# Patient Record
Sex: FEMALE | Race: WHITE | NOT HISPANIC OR LATINO | Employment: OTHER | URBAN - METROPOLITAN AREA
[De-identification: names, ages, dates, MRNs, and addresses within clinical notes are randomized per-mention and may not be internally consistent; named-entity substitution may affect disease eponyms.]

---

## 2017-01-13 DIAGNOSIS — M54.50 CHRONIC MIDLINE LOW BACK PAIN WITHOUT SCIATICA: ICD-10-CM

## 2017-01-13 DIAGNOSIS — G89.29 CHRONIC MIDLINE LOW BACK PAIN WITHOUT SCIATICA: ICD-10-CM

## 2017-01-13 RX ORDER — HYDROCODONE BITARTRATE AND ACETAMINOPHEN 10; 325 MG/1; MG/1
2 TABLET ORAL EVERY 6 HOURS PRN
Qty: 240 TABLET | Refills: 0 | Status: SHIPPED | OUTPATIENT
Start: 2017-01-13 | End: 2017-02-15 | Stop reason: SDUPTHER

## 2017-01-13 NOTE — TELEPHONE ENCOUNTER
Pt is not able to come in this month and is wanting to know if you would refill her pain medication this month and her come in next month. Please advise

## 2017-01-16 RX ORDER — LEVOFLOXACIN 500 MG/1
TABLET, FILM COATED ORAL
Qty: 14 TABLET | Refills: 0 | Status: SHIPPED | OUTPATIENT
Start: 2017-01-16 | End: 2017-01-30 | Stop reason: SDUPTHER

## 2017-01-24 RX ORDER — ERGOCALCIFEROL 1.25 MG/1
CAPSULE ORAL
Qty: 12 CAPSULE | Refills: 3 | Status: SHIPPED | OUTPATIENT
Start: 2017-01-24 | End: 2017-05-08 | Stop reason: HOSPADM

## 2017-01-30 RX ORDER — LEVOFLOXACIN 500 MG/1
TABLET, FILM COATED ORAL
Qty: 14 TABLET | Refills: 0 | Status: SHIPPED | OUTPATIENT
Start: 2017-01-30 | End: 2017-02-15

## 2017-02-06 DIAGNOSIS — E03.9 HYPOTHYROIDISM, ACQUIRED: ICD-10-CM

## 2017-02-06 RX ORDER — LEVOTHYROXINE SODIUM 0.2 MG/1
TABLET ORAL
Qty: 30 TABLET | Refills: 10 | Status: SHIPPED | OUTPATIENT
Start: 2017-02-06 | End: 2017-05-08 | Stop reason: HOSPADM

## 2017-02-15 ENCOUNTER — OFFICE VISIT (OUTPATIENT)
Dept: FAMILY MEDICINE CLINIC | Facility: CLINIC | Age: 62
End: 2017-02-15

## 2017-02-15 VITALS
BODY MASS INDEX: 43.39 KG/M2 | SYSTOLIC BLOOD PRESSURE: 130 MMHG | OXYGEN SATURATION: 96 % | DIASTOLIC BLOOD PRESSURE: 80 MMHG | HEART RATE: 84 BPM | WEIGHT: 270 LBS | HEIGHT: 66 IN | RESPIRATION RATE: 16 BRPM | TEMPERATURE: 97.5 F

## 2017-02-15 DIAGNOSIS — IMO0002 UNCONTROLLED TYPE 2 DIABETES MELLITUS WITH DIABETIC POLYNEUROPATHY, WITH LONG-TERM CURRENT USE OF INSULIN: Primary | ICD-10-CM

## 2017-02-15 DIAGNOSIS — F41.1 GAD (GENERALIZED ANXIETY DISORDER): ICD-10-CM

## 2017-02-15 DIAGNOSIS — I10 ESSENTIAL HYPERTENSION: ICD-10-CM

## 2017-02-15 DIAGNOSIS — I50.42 CHRONIC COMBINED SYSTOLIC AND DIASTOLIC CONGESTIVE HEART FAILURE (HCC): ICD-10-CM

## 2017-02-15 DIAGNOSIS — J41.8 MIXED SIMPLE AND MUCOPURULENT CHRONIC BRONCHITIS (HCC): ICD-10-CM

## 2017-02-15 DIAGNOSIS — F34.1 DYSTHYMIA: ICD-10-CM

## 2017-02-15 DIAGNOSIS — M81.0 OSTEOPOROSIS: ICD-10-CM

## 2017-02-15 DIAGNOSIS — M54.50 CHRONIC MIDLINE LOW BACK PAIN WITHOUT SCIATICA: ICD-10-CM

## 2017-02-15 DIAGNOSIS — G89.29 CHRONIC MIDLINE LOW BACK PAIN WITHOUT SCIATICA: ICD-10-CM

## 2017-02-15 DIAGNOSIS — E03.9 HYPOTHYROIDISM, ACQUIRED: ICD-10-CM

## 2017-02-15 DIAGNOSIS — R60.9 PERIPHERAL EDEMA: ICD-10-CM

## 2017-02-15 DIAGNOSIS — E78.2 MIXED HYPERLIPIDEMIA: ICD-10-CM

## 2017-02-15 PROCEDURE — 99214 OFFICE O/P EST MOD 30 MIN: CPT | Performed by: PHYSICIAN ASSISTANT

## 2017-02-15 RX ORDER — BUMETANIDE 2 MG/1
2 TABLET ORAL DAILY
Qty: 90 TABLET | Refills: 1 | Status: SHIPPED | OUTPATIENT
Start: 2017-02-15 | End: 2017-05-08 | Stop reason: HOSPADM

## 2017-02-15 RX ORDER — METOPROLOL TARTRATE 50 MG/1
50 TABLET, FILM COATED ORAL 2 TIMES DAILY
Qty: 180 TABLET | Refills: 1 | Status: SHIPPED | OUTPATIENT
Start: 2017-02-15 | End: 2017-08-25 | Stop reason: SDUPTHER

## 2017-02-15 RX ORDER — ATORVASTATIN CALCIUM 40 MG/1
40 TABLET, FILM COATED ORAL DAILY
Qty: 90 TABLET | Refills: 3 | Status: SHIPPED | OUTPATIENT
Start: 2017-02-15 | End: 2018-03-14 | Stop reason: SDUPTHER

## 2017-02-15 RX ORDER — HYDROCODONE BITARTRATE AND ACETAMINOPHEN 10; 325 MG/1; MG/1
TABLET ORAL
Qty: 90 TABLET | Refills: 0
Start: 2017-02-15 | End: 2017-03-13 | Stop reason: SDUPTHER

## 2017-02-15 RX ORDER — SPIRONOLACTONE 50 MG/1
50 TABLET, FILM COATED ORAL DAILY
Qty: 90 TABLET | Refills: 1 | Status: ON HOLD | OUTPATIENT
Start: 2017-02-15 | End: 2017-10-16

## 2017-02-15 RX ORDER — ALENDRONATE SODIUM 70 MG/1
70 TABLET ORAL
Qty: 12 TABLET | Refills: 3 | Status: SHIPPED | OUTPATIENT
Start: 2017-02-15 | End: 2017-08-22

## 2017-02-15 NOTE — PATIENT INSTRUCTIONS
Low glycemic index diet  Exercise 30 minutes most days of the week  Make sure you get results on any labs or tests we ordered today  We discussed medications and how to take them as prescribed  Sleep 6-8 hours each night if possible  If you have not signed up for [employer], please activate your code ASAP from your After Visit Summary today    LDL goal <100  LDL goal if heart disease <70  HDL goal >60  Triglyceride goal <150  BP goal =<130/80  Fasting glucose <100    Warned patient that this medication can cause drowsiness and impair them operating machinery, including driving a car.  Caution is advised.

## 2017-02-15 NOTE — PROGRESS NOTES
Subjective   Jared Govea is a 61 y.o. female.     History of Present Illness   Jared Govea 61 y.o. female who presents today for routine follow up check and medication refills.  she has a history of   Patient Active Problem List   Diagnosis   • SERENA (generalized anxiety disorder)   • Chronic back pain   • CHF (congestive heart failure)   • COPD (chronic obstructive pulmonary disease)   • Depression   • Diabetes type 2, uncontrolled   • Swelling   • HLD (hyperlipidemia)   • Essential hypertension   • Hypothyroidism, acquired   • Osteoporosis   • Primary insomnia   .  Since the last visit, she has overall felt tired.  She has Hypertenision and is well controlled on medication.  she has been compliant with current medications have reviewed them.  The patient denies medication side effects.    She declines mammo    Jared Govea female 61 y.o. who presents today for follow up of Depression.  She reports medication is working well, patient desires to continue on Rx, and needs refill. Onset of symptoms was approximately several years ago.  She denies current suicidal and homicidal ideation. Risk factors are chronic illness.  Previous treatment includes current Rx.  She complains of the following medication side effects: none.  The patient has had no previous counseling..    Patient comes in today to see me for the first time. Is a long term patient of Dr. Ventura. Seeing me today due to Dr. Ventura no longer working here. Discussed with pt today that I will not be writing pain medications or benzodiazepines for them past a 90 day window and that we will be aggressively decreasing doses along the way. They have a choice of being sent to pain management for any narcotics taken, if they wish. I will be getting them off of any benzos.they are taking, or they can see a psychiatrist to discuss this further (list of names and phone numbers given to pt today). They are also aware that they are free to transfer their care to a  different facility before the 90 days are up if they wish. After that point, they are aware that they will no longer be seen in this facility by our doctors. Again, reiterated the fact that those types of medications will not be written here past 90 days, and pt voices understanding.    The following portions of the patient's history were reviewed and updated as appropriate: allergies, current medications, past family history, past medical history, past social history, past surgical history and problem list.    Review of Systems   Constitutional: Negative for activity change, appetite change and unexpected weight change.   HENT: Positive for mouth sores. Negative for nosebleeds and trouble swallowing.    Eyes: Negative for pain and visual disturbance.   Respiratory: Negative for chest tightness, shortness of breath and wheezing.    Cardiovascular: Positive for leg swelling. Negative for chest pain and palpitations.   Gastrointestinal: Positive for diarrhea and vomiting. Negative for abdominal pain and blood in stool.   Endocrine: Negative.    Genitourinary: Negative for difficulty urinating and hematuria.   Musculoskeletal: Negative for joint swelling.   Skin: Negative for color change and rash.   Allergic/Immunologic: Negative.    Neurological: Positive for light-headedness. Negative for syncope and speech difficulty.   Hematological: Negative for adenopathy. Bruises/bleeds easily.   Psychiatric/Behavioral: Negative for agitation and confusion.   All other systems reviewed and are negative.      Objective   Physical Exam   Constitutional: She is oriented to person, place, and time. She appears well-developed and well-nourished. No distress.   HENT:   Head: Normocephalic and atraumatic.   Eyes: Conjunctivae and EOM are normal. Pupils are equal, round, and reactive to light. Right eye exhibits no discharge. Left eye exhibits no discharge. No scleral icterus.   Neck: Normal range of motion. Neck supple. No tracheal  deviation present. No thyromegaly present.   Cardiovascular: Normal rate, regular rhythm, normal heart sounds, intact distal pulses and normal pulses.  Exam reveals no gallop.    No murmur heard.  2+ edema   Pulmonary/Chest: Effort normal and breath sounds normal. No respiratory distress. She has no wheezes. She has no rales.   Musculoskeletal: Normal range of motion.   Neurological: She is alert and oriented to person, place, and time. She exhibits normal muscle tone. Coordination normal.   Skin: Skin is warm. No rash noted. No erythema. No pallor.   Psychiatric: She has a normal mood and affect. Her behavior is normal. Judgment and thought content normal.   Nursing note and vitals reviewed.      Assessment/Plan   Problems Addressed this Visit        Cardiovascular and Mediastinum    CHF (congestive heart failure)    Relevant Medications    spironolactone (ALDACTONE) 50 MG tablet    metoprolol tartrate (LOPRESSOR) 50 MG tablet    Other Relevant Orders    Comprehensive metabolic panel    Lipid panel    CBC and Differential    TSH    T4, Free    Hemoglobin A1c    C-Peptide    Microalbumin / creatinine, urine ratio    Phosphorus    Vitamin D 25 Hydroxy    Ambulatory Referral to Cardiology    Ambulatory Referral to Nephrology    Ambulatory Referral to Pain Management    Ferritin    HLD (hyperlipidemia)    Relevant Medications    atorvastatin (LIPITOR) 40 MG tablet    Other Relevant Orders    Comprehensive metabolic panel    Lipid panel    CBC and Differential    TSH    T4, Free    Hemoglobin A1c    C-Peptide    Microalbumin / creatinine, urine ratio    Phosphorus    Vitamin D 25 Hydroxy    Ambulatory Referral to Cardiology    Ambulatory Referral to Nephrology    Ambulatory Referral to Pain Management    Ferritin    Essential hypertension    Relevant Medications    spironolactone (ALDACTONE) 50 MG tablet    metoprolol tartrate (LOPRESSOR) 50 MG tablet    bumetanide (BUMEX) 2 MG tablet    Other Relevant Orders     Comprehensive metabolic panel    Lipid panel    CBC and Differential    TSH    T4, Free    Hemoglobin A1c    C-Peptide    Microalbumin / creatinine, urine ratio    Phosphorus    Vitamin D 25 Hydroxy    Ambulatory Referral to Cardiology    Ambulatory Referral to Nephrology    Ambulatory Referral to Pain Management    Ferritin       Respiratory    COPD (chronic obstructive pulmonary disease)    Relevant Orders    Comprehensive metabolic panel    Lipid panel    CBC and Differential    TSH    T4, Free    Hemoglobin A1c    C-Peptide    Microalbumin / creatinine, urine ratio    Phosphorus    Vitamin D 25 Hydroxy    Ambulatory Referral to Cardiology    Ambulatory Referral to Nephrology    Ambulatory Referral to Pain Management    Ferritin       Endocrine    Diabetes type 2, uncontrolled - Primary    Relevant Orders    Comprehensive metabolic panel    Lipid panel    CBC and Differential    TSH    T4, Free    Hemoglobin A1c    C-Peptide    Microalbumin / creatinine, urine ratio    Phosphorus    Vitamin D 25 Hydroxy    Ambulatory Referral to Cardiology    Ambulatory Referral to Nephrology    Ambulatory Referral to Pain Management    Ferritin    Ambulatory Referral to Endocrinology    Hypothyroidism, acquired    Relevant Medications    metoprolol tartrate (LOPRESSOR) 50 MG tablet    Other Relevant Orders    Comprehensive metabolic panel    Lipid panel    CBC and Differential    TSH    T4, Free    Hemoglobin A1c    C-Peptide    Microalbumin / creatinine, urine ratio    Phosphorus    Vitamin D 25 Hydroxy    Ambulatory Referral to Cardiology    Ambulatory Referral to Nephrology    Ambulatory Referral to Pain Management    Ferritin       Nervous and Auditory    Chronic back pain       Musculoskeletal and Integument    Osteoporosis    Relevant Orders    Comprehensive metabolic panel    Lipid panel    CBC and Differential    TSH    T4, Free    Hemoglobin A1c    C-Peptide    Microalbumin / creatinine, urine ratio    Phosphorus     Vitamin D 25 Hydroxy    Ambulatory Referral to Cardiology    Ambulatory Referral to Nephrology    Ambulatory Referral to Pain Management    Ferritin       Other    SERENA (generalized anxiety disorder)    Relevant Orders    Comprehensive metabolic panel    Lipid panel    CBC and Differential    TSH    T4, Free    Hemoglobin A1c    C-Peptide    Microalbumin / creatinine, urine ratio    Phosphorus    Vitamin D 25 Hydroxy    Ambulatory Referral to Cardiology    Ambulatory Referral to Nephrology    Ambulatory Referral to Pain Management    Ferritin    Depression    Relevant Orders    Comprehensive metabolic panel    Lipid panel    CBC and Differential    TSH    T4, Free    Hemoglobin A1c    C-Peptide    Microalbumin / creatinine, urine ratio    Phosphorus    Vitamin D 25 Hydroxy    Ambulatory Referral to Cardiology    Ambulatory Referral to Nephrology    Ambulatory Referral to Pain Management    Ferritin    RESOLVED: Peripheral edema    Relevant Medications    bumetanide (BUMEX) 2 MG tablet    Other Relevant Orders    Comprehensive metabolic panel    Lipid panel    CBC and Differential    TSH    T4, Free    Hemoglobin A1c    C-Peptide    Microalbumin / creatinine, urine ratio    Phosphorus    Vitamin D 25 Hydroxy    Ambulatory Referral to Cardiology    Ambulatory Referral to Nephrology    Ambulatory Referral to Pain Management    Ferritin

## 2017-02-16 LAB
25(OH)D3+25(OH)D2 SERPL-MCNC: 31.7 NG/ML (ref 30–100)
ALBUMIN SERPL-MCNC: 4 G/DL (ref 3.5–5.2)
ALBUMIN/CREAT UR: 362.7 MG/G CREAT (ref 0–30)
ALBUMIN/GLOB SERPL: 1.7 G/DL
ALP SERPL-CCNC: 78 U/L (ref 39–117)
ALT SERPL-CCNC: 28 U/L (ref 1–33)
AST SERPL-CCNC: 12 U/L (ref 1–32)
BASOPHILS # BLD AUTO: 0.01 10*3/MM3 (ref 0–0.2)
BASOPHILS NFR BLD AUTO: 0.1 % (ref 0–1.5)
BILIRUB SERPL-MCNC: 0.5 MG/DL (ref 0.1–1.2)
BUN SERPL-MCNC: 63 MG/DL (ref 8–23)
BUN/CREAT SERPL: 38.2 (ref 7–25)
C PEPTIDE SERPL-MCNC: 1.3 NG/ML (ref 1.1–4.4)
CALCIUM SERPL-MCNC: 9.6 MG/DL (ref 8.6–10.5)
CHLORIDE SERPL-SCNC: 94 MMOL/L (ref 98–107)
CHOLEST SERPL-MCNC: 160 MG/DL (ref 0–200)
CO2 SERPL-SCNC: 32.5 MMOL/L (ref 22–29)
CREAT SERPL-MCNC: 1.65 MG/DL (ref 0.57–1)
CREAT UR-MCNC: 28.7 MG/DL
EOSINOPHIL # BLD AUTO: 0.02 10*3/MM3 (ref 0–0.7)
EOSINOPHIL NFR BLD AUTO: 0.2 % (ref 0.3–6.2)
ERYTHROCYTE [DISTWIDTH] IN BLOOD BY AUTOMATED COUNT: 13.8 % (ref 11.7–13)
FERRITIN SERPL-MCNC: 753.7 NG/ML (ref 13–150)
GLOBULIN SER CALC-MCNC: 2.4 GM/DL
GLUCOSE SERPL-MCNC: 347 MG/DL (ref 65–99)
HBA1C MFR BLD: 10.46 % (ref 4.8–5.6)
HCT VFR BLD AUTO: 37.6 % (ref 35.6–45.5)
HDLC SERPL-MCNC: 88 MG/DL (ref 40–60)
HGB BLD-MCNC: 11.8 G/DL (ref 11.9–15.5)
IMM GRANULOCYTES # BLD: 0.21 10*3/MM3 (ref 0–0.03)
IMM GRANULOCYTES NFR BLD: 1.6 % (ref 0–0.5)
LDLC SERPL CALC-MCNC: 45 MG/DL (ref 0–100)
LYMPHOCYTES # BLD AUTO: 1.36 10*3/MM3 (ref 0.9–4.8)
LYMPHOCYTES NFR BLD AUTO: 10.3 % (ref 19.6–45.3)
MCH RBC QN AUTO: 30.4 PG (ref 26.9–32)
MCHC RBC AUTO-ENTMCNC: 31.4 G/DL (ref 32.4–36.3)
MCV RBC AUTO: 96.9 FL (ref 80.5–98.2)
MICROALBUMIN UR-MCNC: 104.1 UG/ML
MONOCYTES # BLD AUTO: 0.71 10*3/MM3 (ref 0.2–1.2)
MONOCYTES NFR BLD AUTO: 5.4 % (ref 5–12)
NEUTROPHILS # BLD AUTO: 10.88 10*3/MM3 (ref 1.9–8.1)
NEUTROPHILS NFR BLD AUTO: 82.4 % (ref 42.7–76)
PHOSPHATE SERPL-MCNC: 4 MG/DL (ref 2.5–4.5)
PLATELET # BLD AUTO: 167 10*3/MM3 (ref 140–500)
POTASSIUM SERPL-SCNC: 4.8 MMOL/L (ref 3.5–5.2)
PROT SERPL-MCNC: 6.4 G/DL (ref 6–8.5)
RBC # BLD AUTO: 3.88 10*6/MM3 (ref 3.9–5.2)
SODIUM SERPL-SCNC: 139 MMOL/L (ref 136–145)
T4 FREE SERPL-MCNC: 1.5 NG/DL (ref 0.93–1.7)
TRIGL SERPL-MCNC: 135 MG/DL (ref 0–150)
TSH SERPL DL<=0.005 MIU/L-ACNC: 0.57 MIU/ML (ref 0.27–4.2)
VLDLC SERPL CALC-MCNC: 27 MG/DL (ref 5–40)
WBC # BLD AUTO: 13.19 10*3/MM3 (ref 4.5–10.7)

## 2017-03-13 DIAGNOSIS — G89.29 CHRONIC MIDLINE LOW BACK PAIN WITHOUT SCIATICA: ICD-10-CM

## 2017-03-13 DIAGNOSIS — M54.50 CHRONIC MIDLINE LOW BACK PAIN WITHOUT SCIATICA: ICD-10-CM

## 2017-03-13 RX ORDER — HYDROCODONE BITARTRATE AND ACETAMINOPHEN 10; 325 MG/1; MG/1
TABLET ORAL
Qty: 90 TABLET | Refills: 0
Start: 2017-03-13 | End: 2017-04-17 | Stop reason: SDUPTHER

## 2017-03-27 RX ORDER — INSULIN GLARGINE 100 [IU]/ML
65 INJECTION, SOLUTION SUBCUTANEOUS NIGHTLY
Qty: 6 PEN | Refills: 0 | Status: SHIPPED | OUTPATIENT
Start: 2017-03-27 | End: 2017-03-30 | Stop reason: SDUPTHER

## 2017-03-30 ENCOUNTER — OFFICE VISIT (OUTPATIENT)
Dept: ENDOCRINOLOGY | Age: 62
End: 2017-03-30

## 2017-03-30 VITALS
BODY MASS INDEX: 44.1 KG/M2 | SYSTOLIC BLOOD PRESSURE: 140 MMHG | WEIGHT: 274.4 LBS | HEIGHT: 66 IN | DIASTOLIC BLOOD PRESSURE: 68 MMHG

## 2017-03-30 DIAGNOSIS — E11.42 DIABETIC PERIPHERAL NEUROPATHY (HCC): ICD-10-CM

## 2017-03-30 DIAGNOSIS — I10 ESSENTIAL HYPERTENSION: ICD-10-CM

## 2017-03-30 DIAGNOSIS — E78.2 MIXED HYPERLIPIDEMIA: ICD-10-CM

## 2017-03-30 DIAGNOSIS — IMO0002 UNCONTROLLED TYPE 2 DIABETES MELLITUS WITH DIABETIC POLYNEUROPATHY, WITH LONG-TERM CURRENT USE OF INSULIN: Primary | ICD-10-CM

## 2017-03-30 DIAGNOSIS — E03.9 HYPOTHYROIDISM, ACQUIRED: ICD-10-CM

## 2017-03-30 PROBLEM — R59.0 LAD (LYMPHADENOPATHY), MEDIASTINAL: Status: ACTIVE | Noted: 2017-03-30

## 2017-03-30 PROBLEM — J86.9 EMPYEMA (HCC): Status: ACTIVE | Noted: 2017-03-30

## 2017-03-30 PROBLEM — R91.8 LUNG MASS: Status: ACTIVE | Noted: 2017-03-30

## 2017-03-30 PROBLEM — N18.9 CKD (CHRONIC KIDNEY DISEASE): Status: ACTIVE | Noted: 2017-03-30

## 2017-03-30 PROBLEM — J98.11 ATELECTASIS: Status: ACTIVE | Noted: 2017-03-30

## 2017-03-30 PROBLEM — J94.8 HYDROTHORAX: Status: ACTIVE | Noted: 2017-03-30

## 2017-03-30 PROBLEM — K61.1 ABSCESS, PERIRECTAL: Status: ACTIVE | Noted: 2017-03-30

## 2017-03-30 PROCEDURE — 99204 OFFICE O/P NEW MOD 45 MIN: CPT | Performed by: NURSE PRACTITIONER

## 2017-03-30 RX ORDER — PREDNISONE 20 MG/1
5 TABLET ORAL 2 TIMES DAILY
COMMUNITY
Start: 2017-03-13 | End: 2017-05-08 | Stop reason: HOSPADM

## 2017-03-30 RX ORDER — LIRAGLUTIDE 6 MG/ML
1.8 INJECTION SUBCUTANEOUS DAILY
Qty: 9 ML | Refills: 5 | Status: SHIPPED | OUTPATIENT
Start: 2017-03-30 | End: 2017-04-13 | Stop reason: SDUPTHER

## 2017-03-30 RX ORDER — INSULIN GLARGINE 100 [IU]/ML
40 INJECTION, SOLUTION SUBCUTANEOUS 2 TIMES DAILY
Qty: 10 PEN | Refills: 5 | Status: SHIPPED | OUTPATIENT
Start: 2017-03-30 | End: 2017-04-11 | Stop reason: CLARIF

## 2017-03-30 NOTE — PROGRESS NOTES
"Subjective   Jared Govea is a 61 y.o. female is being seen for consultation today at the request of Shantell Ford PA-C  Chief Complaint   Patient presents with   • Diabetes     recent labs (outside); didnt bring BG monitor; test BG 2-3XD   • Hypothyroidism     TESTED BS THOUGHT WAS HIGH IT    • Hyperlipidemia     pt states swelling in both legs and feet. and tingling and numbness in feet only   • Hypertension     wants to see if can get a refill for prednison and advair diskus     /68  Ht 66\" (167.6 cm)  Wt 274 lb 6.4 oz (124 kg)  BMI 44.29 kg/m2  Current Outpatient Prescriptions on File Prior to Visit   Medication Sig   • albuterol (PROVENTIL HFA;VENTOLIN HFA) 108 (90 BASE) MCG/ACT inhaler Inhale 2 puffs every 4 (four) hours as needed for wheezing.   • alendronate (FOSAMAX) 70 MG tablet Take 1 tablet by mouth Every 7 (Seven) Days. For Osteoporosis   • aspirin 81 MG EC tablet Take 81 mg by mouth daily.   • atorvastatin (LIPITOR) 40 MG tablet Take 1 tablet by mouth Daily. For cholesterol   • B Complex Vitamins (VITAMIN B COMPLEX PO) Take 1 tablet by mouth daily.   • bumetanide (BUMEX) 2 MG tablet Take 1 tablet by mouth Daily. For edema   • cetirizine (ZyrTEC) 10 MG tablet Take 10 mg by mouth every night.   • famotidine (PEPCID) 20 MG tablet Take 20 mg by mouth daily.   • fluticasone-salmeterol (ADVAIR DISKUS) 250-50 MCG/DOSE DISKUS Inhale 1 puff 2 (two) times a day.   • guaiFENesin (MUCINEX) 600 MG 12 hr tablet Take 1 tablet by mouth 2 (two) times a day.   • hydrALAZINE (APRESOLINE) 100 MG tablet TAKE ONE TABLET BY MOUTH TWICE A DAY X 30 DAYS   • HYDROcodone-acetaminophen (NORCO)  MG per tablet One PO Q 8 hours prn pain   • insulin aspart (NOVOLOG FLEXPEN) 100 UNIT/ML solution pen-injector sc pen Use as directed four times a day per sliding scale   • LANTUS SOLOSTAR 100 UNIT/ML injection pen Inject 65 Units under the skin Every Night. And 10 units in AM   • levalbuterol (XOPENEX) 0.63 MG/3ML " nebulizer solution Take 1 ampule by nebulization every 4 (four) hours as needed for wheezing.   • levothyroxine (SYNTHROID, LEVOTHROID) 200 MCG tablet TAKE ONE TABLET BY MOUTH DAILY   • metoprolol tartrate (LOPRESSOR) 50 MG tablet Take 1 tablet by mouth 2 (Two) Times a Day. For heart   • QUEtiapine (SEROquel) 100 MG tablet TAKE ONE TABLET BY MOUTH TWICE A DAY   • spironolactone (ALDACTONE) 50 MG tablet Take 1 tablet by mouth Daily. For edema   • vitamin D (ERGOCALCIFEROL) 31277 UNITS capsule capsule TAKE ONE CAPSULE BY MOUTH ONCE WEEKLY     No current facility-administered medications on file prior to visit.      Family History   Problem Relation Age of Onset   • Cancer Mother      bone     Social History   Substance Use Topics   • Smoking status: Former Smoker     Years: 40.00     Quit date: 10/4/2010   • Smokeless tobacco: Former User     Quit date: 8/3/2012   • Alcohol use No     Allergies   Allergen Reactions   • Erythromycin Nausea And Vomiting   • Ativan [Lorazepam]    • Erythromycin Ethylsuccinate    • Tape      Paper tape       Past Medical History:   Diagnosis Date   • CHF (congestive heart failure)    • Chronic back pain    • COPD (chronic obstructive pulmonary disease)    • Depression    • Edema    • Encounter for eye exam     several years ago   • Essential hypertension    • SERENA (generalized anxiety disorder)    • GERD (gastroesophageal reflux disease)    • History of colonoscopy     never   • HLD (hyperlipidemia)    • Hypothyroidism, acquired    • Osteopetrosis    • Type 2 diabetes mellitus          History of Present Illness  Encounter Diagnoses   Name Primary?   • Uncontrolled type 2 diabetes mellitus with diabetic polyneuropathy, with long-term current use of insulin Yes   • Hypothyroidism, acquired    • Essential hypertension    • Mixed hyperlipidemia     The following portions of the patient's history were reviewed and updated as appropriate: allergies, current medications, past family history,  past medical history, past social history, past surgical history and problem list.  This is a 61-year-old female patient here today as a new patient referral for uncontrolled type 2 diabetes.  She states she has had diabetes for a very long time.  She also has a history of COPD, asthma, and congestive heart failure.  She denies any history of medullary thyroid cancer or pancreatitis.  She has been taking Lantus insulin 65 units at night and 10 units in the morning.  She states she has been taking her NovoLog insulin twice daily once in the morning and again at bedtime.  She is not pairing this insulin with meals.  She states she has woke up before at night with low blood sugar in the 60 range.  She is symptomatic with hypoglycemia.  She does follow with a pulmonologist for her breathing issues.  She is on continuous nasal oxygen at 4 L/m.  She does have some peripheral neuropathy it gets worse especially at night.  She has 2-3+ edema in her lower extremities.  She is using a blood glucose meter that his name brand [employer].  She did not bring her blood glucose meter in with her today.  She is accompanied at today's visit by a family member.  She is currently in a wheelchair as motorized for mobility.  She is short of breath with minimal exertion and talking.  Her medications were reviewed at today's visit.  She does have kidney disease and has not been evaluated by a nephrologist.  She states she is needing prescriptions today for prednisone and Advair however they were instructed to contact her pharmacy and request refills so he can be sent to the appropriate provider.  Review of Systems   Constitutional: Positive for fatigue.   HENT: Negative for trouble swallowing.    Eyes: Negative for visual disturbance.   Respiratory: Positive for shortness of breath.    Gastrointestinal: Negative for nausea.   Endocrine: Negative for polyuria.   Genitourinary: Negative for urgency.   Musculoskeletal: Negative for joint  swelling.   Skin: Negative for wound.   Allergic/Immunologic: Negative for immunocompromised state.   Neurological: Positive for numbness.   Hematological: Negative for adenopathy.   Psychiatric/Behavioral: The patient is not nervous/anxious.        Objective   Physical Exam   Constitutional: She is oriented to person, place, and time. She appears well-developed and well-nourished. No distress.   Obese   HENT:   Head: Normocephalic and atraumatic.   Right Ear: External ear normal.   Left Ear: External ear normal.   Nose: Nose normal.   Mouth/Throat: Oropharynx is clear and moist. No oropharyngeal exudate.   Eyes: EOM are normal. Pupils are equal, round, and reactive to light. Right eye exhibits no discharge. Left eye exhibits no discharge.   Neck: Trachea normal, normal range of motion and full passive range of motion without pain. Neck supple. No tracheal tenderness present. Carotid bruit is not present. No tracheal deviation, no edema and no erythema present. No thyroid mass and no thyromegaly present.   Cardiovascular: Normal rate, regular rhythm, normal heart sounds and intact distal pulses.  Exam reveals no gallop and no friction rub.    No murmur heard.  Pulmonary/Chest: Breath sounds normal. No stridor. No respiratory distress. She has no wheezes. She has no rales.   Shortness of breath with minimal exertion.  He is currently using nasal oxygen at 4 L/m portable   Abdominal: Soft. Bowel sounds are normal. She exhibits no distension.   Musculoskeletal: She exhibits edema. She exhibits no deformity.   Uses a motorized wheelchair has to 3+ edema in bilateral lower extremities   Lymphadenopathy:     She has no cervical adenopathy.   Neurological: She is alert and oriented to person, place, and time.   Skin: Skin is warm and dry. No rash noted. She is not diaphoretic. No erythema. No pallor.   Color ashen   Psychiatric: She has a normal mood and affect. Her behavior is normal. Judgment and thought content normal.    Nursing note and vitals reviewed.      Results for orders placed or performed in visit on 02/15/17   Comprehensive metabolic panel   Result Value Ref Range    Glucose 347 (H) 65 - 99 mg/dL    BUN 63 (H) 8 - 23 mg/dL    Creatinine 1.65 (H) 0.57 - 1.00 mg/dL    eGFR Non African Am 32 (L) >60 mL/min/1.73    eGFR  Am 38 (L) >60 mL/min/1.73    BUN/Creatinine Ratio 38.2 (H) 7.0 - 25.0    Sodium 139 136 - 145 mmol/L    Potassium 4.8 3.5 - 5.2 mmol/L    Chloride 94 (L) 98 - 107 mmol/L    Total CO2 32.5 (H) 22.0 - 29.0 mmol/L    Calcium 9.6 8.6 - 10.5 mg/dL    Total Protein 6.4 6.0 - 8.5 g/dL    Albumin 4.00 3.50 - 5.20 g/dL    Globulin 2.4 gm/dL    A/G Ratio 1.7 g/dL    Total Bilirubin 0.5 0.1 - 1.2 mg/dL    Alkaline Phosphatase 78 39 - 117 U/L    AST (SGOT) 12 1 - 32 U/L    ALT (SGPT) 28 1 - 33 U/L   Lipid panel   Result Value Ref Range    Total Cholesterol 160 0 - 200 mg/dL    Triglycerides 135 0 - 150 mg/dL    HDL Cholesterol 88 (H) 40 - 60 mg/dL    VLDL Cholesterol 27 5 - 40 mg/dL    LDL Cholesterol  45 0 - 100 mg/dL   TSH   Result Value Ref Range    TSH 0.574 0.270 - 4.200 mIU/mL   T4, Free   Result Value Ref Range    Free T4 1.50 0.93 - 1.70 ng/dL   Hemoglobin A1c   Result Value Ref Range    Hemoglobin A1C 10.46 (H) 4.80 - 5.60 %   C-Peptide   Result Value Ref Range    C-Peptide 1.3 1.1 - 4.4 ng/mL   Microalbumin / creatinine, urine ratio   Result Value Ref Range    Creatinine, Urine 28.7 Not Estab. mg/dL    Microalbumin, Urine 104.1 Not Estab. ug/mL    Microalbumin/Creatinine Ratio Urine 362.7 (H) 0.0 - 30.0 mg/g creat   Phosphorus   Result Value Ref Range    Phosphorus 4.0 2.5 - 4.5 mg/dL   Vitamin D 25 Hydroxy   Result Value Ref Range    25 Hydroxy, Vitamin D 31.7 30.0 - 100.0 ng/mL   Ferritin   Result Value Ref Range    Ferritin 753.70 (H) 13.00 - 150.00 ng/mL   CBC and Differential   Result Value Ref Range    WBC 13.19 (H) 4.50 - 10.70 10*3/mm3    RBC 3.88 (L) 3.90 - 5.20 10*6/mm3    Hemoglobin 11.8 (L)  11.9 - 15.5 g/dL    Hematocrit 37.6 35.6 - 45.5 %    MCV 96.9 80.5 - 98.2 fL    MCH 30.4 26.9 - 32.0 pg    MCHC 31.4 (L) 32.4 - 36.3 g/dL    RDW 13.8 (H) 11.7 - 13.0 %    Platelets 167 140 - 500 10*3/mm3    Neutrophil Rel % 82.4 (H) 42.7 - 76.0 %    Lymphocyte Rel % 10.3 (L) 19.6 - 45.3 %    Monocyte Rel % 5.4 5.0 - 12.0 %    Eosinophil Rel % 0.2 (L) 0.3 - 6.2 %    Basophil Rel % 0.1 0.0 - 1.5 %    Neutrophils Absolute 10.88 (H) 1.90 - 8.10 10*3/mm3    Lymphocytes Absolute 1.36 0.90 - 4.80 10*3/mm3    Monocytes Absolute 0.71 0.20 - 1.20 10*3/mm3    Eosinophils Absolute 0.02 0.00 - 0.70 10*3/mm3    Basophils Absolute 0.01 0.00 - 0.20 10*3/mm3    Immature Granulocyte Rel % 1.6 (H) 0.0 - 0.5 %    Immature Grans Absolute 0.21 (H) 0.00 - 0.03 10*3/mm3       Assessment/Plan   Jared was seen today for diabetes, hypothyroidism, hyperlipidemia and hypertension.    Diagnoses and all orders for this visit:    Uncontrolled type 2 diabetes mellitus with diabetic polyneuropathy, with long-term current use of insulin    Hypothyroidism, acquired    Essential hypertension    Mixed hyperlipidemia        In summary, patient was seen and examined.  She has a history of uncontrolled type 2 diabetes.  She also has some kidney disease and pulmonary issues.  I reviewed all her current medications and have made the following changes as listed below.  I have cautioned them on hypoglycemic reactions and to notify the office if she has any blood sugars of less than 70.  Also asked that they contact me with the blood sugar readings in a couple weeks so that I can make further adjustments to the medications.  She was started on Victoza and instructed on the mechanism of action of medication.  She was also instructed on how to dose and inject.  She will follow-up in 3-4 months with labs prior to her next appointment.  No labs were done at today's visit.  All her recent labs from February were reviewed.  Her hemoglobin A1c reflects poor diabetes  control 10.46.  Her creatinine was 1.65.  Her cholesterol reflects that is under good control.       Check your blood sugar readings 4 times daily and let the office know sooner if your blood sugars dropped less than 70 mg/dL.  Victoza 0.6 mg subcutaneous once daily for 1 week then increase to 1.2 mg once daily for 1 week and then increase to 1.8 mg daily for maintenance dose.  You may titrate up slowly if you have problems with nausea  Increase Lantus to 40 units twice daily  NovoLog 15 units prior to each meal.  If you skip that meal then please skip that dose  Referral has been made for nephrologist for evaluation of kidney disease

## 2017-03-30 NOTE — PATIENT INSTRUCTIONS
Check your blood sugar readings 4 times daily and let the office know sooner if your blood sugars dropped less than 70 mg/dL.  Victoza 0.6 mg subcutaneous once daily for 1 week then increase to 1.2 mg once daily for 1 week and then increase to 1.8 mg daily for maintenance dose.  You may titrate up slowly if you have problems with nausea  Increase Lantus to 40 units twice daily  NovoLog 15 units prior to each meal.  If you skip that meal then please skip that dose  Referral has been made for nephrologist for evaluation of kidney disease

## 2017-04-12 DIAGNOSIS — I10 ESSENTIAL HYPERTENSION: ICD-10-CM

## 2017-04-12 RX ORDER — HYDRALAZINE HYDROCHLORIDE 100 MG/1
100 TABLET, FILM COATED ORAL 2 TIMES DAILY
Qty: 60 TABLET | Refills: 4 | Status: SHIPPED | OUTPATIENT
Start: 2017-04-12 | End: 2017-08-22

## 2017-04-13 RX ORDER — LIRAGLUTIDE 6 MG/ML
1.2 INJECTION SUBCUTANEOUS DAILY
Qty: 9 ML | Refills: 5 | Status: SHIPPED | OUTPATIENT
Start: 2017-04-13 | End: 2017-07-05

## 2017-04-14 ENCOUNTER — TELEPHONE (OUTPATIENT)
Dept: ENDOCRINOLOGY | Age: 62
End: 2017-04-14

## 2017-04-14 NOTE — TELEPHONE ENCOUNTER
----- Message from OPAL Arias sent at 4/13/2017  3:45 PM EDT -----  Contact: ALYSSA (PT'S. SISTER ON HIPAA FORM)  Decrease dose to 1.2 mg if she tolerated that dose.   ----- Message -----     From: Lydia Butts MA     Sent: 4/13/2017   3:18 PM       To: OPAL Arias        ----- Message -----     From: Layne Rangel     Sent: 4/13/2017   9:35 AM       To: Lydia Butts MA    THIS IS A [facility] PATIENT.     ALYSSA SAYS THAT SINCE THE PATIENTS VICTOZA WAS INCREASED TO 1.8 MG DAILY, SHE HAS BEEN VERY SICK TO HER STOMACH. SHE CAN'T EAT AND IF SHE DOES EAT, SHE HAS A HARD TIME KEEPING FOOD DOWN. THIS STOMACH SICKNESS CORRELATES WITH THE VICTOZA INCREASE SO THIS LEADS HER TO BELIEVE THAT THIS IS THE REASON. PLEASE CALL TO ADVISE THE PATIENT ON WHAT TO DO.     Spoke to patient's sister 04/14/2017 2:02PM

## 2017-04-17 ENCOUNTER — OFFICE VISIT (OUTPATIENT)
Dept: PAIN MEDICINE | Facility: CLINIC | Age: 62
End: 2017-04-17

## 2017-04-17 VITALS
OXYGEN SATURATION: 89 % | WEIGHT: 265.6 LBS | RESPIRATION RATE: 16 BRPM | TEMPERATURE: 97.5 F | HEIGHT: 66 IN | HEART RATE: 82 BPM | BODY MASS INDEX: 42.68 KG/M2 | SYSTOLIC BLOOD PRESSURE: 154 MMHG | DIASTOLIC BLOOD PRESSURE: 72 MMHG

## 2017-04-17 DIAGNOSIS — E11.42 DIABETIC PERIPHERAL NEUROPATHY (HCC): ICD-10-CM

## 2017-04-17 DIAGNOSIS — M47.816 SPONDYLOSIS OF LUMBAR REGION WITHOUT MYELOPATHY OR RADICULOPATHY: ICD-10-CM

## 2017-04-17 DIAGNOSIS — M54.50 CHRONIC MIDLINE LOW BACK PAIN WITHOUT SCIATICA: ICD-10-CM

## 2017-04-17 DIAGNOSIS — G89.29 CHRONIC BILATERAL LOW BACK PAIN WITHOUT SCIATICA: ICD-10-CM

## 2017-04-17 DIAGNOSIS — M54.50 CHRONIC BILATERAL LOW BACK PAIN WITHOUT SCIATICA: ICD-10-CM

## 2017-04-17 DIAGNOSIS — G89.29 CHRONIC MIDLINE LOW BACK PAIN WITHOUT SCIATICA: ICD-10-CM

## 2017-04-17 DIAGNOSIS — G89.4 CHRONIC PAIN SYNDROME: Primary | ICD-10-CM

## 2017-04-17 DIAGNOSIS — M19.90 ARTHRITIS: ICD-10-CM

## 2017-04-17 LAB
POC AMPHETAMINES: NEGATIVE
POC BARBITURATES: NEGATIVE
POC BENZODIAZEPHINES: NEGATIVE
POC COCAINE: NEGATIVE
POC METHADONE: NEGATIVE
POC METHAMPHETAMINE SCREEN URINE: NEGATIVE
POC OPIATES: POSITIVE
POC OXYCODONE: POSITIVE
POC PHENCYCLIDINE: NEGATIVE
POC PROPOXYPHENE: NEGATIVE
POC THC: NEGATIVE
POC TRICYCLIC ANTIDEPRESSANTS: POSITIVE

## 2017-04-17 PROCEDURE — 80305 DRUG TEST PRSMV DIR OPT OBS: CPT | Performed by: ANESTHESIOLOGY

## 2017-04-17 PROCEDURE — 99205 OFFICE O/P NEW HI 60 MIN: CPT | Performed by: ANESTHESIOLOGY

## 2017-04-17 RX ORDER — HYDROCODONE BITARTRATE AND ACETAMINOPHEN 10; 325 MG/1; MG/1
1 TABLET ORAL EVERY 6 HOURS PRN
Qty: 120 TABLET | Refills: 0 | Status: SHIPPED | OUTPATIENT
Start: 2017-04-17 | End: 2017-05-26 | Stop reason: SDUPTHER

## 2017-04-17 RX ORDER — HYDROCODONE BITARTRATE AND ACETAMINOPHEN 10; 325 MG/1; MG/1
1 TABLET ORAL EVERY 6 HOURS PRN
Qty: 120 TABLET | Refills: 0 | Status: SHIPPED | OUTPATIENT
Start: 2017-04-17 | End: 2017-04-17 | Stop reason: SDUPTHER

## 2017-04-17 NOTE — PATIENT INSTRUCTIONS
--- Follow-up for procedure.... Bilateral L25 Lumbar Medial Branch Block, x2,1 wk apart (diagnostic, NO Steroids)    -- Urine drug screen for routine confirmation    -- slight dose escalation in hydrocodone therapy...  As a trial, pending urine drug screen confirmation and also pain psychologist substance abuse evaluation    A medication management agreement is signed by the patient and the provider today.  Reviewed with the patient that this contract is specific to controlled substances, specifically pain medication.  Reviewed core of pain medicine is to decrease pain and increase functional level.  Reviewed the medication must be picked up as a written prescription from this office and will not be called into any pharmacy.  Reviewed the use of [place] within the office setting.  Reviewed causes for potential of discontinuation of opiates,  including but not limited to consideration for diversion, obtaining other controlled substances from other license practitioners, or  inappropriate office behavior.  Patient understands to use a controlled substance as prescribed by physician and to avoid improper use of controlled substances.  Patient will also verbalized understanding that prescriptions to be filled at the same pharmacy  unless office staff is made aware of this.  Patient understands they can be submitted to random urine drug screens and/or random pill counts on any request.  Patient understands they are not to receive early refills.  The patient must produce an official police report for any effort to replace controlled substances that are lost or stolen.  No use of illegal street drugs while receiving controlled substances from this prescribing provider.  The patient is to take medication as prescribed  and not deviate from the normal schedule without consultation from the provider.  Patient is not to share the medication with others or to take medication with alcohol or other sedatives.  Use caution when  "driving or operating machinery.  Alert office if the patient becomes pregnant or begins nursing a child.  Reviewed the use of controlled substances recreates a risk for respiratory depression, which may result in serious harm or even death.  Must always keep the prescription in the original container.  Patient is to store controlled substances in a locked cabinet or other secure storage unit that is cool, dry and out of sunlight.  Patient must immediately notify office if any controlled substances is stolen or improperly taken by another individual.  Reviewed risk for physical dependence, tolerance and addiction.    --  -------  Opioid Risk Assessment / Formalized Substance Abuse Risk Stratification:  Why is a consultation with the pain psychologist needed?    The medical decision making when deciding whether or not to treat chronic painful conditions with prescription pain medications is a complex process.  The decisions are even more complex when considering whether or not there is a medical need for the use of a controlled substance.  Not only is there consideration of the medical or anatomic pathology, but also safety, other medical conditions, other medications or supplements used, social factors, psychological factors, and the multimodal or holistic treatment plan that is most reasonable for a given condition.      In our practice, we adhere to the most fundamental value of modern medicine... \"First Do No Harm.\"  There can be significant risks to the use of prescribed controlled substances, including opioid pain medications.  Medications are tools.  All tools can be used properly or improperly.  There are risks to the use of controlled substances.  We will follow best medical practices to the best of our ability, including observance of the [place] statutes as well as national & CDC guidelines.    The risks and benefits of prescription pain medications must be weighed.  Many factors can put a patient at risk " for misuse of pain medication.  If we feel that there are concerns, then we will seek the opinion of our colleague, the pain psychologist, to assist in assist in risk stratification and development of a plan to decrease risk.   (For more information, see Section 4 of [place] 9:260, which is [place] statute.)    If a patient has demonstrated elements of noncompliance, then we are required to seek the opinion of a mental health professional for a substance abuse risk assessment if we want to even consider the continuation of prescription pain medication.  Otherwise, we are required by this law to stop prescribing pain medication altogether, and we will need to discharge the patient from our practice.  (See Section 5 of [place] 9:260 for more information.)    The pain psychologist is a valuable member of our multidisciplinary pain management practice model.  Patients should expect a long discussion with the psychologist to help with understanding about the patient's condition and current treatment.  Also, there may be some psychological testing assessments given.  The information gathered is important to all of us.  Not only do we feel it is necessary as part of your treatment plan, we also feel that the evaluation is required by our best practice guidelines and by the statutes of the [facility].   -------      -------  Education about Medial Branch Blockade and RF Therapy:    This medial branch blockade (MBB) suggested is intended for diagnostic purposes, with the intent of offering the patient Radiofrequency thermal rhizotomy (RF) if the MBB is diagnostically effective.  The diagnostic blockade is necessary to determine the likelihood that RF therapy could be efficacious in providing long term relief to the patient.    Medial branches are sensory nerve branches that connect to a facet joint and transmit sensations & pain signals from that joint.  Facet is a term for the type of joints found in the  "spine.  Medial branches are the nerves that go to a facet, and therefore are also sometimes called \"facet joint nerves\" (FJNs).      In a medial branch blockade procedure, xray fluoroscopy is used to verify the locations of the outside of the joint lines which are being targeted.  Under xray guidance, needles are placed to these areas.  Contrast dye is injected to confirm proper placement, with dye flowing over the joint area, and to ensure that the dye does not flow into unintended areas such as a vein.  When this is confirmed, local anesthetic is injected to block the medial branch at that joint level.      If MBBs are diagnostically successful in blocking pain, then the patient is most likely a great candidate for Radiofrequency of those facet joint nerves.  In the RF procedure, needles are placed to the joint lines in the same fashion, and after testing, the needle tips are heated to thermally treat the nerves, blocking the nerves by in essence damaging the nerves with the heat treatment.       Medically, a successful RF procedure should provide a patient with 50% pain relief or more for at least 6 months.  Clinical experience suggests that successful patients receive relief more in the range of 12 months on average.  We also discussed that a fortunate minority of patients receive therapeutic success from the MBB, and may not require RF ablation.  If a patient receives more than 8 weeks of relief from MBB, then occasional repeat MBB for therapeutic purposes is a very reasonable alternative therapy.  This course of therapy is consistent with our LCDs according to our CMS  in the area, and therefore other insurance providers should follow accordingly.  We will monitor our patients to screen for these therapeutic responders and will offer RF therapy only when necessary.        We discussed that MBB & RF are not without risks.  Guidelines regarding anticoagulant use & neuraxial procedures will be " respected.  Patients that are ill or otherwise may be at risk for sepsis will not have their spines accessed by neuraxial injections of any type.  This patient will not be offered these therapies if there is an increased risk.   We discussed that there is a risk of postprocedural pain and also a risk of worsening of clinical picture with these procedures as with any neuraxial procedure.    -------

## 2017-04-17 NOTE — PROGRESS NOTES
"CHIEF COMPLAINT      Subjective   Jared Govea is a 61 y.o. female.   She presents to the office for evaluation of pain. She was referred here by Shantell Ford .     Pt presents with head ache pain, back pain, and neuropathy in feet. She has had back pain and osteoporosis for years. She states it is from all the prednisone she had to take which made her bones brittle. States she also worked for 30 years doing a lot of lifting. She has been taking hydrocodone for 15-20 years to manage pain. Describes her lower back pain as stabbing. Standing, walking, and doing any activity aggravates it. She has to stop and rest to alleviate pain and she also gets out of breath due to having COPD. She also c/o head aches that she gets 2-3 times a week and she has had them for \"quite awhile.\" Sister said it may be due to uncontrolled diabetes, that when it gets too high it spikes a h/a. She also has to use inhalers which makes her jittery and could contribute to h/a. She is on steroids. She has never had back or neck surgery. She has never had epidurals for back pain or PT for pain. She has never been to pain management. She states her primary doctor decreased her hydrocodone dose- she uses to take 2 tablets three times a day, and now takes 1 tablet three times a day. She has lymphedema in her legs and when it swells she gets shooting pain in her legs and tingling plus numbness in her feet. She has cutting and tingling pains in her feet. No recent MRIs.      This 61-year-old woman has a very complex medical history with high cholesterol hypothyroidism hypertension COPD diabetes with peripheral neuropathy GERD is anxious and depressive symptomatology and O2 dependence.  Primary care provider is Shantell BERRIOS.  She also follows with Gricelda JOSEPH at [facility] endocrinology.    She is a , and has one child.  She has retired as a .  She does not drink alcohol and does not smoke.  She has a 40-60-pack-year " smoking history and quit smoking in 2012.  She does not use any illicit substances and does not engage in any risky behaviors.  She had her flu vaccine this season.    She complains of throbbing headaches that averages a 6 out of 10 when they flare.  She complains of severe ankle and foot pain with cutting pains and itching pains and some other tingling and burning sensation bilaterally in the feet.  She complains of stabbing back pain which has an axial type back pain which is fairly constant and rates a 6 out of 10.  Her pain force frequently and makes her feel like she wants to cry when it flares.        Back Pain   This is a chronic problem. The current episode started more than 1 year ago. The problem occurs constantly. The problem has been gradually worsening since onset. The pain is present in the lumbar spine. The quality of the pain is described as aching. The pain does not radiate. The pain is severe. The symptoms are aggravated by bending, standing and twisting. Stiffness is present in the morning, at night and all day. Associated symptoms include chest pain (pt states due to congestive heart failure), headaches, numbness and weakness. Pertinent negatives include no abdominal pain or fever. Risk factors include history of steroid use, poor posture and obesity. She has tried analgesics, bed rest, chiropractic manipulation, NSAIDs, walking and heat (she is not a good candidate for long term NSAIDs) for the symptoms. The treatment provided mild relief.        PEG Assessment   What number best describes your pain on average in the past week?7  What number best describes how, during the past week, pain has interfered with your enjoyment of life?7  What number best describes how, during the past week, pain has interfered with your general activity?  7        Current Outpatient Prescriptions:   •  albuterol (PROVENTIL HFA;VENTOLIN HFA) 108 (90 BASE) MCG/ACT inhaler, Inhale 2 puffs every 4 (four) hours as needed  for wheezing., Disp: , Rfl:   •  alendronate (FOSAMAX) 70 MG tablet, Take 1 tablet by mouth Every 7 (Seven) Days. For Osteoporosis, Disp: 12 tablet, Rfl: 3  •  aspirin 81 MG EC tablet, Take 81 mg by mouth daily., Disp: , Rfl:   •  atorvastatin (LIPITOR) 40 MG tablet, Take 1 tablet by mouth Daily. For cholesterol, Disp: 90 tablet, Rfl: 3  •  B Complex Vitamins (VITAMIN B COMPLEX PO), Take 1 tablet by mouth daily., Disp: , Rfl:   •  bumetanide (BUMEX) 2 MG tablet, Take 1 tablet by mouth Daily. For edema, Disp: 90 tablet, Rfl: 1  •  cetirizine (ZyrTEC) 10 MG tablet, Take 10 mg by mouth every night., Disp: , Rfl:   •  famotidine (PEPCID) 20 MG tablet, Take 20 mg by mouth daily., Disp: , Rfl:   •  fluticasone-salmeterol (ADVAIR DISKUS) 250-50 MCG/DOSE DISKUS, Inhale 1 puff 2 (two) times a day., Disp: , Rfl:   •  guaiFENesin (MUCINEX) 600 MG 12 hr tablet, Take 1 tablet by mouth 2 (two) times a day., Disp: , Rfl:   •  hydrALAZINE (APRESOLINE) 100 MG tablet, Take 1 tablet by mouth 2 (Two) Times a Day., Disp: 60 tablet, Rfl: 4  •  HYDROcodone-acetaminophen (NORCO)  MG per tablet, Take 1 tablet by mouth Every 6 (Six) Hours As Needed for Severe Pain (7-10)., Disp: 120 tablet, Rfl: 0  •  insulin aspart (NOVOLOG FLEXPEN) 100 UNIT/ML solution pen-injector sc pen, 15 units prior to each meal tid. If you skip meal then skip dose., Disp: 15 mL, Rfl: 8  •  Insulin Degludec (TRESIBA FLEXTOUCH) 200 UNIT/ML solution pen-injector, Inject 70 Units under the skin Daily., Disp: 15 mL, Rfl: 3  •  Insulin Pen Needle (BD PEN NEEDLE CLAY U/F) 32G X 4 MM misc, Use as directed 7 times daily, Disp: 250 each, Rfl: 5  •  levalbuterol (XOPENEX) 0.63 MG/3ML nebulizer solution, Take 1 ampule by nebulization every 4 (four) hours as needed for wheezing., Disp: , Rfl:   •  levothyroxine (SYNTHROID, LEVOTHROID) 200 MCG tablet, TAKE ONE TABLET BY MOUTH DAILY, Disp: 30 tablet, Rfl: 10  •  metoprolol tartrate (LOPRESSOR) 50 MG tablet, Take 1 tablet by  mouth 2 (Two) Times a Day. For heart, Disp: 180 tablet, Rfl: 1  •  predniSONE (DELTASONE) 20 MG tablet, Take 5 mg by mouth 2 (Two) Times a Day., Disp: , Rfl:   •  QUEtiapine (SEROquel) 100 MG tablet, TAKE ONE TABLET BY MOUTH TWICE A DAY, Disp: 60 tablet, Rfl: 10  •  spironolactone (ALDACTONE) 50 MG tablet, Take 1 tablet by mouth Daily. For edema, Disp: 90 tablet, Rfl: 1  •  VICTOZA 18 MG/3ML solution pen-injector, Inject 1.2 mg under the skin Daily., Disp: 9 mL, Rfl: 5  •  vitamin D (ERGOCALCIFEROL) 71014 UNITS capsule capsule, TAKE ONE CAPSULE BY MOUTH ONCE WEEKLY, Disp: 12 capsule, Rfl: 3    The following portions of the patient's history were reviewed and updated as appropriate: allergies, current medications, past family history, past medical history, past social history, past surgical history and problem list.      REVIEW OF PERTINENT MEDICAL DATA    Cardona Depression inventory is positive with a score of 22.    I reviewed several previous office notes including the last primary care provider office visit from the 2-15/2017...  I reviewed other primary office visits as well all present in the Epic chart.  She was previously taking hydrocodone up to 80 mg a day which has been weaned to 30 mg a day.  The primary care provider as stated that they will not prescribe pain medications for the long-term.    --    Will D.I.R.E. Score: Patient Selection for Chronic Opioid Analgesia   For each factor, rate the patient’s score from 1-3 based on the explanations in the right hand column.       Diagnosis:    2 1 = Benign chronic condition with minimal objective findings or no definite medical diagnosis. Examples: fibromyalgia, migraine headaches, nonspecific back pain.   2 = Slowly progressive condition concordant with moderate pain, or fixed condition with moderate objective findings. Examples: failed back surgery syndrome, back pain with moderate degenerative changes, neuropathic pain.   3 = Advanced condition  concordant with severe pain with objective findings. Examples: severe ischemic vascular disease, advanced neuropathy, severe spinal stenosis.    Intractability:    2 1 = Few therapies have been tried and the patient takes a passive role in his/her pain management process.   2 = Most customary treatments have been tried but the patient is not fully engaged in the pain management process, or barriers prevent (insurance, transportation, medical illness).   3 = Patient fully engaged in a spectrum of appropriate treatments but with inadequate response.    Risk:  (R = Total of P + C + R + S below)    Psychological:     2 1 = Serious personality dysfunction or mental illness interfering with care.   Example: personality disorder, severe affective disorder, significant personality issues.   2 = Personality or mental health interferes moderately. Example: depression or anxiety disorder.   3 = Good communication with clinic. No significant personality dysfunction or mental illness.    Chemical Health:     2 1 = Active or very recent use of illicit drugs, excessive alcohol, or prescription drug abuse.   2 = Chemical coper (uses medications to cope with stress) or history of CD in remission.   3 = No CD history. Not drug-focused or chemically reliant.      Reliability:     2 1 = History of numerous problems: medication misuse, missed appointments, rarely follows through.   2 = Occasional difficulties with compliance, but generally reliable.   3 = Highly reliable patient with meds, appointments & treatment.    Social Support:     2 1 = Life in chaos. Little family support and few close relationships. Loss of most normal life roles.   2 = Reduction in some relationships and life roles.   3 = Supportive family/close relationships. Involved in work or school and no social isolation.    Efficacy score:    2 1 = Poor function or minimal pain relief despite moderate to high doses.   2 = Moderate benefit with function improved in a  number of ways (or insufficient info - hasn’t tried opioid yet or very low doses or too short of a trial).   3 = Good improvement in pain and function and quality of life with stable doses over time.           14  Total score = D + I + R + E     Score 7-13: Not a suitable candidate for long-term opioid analgesia   Score 14-21: May be a candidate for long-term opioid analgesia     (Source: [facility] © 2005.)        --    Review of Systems   Constitutional: Positive for activity change (decreased), appetite change (pt states victoza has decreased her appetite), chills (pt states she gets hot and cold and mostly stays cold) and fatigue. Negative for fever.   HENT: Positive for congestion and sore throat.    Eyes: Negative for pain.   Respiratory: Positive for cough and shortness of breath (pt on COPD).    Cardiovascular: Positive for chest pain (pt states due to congestive heart failure).   Gastrointestinal: Positive for diarrhea (pt sister states depends on what she eats). Negative for abdominal pain, constipation, nausea and vomiting.   Endocrine: Negative for polydipsia and polyuria.   Genitourinary: Positive for frequency.   Musculoskeletal: Positive for arthralgias and back pain.   Skin: Negative for rash.   Allergic/Immunologic: Negative for immunocompromised state.   Neurological: Positive for weakness, numbness and headaches.   Hematological: Does not bruise/bleed easily.   Psychiatric/Behavioral: Negative for agitation, confusion, hallucinations, sleep disturbance and suicidal ideas. The patient is nervous/anxious.                    --    Reviewed report of lower extremity doppler from August 3  2016.  No thrombus.    ---    Prelim UDS Immunoassay:    THC  (marijuana)  " negative  ELIO (cocaine) negative  OPI (opiate) positive  AMP (amphet) negative  MET (methamph) negative  PCP (pcp)  negative  MDMA (ecstacy) negative  BAR (barbituate) negative  BZO (benzodiaz) negative  MTD (methadone) negative  TCA (tricyclic) positive  OXY (oxycodone) positive    GreenTemp warm  Appearance  WNL      ---    Vitals:    04/17/17 0912   BP: 154/72   Pulse: 82   Resp: 16   Temp: 97.5 °F (36.4 °C)   SpO2: (!) 89%  Comment: 4 liters NC   Weight: 265 lb 9.6 oz (120 kg)   Height: 66\" (167.6 cm)   PainSc:   7   PainLoc: Back         Objective   Physical Exam   Constitutional: She is oriented to person, place, and time. Vital signs are normal. She appears well-developed and well-nourished. She has a sickly appearance.   HENT:   Head: Normocephalic and atraumatic.   Right Ear: Hearing and external ear normal.   Left Ear: Hearing and external ear normal.   Nose: Nose normal.   Mouth/Throat: Uvula is midline and oropharynx is clear and moist.   NC O2 in place     Eyes: Conjunctivae and EOM are normal. Pupils are equal, round, and reactive to light.   Neck: Neck supple.   Cardiovascular: Regular rhythm.    Murmur heard.  Pulmonary/Chest: Effort normal. She has wheezes.   Abdominal: Soft. Normal appearance and bowel sounds are normal. There is no hepatosplenomegaly. There is no tenderness. There is no CVA tenderness.   Musculoskeletal:        Cervical back: She exhibits decreased range of motion.        Thoracic back: She exhibits decreased range of motion and tenderness.        Lumbar back: She exhibits decreased range of motion and tenderness.        Right foot: There is tenderness.        Left foot: There is decreased range of motion.       Neurological Sensory Findings -  Altered sharp/dull right ankle/foot discrimination and altered sharp/dull left ankle/foot discrimination.    Vascular Status -  Her exam exhibits right foot edema. Her exam exhibits left foot edema.  Lymphadenopathy:     She has no " cervical adenopathy.   Neurological: She is alert and oriented to person, place, and time. She displays atrophy. A sensory deficit is present. No cranial nerve deficit. Gait abnormal.   Reflex Scores:       Patellar reflexes are 0 on the right side and 0 on the left side.       Achilles reflexes are 0 on the right side and 0 on the left side.  SLR equiviocal bilaterally.  Decr light touch stocking distribution.  QUite deconditioned and weak.   Skin: Skin is warm and dry.   Psychiatric: She has a normal mood and affect. Her speech is normal.   Nursing note and vitals reviewed.      Assessment/Plan   Jared was seen today for back pain and pain.    Diagnoses and all orders for this visit:    Chronic pain syndrome  -     Ambulatory Referral to Psychology  -     Urine Drug Screen Confirmation  -     POC Urine Drug Screen, Triage    Diabetic peripheral neuropathy  -     Urine Drug Screen Confirmation  -     POC Urine Drug Screen, Triage    Spondylosis of lumbar region without myelopathy or radiculopathy  -     Case Request  -     Urine Drug Screen Confirmation  -     POC Urine Drug Screen, Triage    Chronic bilateral low back pain without sciatica  -     Urine Drug Screen Confirmation  -     POC Urine Drug Screen, Triage    Arthritis  -     Urine Drug Screen Confirmation  -     POC Urine Drug Screen, Triage    Chronic midline low back pain without sciatica  -     Discontinue: HYDROcodone-acetaminophen (NORCO)  MG per tablet; Take 1 tablet by mouth Every 6 (Six) Hours As Needed for Severe Pain (7-10). One PO Q 8 hours prn pain  -     Urine Drug Screen Confirmation  -     POC Urine Drug Screen, Triage  -     HYDROcodone-acetaminophen (NORCO)  MG per tablet; Take 1 tablet by mouth Every 6 (Six) Hours As Needed for Severe Pain (7-10).        --- Follow-up for procedure.... Bilateral L25 Lumbar Medial Branch Block, x2,1 wk apart (diagnostic, NO Steroids)    -- Urine drug screen for routine confirmation    -- slight  dose escalation in hydrocodone therapy...  As a trial, pending urine drug screen confirmation and also pain psychologist substance abuse evaluation    A medication management agreement is signed by the patient and the provider today.  Reviewed with the patient that this contract is specific to controlled substances, specifically pain medication.  Reviewed core of pain medicine is to decrease pain and increase functional level.  Reviewed the medication must be picked up as a written prescription from this office and will not be called into any pharmacy.  Reviewed the use of Graham within the office setting.  Reviewed causes for potential of discontinuation of opiates,  including but not limited to consideration for diversion, obtaining other controlled substances from other license practitioners, or  inappropriate office behavior.  Patient understands to use a controlled substance as prescribed by physician and to avoid improper use of controlled substances.  Patient will also verbalized understanding that prescriptions to be filled at the same pharmacy  unless office staff is made aware of this.  Patient understands they can be submitted to random urine drug screens and/or random pill counts on any request.  Patient understands they are not to receive early refills.  The patient must produce an official police report for any effort to replace controlled substances that are lost or stolen.  No use of illegal street drugs while receiving controlled substances from this prescribing provider.  The patient is to take medication as prescribed  and not deviate from the normal schedule without consultation from the provider.  Patient is not to share the medication with others or to take medication with alcohol or other sedatives.  Use caution when driving or operating machinery.  Alert office if the patient becomes pregnant or begins nursing a child.  Reviewed the use of controlled substances recreates a risk for  "respiratory depression, which may result in serious harm or even death.  Must always keep the prescription in the original container.  Patient is to store controlled substances in a locked cabinet or other secure storage unit that is cool, dry and out of sunlight.  Patient must immediately notify office if any controlled substances is stolen or improperly taken by another individual.  Reviewed risk for physical dependence, tolerance and addiction.    --  She is a very poor candidate for long term use of NSAIDs      -------  Opioid Risk Assessment / Formalized Substance Abuse Risk Stratification:  Why is a consultation with the pain psychologist needed?    The medical decision making when deciding whether or not to treat chronic painful conditions with prescription pain medications is a complex process.  The decisions are even more complex when considering whether or not there is a medical need for the use of a controlled substance.  Not only is there consideration of the medical or anatomic pathology, but also safety, other medical conditions, other medications or supplements used, social factors, psychological factors, and the multimodal or holistic treatment plan that is most reasonable for a given condition.      In our practice, we adhere to the most fundamental value of modern medicine... \"First Do No Harm.\"  There can be significant risks to the use of prescribed controlled substances, including opioid pain medications.  Medications are tools.  All tools can be used properly or improperly.  There are risks to the use of controlled substances.  We will follow best medical practices to the best of our ability, including observance of the [place] statutes as well as national & CDC guidelines.    The risks and benefits of prescription pain medications must be weighed.  Many factors can put a patient at risk for misuse of pain medication.  If we feel that there are concerns, then we will seek the opinion of our " "colleague, the pain psychologist, to assist in assist in risk stratification and development of a plan to decrease risk.   (For more information, see Section 4 of 201 [place] 9:260, which is [place] statute.)    If a patient has demonstrated elements of noncompliance, then we are required to seek the opinion of a mental health professional for a substance abuse risk assessment if we want to even consider the continuation of prescription pain medication.  Otherwise, we are required by this law to stop prescribing pain medication altogether, and we will need to discharge the patient from our practice.  (See Section 5 of [place] 9:260 for more information.)    The pain psychologist is a valuable member of our multidisciplinary pain management practice model.  Patients should expect a long discussion with the psychologist to help with understanding about the patient's condition and current treatment.  Also, there may be some psychological testing assessments given.  The information gathered is important to all of us.  Not only do we feel it is necessary as part of your treatment plan, we also feel that the evaluation is required by our best practice guidelines and by the statutes of the [facility].   -------      -------  Education about Medial Branch Blockade and RF Therapy:    This medial branch blockade (MBB) suggested is intended for diagnostic purposes, with the intent of offering the patient Radiofrequency thermal rhizotomy (RF) if the MBB is diagnostically effective.  The diagnostic blockade is necessary to determine the likelihood that RF therapy could be efficacious in providing long term relief to the patient.    Medial branches are sensory nerve branches that connect to a facet joint and transmit sensations & pain signals from that joint.  Facet is a term for the type of joints found in the spine.  Medial branches are the nerves that go to a facet, and therefore are also sometimes called \"facet " "joint nerves\" (FJNs).      In a medial branch blockade procedure, xray fluoroscopy is used to verify the locations of the outside of the joint lines which are being targeted.  Under xray guidance, needles are placed to these areas.  Contrast dye is injected to confirm proper placement, with dye flowing over the joint area, and to ensure that the dye does not flow into unintended areas such as a vein.  When this is confirmed, local anesthetic is injected to block the medial branch at that joint level.      If MBBs are diagnostically successful in blocking pain, then the patient is most likely a great candidate for Radiofrequency of those facet joint nerves.  In the RF procedure, needles are placed to the joint lines in the same fashion, and after testing, the needle tips are heated to thermally treat the nerves, blocking the nerves by in essence damaging the nerves with the heat treatment.       Medically, a successful RF procedure should provide a patient with 50% pain relief or more for at least 6 months.  Clinical experience suggests that successful patients receive relief more in the range of 12 months on average.  We also discussed that a fortunate minority of patients receive therapeutic success from the MBB, and may not require RF ablation.  If a patient receives more than 8 weeks of relief from MBB, then occasional repeat MBB for therapeutic purposes is a very reasonable alternative therapy.  This course of therapy is consistent with our LCDs according to our CMS  in the area, and therefore other insurance providers should follow accordingly.  We will monitor our patients to screen for these therapeutic responders and will offer RF therapy only when necessary.        We discussed that MBB & RF are not without risks.  Guidelines regarding anticoagulant use & neuraxial procedures will be respected.  Patients that are ill or otherwise may be at risk for sepsis will not have their spines accessed by " neuraxial injections of any type.  This patient will not be offered these therapies if there is an increased risk.   We discussed that there is a risk of postprocedural pain and also a risk of worsening of clinical picture with these procedures as with any neuraxial procedure.    -------                   JACOB REPORT    As part of the patient's treatment plan, I am prescribing controlled substances. The patient has been made aware of appropriate use of such medications, including potential risk of somnolence, limited ability to drive and/or work safely, and the potential for dependence or overdose. It has also bee made clear that these medications are for use by this patient only, without concomitant use of alcohol or other substances unless prescribed.     Patient has completed prescribing agreement detailing terms of continued prescribing of controlled substances, including monitoring JACOB reports, urine drug screening, and pill counts if necessary. The patient is aware that inappropriate use will results in cessation of prescribing such medications.    JACOB report has been reviewed and scanned into the patient's chart.    Date of last JACOB : 4-15-17    History and physical exam exhibit continued safe and appropriate use of controlled substances.         EMR Dragon/Transcription disclaimer:   Much of this encounter note is an electronic transcription/translation of spoken language to printed text. The electronic translation of spoken language may permit erroneous, or at times, nonsensical words or phrases to be inadvertently transcribed; Although I have reviewed the note for such errors, some may still exist.

## 2017-04-30 ENCOUNTER — HOSPITAL ENCOUNTER (INPATIENT)
Facility: HOSPITAL | Age: 62
LOS: 6 days | Discharge: HOME HEALTH | End: 2017-05-08
Attending: EMERGENCY MEDICINE | Admitting: HOSPITALIST

## 2017-04-30 ENCOUNTER — APPOINTMENT (OUTPATIENT)
Dept: GENERAL RADIOLOGY | Facility: HOSPITAL | Age: 62
End: 2017-04-30

## 2017-04-30 DIAGNOSIS — J44.1 COPD EXACERBATION (HCC): Primary | ICD-10-CM

## 2017-04-30 DIAGNOSIS — R73.9 HYPERGLYCEMIA: ICD-10-CM

## 2017-04-30 DIAGNOSIS — N28.9 ACUTE RENAL INSUFFICIENCY: ICD-10-CM

## 2017-04-30 DIAGNOSIS — E11.42 DIABETIC PERIPHERAL NEUROPATHY (HCC): ICD-10-CM

## 2017-04-30 DIAGNOSIS — IMO0002 UNCONTROLLED TYPE 2 DIABETES MELLITUS WITH DIABETIC POLYNEUROPATHY, WITH LONG-TERM CURRENT USE OF INSULIN: ICD-10-CM

## 2017-04-30 LAB
ALBUMIN SERPL-MCNC: 3.6 G/DL (ref 3.5–5.2)
ALBUMIN/GLOB SERPL: 1.5 G/DL
ALP SERPL-CCNC: 57 U/L (ref 40–129)
ALT SERPL W P-5'-P-CCNC: 14 U/L (ref 5–33)
ANION GAP SERPL CALCULATED.3IONS-SCNC: 13.1 MMOL/L
AST SERPL-CCNC: 14 U/L (ref 5–32)
BACTERIA UR QL AUTO: ABNORMAL /HPF
BASOPHILS # BLD AUTO: 0.05 10*3/MM3 (ref 0–0.2)
BASOPHILS NFR BLD AUTO: 0.5 % (ref 0–2)
BILIRUB SERPL-MCNC: 0.2 MG/DL (ref 0.2–1.2)
BILIRUB UR QL STRIP: ABNORMAL
BUN BLD-MCNC: 65 MG/DL (ref 8–23)
BUN/CREAT SERPL: 31.1 (ref 7–25)
CALCIUM SPEC-SCNC: 9.2 MG/DL (ref 8.8–10.5)
CHLORIDE SERPL-SCNC: 97 MMOL/L (ref 98–107)
CLARITY UR: CLEAR
CO2 SERPL-SCNC: 26.9 MMOL/L (ref 22–29)
COLOR UR: YELLOW
CREAT BLD-MCNC: 2.09 MG/DL (ref 0.57–1)
DEPRECATED RDW RBC AUTO: 41.7 FL (ref 37–54)
EOSINOPHIL # BLD AUTO: 0.37 10*3/MM3 (ref 0.1–0.3)
EOSINOPHIL NFR BLD AUTO: 3.8 % (ref 0–4)
ERYTHROCYTE [DISTWIDTH] IN BLOOD BY AUTOMATED COUNT: 12.7 % (ref 11.5–14.5)
GFR SERPL CREATININE-BSD FRML MDRD: 24 ML/MIN/1.73
GLOBULIN UR ELPH-MCNC: 2.4 GM/DL
GLUCOSE BLD-MCNC: 392 MG/DL (ref 65–99)
GLUCOSE BLDC GLUCOMTR-MCNC: 401 MG/DL (ref 70–130)
GLUCOSE BLDC GLUCOMTR-MCNC: 404 MG/DL (ref 70–130)
GLUCOSE BLDC GLUCOMTR-MCNC: 439 MG/DL (ref 70–130)
GLUCOSE UR STRIP-MCNC: ABNORMAL MG/DL
HCT VFR BLD AUTO: 31.5 % (ref 37–47)
HGB BLD-MCNC: 10.2 G/DL (ref 12–16)
HGB UR QL STRIP.AUTO: NEGATIVE
HYALINE CASTS UR QL AUTO: ABNORMAL /LPF
IMM GRANULOCYTES # BLD: 0.06 10*3/MM3 (ref 0–0.03)
IMM GRANULOCYTES NFR BLD: 0.6 % (ref 0–0.5)
INR PPP: 0.95 (ref 0.9–1.1)
KETONES UR QL STRIP: NEGATIVE
LEUKOCYTE ESTERASE UR QL STRIP.AUTO: ABNORMAL
LYMPHOCYTES # BLD AUTO: 1.51 10*3/MM3 (ref 0.6–4.8)
LYMPHOCYTES NFR BLD AUTO: 15.6 % (ref 20–45)
MCH RBC QN AUTO: 29.7 PG (ref 27–31)
MCHC RBC AUTO-ENTMCNC: 32.4 G/DL (ref 31–37)
MCV RBC AUTO: 91.6 FL (ref 81–99)
MONOCYTES # BLD AUTO: 0.74 10*3/MM3 (ref 0–1)
MONOCYTES NFR BLD AUTO: 7.7 % (ref 3–8)
NEUTROPHILS # BLD AUTO: 6.92 10*3/MM3 (ref 1.5–8.3)
NEUTROPHILS NFR BLD AUTO: 71.8 % (ref 45–70)
NITRITE UR QL STRIP: NEGATIVE
NRBC BLD MANUAL-RTO: 0 /100 WBC (ref 0–0)
NT-PROBNP SERPL-MCNC: 698.3 PG/ML (ref 5–125)
PH UR STRIP.AUTO: <=5 [PH] (ref 4.5–8)
PLATELET # BLD AUTO: 183 10*3/MM3 (ref 140–500)
PMV BLD AUTO: 11.5 FL (ref 7.4–10.4)
POTASSIUM BLD-SCNC: 4 MMOL/L (ref 3.5–5.2)
PROT SERPL-MCNC: 6 G/DL (ref 6–8.5)
PROT UR QL STRIP: ABNORMAL
PROTHROMBIN TIME: 12.7 SECONDS (ref 12.1–15)
RBC # BLD AUTO: 3.44 10*6/MM3 (ref 4.2–5.4)
RBC # UR: ABNORMAL /HPF
REF LAB TEST METHOD: ABNORMAL
SODIUM BLD-SCNC: 137 MMOL/L (ref 136–145)
SP GR UR STRIP: 1.01 (ref 1–1.03)
SQUAMOUS #/AREA URNS HPF: ABNORMAL /HPF
TROPONIN T SERPL-MCNC: 0.07 NG/ML (ref 0–0.03)
UROBILINOGEN UR QL STRIP: ABNORMAL
WBC NRBC COR # BLD: 9.65 10*3/MM3 (ref 4.8–10.8)
WBC UR QL AUTO: ABNORMAL /HPF

## 2017-04-30 PROCEDURE — 87798 DETECT AGENT NOS DNA AMP: CPT | Performed by: EMERGENCY MEDICINE

## 2017-04-30 PROCEDURE — 82962 GLUCOSE BLOOD TEST: CPT

## 2017-04-30 PROCEDURE — 84484 ASSAY OF TROPONIN QUANT: CPT | Performed by: EMERGENCY MEDICINE

## 2017-04-30 PROCEDURE — 80053 COMPREHEN METABOLIC PANEL: CPT | Performed by: EMERGENCY MEDICINE

## 2017-04-30 PROCEDURE — 93005 ELECTROCARDIOGRAM TRACING: CPT | Performed by: EMERGENCY MEDICINE

## 2017-04-30 PROCEDURE — 87086 URINE CULTURE/COLONY COUNT: CPT | Performed by: EMERGENCY MEDICINE

## 2017-04-30 PROCEDURE — 71020 HC CHEST PA AND LATERAL: CPT

## 2017-04-30 PROCEDURE — 87486 CHLMYD PNEUM DNA AMP PROBE: CPT | Performed by: EMERGENCY MEDICINE

## 2017-04-30 PROCEDURE — 85610 PROTHROMBIN TIME: CPT | Performed by: EMERGENCY MEDICINE

## 2017-04-30 PROCEDURE — G0378 HOSPITAL OBSERVATION PER HR: HCPCS

## 2017-04-30 PROCEDURE — 83880 ASSAY OF NATRIURETIC PEPTIDE: CPT | Performed by: EMERGENCY MEDICINE

## 2017-04-30 PROCEDURE — 81001 URINALYSIS AUTO W/SCOPE: CPT | Performed by: EMERGENCY MEDICINE

## 2017-04-30 PROCEDURE — 99284 EMERGENCY DEPT VISIT MOD MDM: CPT

## 2017-04-30 PROCEDURE — 25010000002 ONDANSETRON PER 1 MG: Performed by: EMERGENCY MEDICINE

## 2017-04-30 PROCEDURE — 25010000002 ENOXAPARIN PER 10 MG: Performed by: HOSPITALIST

## 2017-04-30 PROCEDURE — 94799 UNLISTED PULMONARY SVC/PX: CPT

## 2017-04-30 PROCEDURE — 87633 RESP VIRUS 12-25 TARGETS: CPT | Performed by: EMERGENCY MEDICINE

## 2017-04-30 PROCEDURE — 63710000001 INSULIN DETEMIR PER 5 UNITS: Performed by: HOSPITALIST

## 2017-04-30 PROCEDURE — 85025 COMPLETE CBC W/AUTO DIFF WBC: CPT | Performed by: EMERGENCY MEDICINE

## 2017-04-30 PROCEDURE — 99285 EMERGENCY DEPT VISIT HI MDM: CPT | Performed by: EMERGENCY MEDICINE

## 2017-04-30 PROCEDURE — 93010 ELECTROCARDIOGRAM REPORT: CPT | Performed by: INTERNAL MEDICINE

## 2017-04-30 PROCEDURE — 87581 M.PNEUMON DNA AMP PROBE: CPT | Performed by: EMERGENCY MEDICINE

## 2017-04-30 PROCEDURE — 63710000001 INSULIN ASPART PER 5 UNITS: Performed by: HOSPITALIST

## 2017-04-30 RX ORDER — NICOTINE POLACRILEX 4 MG
15 LOZENGE BUCCAL
Status: DISCONTINUED | OUTPATIENT
Start: 2017-04-30 | End: 2017-05-03 | Stop reason: SDUPTHER

## 2017-04-30 RX ORDER — NYSTATIN 100000 [USP'U]/G
POWDER TOPICAL EVERY 12 HOURS SCHEDULED
Status: DISCONTINUED | OUTPATIENT
Start: 2017-04-30 | End: 2017-05-08 | Stop reason: HOSPADM

## 2017-04-30 RX ORDER — SODIUM CHLORIDE 0.9 % (FLUSH) 0.9 %
1-10 SYRINGE (ML) INJECTION AS NEEDED
Status: DISCONTINUED | OUTPATIENT
Start: 2017-04-30 | End: 2017-05-08 | Stop reason: HOSPADM

## 2017-04-30 RX ORDER — BUDESONIDE AND FORMOTEROL FUMARATE DIHYDRATE 80; 4.5 UG/1; UG/1
2 AEROSOL RESPIRATORY (INHALATION)
Status: DISCONTINUED | OUTPATIENT
Start: 2017-04-30 | End: 2017-05-08 | Stop reason: HOSPADM

## 2017-04-30 RX ORDER — SPIRONOLACTONE 25 MG/1
50 TABLET ORAL DAILY
Status: DISCONTINUED | OUTPATIENT
Start: 2017-04-30 | End: 2017-05-08 | Stop reason: HOSPADM

## 2017-04-30 RX ORDER — LEVOTHYROXINE SODIUM 0.1 MG/1
200 TABLET ORAL DAILY
Status: DISCONTINUED | OUTPATIENT
Start: 2017-04-30 | End: 2017-05-08 | Stop reason: HOSPADM

## 2017-04-30 RX ORDER — ERGOCALCIFEROL 1.25 MG/1
50000 CAPSULE ORAL
Status: DISCONTINUED | OUTPATIENT
Start: 2017-04-30 | End: 2017-05-08 | Stop reason: HOSPADM

## 2017-04-30 RX ORDER — ALBUTEROL SULFATE 2.5 MG/3ML
2.5 SOLUTION RESPIRATORY (INHALATION)
Status: DISCONTINUED | OUTPATIENT
Start: 2017-04-30 | End: 2017-05-02

## 2017-04-30 RX ORDER — BUMETANIDE 1 MG/1
2 TABLET ORAL DAILY
Status: DISCONTINUED | OUTPATIENT
Start: 2017-04-30 | End: 2017-05-01

## 2017-04-30 RX ORDER — SODIUM CHLORIDE 9 MG/ML
75 INJECTION, SOLUTION INTRAVENOUS CONTINUOUS
Status: DISCONTINUED | OUTPATIENT
Start: 2017-04-30 | End: 2017-05-02

## 2017-04-30 RX ORDER — QUETIAPINE FUMARATE 100 MG/1
100 TABLET, FILM COATED ORAL 2 TIMES DAILY
Status: DISCONTINUED | OUTPATIENT
Start: 2017-04-30 | End: 2017-05-08 | Stop reason: HOSPADM

## 2017-04-30 RX ORDER — DEXTROSE MONOHYDRATE 25 G/50ML
25 INJECTION, SOLUTION INTRAVENOUS
Status: DISCONTINUED | OUTPATIENT
Start: 2017-04-30 | End: 2017-05-03 | Stop reason: SDUPTHER

## 2017-04-30 RX ORDER — FAMOTIDINE 20 MG/1
20 TABLET, FILM COATED ORAL DAILY
Status: DISCONTINUED | OUTPATIENT
Start: 2017-04-30 | End: 2017-05-08 | Stop reason: HOSPADM

## 2017-04-30 RX ORDER — LEVOTHYROXINE SODIUM 0.2 MG/1
200 TABLET ORAL DAILY
COMMUNITY
End: 2017-07-10 | Stop reason: DRUGHIGH

## 2017-04-30 RX ORDER — SODIUM CHLORIDE 0.9 % (FLUSH) 0.9 %
10 SYRINGE (ML) INJECTION AS NEEDED
Status: DISCONTINUED | OUTPATIENT
Start: 2017-04-30 | End: 2017-05-08 | Stop reason: HOSPADM

## 2017-04-30 RX ORDER — METOPROLOL TARTRATE 50 MG/1
50 TABLET, FILM COATED ORAL 2 TIMES DAILY
Status: DISCONTINUED | OUTPATIENT
Start: 2017-04-30 | End: 2017-05-08 | Stop reason: HOSPADM

## 2017-04-30 RX ORDER — GUAIFENESIN 600 MG/1
600 TABLET, EXTENDED RELEASE ORAL 2 TIMES DAILY
Status: DISCONTINUED | OUTPATIENT
Start: 2017-04-30 | End: 2017-05-08 | Stop reason: HOSPADM

## 2017-04-30 RX ORDER — ASPIRIN 81 MG/1
81 TABLET ORAL DAILY
Status: DISCONTINUED | OUTPATIENT
Start: 2017-04-30 | End: 2017-05-08 | Stop reason: HOSPADM

## 2017-04-30 RX ORDER — QUETIAPINE FUMARATE 100 MG/1
100 TABLET, FILM COATED ORAL 2 TIMES DAILY
COMMUNITY
End: 2017-08-25 | Stop reason: SDUPTHER

## 2017-04-30 RX ORDER — ERGOCALCIFEROL 1.25 MG/1
50000 CAPSULE ORAL
COMMUNITY
End: 2018-01-11 | Stop reason: SDUPTHER

## 2017-04-30 RX ORDER — IPRATROPIUM BROMIDE AND ALBUTEROL SULFATE 2.5; .5 MG/3ML; MG/3ML
3 SOLUTION RESPIRATORY (INHALATION) ONCE
Status: COMPLETED | OUTPATIENT
Start: 2017-04-30 | End: 2017-04-30

## 2017-04-30 RX ORDER — HYDROCODONE BITARTRATE AND ACETAMINOPHEN 10; 325 MG/1; MG/1
1 TABLET ORAL EVERY 6 HOURS PRN
Status: DISCONTINUED | OUTPATIENT
Start: 2017-04-30 | End: 2017-05-08 | Stop reason: HOSPADM

## 2017-04-30 RX ORDER — ROSUVASTATIN CALCIUM 10 MG/1
20 TABLET, COATED ORAL DAILY
Status: DISCONTINUED | OUTPATIENT
Start: 2017-04-30 | End: 2017-05-01 | Stop reason: CLARIF

## 2017-04-30 RX ORDER — ONDANSETRON 2 MG/ML
4 INJECTION INTRAMUSCULAR; INTRAVENOUS ONCE
Status: COMPLETED | OUTPATIENT
Start: 2017-04-30 | End: 2017-04-30

## 2017-04-30 RX ORDER — CETIRIZINE HYDROCHLORIDE 10 MG/1
10 TABLET ORAL NIGHTLY
Status: DISCONTINUED | OUTPATIENT
Start: 2017-04-30 | End: 2017-05-08 | Stop reason: HOSPADM

## 2017-04-30 RX ORDER — HYDRALAZINE HYDROCHLORIDE 50 MG/1
100 TABLET, FILM COATED ORAL 2 TIMES DAILY
Status: DISCONTINUED | OUTPATIENT
Start: 2017-04-30 | End: 2017-05-08 | Stop reason: HOSPADM

## 2017-04-30 RX ORDER — SODIUM CHLORIDE 9 MG/ML
50 INJECTION, SOLUTION INTRAVENOUS CONTINUOUS
Status: DISCONTINUED | OUTPATIENT
Start: 2017-04-30 | End: 2017-05-01

## 2017-04-30 RX ADMIN — IPRATROPIUM BROMIDE AND ALBUTEROL SULFATE 3 ML: .5; 3 SOLUTION RESPIRATORY (INHALATION) at 16:32

## 2017-04-30 RX ADMIN — ALBUTEROL SULFATE 2.5 MG: 2.5 SOLUTION RESPIRATORY (INHALATION) at 19:17

## 2017-04-30 RX ADMIN — GUAIFENESIN 600 MG: 600 TABLET, EXTENDED RELEASE ORAL at 19:51

## 2017-04-30 RX ADMIN — BUDESONIDE AND FORMOTEROL FUMARATE DIHYDRATE 2 PUFF: 80; 4.5 AEROSOL RESPIRATORY (INHALATION) at 19:28

## 2017-04-30 RX ADMIN — SODIUM CHLORIDE 75 ML/HR: 9 INJECTION, SOLUTION INTRAVENOUS at 20:01

## 2017-04-30 RX ADMIN — QUETIAPINE FUMARATE 100 MG: 100 TABLET, FILM COATED ORAL at 19:51

## 2017-04-30 RX ADMIN — METOPROLOL TARTRATE 50 MG: 50 TABLET, FILM COATED ORAL at 19:51

## 2017-04-30 RX ADMIN — SODIUM CHLORIDE 50 ML/HR: 9 INJECTION, SOLUTION INTRAVENOUS at 17:25

## 2017-04-30 RX ADMIN — INSULIN DETEMIR 70 UNITS: 100 INJECTION, SOLUTION SUBCUTANEOUS at 21:08

## 2017-04-30 RX ADMIN — ENOXAPARIN SODIUM 30 MG: 30 INJECTION SUBCUTANEOUS at 19:51

## 2017-04-30 RX ADMIN — ALBUTEROL SULFATE 2.5 MG: 2.5 SOLUTION RESPIRATORY (INHALATION) at 23:15

## 2017-04-30 RX ADMIN — ONDANSETRON 4 MG: 2 INJECTION, SOLUTION INTRAMUSCULAR; INTRAVENOUS at 15:55

## 2017-04-30 RX ADMIN — HYDROCODONE BITARTRATE AND ACETAMINOPHEN 1 TABLET: 10; 325 TABLET ORAL at 20:59

## 2017-04-30 RX ADMIN — INSULIN ASPART 12 UNITS: 100 INJECTION, SOLUTION INTRAVENOUS; SUBCUTANEOUS at 21:00

## 2017-04-30 RX ADMIN — NYSTATIN: 100000 POWDER TOPICAL at 21:03

## 2017-04-30 RX ADMIN — CETIRIZINE HYDROCHLORIDE 10 MG: 10 TABLET, FILM COATED ORAL at 20:59

## 2017-05-01 LAB
ANION GAP SERPL CALCULATED.3IONS-SCNC: 11.4 MMOL/L
ANION GAP SERPL CALCULATED.3IONS-SCNC: 12.3 MMOL/L
B PERT DNA SPEC QL NAA+PROBE: NOT DETECTED
BUN BLD-MCNC: 60 MG/DL (ref 8–23)
BUN BLD-MCNC: 65 MG/DL (ref 8–23)
BUN/CREAT SERPL: 28.5 (ref 7–25)
BUN/CREAT SERPL: 28.6 (ref 7–25)
C PNEUM DNA NPH QL NAA+NON-PROBE: NOT DETECTED
CALCIUM SPEC-SCNC: 8.7 MG/DL (ref 8.8–10.5)
CALCIUM SPEC-SCNC: 8.7 MG/DL (ref 8.8–10.5)
CHLORIDE SERPL-SCNC: 100 MMOL/L (ref 98–107)
CHLORIDE SERPL-SCNC: 99 MMOL/L (ref 98–107)
CO2 SERPL-SCNC: 27.7 MMOL/L (ref 22–29)
CO2 SERPL-SCNC: 28.6 MMOL/L (ref 22–29)
CREAT BLD-MCNC: 2.1 MG/DL (ref 0.57–1)
CREAT BLD-MCNC: 2.28 MG/DL (ref 0.57–1)
DEPRECATED RDW RBC AUTO: 42.9 FL (ref 37–54)
EOSINOPHIL # BLD MANUAL: 0.39 10*3/MM3 (ref 0.1–0.3)
EOSINOPHIL NFR BLD MANUAL: 5 % (ref 0–4)
ERYTHROCYTE [DISTWIDTH] IN BLOOD BY AUTOMATED COUNT: 12.6 % (ref 11.5–14.5)
FLUAV H1 2009 PAND RNA NPH QL NAA+PROBE: NOT DETECTED
FLUAV H1 HA GENE NPH QL NAA+PROBE: NOT DETECTED
FLUAV H3 RNA NPH QL NAA+PROBE: NOT DETECTED
FLUAV SUBTYP SPEC NAA+PROBE: NOT DETECTED
FLUBV RNA ISLT QL NAA+PROBE: NOT DETECTED
GFR SERPL CREATININE-BSD FRML MDRD: 22 ML/MIN/1.73
GFR SERPL CREATININE-BSD FRML MDRD: 24 ML/MIN/1.73
GLUCOSE BLD-MCNC: 352 MG/DL (ref 65–99)
GLUCOSE BLD-MCNC: 357 MG/DL (ref 65–99)
GLUCOSE BLDC GLUCOMTR-MCNC: 344 MG/DL (ref 70–130)
GLUCOSE BLDC GLUCOMTR-MCNC: 374 MG/DL (ref 70–130)
GLUCOSE BLDC GLUCOMTR-MCNC: 378 MG/DL (ref 70–130)
GLUCOSE BLDC GLUCOMTR-MCNC: 389 MG/DL (ref 70–130)
GLUCOSE BLDC GLUCOMTR-MCNC: 412 MG/DL (ref 70–130)
GLUCOSE BLDC GLUCOMTR-MCNC: 416 MG/DL (ref 70–130)
HADV DNA SPEC NAA+PROBE: NOT DETECTED
HCOV 229E RNA SPEC QL NAA+PROBE: NOT DETECTED
HCOV HKU1 RNA SPEC QL NAA+PROBE: NOT DETECTED
HCOV NL63 RNA SPEC QL NAA+PROBE: NOT DETECTED
HCOV OC43 RNA SPEC QL NAA+PROBE: NOT DETECTED
HCT VFR BLD AUTO: 29.3 % (ref 37–47)
HGB BLD-MCNC: 9.4 G/DL (ref 12–16)
HMPV RNA NPH QL NAA+NON-PROBE: NOT DETECTED
HPIV1 RNA SPEC QL NAA+PROBE: NOT DETECTED
HPIV2 RNA SPEC QL NAA+PROBE: NOT DETECTED
HPIV3 RNA NPH QL NAA+PROBE: NOT DETECTED
HPIV4 P GENE NPH QL NAA+PROBE: NOT DETECTED
LYMPHOCYTES # BLD MANUAL: 1.33 10*3/MM3 (ref 0.6–4.8)
LYMPHOCYTES NFR BLD MANUAL: 17 % (ref 20–45)
LYMPHOCYTES NFR BLD MANUAL: 4 % (ref 3–8)
M PNEUMO IGG SER IA-ACNC: NOT DETECTED
MCH RBC QN AUTO: 29.7 PG (ref 27–31)
MCHC RBC AUTO-ENTMCNC: 32.1 G/DL (ref 31–37)
MCV RBC AUTO: 92.4 FL (ref 81–99)
MONOCYTES # BLD AUTO: 0.31 10*3/MM3 (ref 0–1)
NEUTROPHILS # BLD AUTO: 5.8 10*3/MM3 (ref 1.5–8.3)
NEUTROPHILS NFR BLD MANUAL: 72 % (ref 45–70)
NEUTS BAND NFR BLD MANUAL: 2 % (ref 0–5)
PLATELET # BLD AUTO: 175 10*3/MM3 (ref 140–500)
PMV BLD AUTO: 11.4 FL (ref 7.4–10.4)
POTASSIUM BLD-SCNC: 4.1 MMOL/L (ref 3.5–5.2)
POTASSIUM BLD-SCNC: 4.2 MMOL/L (ref 3.5–5.2)
RBC # BLD AUTO: 3.17 10*6/MM3 (ref 4.2–5.4)
RBC MORPH BLD: NORMAL
RHINOVIRUS RNA SPEC NAA+PROBE: NOT DETECTED
RSV RNA NPH QL NAA+NON-PROBE: NOT DETECTED
SMALL PLATELETS BLD QL SMEAR: ADEQUATE
SODIUM BLD-SCNC: 139 MMOL/L (ref 136–145)
SODIUM BLD-SCNC: 140 MMOL/L (ref 136–145)
WBC MORPH BLD: NORMAL
WBC NRBC COR # BLD: 7.84 10*3/MM3 (ref 4.8–10.8)

## 2017-05-01 PROCEDURE — 80048 BASIC METABOLIC PNL TOTAL CA: CPT | Performed by: HOSPITALIST

## 2017-05-01 PROCEDURE — 63710000001 INSULIN DETEMIR PER 5 UNITS: Performed by: HOSPITALIST

## 2017-05-01 PROCEDURE — G0378 HOSPITAL OBSERVATION PER HR: HCPCS

## 2017-05-01 PROCEDURE — 63710000001 INSULIN ASPART PER 5 UNITS: Performed by: HOSPITALIST

## 2017-05-01 PROCEDURE — 94799 UNLISTED PULMONARY SVC/PX: CPT

## 2017-05-01 PROCEDURE — 25010000002 ENOXAPARIN PER 10 MG: Performed by: HOSPITALIST

## 2017-05-01 PROCEDURE — 85007 BL SMEAR W/DIFF WBC COUNT: CPT | Performed by: HOSPITALIST

## 2017-05-01 PROCEDURE — 99219 PR INITIAL OBSERVATION CARE/DAY 50 MINUTES: CPT | Performed by: HOSPITALIST

## 2017-05-01 PROCEDURE — 85027 COMPLETE CBC AUTOMATED: CPT | Performed by: HOSPITALIST

## 2017-05-01 PROCEDURE — 82962 GLUCOSE BLOOD TEST: CPT

## 2017-05-01 RX ORDER — ATORVASTATIN CALCIUM 20 MG/1
20 TABLET, FILM COATED ORAL NIGHTLY
Status: DISCONTINUED | OUTPATIENT
Start: 2017-05-01 | End: 2017-05-08 | Stop reason: HOSPADM

## 2017-05-01 RX ORDER — ONDANSETRON 4 MG/1
4 TABLET, FILM COATED ORAL EVERY 6 HOURS PRN
Status: DISCONTINUED | OUTPATIENT
Start: 2017-05-01 | End: 2017-05-08 | Stop reason: HOSPADM

## 2017-05-01 RX ADMIN — SPIRONOLACTONE 50 MG: 25 TABLET ORAL at 08:27

## 2017-05-01 RX ADMIN — METOPROLOL TARTRATE 50 MG: 50 TABLET, FILM COATED ORAL at 08:27

## 2017-05-01 RX ADMIN — SODIUM CHLORIDE 75 ML/HR: 9 INJECTION, SOLUTION INTRAVENOUS at 08:20

## 2017-05-01 RX ADMIN — ALBUTEROL SULFATE 2.5 MG: 2.5 SOLUTION RESPIRATORY (INHALATION) at 03:21

## 2017-05-01 RX ADMIN — HYDROCODONE BITARTRATE AND ACETAMINOPHEN 1 TABLET: 10; 325 TABLET ORAL at 17:44

## 2017-05-01 RX ADMIN — INSULIN DETEMIR 70 UNITS: 100 INJECTION, SOLUTION SUBCUTANEOUS at 21:55

## 2017-05-01 RX ADMIN — NYSTATIN: 100000 POWDER TOPICAL at 08:31

## 2017-05-01 RX ADMIN — ALBUTEROL SULFATE 2.5 MG: 2.5 SOLUTION RESPIRATORY (INHALATION) at 07:32

## 2017-05-01 RX ADMIN — METOPROLOL TARTRATE 50 MG: 50 TABLET, FILM COATED ORAL at 17:40

## 2017-05-01 RX ADMIN — INSULIN ASPART 12 UNITS: 100 INJECTION, SOLUTION INTRAVENOUS; SUBCUTANEOUS at 21:55

## 2017-05-01 RX ADMIN — INSULIN ASPART 14 UNITS: 100 INJECTION, SOLUTION INTRAVENOUS; SUBCUTANEOUS at 17:39

## 2017-05-01 RX ADMIN — INSULIN ASPART 10 UNITS: 100 INJECTION, SOLUTION INTRAVENOUS; SUBCUTANEOUS at 08:28

## 2017-05-01 RX ADMIN — CETIRIZINE HYDROCHLORIDE 10 MG: 10 TABLET, FILM COATED ORAL at 21:55

## 2017-05-01 RX ADMIN — GUAIFENESIN 600 MG: 600 TABLET, EXTENDED RELEASE ORAL at 08:28

## 2017-05-01 RX ADMIN — ALBUTEROL SULFATE 2.5 MG: 2.5 SOLUTION RESPIRATORY (INHALATION) at 11:26

## 2017-05-01 RX ADMIN — BUDESONIDE AND FORMOTEROL FUMARATE DIHYDRATE 2 PUFF: 80; 4.5 AEROSOL RESPIRATORY (INHALATION) at 07:29

## 2017-05-01 RX ADMIN — QUETIAPINE FUMARATE 100 MG: 100 TABLET, FILM COATED ORAL at 08:27

## 2017-05-01 RX ADMIN — ALBUTEROL SULFATE 2.5 MG: 2.5 SOLUTION RESPIRATORY (INHALATION) at 19:18

## 2017-05-01 RX ADMIN — ASPIRIN 81 MG: 81 TABLET, COATED ORAL at 08:28

## 2017-05-01 RX ADMIN — ATORVASTATIN CALCIUM 20 MG: 20 TABLET, FILM COATED ORAL at 21:55

## 2017-05-01 RX ADMIN — FAMOTIDINE 20 MG: 20 TABLET, FILM COATED ORAL at 08:27

## 2017-05-01 RX ADMIN — INSULIN ASPART 12 UNITS: 100 INJECTION, SOLUTION INTRAVENOUS; SUBCUTANEOUS at 13:19

## 2017-05-01 RX ADMIN — HYDRALAZINE HYDROCHLORIDE 100 MG: 50 TABLET ORAL at 17:40

## 2017-05-01 RX ADMIN — ALBUTEROL SULFATE 2.5 MG: 2.5 SOLUTION RESPIRATORY (INHALATION) at 15:35

## 2017-05-01 RX ADMIN — GUAIFENESIN 600 MG: 600 TABLET, EXTENDED RELEASE ORAL at 17:40

## 2017-05-01 RX ADMIN — QUETIAPINE FUMARATE 100 MG: 100 TABLET, FILM COATED ORAL at 17:40

## 2017-05-01 RX ADMIN — ENOXAPARIN SODIUM 30 MG: 30 INJECTION SUBCUTANEOUS at 08:28

## 2017-05-01 RX ADMIN — LEVOTHYROXINE SODIUM 200 MCG: 100 TABLET ORAL at 08:27

## 2017-05-01 RX ADMIN — BUDESONIDE AND FORMOTEROL FUMARATE DIHYDRATE 2 PUFF: 80; 4.5 AEROSOL RESPIRATORY (INHALATION) at 19:23

## 2017-05-01 RX ADMIN — HYDRALAZINE HYDROCHLORIDE 100 MG: 50 TABLET ORAL at 08:27

## 2017-05-01 RX ADMIN — HYDROCODONE BITARTRATE AND ACETAMINOPHEN 1 TABLET: 10; 325 TABLET ORAL at 08:27

## 2017-05-01 RX ADMIN — NYSTATIN: 100000 POWDER TOPICAL at 21:56

## 2017-05-01 RX ADMIN — ALBUTEROL SULFATE 2.5 MG: 2.5 SOLUTION RESPIRATORY (INHALATION) at 23:01

## 2017-05-01 RX ADMIN — BUMETANIDE 2 MG: 1 TABLET ORAL at 08:28

## 2017-05-02 ENCOUNTER — APPOINTMENT (OUTPATIENT)
Dept: CARDIOLOGY | Facility: HOSPITAL | Age: 62
End: 2017-05-02
Attending: HOSPITALIST

## 2017-05-02 PROBLEM — E87.70 VOLUME EXCESS: Status: ACTIVE | Noted: 2017-05-02

## 2017-05-02 LAB
ANION GAP SERPL CALCULATED.3IONS-SCNC: 11.9 MMOL/L
AORTIC ARCH: 2.2 CM
ASCENDING AORTA: 2.8 CM
BACTERIA SPEC AEROBE CULT: NORMAL
BH CV ECHO MEAS - ACS: 2.3 CM
BH CV ECHO MEAS - AO MAX PG (FULL): 1.4 MMHG
BH CV ECHO MEAS - AO MAX PG: 5.9 MMHG
BH CV ECHO MEAS - AO MEAN PG (FULL): 0 MMHG
BH CV ECHO MEAS - AO MEAN PG: 3 MMHG
BH CV ECHO MEAS - AO ROOT AREA (BSA CORRECTED): 1.4
BH CV ECHO MEAS - AO ROOT AREA: 8 CM^2
BH CV ECHO MEAS - AO ROOT DIAM: 3.2 CM
BH CV ECHO MEAS - AO V2 MAX: 121 CM/SEC
BH CV ECHO MEAS - AO V2 MEAN: 87.7 CM/SEC
BH CV ECHO MEAS - AO V2 VTI: 29.8 CM
BH CV ECHO MEAS - ASC AORTA: 2.8 CM
BH CV ECHO MEAS - AVA(I,A): 3.4 CM^2
BH CV ECHO MEAS - AVA(I,D): 3.4 CM^2
BH CV ECHO MEAS - AVA(V,A): 3 CM^2
BH CV ECHO MEAS - AVA(V,D): 3 CM^2
BH CV ECHO MEAS - BSA(HAYCOCK): 2.5 M^2
BH CV ECHO MEAS - BSA: 2.3 M^2
BH CV ECHO MEAS - BZI_BMI: 44.3 KILOGRAMS/M^2
BH CV ECHO MEAS - BZI_METRIC_HEIGHT: 170.2 CM
BH CV ECHO MEAS - BZI_METRIC_WEIGHT: 128.4 KG
BH CV ECHO MEAS - CONTRAST EF (2CH): 54.9 ML/M^2
BH CV ECHO MEAS - CONTRAST EF 4CH: 56.2 ML/M^2
BH CV ECHO MEAS - EDV(CUBED): 81.2 ML
BH CV ECHO MEAS - EDV(MOD-SP2): 123 ML
BH CV ECHO MEAS - EDV(MOD-SP4): 160 ML
BH CV ECHO MEAS - EDV(TEICH): 84.4 ML
BH CV ECHO MEAS - EF(CUBED): 70.9 %
BH CV ECHO MEAS - EF(MOD-SP2): 54.9 %
BH CV ECHO MEAS - EF(MOD-SP4): 56.2 %
BH CV ECHO MEAS - EF(TEICH): 62.8 %
BH CV ECHO MEAS - ESV(CUBED): 23.6 ML
BH CV ECHO MEAS - ESV(MOD-SP2): 55.5 ML
BH CV ECHO MEAS - ESV(MOD-SP4): 70.1 ML
BH CV ECHO MEAS - ESV(TEICH): 31.4 ML
BH CV ECHO MEAS - FS: 33.7 %
BH CV ECHO MEAS - IVS/LVPW: 1.1
BH CV ECHO MEAS - IVSD: 1.1 CM
BH CV ECHO MEAS - LAT PEAK E' VEL: 8 CM/SEC
BH CV ECHO MEAS - LV DIASTOLIC VOL/BSA (35-75): 68.3 ML/M^2
BH CV ECHO MEAS - LV MASS(C)D: 150 GRAMS
BH CV ECHO MEAS - LV MASS(C)DI: 64 GRAMS/M^2
BH CV ECHO MEAS - LV MAX PG: 4.4 MMHG
BH CV ECHO MEAS - LV MEAN PG: 3 MMHG
BH CV ECHO MEAS - LV SYSTOLIC VOL/BSA (12-30): 29.9 ML/M^2
BH CV ECHO MEAS - LV V1 MAX: 105 CM/SEC
BH CV ECHO MEAS - LV V1 MEAN: 78.8 CM/SEC
BH CV ECHO MEAS - LV V1 VTI: 29.3 CM
BH CV ECHO MEAS - LVIDD: 4.3 CM
BH CV ECHO MEAS - LVIDS: 2.9 CM
BH CV ECHO MEAS - LVLD AP2: 8.6 CM
BH CV ECHO MEAS - LVLD AP4: 9.4 CM
BH CV ECHO MEAS - LVLS AP2: 8.1 CM
BH CV ECHO MEAS - LVLS AP4: 9.1 CM
BH CV ECHO MEAS - LVOT AREA (M): 3.5 CM^2
BH CV ECHO MEAS - LVOT AREA: 3.5 CM^2
BH CV ECHO MEAS - LVOT DIAM: 2.1 CM
BH CV ECHO MEAS - LVPWD: 0.96 CM
BH CV ECHO MEAS - MED PEAK E' VEL: 7 CM/SEC
BH CV ECHO MEAS - MV A DUR: 0.1 SEC
BH CV ECHO MEAS - MV A MAX VEL: 114 CM/SEC
BH CV ECHO MEAS - MV DEC SLOPE: 714 CM/SEC^2
BH CV ECHO MEAS - MV DEC TIME: 0.19 SEC
BH CV ECHO MEAS - MV E MAX VEL: 89.2 CM/SEC
BH CV ECHO MEAS - MV E/A: 0.78
BH CV ECHO MEAS - MV MAX PG: 6.9 MMHG
BH CV ECHO MEAS - MV MEAN PG: 3 MMHG
BH CV ECHO MEAS - MV P1/2T MAX VEL: 137 CM/SEC
BH CV ECHO MEAS - MV P1/2T: 56.2 MSEC
BH CV ECHO MEAS - MV V2 MAX: 131 CM/SEC
BH CV ECHO MEAS - MV V2 MEAN: 71.7 CM/SEC
BH CV ECHO MEAS - MV V2 VTI: 36.7 CM
BH CV ECHO MEAS - MVA P1/2T LCG: 1.6 CM^2
BH CV ECHO MEAS - MVA(P1/2T): 3.9 CM^2
BH CV ECHO MEAS - MVA(VTI): 2.8 CM^2
BH CV ECHO MEAS - PA ACC TIME: 0.12 SEC
BH CV ECHO MEAS - PA MAX PG (FULL): 0.26 MMHG
BH CV ECHO MEAS - PA MAX PG: 4.8 MMHG
BH CV ECHO MEAS - PA PR(ACCEL): 26.8 MMHG
BH CV ECHO MEAS - PA V2 MAX: 109 CM/SEC
BH CV ECHO MEAS - PULM A REVS DUR: 0.11 SEC
BH CV ECHO MEAS - PULM A REVS VEL: 39 CM/SEC
BH CV ECHO MEAS - PULM DIAS VEL: 43.7 CM/SEC
BH CV ECHO MEAS - PULM S/D: 1.4
BH CV ECHO MEAS - PULM SYS VEL: 59.6 CM/SEC
BH CV ECHO MEAS - PVA(V,A): 3.7 CM^2
BH CV ECHO MEAS - PVA(V,D): 3.7 CM^2
BH CV ECHO MEAS - QP/QS: 0.86
BH CV ECHO MEAS - RV MAX PG: 4.5 MMHG
BH CV ECHO MEAS - RV MEAN PG: 2 MMHG
BH CV ECHO MEAS - RV V1 MAX: 106 CM/SEC
BH CV ECHO MEAS - RV V1 MEAN: 73.2 CM/SEC
BH CV ECHO MEAS - RV V1 VTI: 22.9 CM
BH CV ECHO MEAS - RVOT AREA: 3.8 CM^2
BH CV ECHO MEAS - RVOT DIAM: 2.2 CM
BH CV ECHO MEAS - SI(AO): 102.3 ML/M^2
BH CV ECHO MEAS - SI(CUBED): 24.6 ML/M^2
BH CV ECHO MEAS - SI(LVOT): 43.3 ML/M^2
BH CV ECHO MEAS - SI(MOD-SP2): 28.8 ML/M^2
BH CV ECHO MEAS - SI(MOD-SP4): 38.4 ML/M^2
BH CV ECHO MEAS - SI(TEICH): 22.6 ML/M^2
BH CV ECHO MEAS - SUP REN AO DIAM: 1.7 CM
BH CV ECHO MEAS - SV(AO): 239.7 ML
BH CV ECHO MEAS - SV(CUBED): 57.5 ML
BH CV ECHO MEAS - SV(LVOT): 101.5 ML
BH CV ECHO MEAS - SV(MOD-SP2): 67.5 ML
BH CV ECHO MEAS - SV(MOD-SP4): 89.9 ML
BH CV ECHO MEAS - SV(RVOT): 87.1 ML
BH CV ECHO MEAS - SV(TEICH): 53 ML
BH CV ECHO MEAS - TAPSE (>1.6): 2.1 CM2
BH CV XLRA - RV BASE: 3.5 CM
BH CV XLRA - TDI S': 16 CM/SEC
BUN BLD-MCNC: 58 MG/DL (ref 8–23)
BUN/CREAT SERPL: 27.6 (ref 7–25)
CALCIUM SPEC-SCNC: 8.5 MG/DL (ref 8.8–10.5)
CHLORIDE SERPL-SCNC: 102 MMOL/L (ref 98–107)
CO2 SERPL-SCNC: 26.1 MMOL/L (ref 22–29)
CREAT BLD-MCNC: 2.1 MG/DL (ref 0.57–1)
E/E' RATIO: 13
GFR SERPL CREATININE-BSD FRML MDRD: 24 ML/MIN/1.73
GLUCOSE BLD-MCNC: 340 MG/DL (ref 65–99)
GLUCOSE BLDC GLUCOMTR-MCNC: 302 MG/DL (ref 70–130)
GLUCOSE BLDC GLUCOMTR-MCNC: 346 MG/DL (ref 70–130)
GLUCOSE BLDC GLUCOMTR-MCNC: 385 MG/DL (ref 70–130)
GLUCOSE BLDC GLUCOMTR-MCNC: 411 MG/DL (ref 70–130)
GLUCOSE BLDC GLUCOMTR-MCNC: 418 MG/DL (ref 70–130)
LEFT ATRIUM VOLUME INDEX: 29 ML/M2
POTASSIUM BLD-SCNC: 4.1 MMOL/L (ref 3.5–5.2)
SODIUM BLD-SCNC: 140 MMOL/L (ref 136–145)

## 2017-05-02 PROCEDURE — 0399T HC MYOCARDL STRAIN IMAG QUAN ASSMT PER SESS: CPT

## 2017-05-02 PROCEDURE — 25010000002 ENOXAPARIN PER 10 MG: Performed by: HOSPITALIST

## 2017-05-02 PROCEDURE — 63710000001 INSULIN ASPART PER 5 UNITS: Performed by: HOSPITALIST

## 2017-05-02 PROCEDURE — 94799 UNLISTED PULMONARY SVC/PX: CPT

## 2017-05-02 PROCEDURE — 93306 TTE W/DOPPLER COMPLETE: CPT | Performed by: INTERNAL MEDICINE

## 2017-05-02 PROCEDURE — 0399T ADULT TRANSTHORACIC ECHO COMPLETE: CPT | Performed by: INTERNAL MEDICINE

## 2017-05-02 PROCEDURE — 80048 BASIC METABOLIC PNL TOTAL CA: CPT | Performed by: HOSPITALIST

## 2017-05-02 PROCEDURE — 63710000001 INSULIN DETEMIR PER 5 UNITS: Performed by: HOSPITALIST

## 2017-05-02 PROCEDURE — 82962 GLUCOSE BLOOD TEST: CPT

## 2017-05-02 PROCEDURE — 99232 SBSQ HOSP IP/OBS MODERATE 35: CPT | Performed by: HOSPITALIST

## 2017-05-02 PROCEDURE — 93306 TTE W/DOPPLER COMPLETE: CPT

## 2017-05-02 RX ORDER — BUMETANIDE 0.25 MG/ML
2 INJECTION INTRAMUSCULAR; INTRAVENOUS EVERY 12 HOURS
Status: DISCONTINUED | OUTPATIENT
Start: 2017-05-02 | End: 2017-05-05

## 2017-05-02 RX ORDER — ALBUTEROL SULFATE 2.5 MG/3ML
2.5 SOLUTION RESPIRATORY (INHALATION) EVERY 6 HOURS PRN
Status: DISCONTINUED | OUTPATIENT
Start: 2017-05-02 | End: 2017-05-05

## 2017-05-02 RX ORDER — BUMETANIDE 1 MG/1
2 TABLET ORAL ONCE
Status: COMPLETED | OUTPATIENT
Start: 2017-05-02 | End: 2017-05-02

## 2017-05-02 RX ADMIN — QUETIAPINE FUMARATE 100 MG: 100 TABLET, FILM COATED ORAL at 18:52

## 2017-05-02 RX ADMIN — QUETIAPINE FUMARATE 100 MG: 100 TABLET, FILM COATED ORAL at 09:10

## 2017-05-02 RX ADMIN — ALBUTEROL SULFATE 2.5 MG: 2.5 SOLUTION RESPIRATORY (INHALATION) at 15:32

## 2017-05-02 RX ADMIN — HYDROCODONE BITARTRATE AND ACETAMINOPHEN 1 TABLET: 10; 325 TABLET ORAL at 12:06

## 2017-05-02 RX ADMIN — NYSTATIN: 100000 POWDER TOPICAL at 09:10

## 2017-05-02 RX ADMIN — HYDRALAZINE HYDROCHLORIDE 100 MG: 50 TABLET ORAL at 09:07

## 2017-05-02 RX ADMIN — GUAIFENESIN 600 MG: 600 TABLET, EXTENDED RELEASE ORAL at 18:53

## 2017-05-02 RX ADMIN — INSULIN ASPART 14 UNITS: 100 INJECTION, SOLUTION INTRAVENOUS; SUBCUTANEOUS at 21:05

## 2017-05-02 RX ADMIN — BUDESONIDE AND FORMOTEROL FUMARATE DIHYDRATE 2 PUFF: 80; 4.5 AEROSOL RESPIRATORY (INHALATION) at 19:15

## 2017-05-02 RX ADMIN — ALBUTEROL SULFATE 2.5 MG: 2.5 SOLUTION RESPIRATORY (INHALATION) at 11:34

## 2017-05-02 RX ADMIN — FAMOTIDINE 20 MG: 20 TABLET, FILM COATED ORAL at 09:06

## 2017-05-02 RX ADMIN — ALBUTEROL SULFATE 2.5 MG: 2.5 SOLUTION RESPIRATORY (INHALATION) at 19:08

## 2017-05-02 RX ADMIN — BUDESONIDE AND FORMOTEROL FUMARATE DIHYDRATE 2 PUFF: 80; 4.5 AEROSOL RESPIRATORY (INHALATION) at 11:36

## 2017-05-02 RX ADMIN — CETIRIZINE HYDROCHLORIDE 10 MG: 10 TABLET, FILM COATED ORAL at 21:07

## 2017-05-02 RX ADMIN — HYDROCODONE BITARTRATE AND ACETAMINOPHEN 1 TABLET: 10; 325 TABLET ORAL at 04:04

## 2017-05-02 RX ADMIN — ALBUTEROL SULFATE 2.5 MG: 2.5 SOLUTION RESPIRATORY (INHALATION) at 02:56

## 2017-05-02 RX ADMIN — METOPROLOL TARTRATE 50 MG: 50 TABLET, FILM COATED ORAL at 09:10

## 2017-05-02 RX ADMIN — ASPIRIN 81 MG: 81 TABLET, COATED ORAL at 09:07

## 2017-05-02 RX ADMIN — ATORVASTATIN CALCIUM 20 MG: 20 TABLET, FILM COATED ORAL at 21:08

## 2017-05-02 RX ADMIN — INSULIN ASPART 10 UNITS: 100 INJECTION, SOLUTION INTRAVENOUS; SUBCUTANEOUS at 12:06

## 2017-05-02 RX ADMIN — ALBUTEROL SULFATE 2.5 MG: 2.5 SOLUTION RESPIRATORY (INHALATION) at 07:55

## 2017-05-02 RX ADMIN — SPIRONOLACTONE 50 MG: 25 TABLET ORAL at 09:10

## 2017-05-02 RX ADMIN — METOPROLOL TARTRATE 50 MG: 50 TABLET, FILM COATED ORAL at 18:52

## 2017-05-02 RX ADMIN — INSULIN ASPART 12 UNITS: 100 INJECTION, SOLUTION INTRAVENOUS; SUBCUTANEOUS at 18:50

## 2017-05-02 RX ADMIN — INSULIN DETEMIR 80 UNITS: 100 INJECTION, SOLUTION SUBCUTANEOUS at 21:05

## 2017-05-02 RX ADMIN — NYSTATIN: 100000 POWDER TOPICAL at 21:08

## 2017-05-02 RX ADMIN — HYDROCODONE BITARTRATE AND ACETAMINOPHEN 1 TABLET: 10; 325 TABLET ORAL at 21:06

## 2017-05-02 RX ADMIN — ENOXAPARIN SODIUM 30 MG: 30 INJECTION SUBCUTANEOUS at 09:07

## 2017-05-02 RX ADMIN — HYDRALAZINE HYDROCHLORIDE 100 MG: 50 TABLET ORAL at 18:53

## 2017-05-02 RX ADMIN — BUMETANIDE 2 MG: 1 TABLET ORAL at 22:44

## 2017-05-02 RX ADMIN — LEVOTHYROXINE SODIUM 200 MCG: 100 TABLET ORAL at 09:10

## 2017-05-02 RX ADMIN — GUAIFENESIN 600 MG: 600 TABLET, EXTENDED RELEASE ORAL at 09:07

## 2017-05-02 RX ADMIN — INSULIN ASPART 10 UNITS: 100 INJECTION, SOLUTION INTRAVENOUS; SUBCUTANEOUS at 08:59

## 2017-05-03 ENCOUNTER — APPOINTMENT (OUTPATIENT)
Dept: GENERAL RADIOLOGY | Facility: HOSPITAL | Age: 62
End: 2017-05-03

## 2017-05-03 ENCOUNTER — APPOINTMENT (OUTPATIENT)
Dept: ULTRASOUND IMAGING | Facility: HOSPITAL | Age: 62
End: 2017-05-03

## 2017-05-03 LAB
ALBUMIN SERPL-MCNC: 3.2 G/DL (ref 3.5–5.2)
ALBUMIN/GLOB SERPL: 1.1 G/DL
ALP SERPL-CCNC: 51 U/L (ref 40–129)
ALT SERPL W P-5'-P-CCNC: 13 U/L (ref 5–33)
ANION GAP SERPL CALCULATED.3IONS-SCNC: 14 MMOL/L
AST SERPL-CCNC: 13 U/L (ref 5–32)
BILIRUB SERPL-MCNC: 0.3 MG/DL (ref 0.2–1.2)
BUN BLD-MCNC: 54 MG/DL (ref 8–23)
BUN/CREAT SERPL: 28.7 (ref 7–25)
CALCIUM SPEC-SCNC: 9 MG/DL (ref 8.8–10.5)
CHLORIDE SERPL-SCNC: 104 MMOL/L (ref 98–107)
CO2 SERPL-SCNC: 24 MMOL/L (ref 22–29)
CREAT BLD-MCNC: 1.88 MG/DL (ref 0.57–1)
GFR SERPL CREATININE-BSD FRML MDRD: 27 ML/MIN/1.73
GLOBULIN UR ELPH-MCNC: 2.9 GM/DL
GLUCOSE BLD-MCNC: 231 MG/DL (ref 65–99)
GLUCOSE BLDC GLUCOMTR-MCNC: 182 MG/DL (ref 70–130)
GLUCOSE BLDC GLUCOMTR-MCNC: 263 MG/DL (ref 70–130)
GLUCOSE BLDC GLUCOMTR-MCNC: 314 MG/DL (ref 70–130)
GLUCOSE BLDC GLUCOMTR-MCNC: 337 MG/DL (ref 70–130)
POTASSIUM BLD-SCNC: 4.4 MMOL/L (ref 3.5–5.2)
PROT SERPL-MCNC: 6.1 G/DL (ref 6–8.5)
SODIUM BLD-SCNC: 142 MMOL/L (ref 136–145)

## 2017-05-03 PROCEDURE — 02HV33Z INSERTION OF INFUSION DEVICE INTO SUPERIOR VENA CAVA, PERCUTANEOUS APPROACH: ICD-10-PCS | Performed by: HOSPITALIST

## 2017-05-03 PROCEDURE — 82962 GLUCOSE BLOOD TEST: CPT

## 2017-05-03 PROCEDURE — 94799 UNLISTED PULMONARY SVC/PX: CPT

## 2017-05-03 PROCEDURE — 99232 SBSQ HOSP IP/OBS MODERATE 35: CPT | Performed by: INTERNAL MEDICINE

## 2017-05-03 PROCEDURE — C1894 INTRO/SHEATH, NON-LASER: HCPCS

## 2017-05-03 PROCEDURE — 71010 HC CHEST PA OR AP: CPT

## 2017-05-03 PROCEDURE — 63710000001 INSULIN DETEMIR PER 5 UNITS: Performed by: HOSPITALIST

## 2017-05-03 PROCEDURE — 63710000001 INSULIN ASPART PER 5 UNITS: Performed by: INTERNAL MEDICINE

## 2017-05-03 PROCEDURE — 63710000001 INSULIN ASPART PER 5 UNITS: Performed by: HOSPITALIST

## 2017-05-03 PROCEDURE — 93970 EXTREMITY STUDY: CPT

## 2017-05-03 PROCEDURE — 25010000002 ENOXAPARIN PER 10 MG: Performed by: HOSPITALIST

## 2017-05-03 PROCEDURE — 80053 COMPREHEN METABOLIC PANEL: CPT | Performed by: HOSPITALIST

## 2017-05-03 RX ORDER — POLYETHYLENE GLYCOL 3350 17 G/17G
17 POWDER, FOR SOLUTION ORAL DAILY PRN
Status: DISCONTINUED | OUTPATIENT
Start: 2017-05-03 | End: 2017-05-07

## 2017-05-03 RX ORDER — DOCUSATE SODIUM 100 MG/1
100 CAPSULE, LIQUID FILLED ORAL 2 TIMES DAILY
Status: DISCONTINUED | OUTPATIENT
Start: 2017-05-03 | End: 2017-05-07

## 2017-05-03 RX ORDER — SODIUM CHLORIDE 0.9 % (FLUSH) 0.9 %
10 SYRINGE (ML) INJECTION EVERY 12 HOURS SCHEDULED
Status: DISCONTINUED | OUTPATIENT
Start: 2017-05-03 | End: 2017-05-08 | Stop reason: HOSPADM

## 2017-05-03 RX ORDER — BISACODYL 10 MG
10 SUPPOSITORY, RECTAL RECTAL DAILY PRN
Status: DISCONTINUED | OUTPATIENT
Start: 2017-05-03 | End: 2017-05-08 | Stop reason: HOSPADM

## 2017-05-03 RX ORDER — IPRATROPIUM BROMIDE AND ALBUTEROL SULFATE 2.5; .5 MG/3ML; MG/3ML
3 SOLUTION RESPIRATORY (INHALATION)
Status: DISCONTINUED | OUTPATIENT
Start: 2017-05-03 | End: 2017-05-04 | Stop reason: CLARIF

## 2017-05-03 RX ORDER — DEXTROSE MONOHYDRATE 25 G/50ML
25 INJECTION, SOLUTION INTRAVENOUS
Status: DISCONTINUED | OUTPATIENT
Start: 2017-05-03 | End: 2017-05-08 | Stop reason: HOSPADM

## 2017-05-03 RX ORDER — NICOTINE POLACRILEX 4 MG
15 LOZENGE BUCCAL
Status: DISCONTINUED | OUTPATIENT
Start: 2017-05-03 | End: 2017-05-08 | Stop reason: HOSPADM

## 2017-05-03 RX ORDER — SODIUM CHLORIDE 0.9 % (FLUSH) 0.9 %
10 SYRINGE (ML) INJECTION AS NEEDED
Status: DISCONTINUED | OUTPATIENT
Start: 2017-05-03 | End: 2017-05-08 | Stop reason: HOSPADM

## 2017-05-03 RX ADMIN — NYSTATIN: 100000 POWDER TOPICAL at 11:32

## 2017-05-03 RX ADMIN — ALBUTEROL SULFATE 2.5 MG: 2.5 SOLUTION RESPIRATORY (INHALATION) at 08:58

## 2017-05-03 RX ADMIN — HYDRALAZINE HYDROCHLORIDE 100 MG: 50 TABLET ORAL at 17:35

## 2017-05-03 RX ADMIN — BUDESONIDE AND FORMOTEROL FUMARATE DIHYDRATE 2 PUFF: 80; 4.5 AEROSOL RESPIRATORY (INHALATION) at 09:03

## 2017-05-03 RX ADMIN — Medication 10 ML: at 20:48

## 2017-05-03 RX ADMIN — QUETIAPINE FUMARATE 100 MG: 100 TABLET, FILM COATED ORAL at 11:27

## 2017-05-03 RX ADMIN — LEVOTHYROXINE SODIUM 200 MCG: 100 TABLET ORAL at 11:24

## 2017-05-03 RX ADMIN — IPRATROPIUM BROMIDE AND ALBUTEROL SULFATE 3 ML: .5; 3 SOLUTION RESPIRATORY (INHALATION) at 11:50

## 2017-05-03 RX ADMIN — IPRATROPIUM BROMIDE AND ALBUTEROL SULFATE 3 ML: .5; 3 SOLUTION RESPIRATORY (INHALATION) at 19:53

## 2017-05-03 RX ADMIN — HYDROCODONE BITARTRATE AND ACETAMINOPHEN 1 TABLET: 10; 325 TABLET ORAL at 11:24

## 2017-05-03 RX ADMIN — INSULIN ASPART 4 UNITS: 100 INJECTION, SOLUTION INTRAVENOUS; SUBCUTANEOUS at 20:50

## 2017-05-03 RX ADMIN — INSULIN ASPART 12 UNITS: 100 INJECTION, SOLUTION INTRAVENOUS; SUBCUTANEOUS at 08:00

## 2017-05-03 RX ADMIN — GUAIFENESIN 600 MG: 600 TABLET, EXTENDED RELEASE ORAL at 17:35

## 2017-05-03 RX ADMIN — QUETIAPINE FUMARATE 100 MG: 100 TABLET, FILM COATED ORAL at 17:35

## 2017-05-03 RX ADMIN — ATORVASTATIN CALCIUM 20 MG: 20 TABLET, FILM COATED ORAL at 20:47

## 2017-05-03 RX ADMIN — FAMOTIDINE 20 MG: 20 TABLET, FILM COATED ORAL at 11:25

## 2017-05-03 RX ADMIN — CETIRIZINE HYDROCHLORIDE 10 MG: 10 TABLET, FILM COATED ORAL at 20:46

## 2017-05-03 RX ADMIN — ASPIRIN 81 MG: 81 TABLET, COATED ORAL at 11:26

## 2017-05-03 RX ADMIN — NYSTATIN: 100000 POWDER TOPICAL at 20:57

## 2017-05-03 RX ADMIN — INSULIN ASPART 2 UNITS: 100 INJECTION, SOLUTION INTRAVENOUS; SUBCUTANEOUS at 12:54

## 2017-05-03 RX ADMIN — GUAIFENESIN 600 MG: 600 TABLET, EXTENDED RELEASE ORAL at 11:26

## 2017-05-03 RX ADMIN — DOCUSATE SODIUM 100 MG: 100 CAPSULE, LIQUID FILLED ORAL at 20:43

## 2017-05-03 RX ADMIN — BUMETANIDE 2 MG: 0.25 INJECTION INTRAMUSCULAR; INTRAVENOUS at 14:29

## 2017-05-03 RX ADMIN — Medication 10 ML: at 14:34

## 2017-05-03 RX ADMIN — BUDESONIDE AND FORMOTEROL FUMARATE DIHYDRATE 2 PUFF: 80; 4.5 AEROSOL RESPIRATORY (INHALATION) at 20:00

## 2017-05-03 RX ADMIN — INSULIN ASPART 5 UNITS: 100 INJECTION, SOLUTION INTRAVENOUS; SUBCUTANEOUS at 17:14

## 2017-05-03 RX ADMIN — IPRATROPIUM BROMIDE AND ALBUTEROL SULFATE 3 ML: .5; 3 SOLUTION RESPIRATORY (INHALATION) at 15:53

## 2017-05-03 RX ADMIN — INSULIN DETEMIR 80 UNITS: 100 INJECTION, SOLUTION SUBCUTANEOUS at 20:42

## 2017-05-03 RX ADMIN — ENOXAPARIN SODIUM 30 MG: 30 INJECTION SUBCUTANEOUS at 11:25

## 2017-05-03 RX ADMIN — HYDRALAZINE HYDROCHLORIDE 100 MG: 50 TABLET ORAL at 11:26

## 2017-05-03 RX ADMIN — DOCUSATE SODIUM 100 MG: 100 CAPSULE, LIQUID FILLED ORAL at 20:47

## 2017-05-03 RX ADMIN — HYDROCODONE BITARTRATE AND ACETAMINOPHEN 1 TABLET: 10; 325 TABLET ORAL at 20:47

## 2017-05-03 RX ADMIN — SPIRONOLACTONE 50 MG: 25 TABLET ORAL at 11:27

## 2017-05-03 RX ADMIN — METOPROLOL TARTRATE 50 MG: 50 TABLET, FILM COATED ORAL at 17:35

## 2017-05-03 RX ADMIN — METOPROLOL TARTRATE 50 MG: 50 TABLET, FILM COATED ORAL at 11:24

## 2017-05-04 LAB
ALBUMIN SERPL-MCNC: 3.2 G/DL (ref 3.5–5.2)
ALBUMIN/GLOB SERPL: 1.2 G/DL
ALP SERPL-CCNC: 50 U/L (ref 40–129)
ALT SERPL W P-5'-P-CCNC: 13 U/L (ref 5–33)
ANION GAP SERPL CALCULATED.3IONS-SCNC: 10.4 MMOL/L
ARTERIAL PATENCY WRIST A: POSITIVE
AST SERPL-CCNC: 11 U/L (ref 5–32)
ATMOSPHERIC PRESS: 743 MMHG
BASE EXCESS BLDA CALC-SCNC: 0.9 MMOL/L (ref -2.3–2.3)
BASOPHILS # BLD AUTO: 0.03 10*3/MM3 (ref 0–0.2)
BASOPHILS NFR BLD AUTO: 0.4 % (ref 0–2)
BDY SITE: ABNORMAL
BILIRUB SERPL-MCNC: 0.3 MG/DL (ref 0.2–1.2)
BUN BLD-MCNC: 55 MG/DL (ref 8–23)
BUN/CREAT SERPL: 29.3 (ref 7–25)
CALCIUM SPEC-SCNC: 8.8 MG/DL (ref 8.8–10.5)
CHLORIDE SERPL-SCNC: 102 MMOL/L (ref 98–107)
CO2 SERPL-SCNC: 27.6 MMOL/L (ref 22–29)
CREAT BLD-MCNC: 1.88 MG/DL (ref 0.57–1)
DEPRECATED RDW RBC AUTO: 43.8 FL (ref 37–54)
EOSINOPHIL # BLD AUTO: 0.43 10*3/MM3 (ref 0.1–0.3)
EOSINOPHIL NFR BLD AUTO: 5.6 % (ref 0–4)
ERYTHROCYTE [DISTWIDTH] IN BLOOD BY AUTOMATED COUNT: 13 % (ref 11.5–14.5)
GAS FLOW AIRWAY: 4 LPM
GFR SERPL CREATININE-BSD FRML MDRD: 27 ML/MIN/1.73
GLOBULIN UR ELPH-MCNC: 2.6 GM/DL
GLUCOSE BLD-MCNC: 313 MG/DL (ref 65–99)
GLUCOSE BLDC GLUCOMTR-MCNC: 293 MG/DL (ref 70–130)
GLUCOSE BLDC GLUCOMTR-MCNC: 346 MG/DL (ref 70–130)
GLUCOSE BLDC GLUCOMTR-MCNC: 362 MG/DL (ref 70–130)
GLUCOSE BLDC GLUCOMTR-MCNC: 390 MG/DL (ref 70–130)
GLUCOSE BLDC GLUCOMTR-MCNC: 435 MG/DL (ref 70–130)
GLUCOSE BLDC GLUCOMTR-MCNC: 443 MG/DL (ref 70–130)
HCO3 BLDA-SCNC: 27.7 MMOL/L (ref 22–26)
HCT VFR BLD AUTO: 28.5 % (ref 37–47)
HGB BLD-MCNC: 9.2 G/DL (ref 12–16)
HGB BLDA-MCNC: 10.5 G/DL (ref 12–18)
IMM GRANULOCYTES # BLD: 0.04 10*3/MM3 (ref 0–0.03)
IMM GRANULOCYTES NFR BLD: 0.5 % (ref 0–0.5)
LYMPHOCYTES # BLD AUTO: 1.65 10*3/MM3 (ref 0.6–4.8)
LYMPHOCYTES NFR BLD AUTO: 21.7 % (ref 20–45)
MCH RBC QN AUTO: 30.1 PG (ref 27–31)
MCHC RBC AUTO-ENTMCNC: 32.3 G/DL (ref 31–37)
MCV RBC AUTO: 93.1 FL (ref 81–99)
MODALITY: ABNORMAL
MONOCYTES # BLD AUTO: 0.83 10*3/MM3 (ref 0–1)
MONOCYTES NFR BLD AUTO: 10.9 % (ref 3–8)
NEUTROPHILS # BLD AUTO: 4.64 10*3/MM3 (ref 1.5–8.3)
NEUTROPHILS NFR BLD AUTO: 60.9 % (ref 45–70)
NRBC BLD MANUAL-RTO: 0 /100 WBC (ref 0–0)
PCO2 BLDA: 55.6 MM HG (ref 35–45)
PH BLDA: 7.32 PH UNITS (ref 7.35–7.45)
PLATELET # BLD AUTO: 171 10*3/MM3 (ref 140–500)
PMV BLD AUTO: 11.3 FL (ref 7.4–10.4)
PO2 BLDA: 76.6 MM HG (ref 80–100)
POTASSIUM BLD-SCNC: 4.2 MMOL/L (ref 3.5–5.2)
PROT SERPL-MCNC: 5.8 G/DL (ref 6–8.5)
RBC # BLD AUTO: 3.06 10*6/MM3 (ref 4.2–5.4)
SAO2 % BLDCOA: 94.2 % (ref 91–100)
SODIUM BLD-SCNC: 140 MMOL/L (ref 136–145)
WBC NRBC COR # BLD: 7.62 10*3/MM3 (ref 4.8–10.8)

## 2017-05-04 PROCEDURE — 94799 UNLISTED PULMONARY SVC/PX: CPT

## 2017-05-04 PROCEDURE — 25010000002 ENOXAPARIN PER 10 MG: Performed by: HOSPITALIST

## 2017-05-04 PROCEDURE — 80053 COMPREHEN METABOLIC PANEL: CPT | Performed by: INTERNAL MEDICINE

## 2017-05-04 PROCEDURE — 82803 BLOOD GASES ANY COMBINATION: CPT | Performed by: INTERNAL MEDICINE

## 2017-05-04 PROCEDURE — 36600 WITHDRAWAL OF ARTERIAL BLOOD: CPT | Performed by: INTERNAL MEDICINE

## 2017-05-04 PROCEDURE — 63710000001 INSULIN DETEMIR PER 5 UNITS: Performed by: INTERNAL MEDICINE

## 2017-05-04 PROCEDURE — 63710000001 INSULIN ASPART PER 5 UNITS: Performed by: INTERNAL MEDICINE

## 2017-05-04 PROCEDURE — 99232 SBSQ HOSP IP/OBS MODERATE 35: CPT | Performed by: INTERNAL MEDICINE

## 2017-05-04 PROCEDURE — 82962 GLUCOSE BLOOD TEST: CPT

## 2017-05-04 PROCEDURE — 97161 PT EVAL LOW COMPLEX 20 MIN: CPT

## 2017-05-04 PROCEDURE — 85025 COMPLETE CBC W/AUTO DIFF WBC: CPT | Performed by: INTERNAL MEDICINE

## 2017-05-04 RX ORDER — LEVALBUTEROL INHALATION SOLUTION 0.63 MG/3ML
0.63 SOLUTION RESPIRATORY (INHALATION) 4 TIMES DAILY PRN
Status: DISCONTINUED | OUTPATIENT
Start: 2017-05-04 | End: 2017-05-08 | Stop reason: HOSPADM

## 2017-05-04 RX ADMIN — IPRATROPIUM BROMIDE AND ALBUTEROL SULFATE 3 ML: .5; 3 SOLUTION RESPIRATORY (INHALATION) at 07:34

## 2017-05-04 RX ADMIN — LEVALBUTEROL INHALATION SOLUTION 0.63 MG: 0.63 SOLUTION RESPIRATORY (INHALATION) at 20:36

## 2017-05-04 RX ADMIN — GUAIFENESIN 600 MG: 600 TABLET, EXTENDED RELEASE ORAL at 08:45

## 2017-05-04 RX ADMIN — BUDESONIDE AND FORMOTEROL FUMARATE DIHYDRATE 2 PUFF: 80; 4.5 AEROSOL RESPIRATORY (INHALATION) at 09:08

## 2017-05-04 RX ADMIN — Medication 10 ML: at 20:43

## 2017-05-04 RX ADMIN — HYDROCODONE BITARTRATE AND ACETAMINOPHEN 1 TABLET: 10; 325 TABLET ORAL at 18:09

## 2017-05-04 RX ADMIN — QUETIAPINE FUMARATE 100 MG: 100 TABLET, FILM COATED ORAL at 17:30

## 2017-05-04 RX ADMIN — INSULIN ASPART 6 UNITS: 100 INJECTION, SOLUTION INTRAVENOUS; SUBCUTANEOUS at 17:30

## 2017-05-04 RX ADMIN — INSULIN ASPART 7 UNITS: 100 INJECTION, SOLUTION INTRAVENOUS; SUBCUTANEOUS at 20:39

## 2017-05-04 RX ADMIN — LEVALBUTEROL INHALATION SOLUTION 0.63 MG: 0.63 SOLUTION RESPIRATORY (INHALATION) at 15:55

## 2017-05-04 RX ADMIN — HYDROCODONE BITARTRATE AND ACETAMINOPHEN 1 TABLET: 10; 325 TABLET ORAL at 08:48

## 2017-05-04 RX ADMIN — NYSTATIN: 100000 POWDER TOPICAL at 08:58

## 2017-05-04 RX ADMIN — HYDROCODONE BITARTRATE AND ACETAMINOPHEN 1 TABLET: 10; 325 TABLET ORAL at 03:00

## 2017-05-04 RX ADMIN — IPRATROPIUM BROMIDE AND ALBUTEROL SULFATE 3 ML: .5; 3 SOLUTION RESPIRATORY (INHALATION) at 11:32

## 2017-05-04 RX ADMIN — LEVOTHYROXINE SODIUM 200 MCG: 100 TABLET ORAL at 08:46

## 2017-05-04 RX ADMIN — DOCUSATE SODIUM 100 MG: 100 CAPSULE, LIQUID FILLED ORAL at 17:29

## 2017-05-04 RX ADMIN — METOPROLOL TARTRATE 50 MG: 50 TABLET, FILM COATED ORAL at 08:46

## 2017-05-04 RX ADMIN — HYDRALAZINE HYDROCHLORIDE 100 MG: 50 TABLET ORAL at 08:47

## 2017-05-04 RX ADMIN — INSULIN DETEMIR 90 UNITS: 100 INJECTION, SOLUTION SUBCUTANEOUS at 20:40

## 2017-05-04 RX ADMIN — INSULIN ASPART 4 UNITS: 100 INJECTION, SOLUTION INTRAVENOUS; SUBCUTANEOUS at 08:43

## 2017-05-04 RX ADMIN — ATORVASTATIN CALCIUM 20 MG: 20 TABLET, FILM COATED ORAL at 20:46

## 2017-05-04 RX ADMIN — BUDESONIDE AND FORMOTEROL FUMARATE DIHYDRATE 2 PUFF: 80; 4.5 AEROSOL RESPIRATORY (INHALATION) at 20:36

## 2017-05-04 RX ADMIN — BUMETANIDE 2 MG: 0.25 INJECTION INTRAMUSCULAR; INTRAVENOUS at 03:00

## 2017-05-04 RX ADMIN — SPIRONOLACTONE 50 MG: 25 TABLET ORAL at 08:45

## 2017-05-04 RX ADMIN — HYDRALAZINE HYDROCHLORIDE 100 MG: 50 TABLET ORAL at 17:29

## 2017-05-04 RX ADMIN — ENOXAPARIN SODIUM 30 MG: 30 INJECTION SUBCUTANEOUS at 08:44

## 2017-05-04 RX ADMIN — FAMOTIDINE 20 MG: 20 TABLET, FILM COATED ORAL at 08:45

## 2017-05-04 RX ADMIN — NYSTATIN: 100000 POWDER TOPICAL at 20:43

## 2017-05-04 RX ADMIN — DOCUSATE SODIUM 100 MG: 100 CAPSULE, LIQUID FILLED ORAL at 08:45

## 2017-05-04 RX ADMIN — ASPIRIN 81 MG: 81 TABLET, COATED ORAL at 08:45

## 2017-05-04 RX ADMIN — CETIRIZINE HYDROCHLORIDE 10 MG: 10 TABLET, FILM COATED ORAL at 20:43

## 2017-05-04 RX ADMIN — Medication 10 ML: at 08:59

## 2017-05-04 RX ADMIN — BUMETANIDE 2 MG: 0.25 INJECTION INTRAMUSCULAR; INTRAVENOUS at 15:03

## 2017-05-04 RX ADMIN — INSULIN ASPART 5 UNITS: 100 INJECTION, SOLUTION INTRAVENOUS; SUBCUTANEOUS at 11:35

## 2017-05-04 RX ADMIN — METOPROLOL TARTRATE 50 MG: 50 TABLET, FILM COATED ORAL at 17:29

## 2017-05-04 RX ADMIN — GUAIFENESIN 600 MG: 600 TABLET, EXTENDED RELEASE ORAL at 17:29

## 2017-05-04 RX ADMIN — QUETIAPINE FUMARATE 100 MG: 100 TABLET, FILM COATED ORAL at 08:45

## 2017-05-05 ENCOUNTER — TELEPHONE (OUTPATIENT)
Dept: FAMILY MEDICINE CLINIC | Facility: CLINIC | Age: 62
End: 2017-05-05

## 2017-05-05 ENCOUNTER — APPOINTMENT (OUTPATIENT)
Dept: ULTRASOUND IMAGING | Facility: HOSPITAL | Age: 62
End: 2017-05-05

## 2017-05-05 LAB
BILIRUB UR QL STRIP: NEGATIVE
CLARITY UR: CLEAR
COLOR UR: YELLOW
CREAT UR-MCNC: 50.4 MG/DL
GLUCOSE BLDC GLUCOMTR-MCNC: 214 MG/DL (ref 70–130)
GLUCOSE BLDC GLUCOMTR-MCNC: 257 MG/DL (ref 70–130)
GLUCOSE BLDC GLUCOMTR-MCNC: 293 MG/DL (ref 70–130)
GLUCOSE BLDC GLUCOMTR-MCNC: 384 MG/DL (ref 70–130)
GLUCOSE UR STRIP-MCNC: ABNORMAL MG/DL
HAV IGM SERPL QL IA: NORMAL
HBV CORE IGM SERPL QL IA: NORMAL
HBV SURFACE AG SERPL QL IA: NORMAL
HCV AB SER DONR QL: NORMAL
HGB UR QL STRIP.AUTO: ABNORMAL
HIV1 P24 AG SER QL: NORMAL
HIV1+2 AB SER QL: NEGATIVE
KETONES UR QL STRIP: NEGATIVE
LEUKOCYTE ESTERASE UR QL STRIP.AUTO: NEGATIVE
NITRITE UR QL STRIP: NEGATIVE
PH UR STRIP.AUTO: <=5 [PH] (ref 4.5–8)
PROCALCITONIN SERPL-MCNC: 0.13 NG/ML (ref 0.1–0.25)
PROT UR QL STRIP: NEGATIVE
PTH-INTACT SERPL-MCNC: 64 PG/ML (ref 15–65)
SP GR UR STRIP: <=1.005 (ref 1–1.03)
UROBILINOGEN UR QL STRIP: ABNORMAL

## 2017-05-05 PROCEDURE — 86160 COMPLEMENT ANTIGEN: CPT | Performed by: INTERNAL MEDICINE

## 2017-05-05 PROCEDURE — 94799 UNLISTED PULMONARY SVC/PX: CPT

## 2017-05-05 PROCEDURE — 86256 FLUORESCENT ANTIBODY TITER: CPT | Performed by: INTERNAL MEDICINE

## 2017-05-05 PROCEDURE — 25010000002 ENOXAPARIN PER 10 MG: Performed by: HOSPITALIST

## 2017-05-05 PROCEDURE — 63710000001 INSULIN ASPART PER 5 UNITS: Performed by: INTERNAL MEDICINE

## 2017-05-05 PROCEDURE — 83516 IMMUNOASSAY NONANTIBODY: CPT | Performed by: INTERNAL MEDICINE

## 2017-05-05 PROCEDURE — 86334 IMMUNOFIX E-PHORESIS SERUM: CPT | Performed by: INTERNAL MEDICINE

## 2017-05-05 PROCEDURE — 99232 SBSQ HOSP IP/OBS MODERATE 35: CPT | Performed by: INTERNAL MEDICINE

## 2017-05-05 PROCEDURE — 86592 SYPHILIS TEST NON-TREP QUAL: CPT | Performed by: INTERNAL MEDICINE

## 2017-05-05 PROCEDURE — 81003 URINALYSIS AUTO W/O SCOPE: CPT | Performed by: INTERNAL MEDICINE

## 2017-05-05 PROCEDURE — G0432 EIA HIV-1/HIV-2 SCREEN: HCPCS | Performed by: INTERNAL MEDICINE

## 2017-05-05 PROCEDURE — 99254 IP/OBS CNSLTJ NEW/EST MOD 60: CPT | Performed by: INTERNAL MEDICINE

## 2017-05-05 PROCEDURE — 83520 IMMUNOASSAY QUANT NOS NONAB: CPT | Performed by: INTERNAL MEDICINE

## 2017-05-05 PROCEDURE — 86335 IMMUNFIX E-PHORSIS/URINE/CSF: CPT | Performed by: INTERNAL MEDICINE

## 2017-05-05 PROCEDURE — 84145 PROCALCITONIN (PCT): CPT | Performed by: INTERNAL MEDICINE

## 2017-05-05 PROCEDURE — 87899 AGENT NOS ASSAY W/OPTIC: CPT | Performed by: INTERNAL MEDICINE

## 2017-05-05 PROCEDURE — 82962 GLUCOSE BLOOD TEST: CPT

## 2017-05-05 PROCEDURE — 83883 ASSAY NEPHELOMETRY NOT SPEC: CPT | Performed by: INTERNAL MEDICINE

## 2017-05-05 PROCEDURE — 63710000001 INSULIN DETEMIR PER 5 UNITS: Performed by: INTERNAL MEDICINE

## 2017-05-05 PROCEDURE — 80074 ACUTE HEPATITIS PANEL: CPT | Performed by: INTERNAL MEDICINE

## 2017-05-05 PROCEDURE — 76775 US EXAM ABDO BACK WALL LIM: CPT

## 2017-05-05 PROCEDURE — 86038 ANTINUCLEAR ANTIBODIES: CPT | Performed by: INTERNAL MEDICINE

## 2017-05-05 PROCEDURE — 82570 ASSAY OF URINE CREATININE: CPT | Performed by: INTERNAL MEDICINE

## 2017-05-05 PROCEDURE — 83970 ASSAY OF PARATHORMONE: CPT | Performed by: INTERNAL MEDICINE

## 2017-05-05 RX ORDER — ALBUTEROL SULFATE 2.5 MG/3ML
2.5 SOLUTION RESPIRATORY (INHALATION) EVERY 4 HOURS PRN
Status: DISCONTINUED | OUTPATIENT
Start: 2017-05-05 | End: 2017-05-05 | Stop reason: CLARIF

## 2017-05-05 RX ORDER — BUMETANIDE 0.25 MG/ML
2 INJECTION INTRAMUSCULAR; INTRAVENOUS EVERY 6 HOURS
Status: DISCONTINUED | OUTPATIENT
Start: 2017-05-05 | End: 2017-05-07

## 2017-05-05 RX ORDER — LEVALBUTEROL INHALATION SOLUTION 0.63 MG/3ML
0.63 SOLUTION RESPIRATORY (INHALATION) EVERY 4 HOURS PRN
Status: DISCONTINUED | OUTPATIENT
Start: 2017-05-05 | End: 2017-05-08 | Stop reason: HOSPADM

## 2017-05-05 RX ORDER — METOLAZONE 5 MG/1
5 TABLET ORAL DAILY
Status: DISCONTINUED | OUTPATIENT
Start: 2017-05-05 | End: 2017-05-07

## 2017-05-05 RX ADMIN — LEVALBUTEROL INHALATION SOLUTION 0.63 MG: 0.63 SOLUTION RESPIRATORY (INHALATION) at 16:33

## 2017-05-05 RX ADMIN — BUDESONIDE AND FORMOTEROL FUMARATE DIHYDRATE 2 PUFF: 80; 4.5 AEROSOL RESPIRATORY (INHALATION) at 20:45

## 2017-05-05 RX ADMIN — GUAIFENESIN 600 MG: 600 TABLET, EXTENDED RELEASE ORAL at 08:15

## 2017-05-05 RX ADMIN — NYSTATIN: 100000 POWDER TOPICAL at 11:53

## 2017-05-05 RX ADMIN — Medication 10 ML: at 08:20

## 2017-05-05 RX ADMIN — BUMETANIDE 2 MG: 0.25 INJECTION INTRAMUSCULAR; INTRAVENOUS at 04:24

## 2017-05-05 RX ADMIN — SPIRONOLACTONE 50 MG: 25 TABLET ORAL at 08:16

## 2017-05-05 RX ADMIN — DOCUSATE SODIUM 100 MG: 100 CAPSULE, LIQUID FILLED ORAL at 08:15

## 2017-05-05 RX ADMIN — INSULIN ASPART 6 UNITS: 100 INJECTION, SOLUTION INTRAVENOUS; SUBCUTANEOUS at 21:25

## 2017-05-05 RX ADMIN — DOCUSATE SODIUM 100 MG: 100 CAPSULE, LIQUID FILLED ORAL at 17:24

## 2017-05-05 RX ADMIN — ASPIRIN 81 MG: 81 TABLET, COATED ORAL at 08:15

## 2017-05-05 RX ADMIN — METOPROLOL TARTRATE 50 MG: 50 TABLET, FILM COATED ORAL at 08:15

## 2017-05-05 RX ADMIN — NYSTATIN: 100000 POWDER TOPICAL at 21:13

## 2017-05-05 RX ADMIN — Medication 10 ML: at 21:14

## 2017-05-05 RX ADMIN — METOLAZONE 5 MG: 5 TABLET ORAL at 17:25

## 2017-05-05 RX ADMIN — BUDESONIDE AND FORMOTEROL FUMARATE DIHYDRATE 2 PUFF: 80; 4.5 AEROSOL RESPIRATORY (INHALATION) at 09:38

## 2017-05-05 RX ADMIN — BISACODYL 10 MG: 10 SUPPOSITORY RECTAL at 08:56

## 2017-05-05 RX ADMIN — INSULIN ASPART 4 UNITS: 100 INJECTION, SOLUTION INTRAVENOUS; SUBCUTANEOUS at 08:16

## 2017-05-05 RX ADMIN — ATORVASTATIN CALCIUM 20 MG: 20 TABLET, FILM COATED ORAL at 21:12

## 2017-05-05 RX ADMIN — INSULIN ASPART 4 UNITS: 100 INJECTION, SOLUTION INTRAVENOUS; SUBCUTANEOUS at 17:59

## 2017-05-05 RX ADMIN — GUAIFENESIN 600 MG: 600 TABLET, EXTENDED RELEASE ORAL at 17:24

## 2017-05-05 RX ADMIN — LEVALBUTEROL INHALATION SOLUTION 0.63 MG: 0.63 SOLUTION RESPIRATORY (INHALATION) at 05:23

## 2017-05-05 RX ADMIN — INSULIN ASPART 4 UNITS: 100 INJECTION, SOLUTION INTRAVENOUS; SUBCUTANEOUS at 18:00

## 2017-05-05 RX ADMIN — CETIRIZINE HYDROCHLORIDE 10 MG: 10 TABLET, FILM COATED ORAL at 21:14

## 2017-05-05 RX ADMIN — HYDRALAZINE HYDROCHLORIDE 100 MG: 50 TABLET ORAL at 08:15

## 2017-05-05 RX ADMIN — QUETIAPINE FUMARATE 100 MG: 100 TABLET, FILM COATED ORAL at 17:24

## 2017-05-05 RX ADMIN — LEVOTHYROXINE SODIUM 200 MCG: 100 TABLET ORAL at 08:15

## 2017-05-05 RX ADMIN — INSULIN ASPART 4 UNITS: 100 INJECTION, SOLUTION INTRAVENOUS; SUBCUTANEOUS at 11:53

## 2017-05-05 RX ADMIN — HYDRALAZINE HYDROCHLORIDE 100 MG: 50 TABLET ORAL at 17:24

## 2017-05-05 RX ADMIN — BUMETANIDE 2 MG: 0.25 INJECTION INTRAMUSCULAR; INTRAVENOUS at 16:45

## 2017-05-05 RX ADMIN — HYDROCODONE BITARTRATE AND ACETAMINOPHEN 1 TABLET: 10; 325 TABLET ORAL at 11:53

## 2017-05-05 RX ADMIN — INSULIN ASPART 3 UNITS: 100 INJECTION, SOLUTION INTRAVENOUS; SUBCUTANEOUS at 11:52

## 2017-05-05 RX ADMIN — INSULIN DETEMIR 100 UNITS: 100 INJECTION, SOLUTION SUBCUTANEOUS at 21:25

## 2017-05-05 RX ADMIN — BUMETANIDE 2 MG: 0.25 INJECTION INTRAMUSCULAR; INTRAVENOUS at 23:04

## 2017-05-05 RX ADMIN — QUETIAPINE FUMARATE 100 MG: 100 TABLET, FILM COATED ORAL at 08:16

## 2017-05-05 RX ADMIN — FAMOTIDINE 20 MG: 20 TABLET, FILM COATED ORAL at 08:15

## 2017-05-05 RX ADMIN — METOPROLOL TARTRATE 50 MG: 50 TABLET, FILM COATED ORAL at 17:24

## 2017-05-05 RX ADMIN — ENOXAPARIN SODIUM 30 MG: 30 INJECTION SUBCUTANEOUS at 08:15

## 2017-05-06 LAB
ANION GAP SERPL CALCULATED.3IONS-SCNC: 10.2 MMOL/L
BUN BLD-MCNC: 49 MG/DL (ref 8–23)
BUN/CREAT SERPL: 29.7 (ref 7–25)
C3 SERPL-MCNC: 172 MG/DL (ref 82–167)
C4 SERPL-MCNC: 43 MG/DL (ref 14–44)
CALCIUM SPEC-SCNC: 8.9 MG/DL (ref 8.8–10.5)
CHLORIDE SERPL-SCNC: 98 MMOL/L (ref 98–107)
CO2 SERPL-SCNC: 30.8 MMOL/L (ref 22–29)
COLLECT DURATION TIME UR: 24 HRS
COLLECT DURATION TIME UR: 24 HRS
CREAT BLD-MCNC: 1.65 MG/DL (ref 0.57–1)
CREAT UR-MCNC: 28 MG/DL
CREATINE 24H UR-MRATE: 0.9 G/24 HR (ref 0.7–1.6)
GFR SERPL CREATININE-BSD FRML MDRD: 32 ML/MIN/1.73
GLUCOSE BLD-MCNC: 249 MG/DL (ref 65–99)
GLUCOSE BLDC GLUCOMTR-MCNC: 196 MG/DL (ref 70–130)
GLUCOSE BLDC GLUCOMTR-MCNC: 243 MG/DL (ref 70–130)
GLUCOSE BLDC GLUCOMTR-MCNC: 253 MG/DL (ref 70–130)
GLUCOSE BLDC GLUCOMTR-MCNC: 358 MG/DL (ref 70–130)
KAPPA LC SERPL-MCNC: 68.89 MG/L (ref 3.3–19.4)
KAPPA LC/LAMBDA SER: 1.28 {RATIO} (ref 0.26–1.65)
LAMBDA LC FREE SERPL-MCNC: 53.85 MG/L (ref 5.71–26.3)
POTASSIUM BLD-SCNC: 4.2 MMOL/L (ref 3.5–5.2)
PROT 24H UR-MRATE: 160 MG/24HOURS (ref 28–141)
PROT UR-MCNC: 5 MG/DL
SODIUM BLD-SCNC: 139 MMOL/L (ref 136–145)
SPECIMEN VOL 24H UR: 3200 ML
SPECIMEN VOL 24H UR: 3200 ML

## 2017-05-06 PROCEDURE — 82962 GLUCOSE BLOOD TEST: CPT

## 2017-05-06 PROCEDURE — 82570 ASSAY OF URINE CREATININE: CPT | Performed by: INTERNAL MEDICINE

## 2017-05-06 PROCEDURE — 94799 UNLISTED PULMONARY SVC/PX: CPT

## 2017-05-06 PROCEDURE — 63710000001 INSULIN ASPART PER 5 UNITS: Performed by: INTERNAL MEDICINE

## 2017-05-06 PROCEDURE — 63710000001 INSULIN DETEMIR PER 5 UNITS: Performed by: INTERNAL MEDICINE

## 2017-05-06 PROCEDURE — 25010000002 ENOXAPARIN PER 10 MG: Performed by: HOSPITALIST

## 2017-05-06 PROCEDURE — 99232 SBSQ HOSP IP/OBS MODERATE 35: CPT | Performed by: INTERNAL MEDICINE

## 2017-05-06 PROCEDURE — 81050 URINALYSIS VOLUME MEASURE: CPT | Performed by: INTERNAL MEDICINE

## 2017-05-06 PROCEDURE — 80048 BASIC METABOLIC PNL TOTAL CA: CPT | Performed by: INTERNAL MEDICINE

## 2017-05-06 PROCEDURE — 84156 ASSAY OF PROTEIN URINE: CPT | Performed by: INTERNAL MEDICINE

## 2017-05-06 RX ADMIN — SPIRONOLACTONE 50 MG: 25 TABLET ORAL at 08:32

## 2017-05-06 RX ADMIN — GUAIFENESIN 600 MG: 600 TABLET, EXTENDED RELEASE ORAL at 17:21

## 2017-05-06 RX ADMIN — FAMOTIDINE 20 MG: 20 TABLET, FILM COATED ORAL at 08:32

## 2017-05-06 RX ADMIN — HYDROCODONE BITARTRATE AND ACETAMINOPHEN 1 TABLET: 10; 325 TABLET ORAL at 02:55

## 2017-05-06 RX ADMIN — QUETIAPINE FUMARATE 100 MG: 100 TABLET, FILM COATED ORAL at 17:20

## 2017-05-06 RX ADMIN — ASPIRIN 81 MG: 81 TABLET, COATED ORAL at 08:32

## 2017-05-06 RX ADMIN — LEVALBUTEROL INHALATION SOLUTION 0.63 MG: 0.63 SOLUTION RESPIRATORY (INHALATION) at 19:28

## 2017-05-06 RX ADMIN — INSULIN ASPART 4 UNITS: 100 INJECTION, SOLUTION INTRAVENOUS; SUBCUTANEOUS at 12:07

## 2017-05-06 RX ADMIN — METOPROLOL TARTRATE 50 MG: 50 TABLET, FILM COATED ORAL at 17:20

## 2017-05-06 RX ADMIN — ATORVASTATIN CALCIUM 20 MG: 20 TABLET, FILM COATED ORAL at 21:07

## 2017-05-06 RX ADMIN — QUETIAPINE FUMARATE 100 MG: 100 TABLET, FILM COATED ORAL at 08:31

## 2017-05-06 RX ADMIN — HYDRALAZINE HYDROCHLORIDE 100 MG: 50 TABLET ORAL at 08:31

## 2017-05-06 RX ADMIN — INSULIN DETEMIR 100 UNITS: 100 INJECTION, SOLUTION SUBCUTANEOUS at 21:09

## 2017-05-06 RX ADMIN — INSULIN ASPART 3 UNITS: 100 INJECTION, SOLUTION INTRAVENOUS; SUBCUTANEOUS at 17:21

## 2017-05-06 RX ADMIN — BUMETANIDE 2 MG: 0.25 INJECTION INTRAMUSCULAR; INTRAVENOUS at 23:23

## 2017-05-06 RX ADMIN — NYSTATIN: 100000 POWDER TOPICAL at 21:02

## 2017-05-06 RX ADMIN — METOLAZONE 5 MG: 5 TABLET ORAL at 08:31

## 2017-05-06 RX ADMIN — BUDESONIDE AND FORMOTEROL FUMARATE DIHYDRATE 2 PUFF: 80; 4.5 AEROSOL RESPIRATORY (INHALATION) at 19:28

## 2017-05-06 RX ADMIN — LEVALBUTEROL INHALATION SOLUTION 0.63 MG: 0.63 SOLUTION RESPIRATORY (INHALATION) at 14:13

## 2017-05-06 RX ADMIN — BUDESONIDE AND FORMOTEROL FUMARATE DIHYDRATE 2 PUFF: 80; 4.5 AEROSOL RESPIRATORY (INHALATION) at 06:46

## 2017-05-06 RX ADMIN — INSULIN ASPART 4 UNITS: 100 INJECTION, SOLUTION INTRAVENOUS; SUBCUTANEOUS at 17:23

## 2017-05-06 RX ADMIN — HYDRALAZINE HYDROCHLORIDE 100 MG: 50 TABLET ORAL at 17:20

## 2017-05-06 RX ADMIN — DOCUSATE SODIUM 100 MG: 100 CAPSULE, LIQUID FILLED ORAL at 17:21

## 2017-05-06 RX ADMIN — BUMETANIDE 2 MG: 0.25 INJECTION INTRAMUSCULAR; INTRAVENOUS at 12:05

## 2017-05-06 RX ADMIN — LEVALBUTEROL INHALATION SOLUTION 0.63 MG: 0.63 SOLUTION RESPIRATORY (INHALATION) at 06:45

## 2017-05-06 RX ADMIN — DOCUSATE SODIUM 100 MG: 100 CAPSULE, LIQUID FILLED ORAL at 08:32

## 2017-05-06 RX ADMIN — INSULIN ASPART 2 UNITS: 100 INJECTION, SOLUTION INTRAVENOUS; SUBCUTANEOUS at 12:05

## 2017-05-06 RX ADMIN — BUMETANIDE 2 MG: 0.25 INJECTION INTRAMUSCULAR; INTRAVENOUS at 06:10

## 2017-05-06 RX ADMIN — METOPROLOL TARTRATE 50 MG: 50 TABLET, FILM COATED ORAL at 08:31

## 2017-05-06 RX ADMIN — NYSTATIN: 100000 POWDER TOPICAL at 08:32

## 2017-05-06 RX ADMIN — ENOXAPARIN SODIUM 30 MG: 30 INJECTION SUBCUTANEOUS at 08:32

## 2017-05-06 RX ADMIN — INSULIN ASPART 4 UNITS: 100 INJECTION, SOLUTION INTRAVENOUS; SUBCUTANEOUS at 08:33

## 2017-05-06 RX ADMIN — CETIRIZINE HYDROCHLORIDE 10 MG: 10 TABLET, FILM COATED ORAL at 21:06

## 2017-05-06 RX ADMIN — Medication 10 ML: at 21:16

## 2017-05-06 RX ADMIN — Medication 10 ML: at 08:34

## 2017-05-06 RX ADMIN — LEVOTHYROXINE SODIUM 200 MCG: 100 TABLET ORAL at 08:31

## 2017-05-06 RX ADMIN — BUMETANIDE 2 MG: 0.25 INJECTION INTRAMUSCULAR; INTRAVENOUS at 17:21

## 2017-05-06 RX ADMIN — INSULIN ASPART 4 UNITS: 100 INJECTION, SOLUTION INTRAVENOUS; SUBCUTANEOUS at 08:32

## 2017-05-06 RX ADMIN — GUAIFENESIN 600 MG: 600 TABLET, EXTENDED RELEASE ORAL at 08:32

## 2017-05-06 RX ADMIN — HYDROCODONE BITARTRATE AND ACETAMINOPHEN 1 TABLET: 10; 325 TABLET ORAL at 17:39

## 2017-05-06 RX ADMIN — INSULIN ASPART 6 UNITS: 100 INJECTION, SOLUTION INTRAVENOUS; SUBCUTANEOUS at 20:58

## 2017-05-07 LAB
ALBUMIN SERPL-MCNC: 3.2 G/DL (ref 3.5–5.2)
ALBUMIN/GLOB SERPL: 1.2 G/DL
ALP SERPL-CCNC: 51 U/L (ref 40–129)
ALT SERPL W P-5'-P-CCNC: 12 U/L (ref 5–33)
ANION GAP SERPL CALCULATED.3IONS-SCNC: 13.6 MMOL/L
AST SERPL-CCNC: 12 U/L (ref 5–32)
BASOPHILS # BLD AUTO: 0.04 10*3/MM3 (ref 0–0.2)
BASOPHILS NFR BLD AUTO: 0.4 % (ref 0–2)
BILIRUB SERPL-MCNC: 0.3 MG/DL (ref 0.2–1.2)
BUN BLD-MCNC: 51 MG/DL (ref 8–23)
BUN/CREAT SERPL: 26.4 (ref 7–25)
CALCIUM SPEC-SCNC: 8.3 MG/DL (ref 8.8–10.5)
CHLORIDE SERPL-SCNC: 95 MMOL/L (ref 98–107)
CO2 SERPL-SCNC: 31.4 MMOL/L (ref 22–29)
CREAT BLD-MCNC: 1.93 MG/DL (ref 0.57–1)
DEPRECATED RDW RBC AUTO: 43.4 FL (ref 37–54)
EOSINOPHIL # BLD AUTO: 0.59 10*3/MM3 (ref 0.1–0.3)
EOSINOPHIL NFR BLD AUTO: 6.3 % (ref 0–4)
ERYTHROCYTE [DISTWIDTH] IN BLOOD BY AUTOMATED COUNT: 12.8 % (ref 11.5–14.5)
GFR SERPL CREATININE-BSD FRML MDRD: 26 ML/MIN/1.73
GLOBULIN UR ELPH-MCNC: 2.7 GM/DL
GLUCOSE BLD-MCNC: 273 MG/DL (ref 65–99)
GLUCOSE BLD-MCNC: 374 MG/DL (ref 65–99)
GLUCOSE BLDC GLUCOMTR-MCNC: 230 MG/DL (ref 70–130)
GLUCOSE BLDC GLUCOMTR-MCNC: 235 MG/DL (ref 70–130)
GLUCOSE BLDC GLUCOMTR-MCNC: 350 MG/DL (ref 70–130)
GLUCOSE BLDC GLUCOMTR-MCNC: 413 MG/DL (ref 70–130)
GLUCOSE BLDC GLUCOMTR-MCNC: 562 MG/DL (ref 70–130)
HCT VFR BLD AUTO: 29.4 % (ref 37–47)
HGB BLD-MCNC: 9.4 G/DL (ref 12–16)
IMM GRANULOCYTES # BLD: 0.05 10*3/MM3 (ref 0–0.03)
IMM GRANULOCYTES NFR BLD: 0.5 % (ref 0–0.5)
LYMPHOCYTES # BLD AUTO: 2.25 10*3/MM3 (ref 0.6–4.8)
LYMPHOCYTES NFR BLD AUTO: 23.9 % (ref 20–45)
MCH RBC QN AUTO: 29.9 PG (ref 27–31)
MCHC RBC AUTO-ENTMCNC: 32 G/DL (ref 31–37)
MCV RBC AUTO: 93.6 FL (ref 81–99)
MONOCYTES # BLD AUTO: 0.82 10*3/MM3 (ref 0–1)
MONOCYTES NFR BLD AUTO: 8.7 % (ref 3–8)
NEUTROPHILS # BLD AUTO: 5.68 10*3/MM3 (ref 1.5–8.3)
NEUTROPHILS NFR BLD AUTO: 60.2 % (ref 45–70)
NRBC BLD MANUAL-RTO: 0 /100 WBC (ref 0–0)
PLATELET # BLD AUTO: 188 10*3/MM3 (ref 140–500)
PMV BLD AUTO: 11 FL (ref 7.4–10.4)
POTASSIUM BLD-SCNC: 4.1 MMOL/L (ref 3.5–5.2)
PROT SERPL-MCNC: 5.9 G/DL (ref 6–8.5)
RBC # BLD AUTO: 3.14 10*6/MM3 (ref 4.2–5.4)
SODIUM BLD-SCNC: 140 MMOL/L (ref 136–145)
WBC NRBC COR # BLD: 9.43 10*3/MM3 (ref 4.8–10.8)

## 2017-05-07 PROCEDURE — 82947 ASSAY GLUCOSE BLOOD QUANT: CPT | Performed by: HOSPITALIST

## 2017-05-07 PROCEDURE — 80053 COMPREHEN METABOLIC PANEL: CPT | Performed by: INTERNAL MEDICINE

## 2017-05-07 PROCEDURE — 25010000002 ENOXAPARIN PER 10 MG: Performed by: HOSPITALIST

## 2017-05-07 PROCEDURE — 94799 UNLISTED PULMONARY SVC/PX: CPT

## 2017-05-07 PROCEDURE — 85025 COMPLETE CBC W/AUTO DIFF WBC: CPT | Performed by: INTERNAL MEDICINE

## 2017-05-07 PROCEDURE — 63710000001 INSULIN DETEMIR PER 5 UNITS: Performed by: INTERNAL MEDICINE

## 2017-05-07 PROCEDURE — 99232 SBSQ HOSP IP/OBS MODERATE 35: CPT | Performed by: HOSPITALIST

## 2017-05-07 PROCEDURE — 82962 GLUCOSE BLOOD TEST: CPT

## 2017-05-07 PROCEDURE — 63710000001 INSULIN ASPART PER 5 UNITS: Performed by: INTERNAL MEDICINE

## 2017-05-07 RX ORDER — BUMETANIDE 0.25 MG/ML
2 INJECTION INTRAMUSCULAR; INTRAVENOUS 2 TIMES DAILY
Status: DISCONTINUED | OUTPATIENT
Start: 2017-05-07 | End: 2017-05-08

## 2017-05-07 RX ADMIN — INSULIN DETEMIR 100 UNITS: 100 INJECTION, SOLUTION SUBCUTANEOUS at 21:00

## 2017-05-07 RX ADMIN — LEVALBUTEROL INHALATION SOLUTION 0.63 MG: 0.63 SOLUTION RESPIRATORY (INHALATION) at 18:37

## 2017-05-07 RX ADMIN — METOLAZONE 5 MG: 5 TABLET ORAL at 09:18

## 2017-05-07 RX ADMIN — HYDROCODONE BITARTRATE AND ACETAMINOPHEN 1 TABLET: 10; 325 TABLET ORAL at 02:27

## 2017-05-07 RX ADMIN — Medication 10 ML: at 09:19

## 2017-05-07 RX ADMIN — ERGOCALCIFEROL 50000 UNITS: 1.25 CAPSULE ORAL at 21:07

## 2017-05-07 RX ADMIN — INSULIN ASPART 3 UNITS: 100 INJECTION, SOLUTION INTRAVENOUS; SUBCUTANEOUS at 18:09

## 2017-05-07 RX ADMIN — ASPIRIN 81 MG: 81 TABLET, COATED ORAL at 09:17

## 2017-05-07 RX ADMIN — FAMOTIDINE 20 MG: 20 TABLET, FILM COATED ORAL at 09:17

## 2017-05-07 RX ADMIN — BUDESONIDE AND FORMOTEROL FUMARATE DIHYDRATE 2 PUFF: 80; 4.5 AEROSOL RESPIRATORY (INHALATION) at 21:13

## 2017-05-07 RX ADMIN — LEVALBUTEROL INHALATION SOLUTION 0.63 MG: 0.63 SOLUTION RESPIRATORY (INHALATION) at 09:59

## 2017-05-07 RX ADMIN — INSULIN ASPART 4 UNITS: 100 INJECTION, SOLUTION INTRAVENOUS; SUBCUTANEOUS at 18:08

## 2017-05-07 RX ADMIN — GUAIFENESIN 600 MG: 600 TABLET, EXTENDED RELEASE ORAL at 09:17

## 2017-05-07 RX ADMIN — CETIRIZINE HYDROCHLORIDE 10 MG: 10 TABLET, FILM COATED ORAL at 21:07

## 2017-05-07 RX ADMIN — GUAIFENESIN 600 MG: 600 TABLET, EXTENDED RELEASE ORAL at 18:09

## 2017-05-07 RX ADMIN — HYDRALAZINE HYDROCHLORIDE 100 MG: 50 TABLET ORAL at 09:17

## 2017-05-07 RX ADMIN — INSULIN ASPART 3 UNITS: 100 INJECTION, SOLUTION INTRAVENOUS; SUBCUTANEOUS at 09:18

## 2017-05-07 RX ADMIN — INSULIN ASPART 4 UNITS: 100 INJECTION, SOLUTION INTRAVENOUS; SUBCUTANEOUS at 09:17

## 2017-05-07 RX ADMIN — NYSTATIN: 100000 POWDER TOPICAL at 09:17

## 2017-05-07 RX ADMIN — INSULIN ASPART 7 UNITS: 100 INJECTION, SOLUTION INTRAVENOUS; SUBCUTANEOUS at 21:00

## 2017-05-07 RX ADMIN — ENOXAPARIN SODIUM 30 MG: 30 INJECTION SUBCUTANEOUS at 09:18

## 2017-05-07 RX ADMIN — NYSTATIN 1 APPLICATION: 100000 POWDER TOPICAL at 21:07

## 2017-05-07 RX ADMIN — QUETIAPINE FUMARATE 100 MG: 100 TABLET, FILM COATED ORAL at 09:17

## 2017-05-07 RX ADMIN — INSULIN ASPART 4 UNITS: 100 INJECTION, SOLUTION INTRAVENOUS; SUBCUTANEOUS at 12:12

## 2017-05-07 RX ADMIN — HYDRALAZINE HYDROCHLORIDE 100 MG: 50 TABLET ORAL at 18:09

## 2017-05-07 RX ADMIN — DOCUSATE SODIUM 100 MG: 100 CAPSULE, LIQUID FILLED ORAL at 09:18

## 2017-05-07 RX ADMIN — HYDROCODONE BITARTRATE AND ACETAMINOPHEN 1 TABLET: 10; 325 TABLET ORAL at 18:09

## 2017-05-07 RX ADMIN — SPIRONOLACTONE 50 MG: 25 TABLET ORAL at 09:17

## 2017-05-07 RX ADMIN — HYDROCODONE BITARTRATE AND ACETAMINOPHEN 1 TABLET: 10; 325 TABLET ORAL at 09:25

## 2017-05-07 RX ADMIN — LEVOTHYROXINE SODIUM 200 MCG: 100 TABLET ORAL at 09:17

## 2017-05-07 RX ADMIN — BUMETANIDE 2 MG: 0.25 INJECTION INTRAMUSCULAR; INTRAVENOUS at 18:10

## 2017-05-07 RX ADMIN — BUDESONIDE AND FORMOTEROL FUMARATE DIHYDRATE 2 PUFF: 80; 4.5 AEROSOL RESPIRATORY (INHALATION) at 09:54

## 2017-05-07 RX ADMIN — BUMETANIDE 2 MG: 0.25 INJECTION INTRAMUSCULAR; INTRAVENOUS at 05:25

## 2017-05-07 RX ADMIN — METOPROLOL TARTRATE 50 MG: 50 TABLET, FILM COATED ORAL at 18:09

## 2017-05-07 RX ADMIN — QUETIAPINE FUMARATE 100 MG: 100 TABLET, FILM COATED ORAL at 18:09

## 2017-05-07 RX ADMIN — ATORVASTATIN CALCIUM 20 MG: 20 TABLET, FILM COATED ORAL at 21:07

## 2017-05-07 RX ADMIN — METOPROLOL TARTRATE 50 MG: 50 TABLET, FILM COATED ORAL at 09:17

## 2017-05-07 RX ADMIN — Medication 10 ML: at 21:07

## 2017-05-07 RX ADMIN — INSULIN ASPART 6 UNITS: 100 INJECTION, SOLUTION INTRAVENOUS; SUBCUTANEOUS at 12:14

## 2017-05-08 ENCOUNTER — TELEPHONE (OUTPATIENT)
Dept: INTERNAL MEDICINE | Facility: CLINIC | Age: 62
End: 2017-05-08

## 2017-05-08 VITALS
SYSTOLIC BLOOD PRESSURE: 136 MMHG | RESPIRATION RATE: 20 BRPM | HEIGHT: 67 IN | TEMPERATURE: 98.1 F | WEIGHT: 268.9 LBS | HEART RATE: 96 BPM | BODY MASS INDEX: 42.2 KG/M2 | OXYGEN SATURATION: 93 % | DIASTOLIC BLOOD PRESSURE: 70 MMHG

## 2017-05-08 LAB
ANA SER QL: NEGATIVE
ANION GAP SERPL CALCULATED.3IONS-SCNC: 12.1 MMOL/L
BUN BLD-MCNC: 53 MG/DL (ref 8–23)
BUN/CREAT SERPL: 27.7 (ref 7–25)
C-ANCA TITR SER IF: NORMAL TITER
CALCIUM SPEC-SCNC: 8.5 MG/DL (ref 8.8–10.5)
CHLORIDE SERPL-SCNC: 95 MMOL/L (ref 98–107)
CO2 SERPL-SCNC: 33.9 MMOL/L (ref 22–29)
CREAT BLD-MCNC: 1.91 MG/DL (ref 0.57–1)
GBM IGG SER-ACNC: 3 UNITS (ref 0–20)
GFR SERPL CREATININE-BSD FRML MDRD: 27 ML/MIN/1.73
GLUCOSE BLD-MCNC: 209 MG/DL (ref 65–99)
GLUCOSE BLDC GLUCOMTR-MCNC: 170 MG/DL (ref 70–130)
IGA SERPL-MCNC: 337 MG/DL (ref 87–352)
IGG SERPL-MCNC: 578 MG/DL (ref 700–1600)
IGM SERPL-MCNC: 55 MG/DL (ref 26–217)
INTERPRETATION UR IFE-IMP: NORMAL
Lab: NORMAL
MYELOPEROXIDASE AB SER-ACNC: <9 U/ML (ref 0–9)
P-ANCA ATYPICAL TITR SER IF: NORMAL TITER
P-ANCA TITR SER IF: NORMAL TITER
POTASSIUM BLD-SCNC: 4.2 MMOL/L (ref 3.5–5.2)
PROT PATTERN SERPL IFE-IMP: ABNORMAL
PROTEINASE3 AB SER IA-ACNC: <3.5 U/ML (ref 0–3.5)
RPR SER QL: NORMAL
SODIUM BLD-SCNC: 141 MMOL/L (ref 136–145)

## 2017-05-08 PROCEDURE — 82962 GLUCOSE BLOOD TEST: CPT

## 2017-05-08 PROCEDURE — 80048 BASIC METABOLIC PNL TOTAL CA: CPT | Performed by: HOSPITALIST

## 2017-05-08 PROCEDURE — 99239 HOSP IP/OBS DSCHRG MGMT >30: CPT | Performed by: NURSE PRACTITIONER

## 2017-05-08 PROCEDURE — 63710000001 INSULIN ASPART PER 5 UNITS: Performed by: HOSPITALIST

## 2017-05-08 PROCEDURE — 94799 UNLISTED PULMONARY SVC/PX: CPT

## 2017-05-08 PROCEDURE — 25010000002 ENOXAPARIN PER 10 MG: Performed by: HOSPITALIST

## 2017-05-08 PROCEDURE — 63710000001 INSULIN ASPART PER 5 UNITS: Performed by: INTERNAL MEDICINE

## 2017-05-08 RX ORDER — BUMETANIDE 1 MG/1
2 TABLET ORAL DAILY
Status: DISCONTINUED | OUTPATIENT
Start: 2017-05-08 | End: 2017-05-08 | Stop reason: HOSPADM

## 2017-05-08 RX ORDER — BUMETANIDE 1 MG/1
1 TABLET ORAL EVERY EVENING
Status: DISCONTINUED | OUTPATIENT
Start: 2017-05-08 | End: 2017-05-08 | Stop reason: HOSPADM

## 2017-05-08 RX ORDER — BISACODYL 10 MG
10 SUPPOSITORY, RECTAL RECTAL DAILY PRN
Qty: 30 SUPPOSITORY | Refills: 0 | Status: SHIPPED | OUTPATIENT
Start: 2017-05-08 | End: 2017-08-22

## 2017-05-08 RX ORDER — BUMETANIDE 1 MG/1
1 TABLET ORAL EVERY EVENING
Qty: 30 TABLET | Refills: 0 | Status: SHIPPED | OUTPATIENT
Start: 2017-05-08 | End: 2017-07-05 | Stop reason: SDUPTHER

## 2017-05-08 RX ORDER — NYSTATIN 100000 [USP'U]/G
POWDER TOPICAL EVERY 12 HOURS SCHEDULED
Qty: 45 G | Refills: 0 | Status: SHIPPED | OUTPATIENT
Start: 2017-05-08 | End: 2017-08-22 | Stop reason: SDUPTHER

## 2017-05-08 RX ORDER — BUMETANIDE 2 MG/1
2 TABLET ORAL DAILY
Qty: 30 TABLET | Refills: 0 | Status: SHIPPED | OUTPATIENT
Start: 2017-05-08 | End: 2017-07-05 | Stop reason: SDUPTHER

## 2017-05-08 RX ADMIN — INSULIN ASPART 15 UNITS: 100 INJECTION, SOLUTION INTRAVENOUS; SUBCUTANEOUS at 08:00

## 2017-05-08 RX ADMIN — SPIRONOLACTONE 50 MG: 25 TABLET ORAL at 08:00

## 2017-05-08 RX ADMIN — LEVOTHYROXINE SODIUM 200 MCG: 100 TABLET ORAL at 07:59

## 2017-05-08 RX ADMIN — FAMOTIDINE 20 MG: 20 TABLET, FILM COATED ORAL at 08:00

## 2017-05-08 RX ADMIN — ASPIRIN 81 MG: 81 TABLET, COATED ORAL at 08:00

## 2017-05-08 RX ADMIN — GUAIFENESIN 600 MG: 600 TABLET, EXTENDED RELEASE ORAL at 08:00

## 2017-05-08 RX ADMIN — BUMETANIDE 2 MG: 0.25 INJECTION INTRAMUSCULAR; INTRAVENOUS at 08:01

## 2017-05-08 RX ADMIN — LEVALBUTEROL INHALATION SOLUTION 0.63 MG: 0.63 SOLUTION RESPIRATORY (INHALATION) at 08:34

## 2017-05-08 RX ADMIN — HYDRALAZINE HYDROCHLORIDE 100 MG: 50 TABLET ORAL at 08:00

## 2017-05-08 RX ADMIN — BUDESONIDE AND FORMOTEROL FUMARATE DIHYDRATE 2 PUFF: 80; 4.5 AEROSOL RESPIRATORY (INHALATION) at 08:41

## 2017-05-08 RX ADMIN — INSULIN ASPART 15 UNITS: 100 INJECTION, SOLUTION INTRAVENOUS; SUBCUTANEOUS at 12:01

## 2017-05-08 RX ADMIN — INSULIN ASPART 2 UNITS: 100 INJECTION, SOLUTION INTRAVENOUS; SUBCUTANEOUS at 12:02

## 2017-05-08 RX ADMIN — ENOXAPARIN SODIUM 30 MG: 30 INJECTION SUBCUTANEOUS at 08:01

## 2017-05-08 RX ADMIN — HYDROCODONE BITARTRATE AND ACETAMINOPHEN 1 TABLET: 10; 325 TABLET ORAL at 09:25

## 2017-05-08 RX ADMIN — QUETIAPINE FUMARATE 100 MG: 100 TABLET, FILM COATED ORAL at 08:00

## 2017-05-08 RX ADMIN — INSULIN ASPART 3 UNITS: 100 INJECTION, SOLUTION INTRAVENOUS; SUBCUTANEOUS at 08:00

## 2017-05-08 RX ADMIN — NYSTATIN: 100000 POWDER TOPICAL at 08:59

## 2017-05-08 RX ADMIN — Medication 10 ML: at 09:27

## 2017-05-08 RX ADMIN — METOPROLOL TARTRATE 50 MG: 50 TABLET, FILM COATED ORAL at 07:59

## 2017-05-12 ENCOUNTER — LAB REQUISITION (OUTPATIENT)
Dept: LAB | Facility: HOSPITAL | Age: 62
End: 2017-05-12

## 2017-05-12 DIAGNOSIS — I50.20 SYSTOLIC CONGESTIVE HEART FAILURE (HCC): ICD-10-CM

## 2017-05-12 LAB
ANION GAP SERPL CALCULATED.3IONS-SCNC: 15.7 MMOL/L
BUN BLD-MCNC: 62 MG/DL (ref 8–23)
BUN/CREAT SERPL: 24.5 (ref 7–25)
CALCIUM SPEC-SCNC: 9.4 MG/DL (ref 8.8–10.5)
CHLORIDE SERPL-SCNC: 92 MMOL/L (ref 98–107)
CO2 SERPL-SCNC: 33.3 MMOL/L (ref 22–29)
CREAT BLD-MCNC: 2.53 MG/DL (ref 0.57–1)
GFR SERPL CREATININE-BSD FRML MDRD: 19 ML/MIN/1.73
GLUCOSE BLD-MCNC: 230 MG/DL (ref 65–99)
POTASSIUM BLD-SCNC: 4.8 MMOL/L (ref 3.5–5.2)
SODIUM BLD-SCNC: 141 MMOL/L (ref 136–145)

## 2017-05-12 PROCEDURE — 80048 BASIC METABOLIC PNL TOTAL CA: CPT | Performed by: FAMILY MEDICINE

## 2017-05-15 ENCOUNTER — OFFICE VISIT (OUTPATIENT)
Dept: FAMILY MEDICINE CLINIC | Facility: CLINIC | Age: 62
End: 2017-05-15

## 2017-05-15 VITALS
RESPIRATION RATE: 16 BRPM | HEIGHT: 67 IN | WEIGHT: 270 LBS | SYSTOLIC BLOOD PRESSURE: 110 MMHG | BODY MASS INDEX: 42.38 KG/M2 | OXYGEN SATURATION: 89 % | TEMPERATURE: 98.6 F | HEART RATE: 64 BPM | DIASTOLIC BLOOD PRESSURE: 68 MMHG

## 2017-05-15 DIAGNOSIS — R31.9 HEMATURIA: ICD-10-CM

## 2017-05-15 DIAGNOSIS — J44.1 COPD EXACERBATION (HCC): ICD-10-CM

## 2017-05-15 DIAGNOSIS — Z09 HOSPITAL DISCHARGE FOLLOW-UP: Primary | ICD-10-CM

## 2017-05-15 DIAGNOSIS — J01.90 ACUTE SINUSITIS, RECURRENCE NOT SPECIFIED, UNSPECIFIED LOCATION: ICD-10-CM

## 2017-05-15 PROCEDURE — 81003 URINALYSIS AUTO W/O SCOPE: CPT | Performed by: PHYSICIAN ASSISTANT

## 2017-05-15 PROCEDURE — 99214 OFFICE O/P EST MOD 30 MIN: CPT | Performed by: PHYSICIAN ASSISTANT

## 2017-05-15 RX ORDER — DOXYCYCLINE HYCLATE 100 MG/1
100 CAPSULE ORAL 2 TIMES DAILY
Qty: 20 CAPSULE | Refills: 0 | Status: SHIPPED | OUTPATIENT
Start: 2017-05-15 | End: 2017-07-05

## 2017-05-18 ENCOUNTER — OUTSIDE FACILITY SERVICE (OUTPATIENT)
Dept: PAIN MEDICINE | Facility: CLINIC | Age: 62
End: 2017-05-18

## 2017-05-18 PROCEDURE — 64493 INJ PARAVERT F JNT L/S 1 LEV: CPT | Performed by: ANESTHESIOLOGY

## 2017-05-18 PROCEDURE — 64494 INJ PARAVERT F JNT L/S 2 LEV: CPT | Performed by: ANESTHESIOLOGY

## 2017-05-18 PROCEDURE — 64495 INJ PARAVERT F JNT L/S 3 LEV: CPT | Performed by: ANESTHESIOLOGY

## 2017-05-19 LAB
BILIRUB BLD-MCNC: NEGATIVE MG/DL
CLARITY, POC: CLEAR
COLOR UR: YELLOW
GLUCOSE UR STRIP-MCNC: NEGATIVE MG/DL
KETONES UR QL: ABNORMAL
LEUKOCYTE EST, POC: NEGATIVE
NITRITE UR-MCNC: NEGATIVE MG/ML
PH UR: 6 [PH] (ref 5–8)
PROT UR STRIP-MCNC: NEGATIVE MG/DL
RBC # UR STRIP: NEGATIVE /UL
SP GR UR: 1 (ref 1–1.03)
UROBILINOGEN UR QL: NORMAL

## 2017-05-26 DIAGNOSIS — G89.29 CHRONIC MIDLINE LOW BACK PAIN WITHOUT SCIATICA: ICD-10-CM

## 2017-05-26 DIAGNOSIS — M54.50 CHRONIC MIDLINE LOW BACK PAIN WITHOUT SCIATICA: ICD-10-CM

## 2017-05-31 ENCOUNTER — LAB REQUISITION (OUTPATIENT)
Dept: LAB | Facility: HOSPITAL | Age: 62
End: 2017-05-31

## 2017-05-31 DIAGNOSIS — Z00.00 ENCOUNTER FOR GENERAL ADULT MEDICAL EXAMINATION WITHOUT ABNORMAL FINDINGS: ICD-10-CM

## 2017-05-31 LAB
ANION GAP SERPL CALCULATED.3IONS-SCNC: 14.3 MMOL/L
BACTERIA UR QL AUTO: ABNORMAL /HPF
BASOPHILS # BLD AUTO: 0.04 10*3/MM3 (ref 0–0.2)
BASOPHILS NFR BLD AUTO: 0.4 % (ref 0–1.5)
BILIRUB UR QL STRIP: NEGATIVE
BUN BLD-MCNC: 85 MG/DL (ref 8–23)
BUN/CREAT SERPL: 40.3 (ref 7–25)
CALCIUM SPEC-SCNC: 10 MG/DL (ref 8.6–10.5)
CHLORIDE SERPL-SCNC: 96 MMOL/L (ref 98–107)
CLARITY UR: ABNORMAL
CO2 SERPL-SCNC: 31.7 MMOL/L (ref 22–29)
COLOR UR: YELLOW
CREAT BLD-MCNC: 2.11 MG/DL (ref 0.57–1)
DEPRECATED RDW RBC AUTO: 46.1 FL (ref 37–54)
EOSINOPHIL # BLD AUTO: 0.21 10*3/MM3 (ref 0–0.7)
EOSINOPHIL NFR BLD AUTO: 2.1 % (ref 0.3–6.2)
ERYTHROCYTE [DISTWIDTH] IN BLOOD BY AUTOMATED COUNT: 12.7 % (ref 11.7–13)
GFR SERPL CREATININE-BSD FRML MDRD: 24 ML/MIN/1.73
GLUCOSE BLD-MCNC: 144 MG/DL (ref 65–99)
GLUCOSE UR STRIP-MCNC: NEGATIVE MG/DL
HCT VFR BLD AUTO: 40.4 % (ref 35.6–45.5)
HGB BLD-MCNC: 12.2 G/DL (ref 11.9–15.5)
HGB UR QL STRIP.AUTO: NEGATIVE
HYALINE CASTS UR QL AUTO: ABNORMAL /LPF
IMM GRANULOCYTES # BLD: 0.03 10*3/MM3 (ref 0–0.03)
IMM GRANULOCYTES NFR BLD: 0.3 % (ref 0–0.5)
KETONES UR QL STRIP: NEGATIVE
LEUKOCYTE ESTERASE UR QL STRIP.AUTO: ABNORMAL
LYMPHOCYTES # BLD AUTO: 2.38 10*3/MM3 (ref 0.9–4.8)
LYMPHOCYTES NFR BLD AUTO: 23.6 % (ref 19.6–45.3)
MCH RBC QN AUTO: 29.9 PG (ref 26.9–32)
MCHC RBC AUTO-ENTMCNC: 30.2 G/DL (ref 32.4–36.3)
MCV RBC AUTO: 99 FL (ref 80.5–98.2)
MONOCYTES # BLD AUTO: 0.91 10*3/MM3 (ref 0.2–1.2)
MONOCYTES NFR BLD AUTO: 9 % (ref 5–12)
NEUTROPHILS # BLD AUTO: 6.53 10*3/MM3 (ref 1.9–8.1)
NEUTROPHILS NFR BLD AUTO: 64.6 % (ref 42.7–76)
NITRITE UR QL STRIP: NEGATIVE
PH UR STRIP.AUTO: <=5 [PH] (ref 5–8)
PLATELET # BLD AUTO: 180 10*3/MM3 (ref 140–500)
PMV BLD AUTO: 11.9 FL (ref 6–12)
POTASSIUM BLD-SCNC: 4 MMOL/L (ref 3.5–5.2)
PROT UR QL STRIP: NEGATIVE
RBC # BLD AUTO: 4.08 10*6/MM3 (ref 3.9–5.2)
RBC # UR: ABNORMAL /HPF
REF LAB TEST METHOD: ABNORMAL
SODIUM BLD-SCNC: 142 MMOL/L (ref 136–145)
SP GR UR STRIP: 1.02 (ref 1–1.03)
SQUAMOUS #/AREA URNS HPF: ABNORMAL /HPF
TRANS CELLS #/AREA URNS HPF: ABNORMAL /HPF
UROBILINOGEN UR QL STRIP: ABNORMAL
WBC NRBC COR # BLD: 10.1 10*3/MM3 (ref 4.5–10.7)
WBC UR QL AUTO: ABNORMAL /HPF

## 2017-05-31 PROCEDURE — 80048 BASIC METABOLIC PNL TOTAL CA: CPT | Performed by: INTERNAL MEDICINE

## 2017-05-31 PROCEDURE — 85025 COMPLETE CBC W/AUTO DIFF WBC: CPT | Performed by: INTERNAL MEDICINE

## 2017-05-31 PROCEDURE — 81001 URINALYSIS AUTO W/SCOPE: CPT | Performed by: INTERNAL MEDICINE

## 2017-05-31 RX ORDER — HYDROCODONE BITARTRATE AND ACETAMINOPHEN 10; 325 MG/1; MG/1
1 TABLET ORAL EVERY 6 HOURS PRN
Qty: 120 TABLET | Refills: 0 | Status: SHIPPED | OUTPATIENT
Start: 2017-05-31 | End: 2017-06-29 | Stop reason: SDUPTHER

## 2017-06-01 ENCOUNTER — OUTSIDE FACILITY SERVICE (OUTPATIENT)
Dept: PAIN MEDICINE | Facility: CLINIC | Age: 62
End: 2017-06-01

## 2017-06-01 PROCEDURE — 64495 INJ PARAVERT F JNT L/S 3 LEV: CPT | Performed by: ANESTHESIOLOGY

## 2017-06-01 PROCEDURE — 64493 INJ PARAVERT F JNT L/S 1 LEV: CPT | Performed by: ANESTHESIOLOGY

## 2017-06-01 PROCEDURE — 64494 INJ PARAVERT F JNT L/S 2 LEV: CPT | Performed by: ANESTHESIOLOGY

## 2017-06-05 DIAGNOSIS — E55.9 VITAMIN D DEFICIENCY: ICD-10-CM

## 2017-06-05 DIAGNOSIS — E03.9 HYPOTHYROIDISM, ACQUIRED: ICD-10-CM

## 2017-06-05 DIAGNOSIS — E11.42 DIABETIC PERIPHERAL NEUROPATHY (HCC): Primary | ICD-10-CM

## 2017-06-12 ENCOUNTER — OFFICE VISIT (OUTPATIENT)
Dept: PAIN MEDICINE | Facility: CLINIC | Age: 62
End: 2017-06-12

## 2017-06-12 VITALS
SYSTOLIC BLOOD PRESSURE: 143 MMHG | HEART RATE: 61 BPM | RESPIRATION RATE: 16 BRPM | DIASTOLIC BLOOD PRESSURE: 75 MMHG | HEIGHT: 67 IN | TEMPERATURE: 96.8 F | OXYGEN SATURATION: 95 %

## 2017-06-12 DIAGNOSIS — M54.50 CHRONIC MIDLINE LOW BACK PAIN WITHOUT SCIATICA: ICD-10-CM

## 2017-06-12 DIAGNOSIS — G89.29 CHRONIC MIDLINE LOW BACK PAIN WITHOUT SCIATICA: ICD-10-CM

## 2017-06-12 DIAGNOSIS — G89.4 CHRONIC PAIN SYNDROME: Primary | ICD-10-CM

## 2017-06-12 DIAGNOSIS — M43.06 LUMBAR SPONDYLOLYSIS: ICD-10-CM

## 2017-06-12 DIAGNOSIS — E11.42 DIABETIC PERIPHERAL NEUROPATHY (HCC): ICD-10-CM

## 2017-06-12 PROCEDURE — 99214 OFFICE O/P EST MOD 30 MIN: CPT | Performed by: NURSE PRACTITIONER

## 2017-06-12 NOTE — PROGRESS NOTES
CHIEF COMPLAINT  Pt states received minimal  relief of back pain following 6/1/17 Bilateral L2-S1 Facet joint injections,#2.  Initial Evaluation by Yamile JOSEPH.  Subjective   Jared Govea is a 61 y.o. female  who presents to the office for follow-up of procedure.  She completed a bilateral L2-L5 FJN   on  6-1-17 and 5-18-17 performed by Dr. Ledezma for management of low back pain. Patient reports variable relief from the procedure.  With the first procedure,  She noticed 70% improvement for at least 1.5 weeks.  With the second procedure, she notes approximately 20% relief for 7 days.  She admits she had a lot of post-procedural pain after the second round of facets.     Patient was initially evaluated by Dr. Ledezma on 4/17/17.  He has a history of low back pains.  He is ordered have bilateral L2 through L5 medial branch blocks ×2 with no steroids.  She did complete these. She was also ordered to have an opioid risk assessment with Dr. Cartwright, which she has not done and states no one from his office has contacted her. And finally, her medication regimen was adjusted to Hydrocodone 10/325 q6hrs(in place of 18hrs).    She was recently admitted to hospital for lymphedema (4-30-17 to 5-8-17).    Complains of pain in her low back. Today her pain is 2/10VAs. Describes the pain as fairly continuous aching and throbbing. Pain increases with standing, moving, lifting; pain decreases with sit, rest, elevate feet and medication. Continues with Hydrocodone 10/325 2 BID. Denies any side effects form the regimen. The regimen helps decrease her pain by 30-40%. ADL's by self.    Also has a history of DPN.  Her primary complaint today is her back pain. Occasionally she has pain that shoots from her back to her legs, but generally her LE pain is independent of her low back pain.     Back Pain   This is a chronic problem. The current episode started more than 1 year ago. The problem occurs constantly. The problem has been  gradually worsening since onset. The pain is present in the lumbar spine. The quality of the pain is described as aching. The pain does not radiate. The pain is at a severity of 2/10. The symptoms are aggravated by bending, standing and twisting. Stiffness is present in the morning, at night and all day. Associated symptoms include chest pain (pt states due to congestive heart failure), headaches, numbness (bilat. legs,feet) and weakness (legs bila.). Pertinent negatives include no abdominal pain, bladder incontinence, bowel incontinence or fever. Risk factors include history of steroid use, poor posture and obesity. She has tried analgesics, bed rest, chiropractic manipulation, NSAIDs, walking and heat (she is not a good candidate for long term NSAIDs) for the symptoms. The treatment provided moderate relief.      PEG Assessment   What number best describes your pain on average in the past week?4  What number best describes how, during the past week, pain has interfered with your enjoyment of life?4  What number best describes how, during the past week, pain has interfered with your general activity?  4    The following portions of the patient's history were reviewed and updated as appropriate: allergies, current medications, past family history, past medical history, past social history, past surgical history and problem list.    Review of Systems   Constitutional: Positive for activity change (decreased), appetite change (pt states victoza has decreased her appetite), chills (pt states she gets hot and cold and mostly stays cold) and fatigue. Negative for fever.   HENT: Positive for sore throat. Negative for congestion.    Eyes: Negative for pain.   Respiratory: Positive for apnea and shortness of breath (pt on COPD). Negative for cough and chest tightness.    Cardiovascular: Positive for chest pain (pt states due to congestive heart failure).   Gastrointestinal: Negative for abdominal pain, bowel incontinence,  "constipation, diarrhea (pt sister states depends on what she eats), nausea and vomiting.   Endocrine: Negative for polydipsia and polyuria.   Genitourinary: Negative for bladder incontinence, difficulty urinating and frequency.   Musculoskeletal: Positive for arthralgias and back pain.   Skin: Negative for rash.   Allergic/Immunologic: Negative for immunocompromised state.   Neurological: Positive for weakness (legs bila.), numbness (bilat. legs,feet) and headaches.   Hematological: Does not bruise/bleed easily.   Psychiatric/Behavioral: Positive for sleep disturbance. Negative for agitation, confusion, hallucinations and suicidal ideas. The patient is not nervous/anxious.        Vitals:    06/12/17 1234   BP: 143/75   Pulse: 61   Resp: 16   Temp: 96.8 °F (36 °C)   SpO2: 95%   Height: 66.5\" (168.9 cm)   PainSc: 2  Comment: LBP bilaterally ranges from 2-8/10   PainLoc: Back     Objective   Physical Exam   Constitutional: She is oriented to person, place, and time. Vital signs are normal. She appears well-developed and well-nourished.   Chronic ill appearance   HENT:   Head: Normocephalic and atraumatic.   NC O2 in place     Eyes: Conjunctivae and EOM are normal. Pupils are equal, round, and reactive to light.   Neck: Neck supple.   Cardiovascular: Regular rhythm.    Murmur heard.  Pulmonary/Chest: Effort normal. She has wheezes.   O2 per NC   Abdominal: Soft.   obese   Musculoskeletal:        Cervical back: She exhibits tenderness.        Thoracic back: She exhibits tenderness.        Lumbar back: She exhibits tenderness and bony tenderness (moderate tenderness of bilateral L2-L5 facets).        Right foot: There is tenderness.        Left foot: There is decreased range of motion.       Neurological Sensory Findings -  Altered sharp/dull right ankle/foot discrimination and altered sharp/dull left ankle/foot discrimination.    Vascular Status -  Her exam exhibits right foot edema. Her exam exhibits left foot " edema.  Lymphadenopathy:     She has no cervical adenopathy.   Neurological: She is alert and oriented to person, place, and time. She displays atrophy. Gait (in motorized scooter) abnormal.   Reflex Scores:       Patellar reflexes are 0 on the right side and 0 on the left side.  Decr light touch stocking distribution.    Skin: Skin is warm, dry and intact.   Psychiatric: She has a normal mood and affect. Her speech is normal and behavior is normal. Judgment and thought content normal.   Nursing note and vitals reviewed.      Assessment/Plan   Jared was seen today for back pain.    Diagnoses and all orders for this visit:    Chronic pain syndrome  -     Ambulatory Referral to Psychology    Chronic midline low back pain without sciatica  -     Ambulatory Referral to Psychology  -     Case Request    Diabetic peripheral neuropathy    Lumbar spondylolysis  -     Case Request      --- The urine drug screen confirmation from 4-17-17 has been reviewed and the result is appropriate based on patient history and JACOB report  --- Continue with medication regimen as previously prescribed by Dr. Ledezma. No refill needed at this time. Patient appears stable with current regimen. No adverse effects. Regarding continuation of opioids, there is no evidence of aberrant behavior or any red flags.  The patient continues with appropriate response to opioid therapy. ADL's remain intact by self.   --- Continue with psychological evaluation with Dr. Cartwright. Referral placed again.  --- Repeat bilateral L2-L5 FJN. No blood thinners. NO STEROIDS. Reviewed the procedure at length with the patient.  Included in the review was expectations, complications, risk and benefits.The procedure was described in detail and the risks, benefits and alternatives were discussed with the patient (including but not limited to: bleeding, infection, nerve damage, worsening of pain, inability to perform injection, paralysis, seizures, and death) who agreed  "to proceed.  Discussed the potential for sedation if warranted/wanted.  Questions were answered and in a way the patient could understand.  Patient verbalized understanding and wishes to proceed.  This intervention will be ordered.  --- Follow-up after procedure or sooner if needed.    -------  Education about Medial Branch Blockade and RF Therapy:    This medial branch blockade (MBB) suggested is intended for diagnostic purposes, with the intent of offering the patient Radiofrequency thermal rhizotomy (RF) if the MBB is diagnostically effective.  The diagnostic blockade is necessary to determine the likelihood that RF therapy could be efficacious in providing long term relief to the patient.    Medial branches are sensory nerve branches that connect to a facet joint and transmit sensations & pain signals from that joint.  Facet is a term for the type of joints found in the spine.  Medial branches are the nerves that go to a facet, and therefore are also sometimes called \"facet joint nerves\" (FJNs).      In a medial branch blockade procedure, xray fluoroscopy is used to verify the locations of the outside of the joint lines which are being targeted.  Under xray guidance, needles are placed to these areas.  Contrast dye is injected to confirm proper placement, with dye flowing over the joint area, and to ensure that the dye does not flow into unintended areas such as a vein.  When this is confirmed, local anesthetic is injected to block the medial branch at that joint level.      If MBBs are diagnostically successful in blocking pain, then the patient is most likely a great candidate for Radiofrequency of those facet joint nerves.  In the RF procedure, needles are placed to the joint lines in the same fashion, and after testing, the needle tips are heated to thermally treat the nerves, blocking the nerves by in essence damaging the nerves with the heat treatment.       Medically, a successful RF procedure should " provide a patient with 50% pain relief or more for at least 6 months.  Clinical experience suggests that successful patients receive relief more in the range of 12 months on average.  We also discussed that a fortunate minority of patients receive therapeutic success from the MBB, and may not require RF ablation.  If a patient receives more than 8 weeks of relief from MBB, then occasional repeat MBB for therapeutic purposes is a very reasonable alternative therapy.  This course of therapy is consistent with our LCDs according to our CMS  in the area, and therefore other insurance providers should follow accordingly.  We will monitor our patients to screen for these therapeutic responders and will offer RF therapy only when necessary.        We discussed that MBB & RF are not without risks.  Guidelines regarding anticoagulant use & neuraxial procedures will be respected.  Patients that are ill or otherwise may be at risk for sepsis will not have their spines accessed by neuraxial injections of any type.  This patient will not be offered these therapies if there is an increased risk.   We discussed that there is a risk of postprocedural pain and also a risk of worsening of clinical picture with these procedures as with any neuraxial procedure.    -------         JACOB REPORT    As part of the patient's treatment plan, I am prescribing controlled substances. The patient has been made aware of appropriate use of such medications, including potential risk of somnolence, limited ability to drive and/or work safely, and the potential for dependence or overdose. It has also bee made clear that these medications are for use by this patient only, without concomitant use of alcohol or other substances unless prescribed.     Patient has completed prescribing agreement detailing terms of continued prescribing of controlled substances, including monitoring JACOB reports, urine drug screening, and pill counts if  necessary. The patient is aware that inappropriate use will results in cessation of prescribing such medications.    JACOB report has been reviewed and scanned into the patient's chart.    Date of last JACOB : 6-8-17    History and physical exam exhibit continued safe and appropriate use of controlled substances.       EMR Dragon/Transcription disclaimer:   Much of this encounter note is an electronic transcription/translation of spoken language to printed text. The electronic translation of spoken language may permit erroneous, or at times, nonsensical words or phrases to be inadvertently transcribed; Although I have reviewed the note for such errors, some may still exist.

## 2017-06-20 ENCOUNTER — OUTSIDE FACILITY SERVICE (OUTPATIENT)
Dept: PAIN MEDICINE | Facility: CLINIC | Age: 62
End: 2017-06-20

## 2017-06-20 PROCEDURE — 64494 INJ PARAVERT F JNT L/S 2 LEV: CPT | Performed by: ANESTHESIOLOGY

## 2017-06-20 PROCEDURE — 64493 INJ PARAVERT F JNT L/S 1 LEV: CPT | Performed by: ANESTHESIOLOGY

## 2017-06-20 PROCEDURE — 64495 INJ PARAVERT F JNT L/S 3 LEV: CPT | Performed by: ANESTHESIOLOGY

## 2017-06-29 ENCOUNTER — OFFICE VISIT (OUTPATIENT)
Dept: PAIN MEDICINE | Facility: CLINIC | Age: 62
End: 2017-06-29

## 2017-06-29 VITALS
DIASTOLIC BLOOD PRESSURE: 76 MMHG | WEIGHT: 254 LBS | HEIGHT: 67 IN | SYSTOLIC BLOOD PRESSURE: 137 MMHG | HEART RATE: 79 BPM | OXYGEN SATURATION: 88 % | RESPIRATION RATE: 18 BRPM | TEMPERATURE: 98.6 F | BODY MASS INDEX: 39.87 KG/M2

## 2017-06-29 DIAGNOSIS — G89.4 CHRONIC PAIN SYNDROME: Primary | ICD-10-CM

## 2017-06-29 DIAGNOSIS — M43.06 LUMBAR SPONDYLOLYSIS: ICD-10-CM

## 2017-06-29 DIAGNOSIS — M54.50 CHRONIC MIDLINE LOW BACK PAIN WITHOUT SCIATICA: ICD-10-CM

## 2017-06-29 DIAGNOSIS — G89.29 CHRONIC MIDLINE LOW BACK PAIN WITHOUT SCIATICA: ICD-10-CM

## 2017-06-29 PROCEDURE — 99213 OFFICE O/P EST LOW 20 MIN: CPT | Performed by: NURSE PRACTITIONER

## 2017-06-29 RX ORDER — HYDROCODONE BITARTRATE AND ACETAMINOPHEN 10; 325 MG/1; MG/1
1 TABLET ORAL EVERY 6 HOURS PRN
Qty: 120 TABLET | Refills: 0 | Status: SHIPPED | OUTPATIENT
Start: 2017-06-29 | End: 2017-07-31 | Stop reason: SDUPTHER

## 2017-06-29 NOTE — PROGRESS NOTES
CHIEF COMPLAINT  Follow-up for back pain.      Subjective   Jared Govea is a 62 y.o. female  who presents to the office for follow-up of procedure.  She completed a bilateral lumbar MBB @ L2-L5 on  6/20/17 performed by Dr. Ledezma for management of back pain. Patient reports no relief from the procedure.  She states she was very sleepy after the procedure and does not remember any specific relief. When she awoke the next day, she recognizes that her pain was at its pre-procedure level. She is unable to voice any relief with the third injection.     Patient was last evaluated by myself on 6/12/17.  At that time she was ordered have a repeat bilateral L2 through L5 FJN.  This was performed on 6/20/17.    She completed a bilateral L2-L5 FJN on 6-1-17 and 5-18-17 performed by Dr. Ledezma for management of low back pain. Patient reports variable relief from the procedure. With the first procedure, She noticed 70% improvement for at least 1.5 weeks. With the second procedure, she notes approximately 20% relief for 7 days. She admits she had a lot of post-procedural pain after the second round of facets.     Complains of pain in her low back. Today her pain is 6/10VAS. Describes the pain as intermittent. Continues with Hydrocodone 10/325 2 BID. Denies any side effects form the regimen. The regimen helps decrease her pain by 30-40%. ADL's difficulty due to pulmonary issues.     She used to be on Hydrocodone 10/325 2 QID per Dr. Ventura.     Is on longer doing home PT as she has completed this.     Back Pain   This is a chronic problem. The current episode started more than 1 year ago. The problem occurs constantly. The problem has been gradually worsening since onset. The pain is present in the lumbar spine. The quality of the pain is described as aching. The pain does not radiate. The pain is at a severity of 6/10. The symptoms are aggravated by bending, standing and twisting. Stiffness is present in the morning, at night  "and all day. Associated symptoms include headaches and numbness (bilateral feet). Pertinent negatives include no abdominal pain, bladder incontinence, bowel incontinence, fever or weakness. Chest pain: pt states due to congestive heart failure. Risk factors include history of steroid use, poor posture and obesity. She has tried analgesics, bed rest, chiropractic manipulation, NSAIDs, walking and heat (she is not a good candidate for long term NSAIDs) for the symptoms. The treatment provided moderate relief.      PEG Assessment   What number best describes your pain on average in the past week?6  What number best describes how, during the past week, pain has interfered with your enjoyment of life?7  What number best describes how, during the past week, pain has interfered with your general activity?  7    The following portions of the patient's history were reviewed and updated as appropriate: allergies, current medications, past family history, past medical history, past social history, past surgical history and problem list.    Review of Systems   Constitutional: Positive for fatigue. Negative for fever.   HENT: Positive for congestion.    Eyes: Negative for visual disturbance.   Respiratory: Positive for cough and shortness of breath. Negative for wheezing.    Cardiovascular: Negative.  Chest pain: pt states due to congestive heart failure.   Gastrointestinal: Negative for abdominal pain, bowel incontinence, constipation and diarrhea.   Genitourinary: Negative for bladder incontinence and difficulty urinating.   Musculoskeletal: Positive for back pain.   Neurological: Positive for numbness (bilateral feet) and headaches. Negative for weakness.   Psychiatric/Behavioral: Negative for sleep disturbance and suicidal ideas. The patient is nervous/anxious.        Vitals:    06/29/17 1051   BP: 137/76   Pulse: 79   Resp: 18   Temp: 98.6 °F (37 °C)   SpO2: (!) 88%   Weight: 254 lb (115 kg)   Height: 66.5\" (168.9 cm) "   PainSc:   6   PainLoc: Back     Objective   Physical Exam   Constitutional: She is oriented to person, place, and time. Vital signs are normal. She appears well-developed and well-nourished.   Chronic ill appearance   HENT:   Head: Normocephalic and atraumatic.   NC O2 in place     Eyes: Conjunctivae and EOM are normal. Pupils are equal, round, and reactive to light.   Neck: Neck supple.   Cardiovascular: Regular rhythm.    Murmur heard.  Pulmonary/Chest: Effort normal. She has wheezes.   O2 per NC   Abdominal: Soft.   obese   Musculoskeletal:        Cervical back: She exhibits tenderness.        Thoracic back: She exhibits tenderness.        Lumbar back: She exhibits tenderness and bony tenderness (moderate tenderness of bilateral L2-L5 facets).       Neurological Sensory Findings -  Altered sharp/dull right ankle/foot discrimination and altered sharp/dull left ankle/foot discrimination.    Vascular Status -  Her exam exhibits right foot edema. Her exam exhibits left foot edema.  Lymphadenopathy:     She has no cervical adenopathy.   Neurological: She is alert and oriented to person, place, and time. Gait (in motorized scooter) abnormal.   Reflex Scores:       Patellar reflexes are 0 on the right side and 0 on the left side.  Decreased light touch stocking distribution.    Skin: Skin is warm, dry and intact.   Psychiatric: She has a normal mood and affect. Her speech is normal and behavior is normal. Judgment and thought content normal.   Nursing note and vitals reviewed.      Assessment/Plan   Jared was seen today for back pain.    Diagnoses and all orders for this visit:    Chronic pain syndrome  -     Ambulatory Referral to Psychology    Lumbar spondylolysis  -     Ambulatory Referral to Psychology    Chronic midline low back pain without sciatica  -     Ambulatory Referral to Psychology  -     HYDROcodone-acetaminophen (NORCO)  MG per tablet; Take 1 tablet by mouth Every 6 (Six) Hours As Needed for  Severe Pain (7-10).      --- The urine drug screen confirmation from 4-12-17 has been reviewed and the result is appropriate based on patient history and JACOB report  --- Refill Hydrocodone. Patient appears stable with current regimen. No adverse effects. Regarding continuation of opioids, there is no evidence of aberrant behavior or any red flags.  The patient continues with appropriate response to opioid therapy. ADL's remain intact by self.   --- Proceed with psychological evaluation with Dr. Cartwright.  --- Follow-up 1 month with DR. Ledezma or sooner if needed.         JACOB REPORT    As part of the patient's treatment plan, I am prescribing controlled substances. The patient has been made aware of appropriate use of such medications, including potential risk of somnolence, limited ability to drive and/or work safely, and the potential for dependence or overdose. It has also bee made clear that these medications are for use by this patient only, without concomitant use of alcohol or other substances unless prescribed.     Patient has completed prescribing agreement detailing terms of continued prescribing of controlled substances, including monitoring JACOB reports, urine drug screening, and pill counts if necessary. The patient is aware that inappropriate use will results in cessation of prescribing such medications.    JACOB report has been reviewed and scanned into the patient's chart.    Date of last JACOB : 6-28-17    History and physical exam exhibit continued safe and appropriate use of controlled substances.       EMR Dragon/Transcription disclaimer:   Much of this encounter note is an electronic transcription/translation of spoken language to printed text. The electronic translation of spoken language may permit erroneous, or at times, nonsensical words or phrases to be inadvertently transcribed; Although I have reviewed the note for such errors, some may still exist.

## 2017-07-05 ENCOUNTER — RESULTS ENCOUNTER (OUTPATIENT)
Dept: ENDOCRINOLOGY | Age: 62
End: 2017-07-05

## 2017-07-05 ENCOUNTER — OFFICE VISIT (OUTPATIENT)
Dept: ENDOCRINOLOGY | Age: 62
End: 2017-07-05

## 2017-07-05 VITALS
WEIGHT: 251.2 LBS | HEIGHT: 66 IN | SYSTOLIC BLOOD PRESSURE: 130 MMHG | DIASTOLIC BLOOD PRESSURE: 80 MMHG | BODY MASS INDEX: 40.37 KG/M2

## 2017-07-05 DIAGNOSIS — I50.42 CHRONIC COMBINED SYSTOLIC AND DIASTOLIC CONGESTIVE HEART FAILURE (HCC): ICD-10-CM

## 2017-07-05 DIAGNOSIS — E78.2 MIXED HYPERLIPIDEMIA: ICD-10-CM

## 2017-07-05 DIAGNOSIS — N18.9 CKD (CHRONIC KIDNEY DISEASE), UNSPECIFIED STAGE: ICD-10-CM

## 2017-07-05 DIAGNOSIS — IMO0002 UNCONTROLLED TYPE 2 DIABETES MELLITUS WITH DIABETIC POLYNEUROPATHY, WITH LONG-TERM CURRENT USE OF INSULIN: Primary | ICD-10-CM

## 2017-07-05 DIAGNOSIS — I10 ESSENTIAL HYPERTENSION: ICD-10-CM

## 2017-07-05 DIAGNOSIS — E11.42 DIABETIC PERIPHERAL NEUROPATHY (HCC): ICD-10-CM

## 2017-07-05 DIAGNOSIS — E03.9 HYPOTHYROIDISM, ACQUIRED: ICD-10-CM

## 2017-07-05 DIAGNOSIS — E55.9 VITAMIN D DEFICIENCY: ICD-10-CM

## 2017-07-05 PROCEDURE — 99214 OFFICE O/P EST MOD 30 MIN: CPT | Performed by: NURSE PRACTITIONER

## 2017-07-05 RX ORDER — BUMETANIDE 1 MG/1
1 TABLET ORAL EVERY EVENING
Qty: 30 TABLET | Refills: 0 | Status: SHIPPED | OUTPATIENT
Start: 2017-07-05 | End: 2017-08-22

## 2017-07-05 RX ORDER — BUMETANIDE 2 MG/1
2 TABLET ORAL DAILY
Qty: 30 TABLET | Refills: 0 | Status: SHIPPED | OUTPATIENT
Start: 2017-07-05 | End: 2017-08-22

## 2017-07-05 NOTE — PROGRESS NOTES
"Subjective   Jared Govea is a 62 y.o. female is here today for follow-up.  Chief Complaint   Patient presents with   • Diabetes     no recent labs, testing up to 3 times daily, pt brought meter   • Hypothyroidism     pt was unable to tolerate victoza and has not needed to take novolog   • Vitamin D Deficiency     /80  Ht 66\" (167.6 cm)  Wt 251 lb 3.2 oz (114 kg)  BMI 40.54 kg/m2  Current Outpatient Prescriptions on File Prior to Visit   Medication Sig   • albuterol (PROVENTIL HFA;VENTOLIN HFA) 108 (90 BASE) MCG/ACT inhaler Inhale 2 puffs every 4 (four) hours as needed for wheezing.   • alendronate (FOSAMAX) 70 MG tablet Take 1 tablet by mouth Every 7 (Seven) Days. For Osteoporosis   • aspirin 81 MG EC tablet Take 81 mg by mouth daily.   • atorvastatin (LIPITOR) 40 MG tablet Take 1 tablet by mouth Daily. For cholesterol   • B Complex Vitamins (VITAMIN B COMPLEX PO) Take 1 tablet by mouth daily.   • bisacodyl (DULCOLAX) 10 MG suppository Insert 1 suppository into the rectum Daily As Needed for Constipation.   • bumetanide (BUMEX) 1 MG tablet Take 1 tablet by mouth Every Evening.   • bumetanide (BUMEX) 2 MG tablet Take 1 tablet by mouth Daily.   • cetirizine (ZyrTEC) 10 MG tablet Take 10 mg by mouth every night.   • famotidine (PEPCID) 20 MG tablet Take 20 mg by mouth daily.   • fluticasone-salmeterol (ADVAIR DISKUS) 250-50 MCG/DOSE DISKUS Inhale 1 puff 2 (Two) Times a Day. For COPD and rinse mouth after use   • guaiFENesin (MUCINEX) 600 MG 12 hr tablet Take 1 tablet by mouth 2 (two) times a day.   • hydrALAZINE (APRESOLINE) 100 MG tablet Take 1 tablet by mouth 2 (Two) Times a Day.   • HYDROcodone-acetaminophen (NORCO)  MG per tablet Take 1 tablet by mouth Every 6 (Six) Hours As Needed for Severe Pain (7-10).   • Insulin Degludec (TRESIBA FLEXTOUCH) 200 UNIT/ML solution pen-injector Inject 70 Units under the skin Daily. (Patient taking differently: Inject 70 Units under the skin Every Evening.)   • " Insulin Pen Needle (BD PEN NEEDLE CLAY U/F) 32G X 4 MM misc Use as directed 7 times daily   • levalbuterol (XOPENEX) 0.63 MG/3ML nebulizer solution Take 1 ampule by nebulization every 4 (four) hours as needed for wheezing.   • levothyroxine (SYNTHROID, LEVOTHROID) 200 MCG tablet Take 200 mcg by mouth Daily.   • metoprolol tartrate (LOPRESSOR) 50 MG tablet Take 1 tablet by mouth 2 (Two) Times a Day. For heart   • nystatin (MYCOSTATIN) 623300 UNIT/GM powder Apply  topically Every 12 (Twelve) Hours.   • QUEtiapine (SEROquel) 100 MG tablet Take 100 mg by mouth 2 (Two) Times a Day.   • spironolactone (ALDACTONE) 50 MG tablet Take 1 tablet by mouth Daily. For edema   • vitamin D (ERGOCALCIFEROL) 51727 UNITS capsule capsule Take 50,000 Units by mouth Every 7 (Seven) Days.   • insulin aspart (NOVOLOG FLEXPEN) 100 UNIT/ML solution pen-injector sc pen 15 units prior to each meal tid. If you skip meal then skip dose.   • VICTOZA 18 MG/3ML solution pen-injector Inject 1.2 mg under the skin Daily.   • [DISCONTINUED] doxycycline (VIBRAMYCIN) 100 MG capsule Take 1 capsule by mouth 2 (Two) Times a Day. For infection     No current facility-administered medications on file prior to visit.      Family History   Problem Relation Age of Onset   • Cancer Mother      bone   • Cancer Father    • Cancer Brother      Allergies   Allergen Reactions   • Erythromycin Nausea And Vomiting   • Ativan [Lorazepam]    • Erythromycin Ethylsuccinate    • Tape      Paper tape           History of Present Illness   Chief Complaint   Patient presents with   • Diabetes     no recent labs, testing up to 3 times daily, pt brought meter   • Hypothyroidism     pt was unable to tolerate victoza and has not needed to take novolog   • Vitamin D Deficiency   This is a 60-year-old female patient here today for routine follow-up visit for the above-mentioned problems.  She has a history of uncontrolled type 2 diabetes and is checking her blood sugars one time  daily.  She states she was recently in the hospital for edema and exacerbation of COPD.  She does have an appointment follow-up with her pulmonologist.  She was advised to follow up with cardiology however her daughter states that she was never referred to a cardiologist.  She denies having any history of heart disease however according to her medical records she was diagnosed with CHF as well as mild cardiomegaly.  They're needing refills at the prescription Bumex that was prescribed for her from the hospital.  She was given a 30 day prescription for this medication until that she needs to follow with the appropriate provider for further refills.  She is getting ready to go see a new PCP however they canceled her last appointment and will need to schedule a new one.  She is accompanied today by her daughter as well as her granddaughter.  She uses a motorized scooter but is able to walk short distances.  She is on nasal oxygen continuous.  She is not following any diet and states that's what her blood sugars have been fluctuating somewhat.  She did have a low of 57 this morning and her highest blood sugar readings according to her meter download was 492.  She is very frustrated at today's appointment because she is told to follow up with so many doctors.  She states she has a limited income of which to live on and the co-pays and caused the medications are wearing her down.  She also has a history of chronic pain.  She follows pain management has had some injections in her back which she states didn't work because her $75 each treatment.  Her daughter states she is sleeping more in the patient complains that she has no appetite.  She states she is taking her tresiba daily however she is not taking her mealtime insulin as prescribed.  She is a poor historian to try very late to when she is taking the insulin and when she has not.  She states she has a sliding scale at home that she goes by.  She was prescribed at her  last visit to take 15 units of mealtime insulin with each meal and to skip the dose if she skips a meal.  She has not been following those instructions.  Despite the fact that her blood sugars are fluctuating so much she is not receptive to making changes.  She feels that all her problems are based in diet.  I spent considerable time today educating her on the correlation between food, activity, and insulin.  Her last meal that she was yesterday morning she ate breakfast.  Her daughter states she is a night out will stay up all night sleep all day.  She quit taken Victoza due to nausea.  It is questionable whether or not she is taking any mealtime insulin.  Her daughter was unable to assist with knowing her medications.        The following portions of the patient's history were reviewed and updated as appropriate: allergies, current medications, past family history, past medical history, past social history, past surgical history and problem list.    Review of Systems   Constitutional: Positive for fatigue.   HENT: Negative for trouble swallowing.    Eyes: Negative for visual disturbance.   Respiratory: Negative for shortness of breath.    Cardiovascular: Positive for leg swelling.   Endocrine: Negative for polyuria.   Skin: Negative for wound.   Neurological: Negative for numbness.       Objective   Physical Exam   Constitutional: She is oriented to person, place, and time. She appears well-developed and well-nourished. No distress.   Obese   HENT:   Head: Normocephalic and atraumatic.   Right Ear: External ear normal.   Left Ear: External ear normal.   Nose: Nose normal.   Mouth/Throat: Oropharynx is clear and moist. No oropharyngeal exudate.   Eyes: EOM are normal. Pupils are equal, round, and reactive to light. Right eye exhibits no discharge. Left eye exhibits no discharge.   Neck: Trachea normal, normal range of motion and full passive range of motion without pain. Neck supple. No tracheal tenderness present.  Carotid bruit is not present. No tracheal deviation, no edema and no erythema present. No thyroid mass and no thyromegaly present.   Cardiovascular: Normal rate, regular rhythm, normal heart sounds and intact distal pulses.  Exam reveals no gallop and no friction rub.    No murmur heard.  Pulmonary/Chest: Breath sounds normal. No stridor. No respiratory distress. She has no wheezes. She has no rales.   Shortness of breath with minimal exertion.  He is currently using nasal oxygen at 4 L/m portable   Abdominal: Soft. Bowel sounds are normal. She exhibits no distension.   Musculoskeletal: She exhibits edema. She exhibits no deformity.   Uses a motorized wheelchair has to 3+ edema in bilateral lower extremities   Lymphadenopathy:     She has no cervical adenopathy.   Neurological: She is alert and oriented to person, place, and time.   Skin: Skin is warm and dry. No rash noted. She is not diaphoretic. No erythema. No pallor.   Color ashen   Psychiatric: She has a normal mood and affect. Her behavior is normal. Judgment and thought content normal.   Nursing note and vitals reviewed.    Lab Requisition on 05/31/2017   Component Date Value Ref Range Status   • Glucose 05/31/2017 144* 65 - 99 mg/dL Final   • BUN 05/31/2017 85* 8 - 23 mg/dL Final   • Creatinine 05/31/2017 2.11* 0.57 - 1.00 mg/dL Final   • Sodium 05/31/2017 142  136 - 145 mmol/L Final   • Potassium 05/31/2017 4.0  3.5 - 5.2 mmol/L Final   • Chloride 05/31/2017 96* 98 - 107 mmol/L Final   • CO2 05/31/2017 31.7* 22.0 - 29.0 mmol/L Final   • Calcium 05/31/2017 10.0  8.6 - 10.5 mg/dL Final   • eGFR Non African Amer 05/31/2017 24* >60 mL/min/1.73 Final   • BUN/Creatinine Ratio 05/31/2017 40.3* 7.0 - 25.0 Final   • Anion Gap 05/31/2017 14.3  mmol/L Final   • Color, UA 05/31/2017 Yellow  Yellow, Straw Final   • Appearance, UA 05/31/2017 Cloudy* Clear Final   • pH, UA 05/31/2017 <=5.0  5.0 - 8.0 Final   • Specific Gravity,  05/31/2017 1.016  1.005 - 1.030 Final    • Glucose, UA 05/31/2017 Negative  Negative Final   • Ketones, UA 05/31/2017 Negative  Negative Final   • Bilirubin, UA 05/31/2017 Negative  Negative Final   • Blood, UA 05/31/2017 Negative  Negative Final   • Protein, UA 05/31/2017 Negative  Negative Final   • Leuk Esterase, UA 05/31/2017 Moderate (2+)* Negative Final   • Nitrite, UA 05/31/2017 Negative  Negative Final   • Urobilinogen, UA 05/31/2017 0.2 E.U./dL  0.2 - 1.0 E.U./dL Final   • WBC 05/31/2017 10.10  4.50 - 10.70 10*3/mm3 Final   • RBC 05/31/2017 4.08  3.90 - 5.20 10*6/mm3 Final   • Hemoglobin 05/31/2017 12.2  11.9 - 15.5 g/dL Final   • Hematocrit 05/31/2017 40.4  35.6 - 45.5 % Final   • MCV 05/31/2017 99.0* 80.5 - 98.2 fL Final   • MCH 05/31/2017 29.9  26.9 - 32.0 pg Final   • MCHC 05/31/2017 30.2* 32.4 - 36.3 g/dL Final   • RDW 05/31/2017 12.7  11.7 - 13.0 % Final   • RDW-SD 05/31/2017 46.1  37.0 - 54.0 fl Final   • MPV 05/31/2017 11.9  6.0 - 12.0 fL Final   • Platelets 05/31/2017 180  140 - 500 10*3/mm3 Final   • Neutrophil % 05/31/2017 64.6  42.7 - 76.0 % Final   • Lymphocyte % 05/31/2017 23.6  19.6 - 45.3 % Final   • Monocyte % 05/31/2017 9.0  5.0 - 12.0 % Final   • Eosinophil % 05/31/2017 2.1  0.3 - 6.2 % Final   • Basophil % 05/31/2017 0.4  0.0 - 1.5 % Final   • Immature Grans % 05/31/2017 0.3  0.0 - 0.5 % Final   • Neutrophils, Absolute 05/31/2017 6.53  1.90 - 8.10 10*3/mm3 Final   • Lymphocytes, Absolute 05/31/2017 2.38  0.90 - 4.80 10*3/mm3 Final   • Monocytes, Absolute 05/31/2017 0.91  0.20 - 1.20 10*3/mm3 Final   • Eosinophils, Absolute 05/31/2017 0.21  0.00 - 0.70 10*3/mm3 Final   • Basophils, Absolute 05/31/2017 0.04  0.00 - 0.20 10*3/mm3 Final   • Immature Grans, Absolute 05/31/2017 0.03  0.00 - 0.03 10*3/mm3 Final   • RBC, UA 05/31/2017 0-2  None Seen, 0-2 /HPF Final   • WBC,  05/31/2017 3-5* None Seen, 0-2 /HPF Final   • Bacteria,  05/31/2017 Trace* None Seen /HPF Final   • Squamous Epithelial Cells,  05/31/2017 3-6* None Seen,  0-2 /HPF Final   • Transitional Epithelial Cells, UA 05/31/2017 3-6* 0 - 2 /HPF Final   • Hyaline Casts, UA 05/31/2017 3-6  None Seen /LPF Final   • Methodology 05/31/2017 Manual Light Microscopy   Final         Assessment/Plan   Jared was seen today for diabetes, hypothyroidism and vitamin d deficiency.    Diagnoses and all orders for this visit:    Uncontrolled type 2 diabetes mellitus with diabetic polyneuropathy, with long-term current use of insulin  -     Comprehensive Metabolic Panel  -     C-Peptide  -     Hemoglobin A1c  -     Cancel: Insulin Antibody  -     Lipid Panel  -     MicroAlbumin, Urine, Random  -     T3, Free  -     T4, Free  -     Thyroid Panel With TSH  -     Vitamin D 25 Hydroxy  -     Cancel: MicroAlbumin, Urine, Random  -     Cancel: Lipid Panel  -     Ambulatory Referral to Cardiology    Chronic combined systolic and diastolic congestive heart failure  -     Comprehensive Metabolic Panel  -     C-Peptide  -     Hemoglobin A1c  -     Cancel: Insulin Antibody  -     Lipid Panel  -     MicroAlbumin, Urine, Random  -     T3, Free  -     T4, Free  -     Thyroid Panel With TSH  -     Vitamin D 25 Hydroxy  -     Cancel: MicroAlbumin, Urine, Random  -     Cancel: Lipid Panel  -     Ambulatory Referral to Cardiology    Mixed hyperlipidemia  -     Comprehensive Metabolic Panel  -     C-Peptide  -     Hemoglobin A1c  -     Cancel: Insulin Antibody  -     Lipid Panel  -     MicroAlbumin, Urine, Random  -     T3, Free  -     T4, Free  -     Thyroid Panel With TSH  -     Vitamin D 25 Hydroxy  -     Cancel: MicroAlbumin, Urine, Random  -     Cancel: Lipid Panel  -     Ambulatory Referral to Cardiology    Essential hypertension  -     Comprehensive Metabolic Panel  -     C-Peptide  -     Hemoglobin A1c  -     Cancel: Insulin Antibody  -     Lipid Panel  -     MicroAlbumin, Urine, Random  -     T3, Free  -     T4, Free  -     Thyroid Panel With TSH  -     Vitamin D 25 Hydroxy  -     Cancel: MicroAlbumin,  Urine, Random  -     Cancel: Lipid Panel  -     Ambulatory Referral to Cardiology    Hypothyroidism, acquired  -     Comprehensive Metabolic Panel  -     C-Peptide  -     Hemoglobin A1c  -     Cancel: Insulin Antibody  -     Lipid Panel  -     MicroAlbumin, Urine, Random  -     T3, Free  -     T4, Free  -     Thyroid Panel With TSH  -     Vitamin D 25 Hydroxy  -     Cancel: MicroAlbumin, Urine, Random  -     Cancel: Lipid Panel  -     Ambulatory Referral to Cardiology    Diabetic peripheral neuropathy  -     Comprehensive Metabolic Panel  -     C-Peptide  -     Hemoglobin A1c  -     Cancel: Insulin Antibody  -     Lipid Panel  -     MicroAlbumin, Urine, Random  -     T3, Free  -     T4, Free  -     Thyroid Panel With TSH  -     Vitamin D 25 Hydroxy  -     Cancel: MicroAlbumin, Urine, Random  -     Cancel: Lipid Panel  -     Ambulatory Referral to Cardiology    CKD (chronic kidney disease), unspecified stage  -     Comprehensive Metabolic Panel  -     C-Peptide  -     Hemoglobin A1c  -     Cancel: Insulin Antibody  -     Lipid Panel  -     MicroAlbumin, Urine, Random  -     T3, Free  -     T4, Free  -     Thyroid Panel With TSH  -     Vitamin D 25 Hydroxy  -     Cancel: MicroAlbumin, Urine, Random  -     Cancel: Lipid Panel  -     Ambulatory Referral to Cardiology    Other orders  -     bumetanide (BUMEX) 1 MG tablet; Take 1 tablet by mouth Every Evening.  -     bumetanide (BUMEX) 2 MG tablet; Take 1 tablet by mouth Daily.        In summary, patient was seen and examined.  She is a very complicated patient with a lot of diagnoses and medications.  She does follow with a specialist however she does not have a cardiologist that she is aware of.  I will refer cardiologist due to her history of CHF as well as mild cardiomegaly.  She is transitioning to a new primary care provider needs to schedule an appointment.  She was given a 30 day prescription for her Bumex but needs to follow-up with the appropriate provider after  30 days for refills.  I've asked that they call with their sliding scale insulin so I can better understand how she is taking her insulin.  She will have extensive laboratory evaluation done today and will be notified of the results. Based on  Her reported history it appears that she is not consistent with when she takes mealtime insulin or if she even takes any.  Her weight is stable.  She states her edema has improved considerably since being in the hospital.  Based on her blood sugar readings her diabetes is not well controlled.  Patient did not want a cardiology referral because she states she sees too many doctors already however her daughter requested that one be made.  Medically and clinically she is stable at this time.  Her blood pressure is an acceptable range.  Her kidney function as well as her thyroid hormones will be evaluated with labs today.

## 2017-07-10 RX ORDER — LEVOTHYROXINE SODIUM 112 UG/1
112 TABLET ORAL DAILY
Qty: 30 TABLET | Refills: 11 | Status: SHIPPED | OUTPATIENT
Start: 2017-07-10 | End: 2017-07-10 | Stop reason: SDUPTHER

## 2017-07-10 RX ORDER — LEVOTHYROXINE SODIUM 112 UG/1
224 TABLET ORAL DAILY
Qty: 60 TABLET | Refills: 5 | Status: SHIPPED | OUTPATIENT
Start: 2017-07-10 | End: 2018-06-18

## 2017-07-11 ENCOUNTER — TELEPHONE (OUTPATIENT)
Dept: ENDOCRINOLOGY | Age: 62
End: 2017-07-11

## 2017-07-11 LAB
25(OH)D3+25(OH)D2 SERPL-MCNC: 41.9 NG/ML (ref 30–100)
ALBUMIN SERPL-MCNC: 4.4 G/DL (ref 3.5–5.2)
ALBUMIN/GLOB SERPL: 1.6 G/DL
ALP SERPL-CCNC: 77 U/L (ref 39–117)
ALT SERPL-CCNC: 15 U/L (ref 1–33)
AST SERPL-CCNC: 21 U/L (ref 1–32)
BILIRUB SERPL-MCNC: 0.4 MG/DL (ref 0.1–1.2)
BUN SERPL-MCNC: 71 MG/DL (ref 8–23)
BUN/CREAT SERPL: 21.1 (ref 7–25)
C PEPTIDE SERPL-MCNC: 0.3 NG/ML (ref 1.1–4.4)
CALCIUM SERPL-MCNC: 9.9 MG/DL (ref 8.6–10.5)
CHLORIDE SERPL-SCNC: 92 MMOL/L (ref 98–107)
CHOLEST SERPL-MCNC: 151 MG/DL (ref 0–200)
CO2 SERPL-SCNC: 31.1 MMOL/L (ref 22–29)
CREAT SERPL-MCNC: 3.37 MG/DL (ref 0.57–1)
FT4I SERPL CALC-MCNC: 1.2 (ref 1.2–4.9)
GLOBULIN SER CALC-MCNC: 2.7 GM/DL
GLUCOSE SERPL-MCNC: 48 MG/DL (ref 65–99)
HBA1C MFR BLD: 8.4 % (ref 4.8–5.6)
HDLC SERPL-MCNC: 46 MG/DL (ref 40–60)
INSULIN AB SER-ACNC: <5 UU/ML
LDLC SERPL CALC-MCNC: 78 MG/DL (ref 0–100)
MICROALBUMIN UR-MCNC: 38.5 UG/ML
POTASSIUM SERPL-SCNC: 4.9 MMOL/L (ref 3.5–5.2)
PROT SERPL-MCNC: 7.1 G/DL (ref 6–8.5)
SODIUM SERPL-SCNC: 140 MMOL/L (ref 136–145)
T3FREE SERPL-MCNC: 1.8 PG/ML (ref 2–4.4)
T3RU NFR SERPL: 27 % (ref 24–39)
T4 FREE SERPL-MCNC: 0.83 NG/DL (ref 0.93–1.7)
T4 SERPL-MCNC: 4.4 UG/DL (ref 4.5–12)
TRIGL SERPL-MCNC: 135 MG/DL (ref 0–150)
TSH SERPL DL<=0.005 MIU/L-ACNC: 14.16 UIU/ML (ref 0.45–4.5)
VLDLC SERPL CALC-MCNC: 27 MG/DL (ref 5–40)

## 2017-07-11 NOTE — TELEPHONE ENCOUNTER
----- Message from OPAL Arias sent at 7/10/2017  6:19 PM EDT -----  I reviewed her preliminary labs and her hemoglobin A1c is not at goal of less than 7.  Based on her current sliding scale I would like for her to increase her doses by 1 unit for each dose for example 150-200  take 5 units instead of 4 units and so forth.  Nephrology was referred back in March and I need to know did she ever go see a nephrologist.  Her kidney function has declined and she needs to get in with somebody if she has not gone the need to make another referral.  Her TSH is too high so I'm increasing her dose to 112 µg 2 pills once daily in the morning on empty stomach      I spoke with the patient's sister and let her know what adjustments needed to be made. Her sister also stated that she will be seeing Dr. Stevens on July 27th.

## 2017-07-19 ENCOUNTER — TRANSCRIBE ORDERS (OUTPATIENT)
Dept: PULMONOLOGY | Facility: HOSPITAL | Age: 62
End: 2017-07-19

## 2017-07-19 DIAGNOSIS — R91.8 PULMONARY INFILTRATE: Primary | ICD-10-CM

## 2017-07-27 ENCOUNTER — HOSPITAL ENCOUNTER (OUTPATIENT)
Dept: CT IMAGING | Facility: HOSPITAL | Age: 62
Discharge: HOME | End: 2017-07-27
Attending: INTERNAL MEDICINE | Admitting: INTERNAL MEDICINE

## 2017-07-27 DIAGNOSIS — R91.8 PULMONARY INFILTRATE: ICD-10-CM

## 2017-07-27 PROCEDURE — 71250 CT THORAX DX C-: CPT

## 2017-07-31 ENCOUNTER — OFFICE VISIT (OUTPATIENT)
Dept: PAIN MEDICINE | Facility: CLINIC | Age: 62
End: 2017-07-31

## 2017-07-31 VITALS
WEIGHT: 259.6 LBS | BODY MASS INDEX: 41.72 KG/M2 | HEART RATE: 62 BPM | DIASTOLIC BLOOD PRESSURE: 59 MMHG | TEMPERATURE: 97.6 F | OXYGEN SATURATION: 91 % | RESPIRATION RATE: 16 BRPM | HEIGHT: 66 IN | SYSTOLIC BLOOD PRESSURE: 103 MMHG

## 2017-07-31 DIAGNOSIS — M47.816 SPONDYLOSIS OF LUMBAR REGION WITHOUT MYELOPATHY OR RADICULOPATHY: ICD-10-CM

## 2017-07-31 DIAGNOSIS — G89.29 CHRONIC MIDLINE LOW BACK PAIN WITHOUT SCIATICA: ICD-10-CM

## 2017-07-31 DIAGNOSIS — M51.06 INTERVERTEBRAL LUMBAR DISC DISORDER WITH MYELOPATHY, LUMBAR REGION: ICD-10-CM

## 2017-07-31 DIAGNOSIS — G89.4 CHRONIC PAIN SYNDROME: Primary | ICD-10-CM

## 2017-07-31 DIAGNOSIS — Z79.891 ENCOUNTER FOR LONG-TERM OPIATE ANALGESIC USE: ICD-10-CM

## 2017-07-31 DIAGNOSIS — E11.42 DIABETIC PERIPHERAL NEUROPATHY (HCC): ICD-10-CM

## 2017-07-31 DIAGNOSIS — M54.50 CHRONIC MIDLINE LOW BACK PAIN WITHOUT SCIATICA: ICD-10-CM

## 2017-07-31 PROCEDURE — 99215 OFFICE O/P EST HI 40 MIN: CPT | Performed by: ANESTHESIOLOGY

## 2017-07-31 RX ORDER — HYDROCODONE BITARTRATE AND ACETAMINOPHEN 10; 325 MG/1; MG/1
1 TABLET ORAL EVERY 6 HOURS PRN
Qty: 120 TABLET | Refills: 0 | Status: SHIPPED | OUTPATIENT
Start: 2017-07-31 | End: 2017-08-30 | Stop reason: SDUPTHER

## 2017-08-02 ENCOUNTER — LAB (OUTPATIENT)
Dept: LAB | Facility: HOSPITAL | Age: 62
End: 2017-08-02
Attending: INTERNAL MEDICINE

## 2017-08-02 ENCOUNTER — TRANSCRIBE ORDERS (OUTPATIENT)
Dept: ADMINISTRATIVE | Facility: HOSPITAL | Age: 62
End: 2017-08-02

## 2017-08-02 DIAGNOSIS — N18.4 CHRONIC KIDNEY DISEASE, STAGE IV (SEVERE) (HCC): ICD-10-CM

## 2017-08-02 DIAGNOSIS — N17.9 ACUTE KIDNEY FAILURE, UNSPECIFIED (HCC): ICD-10-CM

## 2017-08-02 DIAGNOSIS — N17.9 ACUTE KIDNEY FAILURE, UNSPECIFIED (HCC): Primary | ICD-10-CM

## 2017-08-02 LAB
ALBUMIN SERPL-MCNC: 4 G/DL (ref 3.5–5.2)
ALBUMIN/GLOB SERPL: 1.4 G/DL
ALP SERPL-CCNC: 63 U/L (ref 40–129)
ALT SERPL W P-5'-P-CCNC: 16 U/L (ref 5–33)
ANION GAP SERPL CALCULATED.3IONS-SCNC: 12.2 MMOL/L
AST SERPL-CCNC: 20 U/L (ref 5–32)
BACTERIA UR QL AUTO: ABNORMAL /HPF
BASOPHILS # BLD AUTO: 0.05 10*3/MM3 (ref 0–0.2)
BASOPHILS NFR BLD AUTO: 0.4 % (ref 0–2)
BILIRUB SERPL-MCNC: 0.3 MG/DL (ref 0.2–1.2)
BILIRUB UR QL STRIP: NEGATIVE
BUN BLD-MCNC: 61 MG/DL (ref 8–23)
BUN/CREAT SERPL: 30.2 (ref 7–25)
CALCIUM SPEC-SCNC: 9.4 MG/DL (ref 8.8–10.5)
CHLORIDE SERPL-SCNC: 101 MMOL/L (ref 98–107)
CLARITY UR: ABNORMAL
CO2 SERPL-SCNC: 28.8 MMOL/L (ref 22–29)
COLOR UR: YELLOW
CREAT BLD-MCNC: 2.02 MG/DL (ref 0.57–1)
DEPRECATED RDW RBC AUTO: 45.6 FL (ref 37–54)
EOSINOPHIL # BLD AUTO: 0.72 10*3/MM3 (ref 0.1–0.3)
EOSINOPHIL NFR BLD AUTO: 6 % (ref 0–4)
ERYTHROCYTE [DISTWIDTH] IN BLOOD BY AUTOMATED COUNT: 13.1 % (ref 11.5–14.5)
GFR SERPL CREATININE-BSD FRML MDRD: 25 ML/MIN/1.73
GLOBULIN UR ELPH-MCNC: 2.9 GM/DL
GLUCOSE BLD-MCNC: 141 MG/DL (ref 65–99)
GLUCOSE UR STRIP-MCNC: NEGATIVE MG/DL
HCT VFR BLD AUTO: 36.1 % (ref 37–47)
HGB BLD-MCNC: 10.7 G/DL (ref 12–16)
HGB UR QL STRIP.AUTO: NEGATIVE
HYALINE CASTS UR QL AUTO: ABNORMAL /LPF
IMM GRANULOCYTES # BLD: 0.03 10*3/MM3 (ref 0–0.03)
IMM GRANULOCYTES NFR BLD: 0.3 % (ref 0–0.5)
KETONES UR QL STRIP: NEGATIVE
LEUKOCYTE ESTERASE UR QL STRIP.AUTO: ABNORMAL
LYMPHOCYTES # BLD AUTO: 2.57 10*3/MM3 (ref 0.6–4.8)
LYMPHOCYTES NFR BLD AUTO: 21.5 % (ref 20–45)
MCH RBC QN AUTO: 28.4 PG (ref 27–31)
MCHC RBC AUTO-ENTMCNC: 29.6 G/DL (ref 31–37)
MCV RBC AUTO: 95.8 FL (ref 81–99)
MONOCYTES # BLD AUTO: 1.08 10*3/MM3 (ref 0–1)
MONOCYTES NFR BLD AUTO: 9 % (ref 3–8)
NEUTROPHILS # BLD AUTO: 7.49 10*3/MM3 (ref 1.5–8.3)
NEUTROPHILS NFR BLD AUTO: 62.8 % (ref 45–70)
NITRITE UR QL STRIP: NEGATIVE
NRBC BLD MANUAL-RTO: 0 /100 WBC (ref 0–0)
PH UR STRIP.AUTO: <=5 [PH] (ref 4.5–8)
PLATELET # BLD AUTO: 207 10*3/MM3 (ref 140–500)
PMV BLD AUTO: 10.9 FL (ref 7.4–10.4)
POTASSIUM BLD-SCNC: 4.4 MMOL/L (ref 3.5–5.2)
PROT SERPL-MCNC: 6.9 G/DL (ref 6–8.5)
PROT UR QL STRIP: NEGATIVE
RBC # BLD AUTO: 3.77 10*6/MM3 (ref 4.2–5.4)
RBC # UR: ABNORMAL /HPF
REF LAB TEST METHOD: ABNORMAL
SODIUM BLD-SCNC: 142 MMOL/L (ref 136–145)
SP GR UR STRIP: 1.01 (ref 1–1.03)
SQUAMOUS #/AREA URNS HPF: ABNORMAL /HPF
UROBILINOGEN UR QL STRIP: ABNORMAL
WBC CLUMPS # UR AUTO: ABNORMAL /HPF
WBC NRBC COR # BLD: 11.94 10*3/MM3 (ref 4.8–10.8)
WBC UR QL AUTO: ABNORMAL /HPF
YEAST URNS QL MICRO: ABNORMAL /HPF

## 2017-08-02 PROCEDURE — 81001 URINALYSIS AUTO W/SCOPE: CPT

## 2017-08-02 PROCEDURE — 87086 URINE CULTURE/COLONY COUNT: CPT

## 2017-08-02 PROCEDURE — 80053 COMPREHEN METABOLIC PANEL: CPT

## 2017-08-02 PROCEDURE — 36415 COLL VENOUS BLD VENIPUNCTURE: CPT

## 2017-08-02 PROCEDURE — 81003 URINALYSIS AUTO W/O SCOPE: CPT

## 2017-08-02 PROCEDURE — 85025 COMPLETE CBC W/AUTO DIFF WBC: CPT

## 2017-08-03 LAB — BACTERIA SPEC AEROBE CULT: NORMAL

## 2017-08-08 ENCOUNTER — TELEPHONE (OUTPATIENT)
Dept: INTERNAL MEDICINE | Facility: CLINIC | Age: 62
End: 2017-08-08

## 2017-08-08 NOTE — TELEPHONE ENCOUNTER
----- Message from Jina Vaughan MD sent at 8/7/2017 11:35 AM EDT -----  Thanks Erin.     ----- Message -----     From: Erin Luna MA     Sent: 8/7/2017   9:49 AM       To: Jina Vaughan MD    Patient has moved to [facility] and will not be returning to her prior PMD.  I have scheduled her on 8/22/2017.  ----- Message -----     From: Erin Luna MA     Sent: 8/7/2017   8:15 AM       To: Erin Luna MA        ----- Message -----     From: Jina Vaughan MD     Sent: 8/7/2017   7:53 AM       To: Gonzalo Gage[address] [place] [address]    We should try to move up her appointment? She is very sick and it looks as though she followed up with [facility] after discharge and is now switching to our clinic? Can we call her and get her moved up please?

## 2017-08-22 ENCOUNTER — OFFICE VISIT (OUTPATIENT)
Dept: INTERNAL MEDICINE | Facility: CLINIC | Age: 62
End: 2017-08-22

## 2017-08-22 VITALS
BODY MASS INDEX: 41.4 KG/M2 | SYSTOLIC BLOOD PRESSURE: 106 MMHG | HEART RATE: 62 BPM | WEIGHT: 257.6 LBS | HEIGHT: 66 IN | DIASTOLIC BLOOD PRESSURE: 68 MMHG | OXYGEN SATURATION: 92 %

## 2017-08-22 DIAGNOSIS — E78.2 MIXED HYPERLIPIDEMIA: ICD-10-CM

## 2017-08-22 DIAGNOSIS — E03.9 HYPOTHYROIDISM, ACQUIRED: ICD-10-CM

## 2017-08-22 DIAGNOSIS — B37.2 CANDIDAL SKIN INFECTION: ICD-10-CM

## 2017-08-22 DIAGNOSIS — I50.32 CHRONIC DIASTOLIC CONGESTIVE HEART FAILURE (HCC): ICD-10-CM

## 2017-08-22 DIAGNOSIS — R91.1 LUNG NODULE, SOLITARY: ICD-10-CM

## 2017-08-22 DIAGNOSIS — D64.9 NORMOCYTIC ANEMIA: ICD-10-CM

## 2017-08-22 DIAGNOSIS — M81.0 OSTEOPOROSIS: ICD-10-CM

## 2017-08-22 DIAGNOSIS — I10 ESSENTIAL HYPERTENSION: ICD-10-CM

## 2017-08-22 DIAGNOSIS — S91.109A WOUND, OPEN, TOE, INITIAL ENCOUNTER: ICD-10-CM

## 2017-08-22 DIAGNOSIS — N18.4 CKD (CHRONIC KIDNEY DISEASE) STAGE 4, GFR 15-29 ML/MIN (HCC): ICD-10-CM

## 2017-08-22 DIAGNOSIS — E11.42 DIABETIC PERIPHERAL NEUROPATHY (HCC): ICD-10-CM

## 2017-08-22 DIAGNOSIS — IMO0002 UNCONTROLLED TYPE 2 DIABETES MELLITUS WITH DIABETIC POLYNEUROPATHY, WITH LONG-TERM CURRENT USE OF INSULIN: ICD-10-CM

## 2017-08-22 DIAGNOSIS — J41.8 MIXED SIMPLE AND MUCOPURULENT CHRONIC BRONCHITIS (HCC): Primary | ICD-10-CM

## 2017-08-22 PROBLEM — J94.8 PLEURAL SCARRING: Status: ACTIVE | Noted: 2017-08-22

## 2017-08-22 PROBLEM — J98.11 ATELECTASIS: Status: RESOLVED | Noted: 2017-03-30 | Resolved: 2017-08-22

## 2017-08-22 PROBLEM — J86.9 EMPYEMA (HCC): Status: RESOLVED | Noted: 2017-03-30 | Resolved: 2017-08-22

## 2017-08-22 PROBLEM — K61.1 ABSCESS, PERIRECTAL: Status: RESOLVED | Noted: 2017-03-30 | Resolved: 2017-08-22

## 2017-08-22 PROBLEM — J94.8 HYDROTHORAX: Status: RESOLVED | Noted: 2017-03-30 | Resolved: 2017-08-22

## 2017-08-22 PROBLEM — M79.89 SWELLING OF LOWER EXTREMITY: Chronic | Status: ACTIVE | Noted: 2017-08-22

## 2017-08-22 PROBLEM — E87.70 VOLUME EXCESS: Status: RESOLVED | Noted: 2017-05-02 | Resolved: 2017-08-22

## 2017-08-22 PROBLEM — J44.1 COPD EXACERBATION: Status: RESOLVED | Noted: 2017-04-30 | Resolved: 2017-08-22

## 2017-08-22 PROCEDURE — 99215 OFFICE O/P EST HI 40 MIN: CPT | Performed by: INTERNAL MEDICINE

## 2017-08-22 RX ORDER — BUMETANIDE 2 MG/1
2 TABLET ORAL 2 TIMES DAILY
Qty: 60 TABLET | Refills: 0 | OUTPATIENT
Start: 2017-08-22 | End: 2018-05-24 | Stop reason: HOSPADM

## 2017-08-22 RX ORDER — NYSTATIN 100000 U/G
CREAM TOPICAL 2 TIMES DAILY
Qty: 30 G | Refills: 3 | Status: SHIPPED | OUTPATIENT
Start: 2017-08-22 | End: 2017-09-26

## 2017-08-22 RX ORDER — NYSTATIN 100000 [USP'U]/G
POWDER TOPICAL 2 TIMES DAILY
Qty: 45 G | Refills: 3 | Status: SHIPPED | OUTPATIENT
Start: 2017-08-22 | End: 2017-08-30

## 2017-08-22 NOTE — PROGRESS NOTES
"Subjective     Jared Govea is a 62 y.o. female, who presents with a chief complaint of   Chief Complaint   Patient presents with   • Establish Care     former Dr. Corinne Ventura pt    • Leg Pain     bilateral lower leg pain, hot to touch pt states    • Foot Swelling     L foot, x questions        HPI Comments: 61 yo F here to establish care and has many chronic medical issues. All of her problems are new to me and she was recently admitted in 5/2017 with fluid overload that was thought to be multi-factorial related to her CKD, obesity, and COPD. Cardiology origin was ruled out.     She has had issues with her left foot \"crusting up\" for 6 months. She has had pain and sharp pains in both feet for years that has worsened in the last few weeks. No fever or chills. She has never been on any medication for neuropathy and doesn't know the last time that she had a foot exam.     She was very sick in 2011 and was admitted to [place]. She could not get off the ventilator due to MRSA PNA and had to have a trache. She had a chest tube to drain the fluid and was in [place] for 4 months.     She has type 2 diabetes that she takes 70 units of Triseba. Her fasting in the AM is usually 150-200. She does not have her log here today for me to review. She does not have her SSI here with her today as well, cannot tell me what she normally takes. She has not had her eyes checked in the last year and is due. She does not have lows regularly. She is on a statin.     She take Bumex for her heart and chronic swelling in her legs and tolerates well.     She has end stage COPD on 4L consistently. She is also on Advair, Incruse and her Xopenex.     She does not do very much walking reguarly and uses her scooter. She does walk in her house    She is working on getting a stimulator in her back. She takes 2 tablets orally twice per day of hydrocodone for her chronic low back pain. Dr. Ledezma manages her pain medication.        The following " portions of the patient's history were reviewed and updated as appropriate: allergies, current medications, past family history, past medical history, past social history, past surgical history and problem list.    Allergies: Erythromycin; Erythromycin ethylsuccinate; Ativan [lorazepam]; and Tape    Current Outpatient Prescriptions:   •  albuterol (PROVENTIL HFA;VENTOLIN HFA) 108 (90 BASE) MCG/ACT inhaler, Inhale 2 puffs every 4 (four) hours as needed for wheezing., Disp: , Rfl:   •  alendronate (FOSAMAX) 70 MG tablet, Take 1 tablet by mouth Every 7 (Seven) Days. For Osteoporosis, Disp: 12 tablet, Rfl: 3  •  aspirin 81 MG EC tablet, Take 81 mg by mouth daily., Disp: , Rfl:   •  atorvastatin (LIPITOR) 40 MG tablet, Take 1 tablet by mouth Daily. For cholesterol, Disp: 90 tablet, Rfl: 3  •  B Complex Vitamins (VITAMIN B COMPLEX PO), Take 1 tablet by mouth daily., Disp: , Rfl:   •  bumetanide (BUMEX) 2 MG tablet, Take 1 tablet by mouth 2 (Two) Times a Day., Disp: 60 tablet, Rfl: 0  •  cetirizine (ZyrTEC) 10 MG tablet, Take 10 mg by mouth every night., Disp: , Rfl:   •  famotidine (PEPCID) 20 MG tablet, Take 20 mg by mouth daily., Disp: , Rfl:   •  fluticasone-salmeterol (ADVAIR DISKUS) 250-50 MCG/DOSE DISKUS, Inhale 1 puff 2 (Two) Times a Day. For COPD and rinse mouth after use, Disp: 60 each, Rfl: 5  •  guaiFENesin (MUCINEX) 600 MG 12 hr tablet, Take 1 tablet by mouth 2 (two) times a day., Disp: , Rfl:   •  HYDROcodone-acetaminophen (NORCO)  MG per tablet, Take 1 tablet by mouth Every 6 (Six) Hours As Needed for Severe Pain  (7-10). (Patient taking differently: Take 2 tablets by mouth 2 (Two) Times a Day.), Disp: 120 tablet, Rfl: 0  •  INCRUSE ELLIPTA 62.5 MCG/INH aerosol powder , , Disp: , Rfl:   •  insulin aspart (NOVOLOG FLEXPEN) 100 UNIT/ML solution pen-injector sc pen, 15 units prior to each meal tid. If you skip meal then skip dose., Disp: 15 mL, Rfl: 8  •  Insulin Degludec (TRESIBA FLEXTOUCH) 200 UNIT/ML  "solution pen-injector, Inject 70 Units under the skin Daily., Disp: 15 mL, Rfl: 3  •  Insulin Pen Needle (BD PEN NEEDLE CLAY U/F) 32G X 4 MM misc, Use as directed 7 times daily, Disp: 250 each, Rfl: 5  •  levalbuterol (XOPENEX) 0.63 MG/3ML nebulizer solution, Take 1 ampule by nebulization every 4 (four) hours as needed for wheezing., Disp: , Rfl:   •  levothyroxine (SYNTHROID) 112 MCG tablet, Take 2 tablets by mouth Daily., Disp: 60 tablet, Rfl: 5  •  metoprolol tartrate (LOPRESSOR) 50 MG tablet, Take 1 tablet by mouth 2 (Two) Times a Day. For heart, Disp: 180 tablet, Rfl: 1  •  QUEtiapine (SEROquel) 100 MG tablet, Take 100 mg by mouth 2 (Two) Times a Day., Disp: , Rfl:   •  spironolactone (ALDACTONE) 50 MG tablet, Take 1 tablet by mouth Daily. For edema, Disp: 90 tablet, Rfl: 1  •  vitamin D (ERGOCALCIFEROL) 04899 UNITS capsule capsule, Take 50,000 Units by mouth Every 7 (Seven) Days., Disp: , Rfl:   •  nystatin (MYCOSTATIN) 241675 UNIT/GM cream, Apply  topically 2 (Two) Times a Day., Disp: 30 g, Rfl: 3  •  nystatin (MYCOSTATIN) 130403 UNIT/GM powder, Apply  topically Every 12 (Twelve) Hours., Disp: 45 g, Rfl: 0  •  nystatin (MYCOSTATIN) 433083 UNIT/GM powder, Apply  topically 2 (Two) Times a Day., Disp: 45 g, Rfl: 3      Review of Systems   Constitutional: Negative for chills, fatigue and fever.   Respiratory: Positive for cough, shortness of breath and wheezing.    Cardiovascular: Positive for leg swelling. Negative for chest pain and palpitations.   Gastrointestinal: Negative for diarrhea, nausea and vomiting.   Musculoskeletal: Positive for arthralgias.   Skin: Positive for color change, rash and wound.       Objective     /68 (BP Location: Left arm, Patient Position: Sitting, Cuff Size: Adult)  Pulse 62  Ht 66\" (167.6 cm)  Wt 257 lb 9.6 oz (117 kg)  SpO2 92%  BMI 41.58 kg/m2      Physical Exam   Constitutional: She is oriented to person, place, and time. She appears well-developed and " well-nourished. No distress.   Morbidly obese in a scooter with nasal cancula in place    HENT:   Head: Normocephalic and atraumatic.   Right Ear: External ear normal.   Left Ear: External ear normal.   Mouth/Throat: Oropharynx is clear and moist. No oropharyngeal exudate.   Eyes: Conjunctivae are normal. Right eye exhibits no discharge. Left eye exhibits no discharge. No scleral icterus.   Neck: Neck supple.   Cardiovascular: Normal rate, regular rhythm and normal heart sounds.  Exam reveals no gallop and no friction rub.    No murmur heard.  Pulmonary/Chest: Effort normal. No respiratory distress. She has decreased breath sounds. She has no wheezes. She has no rales.   Musculoskeletal: She exhibits edema (Pitting edema of bilateral LE's with chronic venous changes in both legs. Several small ulcers on her anterior toes with crusting of her skin on top of her feet. ).   Lymphadenopathy:     She has no cervical adenopathy.   Neurological: She is alert and oriented to person, place, and time.   Skin: Skin is warm. Rash (Candidal rash under her breast and pannus ) noted.   Psychiatric: She has a normal mood and affect. Her behavior is normal.   Nursing note and vitals reviewed.        Results for orders placed or performed in visit on 08/02/17   Urine Culture   Result Value Ref Range    Urine Culture 50,000 CFU/mL Mixed Culture    Comprehensive Metabolic Panel   Result Value Ref Range    Glucose 141 (H) 65 - 99 mg/dL    BUN 61 (H) 8 - 23 mg/dL    Creatinine 2.02 (H) 0.57 - 1.00 mg/dL    Sodium 142 136 - 145 mmol/L    Potassium 4.4 3.5 - 5.2 mmol/L    Chloride 101 98 - 107 mmol/L    CO2 28.8 22.0 - 29.0 mmol/L    Calcium 9.4 8.8 - 10.5 mg/dL    Total Protein 6.9 6.0 - 8.5 g/dL    Albumin 4.00 3.50 - 5.20 g/dL    ALT (SGPT) 16 5 - 33 U/L    AST (SGOT) 20 5 - 32 U/L    Alkaline Phosphatase 63 40 - 129 U/L    Total Bilirubin 0.3 0.2 - 1.2 mg/dL    eGFR Non African Amer 25 (L) >60 mL/min/1.73    Globulin 2.9 gm/dL    A/G  Ratio 1.4 g/dL    BUN/Creatinine Ratio 30.2 (H) 7.0 - 25.0    Anion Gap 12.2 mmol/L   Urinalysis With / Culture If Indicated   Result Value Ref Range    Color, UA Yellow Yellow, Straw    Appearance, UA Slightly Cloudy (A) Clear    pH, UA <=5.0 4.5 - 8.0    Specific Gravity, UA 1.015 1.003 - 1.030    Glucose, UA Negative Negative    Ketones, UA Negative Negative, 80 mg/dL (3+), >=160 mg/dL (4+)    Bilirubin, UA Negative Negative    Blood, UA Negative Negative    Protein, UA Negative Negative    Leuk Esterase, UA Small (1+) (A) Negative    Nitrite, UA Negative Negative    Urobilinogen, UA 0.2 E.U./dL 0.2 - 1.0 E.U./dL   CBC Auto Differential   Result Value Ref Range    WBC 11.94 (H) 4.80 - 10.80 10*3/mm3    RBC 3.77 (L) 4.20 - 5.40 10*6/mm3    Hemoglobin 10.7 (L) 12.0 - 16.0 g/dL    Hematocrit 36.1 (L) 37.0 - 47.0 %    MCV 95.8 81.0 - 99.0 fL    MCH 28.4 27.0 - 31.0 pg    MCHC 29.6 (L) 31.0 - 37.0 g/dL    RDW 13.1 11.5 - 14.5 %    RDW-SD 45.6 37.0 - 54.0 fl    MPV 10.9 (H) 7.4 - 10.4 fL    Platelets 207 140 - 500 10*3/mm3    Neutrophil % 62.8 45.0 - 70.0 %    Lymphocyte % 21.5 20.0 - 45.0 %    Monocyte % 9.0 (H) 3.0 - 8.0 %    Eosinophil % 6.0 (H) 0.0 - 4.0 %    Basophil % 0.4 0.0 - 2.0 %    Immature Grans % 0.3 0.0 - 0.5 %    Neutrophils, Absolute 7.49 1.50 - 8.30 10*3/mm3    Lymphocytes, Absolute 2.57 0.60 - 4.80 10*3/mm3    Monocytes, Absolute 1.08 (H) 0.00 - 1.00 10*3/mm3    Eosinophils, Absolute 0.72 (H) 0.10 - 0.30 10*3/mm3    Basophils, Absolute 0.05 0.00 - 0.20 10*3/mm3    Immature Grans, Absolute 0.03 0.00 - 0.03 10*3/mm3    nRBC 0.0 0.0 - 0.0 /100 WBC   Urinalysis, Microscopic Only   Result Value Ref Range    RBC, UA 0-2 (A) None Seen /HPF    WBC, UA 13-20 (A) None Seen /HPF    Bacteria, UA 2+ (A) None Seen /HPF    Squamous Epithelial Cells, UA 21-30 (A) None Seen, 0-2 /HPF    Yeast, UA Small/1+ Yeast None Seen /HPF    Hyaline Casts, UA None Seen None Seen /LPF    WBC Clumps, UA Small/1+ None Seen /HPF     Methodology Manual Light Microscopy        Assessment/Plan   Jared was seen today for establish care, leg pain and foot swelling.    Diagnoses and all orders for this visit:    Mixed simple and mucopurulent chronic bronchitis    Chronic diastolic congestive heart failure    Uncontrolled type 2 diabetes mellitus with diabetic polyneuropathy, with long-term current use of insulin  -     Hemoglobin A1c    Hypothyroidism, acquired  -     T4, Free  -     TSH    Diabetic peripheral neuropathy    Osteoporosis    CKD (chronic kidney disease) stage 4, GFR 15-29 ml/min  -     Comprehensive Metabolic Panel    Normocytic anemia  -     CBC & Differential  -     Iron Profile  -     Vitamin B12  -     Folate    Essential hypertension    Mixed hyperlipidemia    Candidal skin infection    Wound, open, toe, initial encounter  -     Ambulatory Referral to Podiatry    Lung nodule, solitary    Other orders  -     Insulin Degludec (TRESIBA FLEXTOUCH) 200 UNIT/ML solution pen-injector; Inject 70 Units under the skin Daily.  -     bumetanide (BUMEX) 2 MG tablet; Take 1 tablet by mouth 2 (Two) Times a Day.  -     nystatin (MYCOSTATIN) 354503 UNIT/GM powder; Apply  topically 2 (Two) Times a Day.  -     nystatin (MYCOSTATIN) 502844 UNIT/GM cream; Apply  topically 2 (Two) Times a Day.      She is followed by Dr. Hoyt for her COPD and lung nodule. Will continue current regimen of LABA/ICC, LAMA and PRN albuterol. She is on 4L of oxygen consistently for goal O2 of 88-91%. CT in 1/2018 for for follow up as well as chronic mediastinal lymphadenopathy.     Patient's cholesterol is nder good control per check in 7/2017 that I reviewed. Will continue current regimen of Simvastatin. No side effects from medications reported. Will follow up in 3months to monitor levels.     HTN is under good control and patient will continue to monitor with home BP cuff. No side effects from medications. Will continue current regimen of Metoprolol. She is not on ACE  due to her CKD. Will follow up in 3 months      Patient's type 2 diabetes is better control than in the past (>10% to now 8.4%) . Will continue current regimen of Triseba and SSI that she will call and let me know what she has been instructed to do.No reported side effects from medications. Will follow up in 1 month to review her BG log and follow up on labs. She needs to get her eyes checked and will have that done.     TSH has not been stable, but patient is doing well. Will continue current regimen of levothyroxine 224 mcg and follow up levels in the next week. Will adjust does accordingly.     Her foot ulcer needs to be seen by podiatrist. I have made referral today.     For her chronic venous stasis, I have encouraged her to move more and use compression stockings.     Her CKD is actually better and her GFR is now close to 25. Will discuss with Dr. Orr about stopping her Fosamax and potentially trying a low dose Gabapentin possibly 100mg PO TID to help with neuropathy. She knows to drink plenty of water and avoid any NSAID's. Continue Bumex BID as well as spironolactone.      I am working up anemia which is likely anemia of chronic disease and will review those labs when they are back. She is currently on iron for her CKD     I have spent 30 minutes of face to face time with patient counseling her on her diabetes and how to manage and control and how this relates to her CKD and overall health. I also counseled patient on use of NSAID's and to avoid to her kidney disease.     Refuses mammogram and c-scope or cologuard today    Return in about 4 weeks (around 9/19/2017) for Recheck.    Jina Vaughan MD  08/22/2017

## 2017-08-23 LAB
ALBUMIN SERPL-MCNC: 4 G/DL (ref 3.5–5.2)
ALBUMIN/GLOB SERPL: 1.5 G/DL
ALP SERPL-CCNC: 70 U/L (ref 40–129)
ALT SERPL-CCNC: 13 U/L (ref 5–33)
AST SERPL-CCNC: 17 U/L (ref 5–32)
BASOPHILS # BLD AUTO: 0.05 10*3/MM3 (ref 0–0.2)
BASOPHILS NFR BLD AUTO: 0.5 % (ref 0–2)
BILIRUB SERPL-MCNC: 0.3 MG/DL (ref 0.2–1.2)
BUN SERPL-MCNC: 70 MG/DL (ref 8–23)
BUN/CREAT SERPL: 26.6 (ref 7–25)
CALCIUM SERPL-MCNC: 9.1 MG/DL (ref 8.8–10.5)
CHLORIDE SERPL-SCNC: 98 MMOL/L (ref 98–107)
CO2 SERPL-SCNC: 31.1 MMOL/L (ref 22–29)
CREAT SERPL-MCNC: 2.63 MG/DL (ref 0.57–1)
EOSINOPHIL # BLD AUTO: 0.57 10*3/MM3 (ref 0.1–0.3)
EOSINOPHIL NFR BLD AUTO: 5.9 % (ref 0–4)
ERYTHROCYTE [DISTWIDTH] IN BLOOD BY AUTOMATED COUNT: 13.2 % (ref 11.5–14.5)
FOLATE SERPL-MCNC: >20 NG/ML (ref 4.78–24.2)
GLOBULIN SER CALC-MCNC: 2.7 GM/DL
GLUCOSE SERPL-MCNC: 75 MG/DL (ref 65–99)
HBA1C MFR BLD: 6.6 % (ref 4.8–5.6)
HCT VFR BLD AUTO: 35.7 % (ref 37–47)
HGB BLD-MCNC: 10.8 G/DL (ref 12–16)
IMM GRANULOCYTES # BLD: 0.02 10*3/MM3 (ref 0–0.03)
IMM GRANULOCYTES NFR BLD: 0.2 % (ref 0–0.5)
IRON SATN MFR SERPL: 22 %
IRON SERPL-MCNC: 48 MCG/DL (ref 37–145)
LYMPHOCYTES # BLD AUTO: 2.35 10*3/MM3 (ref 0.6–4.8)
LYMPHOCYTES NFR BLD AUTO: 24.2 % (ref 20–45)
MCH RBC QN AUTO: 28.9 PG (ref 27–31)
MCHC RBC AUTO-ENTMCNC: 30.3 G/DL (ref 31–37)
MCV RBC AUTO: 95.5 FL (ref 81–99)
MONOCYTES # BLD AUTO: 0.83 10*3/MM3 (ref 0–1)
MONOCYTES NFR BLD AUTO: 8.5 % (ref 3–8)
NEUTROPHILS # BLD AUTO: 5.89 10*3/MM3 (ref 1.5–8.3)
NEUTROPHILS NFR BLD AUTO: 60.7 % (ref 45–70)
NRBC BLD AUTO-RTO: 0 /100 WBC (ref 0–0)
PLATELET # BLD AUTO: 213 10*3/MM3 (ref 140–500)
POTASSIUM SERPL-SCNC: 4.6 MMOL/L (ref 3.5–5.2)
PROT SERPL-MCNC: 6.7 G/DL (ref 6–8.5)
RBC # BLD AUTO: 3.74 10*6/MM3 (ref 4.2–5.4)
SODIUM SERPL-SCNC: 142 MMOL/L (ref 136–145)
T4 FREE SERPL-MCNC: 1.17 NG/DL (ref 0.93–1.7)
TIBC SERPL-MCNC: 214 MCG/DL (ref 261–478)
TSH SERPL DL<=0.005 MIU/L-ACNC: 1.91 MIU/ML (ref 0.27–4.2)
UIBC SERPL-MCNC: 166 MCG/DL (ref 112–346)
VIT B12 SERPL-MCNC: 813 PG/ML (ref 211–946)
WBC # BLD AUTO: 9.71 10*3/MM3 (ref 4.8–10.8)

## 2017-08-23 NOTE — PROGRESS NOTES
Please call patient with these results and let her know that her labs are roughly stable. Her thyroid is much better and I want her to continue her current dose of synthroid at 224mcg per day. Her kidney function and anemia are about the same. I will plan on seeing her in a few weeks for follow up of her diabetes. Please see if she can give you her SSI regimen over the phone that she is currently taking. I do want to let her know that her diabetes is doing much better. Her HgA1c is 6.6% which is great. Below 7% is goal. Keep up the good work and we will make changes if we need to to her insulin the next time that I see her.

## 2017-08-24 RX ORDER — METOPROLOL TARTRATE 50 MG/1
TABLET, FILM COATED ORAL
Qty: 180 TABLET | Refills: 0 | OUTPATIENT
Start: 2017-08-24

## 2017-08-25 ENCOUNTER — TELEPHONE (OUTPATIENT)
Dept: INTERNAL MEDICINE | Facility: CLINIC | Age: 62
End: 2017-08-25

## 2017-08-25 RX ORDER — QUETIAPINE FUMARATE 100 MG/1
100 TABLET, FILM COATED ORAL 2 TIMES DAILY
Qty: 180 TABLET | Refills: 1 | Status: SHIPPED | OUTPATIENT
Start: 2017-08-25 | End: 2018-03-09 | Stop reason: SDUPTHER

## 2017-08-25 RX ORDER — METOPROLOL TARTRATE 50 MG/1
50 TABLET, FILM COATED ORAL 2 TIMES DAILY
Qty: 180 TABLET | Refills: 1 | Status: SHIPPED | OUTPATIENT
Start: 2017-08-25 | End: 2018-03-08 | Stop reason: SDUPTHER

## 2017-08-25 NOTE — TELEPHONE ENCOUNTER
----- Message from Jina Vaughan MD sent at 8/23/2017  3:31 PM EDT -----  Please call patient with these results and let her know that her labs are roughly stable. Her thyroid is much better and I want her to continue her current dose of synthroid at 224mcg per day. Her kidney function and anemia are about the same. I will plan on seeing her in a few weeks for follow up of her diabetes. Please see if she can give you her SSI regimen over the phone that she is currently taking. I do want to let her know that her diabetes is doing much better. Her HgA1c is 6.6% which is great. Below 7% is goal. Keep up the good work and we will make changes if we need to to her insulin the next time that I see her.     Pt advised of labs and instructions and below is SSI.  150 to 200     5 units  201 to 250     7 units  251 to 300     9 units  301 to 355     11 units  351 to 400     13 units  401 to 450      17 units  451 to 500 21 units  Above that get help.    dg

## 2017-08-30 ENCOUNTER — OFFICE VISIT (OUTPATIENT)
Dept: PAIN MEDICINE | Facility: CLINIC | Age: 62
End: 2017-08-30

## 2017-08-30 VITALS
OXYGEN SATURATION: 90 % | SYSTOLIC BLOOD PRESSURE: 125 MMHG | BODY MASS INDEX: 39.7 KG/M2 | WEIGHT: 247 LBS | RESPIRATION RATE: 20 BRPM | DIASTOLIC BLOOD PRESSURE: 80 MMHG | HEIGHT: 66 IN | TEMPERATURE: 98 F | HEART RATE: 61 BPM

## 2017-08-30 DIAGNOSIS — M54.50 CHRONIC MIDLINE LOW BACK PAIN WITHOUT SCIATICA: ICD-10-CM

## 2017-08-30 DIAGNOSIS — G89.29 CHRONIC MIDLINE LOW BACK PAIN WITHOUT SCIATICA: ICD-10-CM

## 2017-08-30 DIAGNOSIS — E11.42 DIABETIC PERIPHERAL NEUROPATHY (HCC): ICD-10-CM

## 2017-08-30 DIAGNOSIS — M43.06 LUMBAR SPONDYLOLYSIS: ICD-10-CM

## 2017-08-30 DIAGNOSIS — G89.4 CHRONIC PAIN SYNDROME: Primary | ICD-10-CM

## 2017-08-30 DIAGNOSIS — M54.31 BILATERAL SCIATICA: ICD-10-CM

## 2017-08-30 DIAGNOSIS — M54.32 BILATERAL SCIATICA: ICD-10-CM

## 2017-08-30 PROCEDURE — 99214 OFFICE O/P EST MOD 30 MIN: CPT | Performed by: ANESTHESIOLOGY

## 2017-08-30 RX ORDER — HYDROCODONE BITARTRATE AND ACETAMINOPHEN 10; 325 MG/1; MG/1
2 TABLET ORAL 2 TIMES DAILY
Qty: 120 TABLET | Refills: 0 | Status: SHIPPED | OUTPATIENT
Start: 2017-08-30 | End: 2017-09-28 | Stop reason: SDUPTHER

## 2017-09-11 ENCOUNTER — OFFICE VISIT (OUTPATIENT)
Dept: INTERNAL MEDICINE | Facility: CLINIC | Age: 62
End: 2017-09-11

## 2017-09-11 VITALS
HEIGHT: 66 IN | TEMPERATURE: 97.2 F | DIASTOLIC BLOOD PRESSURE: 68 MMHG | SYSTOLIC BLOOD PRESSURE: 116 MMHG | BODY MASS INDEX: 39.63 KG/M2 | WEIGHT: 246.6 LBS | OXYGEN SATURATION: 94 % | HEART RATE: 64 BPM

## 2017-09-11 DIAGNOSIS — L03.311 CELLULITIS OF ABDOMINAL WALL: ICD-10-CM

## 2017-09-11 DIAGNOSIS — H10.32 ACUTE BACTERIAL CONJUNCTIVITIS OF LEFT EYE: ICD-10-CM

## 2017-09-11 DIAGNOSIS — B37.2 YEAST DERMATITIS: Primary | ICD-10-CM

## 2017-09-11 PROCEDURE — 99214 OFFICE O/P EST MOD 30 MIN: CPT | Performed by: INTERNAL MEDICINE

## 2017-09-11 PROCEDURE — 96372 THER/PROPH/DIAG INJ SC/IM: CPT | Performed by: INTERNAL MEDICINE

## 2017-09-11 RX ORDER — DOXYCYCLINE HYCLATE 100 MG/1
100 TABLET, DELAYED RELEASE ORAL 2 TIMES DAILY
Qty: 20 TABLET | Refills: 0 | Status: SHIPPED | OUTPATIENT
Start: 2017-09-11 | End: 2017-09-21

## 2017-09-11 RX ORDER — FLUCONAZOLE 100 MG/1
100 TABLET ORAL DAILY
Qty: 14 TABLET | Refills: 0 | Status: SHIPPED | OUTPATIENT
Start: 2017-09-11 | End: 2017-09-25

## 2017-09-11 RX ORDER — POLYMYXIN B SULFATE AND TRIMETHOPRIM 1; 10000 MG/ML; [USP'U]/ML
1 SOLUTION OPHTHALMIC EVERY 4 HOURS
Qty: 10 ML | Refills: 0 | Status: SHIPPED | OUTPATIENT
Start: 2017-09-11 | End: 2017-09-18

## 2017-09-11 RX ORDER — OCTISALATE, AVOBENZONE, HOMOSALATE, AND OCTOCRYLENE 29.4; 29.4; 49; 25.48 MG/ML; MG/ML; MG/ML; MG/ML
4 LOTION TOPICAL DAILY
Qty: 30 CAPSULE | Refills: 6 | Status: SHIPPED | OUTPATIENT
Start: 2017-09-11 | End: 2018-04-04 | Stop reason: SDUPTHER

## 2017-09-11 RX ORDER — CEFTRIAXONE 500 MG/1
1000 INJECTION, POWDER, FOR SOLUTION INTRAMUSCULAR; INTRAVENOUS EVERY 24 HOURS
Status: DISCONTINUED | OUTPATIENT
Start: 2017-09-11 | End: 2017-09-11

## 2017-09-11 NOTE — PROGRESS NOTES
"Subjective     Jared Govea is a 62 y.o. female, who presents with a chief complaint of   Chief Complaint   Patient presents with   • Rash     x concerns, under bilateral breast, lower extrem    • Eye Problem     L eye, drainage, rednes, x questions    • Fatigue     daughter states pt fell 2 x times recently, pt disagrees by stating she \"just slipped\" in the shower        HPI Comments: 61 yo F here for acute visit. She started having yellow drainage in her left eye about one week ago. No fever or chills. No visual disturbance. The eye is consistently draining and she has swelling in her left cheek as well.     Her rash in her breat and panus is actually doing better, but is now hurting more. She has been trying to keep it dry and clean, but Nystatin and powder is not helping anymore. No abscesses, but she is in pain. She has not tried anything that she thinks helps besides a cream that she bought on [employer] that is not prescription.        The following portions of the patient's history were reviewed and updated as appropriate: allergies, current medications, past family history, past medical history, past social history, past surgical history and problem list.    Allergies: Erythromycin; Erythromycin ethylsuccinate; Ativan [lorazepam]; and Tape    Current Outpatient Prescriptions:   •  albuterol (PROVENTIL HFA;VENTOLIN HFA) 108 (90 BASE) MCG/ACT inhaler, Inhale 2 puffs every 4 (four) hours as needed for wheezing., Disp: , Rfl:   •  aspirin 81 MG EC tablet, Take 81 mg by mouth daily., Disp: , Rfl:   •  atorvastatin (LIPITOR) 40 MG tablet, Take 1 tablet by mouth Daily. For cholesterol, Disp: 90 tablet, Rfl: 3  •  B Complex Vitamins (VITAMIN B COMPLEX PO), Take 1 tablet by mouth daily., Disp: , Rfl:   •  bumetanide (BUMEX) 2 MG tablet, Take 1 tablet by mouth 2 (Two) Times a Day., Disp: 60 tablet, Rfl: 0  •  cetirizine (ZyrTEC) 10 MG tablet, Take 10 mg by mouth every night., Disp: , Rfl:   •  famotidine (PEPCID) 20 MG " tablet, Take 20 mg by mouth daily., Disp: , Rfl:   •  fluticasone-salmeterol (ADVAIR DISKUS) 250-50 MCG/DOSE DISKUS, Inhale 1 puff 2 (Two) Times a Day. For COPD and rinse mouth after use, Disp: 60 each, Rfl: 5  •  guaiFENesin (MUCINEX) 600 MG 12 hr tablet, Take 1 tablet by mouth 2 (two) times a day., Disp: , Rfl:   •  HYDROcodone-acetaminophen (NORCO)  MG per tablet, Take 2 tablets by mouth 2 (Two) Times a Day., Disp: 120 tablet, Rfl: 0  •  INCRUSE ELLIPTA 62.5 MCG/INH aerosol powder , , Disp: , Rfl:   •  insulin aspart (NOVOLOG FLEXPEN) 100 UNIT/ML solution pen-injector sc pen, 15 units prior to each meal tid. If you skip meal then skip dose., Disp: 15 mL, Rfl: 8  •  Insulin Degludec (TRESIBA FLEXTOUCH) 200 UNIT/ML solution pen-injector, Inject 70 Units under the skin Daily., Disp: 15 mL, Rfl: 3  •  Insulin Pen Needle (BD PEN NEEDLE CLAY U/F) 32G X 4 MM misc, Use as directed 7 times daily, Disp: 250 each, Rfl: 5  •  levalbuterol (XOPENEX) 0.63 MG/3ML nebulizer solution, Take 1 ampule by nebulization every 4 (four) hours as needed for wheezing., Disp: , Rfl:   •  levothyroxine (SYNTHROID) 112 MCG tablet, Take 2 tablets by mouth Daily., Disp: 60 tablet, Rfl: 5  •  metoprolol tartrate (LOPRESSOR) 50 MG tablet, Take 1 tablet by mouth 2 (Two) Times a Day. For heart, Disp: 180 tablet, Rfl: 1  •  nystatin (MYCOSTATIN) 790122 UNIT/GM cream, Apply  topically 2 (Two) Times a Day., Disp: 30 g, Rfl: 3  •  QUEtiapine (SEROquel) 100 MG tablet, Take 1 tablet by mouth 2 (Two) Times a Day., Disp: 180 tablet, Rfl: 1  •  spironolactone (ALDACTONE) 50 MG tablet, Take 1 tablet by mouth Daily. For edema, Disp: 90 tablet, Rfl: 1  •  vitamin D (ERGOCALCIFEROL) 85826 UNITS capsule capsule, Take 50,000 Units by mouth Every 7 (Seven) Days., Disp: , Rfl:   •  doxycycline (DORYX) 100 MG enteric coated tablet, Take 1 tablet by mouth 2 (Two) Times a Day for 10 days., Disp: 20 tablet, Rfl: 0  •  fluconazole (DIFLUCAN) 100 MG tablet, Take 1  "tablet by mouth Daily for 14 days., Disp: 14 tablet, Rfl: 0  •  Probiotic Product (ALIGN) 4 MG capsule, Take 4 mg by mouth Daily., Disp: 30 capsule, Rfl: 6  •  trimethoprim-polymyxin b (POLYTRIM) 48634-2.1 UNIT/ML-% ophthalmic solution, Administer 1 drop to both eyes Every 4 (Four) Hours for 7 days., Disp: 10 mL, Rfl: 0    Current Facility-Administered Medications:   •  cefTRIAXone (ROCEPHIN) 1 g in lidocaine PF 1% (XYLOCAINE) IM only syringe, 1 g, Intramuscular, Once, Jina Vaughan MD  Medications Discontinued During This Encounter   Medication Reason   • cefTRIAXone (ROCEPHIN) injection 1,000 mg        Review of Systems   Constitutional: Negative for chills, fatigue and fever.   Eyes: Positive for discharge, redness and itching. Negative for visual disturbance.   Respiratory: Negative for cough.    Cardiovascular: Negative for chest pain and palpitations.   Gastrointestinal: Negative for constipation, diarrhea and vomiting.   Skin: Positive for color change, rash and wound.       Objective     /68 (BP Location: Left arm, Patient Position: Sitting, Cuff Size: Adult)  Pulse 64  Temp 97.2 °F (36.2 °C)  Ht 66\" (167.6 cm)  Wt 246 lb 9.6 oz (112 kg)  SpO2 94%  BMI 39.8 kg/m2      Physical Exam   Constitutional: She is oriented to person, place, and time. She appears well-developed and well-nourished. No distress.   HENT:   Head: Normocephalic and atraumatic.   Right Ear: External ear normal.   Left Ear: External ear normal.   Mouth/Throat: Oropharynx is clear and moist. No oropharyngeal exudate.   Eyes: Conjunctivae are normal. Right eye exhibits no discharge. Left eye exhibits no discharge. No scleral icterus.   Neck: Neck supple.   Cardiovascular: Normal rate.    Pulmonary/Chest:   Increased effort with moving. Currently on supplemental oxygen.    Abdominal: Soft. Bowel sounds are normal. She exhibits no distension and no mass. There is no tenderness. There is no guarding.   Lymphadenopathy:     She has " no cervical adenopathy.   Neurological: She is alert and oriented to person, place, and time.   Skin: Skin is warm. Rash noted.   Severe yeast infection of skin of panus, right breast ans vaginal area.  Patient with yellow oozing of skin as well as redness and excoriations. No abscesses palpate under breast or in panus.    Psychiatric: She has a normal mood and affect. Her behavior is normal.   Nursing note and vitals reviewed.        Results for orders placed or performed in visit on 08/22/17   Hemoglobin A1c   Result Value Ref Range    Hemoglobin A1C 6.60 (H) 4.80 - 5.60 %   Iron Profile   Result Value Ref Range    TIBC 214 (L) 261 - 478 mcg/dL    UIBC 166 112 - 346 mcg/dL    Iron 48 37 - 145 mcg/dL    Iron Saturation 22 %   Vitamin B12   Result Value Ref Range    Vitamin B-12 813 211 - 946 pg/mL   Folate   Result Value Ref Range    Folate >20.00 4.78 - 24.20 ng/mL   T4, Free   Result Value Ref Range    Free T4 1.17 0.93 - 1.70 ng/dL   TSH   Result Value Ref Range    TSH 1.910 0.270 - 4.200 mIU/mL   Comprehensive Metabolic Panel   Result Value Ref Range    Glucose 75 65 - 99 mg/dL    BUN 70 (H) 8 - 23 mg/dL    Creatinine 2.63 (H) 0.57 - 1.00 mg/dL    eGFR Non African Am 18 (L) >60 mL/min/1.73    eGFR African Am 22 (L) >60 mL/min/1.73    BUN/Creatinine Ratio 26.6 (H) 7.0 - 25.0    Sodium 142 136 - 145 mmol/L    Potassium 4.6 3.5 - 5.2 mmol/L    Chloride 98 98 - 107 mmol/L    Total CO2 31.1 (H) 22.0 - 29.0 mmol/L    Calcium 9.1 8.8 - 10.5 mg/dL    Total Protein 6.7 6.0 - 8.5 g/dL    Albumin 4.00 3.50 - 5.20 g/dL    Globulin 2.7 gm/dL    A/G Ratio 1.5 g/dL    Total Bilirubin 0.3 0.2 - 1.2 mg/dL    Alkaline Phosphatase 70 40 - 129 U/L    AST (SGOT) 17 5 - 32 U/L    ALT (SGPT) 13 5 - 33 U/L   CBC & Differential   Result Value Ref Range    WBC 9.71 4.80 - 10.80 10*3/mm3    RBC 3.74 (L) 4.20 - 5.40 10*6/mm3    Hemoglobin 10.8 (L) 12.0 - 16.0 g/dL    Hematocrit 35.7 (L) 37.0 - 47.0 %    MCV 95.5 81.0 - 99.0 fL    MCH 28.9  27.0 - 31.0 pg    MCHC 30.3 (L) 31.0 - 37.0 g/dL    RDW 13.2 11.5 - 14.5 %    Platelets 213 140 - 500 10*3/mm3    Neutrophil Rel % 60.7 45.0 - 70.0 %    Lymphocyte Rel % 24.2 20.0 - 45.0 %    Monocyte Rel % 8.5 (H) 3.0 - 8.0 %    Eosinophil Rel % 5.9 (H) 0.0 - 4.0 %    Basophil Rel % 0.5 0.0 - 2.0 %    Neutrophils Absolute 5.89 1.50 - 8.30 10*3/mm3    Lymphocytes Absolute 2.35 0.60 - 4.80 10*3/mm3    Monocytes Absolute 0.83 0.00 - 1.00 10*3/mm3    Eosinophils Absolute 0.57 (H) 0.10 - 0.30 10*3/mm3    Basophils Absolute 0.05 0.00 - 0.20 10*3/mm3    Immature Granulocyte Rel % 0.2 0.0 - 0.5 %    Immature Grans Absolute 0.02 0.00 - 0.03 10*3/mm3    nRBC 0.0 0.0 - 0.0 /100 WBC       Assessment/Plan   Jared was seen today for rash, eye problem and fatigue.    Diagnoses and all orders for this visit:    Yeast dermatitis  -     Ambulatory Referral to Home Health  -     cefTRIAXone (ROCEPHIN) 1 g in lidocaine PF 1% (XYLOCAINE) IM only syringe; Inject 4 mL into the shoulder, thigh, or buttocks 1 (One) Time.    Cellulitis of abdominal wall  -     Discontinue: cefTRIAXone (ROCEPHIN) injection 1,000 mg; Inject 1,000 mg into the shoulder, thigh, or buttocks Daily.  -     Ambulatory Referral to Home Health  -     cefTRIAXone (ROCEPHIN) 1 g in lidocaine PF 1% (XYLOCAINE) IM only syringe; Inject 4 mL into the shoulder, thigh, or buttocks 1 (One) Time.    Acute bacterial conjunctivitis of left eye  -     cefTRIAXone (ROCEPHIN) 1 g in lidocaine PF 1% (XYLOCAINE) IM only syringe; Inject 4 mL into the shoulder, thigh, or buttocks 1 (One) Time.    Other orders  -     fluconazole (DIFLUCAN) 100 MG tablet; Take 1 tablet by mouth Daily for 14 days.  -     doxycycline (DORYX) 100 MG enteric coated tablet; Take 1 tablet by mouth 2 (Two) Times a Day for 10 days.  -     trimethoprim-polymyxin b (POLYTRIM) 80009-9.1 UNIT/ML-% ophthalmic solution; Administer 1 drop to both eyes Every 4 (Four) Hours for 7 days.  -     Probiotic Product (ALIGN) 4 MG  capsule; Take 4 mg by mouth Daily.      Will treat with 10 day course of antibiotics as well as Rocephin shot today. With h/o MRSA needs to have coverage and gave Doxy as Bactrim is contraindicated due to kidney disease. Will see back on Thursday to make sure that she is improving. Will also give Diflucan for yeast rash. Home health to visit her for wound care. Keep clean and try and will also start Probiotic to keep her from having side effects from antibiotics. No fever or chills currently, but will call if things change.     Topical eye drops for eye as well which may be related to infection in groin and, breast and panus.     Return in about 3 days (around 9/14/2017).    Jina Vaughan MD  09/11/2017

## 2017-09-12 ENCOUNTER — TELEPHONE (OUTPATIENT)
Dept: INTERNAL MEDICINE | Facility: CLINIC | Age: 62
End: 2017-09-12

## 2017-09-12 NOTE — TELEPHONE ENCOUNTER
----- Message from Jina Vaughan MD sent at 9/12/2017 11:32 AM EDT -----  Regarding: RE: home health phone call  Thanks for the update. Tell them to call me if they need anything else.   ----- Message -----     From: Christina Chavez MA     Sent: 9/12/2017  10:51 AM       To: Jina Vaughan MD  Subject: FW: home health phone call                     ----- Message -----     From: Magalys Galvan     Sent: 9/12/2017  10:46 AM       To: Christina Chavez MA  Subject: home health phone call                           Clement      Cellulitis of abdomen wall    2 pinpoint openings  abd pads were put underneath.  She is washing with dial soap and applying nystatin and apply abd pads twice daily    Will be seeing patient twice weekly for next 3 weeks.    Cristina  [phone]    Verbal order given for Cristina to order supplies necessary for this patient.

## 2017-09-14 ENCOUNTER — OFFICE VISIT (OUTPATIENT)
Dept: INTERNAL MEDICINE | Facility: CLINIC | Age: 62
End: 2017-09-14

## 2017-09-14 VITALS
HEIGHT: 66 IN | WEIGHT: 244 LBS | SYSTOLIC BLOOD PRESSURE: 104 MMHG | OXYGEN SATURATION: 98 % | BODY MASS INDEX: 39.21 KG/M2 | HEART RATE: 89 BPM | DIASTOLIC BLOOD PRESSURE: 68 MMHG

## 2017-09-14 DIAGNOSIS — L03.311 CELLULITIS OF ABDOMINAL WALL: ICD-10-CM

## 2017-09-14 DIAGNOSIS — B37.2 YEAST DERMATITIS: Primary | ICD-10-CM

## 2017-09-14 DIAGNOSIS — R03.1 LOW BLOOD PRESSURE, NOT HYPOTENSION: ICD-10-CM

## 2017-09-14 DIAGNOSIS — H10.32 ACUTE BACTERIAL CONJUNCTIVITIS OF LEFT EYE: ICD-10-CM

## 2017-09-14 DIAGNOSIS — T75.3XXA CAR SICKNESS, INITIAL ENCOUNTER: ICD-10-CM

## 2017-09-14 PROCEDURE — 99214 OFFICE O/P EST MOD 30 MIN: CPT | Performed by: INTERNAL MEDICINE

## 2017-09-14 RX ORDER — DIMENHYDRINATE 50 MG/1
50 TABLET ORAL DAILY PRN
Qty: 30 TABLET | Refills: 0 | Status: SHIPPED | OUTPATIENT
Start: 2017-09-14 | End: 2020-08-14 | Stop reason: HOSPADM

## 2017-09-14 NOTE — PROGRESS NOTES
Subjective     Jared Govea is a 62 y.o. female, who presents with a chief complaint of   Chief Complaint   Patient presents with   • Follow-up     4 day follow up, patient asking for car sickness rx    • Rash     patient states this issue are better       HPI Comments: 61 yo F here for follow up of her cellulitis and yeast infection which is doing better on fluconazole and Keflex. She is tolerating antibiotics well without diarrhea. She has noticed decrease in pain in her vaginal area and skin folds. Home health has seen her and done dressing changes and discussed cleaning area well and keeping dry.     Her eye is much better as well. She is using the drops that I gave her.     She does have chronic are sickness and doesn't know what she should take for this. It happens with long car rides and she gets sick to her stomach.          The following portions of the patient's history were reviewed and updated as appropriate: allergies, current medications, past family history, past medical history, past social history, past surgical history and problem list.    Allergies: Erythromycin; Erythromycin ethylsuccinate; Ativan [lorazepam]; and Tape    Current Outpatient Prescriptions:   •  albuterol (PROVENTIL HFA;VENTOLIN HFA) 108 (90 BASE) MCG/ACT inhaler, Inhale 2 puffs every 4 (four) hours as needed for wheezing., Disp: , Rfl:   •  aspirin 81 MG EC tablet, Take 81 mg by mouth daily., Disp: , Rfl:   •  atorvastatin (LIPITOR) 40 MG tablet, Take 1 tablet by mouth Daily. For cholesterol, Disp: 90 tablet, Rfl: 3  •  B Complex Vitamins (VITAMIN B COMPLEX PO), Take 1 tablet by mouth daily., Disp: , Rfl:   •  bumetanide (BUMEX) 2 MG tablet, Take 1 tablet by mouth 2 (Two) Times a Day., Disp: 60 tablet, Rfl: 0  •  cetirizine (ZyrTEC) 10 MG tablet, Take 10 mg by mouth every night., Disp: , Rfl:   •  doxycycline (DORYX) 100 MG enteric coated tablet, Take 1 tablet by mouth 2 (Two) Times a Day for 10 days., Disp: 20 tablet, Rfl: 0  •   famotidine (PEPCID) 20 MG tablet, Take 20 mg by mouth daily., Disp: , Rfl:   •  fluconazole (DIFLUCAN) 100 MG tablet, Take 1 tablet by mouth Daily for 14 days., Disp: 14 tablet, Rfl: 0  •  fluticasone-salmeterol (ADVAIR DISKUS) 250-50 MCG/DOSE DISKUS, Inhale 1 puff 2 (Two) Times a Day. For COPD and rinse mouth after use, Disp: 60 each, Rfl: 5  •  guaiFENesin (MUCINEX) 600 MG 12 hr tablet, Take 1 tablet by mouth 2 (two) times a day., Disp: , Rfl:   •  HYDROcodone-acetaminophen (NORCO)  MG per tablet, Take 2 tablets by mouth 2 (Two) Times a Day., Disp: 120 tablet, Rfl: 0  •  INCRUSE ELLIPTA 62.5 MCG/INH aerosol powder , , Disp: , Rfl:   •  insulin aspart (NOVOLOG FLEXPEN) 100 UNIT/ML solution pen-injector sc pen, 15 units prior to each meal tid. If you skip meal then skip dose., Disp: 15 mL, Rfl: 8  •  Insulin Degludec (TRESIBA FLEXTOUCH) 200 UNIT/ML solution pen-injector, Inject 70 Units under the skin Daily., Disp: 15 mL, Rfl: 3  •  Insulin Pen Needle (BD PEN NEEDLE CLAY U/F) 32G X 4 MM misc, Use as directed 7 times daily, Disp: 250 each, Rfl: 5  •  levalbuterol (XOPENEX) 0.63 MG/3ML nebulizer solution, Take 1 ampule by nebulization every 4 (four) hours as needed for wheezing., Disp: , Rfl:   •  levothyroxine (SYNTHROID) 112 MCG tablet, Take 2 tablets by mouth Daily., Disp: 60 tablet, Rfl: 5  •  metoprolol tartrate (LOPRESSOR) 50 MG tablet, Take 1 tablet by mouth 2 (Two) Times a Day. For heart, Disp: 180 tablet, Rfl: 1  •  nystatin (MYCOSTATIN) 816835 UNIT/GM cream, Apply  topically 2 (Two) Times a Day., Disp: 30 g, Rfl: 3  •  Probiotic Product (ALIGN) 4 MG capsule, Take 4 mg by mouth Daily., Disp: 30 capsule, Rfl: 6  •  QUEtiapine (SEROquel) 100 MG tablet, Take 1 tablet by mouth 2 (Two) Times a Day., Disp: 180 tablet, Rfl: 1  •  spironolactone (ALDACTONE) 50 MG tablet, Take 1 tablet by mouth Daily. For edema, Disp: 90 tablet, Rfl: 1  •  trimethoprim-polymyxin b (POLYTRIM) 00360-1.1 UNIT/ML-% ophthalmic  "solution, Administer 1 drop to both eyes Every 4 (Four) Hours for 7 days., Disp: 10 mL, Rfl: 0  •  vitamin D (ERGOCALCIFEROL) 23407 UNITS capsule capsule, Take 50,000 Units by mouth Every 7 (Seven) Days., Disp: , Rfl:   •  dimenhyDRINATE (DRAMAMINE) 50 MG tablet, Take 1 tablet by mouth Daily As Needed for movement disorders., Disp: 30 tablet, Rfl: 0  There are no discontinued medications.    Review of Systems   Constitutional: Negative for chills and fever.   Gastrointestinal: Negative for abdominal pain, diarrhea and nausea.   Skin: Positive for rash and wound.   Neurological: Negative for dizziness and headaches.   Hematological: Negative for adenopathy.       Objective     /68 (BP Location: Left arm, Patient Position: Sitting, Cuff Size: Adult)  Pulse 89  Ht 66\" (167.6 cm)  Wt 244 lb (111 kg)  SpO2 98%  BMI 39.38 kg/m2      Physical Exam   Constitutional: She is oriented to person, place, and time. She appears well-developed and well-nourished. No distress.   HENT:   Head: Normocephalic and atraumatic.   Right Ear: External ear normal.   Left Ear: External ear normal.   Mouth/Throat: Oropharynx is clear and moist. No oropharyngeal exudate.   Eyes: Conjunctivae and lids are normal. Right eye exhibits no discharge. Left eye exhibits discharge (Discharge is much better. Small excoriation over nasal bridge.). No scleral icterus.   Neck: Neck supple.   Cardiovascular: Normal rate, regular rhythm and normal heart sounds.  Exam reveals no gallop and no friction rub.    No murmur heard.  Pulmonary/Chest: Effort normal and breath sounds normal. No respiratory distress. She has no wheezes. She has no rales.   Abdominal: Soft. Bowel sounds are normal. She exhibits no distension and no mass. There is no tenderness. There is no guarding.   Lymphadenopathy:     She has no cervical adenopathy.   Neurological: She is alert and oriented to person, place, and time.   Skin: Skin is warm. Rash (Yeast rash in pannus and " breast is better) noted.   Psychiatric: She has a normal mood and affect. Her behavior is normal.   Nursing note and vitals reviewed.        Results for orders placed or performed in visit on 08/22/17   Hemoglobin A1c   Result Value Ref Range    Hemoglobin A1C 6.60 (H) 4.80 - 5.60 %   Iron Profile   Result Value Ref Range    TIBC 214 (L) 261 - 478 mcg/dL    UIBC 166 112 - 346 mcg/dL    Iron 48 37 - 145 mcg/dL    Iron Saturation 22 %   Vitamin B12   Result Value Ref Range    Vitamin B-12 813 211 - 946 pg/mL   Folate   Result Value Ref Range    Folate >20.00 4.78 - 24.20 ng/mL   T4, Free   Result Value Ref Range    Free T4 1.17 0.93 - 1.70 ng/dL   TSH   Result Value Ref Range    TSH 1.910 0.270 - 4.200 mIU/mL   Comprehensive Metabolic Panel   Result Value Ref Range    Glucose 75 65 - 99 mg/dL    BUN 70 (H) 8 - 23 mg/dL    Creatinine 2.63 (H) 0.57 - 1.00 mg/dL    eGFR Non African Am 18 (L) >60 mL/min/1.73    eGFR African Am 22 (L) >60 mL/min/1.73    BUN/Creatinine Ratio 26.6 (H) 7.0 - 25.0    Sodium 142 136 - 145 mmol/L    Potassium 4.6 3.5 - 5.2 mmol/L    Chloride 98 98 - 107 mmol/L    Total CO2 31.1 (H) 22.0 - 29.0 mmol/L    Calcium 9.1 8.8 - 10.5 mg/dL    Total Protein 6.7 6.0 - 8.5 g/dL    Albumin 4.00 3.50 - 5.20 g/dL    Globulin 2.7 gm/dL    A/G Ratio 1.5 g/dL    Total Bilirubin 0.3 0.2 - 1.2 mg/dL    Alkaline Phosphatase 70 40 - 129 U/L    AST (SGOT) 17 5 - 32 U/L    ALT (SGPT) 13 5 - 33 U/L   CBC & Differential   Result Value Ref Range    WBC 9.71 4.80 - 10.80 10*3/mm3    RBC 3.74 (L) 4.20 - 5.40 10*6/mm3    Hemoglobin 10.8 (L) 12.0 - 16.0 g/dL    Hematocrit 35.7 (L) 37.0 - 47.0 %    MCV 95.5 81.0 - 99.0 fL    MCH 28.9 27.0 - 31.0 pg    MCHC 30.3 (L) 31.0 - 37.0 g/dL    RDW 13.2 11.5 - 14.5 %    Platelets 213 140 - 500 10*3/mm3    Neutrophil Rel % 60.7 45.0 - 70.0 %    Lymphocyte Rel % 24.2 20.0 - 45.0 %    Monocyte Rel % 8.5 (H) 3.0 - 8.0 %    Eosinophil Rel % 5.9 (H) 0.0 - 4.0 %    Basophil Rel % 0.5 0.0 -  2.0 %    Neutrophils Absolute 5.89 1.50 - 8.30 10*3/mm3    Lymphocytes Absolute 2.35 0.60 - 4.80 10*3/mm3    Monocytes Absolute 0.83 0.00 - 1.00 10*3/mm3    Eosinophils Absolute 0.57 (H) 0.10 - 0.30 10*3/mm3    Basophils Absolute 0.05 0.00 - 0.20 10*3/mm3    Immature Granulocyte Rel % 0.2 0.0 - 0.5 %    Immature Grans Absolute 0.02 0.00 - 0.03 10*3/mm3    nRBC 0.0 0.0 - 0.0 /100 WBC       Assessment/Plan   Jared was seen today for follow-up and rash.    Diagnoses and all orders for this visit:    Yeast dermatitis    Cellulitis of abdominal wall    Acute bacterial conjunctivitis of left eye    Low blood pressure, not hypotension    Car sickness, initial encounter    Other orders  -     dimenhyDRINATE (DRAMAMINE) 50 MG tablet; Take 1 tablet by mouth Daily As Needed for movement disorders.        Continue diflucan and antibiotic to complete course. Home health to visit her for wound care. Keep clean and try and continue Probiotic to keep her from having side effects from antibiotics. No fever or chills currently, but will call if things change.      Continue topical eye drops for eye as well.     Dramamine for car sickness     Will monitor lower blood pressure (although normal). She's going to see me back in about 10-12 days to follow up on her kidneys., diabetes and blood pressure. Will make sure to bring log with her on return visit.      Return in about 12 days (around 9/26/2017).    Jina Vaughan MD  09/14/2017

## 2017-09-26 ENCOUNTER — OFFICE VISIT (OUTPATIENT)
Dept: INTERNAL MEDICINE | Facility: CLINIC | Age: 62
End: 2017-09-26

## 2017-09-26 VITALS
SYSTOLIC BLOOD PRESSURE: 118 MMHG | HEART RATE: 84 BPM | OXYGEN SATURATION: 91 % | HEIGHT: 66 IN | WEIGHT: 243 LBS | DIASTOLIC BLOOD PRESSURE: 62 MMHG | BODY MASS INDEX: 39.05 KG/M2

## 2017-09-26 DIAGNOSIS — R21 RASH OF BODY: ICD-10-CM

## 2017-09-26 DIAGNOSIS — J41.8 MIXED SIMPLE AND MUCOPURULENT CHRONIC BRONCHITIS (HCC): ICD-10-CM

## 2017-09-26 DIAGNOSIS — E78.2 MIXED HYPERLIPIDEMIA: Primary | ICD-10-CM

## 2017-09-26 DIAGNOSIS — D64.9 NORMOCYTIC ANEMIA: ICD-10-CM

## 2017-09-26 DIAGNOSIS — E11.42 TYPE 2 DIABETES MELLITUS WITH DIABETIC POLYNEUROPATHY, WITHOUT LONG-TERM CURRENT USE OF INSULIN (HCC): ICD-10-CM

## 2017-09-26 DIAGNOSIS — I10 ESSENTIAL HYPERTENSION: ICD-10-CM

## 2017-09-26 DIAGNOSIS — E03.9 HYPOTHYROIDISM, ACQUIRED: ICD-10-CM

## 2017-09-26 DIAGNOSIS — Z23 NEED FOR INFLUENZA VACCINATION: ICD-10-CM

## 2017-09-26 DIAGNOSIS — N18.4 CKD (CHRONIC KIDNEY DISEASE) STAGE 4, GFR 15-29 ML/MIN (HCC): ICD-10-CM

## 2017-09-26 LAB
BUN SERPL-MCNC: 106 MG/DL (ref 8–23)
BUN/CREAT SERPL: 35.2 (ref 7–25)
CALCIUM SERPL-MCNC: 9.6 MG/DL (ref 8.8–10.5)
CHLORIDE SERPL-SCNC: 96 MMOL/L (ref 98–107)
CO2 SERPL-SCNC: 30.7 MMOL/L (ref 22–29)
CREAT SERPL-MCNC: 3.01 MG/DL (ref 0.57–1)
GLUCOSE SERPL-MCNC: 198 MG/DL (ref 65–99)
POTASSIUM SERPL-SCNC: 5.2 MMOL/L (ref 3.5–5.2)
SODIUM SERPL-SCNC: 140 MMOL/L (ref 136–145)

## 2017-09-26 PROCEDURE — 90686 IIV4 VACC NO PRSV 0.5 ML IM: CPT | Performed by: INTERNAL MEDICINE

## 2017-09-26 PROCEDURE — 99214 OFFICE O/P EST MOD 30 MIN: CPT | Performed by: INTERNAL MEDICINE

## 2017-09-26 PROCEDURE — 90471 IMMUNIZATION ADMIN: CPT | Performed by: INTERNAL MEDICINE

## 2017-09-26 RX ORDER — ALBUTEROL SULFATE 90 UG/1
2 AEROSOL, METERED RESPIRATORY (INHALATION) EVERY 4 HOURS PRN
Qty: 1 INHALER | Refills: 6 | Status: SHIPPED | OUTPATIENT
Start: 2017-09-26 | End: 2018-06-18 | Stop reason: SDUPTHER

## 2017-09-26 NOTE — PROGRESS NOTES
Subjective     Jared Govea is a 62 y.o. female, who presents with a chief complaint of   Chief Complaint   Patient presents with   • Follow-up     2 wk f/u, yeast, htn, dm II   • Diabetes       HPI Comments: 61 yo F here for follow up of her diabetes. She takes 70 units of Triseba. She never misses a dose. She generally takes about 10 units per day with her meals. She has been off of ACE for many years due to CKD. Her numbers are running around 's. She does have a few highs around 220. She is on a statin. She has had lows in the past down to 50's. Those happen a few times per month. They are related to not eating. She does not eat well and eats a lot of freezer meals. She does eat fresh fruit. She doesn't eat a lot of vegetables. She had her eyes checked yesterday that showed no diabetes changes, but did have increased pressure and going to [employer] [email or web address] on Monday.     Her breathing is okay today and is on 4L of O2 during the day. She takes Advair and Incruse and albuterol PRN. When she is out, she needs it more. She is followed by Dr. Hoyt     She is now done taking anti-fungal and anti-biotic. Her wounds are looking better in her panus and breast. She is using powder and ABD pads. She was not working well with home health per their report. She is seeing home health a few days per week.           The following portions of the patient's history were reviewed and updated as appropriate: allergies, current medications, past family history, past medical history, past social history, past surgical history and problem list.    Allergies: Erythromycin; Erythromycin ethylsuccinate; Ativan [lorazepam]; and Tape    Current Outpatient Prescriptions:   •  albuterol (PROVENTIL HFA;VENTOLIN HFA) 108 (90 Base) MCG/ACT inhaler, Inhale 2 puffs Every 4 (Four) Hours As Needed for Wheezing., Disp: 1 inhaler, Rfl: 6  •  aspirin 81 MG EC tablet, Take 81 mg by mouth daily., Disp: , Rfl:   •  atorvastatin (LIPITOR) 40 MG  tablet, Take 1 tablet by mouth Daily. For cholesterol, Disp: 90 tablet, Rfl: 3  •  B Complex Vitamins (VITAMIN B COMPLEX PO), Take 1 tablet by mouth daily., Disp: , Rfl:   •  bumetanide (BUMEX) 2 MG tablet, Take 1 tablet by mouth 2 (Two) Times a Day., Disp: 60 tablet, Rfl: 0  •  cetirizine (ZyrTEC) 10 MG tablet, Take 10 mg by mouth every night., Disp: , Rfl:   •  dimenhyDRINATE (DRAMAMINE) 50 MG tablet, Take 1 tablet by mouth Daily As Needed for movement disorders., Disp: 30 tablet, Rfl: 0  •  famotidine (PEPCID) 20 MG tablet, Take 20 mg by mouth daily., Disp: , Rfl:   •  fluticasone-salmeterol (ADVAIR DISKUS) 250-50 MCG/DOSE DISKUS, Inhale 1 puff 2 (Two) Times a Day. For COPD and rinse mouth after use, Disp: 60 each, Rfl: 5  •  guaiFENesin (MUCINEX) 600 MG 12 hr tablet, Take 1 tablet by mouth 2 (two) times a day., Disp: , Rfl:   •  HYDROcodone-acetaminophen (NORCO)  MG per tablet, Take 2 tablets by mouth 2 (Two) Times a Day., Disp: 120 tablet, Rfl: 0  •  INCRUSE ELLIPTA 62.5 MCG/INH aerosol powder , , Disp: , Rfl:   •  insulin aspart (NOVOLOG FLEXPEN) 100 UNIT/ML solution pen-injector sc pen, SSI, Disp: 15 mL, Rfl: 3  •  Insulin Degludec (TRESIBA FLEXTOUCH) 200 UNIT/ML solution pen-injector, Inject 70 Units under the skin Daily., Disp: 15 mL, Rfl: 3  •  Insulin Pen Needle (BD PEN NEEDLE CLAY U/F) 32G X 4 MM misc, Use as directed 7 times daily, Disp: 250 each, Rfl: 5  •  levalbuterol (XOPENEX) 0.63 MG/3ML nebulizer solution, Take 1 ampule by nebulization every 4 (four) hours as needed for wheezing., Disp: , Rfl:   •  levothyroxine (SYNTHROID) 112 MCG tablet, Take 2 tablets by mouth Daily., Disp: 60 tablet, Rfl: 5  •  metoprolol tartrate (LOPRESSOR) 50 MG tablet, Take 1 tablet by mouth 2 (Two) Times a Day. For heart, Disp: 180 tablet, Rfl: 1  •  Probiotic Product (ALIGN) 4 MG capsule, Take 4 mg by mouth Daily., Disp: 30 capsule, Rfl: 6  •  QUEtiapine (SEROquel) 100 MG tablet, Take 1 tablet by mouth 2 (Two)  "Times a Day., Disp: 180 tablet, Rfl: 1  •  spironolactone (ALDACTONE) 50 MG tablet, Take 1 tablet by mouth Daily. For edema, Disp: 90 tablet, Rfl: 1  •  vitamin D (ERGOCALCIFEROL) 94775 UNITS capsule capsule, Take 50,000 Units by mouth Every 7 (Seven) Days., Disp: , Rfl:   Medications Discontinued During This Encounter   Medication Reason   • nystatin (MYCOSTATIN) 250899 UNIT/GM cream Therapy completed   • Insulin Degludec (TRESIBA FLEXTOUCH) 200 UNIT/ML solution pen-injector Reorder   • insulin aspart (NOVOLOG FLEXPEN) 100 UNIT/ML solution pen-injector sc pen Reorder   • albuterol (PROVENTIL HFA;VENTOLIN HFA) 108 (90 BASE) MCG/ACT inhaler Reorder       Review of Systems   Constitutional: Positive for fatigue. Negative for chills and fever.   HENT: Negative for congestion.    Respiratory: Positive for shortness of breath (Chronic ). Negative for cough.    Cardiovascular: Negative for chest pain and palpitations.   Skin: Positive for rash and wound.   Neurological: Negative for dizziness and headaches.       Objective     /62 (BP Location: Right arm, Patient Position: Sitting, Cuff Size: Adult)  Pulse 84  Ht 66\" (167.6 cm)  Wt 243 lb (110 kg)  SpO2 91%  BMI 39.22 kg/m2      Physical Exam   Constitutional: She is oriented to person, place, and time. She appears well-developed and well-nourished. No distress.   Obese and sitting in scooter   HENT:   Head: Normocephalic and atraumatic.   Right Ear: External ear normal.   Left Ear: External ear normal.   Mouth/Throat: Oropharynx is clear and moist. No oropharyngeal exudate.   Eyes: Conjunctivae are normal. Right eye exhibits no discharge. Left eye exhibits no discharge. No scleral icterus.   Neck: Neck supple.   Cardiovascular: Normal rate.  Exam reveals no gallop and no friction rub.    No murmur heard.  Pulmonary/Chest:   Increased breathing with transferring related to deconditioning .    Lymphadenopathy:     She has no cervical adenopathy.   Neurological: " She is alert and oriented to person, place, and time.   Skin: Skin is warm. Rash (Rash of pannus and right breat is better. Still has redness and denuded skin, but no purulence or discharge. Area appears dry. ) noted. There is erythema.   Psychiatric: She has a normal mood and affect. Her behavior is normal.   Nursing note and vitals reviewed.        Results for orders placed or performed in visit on 08/22/17   Hemoglobin A1c   Result Value Ref Range    Hemoglobin A1C 6.60 (H) 4.80 - 5.60 %   Iron Profile   Result Value Ref Range    TIBC 214 (L) 261 - 478 mcg/dL    UIBC 166 112 - 346 mcg/dL    Iron 48 37 - 145 mcg/dL    Iron Saturation 22 %   Vitamin B12   Result Value Ref Range    Vitamin B-12 813 211 - 946 pg/mL   Folate   Result Value Ref Range    Folate >20.00 4.78 - 24.20 ng/mL   T4, Free   Result Value Ref Range    Free T4 1.17 0.93 - 1.70 ng/dL   TSH   Result Value Ref Range    TSH 1.910 0.270 - 4.200 mIU/mL   Comprehensive Metabolic Panel   Result Value Ref Range    Glucose 75 65 - 99 mg/dL    BUN 70 (H) 8 - 23 mg/dL    Creatinine 2.63 (H) 0.57 - 1.00 mg/dL    eGFR Non African Am 18 (L) >60 mL/min/1.73    eGFR African Am 22 (L) >60 mL/min/1.73    BUN/Creatinine Ratio 26.6 (H) 7.0 - 25.0    Sodium 142 136 - 145 mmol/L    Potassium 4.6 3.5 - 5.2 mmol/L    Chloride 98 98 - 107 mmol/L    Total CO2 31.1 (H) 22.0 - 29.0 mmol/L    Calcium 9.1 8.8 - 10.5 mg/dL    Total Protein 6.7 6.0 - 8.5 g/dL    Albumin 4.00 3.50 - 5.20 g/dL    Globulin 2.7 gm/dL    A/G Ratio 1.5 g/dL    Total Bilirubin 0.3 0.2 - 1.2 mg/dL    Alkaline Phosphatase 70 40 - 129 U/L    AST (SGOT) 17 5 - 32 U/L    ALT (SGPT) 13 5 - 33 U/L   CBC & Differential   Result Value Ref Range    WBC 9.71 4.80 - 10.80 10*3/mm3    RBC 3.74 (L) 4.20 - 5.40 10*6/mm3    Hemoglobin 10.8 (L) 12.0 - 16.0 g/dL    Hematocrit 35.7 (L) 37.0 - 47.0 %    MCV 95.5 81.0 - 99.0 fL    MCH 28.9 27.0 - 31.0 pg    MCHC 30.3 (L) 31.0 - 37.0 g/dL    RDW 13.2 11.5 - 14.5 %     Platelets 213 140 - 500 10*3/mm3    Neutrophil Rel % 60.7 45.0 - 70.0 %    Lymphocyte Rel % 24.2 20.0 - 45.0 %    Monocyte Rel % 8.5 (H) 3.0 - 8.0 %    Eosinophil Rel % 5.9 (H) 0.0 - 4.0 %    Basophil Rel % 0.5 0.0 - 2.0 %    Neutrophils Absolute 5.89 1.50 - 8.30 10*3/mm3    Lymphocytes Absolute 2.35 0.60 - 4.80 10*3/mm3    Monocytes Absolute 0.83 0.00 - 1.00 10*3/mm3    Eosinophils Absolute 0.57 (H) 0.10 - 0.30 10*3/mm3    Basophils Absolute 0.05 0.00 - 0.20 10*3/mm3    Immature Granulocyte Rel % 0.2 0.0 - 0.5 %    Immature Grans Absolute 0.02 0.00 - 0.03 10*3/mm3    nRBC 0.0 0.0 - 0.0 /100 WBC       Assessment/Plan   Jared was seen today for follow-up and diabetes.    Diagnoses and all orders for this visit:    Mixed hyperlipidemia    Essential hypertension    Mixed simple and mucopurulent chronic bronchitis    Type 2 diabetes mellitus with diabetic polyneuropathy, without long-term current use of insulin    Hypothyroidism, acquired    CKD (chronic kidney disease) stage 4, GFR 15-29 ml/min  -     Basic Metabolic Panel    Normocytic anemia    Rash of body    Need for influenza vaccination  -     Flu Vaccine Quad PF 3YR+    Other orders  -     Insulin Degludec (TRESIBA FLEXTOUCH) 200 UNIT/ML solution pen-injector; Inject 70 Units under the skin Daily.  -     insulin aspart (NOVOLOG FLEXPEN) 100 UNIT/ML solution pen-injector sc pen; SSI  -     albuterol (PROVENTIL HFA;VENTOLIN HFA) 108 (90 Base) MCG/ACT inhaler; Inhale 2 puffs Every 4 (Four) Hours As Needed for Wheezing.        Patient's type 2 diabetes is under good control . Will continue current regimen of Triseba as well as SSI which we will scan into media.No reported side effects from medications. Will follow up in 2 months with labs before. She is up to date on her eye exam.     HTN is under good control and patient will continue to monitor with home BP cuff. No side effects from medications. Will continue current regimen of Metoprolol and diuretics. Will  follow up in 2 months      Her CKD fluctuates. I want her to continue her current regimen of Bumex and spironolactone and needs follow up with  due to the severity. She needs to work on her diet and moving more. Encouraged her to drink plenty of water and to avoid NSAID's.     Normocytic anemia is stable and her B12, folate and iron profiles are normal. I think that this is related to anemia of chronic disease and we will continue to monitor closely.     TSH has been stable and patient is doing well. Will continue current regimen of levothyroxine 112 mcg and follow up levels in 6 months.     For her rash, I have called home health and they will start Z-guard cream as well as ABD pads. No need for further antibiotics or anti-fungals are this time. Continue Nystatin powder as well.     Patient's cholesterol is under good control. Will continue current regimen of Lipitor. No side effects from medications reported. Will follow up in 6 months to monitor levels.       Return in about 2 months (around 11/26/2017) for Recheck.    Jina Vaughan MD  09/26/2017

## 2017-09-27 ENCOUNTER — TELEPHONE (OUTPATIENT)
Dept: INTERNAL MEDICINE | Facility: CLINIC | Age: 62
End: 2017-09-27

## 2017-09-27 DIAGNOSIS — R94.8: Primary | ICD-10-CM

## 2017-09-27 LAB
Lab: NORMAL
PHOSPHATE SERPL-MCNC: 5.4 MG/DL (ref 2.7–4.5)
WRITTEN AUTHORIZATION: NORMAL

## 2017-09-27 NOTE — TELEPHONE ENCOUNTER
Patient sister has been advised and voiced understanding. Patient sister also advised that she will call Dr. Orr's office immediately after our call. I advised I would fax over results to Dr. Orr's office for patient.     ----- Message from Jina Vaughan MD sent at 9/27/2017  8:28 AM EDT -----  Please call patient with these results and let her know that her kidney function is worsening. She did not go to there appointment with Dr. Konstantin Orr and I asked them to reschedule. I would actually like him to see her this week if possible. Can you see if his clinic can see her? Please add a phosphorous onto her blood that was collected yesterday as well.

## 2017-09-27 NOTE — PROGRESS NOTES
Please call patient with these results and let her know that her kidney function is worsening. She did not go to there appointment with Dr. Konstantin Orr and I asked them to reschedule. I would actually like him to see her this week if possible. Can you see if his clinic can see her? Please add a phosphorous onto her blood that was collected yesterday as well.

## 2017-09-28 ENCOUNTER — OFFICE VISIT (OUTPATIENT)
Dept: PAIN MEDICINE | Facility: CLINIC | Age: 62
End: 2017-09-28

## 2017-09-28 VITALS
DIASTOLIC BLOOD PRESSURE: 54 MMHG | HEART RATE: 68 BPM | OXYGEN SATURATION: 88 % | TEMPERATURE: 97.8 F | WEIGHT: 243 LBS | RESPIRATION RATE: 18 BRPM | SYSTOLIC BLOOD PRESSURE: 123 MMHG | HEIGHT: 66 IN | BODY MASS INDEX: 39.05 KG/M2

## 2017-09-28 DIAGNOSIS — M43.06 LUMBAR SPONDYLOLYSIS: ICD-10-CM

## 2017-09-28 DIAGNOSIS — Z79.899 ENCOUNTER FOR LONG-TERM CURRENT USE OF HIGH RISK MEDICATION: ICD-10-CM

## 2017-09-28 DIAGNOSIS — E11.42 DIABETIC PERIPHERAL NEUROPATHY (HCC): ICD-10-CM

## 2017-09-28 DIAGNOSIS — G89.4 CHRONIC PAIN SYNDROME: Primary | ICD-10-CM

## 2017-09-28 DIAGNOSIS — G89.29 CHRONIC MIDLINE LOW BACK PAIN WITHOUT SCIATICA: ICD-10-CM

## 2017-09-28 DIAGNOSIS — M54.50 CHRONIC MIDLINE LOW BACK PAIN WITHOUT SCIATICA: ICD-10-CM

## 2017-09-28 PROCEDURE — 99213 OFFICE O/P EST LOW 20 MIN: CPT | Performed by: NURSE PRACTITIONER

## 2017-09-28 RX ORDER — HYDROCODONE BITARTRATE AND ACETAMINOPHEN 10; 325 MG/1; MG/1
2 TABLET ORAL 2 TIMES DAILY
Qty: 120 TABLET | Refills: 0 | Status: SHIPPED | OUTPATIENT
Start: 2017-09-28 | End: 2017-10-30 | Stop reason: SDUPTHER

## 2017-09-28 NOTE — PROGRESS NOTES
CHIEF COMPLAINT    F/U back pain. Pt states back pain is unchanged. Waiting for SCS trial.    Subjective   Jared Govea is a 62 y.o. female  who presents to the office for follow-up.She has a history of chronic low back and bilateral leg pain.  She is awaiting insurance authorization/scheduling of SCS trial phase 1.      Reports low back and leg/foot pain related to DPN.  Today her pain is a 4/10 in severity.  She has previously reported little benefit with multiple injections/pain management procedures.  At this time she continues with hydrocodone 10/325 4/day for pain. She reports moderate benefit with this regime and denies adverse reactions.     Back Pain   This is a chronic problem. The current episode started more than 1 year ago. The problem occurs constantly. The pain is present in the lumbar spine. The quality of the pain is described as aching. The pain does not radiate. The pain is at a severity of 4/10. The pain is severe. The symptoms are aggravated by bending, standing and twisting. Stiffness is present in the morning, at night and all day. Associated symptoms include headaches (pt states off an on) and weakness. Pertinent negatives include no abdominal pain, bladder incontinence, bowel incontinence, chest pain (pt states due to congestive heart failure), fever or numbness. Risk factors include history of steroid use, poor posture and obesity. She has tried analgesics, bed rest, chiropractic manipulation, NSAIDs, walking, heat, ice and home exercises for the symptoms. The treatment provided mild relief.      PEG Assessment   What number best describes your pain on average in the past week?4  What number best describes how, during the past week, pain has interfered with your enjoyment of life?4  What number best describes how, during the past week, pain has interfered with your general activity?  4    The following portions of the patient's history were reviewed and updated as appropriate: allergies,  "current medications, past family history, past medical history, past social history, past surgical history and problem list.    Review of Systems   Constitutional: Positive for fatigue. Negative for chills and fever.   HENT: Positive for congestion.    Eyes: Negative for visual disturbance.   Respiratory: Positive for cough, shortness of breath and wheezing.    Cardiovascular: Negative.  Negative for chest pain (pt states due to congestive heart failure).   Gastrointestinal: Positive for nausea (pt states she just started dramamine today). Negative for abdominal pain, bowel incontinence, constipation, diarrhea and vomiting.   Genitourinary: Negative for bladder incontinence and difficulty urinating.   Musculoskeletal: Positive for back pain.   Skin: Negative for rash and wound.   Allergic/Immunologic: Negative for immunocompromised state.   Neurological: Positive for dizziness (occ due to side effects of medications), weakness, light-headedness and headaches (pt states off an on). Negative for numbness.   Hematological: Does not bruise/bleed easily.   Psychiatric/Behavioral: Positive for sleep disturbance. Negative for agitation, confusion, hallucinations and suicidal ideas. The patient is nervous/anxious.        Vitals:    09/28/17 1122   BP: 123/54   Pulse: 68   Resp: 18   Temp: 97.8 °F (36.6 °C)   SpO2: (!) 88%  Comment: on 3 liters 02 NC. [facility] member states 88-94% is good for her   Weight: 243 lb (110 kg)   Height: 66\" (167.6 cm)   PainSc:   4   PainLoc: Back     Objective   Physical Exam   Constitutional: She is oriented to person, place, and time. Vital signs are normal. She appears well-developed and well-nourished.   Chronic ill appearance   HENT:   Head: Normocephalic and atraumatic.   NC O2 in place   Eyes: Conjunctivae and EOM are normal. Pupils are equal, round, and reactive to light.   Neck: Neck supple.   Cardiovascular: Normal rate, regular rhythm and normal heart sounds.    Pulmonary/Chest: Effort " normal. She has wheezes.   Using O2 per NC   Abdominal:   obese   Musculoskeletal:        Lumbar back: She exhibits decreased range of motion, tenderness, bony tenderness and pain. She exhibits no swelling, no edema and no laceration.       Vascular Status -  Her exam exhibits right foot edema. Her exam exhibits left foot edema.  Lymphadenopathy:     She has no cervical adenopathy.   Neurological: She is alert and oriented to person, place, and time. Gait (in motorized scooter) abnormal.   Reflex Scores:       Patellar reflexes are 0 on the right side and 0 on the left side.  Skin: Skin is warm, dry and intact.   Psychiatric: She has a normal mood and affect. Her speech is normal and behavior is normal. Judgment and thought content normal.   Nursing note and vitals reviewed.    Assessment/Plan   Jared was seen today for back pain.    Diagnoses and all orders for this visit:    Chronic pain syndrome    Chronic midline low back pain without sciatica  -     HYDROcodone-acetaminophen (NORCO)  MG per tablet; Take 2 tablets by mouth 2 (Two) Times a Day.    Lumbar spondylolysis    Diabetic peripheral neuropathy    Encounter for long-term current use of high risk medication      --- Proceed with SCS trial   --- Refill hydrocodone. Patient appears stable with current regimen. No adverse effects. Regarding continuation of opioids, there is no evidence of aberrant behavior or any red flags.  The patient continues with appropriate response to opioid therapy. ADL's remain intact by self.   --- The urine drug screen confirmation from 4/17/2017 has been reviewed and the result is appropriate based on patient history and JACOB report  --- Follow-up 1 month or sooner for SCS trial          JACOB REPORT  As part of the patient's treatment plan, I am prescribing controlled substances. The patient has been made aware of appropriate use of such medications, including potential risk of somnolence, limited ability to drive and/or  work safely, and the potential for dependence or overdose. It has also bee made clear that these medications are for use by this patient only, without concomitant use of alcohol or other substances unless prescribed.     Patient has completed prescribing agreement detailing terms of continued prescribing of controlled substances, including monitoring JACOB reports, urine drug screening, and pill counts if necessary. The patient is aware that inappropriate use will results in cessation of prescribing such medications.    JACOB report has been reviewed and scanned into the patient's chart.    Date of last JACOB : 9/27/2017    History and physical exam exhibit continued safe and appropriate use of controlled substances.    EMR Dragon/Transcription disclaimer:   Much of this encounter note is an electronic transcription/translation of spoken language to printed text. The electronic translation of spoken language may permit erroneous, or at times, nonsensical words or phrases to be inadvertently transcribed; Although I have reviewed the note for such errors, some may still exist.

## 2017-10-16 ENCOUNTER — TELEPHONE (OUTPATIENT)
Dept: INTERNAL MEDICINE | Facility: CLINIC | Age: 62
End: 2017-10-16

## 2017-10-16 ENCOUNTER — HOSPITAL ENCOUNTER (INPATIENT)
Facility: HOSPITAL | Age: 62
LOS: 4 days | Discharge: HOME | End: 2017-10-20
Attending: HOSPITALIST | Admitting: HOSPITALIST

## 2017-10-16 DIAGNOSIS — M79.3 PANNICULITIS: Primary | ICD-10-CM

## 2017-10-16 DIAGNOSIS — N18.9 CHRONIC KIDNEY DISEASE, UNSPECIFIED CKD STAGE: ICD-10-CM

## 2017-10-16 LAB
ALBUMIN SERPL-MCNC: 3.5 G/DL (ref 3.5–5.2)
ALBUMIN/GLOB SERPL: 1 G/DL
ALP SERPL-CCNC: 76 U/L (ref 40–129)
ALT SERPL W P-5'-P-CCNC: 10 U/L (ref 5–33)
ANION GAP SERPL CALCULATED.3IONS-SCNC: 12.2 MMOL/L
AST SERPL-CCNC: 14 U/L (ref 5–32)
BASOPHILS # BLD AUTO: 0.04 10*3/MM3 (ref 0–0.2)
BASOPHILS NFR BLD AUTO: 0.3 % (ref 0–2)
BILIRUB SERPL-MCNC: 0.3 MG/DL (ref 0.2–1.2)
BUN BLD-MCNC: 68 MG/DL (ref 8–23)
BUN/CREAT SERPL: 31.6 (ref 7–25)
CALCIUM SPEC-SCNC: 9.1 MG/DL (ref 8.8–10.5)
CHLORIDE SERPL-SCNC: 95 MMOL/L (ref 98–107)
CO2 SERPL-SCNC: 28.8 MMOL/L (ref 22–29)
CREAT BLD-MCNC: 2.15 MG/DL (ref 0.57–1)
D-LACTATE SERPL-SCNC: 0.9 MMOL/L (ref 0.5–2)
DEPRECATED RDW RBC AUTO: 41.1 FL (ref 37–54)
EOSINOPHIL # BLD AUTO: 0.44 10*3/MM3 (ref 0.1–0.3)
EOSINOPHIL NFR BLD AUTO: 3 % (ref 0–4)
ERYTHROCYTE [DISTWIDTH] IN BLOOD BY AUTOMATED COUNT: 12.2 % (ref 11.5–14.5)
GFR SERPL CREATININE-BSD FRML MDRD: 23 ML/MIN/1.73
GLOBULIN UR ELPH-MCNC: 3.5 GM/DL
GLUCOSE BLD-MCNC: 273 MG/DL (ref 65–99)
GLUCOSE BLDC GLUCOMTR-MCNC: 276 MG/DL (ref 70–130)
GLUCOSE BLDC GLUCOMTR-MCNC: 384 MG/DL (ref 70–130)
HCT VFR BLD AUTO: 31.5 % (ref 37–47)
HGB BLD-MCNC: 10 G/DL (ref 12–16)
IMM GRANULOCYTES # BLD: 0.06 10*3/MM3 (ref 0–0.03)
IMM GRANULOCYTES NFR BLD: 0.4 % (ref 0–0.5)
LYMPHOCYTES # BLD AUTO: 2.05 10*3/MM3 (ref 0.6–4.8)
LYMPHOCYTES NFR BLD AUTO: 13.9 % (ref 20–45)
MCH RBC QN AUTO: 29.2 PG (ref 27–31)
MCHC RBC AUTO-ENTMCNC: 31.7 G/DL (ref 31–37)
MCV RBC AUTO: 92.1 FL (ref 81–99)
MONOCYTES # BLD AUTO: 0.93 10*3/MM3 (ref 0–1)
MONOCYTES NFR BLD AUTO: 6.3 % (ref 3–8)
NEUTROPHILS # BLD AUTO: 11.26 10*3/MM3 (ref 1.5–8.3)
NEUTROPHILS NFR BLD AUTO: 76.1 % (ref 45–70)
NRBC BLD MANUAL-RTO: 0 /100 WBC (ref 0–0)
PLATELET # BLD AUTO: 189 10*3/MM3 (ref 140–500)
PMV BLD AUTO: 10.8 FL (ref 7.4–10.4)
POTASSIUM BLD-SCNC: 4.5 MMOL/L (ref 3.5–5.2)
PROT SERPL-MCNC: 7 G/DL (ref 6–8.5)
RBC # BLD AUTO: 3.42 10*6/MM3 (ref 4.2–5.4)
SODIUM BLD-SCNC: 136 MMOL/L (ref 136–145)
WBC NRBC COR # BLD: 14.78 10*3/MM3 (ref 4.8–10.8)

## 2017-10-16 PROCEDURE — 99284 EMERGENCY DEPT VISIT MOD MDM: CPT | Performed by: PHYSICIAN ASSISTANT

## 2017-10-16 PROCEDURE — 25010000002 FLUCONAZOLE PER 200 MG

## 2017-10-16 PROCEDURE — 94640 AIRWAY INHALATION TREATMENT: CPT

## 2017-10-16 PROCEDURE — 25010000002 FLUCONAZOLE PER 200 MG: Performed by: HOSPITALIST

## 2017-10-16 PROCEDURE — 63710000001 INSULIN DETEMIR PER 5 UNITS: Performed by: HOSPITALIST

## 2017-10-16 PROCEDURE — 82962 GLUCOSE BLOOD TEST: CPT

## 2017-10-16 PROCEDURE — 63710000001 INSULIN ASPART PER 5 UNITS: Performed by: HOSPITALIST

## 2017-10-16 PROCEDURE — 25010000002 PIPERACILLIN SOD-TAZOBACTAM PER 1 G: Performed by: PHYSICIAN ASSISTANT

## 2017-10-16 PROCEDURE — 99222 1ST HOSP IP/OBS MODERATE 55: CPT | Performed by: HOSPITALIST

## 2017-10-16 PROCEDURE — 80053 COMPREHEN METABOLIC PANEL: CPT | Performed by: PHYSICIAN ASSISTANT

## 2017-10-16 PROCEDURE — 85025 COMPLETE CBC W/AUTO DIFF WBC: CPT | Performed by: PHYSICIAN ASSISTANT

## 2017-10-16 PROCEDURE — 94799 UNLISTED PULMONARY SVC/PX: CPT

## 2017-10-16 PROCEDURE — 83605 ASSAY OF LACTIC ACID: CPT | Performed by: PHYSICIAN ASSISTANT

## 2017-10-16 PROCEDURE — 87040 BLOOD CULTURE FOR BACTERIA: CPT | Performed by: PHYSICIAN ASSISTANT

## 2017-10-16 PROCEDURE — 99283 EMERGENCY DEPT VISIT LOW MDM: CPT

## 2017-10-16 RX ORDER — POLYMYXIN B SULFATE AND TRIMETHOPRIM 1; 10000 MG/ML; [USP'U]/ML
1 SOLUTION OPHTHALMIC
Status: DISCONTINUED | OUTPATIENT
Start: 2017-10-17 | End: 2017-10-20 | Stop reason: HOSPADM

## 2017-10-16 RX ORDER — L.ACID,PARA/B.BIFIDUM/S.THERM 8B CELL
1 CAPSULE ORAL DAILY
Status: DISCONTINUED | OUTPATIENT
Start: 2017-10-17 | End: 2017-10-20 | Stop reason: HOSPADM

## 2017-10-16 RX ORDER — ASPIRIN 81 MG/1
81 TABLET ORAL DAILY
Status: DISCONTINUED | OUTPATIENT
Start: 2017-10-17 | End: 2017-10-20 | Stop reason: HOSPADM

## 2017-10-16 RX ORDER — NICOTINE POLACRILEX 4 MG
15 LOZENGE BUCCAL
Status: DISCONTINUED | OUTPATIENT
Start: 2017-10-16 | End: 2017-10-20 | Stop reason: HOSPADM

## 2017-10-16 RX ORDER — HYDROCODONE BITARTRATE AND ACETAMINOPHEN 10; 325 MG/1; MG/1
2 TABLET ORAL 2 TIMES DAILY
Status: DISCONTINUED | OUTPATIENT
Start: 2017-10-16 | End: 2017-10-20 | Stop reason: HOSPADM

## 2017-10-16 RX ORDER — ATORVASTATIN CALCIUM 20 MG/1
TABLET, FILM COATED ORAL
Status: COMPLETED
Start: 2017-10-16 | End: 2017-10-16

## 2017-10-16 RX ORDER — FLUCONAZOLE 2 MG/ML
INJECTION, SOLUTION INTRAVENOUS
Status: COMPLETED
Start: 2017-10-16 | End: 2017-10-16

## 2017-10-16 RX ORDER — BUDESONIDE AND FORMOTEROL FUMARATE DIHYDRATE 160; 4.5 UG/1; UG/1
2 AEROSOL RESPIRATORY (INHALATION)
Status: DISCONTINUED | OUTPATIENT
Start: 2017-10-16 | End: 2017-10-20 | Stop reason: HOSPADM

## 2017-10-16 RX ORDER — SODIUM CHLORIDE 0.9 % (FLUSH) 0.9 %
1-10 SYRINGE (ML) INJECTION AS NEEDED
Status: DISCONTINUED | OUTPATIENT
Start: 2017-10-16 | End: 2017-10-20 | Stop reason: HOSPADM

## 2017-10-16 RX ORDER — ATORVASTATIN CALCIUM 40 MG/1
40 TABLET, FILM COATED ORAL NIGHTLY
Status: DISCONTINUED | OUTPATIENT
Start: 2017-10-16 | End: 2017-10-20 | Stop reason: HOSPADM

## 2017-10-16 RX ORDER — FLUCONAZOLE 2 MG/ML
200 INJECTION, SOLUTION INTRAVENOUS DAILY
Status: DISCONTINUED | OUTPATIENT
Start: 2017-10-17 | End: 2017-10-17 | Stop reason: DRUGHIGH

## 2017-10-16 RX ORDER — BUMETANIDE 1 MG/1
2 TABLET ORAL DAILY
Status: DISCONTINUED | OUTPATIENT
Start: 2017-10-17 | End: 2017-10-20 | Stop reason: HOSPADM

## 2017-10-16 RX ORDER — FLUCONAZOLE 2 MG/ML
400 INJECTION, SOLUTION INTRAVENOUS ONCE
Status: COMPLETED | OUTPATIENT
Start: 2017-10-16 | End: 2017-10-17

## 2017-10-16 RX ORDER — SODIUM CHLORIDE 9 MG/ML
40 INJECTION, SOLUTION INTRAVENOUS AS NEEDED
Status: DISCONTINUED | OUTPATIENT
Start: 2017-10-16 | End: 2017-10-20 | Stop reason: HOSPADM

## 2017-10-16 RX ORDER — FAMOTIDINE 20 MG/1
20 TABLET, FILM COATED ORAL DAILY
Status: DISCONTINUED | OUTPATIENT
Start: 2017-10-17 | End: 2017-10-20 | Stop reason: HOSPADM

## 2017-10-16 RX ORDER — LINEZOLID 2 MG/ML
600 INJECTION, SOLUTION INTRAVENOUS EVERY 12 HOURS
Status: DISCONTINUED | OUTPATIENT
Start: 2017-10-17 | End: 2017-10-17

## 2017-10-16 RX ORDER — METOPROLOL TARTRATE 50 MG/1
50 TABLET, FILM COATED ORAL 2 TIMES DAILY
Status: DISCONTINUED | OUTPATIENT
Start: 2017-10-16 | End: 2017-10-20 | Stop reason: HOSPADM

## 2017-10-16 RX ORDER — GUAIFENESIN 600 MG/1
600 TABLET, EXTENDED RELEASE ORAL 2 TIMES DAILY
Status: DISCONTINUED | OUTPATIENT
Start: 2017-10-16 | End: 2017-10-20 | Stop reason: HOSPADM

## 2017-10-16 RX ORDER — FLUCONAZOLE 2 MG/ML
400 INJECTION, SOLUTION INTRAVENOUS DAILY
Status: DISCONTINUED | OUTPATIENT
Start: 2017-10-16 | End: 2017-10-16

## 2017-10-16 RX ORDER — VITAMIN B COMPLEX
1 CAPSULE ORAL DAILY
Status: DISCONTINUED | OUTPATIENT
Start: 2017-10-17 | End: 2017-10-17 | Stop reason: CLARIF

## 2017-10-16 RX ORDER — IPRATROPIUM BROMIDE AND ALBUTEROL SULFATE 2.5; .5 MG/3ML; MG/3ML
3 SOLUTION RESPIRATORY (INHALATION) ONCE
Status: COMPLETED | OUTPATIENT
Start: 2017-10-16 | End: 2017-10-16

## 2017-10-16 RX ORDER — QUETIAPINE FUMARATE 100 MG/1
100 TABLET, FILM COATED ORAL 2 TIMES DAILY
Status: DISCONTINUED | OUTPATIENT
Start: 2017-10-16 | End: 2017-10-20 | Stop reason: HOSPADM

## 2017-10-16 RX ORDER — ALBUTEROL SULFATE 2.5 MG/3ML
2.5 SOLUTION RESPIRATORY (INHALATION) EVERY 4 HOURS PRN
Status: DISCONTINUED | OUTPATIENT
Start: 2017-10-16 | End: 2017-10-17

## 2017-10-16 RX ORDER — CETIRIZINE HYDROCHLORIDE 10 MG/1
10 TABLET ORAL NIGHTLY
Status: DISCONTINUED | OUTPATIENT
Start: 2017-10-16 | End: 2017-10-20 | Stop reason: HOSPADM

## 2017-10-16 RX ORDER — LEVOTHYROXINE SODIUM 112 UG/1
224 TABLET ORAL DAILY
Status: DISCONTINUED | OUTPATIENT
Start: 2017-10-17 | End: 2017-10-20 | Stop reason: HOSPADM

## 2017-10-16 RX ORDER — SODIUM CHLORIDE 0.9 % (FLUSH) 0.9 %
10 SYRINGE (ML) INJECTION AS NEEDED
Status: DISCONTINUED | OUTPATIENT
Start: 2017-10-16 | End: 2017-10-20 | Stop reason: HOSPADM

## 2017-10-16 RX ORDER — DEXTROSE MONOHYDRATE 25 G/50ML
25 INJECTION, SOLUTION INTRAVENOUS
Status: DISCONTINUED | OUTPATIENT
Start: 2017-10-16 | End: 2017-10-20 | Stop reason: HOSPADM

## 2017-10-16 RX ADMIN — ATORVASTATIN CALCIUM 40 MG: 40 TABLET, FILM COATED ORAL at 23:01

## 2017-10-16 RX ADMIN — BUDESONIDE AND FORMOTEROL FUMARATE DIHYDRATE 2 PUFF: 160; 4.5 AEROSOL RESPIRATORY (INHALATION) at 22:55

## 2017-10-16 RX ADMIN — FLUCONAZOLE 400 MG: 2 INJECTION, SOLUTION INTRAVENOUS at 23:04

## 2017-10-16 RX ADMIN — PIPERACILLIN SODIUM, TAZOBACTAM SODIUM 2.25 G: 2; .25 INJECTION, POWDER, LYOPHILIZED, FOR SOLUTION INTRAVENOUS at 18:45

## 2017-10-16 RX ADMIN — METOPROLOL TARTRATE 50 MG: 50 TABLET, FILM COATED ORAL at 23:01

## 2017-10-16 RX ADMIN — GUAIFENESIN 600 MG: 600 TABLET, EXTENDED RELEASE ORAL at 23:02

## 2017-10-16 RX ADMIN — INSULIN DETEMIR 60 UNITS: 100 INJECTION, SOLUTION SUBCUTANEOUS at 23:02

## 2017-10-16 RX ADMIN — IPRATROPIUM BROMIDE AND ALBUTEROL SULFATE 3 ML: .5; 3 SOLUTION RESPIRATORY (INHALATION) at 19:28

## 2017-10-16 RX ADMIN — INSULIN ASPART 20 UNITS: 100 INJECTION, SOLUTION INTRAVENOUS; SUBCUTANEOUS at 23:02

## 2017-10-16 RX ADMIN — QUETIAPINE FUMARATE 100 MG: 100 TABLET, FILM COATED ORAL at 23:02

## 2017-10-16 RX ADMIN — VANCOMYCIN HYDROCHLORIDE 1000 MG: 1 INJECTION, POWDER, LYOPHILIZED, FOR SOLUTION INTRAVENOUS at 19:23

## 2017-10-16 RX ADMIN — ATORVASTATIN CALCIUM 40 MG: 20 TABLET, FILM COATED ORAL at 23:01

## 2017-10-16 RX ADMIN — CETIRIZINE HYDROCHLORIDE 10 MG: 10 TABLET, FILM COATED ORAL at 23:01

## 2017-10-16 RX ADMIN — FLUCONAZOLE 400 MG: 2 INJECTION, SOLUTION INTRAVENOUS at 23:03

## 2017-10-16 RX ADMIN — HYDROCODONE BITARTRATE AND ACETAMINOPHEN 2 TABLET: 10; 325 TABLET ORAL at 23:02

## 2017-10-16 NOTE — ED PROVIDER NOTES
Subjective   History of Present Illness  History of Present Illness    Chief complaint: rash    Location: groin    Quality/Severity:  Red, itchy, burning. severe    Timing/Duration: 1 week, worse    Modifying Factors: Nothing makes worse or better.  They have tried multiple over-the-counter medications in addition to nystatin powder    Associated Symptoms: Denies fevers or chills.  Denies nausea or vomiting.    Narrative: 62-year-old female with history of diabetes, CHF and chronic kidney disease presents with rash that has been worse for one week.  She stated that she previously had a rash that was treated as yeast infection.  It had some improvement but now it has returned and has become increasingly worse every day.  It is spreading and it is dark red.  She has been using topical nystatin without relief.    Review of Systems  General: Denies fevers or chills.  Denies any weakness or fatigue.  Denies any weight loss or weight gain.  SKIN: As above.  ENT: Denies sore throat or rhinorrhea.  Denies ear pain.    EYES: drainage with mattering to L eye x 1 day. Denies any blurred vision.  Denies any change in vision.  Denies any photophobia.  Denies any vision loss.  LUNGS: Denies any shortness of breath or wheezing.  Denies any cough.  Denies any hemoptysis.  CARDIAC: Denies any chest pain.  Denies palpitations.  Denies syncope.  Denies any edema  ABD: Denies any abdominal pain.  Denies any nausea or vomiting or diarrhea.  Denies any rectal bleeding.  Denies constipation  : Denies any dysuria, urgency, frequency or hematuria.  Denies discharge.  Denies flank pain.  NEURO: Denies any focal weakness.  Denies headache.  Denies seizures.  Denies changes in speech or difficulty walking.  ENDOCRINE: Denies polydipsia and polyuria  M/S: Denies arthralgias, back pain, myalgias or neck pain  HEME/LYMPH: Negative for adenopathy. Does not bruise/bleed easily.   PSYCH: Negative for suicidal ideas. Denies anxiety or  depression  review was performed in addition to those in the above all other reviews are negative.      Past Medical History:   Diagnosis Date   • Anxiety    • Arthritis    • CHF (congestive heart failure)    • Chronic back pain    • COPD (chronic obstructive pulmonary disease)    • Depression    • Essential hypertension    • SERENA (generalized anxiety disorder)    • GERD (gastroesophageal reflux disease)    • Headache    • HLD (hyperlipidemia)    • Hypothyroidism, acquired    • Low back pain    • Neuropathy in diabetes    • Sleep apnea    • Type 2 diabetes mellitus    • Wound, open, toe 8/22/2017       Allergies   Allergen Reactions   • Erythromycin Nausea And Vomiting   • Erythromycin Ethylsuccinate Nausea And Vomiting   • Ativan [Lorazepam] Hallucinations   • Tape Rash     Paper tape         Past Surgical History:   Procedure Laterality Date   • LUNG SURGERY Right    • TRACHEOSTOMY         Family History   Problem Relation Age of Onset   • Cancer Mother      bone   • Cancer Father    • Cancer Brother        Social History     Social History   • Marital status:      Spouse name: N/A   • Number of children: N/A   • Years of education: N/A     Social History Main Topics   • Smoking status: Former Smoker     Packs/day: 0.50     Years: 40.00     Types: Cigarettes     Quit date: 10/4/2010   • Smokeless tobacco: Former User     Quit date: 8/3/2012   • Alcohol use No   • Drug use: No   • Sexual activity: Defer     Other Topics Concern   • None     Social History Narrative     No current facility-administered medications on file prior to encounter.      Current Outpatient Prescriptions on File Prior to Encounter   Medication Sig Dispense Refill   • albuterol (PROVENTIL HFA;VENTOLIN HFA) 108 (90 Base) MCG/ACT inhaler Inhale 2 puffs Every 4 (Four) Hours As Needed for Wheezing. 1 inhaler 6   • aspirin 81 MG EC tablet Take 81 mg by mouth daily.     • atorvastatin (LIPITOR) 40 MG tablet Take 1 tablet by mouth Daily. For  cholesterol 90 tablet 3   • B Complex Vitamins (VITAMIN B COMPLEX PO) Take 1 tablet by mouth daily.     • bumetanide (BUMEX) 2 MG tablet Take 1 tablet by mouth 2 (Two) Times a Day. (Patient taking differently: Take 2 mg by mouth Daily.) 60 tablet 0   • cetirizine (ZyrTEC) 10 MG tablet Take 10 mg by mouth every night.     • dimenhyDRINATE (DRAMAMINE) 50 MG tablet Take 1 tablet by mouth Daily As Needed for movement disorders. 30 tablet 0   • famotidine (PEPCID) 20 MG tablet Take 20 mg by mouth daily.     • fluticasone-salmeterol (ADVAIR DISKUS) 250-50 MCG/DOSE DISKUS Inhale 1 puff 2 (Two) Times a Day. For COPD and rinse mouth after use 60 each 5   • guaiFENesin (MUCINEX) 600 MG 12 hr tablet Take 1 tablet by mouth 2 (two) times a day.     • HYDROcodone-acetaminophen (NORCO)  MG per tablet Take 2 tablets by mouth 2 (Two) Times a Day. 120 tablet 0   • INCRUSE ELLIPTA 62.5 MCG/INH aerosol powder       • insulin aspart (NOVOLOG FLEXPEN) 100 UNIT/ML solution pen-injector sc pen SSI 15 mL 3   • Insulin Degludec (TRESIBA FLEXTOUCH) 200 UNIT/ML solution pen-injector Inject 70 Units under the skin Daily. 15 mL 3   • Insulin Pen Needle (BD PEN NEEDLE CLAY U/F) 32G X 4 MM misc Use as directed 7 times daily 250 each 5   • levalbuterol (XOPENEX) 0.63 MG/3ML nebulizer solution Take 1 ampule by nebulization every 4 (four) hours as needed for wheezing.     • levothyroxine (SYNTHROID) 112 MCG tablet Take 2 tablets by mouth Daily. 60 tablet 5   • metoprolol tartrate (LOPRESSOR) 50 MG tablet Take 1 tablet by mouth 2 (Two) Times a Day. For heart 180 tablet 1   • Probiotic Product (ALIGN) 4 MG capsule Take 4 mg by mouth Daily. 30 capsule 6   • QUEtiapine (SEROquel) 100 MG tablet Take 1 tablet by mouth 2 (Two) Times a Day. 180 tablet 1   • spironolactone (ALDACTONE) 50 MG tablet Take 1 tablet by mouth Daily. For edema 90 tablet 1   • vitamin D (ERGOCALCIFEROL) 70736 UNITS capsule capsule Take 50,000 Units by mouth Every 7 (Seven)  Days.             Objective   Physical Exam  Vitals:    10/16/17 1748   BP: 140/58   Pulse: 86   Resp: 18   Temp: 98.6 °F (37 °C)   SpO2: 90%     GENERAL: a/o x 4, NAD  SKIN: Warm pink and dry, erythema L nose/cheek border, bright red erythema in patient's upper medial thigh, groin, under pannis circumferential around to the back.  It does kris.  HEENT:  PERRLA, EOM intact, conjunctiva injected L eye, sclera clear, yellow drainage to L eye  NECK: supple, no JVD  LUNGS: Clear to auscultation bilaterally without wheezes, rales or rhonchi.  No accessory muscle use and no nasal flaring.  CARDIAC:  Regular rate and rhythm, S1-S2.  No murmurs, rubs or gallops.  No peripheral edema.  Equal pulses bilaterally.  ABDOMEN: Soft, nontender, nondistended.  No guarding or rebound tenderness.  Normal bowel sounds.  MUSCULOSKELETAL: Moves all extremities well.  No deformity.  NEURO: Cranial nerves II through XII grossly intact.  No gross focal deficits.  Alert.  Normal speech and motor.  PSYCH: Normal mood and affect      Procedures         ED Course  ED Course      D/w dr. Donis.  Iv vanc/zosyn/ fluconazole given with doses adjusted for ckd.    Results for orders placed or performed during the hospital encounter of 10/16/17   Comprehensive Metabolic Panel   Result Value Ref Range    Glucose 273 (H) 65 - 99 mg/dL    BUN 68 (H) 8 - 23 mg/dL    Creatinine 2.15 (H) 0.57 - 1.00 mg/dL    Sodium 136 136 - 145 mmol/L    Potassium 4.5 3.5 - 5.2 mmol/L    Chloride 95 (L) 98 - 107 mmol/L    CO2 28.8 22.0 - 29.0 mmol/L    Calcium 9.1 8.8 - 10.5 mg/dL    Total Protein 7.0 6.0 - 8.5 g/dL    Albumin 3.50 3.50 - 5.20 g/dL    ALT (SGPT) 10 5 - 33 U/L    AST (SGOT) 14 5 - 32 U/L    Alkaline Phosphatase 76 40 - 129 U/L    Total Bilirubin 0.3 0.2 - 1.2 mg/dL    eGFR Non African Amer 23 (L) >60 mL/min/1.73    Globulin 3.5 gm/dL    A/G Ratio 1.0 g/dL    BUN/Creatinine Ratio 31.6 (H) 7.0 - 25.0    Anion Gap 12.2 mmol/L   Lactic Acid, Plasma   Result  Value Ref Range    Lactate 0.9 0.5 - 2.0 mmol/L   CBC Auto Differential   Result Value Ref Range    WBC 14.78 (H) 4.80 - 10.80 10*3/mm3    RBC 3.42 (L) 4.20 - 5.40 10*6/mm3    Hemoglobin 10.0 (L) 12.0 - 16.0 g/dL    Hematocrit 31.5 (L) 37.0 - 47.0 %    MCV 92.1 81.0 - 99.0 fL    MCH 29.2 27.0 - 31.0 pg    MCHC 31.7 31.0 - 37.0 g/dL    RDW 12.2 11.5 - 14.5 %    RDW-SD 41.1 37.0 - 54.0 fl    MPV 10.8 (H) 7.4 - 10.4 fL    Platelets 189 140 - 500 10*3/mm3    Neutrophil % 76.1 (H) 45.0 - 70.0 %    Lymphocyte % 13.9 (L) 20.0 - 45.0 %    Monocyte % 6.3 3.0 - 8.0 %    Eosinophil % 3.0 0.0 - 4.0 %    Basophil % 0.3 0.0 - 2.0 %    Immature Grans % 0.4 0.0 - 0.5 %    Neutrophils, Absolute 11.26 (H) 1.50 - 8.30 10*3/mm3    Lymphocytes, Absolute 2.05 0.60 - 4.80 10*3/mm3    Monocytes, Absolute 0.93 0.00 - 1.00 10*3/mm3    Eosinophils, Absolute 0.44 (H) 0.10 - 0.30 10*3/mm3    Basophils, Absolute 0.04 0.00 - 0.20 10*3/mm3    Immature Grans, Absolute 0.06 (H) 0.00 - 0.03 10*3/mm3    nRBC 0.0 0.0 - 0.0 /100 WBC   POC Glucose Fingerstick   Result Value Ref Range    Glucose 276 (H) 70 - 130 mg/dL     CONSULT  Time 1910  Discussed case with Dr Paul  Reviewed history, exam, results and treatments.  Discussed concerns and plan of care.  She will come and evaluate the patient.  1915- Dr. Paul and Dr. Peck here to see pt.  1916- they will admit          MDM  Number of Diagnoses or Management Options  Chronic kidney disease, unspecified CKD stage:   Panniculitis:      Amount and/or Complexity of Data Reviewed  Clinical lab tests: reviewed and ordered  Tests in the radiology section of CPT®: ordered and reviewed  Tests in the medicine section of CPT®: ordered and reviewed  Decide to obtain previous medical records or to obtain history from someone other than the patient: yes  Obtain history from someone other than the patient: yes  Review and summarize past medical records: yes  Discuss the patient with other providers:  yes  Independent visualization of images, tracings, or specimens: yes    Risk of Complications, Morbidity, and/or Mortality  Presenting problems: moderate  Diagnostic procedures: moderate  Management options: high    Patient Progress  Patient progress: improved      Final diagnoses:   Panniculitis   Chronic kidney disease, unspecified CKD stage     Dictated utilizing Dragon dictation    .         Violet Lee PA-C  10/16/17 1929

## 2017-10-16 NOTE — ED NOTES
Pt has a dark red rash with some blistering all over her perineum and underneath her panis.  She has a rash on the left side of her face involving her left eye.  She says the rash oozes white pus and itches horribly.     Jenna Romero RN  10/16/17 5129

## 2017-10-16 NOTE — ED NOTES
Pt has her own 02 tank. Placed a pt label on it and will advise the for that the tank is hers     Yadiel Bean RN  10/16/17 1926

## 2017-10-16 NOTE — TELEPHONE ENCOUNTER
Spoke to sister, all symptoms 1 x week- rash in/around eye is currently back and spreading. Rash that was in lower area has now spread to dorsogluteal as well. Drainage and warm to touch is present. Per Dr. Vaughan ER ok for patient.     ----- Message from Sania Gardner sent at 10/16/2017 11:53 AM EDT -----  Regarding: SEVERE RASH PAINFUL  Contact: [phone]  MARIE    PATIENTS SISTER, SANIA, CALLED IN TO SAY THAT THE RASH IS A LOT WORSE AND SHE WOULD LIKE TO HAVE SOME GUIDANCE AS TO WHETHER OR NOT SHE SHOULD TAKE THE PATIENT TO THE ER. PLEASE CALL HER AT THE NUMBER ABOVE.

## 2017-10-17 LAB
ANION GAP SERPL CALCULATED.3IONS-SCNC: 12 MMOL/L
BASOPHILS # BLD AUTO: 0.04 10*3/MM3 (ref 0–0.2)
BASOPHILS NFR BLD AUTO: 0.3 % (ref 0–2)
BUN BLD-MCNC: 68 MG/DL (ref 8–23)
BUN/CREAT SERPL: 35.1 (ref 7–25)
CALCIUM SPEC-SCNC: 8.9 MG/DL (ref 8.8–10.5)
CHLORIDE SERPL-SCNC: 100 MMOL/L (ref 98–107)
CO2 SERPL-SCNC: 28 MMOL/L (ref 22–29)
CREAT BLD-MCNC: 1.94 MG/DL (ref 0.57–1)
DEPRECATED RDW RBC AUTO: 41.5 FL (ref 37–54)
EOSINOPHIL # BLD AUTO: 0.41 10*3/MM3 (ref 0.1–0.3)
EOSINOPHIL NFR BLD AUTO: 3.2 % (ref 0–4)
ERYTHROCYTE [DISTWIDTH] IN BLOOD BY AUTOMATED COUNT: 12.5 % (ref 11.5–14.5)
GFR SERPL CREATININE-BSD FRML MDRD: 26 ML/MIN/1.73
GLUCOSE BLD-MCNC: 129 MG/DL (ref 65–99)
GLUCOSE BLDC GLUCOMTR-MCNC: 127 MG/DL (ref 70–130)
GLUCOSE BLDC GLUCOMTR-MCNC: 232 MG/DL (ref 70–130)
GLUCOSE BLDC GLUCOMTR-MCNC: 82 MG/DL (ref 70–130)
GLUCOSE BLDC GLUCOMTR-MCNC: 94 MG/DL (ref 70–130)
HCT VFR BLD AUTO: 28.9 % (ref 37–47)
HGB BLD-MCNC: 9.2 G/DL (ref 12–16)
IMM GRANULOCYTES # BLD: 0.04 10*3/MM3 (ref 0–0.03)
IMM GRANULOCYTES NFR BLD: 0.3 % (ref 0–0.5)
LYMPHOCYTES # BLD AUTO: 2.05 10*3/MM3 (ref 0.6–4.8)
LYMPHOCYTES NFR BLD AUTO: 16.1 % (ref 20–45)
MCH RBC QN AUTO: 29.2 PG (ref 27–31)
MCHC RBC AUTO-ENTMCNC: 31.8 G/DL (ref 31–37)
MCV RBC AUTO: 91.7 FL (ref 81–99)
MONOCYTES # BLD AUTO: 0.91 10*3/MM3 (ref 0–1)
MONOCYTES NFR BLD AUTO: 7.2 % (ref 3–8)
NEUTROPHILS # BLD AUTO: 9.25 10*3/MM3 (ref 1.5–8.3)
NEUTROPHILS NFR BLD AUTO: 72.9 % (ref 45–70)
NRBC BLD MANUAL-RTO: 0 /100 WBC (ref 0–0)
PLATELET # BLD AUTO: 181 10*3/MM3 (ref 140–500)
PMV BLD AUTO: 10.9 FL (ref 7.4–10.4)
POTASSIUM BLD-SCNC: 4 MMOL/L (ref 3.5–5.2)
RBC # BLD AUTO: 3.15 10*6/MM3 (ref 4.2–5.4)
SODIUM BLD-SCNC: 140 MMOL/L (ref 136–145)
WBC NRBC COR # BLD: 12.7 10*3/MM3 (ref 4.8–10.8)

## 2017-10-17 PROCEDURE — 25010000002 PIPERACILLIN-TAZOBACTAM: Performed by: HOSPITALIST

## 2017-10-17 PROCEDURE — 25010000002 PIPERACILLIN SOD-TAZOBACTAM PER 1 G: Performed by: HOSPITALIST

## 2017-10-17 PROCEDURE — 94640 AIRWAY INHALATION TREATMENT: CPT

## 2017-10-17 PROCEDURE — 25010000002 PIPERACILLIN SOD-TAZOBACTAM PER 1 G

## 2017-10-17 PROCEDURE — 82962 GLUCOSE BLOOD TEST: CPT

## 2017-10-17 PROCEDURE — 25010000002 ENOXAPARIN PER 10 MG: Performed by: HOSPITALIST

## 2017-10-17 PROCEDURE — 25010000002 FLUCONAZOLE PER 200 MG: Performed by: INTERNAL MEDICINE

## 2017-10-17 PROCEDURE — 25010000002 LINEZOLID 600 MG/300ML SOLUTION: Performed by: HOSPITALIST

## 2017-10-17 PROCEDURE — 63710000001 INSULIN ASPART PER 5 UNITS: Performed by: HOSPITALIST

## 2017-10-17 PROCEDURE — 80048 BASIC METABOLIC PNL TOTAL CA: CPT | Performed by: HOSPITALIST

## 2017-10-17 PROCEDURE — 94799 UNLISTED PULMONARY SVC/PX: CPT

## 2017-10-17 PROCEDURE — 63710000001 INSULIN DETEMIR PER 5 UNITS: Performed by: HOSPITALIST

## 2017-10-17 PROCEDURE — 99232 SBSQ HOSP IP/OBS MODERATE 35: CPT | Performed by: HOSPITALIST

## 2017-10-17 PROCEDURE — 85025 COMPLETE CBC W/AUTO DIFF WBC: CPT | Performed by: HOSPITALIST

## 2017-10-17 RX ORDER — PIPERACILLIN SODIUM, TAZOBACTAM SODIUM 3; .375 G/15ML; G/15ML
INJECTION, POWDER, LYOPHILIZED, FOR SOLUTION INTRAVENOUS
Status: COMPLETED
Start: 2017-10-17 | End: 2017-10-17

## 2017-10-17 RX ORDER — FLUCONAZOLE 2 MG/ML
200 INJECTION, SOLUTION INTRAVENOUS DAILY
Status: DISCONTINUED | OUTPATIENT
Start: 2017-10-17 | End: 2017-10-20

## 2017-10-17 RX ORDER — ALBUTEROL SULFATE 2.5 MG/3ML
2.5 SOLUTION RESPIRATORY (INHALATION)
Status: DISCONTINUED | OUTPATIENT
Start: 2017-10-17 | End: 2017-10-18

## 2017-10-17 RX ORDER — SODIUM CHLORIDE 9 MG/ML
INJECTION, SOLUTION INTRAVENOUS
Status: COMPLETED
Start: 2017-10-17 | End: 2017-10-17

## 2017-10-17 RX ORDER — SODIUM CHLORIDE 9 MG/ML
INJECTION, SOLUTION INTRAVENOUS
Status: DISPENSED
Start: 2017-10-17 | End: 2017-10-18

## 2017-10-17 RX ORDER — CLINDAMYCIN PHOSPHATE 600 MG/50ML
600 INJECTION INTRAVENOUS EVERY 8 HOURS
Status: DISCONTINUED | OUTPATIENT
Start: 2017-10-17 | End: 2017-10-20

## 2017-10-17 RX ORDER — FLUCONAZOLE 150 MG/1
150 TABLET ORAL
Status: DISCONTINUED | OUTPATIENT
Start: 2017-10-23 | End: 2017-10-17

## 2017-10-17 RX ADMIN — ATORVASTATIN CALCIUM 40 MG: 40 TABLET, FILM COATED ORAL at 22:21

## 2017-10-17 RX ADMIN — INSULIN DETEMIR 60 UNITS: 100 INJECTION, SOLUTION SUBCUTANEOUS at 22:16

## 2017-10-17 RX ADMIN — MICONAZOLE NITRATE: 20 POWDER TOPICAL at 14:13

## 2017-10-17 RX ADMIN — METOPROLOL TARTRATE 50 MG: 50 TABLET, FILM COATED ORAL at 17:43

## 2017-10-17 RX ADMIN — LINEZOLID 600 MG: 600 INJECTION, SOLUTION INTRAVENOUS at 08:57

## 2017-10-17 RX ADMIN — POLYMYXIN B SULFATE AND TRIMETHOPRIM 1 DROP: 1; 10000 SOLUTION OPHTHALMIC at 22:13

## 2017-10-17 RX ADMIN — IPRATROPIUM BROMIDE 0.5 MG: 0.5 SOLUTION RESPIRATORY (INHALATION) at 20:20

## 2017-10-17 RX ADMIN — BUDESONIDE AND FORMOTEROL FUMARATE DIHYDRATE 2 PUFF: 160; 4.5 AEROSOL RESPIRATORY (INHALATION) at 20:20

## 2017-10-17 RX ADMIN — POLYMYXIN B SULFATE AND TRIMETHOPRIM 1 DROP: 1; 10000 SOLUTION OPHTHALMIC at 16:28

## 2017-10-17 RX ADMIN — BUMETANIDE 2 MG: 1 TABLET ORAL at 08:58

## 2017-10-17 RX ADMIN — GUAIFENESIN 600 MG: 600 TABLET, EXTENDED RELEASE ORAL at 17:43

## 2017-10-17 RX ADMIN — CLINDAMYCIN PHOSPHATE 600 MG: 12 INJECTION, SOLUTION INTRAVENOUS at 18:07

## 2017-10-17 RX ADMIN — HYDROCODONE BITARTRATE AND ACETAMINOPHEN 2 TABLET: 10; 325 TABLET ORAL at 08:59

## 2017-10-17 RX ADMIN — ALBUTEROL SULFATE 2.5 MG: 2.5 SOLUTION RESPIRATORY (INHALATION) at 23:17

## 2017-10-17 RX ADMIN — PIPERACILLIN AND TAZOBACTAM: 3; .375 INJECTION, POWDER, LYOPHILIZED, FOR SOLUTION INTRAVENOUS; PARENTERAL at 03:22

## 2017-10-17 RX ADMIN — INSULIN ASPART 8 UNITS: 100 INJECTION, SOLUTION INTRAVENOUS; SUBCUTANEOUS at 12:15

## 2017-10-17 RX ADMIN — ASPIRIN 81 MG: 81 TABLET, COATED ORAL at 09:00

## 2017-10-17 RX ADMIN — BUDESONIDE AND FORMOTEROL FUMARATE DIHYDRATE 2 PUFF: 160; 4.5 AEROSOL RESPIRATORY (INHALATION) at 08:00

## 2017-10-17 RX ADMIN — PIPERACILLIN AND TAZOBACTAM 3.38 G: 3; .375 INJECTION, POWDER, FOR SOLUTION INTRAVENOUS at 03:22

## 2017-10-17 RX ADMIN — IPRATROPIUM BROMIDE 0.5 MG: 0.5 SOLUTION RESPIRATORY (INHALATION) at 07:49

## 2017-10-17 RX ADMIN — GUAIFENESIN 600 MG: 600 TABLET, EXTENDED RELEASE ORAL at 08:58

## 2017-10-17 RX ADMIN — MICONAZOLE NITRATE: 20 POWDER TOPICAL at 22:25

## 2017-10-17 RX ADMIN — CETIRIZINE HYDROCHLORIDE 10 MG: 10 TABLET, FILM COATED ORAL at 22:21

## 2017-10-17 RX ADMIN — LEVOTHYROXINE SODIUM 224 MCG: 112 TABLET ORAL at 09:00

## 2017-10-17 RX ADMIN — Medication 1 CAPSULE: at 09:00

## 2017-10-17 RX ADMIN — ENOXAPARIN SODIUM 30 MG: 30 INJECTION SUBCUTANEOUS at 08:58

## 2017-10-17 RX ADMIN — PIPERACILLIN AND TAZOBACTAM 3.38 G: 3; .375 INJECTION, POWDER, FOR SOLUTION INTRAVENOUS at 12:14

## 2017-10-17 RX ADMIN — SODIUM CHLORIDE 40 ML: 9 INJECTION, SOLUTION INTRAVENOUS at 18:07

## 2017-10-17 RX ADMIN — SODIUM CHLORIDE: 0.9 INJECTION, SOLUTION INTRAVENOUS at 03:22

## 2017-10-17 RX ADMIN — POLYMYXIN B SULFATE AND TRIMETHOPRIM 1 DROP: 1; 10000 SOLUTION OPHTHALMIC at 12:16

## 2017-10-17 RX ADMIN — METOPROLOL TARTRATE 50 MG: 50 TABLET, FILM COATED ORAL at 09:01

## 2017-10-17 RX ADMIN — IPRATROPIUM BROMIDE 0.5 MG: 0.5 SOLUTION RESPIRATORY (INHALATION) at 15:40

## 2017-10-17 RX ADMIN — IPRATROPIUM BROMIDE 0.5 MG: 0.5 SOLUTION RESPIRATORY (INHALATION) at 11:35

## 2017-10-17 RX ADMIN — HYDROCODONE BITARTRATE AND ACETAMINOPHEN 2 TABLET: 10; 325 TABLET ORAL at 17:43

## 2017-10-17 RX ADMIN — QUETIAPINE FUMARATE 100 MG: 100 TABLET, FILM COATED ORAL at 17:43

## 2017-10-17 RX ADMIN — FLUCONAZOLE 200 MG: 2 INJECTION, SOLUTION INTRAVENOUS at 16:25

## 2017-10-17 RX ADMIN — QUETIAPINE FUMARATE 100 MG: 100 TABLET, FILM COATED ORAL at 09:00

## 2017-10-17 RX ADMIN — POLYMYXIN B SULFATE AND TRIMETHOPRIM 1 DROP: 1; 10000 SOLUTION OPHTHALMIC at 09:01

## 2017-10-17 RX ADMIN — FAMOTIDINE 20 MG: 20 TABLET, FILM COATED ORAL at 08:59

## 2017-10-17 RX ADMIN — ASCORBIC ACID, THIAMINE MONONITRATE,RIBOFLAVIN, NIACINAMIDE, PYRIDOXINE HYDROCHLORIDE, FOLIC ACID, CYANOCOBALAMIN, BIOTIN, CALCIUM PANTOTHENATE, 1 MG: 100; 1.5; 1.7; 20; 10; 1; 6000; 150000; 5 CAPSULE, LIQUID FILLED ORAL at 09:00

## 2017-10-17 NOTE — NURSING NOTE
[facility] consult received for cellulitis and intertrigo of skin folds.  Patient presents with large areas of erythematous inflammation with satellite lesions to breast folds, skin folds, bilateral groin, and under pannus. Fissures are also noted as well as excessive moisture. Currently there is no active draining or bleeding but patient states there has been yellow drainage with an odor in the past.  Patient complains of discomfort and itching.  Recommendation:  Cleanse areas with soap/water.  Dry areas well, apply dusting of miconazole powder followed by Z guard, seal with no sting barrier spray.  Place ABD pads under folders to wick moisture.  NO pillow cases please.  Spoke with Dr. Peck, patient's RN and CNA regarding treatment recommendation.  Thank you for consult and will plan to follow up later this week.  Do not hesitate to ask if you need anything further.

## 2017-10-17 NOTE — PLAN OF CARE
Problem: Skin Integrity Impairment, Risk/Actual (Adult)  Goal: Identify Related Risk Factors and Signs and Symptoms  Outcome: Ongoing (interventions implemented as appropriate)    10/17/17 1704   Skin Integrity Impairment, Risk/Actual   Skin Integrity Impairment, Risk/Actual: Related Risk Factors infection/disease process;moisture;skin disorders   Signs and Symptoms (Skin Integrity Impairment) rash;inflammation       Goal: Skin Integrity/Wound Healing  Outcome: Ongoing (interventions implemented as appropriate)    10/17/17 1704   Skin Integrity Impairment, Risk/Actual (Adult)   Skin Integrity/Wound Healing making progress toward outcome         Problem: Pain, Acute (Adult)  Goal: Identify Related Risk Factors and Signs and Symptoms  Outcome: Ongoing (interventions implemented as appropriate)    10/17/17 1704   Pain, Acute   Related Risk Factors (Acute Pain) infection;patient perception   Signs and Symptoms (Acute Pain) verbalization of pain descriptors

## 2017-10-17 NOTE — H&P
[facility] HOSPITALIST     Jina Vaughan MD    CHIEF COMPLAINT: Rash in groin    HISTORY OF PRESENT ILLNESS:    61 y/o female with dyslipidemia, hypertension, DM-2 with diabetic neuropathy, hypothyroidism, CKD stage IV, Anemia of Chronic disease, COPD, GERD, ALTA, H/O Anxiety and depression, Morbid obesity, and Chronic hypoxic respiratory failure on 4L O2 continuous at home presented to the ER secondary to worsening rash in her groin and abdomen.  The patient notes this started about 3 months ago.  In early September she was seen by her PCP and started on Doxycycline and fluconazole.  The rash she notes got a little better and she continued to use a topical barrier cream and ABD pads but over time the rash has worsened again and she notes it itches and burns.  She denies any fevers or chills or N/V/D.  She notes chronic KAYE which is unchanged from her baseline and minimal LE edema.  She denies any CP or heart palpitations. She denies any lightheadedness.  She has chronic back pain which is fairly well controlled on her home pain regimen. She was also treated for conjuntivitis in early September and notes 4-5 days ago her eyes started to get read again with purulent yellow drainage R>L.  She has crusting as well.       Past Medical History:   Diagnosis Date   • Anxiety    • Arthritis    • CHF (congestive heart failure)    • Chronic back pain    • COPD (chronic obstructive pulmonary disease)    • Depression    • Essential hypertension    • SERENA (generalized anxiety disorder)    • GERD (gastroesophageal reflux disease)    • Headache    • HLD (hyperlipidemia)    • Hypothyroidism, acquired    • Low back pain    • Neuropathy in diabetes    • Sleep apnea    • Type 2 diabetes mellitus    • Wound, open, toe 8/22/2017     Past Surgical History:   Procedure Laterality Date   • LUNG SURGERY Right    • TRACHEOSTOMY       Family History   Problem Relation Age of Onset   • Cancer Mother      bone   • Cancer  Father    • Cancer Brother      Social History   Substance Use Topics   • Smoking status: Former Smoker     Packs/day: 0.50     Years: 40.00     Types: Cigarettes     Quit date: 10/4/2010   • Smokeless tobacco: Former User     Quit date: 8/3/2012   • Alcohol use No     Prescriptions Prior to Admission   Medication Sig Dispense Refill Last Dose   • albuterol (PROVENTIL HFA;VENTOLIN HFA) 108 (90 Base) MCG/ACT inhaler Inhale 2 puffs Every 4 (Four) Hours As Needed for Wheezing. 1 inhaler 6 10/16/2017 at Unknown time   • aspirin 81 MG EC tablet Take 81 mg by mouth daily.   10/16/2017 at Unknown time   • atorvastatin (LIPITOR) 40 MG tablet Take 1 tablet by mouth Daily. For cholesterol (Patient taking differently: Take 40 mg by mouth Every Night. For cholesterol) 90 tablet 3 10/15/2017 at Unknown time   • B Complex Vitamins (VITAMIN B COMPLEX PO) Take 1 tablet by mouth daily.   10/16/2017 at Unknown time   • bumetanide (BUMEX) 2 MG tablet Take 1 tablet by mouth 2 (Two) Times a Day. (Patient taking differently: Take 2 mg by mouth Daily.) 60 tablet 0 10/16/2017 at Unknown time   • cetirizine (ZyrTEC) 10 MG tablet Take 10 mg by mouth every night.   10/15/2017 at Unknown time   • dimenhyDRINATE (DRAMAMINE) 50 MG tablet Take 1 tablet by mouth Daily As Needed for movement disorders. 30 tablet 0 Past Week at Unknown time   • famotidine (PEPCID) 20 MG tablet Take 20 mg by mouth daily.   10/16/2017 at Unknown time   • fluticasone-salmeterol (ADVAIR DISKUS) 250-50 MCG/DOSE DISKUS Inhale 1 puff 2 (Two) Times a Day. For COPD and rinse mouth after use 60 each 5 10/16/2017 at 0700   • guaiFENesin (MUCINEX) 600 MG 12 hr tablet Take 1 tablet by mouth 2 (two) times a day.   10/16/2017 at 0700   • HYDROcodone-acetaminophen (NORCO)  MG per tablet Take 2 tablets by mouth 2 (Two) Times a Day. 120 tablet 0 10/16/2017 at 0700   • INCRUSE ELLIPTA 62.5 MCG/INH aerosol powder  Inhale Daily.   10/16/2017 at Unknown time   • insulin aspart  "(NOVOLOG FLEXPEN) 100 UNIT/ML solution pen-injector sc pen SSI 15 mL 3 10/15/2017 at Unknown time   • Insulin Degludec (TRESIBA FLEXTOUCH) 200 UNIT/ML solution pen-injector Inject 70 Units under the skin Daily. (Patient taking differently: Inject 72 Units under the skin Daily.) 15 mL 3 10/15/2017 at Unknown time   • levalbuterol (XOPENEX) 0.63 MG/3ML nebulizer solution Take 1 ampule by nebulization every 4 (four) hours as needed for wheezing.   10/16/2017 at Unknown time   • levothyroxine (SYNTHROID) 112 MCG tablet Take 2 tablets by mouth Daily. 60 tablet 5 10/16/2017 at Unknown time   • metoprolol tartrate (LOPRESSOR) 50 MG tablet Take 1 tablet by mouth 2 (Two) Times a Day. For heart 180 tablet 1 10/16/2017 at 0700   • Probiotic Product (ALIGN) 4 MG capsule Take 4 mg by mouth Daily. 30 capsule 6 10/15/2017 at Unknown time   • QUEtiapine (SEROquel) 100 MG tablet Take 1 tablet by mouth 2 (Two) Times a Day. 180 tablet 1 10/16/2017 at 0700   • vitamin D (ERGOCALCIFEROL) 62495 UNITS capsule capsule Take 50,000 Units by mouth Every 7 (Seven) Days. On tuesday   Past Week at Unknown time   • Insulin Pen Needle (BD PEN NEEDLE CLAY U/F) 32G X 4 MM misc Use as directed 7 times daily 250 each 5 Taking     Allergies:  Erythromycin; Erythromycin ethylsuccinate; Ativan [lorazepam]; and Tape    REVIEW OF SYSTEMS:  Please see the above history of present illness for pertinent positives and negatives.  The remainder of the patient's systems have been reviewed and are negative.     Vital Signs  Temp:  [97.1 °F (36.2 °C)-98.6 °F (37 °C)] 97.1 °F (36.2 °C)  Heart Rate:  [75-86] 75  Resp:  [18] 18  BP: (140-146)/(58-66) 146/66  Oxygen Therapy  SpO2: 91 %  Pulse Oximetry Type: Intermittent  O2 Device: nasal cannula  Flow (L/min): 4  Body mass index is 39.22 kg/(m^2).     Flowsheet Rows         First Filed Value    Admission Height  66\" (167.6 cm) Documented at 10/16/2017 1748    Admission Weight  243 lb (110 kg) Documented at 10/16/2017 " 1748             Physical Exam:  Physical Exam   Constitutional: Patient appears well-developed, morbidly obese and in no acute distress   HEENT:   Head: Normocephalic and atraumatic.   Eyes:  Pupils are equal, round, and reactive to light. EOM are intact. Sclera and injected L>R and there is crusting and purulent yellow discharge noted L>R. There is also some pink area and crusting near the bridge of the patient's nose more on the left.  Mouth and Throat: Patient has moist mucous membranes. Oropharynx is clear of any erythema or exudate.     Neck: Neck supple. No JVD noted (difficult to eval with body habitus). No lymphadenopathy present.  Cardiovascular: Regular rate, regular rhythm, S1 normal and S2 normal.  Exam reveals no gallop and no friction rub.  No murmur heard.  Pulmonary/Chest: Lungs with diminished breath sounds throughout. No respiratory distress. No wheezes. No rhonchi. No rales.   Abdominal: Obese. Soft. Bowel sounds are normal. No mass. There is no tenderness. Musculoskeletal: Normal Muscle tone  Extremities: trace LE edema. Pulses are palpable in all 4 extremities.  Neurological: Patient is alert and oriented to person, place, and time. Cranial nerves II-XII are grossly intact with no focal deficits.  Skin: Skin is warm. Nails show no clubbing.  No cyanosis. Intertrigo and cellulitis under the patient's panus with significant erythema that wraps around to her sides and into her groin and upper thighs.  No purulent drainage appreciated.      Results Review:    I reviewed the patient's new clinical results.  Lab Results (most recent)     Procedure Component Value Units Date/Time    POC Glucose Fingerstick [[id number]]  (Abnormal) Collected:  10/16/17 1810    Specimen:  Blood Updated:  10/16/17 1816     Glucose 276 (H) mg/dL     Narrative:       Meter: [id number] : [id number] Maya Zarate NURSING ASSISTANT    Blood Culture - Blood, [[id number]] Collected:  10/16/17 1815    Specimen:  Blood from  Arm, Right Updated:  10/16/17 1820    CBC & Differential [[id number]] Collected:  10/16/17 1815    Specimen:  Blood Updated:  10/16/17 1822    Narrative:       The following orders were created for panel order CBC & Differential.  Procedure                               Abnormality         Status                     ---------                               -----------         ------                     CBC Auto Differential[[id number]]        Abnormal            Final result                 Please view results for these tests on the individual orders.    CBC Auto Differential [[id number]]  (Abnormal) Collected:  10/16/17 1815    Specimen:  Blood Updated:  10/16/17 1822     WBC 14.78 (H) 10*3/mm3      RBC 3.42 (L) 10*6/mm3      Hemoglobin 10.0 (L) g/dL      Hematocrit 31.5 (L) %      MCV 92.1 fL      MCH 29.2 pg      MCHC 31.7 g/dL      RDW 12.2 %      RDW-SD 41.1 fl      MPV 10.8 (H) fL      Platelets 189 10*3/mm3      Neutrophil % 76.1 (H) %      Lymphocyte % 13.9 (L) %      Monocyte % 6.3 %      Eosinophil % 3.0 %      Basophil % 0.3 %      Immature Grans % 0.4 %      Neutrophils, Absolute 11.26 (H) 10*3/mm3      Lymphocytes, Absolute 2.05 10*3/mm3      Monocytes, Absolute 0.93 10*3/mm3      Eosinophils, Absolute 0.44 (H) 10*3/mm3      Basophils, Absolute 0.04 10*3/mm3      Immature Grans, Absolute 0.06 (H) 10*3/mm3      nRBC 0.0 /100 WBC     Lactic Acid, Plasma [[id number]]  (Normal) Collected:  10/16/17 1816    Specimen:  Blood Updated:  10/16/17 1835     Lactate 0.9 mmol/L     Comprehensive Metabolic Panel [[id number]]  (Abnormal) Collected:  10/16/17 1815    Specimen:  Blood Updated:  10/16/17 1839     Glucose 273 (H) mg/dL      BUN 68 (H) mg/dL      Creatinine 2.15 (H) mg/dL      Sodium 136 mmol/L      Potassium 4.5 mmol/L      Chloride 95 (L) mmol/L      CO2 28.8 mmol/L      Calcium 9.1 mg/dL      Total Protein 7.0 g/dL      Albumin 3.50 g/dL      ALT (SGPT) 10 U/L      AST (SGOT) 14 U/L      Alkaline Phosphatase  76 U/L      Total Bilirubin 0.3 mg/dL      eGFR Non African Amer 23 (L) mL/min/1.73      Globulin 3.5 gm/dL      A/G Ratio 1.0 g/dL      BUN/Creatinine Ratio 31.6 (H)     Anion Gap 12.2 mmol/L     Blood Culture - Blood, [[id number]] Collected:  10/16/17 1835    Specimen:  Blood from Arm, Right Updated:  10/16/17 1903          Imaging Results (most recent)     None       ECG/EMG Results (most recent)     None            Assessment/Plan     1. Severe intertrigo and cellulitis: Patient was started on Vanco, zosyn and diflucan in the ER.  I will continue the zosyn and diflucan but change the Vanco to zyvox for now given the patient's severe renal dysfunction. Will have wound care see the patient. WBC is only mildly elevated and patient is afebrile.     2. Conjunctivitis: Appears bacterial with purulent discharge R>L.  Previously resolved with polytrim drops. Will resume and complete a new course.     3. Dyslipidemia: Continue home dose of lipitor.    5. Hypertension: BP slightly elevated, continue home BB and diuretic. Monitor.    6. DM-2 with diabetic neuropathy: Last HbA1C in August was 6.6.  Will reduce long acting insulin slightly as we are using Levemir here instead of Tresiba.  CC diet.  Start our SSI, monitor accu checks.     7. Hypothyroidism: continue home dose supplementation. TSH is August was NL.    8. CKD stage IV: Renal function appears about at baseline.  Patient has jumped around quite a bit in the last 6 months from 1.8 to 3.0. She follows with Dr. Orr outpatient. Monitor. (patient had been on Bumex BID and spironolactone but nephrology decreased her bumex dose to daily and D/Mino the spironolactone because of worsened renal function last month).    9. Anemia of Chronic disease: Hb appears about 9-11 at baseline.  Is currently at her baseline. Monitor.     10. Chronic hypoxic respiratory failure and COPD:  on 4L O2 continuous at home. No acute issues here. Will substitute symbicort for advair and  ipratropium for incruse ellipta here.  Continue albuterol PRN.    11. GERD: Continue home dose pepcid.    12. ALTA: Continue home Bipap at HS.     13. H/O Anxiety and depression: Patient on no medications for this currently.  No acute issues.    14. Morbid obesity: Nutrition to .    15. Chronic Low back pain: Patient follows with a pain MD outpatient. Continue home dose norco.  I did verify dose with previous clinic notes and ivonne reports.    16. DVT prophy: Lovenox and SCDs.    I discussed the patients findings and my recommendations with patient.     Magalys Paul MD  10/16/17  10:34 PM

## 2017-10-17 NOTE — PLAN OF CARE
Problem: Patient Care Overview (Adult)  Goal: Plan of Care Review    10/16/17 8538   Coping/Psychosocial Response Interventions   Plan Of Care Reviewed With patient   Patient Care Overview   Progress progress toward functional goals as expected

## 2017-10-17 NOTE — NURSING NOTE
10/17/17 @ 1430- I SPOKE WITH PATIENT AT BEDSIDE- SHE LIVES WITH HER SISTER AND BROTHER IN LAW IN A HOUSE AND HAS FOR ABOUT 7 YEARS- SHE HAS A WC, WALKER, SHOWER CHAIR, OXYGEN WITH JACKIE AT 4 L NC CONTINUOUS, BIPAP MACHINE, NEBULIZER, AND A  MOTORIZED SCOOTER. SHE AMBULATES INSIDE THE HOME WITHOUT AN ASSISTIVE DEVICE- SHE IS INDEPENDENT WITH HER ADLS- HER FAMILY IS SUPPORTIVE AND HELPFUL-  SHE PLANS ON RETURNING HOME WITH HER SISTER AT DISCHARGE- SHE STATES SHE DOES PURCHASE MED'S PRESCRIBED, BUT SOME ARE EXPENSIVE, ESPECIALLY THE INHALERS- I GAVE HER A PRESCRIPTION DISCOUNT CARD. I ALSO NOTIFIED HER OF DIRECT WEB SITES FOR MED'S THAT SHE COULD TRY AND GET DISCOUNT MEDICINE-(ENTERING HER INFO) I CALLED DR. STARKEY OFFICE AND SPOKE WITH KWADWO- SHE WAS NOTIFIED OF OF PATIENTS  RESP MEDICATIONS AND SHE IS GETTING INFO TO DIANA(DR. STARKEY NURSE)- WHO IS TO CHECK FOR SAMPLES AT [place] OFFICE FOR PATIENT AND GET BACK WITH CASE MANAGEMENT . NO OTHER NEEDS NOTED AT THIS TIMEDischarge Planning Assessment   Reyna Meyer     Patient Name: Jared Govea  MRN: [id number]  Today's Date: 10/17/2017    Admit Date: 10/16/2017          Discharge Needs Assessment       10/17/17 1428    Living Environment    Lives With --   LIVES WITH SISTER AND BROTHER IN LAW    Living Arrangements house    Living Environment    Provides Primary Care For no one    Quality Of Family Relationships supportive    Able to Return to Prior Living Arrangements yes    Discharge Needs Assessment    Readmission Within The Last 30 Days no previous admission in last 30 days    Equipment Currently Used at Home wheelchair;bipap/ cpap   NEBULIZER, MOTORIZED SCOOTER,            Discharge Plan     None        Discharge Placement     No information found                Demographic Summary       10/17/17 1426    Referral Information    Admission Type inpatient    Contact Information    Permission Granted to Share Information With --   SISTER ALYSSA CARROLL- [phone]     Primary Care Physician Information    Name DR. SALAZAR IN [place]            Functional Status       10/17/17 6483    Functional Status Prior    Prior Functional Level Comment INDEPENDENT WITH ADLS , DOES NOT DRIVE            Psychosocial     None            Abuse/Neglect     None            Legal     None            Substance Abuse     None            Patient Forms     None          Radha Cm, RN

## 2017-10-17 NOTE — PLAN OF CARE
Problem: Skin Integrity Impairment, Risk/Actual (Adult)  Goal: Identify Related Risk Factors and Signs and Symptoms  Outcome: Ongoing (interventions implemented as appropriate)    10/17/17 1206   Skin Integrity Impairment, Risk/Actual   Skin Integrity Impairment, Risk/Actual: Related Risk Factors infection/disease process;moisture;fluid/nutrition status;skin disorders   Signs and Symptoms (Skin Integrity Impairment) erythema nonblanchable;rash;inflammation       Goal: Skin Integrity/Wound Healing  Outcome: Ongoing (interventions implemented as appropriate)    10/17/17 1206   Skin Integrity Impairment, Risk/Actual (Adult)   Skin Integrity/Wound Healing making progress toward outcome

## 2017-10-17 NOTE — PLAN OF CARE
Problem: Patient Care Overview (Adult)  Goal: Discharge Needs Assessment  Outcome: Ongoing (interventions implemented as appropriate)    10/16/17 2033 10/17/17 1428 10/17/17 1445   Discharge Needs Assessment   Readmission Within The Last 30 Days --  no previous admission in last 30 days --    Discharge Planning Comments --  --  HOME WITH FAMILY AT DISCHARGE   Living Environment   Transportation Available family or friend will provide --  --    Self-Care   Equipment Currently Used at Home --  wheelchair;bipap/ cpap  (NEBULIZER, MOTORIZED SCOOTER,) --

## 2017-10-17 NOTE — PROGRESS NOTES
"Hospitalist Team      Patient Care Team:  Jina Vaughan MD as PCP - General (Internal Medicine)  Jhonny Hoyt MD as Consulting Physician (Pulmonary Disease)  OPAL Arias as Nurse Practitioner (Endocrinology)  Konstantin Orr MD as Consulting Physician (Nephrology)  Vargas Ledezma MD as Consulting Physician (Pain Medicine)  Trinidad Barreto MD as Consulting Physician (Cardiology)        Chief Complaint:  Follow-up cellulitis    Subjective    Feels much better today!  Less burning and pain.  Didn't sleep well last evening.  Denies chest pain, chronic dyspnea unchanged.  Tolerating PO.    Objective    Vital Signs  Temp:  [97.1 °F (36.2 °C)-98.6 °F (37 °C)] 98.2 °F (36.8 °C)  Heart Rate:  [67-86] 67  Resp:  [16-18] 16  BP: (124-154)/(58-66) 124/64  Oxygen Therapy  SpO2: 91 %  Pulse Oximetry Type: Intermittent  O2 Device: nasal cannula  Flow (L/min): 4}    Flowsheet Rows         First Filed Value    Admission Height  66\" (167.6 cm) Documented at 10/16/2017 1748    Admission Weight  243 lb (110 kg) Documented at 10/16/2017 1748          Physical Exam:    General Appearance:    Alert, cooperative, in no acute distress   Lungs:     Diminished to auscultation,respirations regular, even and         unlabored    Heart:    Regular rhythm and normal rate, normal S1 and S2, no            murmur, no gallop, no rub, no click   Abdomen:     Morbidly obese w/ active bowel sounds   Rectal:     Deferred   Extremities:   Moves all extremities well, no edema, no cyanosis, no             redness   Pulses:   Pulses palpable and equal bilaterally   Skin:   Erythema slightly decreased   Neurologic:   Cranial nerves 2 - 12 grossly intact         Results Review:     I reviewed the patient's new clinical results.  Lab Results (last 24 hours)     Procedure Component Value Units Date/Time    POC Glucose Fingerstick [[id number]]  (Abnormal) Collected:  10/16/17 1810    Specimen:  Blood Updated:  10/16/17 1816     Glucose " 276 (H) mg/dL     Narrative:       Meter: [id number] : [id number] Maya Zarate NURSING ASSISTANT    CBC & Differential [[id number]] Collected:  10/16/17 1815    Specimen:  Blood Updated:  10/16/17 1822    Narrative:       The following orders were created for panel order CBC & Differential.  Procedure                               Abnormality         Status                     ---------                               -----------         ------                     CBC Auto Differential[[id number]]        Abnormal            Final result                 Please view results for these tests on the individual orders.    CBC Auto Differential [[id number]]  (Abnormal) Collected:  10/16/17 1815    Specimen:  Blood Updated:  10/16/17 1822     WBC 14.78 (H) 10*3/mm3      RBC 3.42 (L) 10*6/mm3      Hemoglobin 10.0 (L) g/dL      Hematocrit 31.5 (L) %      MCV 92.1 fL      MCH 29.2 pg      MCHC 31.7 g/dL      RDW 12.2 %      RDW-SD 41.1 fl      MPV 10.8 (H) fL      Platelets 189 10*3/mm3      Neutrophil % 76.1 (H) %      Lymphocyte % 13.9 (L) %      Monocyte % 6.3 %      Eosinophil % 3.0 %      Basophil % 0.3 %      Immature Grans % 0.4 %      Neutrophils, Absolute 11.26 (H) 10*3/mm3      Lymphocytes, Absolute 2.05 10*3/mm3      Monocytes, Absolute 0.93 10*3/mm3      Eosinophils, Absolute 0.44 (H) 10*3/mm3      Basophils, Absolute 0.04 10*3/mm3      Immature Grans, Absolute 0.06 (H) 10*3/mm3      nRBC 0.0 /100 WBC     Lactic Acid, Plasma [[id number]]  (Normal) Collected:  10/16/17 1816    Specimen:  Blood Updated:  10/16/17 1835     Lactate 0.9 mmol/L     Comprehensive Metabolic Panel [[id number]]  (Abnormal) Collected:  10/16/17 1815    Specimen:  Blood Updated:  10/16/17 1839     Glucose 273 (H) mg/dL      BUN 68 (H) mg/dL      Creatinine 2.15 (H) mg/dL      Sodium 136 mmol/L      Potassium 4.5 mmol/L      Chloride 95 (L) mmol/L      CO2 28.8 mmol/L      Calcium 9.1 mg/dL      Total Protein 7.0 g/dL      Albumin 3.50 g/dL       ALT (SGPT) 10 U/L      AST (SGOT) 14 U/L      Alkaline Phosphatase 76 U/L      Total Bilirubin 0.3 mg/dL      eGFR Non African Amer 23 (L) mL/min/1.73      Globulin 3.5 gm/dL      A/G Ratio 1.0 g/dL      BUN/Creatinine Ratio 31.6 (H)     Anion Gap 12.2 mmol/L     POC Glucose Fingerstick [[id number]]  (Abnormal) Collected:  10/16/17 2243    Specimen:  Blood Updated:  10/16/17 2249     Glucose 384 (H) mg/dL     Narrative:       Meter: [id number] : [id number] Waylon Tillman    CBC Auto Differential [[id number]]  (Abnormal) Collected:  10/17/17 0408    Specimen:  Blood Updated:  10/17/17 0439     WBC 12.70 (H) 10*3/mm3      RBC 3.15 (L) 10*6/mm3      Hemoglobin 9.2 (L) g/dL      Hematocrit 28.9 (L) %      MCV 91.7 fL      MCH 29.2 pg      MCHC 31.8 g/dL      RDW 12.5 %      RDW-SD 41.5 fl      MPV 10.9 (H) fL      Platelets 181 10*3/mm3      Neutrophil % 72.9 (H) %      Lymphocyte % 16.1 (L) %      Monocyte % 7.2 %      Eosinophil % 3.2 %      Basophil % 0.3 %      Immature Grans % 0.3 %      Neutrophils, Absolute 9.25 (H) 10*3/mm3      Lymphocytes, Absolute 2.05 10*3/mm3      Monocytes, Absolute 0.91 10*3/mm3      Eosinophils, Absolute 0.41 (H) 10*3/mm3      Basophils, Absolute 0.04 10*3/mm3      Immature Grans, Absolute 0.04 (H) 10*3/mm3      nRBC 0.0 /100 WBC     Basic Metabolic Panel [[id number]]  (Abnormal) Collected:  10/17/17 0408    Specimen:  Blood Updated:  10/17/17 0534     Glucose 129 (H) mg/dL      BUN 68 (H) mg/dL      Creatinine 1.94 (H) mg/dL      Sodium 140 mmol/L      Potassium 4.0 mmol/L      Chloride 100 mmol/L      CO2 28.0 mmol/L      Calcium 8.9 mg/dL      eGFR Non African Amer 26 (L) mL/min/1.73      BUN/Creatinine Ratio 35.1 (H)     Anion Gap 12.0 mmol/L     Narrative:       GFR Normal >60  Chronic Kidney Disease <60  Kidney Failure <15    Blood Culture - Blood, [[id number]]  (Normal) Collected:  10/16/17 1815    Specimen:  Blood from Arm, Right Updated:  10/17/17 0631     Blood Culture No  growth at less than 24 hours    Blood Culture - Blood, [[id number]]  (Normal) Collected:  10/16/17 1835    Specimen:  Blood from Arm, Right Updated:  10/17/17 0716     Blood Culture No growth at less than 24 hours    POC Glucose Fingerstick [[id number]]  (Normal) Collected:  10/17/17 0839    Specimen:  Blood Updated:  10/17/17 0848     Glucose 82 mg/dL     Narrative:       Meter: [id number] : [id number] FaPublikDemand NA CERT    POC Glucose Fingerstick [[id number]]  (Abnormal) Collected:  10/17/17 1149    Specimen:  Blood Updated:  10/17/17 1204     Glucose 232 (H) mg/dL     Narrative:       Meter: [id number] : [id number] [employer] NA CERT          Imaging Results (last 24 hours)     ** No results found for the last 24 hours. **            ECG/EMG Results (most recent)     None          Medication Review:   I have reviewed the patient's current medication list    Current Facility-Administered Medications:   •  albuterol (PROVENTIL) nebulizer solution 0.083% 2.5 mg/3mL, 2.5 mg, Nebulization, Q4H PRN, Magalys Paul MD  •  aspirin EC tablet 81 mg, 81 mg, Oral, Daily, Magalys Paul MD, 81 mg at 10/17/17 0900  •  atorvastatin (LIPITOR) tablet 40 mg, 40 mg, Oral, Nightly, Magalys Paul MD, 40 mg at 10/16/17 2301  •  b complex-C-folic acid capsule 1 mg, 1 capsule, Oral, Daily, Magalys Paul MD, 1 mg at 10/17/17 0900  •  budesonide-formoterol (SYMBICORT) 160-4.5 MCG/ACT inhaler 2 puff, 2 puff, Inhalation, BID - RT, Magalys Paul MD, 2 puff at 10/17/17 0800  •  bumetanide (BUMEX) tablet 2 mg, 2 mg, Oral, Daily, Magalys Paul MD, 2 mg at 10/17/17 0858  •  cetirizine (zyrTEC) tablet 10 mg, 10 mg, Oral, Nightly, Magalys Paul MD, 10 mg at 10/16/17 2301  •  clindamycin (CLEOCIN) 600 mg in dextrose 5% 50 mL IVPB (premix), 600 mg, Intravenous, Q8H, Stanley Mendoza MD  •  dextrose (D50W) solution 25 g, 25 g, Intravenous,  Q15 Min PRN, Magalys Paul MD  •  dextrose (GLUTOSE) oral gel 15 g, 15 g, Oral, Q15 Min PRN, Magalys Paul MD  •  enoxaparin (LOVENOX) syringe 30 mg, 30 mg, Subcutaneous, Daily, Magalys Paul MD, 30 mg at 10/17/17 0858  •  famotidine (PEPCID) tablet 20 mg, 20 mg, Oral, Daily, Magalys Paul MD, 20 mg at 10/17/17 0859  •  fluconazole (DIFLUCAN) IVPB 200 mg, 200 mg, Intravenous, Daily, Stanley Mendoza MD  •  glucagon (GLUCAGEN) injection 1 mg, 1 mg, Subcutaneous, Q15 Min PRN, Magalys Paul MD  •  guaiFENesin (MUCINEX) 12 hr tablet 600 mg, 600 mg, Oral, BID, Magalys Paul MD, 600 mg at 10/17/17 0858  •  HYDROcodone-acetaminophen (NORCO)  MG per tablet 2 tablet, 2 tablet, Oral, BID, Magalys Paul MD, 2 tablet at 10/17/17 0859  •  insulin aspart (novoLOG) injection 0-24 Units, 0-24 Units, Subcutaneous, 4x Daily AC & at Bedtime, Magalys Paul MD, 8 Units at 10/17/17 1215  •  insulin detemir (LEVEMIR) injection 60 Units, 60 Units, Subcutaneous, Nightly, Magalys Paul MD, 60 Units at 10/16/17 2302  •  ipratropium (ATROVENT) nebulizer solution 0.5 mg, 0.5 mg, Nebulization, 4x Daily - RT, Magalys Paul MD, 0.5 mg at 10/17/17 1135  •  lactobacillus acidophilus (RISAQUAD) capsule 1 capsule, 1 capsule, Oral, Daily, Magalys Paul MD, 1 capsule at 10/17/17 0900  •  levothyroxine (SYNTHROID, LEVOTHROID) tablet 224 mcg, 224 mcg, Oral, Daily, Magalys Paul MD, 224 mcg at 10/17/17 0900  •  metoprolol tartrate (LOPRESSOR) tablet 50 mg, 50 mg, Oral, BID, Magalys Paul MD, 50 mg at 10/17/17 0901  •  miconazole (MICOTIN) 2 % powder, , Topical, Q12H, Payam Peck MD  •  Pharmacy Consult - Pharmacy to dose, , Does not apply, Continuous PRN, Magalys Paul MD  •  Pharmacy Consult - Pharmacy to dose, , Does not apply, Continuous  PRN, Magalys Paul MD  •  QUEtiapine (SEROquel) tablet 100 mg, 100 mg, Oral, BID, Magalys Paul MD, 100 mg at 10/17/17 0900  •  sodium chloride 0.9 % flush 1-10 mL, 1-10 mL, Intravenous, PRN, Magalys Paul MD  •  Insert peripheral IV, , , Once **AND** sodium chloride 0.9 % flush 10 mL, 10 mL, Intravenous, PRN, Violet Lee PA-C  •  sodium chloride 0.9 % infusion 40 mL, 40 mL, Intravenous, PRN, Magalys Paul MD  •  trimethoprim-polymyxin b (POLYTRIM) 57411-5.1 UNIT/ML-% ophthalmic solution 1 drop, 1 drop, Both Eyes, Q4H, Magalys Paul MD, 1 drop at 10/17/17 1216      Assessment/Plan     1. Severe intertrigo and cellulitis:  Slowly resolving.  Appreciate ID and WCON help!  On probiotic.     2. Conjunctivitis:  Continue eye drop regimen.     3. Dyslipidemia:  Continue statin therapy.     4. Hypertension:  At goal.  No change in therapy.     5. DM-2 with diabetic neuropathy: AM and bedsides at goal today.  No change in regimen.  A1c at goal.     6. Hypothyroidism:  Continue current dose replacement.     7. CKD stage IV: Creatinine around baseline.  Continue to monitor.      8. Chronic hypoxic respiratory failure and COPD:  Nothing acute.  Continues on baseline O2.     9. GERD:  Continue current dose Pepcid.     10. ALTA: Continue BiPAP w/ sleep.     11. Morbid obesity: Nutrition to .     12. Chronic Low back pain:  Nothing acute.     Plan for disposition: Home when ready    Payam Peck MD  10/17/17  3:19 PM

## 2017-10-17 NOTE — PAYOR COMM NOTE
"Estuardo Govea (62 y.o. Female)     ATTN: NURSE REVIEWER  REF#[id number]  [facility] FOR INPT REVIEW. PLEASE REPLY TO JAC ALANIS AT FAX#[phone] OR PHONE#[phone].      Date of Birth Social Security Number Address Home Phone MRN    1955  [id number] [address]  [place] [address] [phone] [id number]    Confucianism Marital Status          None        Admission Date Admission Type Admitting Provider Attending Provider Department, Room/Bed    10/16/17 Emergency Magalys Paul MD Engelhardt, Magalys Paige MD [facility] MED SURG, 1417/1    Discharge Date Discharge Disposition Discharge Destination                      Attending Provider: Magalys Paul MD     Allergies:  Erythromycin, Erythromycin Ethylsuccinate, Ativan [Lorazepam], Tape    Isolation:  Contact   Infection:  MRSA/History Only (08/05/16)   Code Status:  FULL    Ht:  66\" (167.6 cm)   Wt:  243 lb (110 kg)    Admission Cmt:  None   Principal Problem:  None                Active Insurance as of 10/16/2017     Primary Coverage     Payor Plan Insurance Group Employer/Plan Group    [employer] EMPL[employer]EE [id number]     Payor Plan Address Payor Plan Phone Number Effective From Effective To    [address] [phone] 1/1/2015     [place] [address]       Subscriber Name Subscriber Birth Date Member ID       ESTUARDO GOVEA 1955 [id number]                 Emergency Contacts      (Rel.) Home Phone Work Phone Mobile Phone    Sania Goins (Sister) -- -- [phone]               History & Physical      Magalys Paul MD at 10/16/2017  8:29 PM          [facility] MEDICAL [facility] HOSPITALIST     Jina Vaughan MD    CHIEF COMPLAINT: Rash in groin    HISTORY OF PRESENT ILLNESS:    63 y/o female with dyslipidemia, hypertension, DM-2 with diabetic neuropathy, hypothyroidism, CKD stage IV, Anemia of Chronic disease, COPD, GERD, ALTA, H/O " Anxiety and depression, Morbid obesity, and Chronic hypoxic respiratory failure on 4L O2 continuous at home presented to the ER secondary to worsening rash in her groin and abdomen.  The patient notes this started about 3 months ago.  In early September she was seen by her PCP and started on Doxycycline and fluconazole.  The rash she notes got a little better and she continued to use a topical barrier cream and ABD pads but over time the rash has worsened again and she notes it itches and burns.  She denies any fevers or chills or N/V/D.  She notes chronic KAYE which is unchanged from her baseline and minimal LE edema.  She denies any CP or heart palpitations. She denies any lightheadedness.  She has chronic back pain which is fairly well controlled on her home pain regimen. She was also treated for conjuntivitis in early September and notes 4-5 days ago her eyes started to get read again with purulent yellow drainage R>L.  She has crusting as well.       Past Medical History:   Diagnosis Date   • Anxiety    • Arthritis    • CHF (congestive heart failure)    • Chronic back pain    • COPD (chronic obstructive pulmonary disease)    • Depression    • Essential hypertension    • SERENA (generalized anxiety disorder)    • GERD (gastroesophageal reflux disease)    • Headache    • HLD (hyperlipidemia)    • Hypothyroidism, acquired    • Low back pain    • Neuropathy in diabetes    • Sleep apnea    • Type 2 diabetes mellitus    • Wound, open, toe 8/22/2017     Past Surgical History:   Procedure Laterality Date   • LUNG SURGERY Right    • TRACHEOSTOMY       Family History   Problem Relation Age of Onset   • Cancer Mother      bone   • Cancer Father    • Cancer Brother      Social History   Substance Use Topics   • Smoking status: Former Smoker     Packs/day: 0.50     Years: 40.00     Types: Cigarettes     Quit date: 10/4/2010   • Smokeless tobacco: Former User     Quit date: 8/3/2012   • Alcohol use No     Prescriptions Prior to  Admission   Medication Sig Dispense Refill Last Dose   • albuterol (PROVENTIL HFA;VENTOLIN HFA) 108 (90 Base) MCG/ACT inhaler Inhale 2 puffs Every 4 (Four) Hours As Needed for Wheezing. 1 inhaler 6 10/16/2017 at Unknown time   • aspirin 81 MG EC tablet Take 81 mg by mouth daily.   10/16/2017 at Unknown time   • atorvastatin (LIPITOR) 40 MG tablet Take 1 tablet by mouth Daily. For cholesterol (Patient taking differently: Take 40 mg by mouth Every Night. For cholesterol) 90 tablet 3 10/15/2017 at Unknown time   • B Complex Vitamins (VITAMIN B COMPLEX PO) Take 1 tablet by mouth daily.   10/16/2017 at Unknown time   • bumetanide (BUMEX) 2 MG tablet Take 1 tablet by mouth 2 (Two) Times a Day. (Patient taking differently: Take 2 mg by mouth Daily.) 60 tablet 0 10/16/2017 at Unknown time   • cetirizine (ZyrTEC) 10 MG tablet Take 10 mg by mouth every night.   10/15/2017 at Unknown time   • dimenhyDRINATE (DRAMAMINE) 50 MG tablet Take 1 tablet by mouth Daily As Needed for movement disorders. 30 tablet 0 Past Week at Unknown time   • famotidine (PEPCID) 20 MG tablet Take 20 mg by mouth daily.   10/16/2017 at Unknown time   • fluticasone-salmeterol (ADVAIR DISKUS) 250-50 MCG/DOSE DISKUS Inhale 1 puff 2 (Two) Times a Day. For COPD and rinse mouth after use 60 each 5 10/16/2017 at 0700   • guaiFENesin (MUCINEX) 600 MG 12 hr tablet Take 1 tablet by mouth 2 (two) times a day.   10/16/2017 at 0700   • HYDROcodone-acetaminophen (NORCO)  MG per tablet Take 2 tablets by mouth 2 (Two) Times a Day. 120 tablet 0 10/16/2017 at 0700   • INCRUSE ELLIPTA 62.5 MCG/INH aerosol powder  Inhale Daily.   10/16/2017 at Unknown time   • insulin aspart (NOVOLOG FLEXPEN) 100 UNIT/ML solution pen-injector sc pen SSI 15 mL 3 10/15/2017 at Unknown time   • Insulin Degludec (TRESIBA FLEXTOUCH) 200 UNIT/ML solution pen-injector Inject 70 Units under the skin Daily. (Patient taking differently: Inject 72 Units under the skin Daily.) 15 mL 3 10/15/2017  "at Unknown time   • levalbuterol (XOPENEX) 0.63 MG/3ML nebulizer solution Take 1 ampule by nebulization every 4 (four) hours as needed for wheezing.   10/16/2017 at Unknown time   • levothyroxine (SYNTHROID) 112 MCG tablet Take 2 tablets by mouth Daily. 60 tablet 5 10/16/2017 at Unknown time   • metoprolol tartrate (LOPRESSOR) 50 MG tablet Take 1 tablet by mouth 2 (Two) Times a Day. For heart 180 tablet 1 10/16/2017 at 0700   • Probiotic Product (ALIGN) 4 MG capsule Take 4 mg by mouth Daily. 30 capsule 6 10/15/2017 at Unknown time   • QUEtiapine (SEROquel) 100 MG tablet Take 1 tablet by mouth 2 (Two) Times a Day. 180 tablet 1 10/16/2017 at 0700   • vitamin D (ERGOCALCIFEROL) 50884 UNITS capsule capsule Take 50,000 Units by mouth Every 7 (Seven) Days. On tuesday   Past Week at Unknown time   • Insulin Pen Needle (BD PEN NEEDLE CLAY U/F) 32G X 4 MM misc Use as directed 7 times daily 250 each 5 Taking     Allergies:  Erythromycin; Erythromycin ethylsuccinate; Ativan [lorazepam]; and Tape    REVIEW OF SYSTEMS:  Please see the above history of present illness for pertinent positives and negatives.  The remainder of the patient's systems have been reviewed and are negative.     Vital Signs  Temp:  [97.1 °F (36.2 °C)-98.6 °F (37 °C)] 97.1 °F (36.2 °C)  Heart Rate:  [75-86] 75  Resp:  [18] 18  BP: (140-146)/(58-66) 146/66  Oxygen Therapy  SpO2: 91 %  Pulse Oximetry Type: Intermittent  O2 Device: nasal cannula  Flow (L/min): 4  Body mass index is 39.22 kg/(m^2).     Flowsheet Rows         First Filed Value    Admission Height  66\" (167.6 cm) Documented at 10/16/2017 1748    Admission Weight  243 lb (110 kg) Documented at 10/16/2017 1748             Physical Exam:  Physical Exam   Constitutional: Patient appears well-developed, morbidly obese and in no acute distress   HEENT:   Head: Normocephalic and atraumatic.   Eyes:  Pupils are equal, round, and reactive to light. EOM are intact. Sclera and injected L>R and there is " crusting and purulent yellow discharge noted L>R. There is also some pink area and crusting near the bridge of the patient's nose more on the left.  Mouth and Throat: Patient has moist mucous membranes. Oropharynx is clear of any erythema or exudate.     Neck: Neck supple. No JVD noted (difficult to eval with body habitus). No lymphadenopathy present.  Cardiovascular: Regular rate, regular rhythm, S1 normal and S2 normal.  Exam reveals no gallop and no friction rub.  No murmur heard.  Pulmonary/Chest: Lungs with diminished breath sounds throughout. No respiratory distress. No wheezes. No rhonchi. No rales.   Abdominal: Obese. Soft. Bowel sounds are normal. No mass. There is no tenderness. Musculoskeletal: Normal Muscle tone  Extremities: trace LE edema. Pulses are palpable in all 4 extremities.  Neurological: Patient is alert and oriented to person, place, and time. Cranial nerves II-XII are grossly intact with no focal deficits.  Skin: Skin is warm. Nails show no clubbing.  No cyanosis. Intertrigo and cellulitis under the patient's panus with significant erythema that wraps around to her sides and into her groin and upper thighs.  No purulent drainage appreciated.      Results Review:    I reviewed the patient's new clinical results.  Lab Results (most recent)     Procedure Component Value Units Date/Time    POC Glucose Fingerstick [[id number]]  (Abnormal) Collected:  10/16/17 1810    Specimen:  Blood Updated:  10/16/17 1816     Glucose 276 (H) mg/dL     Narrative:       Meter: [id number] : [id number] Maya Zarate NURSING ASSISTANT    Blood Culture - Blood, [[id number]] Collected:  10/16/17 1815    Specimen:  Blood from Arm, Right Updated:  10/16/17 1820    CBC & Differential [[id number]] Collected:  10/16/17 1815    Specimen:  Blood Updated:  10/16/17 1822    Narrative:       The following orders were created for panel order CBC & Differential.  Procedure                               Abnormality         Status                      ---------                               -----------         ------                     CBC Auto Differential[[id number]]        Abnormal            Final result                 Please view results for these tests on the individual orders.    CBC Auto Differential [[id number]]  (Abnormal) Collected:  10/16/17 1815    Specimen:  Blood Updated:  10/16/17 1822     WBC 14.78 (H) 10*3/mm3      RBC 3.42 (L) 10*6/mm3      Hemoglobin 10.0 (L) g/dL      Hematocrit 31.5 (L) %      MCV 92.1 fL      MCH 29.2 pg      MCHC 31.7 g/dL      RDW 12.2 %      RDW-SD 41.1 fl      MPV 10.8 (H) fL      Platelets 189 10*3/mm3      Neutrophil % 76.1 (H) %      Lymphocyte % 13.9 (L) %      Monocyte % 6.3 %      Eosinophil % 3.0 %      Basophil % 0.3 %      Immature Grans % 0.4 %      Neutrophils, Absolute 11.26 (H) 10*3/mm3      Lymphocytes, Absolute 2.05 10*3/mm3      Monocytes, Absolute 0.93 10*3/mm3      Eosinophils, Absolute 0.44 (H) 10*3/mm3      Basophils, Absolute 0.04 10*3/mm3      Immature Grans, Absolute 0.06 (H) 10*3/mm3      nRBC 0.0 /100 WBC     Lactic Acid, Plasma [[id number]]  (Normal) Collected:  10/16/17 1816    Specimen:  Blood Updated:  10/16/17 1835     Lactate 0.9 mmol/L     Comprehensive Metabolic Panel [[id number]]  (Abnormal) Collected:  10/16/17 1815    Specimen:  Blood Updated:  10/16/17 1839     Glucose 273 (H) mg/dL      BUN 68 (H) mg/dL      Creatinine 2.15 (H) mg/dL      Sodium 136 mmol/L      Potassium 4.5 mmol/L      Chloride 95 (L) mmol/L      CO2 28.8 mmol/L      Calcium 9.1 mg/dL      Total Protein 7.0 g/dL      Albumin 3.50 g/dL      ALT (SGPT) 10 U/L      AST (SGOT) 14 U/L      Alkaline Phosphatase 76 U/L      Total Bilirubin 0.3 mg/dL      eGFR Non African Amer 23 (L) mL/min/1.73      Globulin 3.5 gm/dL      A/G Ratio 1.0 g/dL      BUN/Creatinine Ratio 31.6 (H)     Anion Gap 12.2 mmol/L     Blood Culture - Blood, [[id number]] Collected:  10/16/17 9916    Specimen:  Blood from Arm, Right  Updated:  10/16/17 1903          Imaging Results (most recent)     None       ECG/EMG Results (most recent)     None            Assessment/Plan     1. Severe intertrigo and cellulitis: Patient was started on Vanco, zosyn and diflucan in the ER.  I will continue the zosyn and diflucan but change the Vanco to zyvox for now given the patient's severe renal dysfunction. Will have wound care see the patient. WBC is only mildly elevated and patient is afebrile.     2. Conjunctivitis: Appears bacterial with purulent discharge R>L.  Previously resolved with polytrim drops. Will resume and complete a new course.     3. Dyslipidemia: Continue home dose of lipitor.    5. Hypertension: BP slightly elevated, continue home BB and diuretic. Monitor.    6. DM-2 with diabetic neuropathy: Last HbA1C in August was 6.6.  Will reduce long acting insulin slightly as we are using Levemir here instead of Tresiba.  CC diet.  Start our SSI, monitor accu checks.     7. Hypothyroidism: continue home dose supplementation. TSH is August was NL.    8. CKD stage IV: Renal function appears about at baseline.  Patient has jumped around quite a bit in the last 6 months from 1.8 to 3.0. She follows with Dr. Orr outpatient. Monitor. (patient had been on Bumex BID and spironolactone but nephrology decreased her bumex dose to daily and D/Mino the spironolactone because of worsened renal function last month).    9. Anemia of Chronic disease: Hb appears about 9-11 at baseline.  Is currently at her baseline. Monitor.     10. Chronic hypoxic respiratory failure and COPD:  on 4L O2 continuous at home. No acute issues here. Will substitute symbicort for advair and ipratropium for incruse ellipta here.  Continue albuterol PRN.    11. GERD: Continue home dose pepcid.    12. ALTA: Continue home Bipap at HS.     13. H/O Anxiety and depression: Patient on no medications for this currently.  No acute issues.    14. Morbid obesity: Nutrition to .    15.  Chronic Low back pain: Patient follows with a pain MD outpatient. Continue home dose norco.  I did verify dose with previous clinic notes and ivonne reports.    16. DVT prophy: Lovenox and SCDs.    I discussed the patients findings and my recommendations with patient.     Magalys Paul MD  10/16/17  10:34 PM           Electronically signed by Magalys Paul MD at 10/16/2017 10:34 PM        Hospital Medications (all)       Dose Frequency Start End    albuterol (PROVENTIL) nebulizer solution 0.083% 2.5 mg/3mL 2.5 mg Every 4 Hours PRN 10/16/2017     Sig - Route: Take 2.5 mg by nebulization Every 4 (Four) Hours As Needed for Wheezing or Shortness of Air. - Nebulization    aspirin EC tablet 81 mg 81 mg Daily 10/17/2017     Sig - Route: Take 1 tablet by mouth Daily. - Oral    atorvastatin (LIPITOR) tablet 40 mg 40 mg Nightly 10/16/2017     Sig - Route: Take 1 tablet by mouth Every Night. - Oral    b complex-C-folic acid capsule 1 mg 1 capsule Daily 10/17/2017     Sig - Route: Take 1 capsule by mouth Daily. - Oral    budesonide-formoterol (SYMBICORT) 160-4.5 MCG/ACT inhaler 2 puff 2 puff 2 Times Daily - RT 10/16/2017     Sig - Route: Inhale 2 puffs 2 (Two) Times a Day. - Inhalation    bumetanide (BUMEX) tablet 2 mg 2 mg Daily 10/17/2017     Sig - Route: Take 2 tablets by mouth Daily. - Oral    cetirizine (zyrTEC) tablet 10 mg 10 mg Nightly 10/16/2017     Sig - Route: Take 1 tablet by mouth Every Night. - Oral    dextrose (D50W) solution 25 g 25 g Every 15 Minutes PRN 10/16/2017     Sig - Route: Infuse 50 mL into a venous catheter Every 15 (Fifteen) Minutes As Needed for Low Blood Sugar (Blood Sugar Less Than 70, Patient Has IV Access - Unresponsive, NPO or Unable To Safely Swallow). - Intravenous    dextrose (GLUTOSE) oral gel 15 g 15 g Every 15 Minutes PRN 10/16/2017     Sig - Route: Take 15 g by mouth Every 15 (Fifteen) Minutes As Needed for Low Blood Sugar (Blood Sugar Less Than 70, Patient  Alert, Is Not NPO & Can Safely Swallow). - Oral    enoxaparin (LOVENOX) syringe 30 mg 30 mg Daily 10/17/2017     Sig - Route: Inject 0.3 mL under the skin Daily. - Subcutaneous    famotidine (PEPCID) tablet 20 mg 20 mg Daily 10/17/2017     Sig - Route: Take 1 tablet by mouth Daily. - Oral    fluconazole (DIFLUCAN) 200-0.9 MG/100ML-% IVPB  - ADS Override Pull   10/16/2017 10/16/2017    Notes to Pharmacy: Cherelle Alba: cabinet override    fluconazole (DIFLUCAN) IVPB 400 mg 400 mg Once 10/16/2017 10/17/2017    Sig - Route: Infuse 200 mL into a venous catheter 1 (One) Time. - Intravenous    fluconazole (DIFLUCAN) tablet 150 mg 150 mg Every 7 Days 10/23/2017     Sig - Route: Take 1 tablet by mouth Every 7 (Seven) Days. - Oral    Cosign for Ordering: Required by Magalys Paul MD    glucagon (GLUCAGEN) injection 1 mg 1 mg Every 15 Minutes PRN 10/16/2017     Sig - Route: Inject 1 mg under the skin Every 15 (Fifteen) Minutes As Needed (Blood Glucose Less Than 70 - Patient Without IV Access - Unresponsive, NPO or Unable To Safely Swallow). - Subcutaneous    guaiFENesin (MUCINEX) 12 hr tablet 600 mg 600 mg 2 Times Daily 10/16/2017     Sig - Route: Take 1 tablet by mouth 2 (Two) Times a Day. - Oral    HYDROcodone-acetaminophen (NORCO)  MG per tablet 2 tablet 2 tablet 2 Times Daily 10/16/2017 10/26/2017    Sig - Route: Take 2 tablets by mouth 2 (Two) Times a Day. - Oral    insulin aspart (novoLOG) injection 0-24 Units 0-24 Units 4 Times Daily Before Meals & Nightly 10/16/2017     Sig - Route: Inject 0-24 Units under the skin 4 (Four) Times a Day Before Meals & at Bedtime. - Subcutaneous    insulin detemir (LEVEMIR) injection 60 Units 60 Units Nightly 10/16/2017     Sig - Route: Inject 60 Units under the skin Every Night. - Subcutaneous    ipratropium (ATROVENT) nebulizer solution 0.5 mg 0.5 mg 4 Times Daily - RT 10/16/2017     Sig - Route: Take 2.5 mL by nebulization 4 (Four) Times a Day. - Nebulization     ipratropium-albuterol (DUO-NEB) nebulizer solution 3 mL 3 mL Once 10/16/2017 10/16/2017    Sig - Route: Take 3 mL by nebulization 1 (One) Time. - Nebulization    Cosign for Ordering: Accepted by Brain Donis MD on 10/17/2017 12:06 AM    lactobacillus acidophilus (RISAQUAD) capsule 1 capsule 1 capsule Daily 10/17/2017     Sig - Route: Take 1 capsule by mouth Daily. - Oral    levothyroxine (SYNTHROID, LEVOTHROID) tablet 224 mcg 224 mcg Daily 10/17/2017 7/11/2018    Sig - Route: Take 2 tablets by mouth Daily. - Oral    Linezolid (ZYVOX) 600 mg 300 mL 600 mg Every 12 Hours 10/17/2017     Sig - Route: Infuse 300 mL into a venous catheter Every 12 (Twelve) Hours. - Intravenous    metoprolol tartrate (LOPRESSOR) tablet 50 mg 50 mg 2 Times Daily 10/16/2017     Sig - Route: Take 1 tablet by mouth 2 (Two) Times a Day. - Oral    miconazole (MICOTIN) 2 % powder  Every 12 Hours Scheduled 10/17/2017     Sig - Route: Apply  topically Every 12 (Twelve) Hours. - Topical    Pharmacy Consult - Pharmacy to dose  Continuous PRN 10/16/2017     Sig - Route: Continuous As Needed for Consult. - Does not apply    Pharmacy Consult - Pharmacy to dose  Continuous PRN 10/16/2017     Sig - Route: Continuous As Needed for Consult. - Does not apply    piperacillin-tazobactam (ZOSYN) 3.375 (3-0.375) g injection  - ADS Override Pull   10/17/2017 10/17/2017    Notes to Pharmacy: Created by cabinet override    piperacillin-tazobactam (ZOSYN) 3.375 g/100 mL 0.9% NS IVPB (mbp) 3.375 g Every 8 Hours 10/17/2017     Sig - Route: Infuse 100 mL into a venous catheter Every 8 (Eight) Hours. - Intravenous    piperacillin-tazobactam (ZOSYN) IVPB 2.25 g in 100 mL NS 2.25 g Once 10/16/2017 10/16/2017    Sig - Route: Infuse 2.25 g into a venous catheter 1 (One) Time. - Intravenous    Cosign for Ordering: Accepted by Brain Donis MD on 10/17/2017 12:06 AM    QUEtiapine (SEROquel) tablet 100 mg 100 mg 2 Times Daily 10/16/2017     Sig - Route: Take 1 tablet  "by mouth 2 (Two) Times a Day. - Oral    sodium chloride 0.9 % flush 1-10 mL 1-10 mL As Needed 10/16/2017     Sig - Route: Infuse 1-10 mL into a venous catheter As Needed for Line Care. - Intravenous    sodium chloride 0.9 % flush 10 mL 10 mL As Needed 10/16/2017     Sig - Route: Infuse 10 mL into a venous catheter As Needed for Line Care. - Intravenous    Cosign for Ordering: Accepted by Brain Donis MD on 10/17/2017 12:06 AM    Linked Group 1:  \"And\" Linked Group Details        sodium chloride 0.9 % infusion  - ADS Override Pull   10/17/2017 10/17/2017    Notes to Pharmacy: Created by cabinet override    sodium chloride 0.9 % infusion 40 mL 40 mL As Needed 10/16/2017     Sig - Route: Infuse 40 mL into a venous catheter As Needed for Line Care. - Intravenous    trimethoprim-polymyxin b (POLYTRIM) 09535-6.1 UNIT/ML-% ophthalmic solution 1 drop 1 drop Every 4 Hours Scheduled 10/17/2017     Sig - Route: Administer 1 drop to both eyes Every 4 (Four) Hours. - Both Eyes    vancomycin 1 g/250 mL 0.9% NS (vial-mate) 15 mg/kg × 59.3 kg (Ideal) Once 10/16/2017 10/16/2017    Sig - Route: Infuse 250 mL into a venous catheter 1 (One) Time. - Intravenous    Cosign for Ordering: Accepted by Brain Donis MD on 10/17/2017 12:06 AM    fluconazole (DIFLUCAN) IVPB 200 mg (Discontinued) 200 mg Daily 10/17/2017 10/17/2017    Sig - Route: Infuse 100 mL into a venous catheter Daily. - Intravenous    Reason for Discontinue: Dose adjustment    fluconazole (DIFLUCAN) IVPB 400 mg (Discontinued) 400 mg Daily 10/16/2017 10/16/2017    Sig - Route: Infuse 200 mL into a venous catheter Daily. - Intravenous    Cosign for Ordering: Accepted by Brain Donis MD on 10/17/2017 12:06 AM    insulin aspart (novoLOG) injection 0-9 Units (Discontinued) 0-9 Units 4 Times Daily Before Meals & Nightly 10/16/2017 10/16/2017    Sig - Route: Inject 0-9 Units under the skin 4 (Four) Times a Day Before Meals & at Bedtime. - Subcutaneous    insulin " detemir (LEVEMIR) injection 60 Units (Discontinued) 60 Units Every Morning 10/17/2017 10/16/2017    Sig - Route: Inject 60 Units under the skin Every Morning. - Subcutaneous    piperacillin-tazobactam (ZOSYN) IVPB 2.25 g in 100 mL NS (Discontinued) 2.25 g Every 6 Hours 10/17/2017 10/16/2017    Sig - Route: Infuse 2.25 g into a venous catheter Every 6 (Six) Hours. - Intravenous    Reason for Discontinue: Formulary change    vitamin b complex capsule 1 tablet (Discontinued) 1 tablet Daily 10/17/2017 10/17/2017    Sig - Route: Take 1 tablet by mouth Daily. - Oral    Reason for Discontinue: Formulary change          Lab Results (last 72 hours)     Procedure Component Value Units Date/Time    POC Glucose Fingerstick [[id number]]  (Abnormal) Collected:  10/16/17 1810    Specimen:  Blood Updated:  10/16/17 1816     Glucose 276 (H) mg/dL     Narrative:       Meter: [id number] : [id number] Maya Zarate NURSING ASSISTANT    CBC & Differential [[id number]] Collected:  10/16/17 1815    Specimen:  Blood Updated:  10/16/17 1822    Narrative:       The following orders were created for panel order CBC & Differential.  Procedure                               Abnormality         Status                     ---------                               -----------         ------                     CBC Auto Differential[[id number]]        Abnormal            Final result                 Please view results for these tests on the individual orders.    CBC Auto Differential [[id number]]  (Abnormal) Collected:  10/16/17 1815    Specimen:  Blood Updated:  10/16/17 1822     WBC 14.78 (H) 10*3/mm3      RBC 3.42 (L) 10*6/mm3      Hemoglobin 10.0 (L) g/dL      Hematocrit 31.5 (L) %      MCV 92.1 fL      MCH 29.2 pg      MCHC 31.7 g/dL      RDW 12.2 %      RDW-SD 41.1 fl      MPV 10.8 (H) fL      Platelets 189 10*3/mm3      Neutrophil % 76.1 (H) %      Lymphocyte % 13.9 (L) %      Monocyte % 6.3 %      Eosinophil % 3.0 %      Basophil % 0.3 %       Immature Grans % 0.4 %      Neutrophils, Absolute 11.26 (H) 10*3/mm3      Lymphocytes, Absolute 2.05 10*3/mm3      Monocytes, Absolute 0.93 10*3/mm3      Eosinophils, Absolute 0.44 (H) 10*3/mm3      Basophils, Absolute 0.04 10*3/mm3      Immature Grans, Absolute 0.06 (H) 10*3/mm3      nRBC 0.0 /100 WBC     Lactic Acid, Plasma [[id number]]  (Normal) Collected:  10/16/17 1816    Specimen:  Blood Updated:  10/16/17 1835     Lactate 0.9 mmol/L     Comprehensive Metabolic Panel [[id number]]  (Abnormal) Collected:  10/16/17 1815    Specimen:  Blood Updated:  10/16/17 1839     Glucose 273 (H) mg/dL      BUN 68 (H) mg/dL      Creatinine 2.15 (H) mg/dL      Sodium 136 mmol/L      Potassium 4.5 mmol/L      Chloride 95 (L) mmol/L      CO2 28.8 mmol/L      Calcium 9.1 mg/dL      Total Protein 7.0 g/dL      Albumin 3.50 g/dL      ALT (SGPT) 10 U/L      AST (SGOT) 14 U/L      Alkaline Phosphatase 76 U/L      Total Bilirubin 0.3 mg/dL      eGFR Non African Amer 23 (L) mL/min/1.73      Globulin 3.5 gm/dL      A/G Ratio 1.0 g/dL      BUN/Creatinine Ratio 31.6 (H)     Anion Gap 12.2 mmol/L     POC Glucose Fingerstick [[id number]]  (Abnormal) Collected:  10/16/17 2243    Specimen:  Blood Updated:  10/16/17 2249     Glucose 384 (H) mg/dL     Narrative:       Meter: [id number] : [id number] Waylonrossy Tillman    CBC Auto Differential [[id number]]  (Abnormal) Collected:  10/17/17 0408    Specimen:  Blood Updated:  10/17/17 0439     WBC 12.70 (H) 10*3/mm3      RBC 3.15 (L) 10*6/mm3      Hemoglobin 9.2 (L) g/dL      Hematocrit 28.9 (L) %      MCV 91.7 fL      MCH 29.2 pg      MCHC 31.8 g/dL      RDW 12.5 %      RDW-SD 41.5 fl      MPV 10.9 (H) fL      Platelets 181 10*3/mm3      Neutrophil % 72.9 (H) %      Lymphocyte % 16.1 (L) %      Monocyte % 7.2 %      Eosinophil % 3.2 %      Basophil % 0.3 %      Immature Grans % 0.3 %      Neutrophils, Absolute 9.25 (H) 10*3/mm3      Lymphocytes, Absolute 2.05 10*3/mm3      Monocytes, Absolute 0.91  10*3/mm3      Eosinophils, Absolute 0.41 (H) 10*3/mm3      Basophils, Absolute 0.04 10*3/mm3      Immature Grans, Absolute 0.04 (H) 10*3/mm3      nRBC 0.0 /100 WBC     Basic Metabolic Panel [[id number]]  (Abnormal) Collected:  10/17/17 0408    Specimen:  Blood Updated:  10/17/17 0534     Glucose 129 (H) mg/dL      BUN 68 (H) mg/dL      Creatinine 1.94 (H) mg/dL      Sodium 140 mmol/L      Potassium 4.0 mmol/L      Chloride 100 mmol/L      CO2 28.0 mmol/L      Calcium 8.9 mg/dL      eGFR Non African Amer 26 (L) mL/min/1.73      BUN/Creatinine Ratio 35.1 (H)     Anion Gap 12.0 mmol/L     Narrative:       GFR Normal >60  Chronic Kidney Disease <60  Kidney Failure <15    Blood Culture - Blood, [[id number]]  (Normal) Collected:  10/16/17 1815    Specimen:  Blood from Arm, Right Updated:  10/17/17 0631     Blood Culture No growth at less than 24 hours    Blood Culture - Blood, [[id number]]  (Normal) Collected:  10/16/17 1835    Specimen:  Blood from Arm, Right Updated:  10/17/17 0716     Blood Culture No growth at less than 24 hours    POC Glucose Fingerstick [[id number]]  (Normal) Collected:  10/17/17 0839    Specimen:  Blood Updated:  10/17/17 0848     Glucose 82 mg/dL     Narrative:       Meter: [id number] : [id number] Fadayanaker Lea NA CERT    POC Glucose Fingerstick [[id number]]  (Abnormal) Collected:  10/17/17 1149    Specimen:  Blood Updated:  10/17/17 1204     Glucose 232 (H) mg/dL     Narrative:       Meter: [id number] : [id number] Faulker Lea NA CERT

## 2017-10-17 NOTE — PROGRESS NOTES
No renal adjustment is needed for Zyvox.   600 mg q12h is appropriate for cellulitis and intertrigo  Continue as ordered

## 2017-10-17 NOTE — PLAN OF CARE
Problem: Patient Care Overview (Adult)  Goal: Plan of Care Review  Outcome: Ongoing (interventions implemented as appropriate)    10/17/17 0231   Coping/Psychosocial Response Interventions   Plan Of Care Reviewed With patient   Patient Care Overview   Progress no change   Outcome Evaluation   Outcome Summary/Follow up Plan VSS, pictures taken of patients skin, and placed in chart. Applied Zinc cream to skin and placed pillow cases between skin folds for comfort. Patient says she feels better. No other issues or concerns this shift.          Problem: Skin Integrity Impairment, Risk/Actual (Adult)  Goal: Identify Related Risk Factors and Signs and Symptoms  Outcome: Ongoing (interventions implemented as appropriate)    10/17/17 0231   Skin Integrity Impairment, Risk/Actual   Skin Integrity Impairment, Risk/Actual: Related Risk Factors edema;fluid/nutrition status;infection/disease process;moisture;skin disorders   Signs and Symptoms (Skin Integrity Impairment) edema;erythema nonblanchable;inflammation       Goal: Skin Integrity/Wound Healing  Outcome: Ongoing (interventions implemented as appropriate)    10/17/17 0231   Skin Integrity Impairment, Risk/Actual (Adult)   Skin Integrity/Wound Healing making progress toward outcome         Problem: Pain, Acute (Adult)  Goal: Identify Related Risk Factors and Signs and Symptoms  Outcome: Ongoing (interventions implemented as appropriate)    10/17/17 0231   Pain, Acute   Related Risk Factors (Acute Pain) knowledge deficit;patient perception;persistent pain   Signs and Symptoms (Acute Pain) BADLs/IADLs reluctance/inability to perform;facial mask of pain/grimace;verbalization of pain descriptors       Goal: Acceptable Pain Control/Comfort Level  Outcome: Ongoing (interventions implemented as appropriate)    10/17/17 0231   Pain, Acute (Adult)   Acceptable Pain Control/Comfort Level making progress toward outcome

## 2017-10-17 NOTE — CONSULTS
LOS: 1 day   Patient Care Team:  Jina Vaughan MD as PCP - General (Internal Medicine)  Jhonny Hoyt MD as Consulting Physician (Pulmonary Disease)  OPAL Arias as Nurse Practitioner (Endocrinology)  Konstantin Orr MD as Consulting Physician (Nephrology)  Vargas Ledezma MD as Consulting Physician (Pain Medicine)  Trinidad Barreto MD as Consulting Physician (Cardiology)        Subjective       Attending MD : Magalys Rodriges*    Patient Complaints: groin pain         History of Present Illness  :     Patient Denies:      PMH : Active Problems:    Panniculitis      Review of Systems:    12 point ROS done  NO chills    Objective     Vital Signs  Temp:  [97.1 °F (36.2 °C)-98.6 °F (37 °C)] 98.2 °F (36.8 °C)  Heart Rate:  [67-86] 67  Resp:  [16-18] 16  BP: (124-154)/(58-66) 124/64    Physical Exam:     General Appearance:    Alert, cooperative, in no acute distress   Head:    Normocephalic, without obvious abnormality, atraumatic   Eyes:            Lids and lashes normal, conjunctivae and sclerae normal, no   icterus, no pallor, corneas clear, PERRLA   Ears:    Ears appear intact with no abnormalities noted   Throat:   No oral lesions, no thrush, oral mucosa moist   Neck:   No adenopathy, supple, trachea midline, no thyromegaly, no     carotid bruit, no JVD   Back:     No kyphosis present, no scoliosis present, no skin lesions,       erythema or scars, no tenderness to percussion or                   palpation,   range of motion normal   Lungs:     Clear to auscultation,respirations regular, even and                   unlabored    Heart:    Regular rhythm and normal rate, normal S1 and S2, no            murmur, no gallop, no rub, no click   Breast Exam:    Deferred   Abdomen:     Normal bowel sounds, no masses, no organomegaly, soft        non-tender, non-distended, no guarding, no rebound                 tenderness   Genitalia:    Deferred   Extremities:   Moves all extremities  well, no edema, no cyanosis, no              redness   Pulses:   Pulses palpable and equal bilaterally   Skin:   Groin rash noted   Lymph nodes:   No palpable adenopathy   Neurologic:   Cranial nerves 2 - 12 grossly intact, sensation intact, DTR        present and equal bilaterally            Results Review:    Lab Results (last 72 hours)     Procedure Component Value Units Date/Time    POC Glucose Fingerstick [[id number]]  (Abnormal) Collected:  10/16/17 1810    Specimen:  Blood Updated:  10/16/17 1816     Glucose 276 (H) mg/dL     Narrative:       Meter: [id number] : [id number] Maya Zarate NURSING ASSISTANT    CBC & Differential [[id number]] Collected:  10/16/17 1815    Specimen:  Blood Updated:  10/16/17 1822    Narrative:       The following orders were created for panel order CBC & Differential.  Procedure                               Abnormality         Status                     ---------                               -----------         ------                     CBC Auto Differential[[id number]]        Abnormal            Final result                 Please view results for these tests on the individual orders.    CBC Auto Differential [[id number]]  (Abnormal) Collected:  10/16/17 1815    Specimen:  Blood Updated:  10/16/17 1822     WBC 14.78 (H) 10*3/mm3      RBC 3.42 (L) 10*6/mm3      Hemoglobin 10.0 (L) g/dL      Hematocrit 31.5 (L) %      MCV 92.1 fL      MCH 29.2 pg      MCHC 31.7 g/dL      RDW 12.2 %      RDW-SD 41.1 fl      MPV 10.8 (H) fL      Platelets 189 10*3/mm3      Neutrophil % 76.1 (H) %      Lymphocyte % 13.9 (L) %      Monocyte % 6.3 %      Eosinophil % 3.0 %      Basophil % 0.3 %      Immature Grans % 0.4 %      Neutrophils, Absolute 11.26 (H) 10*3/mm3      Lymphocytes, Absolute 2.05 10*3/mm3      Monocytes, Absolute 0.93 10*3/mm3      Eosinophils, Absolute 0.44 (H) 10*3/mm3      Basophils, Absolute 0.04 10*3/mm3      Immature Grans, Absolute 0.06 (H) 10*3/mm3      nRBC 0.0 /100 WBC      Lactic Acid, Plasma [[id number]]  (Normal) Collected:  10/16/17 1816    Specimen:  Blood Updated:  10/16/17 1835     Lactate 0.9 mmol/L     Comprehensive Metabolic Panel [[id number]]  (Abnormal) Collected:  10/16/17 1815    Specimen:  Blood Updated:  10/16/17 1839     Glucose 273 (H) mg/dL      BUN 68 (H) mg/dL      Creatinine 2.15 (H) mg/dL      Sodium 136 mmol/L      Potassium 4.5 mmol/L      Chloride 95 (L) mmol/L      CO2 28.8 mmol/L      Calcium 9.1 mg/dL      Total Protein 7.0 g/dL      Albumin 3.50 g/dL      ALT (SGPT) 10 U/L      AST (SGOT) 14 U/L      Alkaline Phosphatase 76 U/L      Total Bilirubin 0.3 mg/dL      eGFR Non African Amer 23 (L) mL/min/1.73      Globulin 3.5 gm/dL      A/G Ratio 1.0 g/dL      BUN/Creatinine Ratio 31.6 (H)     Anion Gap 12.2 mmol/L     POC Glucose Fingerstick [[id number]]  (Abnormal) Collected:  10/16/17 2243    Specimen:  Blood Updated:  10/16/17 2249     Glucose 384 (H) mg/dL     Narrative:       Meter: [id number] : [id number] Waylon Tillman    CBC Auto Differential [[id number]]  (Abnormal) Collected:  10/17/17 0408    Specimen:  Blood Updated:  10/17/17 0439     WBC 12.70 (H) 10*3/mm3      RBC 3.15 (L) 10*6/mm3      Hemoglobin 9.2 (L) g/dL      Hematocrit 28.9 (L) %      MCV 91.7 fL      MCH 29.2 pg      MCHC 31.8 g/dL      RDW 12.5 %      RDW-SD 41.5 fl      MPV 10.9 (H) fL      Platelets 181 10*3/mm3      Neutrophil % 72.9 (H) %      Lymphocyte % 16.1 (L) %      Monocyte % 7.2 %      Eosinophil % 3.2 %      Basophil % 0.3 %      Immature Grans % 0.3 %      Neutrophils, Absolute 9.25 (H) 10*3/mm3      Lymphocytes, Absolute 2.05 10*3/mm3      Monocytes, Absolute 0.91 10*3/mm3      Eosinophils, Absolute 0.41 (H) 10*3/mm3      Basophils, Absolute 0.04 10*3/mm3      Immature Grans, Absolute 0.04 (H) 10*3/mm3      nRBC 0.0 /100 WBC     Basic Metabolic Panel [[id number]]  (Abnormal) Collected:  10/17/17 0408    Specimen:  Blood Updated:  10/17/17 0534     Glucose 129 (H)  mg/dL      BUN 68 (H) mg/dL      Creatinine 1.94 (H) mg/dL      Sodium 140 mmol/L      Potassium 4.0 mmol/L      Chloride 100 mmol/L      CO2 28.0 mmol/L      Calcium 8.9 mg/dL      eGFR Non African Amer 26 (L) mL/min/1.73      BUN/Creatinine Ratio 35.1 (H)     Anion Gap 12.0 mmol/L     Narrative:       GFR Normal >60  Chronic Kidney Disease <60  Kidney Failure <15    Blood Culture - Blood, [[id number]]  (Normal) Collected:  10/16/17 1815    Specimen:  Blood from Arm, Right Updated:  10/17/17 0631     Blood Culture No growth at less than 24 hours    Blood Culture - Blood, [[id number]]  (Normal) Collected:  10/16/17 1835    Specimen:  Blood from Arm, Right Updated:  10/17/17 0716     Blood Culture No growth at less than 24 hours    POC Glucose Fingerstick [[id number]]  (Normal) Collected:  10/17/17 0839    Specimen:  Blood Updated:  10/17/17 0848     Glucose 82 mg/dL     Narrative:       Meter: [id number] : [id number] Red Lambda NA CERT    POC Glucose Fingerstick [[id number]]  (Abnormal) Collected:  10/17/17 1149    Specimen:  Blood Updated:  10/17/17 1204     Glucose 232 (H) mg/dL     Narrative:       Meter: [id number] : [id number] Red Lambda NA CERT              Imaging Results (last 72 hours)     ** No results found for the last 72 hours. **            Medication Review:      Hospital Medications (active)       Dose Frequency Start End    albuterol (PROVENTIL) nebulizer solution 0.083% 2.5 mg/3mL 2.5 mg Every 4 Hours PRN 10/16/2017     Sig - Route: Take 2.5 mg by nebulization Every 4 (Four) Hours As Needed for Wheezing or Shortness of Air. - Nebulization    aspirin EC tablet 81 mg 81 mg Daily 10/17/2017     Sig - Route: Take 1 tablet by mouth Daily. - Oral    atorvastatin (LIPITOR) tablet 40 mg 40 mg Nightly 10/16/2017     Sig - Route: Take 1 tablet by mouth Every Night. - Oral    b complex-C-folic acid capsule 1 mg 1 capsule Daily 10/17/2017     Sig - Route: Take 1 capsule by mouth Daily. - Oral     budesonide-formoterol (SYMBICORT) 160-4.5 MCG/ACT inhaler 2 puff 2 puff 2 Times Daily - RT 10/16/2017     Sig - Route: Inhale 2 puffs 2 (Two) Times a Day. - Inhalation    bumetanide (BUMEX) tablet 2 mg 2 mg Daily 10/17/2017     Sig - Route: Take 2 tablets by mouth Daily. - Oral    cetirizine (zyrTEC) tablet 10 mg 10 mg Nightly 10/16/2017     Sig - Route: Take 1 tablet by mouth Every Night. - Oral    dextrose (D50W) solution 25 g 25 g Every 15 Minutes PRN 10/16/2017     Sig - Route: Infuse 50 mL into a venous catheter Every 15 (Fifteen) Minutes As Needed for Low Blood Sugar (Blood Sugar Less Than 70, Patient Has IV Access - Unresponsive, NPO or Unable To Safely Swallow). - Intravenous    dextrose (GLUTOSE) oral gel 15 g 15 g Every 15 Minutes PRN 10/16/2017     Sig - Route: Take 15 g by mouth Every 15 (Fifteen) Minutes As Needed for Low Blood Sugar (Blood Sugar Less Than 70, Patient Alert, Is Not NPO & Can Safely Swallow). - Oral    enoxaparin (LOVENOX) syringe 30 mg 30 mg Daily 10/17/2017     Sig - Route: Inject 0.3 mL under the skin Daily. - Subcutaneous    famotidine (PEPCID) tablet 20 mg 20 mg Daily 10/17/2017     Sig - Route: Take 1 tablet by mouth Daily. - Oral    fluconazole (DIFLUCAN) 200-0.9 MG/100ML-% IVPB  - ADS Override Pull   10/16/2017 10/16/2017    Notes to Pharmacy: Cherelle Alba: cabinet override    fluconazole (DIFLUCAN) IVPB 400 mg 400 mg Once 10/16/2017 10/17/2017    Sig - Route: Infuse 200 mL into a venous catheter 1 (One) Time. - Intravenous    fluconazole (DIFLUCAN) tablet 150 mg 150 mg Every 7 Days 10/23/2017     Sig - Route: Take 1 tablet by mouth Every 7 (Seven) Days. - Oral    Cosign for Ordering: Required by Magalys Paul MD    glucagon (GLUCAGEN) injection 1 mg 1 mg Every 15 Minutes PRN 10/16/2017     Sig - Route: Inject 1 mg under the skin Every 15 (Fifteen) Minutes As Needed (Blood Glucose Less Than 70 - Patient Without IV Access - Unresponsive, NPO or Unable To Safely  Swallow). - Subcutaneous    guaiFENesin (MUCINEX) 12 hr tablet 600 mg 600 mg 2 Times Daily 10/16/2017     Sig - Route: Take 1 tablet by mouth 2 (Two) Times a Day. - Oral    HYDROcodone-acetaminophen (NORCO)  MG per tablet 2 tablet 2 tablet 2 Times Daily 10/16/2017 10/26/2017    Sig - Route: Take 2 tablets by mouth 2 (Two) Times a Day. - Oral    insulin aspart (novoLOG) injection 0-24 Units 0-24 Units 4 Times Daily Before Meals & Nightly 10/16/2017     Sig - Route: Inject 0-24 Units under the skin 4 (Four) Times a Day Before Meals & at Bedtime. - Subcutaneous    insulin detemir (LEVEMIR) injection 60 Units 60 Units Nightly 10/16/2017     Sig - Route: Inject 60 Units under the skin Every Night. - Subcutaneous    ipratropium (ATROVENT) nebulizer solution 0.5 mg 0.5 mg 4 Times Daily - RT 10/16/2017     Sig - Route: Take 2.5 mL by nebulization 4 (Four) Times a Day. - Nebulization    ipratropium-albuterol (DUO-NEB) nebulizer solution 3 mL 3 mL Once 10/16/2017 10/16/2017    Sig - Route: Take 3 mL by nebulization 1 (One) Time. - Nebulization    Cosign for Ordering: Accepted by Brain Donis MD on 10/17/2017 12:06 AM    lactobacillus acidophilus (RISAQUAD) capsule 1 capsule 1 capsule Daily 10/17/2017     Sig - Route: Take 1 capsule by mouth Daily. - Oral    levothyroxine (SYNTHROID, LEVOTHROID) tablet 224 mcg 224 mcg Daily 10/17/2017 7/11/2018    Sig - Route: Take 2 tablets by mouth Daily. - Oral    Linezolid (ZYVOX) 600 mg 300 mL 600 mg Every 12 Hours 10/17/2017     Sig - Route: Infuse 300 mL into a venous catheter Every 12 (Twelve) Hours. - Intravenous    metoprolol tartrate (LOPRESSOR) tablet 50 mg 50 mg 2 Times Daily 10/16/2017     Sig - Route: Take 1 tablet by mouth 2 (Two) Times a Day. - Oral    miconazole (MICOTIN) 2 % powder  Every 12 Hours Scheduled 10/17/2017     Sig - Route: Apply  topically Every 12 (Twelve) Hours. - Topical    Pharmacy Consult - Pharmacy to dose  Continuous PRN 10/16/2017     Sig -  "Route: Continuous As Needed for Consult. - Does not apply    Pharmacy Consult - Pharmacy to dose  Continuous PRN 10/16/2017     Sig - Route: Continuous As Needed for Consult. - Does not apply    piperacillin-tazobactam (ZOSYN) 3.375 (3-0.375) g injection  - ADS Override Pull   10/17/2017 10/17/2017    Notes to Pharmacy: Created by cabinet override    piperacillin-tazobactam (ZOSYN) 3.375 g/100 mL 0.9% NS IVPB (mbp) 3.375 g Every 8 Hours 10/17/2017     Sig - Route: Infuse 100 mL into a venous catheter Every 8 (Eight) Hours. - Intravenous    piperacillin-tazobactam (ZOSYN) IVPB 2.25 g in 100 mL NS 2.25 g Once 10/16/2017 10/16/2017    Sig - Route: Infuse 2.25 g into a venous catheter 1 (One) Time. - Intravenous    Cosign for Ordering: Accepted by Brain Donis MD on 10/17/2017 12:06 AM    QUEtiapine (SEROquel) tablet 100 mg 100 mg 2 Times Daily 10/16/2017     Sig - Route: Take 1 tablet by mouth 2 (Two) Times a Day. - Oral    sodium chloride 0.9 % flush 1-10 mL 1-10 mL As Needed 10/16/2017     Sig - Route: Infuse 1-10 mL into a venous catheter As Needed for Line Care. - Intravenous    sodium chloride 0.9 % flush 10 mL 10 mL As Needed 10/16/2017     Sig - Route: Infuse 10 mL into a venous catheter As Needed for Line Care. - Intravenous    Cosign for Ordering: Accepted by Brain Donis MD on 10/17/2017 12:06 AM    Linked Group 1:  \"And\" Linked Group Details        sodium chloride 0.9 % infusion  - ADS Override Pull   10/17/2017 10/17/2017    Notes to Pharmacy: Created by cabinet override    sodium chloride 0.9 % infusion 40 mL 40 mL As Needed 10/16/2017     Sig - Route: Infuse 40 mL into a venous catheter As Needed for Line Care. - Intravenous    trimethoprim-polymyxin b (POLYTRIM) 07375-3.1 UNIT/ML-% ophthalmic solution 1 drop 1 drop Every 4 Hours Scheduled 10/17/2017     Sig - Route: Administer 1 drop to both eyes Every 4 (Four) Hours. - Both Eyes    vancomycin 1 g/250 mL 0.9% NS (vial-mate) 15 mg/kg × 59.3 kg " (Ideal) Once 10/16/2017 10/16/2017    Sig - Route: Infuse 250 mL into a venous catheter 1 (One) Time. - Intravenous    Cosign for Ordering: Accepted by Brain Donis MD on 10/17/2017 12:06 AM    fluconazole (DIFLUCAN) IVPB 200 mg (Discontinued) 200 mg Daily 10/17/2017 10/17/2017    Sig - Route: Infuse 100 mL into a venous catheter Daily. - Intravenous    Reason for Discontinue: Dose adjustment    fluconazole (DIFLUCAN) IVPB 400 mg (Discontinued) 400 mg Daily 10/16/2017 10/16/2017    Sig - Route: Infuse 200 mL into a venous catheter Daily. - Intravenous    Cosign for Ordering: Accepted by Brain Donis MD on 10/17/2017 12:06 AM    insulin aspart (novoLOG) injection 0-9 Units (Discontinued) 0-9 Units 4 Times Daily Before Meals & Nightly 10/16/2017 10/16/2017    Sig - Route: Inject 0-9 Units under the skin 4 (Four) Times a Day Before Meals & at Bedtime. - Subcutaneous    insulin detemir (LEVEMIR) injection 60 Units (Discontinued) 60 Units Every Morning 10/17/2017 10/16/2017    Sig - Route: Inject 60 Units under the skin Every Morning. - Subcutaneous    piperacillin-tazobactam (ZOSYN) IVPB 2.25 g in 100 mL NS (Discontinued) 2.25 g Every 6 Hours 10/17/2017 10/16/2017    Sig - Route: Infuse 2.25 g into a venous catheter Every 6 (Six) Hours. - Intravenous    Reason for Discontinue: Formulary change    vitamin b complex capsule 1 tablet (Discontinued) 1 tablet Daily 10/17/2017 10/17/2017    Sig - Route: Take 1 tablet by mouth Daily. - Oral    Reason for Discontinue: Formulary change        Assessment/Plan       Active Problems:    Panniculitis    DM  CKD  MO  Sever Tenia cruris  Cellulitis      Plan :    IV  clinda and diflucan  Local care with Nystatin  Keep skin folds in groin   Home soon on 10 days of PO minocyclin and diflucan 200 and local care  Thank  you    Stanley Mendoza MD  10/17/17  2:42 PM

## 2017-10-18 LAB
GLUCOSE BLDC GLUCOMTR-MCNC: 185 MG/DL (ref 70–130)
GLUCOSE BLDC GLUCOMTR-MCNC: 303 MG/DL (ref 70–130)
GLUCOSE BLDC GLUCOMTR-MCNC: 87 MG/DL (ref 70–130)
GLUCOSE BLDC GLUCOMTR-MCNC: 87 MG/DL (ref 70–130)

## 2017-10-18 PROCEDURE — 25010000002 FLUCONAZOLE PER 200 MG: Performed by: INTERNAL MEDICINE

## 2017-10-18 PROCEDURE — 63710000001 INSULIN ASPART PER 5 UNITS: Performed by: HOSPITALIST

## 2017-10-18 PROCEDURE — 25010000002 ENOXAPARIN PER 10 MG: Performed by: HOSPITALIST

## 2017-10-18 PROCEDURE — 82962 GLUCOSE BLOOD TEST: CPT

## 2017-10-18 PROCEDURE — 99232 SBSQ HOSP IP/OBS MODERATE 35: CPT | Performed by: HOSPITALIST

## 2017-10-18 PROCEDURE — 94799 UNLISTED PULMONARY SVC/PX: CPT

## 2017-10-18 PROCEDURE — 63710000001 INSULIN DETEMIR PER 5 UNITS: Performed by: HOSPITALIST

## 2017-10-18 RX ORDER — IPRATROPIUM BROMIDE AND ALBUTEROL SULFATE 2.5; .5 MG/3ML; MG/3ML
3 SOLUTION RESPIRATORY (INHALATION)
Status: DISCONTINUED | OUTPATIENT
Start: 2017-10-18 | End: 2017-10-20 | Stop reason: HOSPADM

## 2017-10-18 RX ADMIN — METOPROLOL TARTRATE 50 MG: 50 TABLET, FILM COATED ORAL at 11:25

## 2017-10-18 RX ADMIN — ENOXAPARIN SODIUM 30 MG: 30 INJECTION SUBCUTANEOUS at 11:26

## 2017-10-18 RX ADMIN — POLYMYXIN B SULFATE AND TRIMETHOPRIM 1 DROP: 1; 10000 SOLUTION OPHTHALMIC at 14:43

## 2017-10-18 RX ADMIN — ASCORBIC ACID, THIAMINE MONONITRATE,RIBOFLAVIN, NIACINAMIDE, PYRIDOXINE HYDROCHLORIDE, FOLIC ACID, CYANOCOBALAMIN, BIOTIN, CALCIUM PANTOTHENATE, 1 MG: 100; 1.5; 1.7; 20; 10; 1; 6000; 150000; 5 CAPSULE, LIQUID FILLED ORAL at 11:25

## 2017-10-18 RX ADMIN — ASPIRIN 81 MG: 81 TABLET, COATED ORAL at 11:26

## 2017-10-18 RX ADMIN — POLYMYXIN B SULFATE AND TRIMETHOPRIM 1 DROP: 1; 10000 SOLUTION OPHTHALMIC at 11:23

## 2017-10-18 RX ADMIN — IPRATROPIUM BROMIDE 0.5 MG: 0.5 SOLUTION RESPIRATORY (INHALATION) at 07:49

## 2017-10-18 RX ADMIN — GUAIFENESIN 600 MG: 600 TABLET, EXTENDED RELEASE ORAL at 11:25

## 2017-10-18 RX ADMIN — POLYMYXIN B SULFATE AND TRIMETHOPRIM 1 DROP: 1; 10000 SOLUTION OPHTHALMIC at 21:59

## 2017-10-18 RX ADMIN — INSULIN DETEMIR 60 UNITS: 100 INJECTION, SOLUTION SUBCUTANEOUS at 22:03

## 2017-10-18 RX ADMIN — QUETIAPINE FUMARATE 100 MG: 100 TABLET, FILM COATED ORAL at 17:49

## 2017-10-18 RX ADMIN — BUMETANIDE 2 MG: 1 TABLET ORAL at 11:24

## 2017-10-18 RX ADMIN — INSULIN ASPART 4 UNITS: 100 INJECTION, SOLUTION INTRAVENOUS; SUBCUTANEOUS at 17:47

## 2017-10-18 RX ADMIN — METOPROLOL TARTRATE 50 MG: 50 TABLET, FILM COATED ORAL at 17:49

## 2017-10-18 RX ADMIN — IPRATROPIUM BROMIDE AND ALBUTEROL SULFATE 3 ML: .5; 3 SOLUTION RESPIRATORY (INHALATION) at 15:34

## 2017-10-18 RX ADMIN — GUAIFENESIN 600 MG: 600 TABLET, EXTENDED RELEASE ORAL at 17:49

## 2017-10-18 RX ADMIN — FLUCONAZOLE 200 MG: 2 INJECTION, SOLUTION INTRAVENOUS at 13:35

## 2017-10-18 RX ADMIN — POLYMYXIN B SULFATE AND TRIMETHOPRIM 1 DROP: 1; 10000 SOLUTION OPHTHALMIC at 04:09

## 2017-10-18 RX ADMIN — HYDROCODONE BITARTRATE AND ACETAMINOPHEN 2 TABLET: 10; 325 TABLET ORAL at 18:06

## 2017-10-18 RX ADMIN — HYDROCODONE BITARTRATE AND ACETAMINOPHEN 2 TABLET: 10; 325 TABLET ORAL at 11:24

## 2017-10-18 RX ADMIN — BUDESONIDE AND FORMOTEROL FUMARATE DIHYDRATE 2 PUFF: 160; 4.5 AEROSOL RESPIRATORY (INHALATION) at 10:08

## 2017-10-18 RX ADMIN — MICONAZOLE NITRATE: 20 POWDER TOPICAL at 21:59

## 2017-10-18 RX ADMIN — FAMOTIDINE 20 MG: 20 TABLET, FILM COATED ORAL at 11:25

## 2017-10-18 RX ADMIN — CETIRIZINE HYDROCHLORIDE 10 MG: 10 TABLET, FILM COATED ORAL at 21:59

## 2017-10-18 RX ADMIN — CLINDAMYCIN PHOSPHATE 600 MG: 12 INJECTION, SOLUTION INTRAVENOUS at 21:59

## 2017-10-18 RX ADMIN — INSULIN ASPART 16 UNITS: 100 INJECTION, SOLUTION INTRAVENOUS; SUBCUTANEOUS at 22:07

## 2017-10-18 RX ADMIN — MICONAZOLE NITRATE: 20 POWDER TOPICAL at 11:28

## 2017-10-18 RX ADMIN — POLYMYXIN B SULFATE AND TRIMETHOPRIM 1 DROP: 1; 10000 SOLUTION OPHTHALMIC at 13:40

## 2017-10-18 RX ADMIN — LEVOTHYROXINE SODIUM 224 MCG: 112 TABLET ORAL at 11:25

## 2017-10-18 RX ADMIN — IPRATROPIUM BROMIDE AND ALBUTEROL SULFATE 3 ML: .5; 3 SOLUTION RESPIRATORY (INHALATION) at 18:40

## 2017-10-18 RX ADMIN — CLINDAMYCIN PHOSPHATE 600 MG: 12 INJECTION, SOLUTION INTRAVENOUS at 02:00

## 2017-10-18 RX ADMIN — BUDESONIDE AND FORMOTEROL FUMARATE DIHYDRATE 2 PUFF: 160; 4.5 AEROSOL RESPIRATORY (INHALATION) at 18:49

## 2017-10-18 RX ADMIN — ATORVASTATIN CALCIUM 40 MG: 40 TABLET, FILM COATED ORAL at 21:59

## 2017-10-18 RX ADMIN — ALBUTEROL SULFATE 2.5 MG: 2.5 SOLUTION RESPIRATORY (INHALATION) at 07:49

## 2017-10-18 RX ADMIN — CLINDAMYCIN PHOSPHATE 600 MG: 12 INJECTION, SOLUTION INTRAVENOUS at 14:43

## 2017-10-18 RX ADMIN — Medication 1 CAPSULE: at 11:26

## 2017-10-18 RX ADMIN — QUETIAPINE FUMARATE 100 MG: 100 TABLET, FILM COATED ORAL at 11:24

## 2017-10-18 NOTE — PROGRESS NOTES
Adult Nutrition  Assessment/PES    Patient Name:  Jared Govea  YOB: 1955  MRN: [id number]  Admit Date:  10/16/2017    Assessment Date:  10/18/2017    Comments:  Provided education per pt request. Good po intake noted. Will remain available.           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 Somewhat denies HF but then does confirm diuretic at home for fluid. Has BGM and checks daily. NKFA. Denies issue chew/swallow, feeds self. Uses microwave for most meals at home.             Nutrition/Diet History       10/18/17 1029    Nutrition/Diet History    Patient Reported Diet/Restrictions/Preferences diabetic   use lite-salt sometimes not reg salt            Anthropometrics       10/18/17 1030    Anthropometrics    RD Documented Current Weight  110 kg (242 lb 8.1 oz)    Anthropometrics (Special Considerations)    RD Calculated BMI (kg/m2) 39.2    Body Mass Index (BMI)    BMI Grade 35 - 39.9 - obesity - grade II            Labs/Tests/Procedures/Meds       10/18/17 1030    Labs/Tests/Procedures/Meds    Labs/Tests Review Reviewed;Glucose;BUN;Creat;GFR    Medication Review Reviewed, pertinent;Insulin;Diuretic;Multivitamin   risaquad, florastor            Physical Findings       10/18/17 1030    Physical Findings/Assessment    Additional Documentation Physical Appearance (Group)    Physical Appearance    Overall Physical Appearance obese    Skin cellulitis            Estimated/Assessed Needs       10/18/17 1031    Calculation Measurements    Weight Used  "For Calculations 110 kg (242 lb 8.1 oz)    Height Used for Calculations 1.676 m (5' 6\")    Estimated/Assessed Energy Needs    Energy Need Method [place]- Emilio    Age 62    RMR ([place]-St Jeor Equation) 1676.75    Estimated Kcal Range  1511 kcal (mifflin 1.2 - 500 kcal for obese)    170 gm CHO, 45% kcal     Estimated/Assessed Protein Needs    Weight Used for Protein Calculation 110 kg (242 lb 8.1 oz)    Protein (gm/kg) 0.7    0.7 Gm Protein (gm) 77    Estimated/Assessed Fluid Needs    Fluid Need Method Other (comment)   9534-1771 ml CHF guidelines            Nutrition Prescription Ordered       10/18/17 1032    Nutrition Prescription PO    Common Modifiers Cardiac;Consistent Carbohydrate            Evaluation of Received Nutrient/Fluid Intake       10/18/17 1032    Evaluation of Received Nutrient/Fluid Intake    Nutrition Delivered Fluid Evaluation    Fluid Intake Evaluation    Oral Fluid (mL) 1100    Total Fluid Intake (mL) 1100    % Fluid Needs 78    PO Evaluation    Number of Meals 3    % PO Intake 100            Problem/Interventions:        Problem 1       10/18/17 1032    Nutrition Diagnoses Problem 1    Problem 1 Knowledge Deficit    Etiology (related to) Medical Diagnosis    Signs/Symptoms (evidenced by) Reported  Information Deficit                    Intervention Goal       10/18/17 1032    Intervention Goal    General Provide information regarding MNT for treatment/condition    PO Maintain intake            Nutrition Intervention       10/18/17 1032    Nutrition Intervention    RD/Tech Action Interview for preference;Follow Tx progress              Education/Evaluation       10/18/17 1033    Education    Education Education topics   Pt reports working on Dm has lost wt & Hga1c 6ish. Pt uses lite salt if any. Pt most intersted in what she needs to do for kidney. Explained CKD IV per notes, avoiding Na+ & adequate pro but not excessive most important. K/Mg/Phos with no issues .    Education Topics " Cardiac diabetic;Renal diet;CHF;Na+   Pt has a scale but reportrs doesn't half the time. Encouraged monitor wt for HF. Simple Rules Na+, Simple Rules DM, Basic Renal Diet, Nutrition Diabetes Placemate provided at bedside with card.     Monitor/Evaluation    Education Follow-up Other (comment)   Edu impt 3 meals per day, 5 fruits/veggies, pro each meal but portion control, avoid salt, watch CHO, cont check BG. Card provided for questions. Expect fair to good compliance but requested visit.         Electronically signed by:  Flor Valencia RD  10/18/17 10:36 AM

## 2017-10-18 NOTE — PROGRESS NOTES
"Hospitalist Team      Patient Care Team:  Jina Vaughan MD as PCP - General (Internal Medicine)  Jhonny Hoyt MD as Consulting Physician (Pulmonary Disease)  OPAL Arias as Nurse Practitioner (Endocrinology)  Konstantin Orr MD as Consulting Physician (Nephrology)  Vargas Ledezma MD as Consulting Physician (Pain Medicine)  Trinidad Barreto MD as Consulting Physician (Cardiology)        Chief Complaint: Follow-up Panniculitis/Cellulitis    Subjective    Continues to do well.  Pain improved.  Slept much better last night.  Tolerating PO.  Denies dyspnea and chest pain.  Swelling in her legs improved.    Objective    Vital Signs  Temp:  [96.8 °F (36 °C)-98.2 °F (36.8 °C)] 98 °F (36.7 °C)  Heart Rate:  [58-72] 65  Resp:  [16-20] 16  BP: (116-135)/(60-65) 116/60  Oxygen Therapy  SpO2: 97 %  Pulse Oximetry Type: Intermittent  O2 Device: BiPAP  Flow (L/min): 4}    Flowsheet Rows         First Filed Value    Admission Height  66\" (167.6 cm) Documented at 10/16/2017 1748    Admission Weight  243 lb (110 kg) Documented at 10/16/2017 1748          Physical Exam:    General Appearance:    Alert, cooperative, in no acute distress   Eyes:            Decreased erythema and purulence of the left eye.   Lungs:     Diminished to auscultation,respirations regular, even and         unlabored    Heart:    Regular rhythm and normal rate, normal S1 and S2, no            murmur, no gallop, no rub, no click   Abdomen:     Morbidly obese, soft w/ active bowel sounds   Extremities:   Moves all extremities well, no edema, no cyanosis, no             redness   Pulses:   Pulses palpable and equal bilaterally   Skin:   Resolving erythema   Neurologic:   Cranial nerves 2 - 12 grossly intact       Results Review:     I reviewed the patient's new clinical results.  Lab Results (last 24 hours)     Procedure Component Value Units Date/Time    POC Glucose Fingerstick [[id number]]  (Abnormal) Collected:  10/17/17 1149    " Specimen:  Blood Updated:  10/17/17 1204     Glucose 232 (H) mg/dL     Narrative:       Meter: [id number] : [id number] Russ STEARNS    POC Glucose Fingerstick [[id number]]  (Normal) Collected:  10/17/17 1652    Specimen:  Blood Updated:  10/17/17 1703     Glucose 94 mg/dL     Narrative:       Meter: [id number] : [id number] Russ FIRAS CERT    Blood Culture - Blood, [[id number]]  (Normal) Collected:  10/16/17 1815    Specimen:  Blood from Arm, Right Updated:  10/17/17 1831     Blood Culture No growth at 24 hours    Blood Culture - Blood, [[id number]]  (Normal) Collected:  10/16/17 1835    Specimen:  Blood from Arm, Right Updated:  10/17/17 1916     Blood Culture No growth at 24 hours    POC Glucose Fingerstick [[id number]]  (Normal) Collected:  10/17/17 2039    Specimen:  Blood Updated:  10/17/17 2045     Glucose 127 mg/dL     Narrative:       Meter: [id number] : [id number] Waylon Tillman    POC Glucose Fingerstick [[id number]]  (Normal) Collected:  10/18/17 0721    Specimen:  Blood Updated:  10/18/17 0733     Glucose 87 mg/dL     Narrative:       Meter: [id number] : [id number] Russ Tate NA CERT          Imaging Results (last 24 hours)     ** No results found for the last 24 hours. **            Medication Review:   I have reviewed the patient's current medication list    Current Facility-Administered Medications:   •  aspirin EC tablet 81 mg, 81 mg, Oral, Daily, Magalys Paul MD, 81 mg at 10/17/17 0900  •  atorvastatin (LIPITOR) tablet 40 mg, 40 mg, Oral, Nightly, Magalys Paul MD, 40 mg at 10/17/17 2221  •  b complex-C-folic acid capsule 1 mg, 1 capsule, Oral, Daily, Magalys Paul MD, 1 mg at 10/17/17 0900  •  budesonide-formoterol (SYMBICORT) 160-4.5 MCG/ACT inhaler 2 puff, 2 puff, Inhalation, BID - RT, Magalys Paul MD, 2 puff at 10/18/17 1008  •  bumetanide (BUMEX) tablet 2 mg, 2 mg, Oral, Daily, Magalys Paige  MD [place], 2 mg at 10/17/17 0858  •  cetirizine (zyrTEC) tablet 10 mg, 10 mg, Oral, Nightly, Magalys Paul MD, 10 mg at 10/17/17 2221  •  clindamycin (CLEOCIN) 600 mg in dextrose 5% 50 mL IVPB (premix), 600 mg, Intravenous, Q8H, Stanley Mendoza MD, Stopped at 10/18/17 0411  •  dextrose (D50W) solution 25 g, 25 g, Intravenous, Q15 Min PRN, Magalys Paul MD  •  dextrose (GLUTOSE) oral gel 15 g, 15 g, Oral, Q15 Min PRN, Magalys Paul MD  •  enoxaparin (LOVENOX) syringe 30 mg, 30 mg, Subcutaneous, Daily, Magalys Paul MD, 30 mg at 10/17/17 0858  •  famotidine (PEPCID) tablet 20 mg, 20 mg, Oral, Daily, Magalys Paul MD, 20 mg at 10/17/17 0859  •  fluconazole (DIFLUCAN) IVPB 200 mg, 200 mg, Intravenous, Daily, Stanley Mendoza MD, 200 mg at 10/17/17 1625  •  glucagon (GLUCAGEN) injection 1 mg, 1 mg, Subcutaneous, Q15 Min PRN, Magalys Paul MD  •  guaiFENesin (MUCINEX) 12 hr tablet 600 mg, 600 mg, Oral, BID, Magalys Paul MD, 600 mg at 10/17/17 1743  •  HYDROcodone-acetaminophen (NORCO)  MG per tablet 2 tablet, 2 tablet, Oral, BID, Magalys Paul MD, 2 tablet at 10/17/17 1743  •  insulin aspart (novoLOG) injection 0-24 Units, 0-24 Units, Subcutaneous, 4x Daily AC & at Bedtime, Magalys Paul MD, 8 Units at 10/17/17 1215  •  insulin detemir (LEVEMIR) injection 60 Units, 60 Units, Subcutaneous, Nightly, Magalys Paul MD, 60 Units at 10/17/17 2216  •  ipratropium-albuterol (DUO-NEB) nebulizer solution 3 mL, 3 mL, Nebulization, 4x Daily - RT, Payam Peck MD  •  lactobacillus acidophilus (RISAQUAD) capsule 1 capsule, 1 capsule, Oral, Daily, Magalys Paul MD, 1 capsule at 10/17/17 0900  •  levothyroxine (SYNTHROID, LEVOTHROID) tablet 224 mcg, 224 mcg, Oral, Daily, Magalys Paul MD, 224 mcg at 10/17/17 0900  •  metoprolol tartrate (LOPRESSOR)  tablet 50 mg, 50 mg, Oral, BID, Magalys Paul MD, 50 mg at 10/17/17 1743  •  miconazole (MICOTIN) 2 % powder, , Topical, Q12H, Payam Peck MD  •  Pharmacy Consult - Pharmacy to dose, , Does not apply, Continuous PRN, Magalys Paul MD  •  Pharmacy Consult - Pharmacy to dose, , Does not apply, Continuous PRN, Magalys Paul MD  •  QUEtiapine (SEROquel) tablet 100 mg, 100 mg, Oral, BID, Magalys Paul MD, 100 mg at 10/17/17 1743  •  sodium chloride 0.9 % flush 1-10 mL, 1-10 mL, Intravenous, PRN, Magalys Paul MD  •  Insert peripheral IV, , , Once **AND** sodium chloride 0.9 % flush 10 mL, 10 mL, Intravenous, PRN, Violet Lee PA-C  •  sodium chloride 0.9 % infusion 40 mL, 40 mL, Intravenous, PRN, Magalys Paul MD, 40 mL at 10/17/17 1807  •  trimethoprim-polymyxin b (POLYTRIM) 83457-5.1 UNIT/ML-% ophthalmic solution 1 drop, 1 drop, Both Eyes, Q4H, Magalys Paul MD, 1 drop at 10/18/17 0409      Assessment/Plan     1. Panniculitis and cellulitis: Continues to slowly resolve.  Continue IV antibiotics as leukocytosis resolves.  Oral therapy guidelines as per Dr. Mendoza.      2. Conjunctivitis:  Improving.  Continue antibiotic drops.      3. Dyslipidemia:  Continue Lipitor.      4. Hypertension:  At goal.  No change in regimen.      5. DM-2 with diabetic neuropathy:  Bedsides at goal.  No change to regimen.      6. Hypothyroidism:  Continue current replacement.      7. CKD stage IV:  Creatinine at baseline.      8. Chronic hypoxic respiratory failure and COPD:  No acute issues.      9. GERD:  Nothing acute.      10. ALTA: Nothing acute.  Continue BiPAP at sleep.      11. Morbid obesity:  Nutrition following.      12. Chronic Low back pain:  No acute issues.    Plan for disposition: Home on oral antibiotics.    Payam Peck MD  10/18/17  10:20 AM

## 2017-10-18 NOTE — NURSING NOTE
Continued Stay Note  TEODORA Dobbs     Patient Name: Jared Govea  MRN: [id number]  Today's Date: 10/18/2017    Admit Date: 10/16/2017          Discharge Plan       10/18/17 0806    Case Management/Social Work Plan    Additional Comments received voicemail from Sandie Hoyt office and they will have samples of Incruse at the [address] [place] [address] office. they do not have any Advair and they do not have rescue inhalers. will continue to follow.               Discharge Codes     None            Christina Galvan RN

## 2017-10-18 NOTE — NURSING NOTE
Continued Stay Note  TEODORA Dobbs     Patient Name: Jared Govea  MRN: [id number]  Today's Date: 10/18/2017    Admit Date: 10/16/2017          Discharge Plan       10/18/17 1501    Case Management/Social Work Plan    Plan plan home with family    Patient/Family In Agreement With Plan yes    Additional Comments Spoke with patient at bedside and informed of samples of Incruse medication available at Dr. Hoyt's office @ [address] [facility]. She appreciates the information but is not sure if  she will be able to have someone  the medication. Will continue to follow.              Discharge Codes     None            Davion Pryor RN

## 2017-10-18 NOTE — PLAN OF CARE
Problem: Patient Care Overview (Adult)  Goal: Plan of Care Review  Outcome: Ongoing (interventions implemented as appropriate)    10/18/17 0631   Coping/Psychosocial Response Interventions   Plan Of Care Reviewed With patient   Patient Care Overview   Progress no change   Outcome Evaluation   Outcome Summary/Follow up Plan pt confused between 0200 and 0430 on 10/18/17; was disoriented to time place and person. believed she was abandoned out in the wilderness with no clothes on and wanted to go home; no problems since that time.       Goal: Adult Individualization and Mutuality  Outcome: Ongoing (interventions implemented as appropriate)  Goal: Discharge Needs Assessment  Outcome: Ongoing (interventions implemented as appropriate)    Problem: Skin Integrity Impairment, Risk/Actual (Adult)  Goal: Identify Related Risk Factors and Signs and Symptoms  Outcome: Ongoing (interventions implemented as appropriate)  Goal: Skin Integrity/Wound Healing  Outcome: Ongoing (interventions implemented as appropriate)    Problem: Pain, Acute (Adult)  Goal: Identify Related Risk Factors and Signs and Symptoms  Outcome: Ongoing (interventions implemented as appropriate)  Goal: Acceptable Pain Control/Comfort Level  Outcome: Ongoing (interventions implemented as appropriate)    Problem: Confusion, Acute (Adult)  Goal: Identify Related Risk Factors and Signs and Symptoms  Outcome: Ongoing (interventions implemented as appropriate)  Goal: Cognitive/Functional Impairments Minimized  Outcome: Ongoing (interventions implemented as appropriate)  Goal: Safety  Outcome: Ongoing (interventions implemented as appropriate)

## 2017-10-18 NOTE — NURSING NOTE
Patient on IV fluconazole and IV clindamycin. PO eye drops given as ordered. Miconazole powder applied to skin folds and abd pad placed between the folds. Family at bedside.

## 2017-10-18 NOTE — PLAN OF CARE
Problem: Patient Care Overview (Adult)  Goal: Plan of Care Review  Outcome: Ongoing (interventions implemented as appropriate)    10/18/17 1430   Coping/Psychosocial Response Interventions   Plan Of Care Reviewed With patient   Patient Care Overview   Progress progress toward functional goals as expected   Outcome Evaluation   Outcome Summary/Follow up Plan strong encouragement to practice good hand hygiene. Patient witnessed touching panis area then face, wash cloth and  provided. Nails need to be cut down. Encouraged her to ambulte to bathroom, vs using bsc. Also to air out the lower abdomen.          Problem: Skin Integrity Impairment, Risk/Actual (Adult)  Goal: Skin Integrity/Wound Healing  Outcome: Ongoing (interventions implemented as appropriate)

## 2017-10-19 LAB
ALBUMIN SERPL-MCNC: 3.2 G/DL (ref 3.5–5.2)
ALBUMIN/GLOB SERPL: 1 G/DL
ALP SERPL-CCNC: 64 U/L (ref 40–129)
ALT SERPL W P-5'-P-CCNC: 11 U/L (ref 5–33)
ANION GAP SERPL CALCULATED.3IONS-SCNC: 11.8 MMOL/L
AST SERPL-CCNC: 14 U/L (ref 5–32)
BASOPHILS # BLD AUTO: 0.04 10*3/MM3 (ref 0–0.2)
BASOPHILS NFR BLD AUTO: 0.4 % (ref 0–2)
BILIRUB SERPL-MCNC: 0.2 MG/DL (ref 0.2–1.2)
BUN BLD-MCNC: 60 MG/DL (ref 8–23)
BUN/CREAT SERPL: 28.6 (ref 7–25)
CALCIUM SPEC-SCNC: 8.5 MG/DL (ref 8.8–10.5)
CHLORIDE SERPL-SCNC: 104 MMOL/L (ref 98–107)
CO2 SERPL-SCNC: 27.2 MMOL/L (ref 22–29)
CREAT BLD-MCNC: 2.1 MG/DL (ref 0.57–1)
DEPRECATED RDW RBC AUTO: 42 FL (ref 37–54)
EOSINOPHIL # BLD AUTO: 0.52 10*3/MM3 (ref 0.1–0.3)
EOSINOPHIL NFR BLD AUTO: 5.8 % (ref 0–4)
ERYTHROCYTE [DISTWIDTH] IN BLOOD BY AUTOMATED COUNT: 12.6 % (ref 11.5–14.5)
GFR SERPL CREATININE-BSD FRML MDRD: 24 ML/MIN/1.73
GLOBULIN UR ELPH-MCNC: 3.1 GM/DL
GLUCOSE BLD-MCNC: 120 MG/DL (ref 65–99)
GLUCOSE BLDC GLUCOMTR-MCNC: 168 MG/DL (ref 70–130)
GLUCOSE BLDC GLUCOMTR-MCNC: 285 MG/DL (ref 70–130)
GLUCOSE BLDC GLUCOMTR-MCNC: 290 MG/DL (ref 70–130)
GLUCOSE BLDC GLUCOMTR-MCNC: 99 MG/DL (ref 70–130)
HCT VFR BLD AUTO: 28.4 % (ref 37–47)
HGB BLD-MCNC: 9.1 G/DL (ref 12–16)
IMM GRANULOCYTES # BLD: 0.05 10*3/MM3 (ref 0–0.03)
IMM GRANULOCYTES NFR BLD: 0.6 % (ref 0–0.5)
LYMPHOCYTES # BLD AUTO: 2.25 10*3/MM3 (ref 0.6–4.8)
LYMPHOCYTES NFR BLD AUTO: 24.9 % (ref 20–45)
MCH RBC QN AUTO: 29.5 PG (ref 27–31)
MCHC RBC AUTO-ENTMCNC: 32 G/DL (ref 31–37)
MCV RBC AUTO: 92.2 FL (ref 81–99)
MONOCYTES # BLD AUTO: 0.8 10*3/MM3 (ref 0–1)
MONOCYTES NFR BLD AUTO: 8.9 % (ref 3–8)
NEUTROPHILS # BLD AUTO: 5.37 10*3/MM3 (ref 1.5–8.3)
NEUTROPHILS NFR BLD AUTO: 59.4 % (ref 45–70)
NRBC BLD MANUAL-RTO: 0 /100 WBC (ref 0–0)
PLATELET # BLD AUTO: 202 10*3/MM3 (ref 140–500)
PMV BLD AUTO: 10.2 FL (ref 7.4–10.4)
POTASSIUM BLD-SCNC: 4.5 MMOL/L (ref 3.5–5.2)
PROT SERPL-MCNC: 6.3 G/DL (ref 6–8.5)
RBC # BLD AUTO: 3.08 10*6/MM3 (ref 4.2–5.4)
SODIUM BLD-SCNC: 143 MMOL/L (ref 136–145)
WBC NRBC COR # BLD: 9.03 10*3/MM3 (ref 4.8–10.8)

## 2017-10-19 PROCEDURE — 25010000002 FLUCONAZOLE PER 200 MG: Performed by: INTERNAL MEDICINE

## 2017-10-19 PROCEDURE — 25010000002 ENOXAPARIN PER 10 MG: Performed by: HOSPITALIST

## 2017-10-19 PROCEDURE — 94799 UNLISTED PULMONARY SVC/PX: CPT

## 2017-10-19 PROCEDURE — 63710000001 INSULIN DETEMIR PER 5 UNITS: Performed by: HOSPITALIST

## 2017-10-19 PROCEDURE — 99232 SBSQ HOSP IP/OBS MODERATE 35: CPT | Performed by: INTERNAL MEDICINE

## 2017-10-19 PROCEDURE — 63710000001 INSULIN ASPART PER 5 UNITS: Performed by: HOSPITALIST

## 2017-10-19 PROCEDURE — 82962 GLUCOSE BLOOD TEST: CPT

## 2017-10-19 PROCEDURE — 80053 COMPREHEN METABOLIC PANEL: CPT | Performed by: HOSPITALIST

## 2017-10-19 PROCEDURE — 85025 COMPLETE CBC W/AUTO DIFF WBC: CPT | Performed by: HOSPITALIST

## 2017-10-19 RX ORDER — ZOLPIDEM TARTRATE 5 MG/1
5 TABLET ORAL NIGHTLY PRN
Status: DISCONTINUED | OUTPATIENT
Start: 2017-10-19 | End: 2017-10-20 | Stop reason: HOSPADM

## 2017-10-19 RX ADMIN — Medication 1 CAPSULE: at 09:32

## 2017-10-19 RX ADMIN — BUDESONIDE AND FORMOTEROL FUMARATE DIHYDRATE 2 PUFF: 160; 4.5 AEROSOL RESPIRATORY (INHALATION) at 19:41

## 2017-10-19 RX ADMIN — INSULIN ASPART 12 UNITS: 100 INJECTION, SOLUTION INTRAVENOUS; SUBCUTANEOUS at 12:06

## 2017-10-19 RX ADMIN — FLUCONAZOLE 200 MG: 2 INJECTION, SOLUTION INTRAVENOUS at 09:44

## 2017-10-19 RX ADMIN — IPRATROPIUM BROMIDE AND ALBUTEROL SULFATE 3 ML: .5; 3 SOLUTION RESPIRATORY (INHALATION) at 11:39

## 2017-10-19 RX ADMIN — INSULIN ASPART 4 UNITS: 100 INJECTION, SOLUTION INTRAVENOUS; SUBCUTANEOUS at 17:11

## 2017-10-19 RX ADMIN — IPRATROPIUM BROMIDE AND ALBUTEROL SULFATE 3 ML: .5; 3 SOLUTION RESPIRATORY (INHALATION) at 15:51

## 2017-10-19 RX ADMIN — POLYMYXIN B SULFATE AND TRIMETHOPRIM 1 DROP: 1; 10000 SOLUTION OPHTHALMIC at 23:46

## 2017-10-19 RX ADMIN — CETIRIZINE HYDROCHLORIDE 10 MG: 10 TABLET, FILM COATED ORAL at 20:48

## 2017-10-19 RX ADMIN — ATORVASTATIN CALCIUM 40 MG: 40 TABLET, FILM COATED ORAL at 20:48

## 2017-10-19 RX ADMIN — GUAIFENESIN 600 MG: 600 TABLET, EXTENDED RELEASE ORAL at 17:11

## 2017-10-19 RX ADMIN — MICONAZOLE NITRATE 1 APPLICATION: 20 POWDER TOPICAL at 20:49

## 2017-10-19 RX ADMIN — CLINDAMYCIN PHOSPHATE 600 MG: 12 INJECTION, SOLUTION INTRAVENOUS at 19:39

## 2017-10-19 RX ADMIN — BUDESONIDE AND FORMOTEROL FUMARATE DIHYDRATE 2 PUFF: 160; 4.5 AEROSOL RESPIRATORY (INHALATION) at 07:46

## 2017-10-19 RX ADMIN — POLYMYXIN B SULFATE AND TRIMETHOPRIM 1 DROP: 1; 10000 SOLUTION OPHTHALMIC at 20:46

## 2017-10-19 RX ADMIN — POLYMYXIN B SULFATE AND TRIMETHOPRIM 1 DROP: 1; 10000 SOLUTION OPHTHALMIC at 17:10

## 2017-10-19 RX ADMIN — GUAIFENESIN 600 MG: 600 TABLET, EXTENDED RELEASE ORAL at 09:31

## 2017-10-19 RX ADMIN — ASCORBIC ACID, THIAMINE MONONITRATE,RIBOFLAVIN, NIACINAMIDE, PYRIDOXINE HYDROCHLORIDE, FOLIC ACID, CYANOCOBALAMIN, BIOTIN, CALCIUM PANTOTHENATE, 1 MG: 100; 1.5; 1.7; 20; 10; 1; 6000; 150000; 5 CAPSULE, LIQUID FILLED ORAL at 09:32

## 2017-10-19 RX ADMIN — LEVOTHYROXINE SODIUM 224 MCG: 112 TABLET ORAL at 09:31

## 2017-10-19 RX ADMIN — INSULIN DETEMIR 60 UNITS: 100 INJECTION, SOLUTION SUBCUTANEOUS at 21:06

## 2017-10-19 RX ADMIN — IPRATROPIUM BROMIDE AND ALBUTEROL SULFATE 3 ML: .5; 3 SOLUTION RESPIRATORY (INHALATION) at 07:37

## 2017-10-19 RX ADMIN — POLYMYXIN B SULFATE AND TRIMETHOPRIM 1 DROP: 1; 10000 SOLUTION OPHTHALMIC at 12:06

## 2017-10-19 RX ADMIN — FAMOTIDINE 20 MG: 20 TABLET, FILM COATED ORAL at 09:32

## 2017-10-19 RX ADMIN — MICONAZOLE NITRATE: 20 POWDER TOPICAL at 09:43

## 2017-10-19 RX ADMIN — QUETIAPINE FUMARATE 100 MG: 100 TABLET, FILM COATED ORAL at 09:32

## 2017-10-19 RX ADMIN — IPRATROPIUM BROMIDE AND ALBUTEROL SULFATE 3 ML: .5; 3 SOLUTION RESPIRATORY (INHALATION) at 19:41

## 2017-10-19 RX ADMIN — ASPIRIN 81 MG: 81 TABLET, COATED ORAL at 09:32

## 2017-10-19 RX ADMIN — CLINDAMYCIN PHOSPHATE 600 MG: 12 INJECTION, SOLUTION INTRAVENOUS at 03:18

## 2017-10-19 RX ADMIN — POLYMYXIN B SULFATE AND TRIMETHOPRIM 1 DROP: 1; 10000 SOLUTION OPHTHALMIC at 09:31

## 2017-10-19 RX ADMIN — CLINDAMYCIN PHOSPHATE 600 MG: 12 INJECTION, SOLUTION INTRAVENOUS at 12:06

## 2017-10-19 RX ADMIN — INSULIN ASPART 12 UNITS: 100 INJECTION, SOLUTION INTRAVENOUS; SUBCUTANEOUS at 21:06

## 2017-10-19 RX ADMIN — HYDROCODONE BITARTRATE AND ACETAMINOPHEN 2 TABLET: 10; 325 TABLET ORAL at 09:43

## 2017-10-19 RX ADMIN — BUMETANIDE 2 MG: 1 TABLET ORAL at 09:32

## 2017-10-19 RX ADMIN — QUETIAPINE FUMARATE 100 MG: 100 TABLET, FILM COATED ORAL at 17:11

## 2017-10-19 RX ADMIN — METOPROLOL TARTRATE 50 MG: 50 TABLET, FILM COATED ORAL at 09:32

## 2017-10-19 RX ADMIN — HYDROCODONE BITARTRATE AND ACETAMINOPHEN 2 TABLET: 10; 325 TABLET ORAL at 17:10

## 2017-10-19 RX ADMIN — METOPROLOL TARTRATE 50 MG: 50 TABLET, FILM COATED ORAL at 17:11

## 2017-10-19 RX ADMIN — POLYMYXIN B SULFATE AND TRIMETHOPRIM 1 DROP: 1; 10000 SOLUTION OPHTHALMIC at 03:18

## 2017-10-19 RX ADMIN — ENOXAPARIN SODIUM 30 MG: 30 INJECTION SUBCUTANEOUS at 09:32

## 2017-10-19 NOTE — PAYOR COMM NOTE
"Estuardo Govea (62 y.o. Female)     ATTN: NURSE REVIEWER  REF#[id number]  FAXING ADDITIONAL CLINICALS ON PENDING INPT REVIEW. PLEASE REPLY TO JAC AMBRIZ FAX#[phone] OR PHONE#[phone].       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)   Code Status:  FULL    Ht:  66\" (167.6 cm)   Wt:  243 lb (110 kg)    Admission Cmt:  None   Principal Problem:  None                Active Insurance as of 10/16/2017     Primary Coverage     Payor Plan Insurance Group Employer/Plan Group    [facility] [employer] EMPL[employer]EE [id number]     Payor Plan Address Payor Plan Phone Number Effective From Effective To    [address] [phone] 1/1/2015     [place] [address]       Subscriber Name Subscriber Birth Date Member ID       ESTUARDO GOVEA 1955 [id number]                 Emergency Contacts      (Rel.) Home Phone Work Phone Mobile Phone    Sania Goins (Sister) -- -- [phone]            [facility] Medications (active)       Dose Frequency Start End    aspirin EC tablet 81 mg 81 mg Daily 10/17/2017     Sig - Route: Take 1 tablet by mouth Daily. - Oral    atorvastatin (LIPITOR) tablet 40 mg 40 mg Nightly 10/16/2017     Sig - Route: Take 1 tablet by mouth Every Night. - Oral    b complex-C-folic acid capsule 1 mg 1 capsule Daily 10/17/2017     Sig " - Route: Take 1 capsule by mouth Daily. - Oral    budesonide-formoterol (SYMBICORT) 160-4.5 MCG/ACT inhaler 2 puff 2 puff 2 Times Daily - RT 10/16/2017     Sig - Route: Inhale 2 puffs 2 (Two) Times a Day. - Inhalation    bumetanide (BUMEX) tablet 2 mg 2 mg Daily 10/17/2017     Sig - Route: Take 2 tablets by mouth Daily. - Oral    cetirizine (zyrTEC) tablet 10 mg 10 mg Nightly 10/16/2017     Sig - Route: Take 1 tablet by mouth Every Night. - Oral    clindamycin (CLEOCIN) 600 mg in dextrose 5% 50 mL IVPB (premix) 600 mg Every 8 Hours 10/17/2017     Sig - Route: Infuse 50 mL into a venous catheter Every 8 (Eight) Hours. - Intravenous    dextrose (D50W) solution 25 g 25 g Every 15 Minutes PRN 10/16/2017     Sig - Route: Infuse 50 mL into a venous catheter Every 15 (Fifteen) Minutes As Needed for Low Blood Sugar (Blood Sugar Less Than 70, Patient Has IV Access - Unresponsive, NPO or Unable To Safely Swallow). - Intravenous    dextrose (GLUTOSE) oral gel 15 g 15 g Every 15 Minutes PRN 10/16/2017     Sig - Route: Take 15 g by mouth Every 15 (Fifteen) Minutes As Needed for Low Blood Sugar (Blood Sugar Less Than 70, Patient Alert, Is Not NPO & Can Safely Swallow). - Oral    enoxaparin (LOVENOX) syringe 30 mg 30 mg Daily 10/17/2017     Sig - Route: Inject 0.3 mL under the skin Daily. - Subcutaneous    famotidine (PEPCID) tablet 20 mg 20 mg Daily 10/17/2017     Sig - Route: Take 1 tablet by mouth Daily. - Oral    fluconazole (DIFLUCAN) IVPB 200 mg 200 mg Daily 10/17/2017     Sig - Route: Infuse 100 mL into a venous catheter Daily. - Intravenous    glucagon (GLUCAGEN) injection 1 mg 1 mg Every 15 Minutes PRN 10/16/2017     Sig - Route: Inject 1 mg under the skin Every 15 (Fifteen) Minutes As Needed (Blood Glucose Less Than 70 - Patient Without IV Access - Unresponsive, NPO or Unable To Safely Swallow). - Subcutaneous    guaiFENesin (MUCINEX) 12 hr tablet 600 mg 600 mg 2 Times Daily 10/16/2017     Sig - Route: Take 1 tablet by  mouth 2 (Two) Times a Day. - Oral    HYDROcodone-acetaminophen (NORCO)  MG per tablet 2 tablet 2 tablet 2 Times Daily 10/16/2017 10/26/2017    Sig - Route: Take 2 tablets by mouth 2 (Two) Times a Day. - Oral    insulin aspart (novoLOG) injection 0-24 Units 0-24 Units 4 Times Daily Before Meals & Nightly 10/16/2017     Sig - Route: Inject 0-24 Units under the skin 4 (Four) Times a Day Before Meals & at Bedtime. - Subcutaneous    insulin detemir (LEVEMIR) injection 60 Units 60 Units Nightly 10/16/2017     Sig - Route: Inject 60 Units under the skin Every Night. - Subcutaneous    ipratropium-albuterol (DUO-NEB) nebulizer solution 3 mL 3 mL 4 Times Daily - RT 10/18/2017     Sig - Route: Take 3 mL by nebulization 4 (Four) Times a Day. - Nebulization    lactobacillus acidophilus (RISAQUAD) capsule 1 capsule 1 capsule Daily 10/17/2017     Sig - Route: Take 1 capsule by mouth Daily. - Oral    levothyroxine (SYNTHROID, LEVOTHROID) tablet 224 mcg 224 mcg Daily 10/17/2017 7/11/2018    Sig - Route: Take 2 tablets by mouth Daily. - Oral    metoprolol tartrate (LOPRESSOR) tablet 50 mg 50 mg 2 Times Daily 10/16/2017     Sig - Route: Take 1 tablet by mouth 2 (Two) Times a Day. - Oral    miconazole (MICOTIN) 2 % powder  Every 12 Hours Scheduled 10/17/2017     Sig - Route: Apply  topically Every 12 (Twelve) Hours. - Topical    Pharmacy Consult - Pharmacy to dose  Continuous PRN 10/16/2017     Sig - Route: Continuous As Needed for Consult. - Does not apply    Pharmacy Consult - Pharmacy to dose  Continuous PRN 10/16/2017     Sig - Route: Continuous As Needed for Consult. - Does not apply    QUEtiapine (SEROquel) tablet 100 mg 100 mg 2 Times Daily 10/16/2017     Sig - Route: Take 1 tablet by mouth 2 (Two) Times a Day. - Oral    sodium chloride 0.9 % flush 1-10 mL 1-10 mL As Needed 10/16/2017     Sig - Route: Infuse 1-10 mL into a venous catheter As Needed for Line Care. - Intravenous    sodium chloride 0.9 % flush 10 mL 10 mL As  "Needed 10/16/2017     Sig - Route: Infuse 10 mL into a venous catheter As Needed for Line Care. - Intravenous    Cosign for Ordering: Accepted by Brain Donis MD on 10/17/2017 12:06 AM    Linked Group 1:  \"And\" Linked Group Details        sodium chloride 0.9 % infusion 40 mL 40 mL As Needed 10/16/2017     Sig - Route: Infuse 40 mL into a venous catheter As Needed for Line Care. - Intravenous    trimethoprim-polymyxin b (POLYTRIM) 13973-7.1 UNIT/ML-% ophthalmic solution 1 drop 1 drop Every 4 Hours Scheduled 10/17/2017     Sig - Route: Administer 1 drop to both eyes Every 4 (Four) Hours. - Both Eyes    zolpidem (AMBIEN) tablet 5 mg 5 mg Nightly PRN 10/19/2017 10/29/2017    Sig - Route: Take 1 tablet by mouth At Night As Needed for Sleep. - Oral            Lab Results (last 24 hours)     Procedure Component Value Units Date/Time    POC Glucose Fingerstick [[id number]]  (Abnormal) Collected:  10/18/17 1710    Specimen:  Blood Updated:  10/18/17 1725     Glucose 185 (H) mg/dL     Narrative:       Meter: [id number] : [id number] Russ STEARNS    Blood Culture - Blood, [[id number]]  (Normal) Collected:  10/16/17 1815    Specimen:  Blood from Arm, Right Updated:  10/18/17 1831     Blood Culture No growth at 2 days    Blood Culture - Blood, [[id number]]  (Normal) Collected:  10/16/17 1835    Specimen:  Blood from Arm, Right Updated:  10/18/17 1916     Blood Culture No growth at 2 days    POC Glucose Fingerstick [[id number]]  (Abnormal) Collected:  10/18/17 2018    Specimen:  Blood Updated:  10/18/17 2024     Glucose 303 (H) mg/dL     Narrative:       Meter: [id number] : [id number] Jassi Waters NURSING ASSISTANT    CBC & Differential [[id number]] Collected:  10/19/17 0443    Specimen:  Blood Updated:  10/19/17 0453    Narrative:       The following orders were created for panel order CBC & Differential.  Procedure                               Abnormality         Status                     ---------           "                     -----------         ------                     CBC Auto Differential[[id number]]        Abnormal            Final result                 Please view results for these tests on the individual orders.    CBC Auto Differential [[id number]]  (Abnormal) Collected:  10/19/17 0443    Specimen:  Blood Updated:  10/19/17 0453     WBC 9.03 10*3/mm3      RBC 3.08 (L) 10*6/mm3      Hemoglobin 9.1 (L) g/dL      Hematocrit 28.4 (L) %      MCV 92.2 fL      MCH 29.5 pg      MCHC 32.0 g/dL      RDW 12.6 %      RDW-SD 42.0 fl      MPV 10.2 fL      Platelets 202 10*3/mm3      Neutrophil % 59.4 %      Lymphocyte % 24.9 %      Monocyte % 8.9 (H) %      Eosinophil % 5.8 (H) %      Basophil % 0.4 %      Immature Grans % 0.6 (H) %      Neutrophils, Absolute 5.37 10*3/mm3      Lymphocytes, Absolute 2.25 10*3/mm3      Monocytes, Absolute 0.80 10*3/mm3      Eosinophils, Absolute 0.52 (H) 10*3/mm3      Basophils, Absolute 0.04 10*3/mm3      Immature Grans, Absolute 0.05 (H) 10*3/mm3      nRBC 0.0 /100 WBC     Comprehensive Metabolic Panel [[id number]]  (Abnormal) Collected:  10/19/17 0443    Specimen:  Blood Updated:  10/19/17 0600     Glucose 120 (H) mg/dL      BUN 60 (H) mg/dL      Creatinine 2.10 (H) mg/dL      Sodium 143 mmol/L      Potassium 4.5 mmol/L      Chloride 104 mmol/L      CO2 27.2 mmol/L      Calcium 8.5 (L) mg/dL      Total Protein 6.3 g/dL      Albumin 3.20 (L) g/dL      ALT (SGPT) 11 U/L      AST (SGOT) 14 U/L      Alkaline Phosphatase 64 U/L      Total Bilirubin 0.2 mg/dL      eGFR Non African Amer 24 (L) mL/min/1.73      Globulin 3.1 gm/dL      A/G Ratio 1.0 g/dL      BUN/Creatinine Ratio 28.6 (H)     Anion Gap 11.8 mmol/L     POC Glucose Fingerstick [[id number]]  (Normal) Collected:  10/19/17 0721    Specimen:  Blood Updated:  10/19/17 0738     Glucose 99 mg/dL     Narrative:       Meter: [id number] : [id number] Russ STEARNS    POC Glucose Fingerstick [[id number]]  (Abnormal) Collected:   10/19/17 1118    Specimen:  Blood Updated:  10/19/17 1142     Glucose 290 (H) mg/dL     Narrative:       Meter: [id number] : [id number] Russ FRIAS JINNY        Imaging Results (last 24 hours)     ** No results found for the last 24 hours. **        ECG/EMG Results (last 24 hours)     ** No results found for the last 24 hours. **        Operative/Procedure Notes (last 24 hours) (Notes from 10/18/2017 12:43 PM through 10/19/2017 12:43 PM)     No notes of this type exist for this encounter.           Physician Progress Notes (last 24 hours) (Notes from 10/18/2017 12:43 PM through 10/19/2017 12:43 PM)      Micah Romero MD at 10/19/2017 12:18 PM  Version 1 of 1             HOSPITALIST SERVICES  @ [facility], [facility] HOSPITALIST TEAM   PROGRESS NOTE      Patient Care Team:  Jina Vaughan MD as PCP - General (Internal Medicine)  Jhonny Hoyt MD as Consulting Physician (Pulmonary Disease)  OPAL Arias as Nurse Practitioner (Endocrinology)  Konstantin Orr MD as Consulting Physician (Nephrology)  Vargas Ledezma MD as Consulting Physician (Pain Medicine)  Trinidad Barreto MD as Consulting Physician (Cardiology)        Chief Complaint:        Rash in groin      Subjective:    As per Dr Castillo's admission Note  63 y/o female with dyslipidemia, hypertension, DM-2 with diabetic neuropathy, hypothyroidism, CKD stage IV, Anemia of Chronic disease, COPD, GERD, ALTA, H/O Anxiety and depression, Morbid obesity, and Chronic hypoxic respiratory failure on 4L O2 continuous at home presented to the ER secondary to worsening rash in her groin and abdomen.  The patient notes this started about 3 months ago.  In early September she was seen by her PCP and started on Doxycycline and fluconazole.  The rash she notes got a little better and she continued to use a topical barrier cream and ABD pads but over time the rash has worsened again and she notes it  "itches and burns.  She denies any fevers or chills or N/V/D.  She notes chronic KAYE which is unchanged from her baseline and minimal LE edema.  She denies any CP or heart palpitations. She denies any lightheadedness.  She has chronic back pain which is fairly well controlled on her home pain regimen. She was also treated for conjuntivitis in early September and notes 4-5 days ago her eyes started to get read again with purulent yellow drainage R>L.  She has crusting as well.       Interval History and ROS:     Patient States she feels better and is anxious to go home. Requests something for sleep  Patient Complaints: As above  Patient Denies:  Any sx of shortness of breath, wheezing, abdominal pain or n/v  History taken from: Patient      Objective    Vital Signs  Temp:  [97.8 °F (36.6 °C)-98.3 °F (36.8 °C)] 98.1 °F (36.7 °C)  Heart Rate:  [60-78] 69  Resp:  [17-20] 20  BP: ()/(52-62) 150/60    Flowsheet Rows         First Filed Value    Admission Height  66\" (167.6 cm) Documented at 10/16/2017 1748    Admission Weight  243 lb (110 kg) Documented at 10/16/2017 1748              PHYSICAL EXAMINATION:    VS: As documented above  PE: GEN - Morbidly obese, A&O x 3, in NAD   HEENT - Normocephalic, PERRL, EOMI   CV - RRR, S1+, S2+. with no m/r/g  RESP - CTAB x no rales or rhonchi    ABD - Obese, s/nt/nd, NABS, no HSMeg, no palpable masses. Intertrigo and cellulitis under the patient's panus with significant erythema that wraps around to her sides and into her groin and upper thighs.    MS - MAEW(moves all extremities well) , 5/5 strength UE/LE   EXT - No cyanosis or clubbing; Ankle/pedal edema appreciated  NEURO - CN II-XII intact, normal motor and sensory examinations without any focal  neurologic deficits.  PSYCH - affect, mood congruent and appropriate           Results Review:     I reviewed the patient's new clinical results.    Lab Results (last 24 hours)     Procedure Component Value Units Date/Time    POC " Glucose Fingerstick [[id number]]  (Normal) Collected:  10/18/17 1221    Specimen:  Blood Updated:  10/18/17 1235     Glucose 87 mg/dL     Narrative:       Meter: [id number] : [id number] [employer] NA CERT    POC Glucose Fingerstick [[id number]]  (Abnormal) Collected:  10/18/17 1710    Specimen:  Blood Updated:  10/18/17 1725     Glucose 185 (H) mg/dL     Narrative:       Meter: [id number] : [id number] Faulker Lea NA CERT    Blood Culture - Blood, [[id number]]  (Normal) Collected:  10/16/17 1815    Specimen:  Blood from Arm, Right Updated:  10/18/17 1831     Blood Culture No growth at 2 days    Blood Culture - Blood, [[id number]]  (Normal) Collected:  10/16/17 1835    Specimen:  Blood from Arm, Right Updated:  10/18/17 1916     Blood Culture No growth at 2 days    POC Glucose Fingerstick [[id number]]  (Abnormal) Collected:  10/18/17 2018    Specimen:  Blood Updated:  10/18/17 2024     Glucose 303 (H) mg/dL     Narrative:       Meter: [id number] : [id number] Jassi Waters NURSING ASSISTANT    CBC & Differential [[id number]] Collected:  10/19/17 0443    Specimen:  Blood Updated:  10/19/17 0453    Narrative:       The following orders were created for panel order CBC & Differential.  Procedure                               Abnormality         Status                     ---------                               -----------         ------                     CBC Auto Differential[[id number]]        Abnormal            Final result                 Please view results for these tests on the individual orders.    CBC Auto Differential [[id number]]  (Abnormal) Collected:  10/19/17 0443    Specimen:  Blood Updated:  10/19/17 0453     WBC 9.03 10*3/mm3      RBC 3.08 (L) 10*6/mm3      Hemoglobin 9.1 (L) g/dL      Hematocrit 28.4 (L) %      MCV 92.2 fL      MCH 29.5 pg      MCHC 32.0 g/dL      RDW 12.6 %      RDW-SD 42.0 fl      MPV 10.2 fL      Platelets 202 10*3/mm3      Neutrophil % 59.4 %      Lymphocyte % 24.9 %       Monocyte % 8.9 (H) %      Eosinophil % 5.8 (H) %      Basophil % 0.4 %      Immature Grans % 0.6 (H) %      Neutrophils, Absolute 5.37 10*3/mm3      Lymphocytes, Absolute 2.25 10*3/mm3      Monocytes, Absolute 0.80 10*3/mm3      Eosinophils, Absolute 0.52 (H) 10*3/mm3      Basophils, Absolute 0.04 10*3/mm3      Immature Grans, Absolute 0.05 (H) 10*3/mm3      nRBC 0.0 /100 WBC     Comprehensive Metabolic Panel [[id number]]  (Abnormal) Collected:  10/19/17 0443    Specimen:  Blood Updated:  10/19/17 0600     Glucose 120 (H) mg/dL      BUN 60 (H) mg/dL      Creatinine 2.10 (H) mg/dL      Sodium 143 mmol/L      Potassium 4.5 mmol/L      Chloride 104 mmol/L      CO2 27.2 mmol/L      Calcium 8.5 (L) mg/dL      Total Protein 6.3 g/dL      Albumin 3.20 (L) g/dL      ALT (SGPT) 11 U/L      AST (SGOT) 14 U/L      Alkaline Phosphatase 64 U/L      Total Bilirubin 0.2 mg/dL      eGFR Non African Amer 24 (L) mL/min/1.73      Globulin 3.1 gm/dL      A/G Ratio 1.0 g/dL      BUN/Creatinine Ratio 28.6 (H)     Anion Gap 11.8 mmol/L     POC Glucose Fingerstick [[id number]]  (Normal) Collected:  10/19/17 0721    Specimen:  Blood Updated:  10/19/17 0738     Glucose 99 mg/dL     Narrative:       Meter: [id number] : [id number] Primary Data    POC Glucose Fingerstick [[id number]]  (Abnormal) Collected:  10/19/17 1118    Specimen:  Blood Updated:  10/19/17 1142     Glucose 290 (H) mg/dL     Narrative:       Meter: [id number] : [id number] Primary Data          Imaging Results (last 24 hours)     ** No results found for the last 24 hours. **          Xray not reviewed personally by physician.      ECG not reviewed personally by physician  ECG/EMG Results (most recent)     None          Medication Review:   I have reviewed the patient's current medication list    Current Facility-Administered Medications:   •  aspirin EC tablet 81 mg, 81 mg, Oral, Daily, Magalys Paul MD, 81 mg at 10/19/17 0973  •   atorvastatin (LIPITOR) tablet 40 mg, 40 mg, Oral, Nightly, Magalys Paul MD, 40 mg at 10/18/17 2159  •  b complex-C-folic acid capsule 1 mg, 1 capsule, Oral, Daily, Magalys Paul MD, 1 mg at 10/19/17 0932  •  budesonide-formoterol (SYMBICORT) 160-4.5 MCG/ACT inhaler 2 puff, 2 puff, Inhalation, BID - RT, Magalys Paul MD, 2 puff at 10/19/17 0746  •  bumetanide (BUMEX) tablet 2 mg, 2 mg, Oral, Daily, Magalys Paul MD, 2 mg at 10/19/17 0932  •  cetirizine (zyrTEC) tablet 10 mg, 10 mg, Oral, Nightly, Magalys Paul MD, 10 mg at 10/18/17 2159  •  clindamycin (CLEOCIN) 600 mg in dextrose 5% 50 mL IVPB (premix), 600 mg, Intravenous, Q8H, Stanley Mendoza MD, Last Rate: 0 mL/hr at 10/19/17 0315, 600 mg at 10/19/17 1206  •  dextrose (D50W) solution 25 g, 25 g, Intravenous, Q15 Min PRN, Magalys Paul MD  •  dextrose (GLUTOSE) oral gel 15 g, 15 g, Oral, Q15 Min PRN, Magalys Paul MD  •  enoxaparin (LOVENOX) syringe 30 mg, 30 mg, Subcutaneous, Daily, Magalys Paul MD, 30 mg at 10/19/17 0932  •  famotidine (PEPCID) tablet 20 mg, 20 mg, Oral, Daily, Magalys Paul MD, 20 mg at 10/19/17 0932  •  fluconazole (DIFLUCAN) IVPB 200 mg, 200 mg, Intravenous, Daily, Stanley Mendoza MD, 200 mg at 10/19/17 0944  •  glucagon (GLUCAGEN) injection 1 mg, 1 mg, Subcutaneous, Q15 Min PRN, Magalys Paul MD  •  guaiFENesin (MUCINEX) 12 hr tablet 600 mg, 600 mg, Oral, BID, Magalys Paul MD, 600 mg at 10/19/17 0931  •  HYDROcodone-acetaminophen (NORCO)  MG per tablet 2 tablet, 2 tablet, Oral, BID, Magalys Paul MD, 2 tablet at 10/19/17 0943  •  insulin aspart (novoLOG) injection 0-24 Units, 0-24 Units, Subcutaneous, 4x Daily AC & at Bedtime, Magalys Paul MD, 12 Units at 10/19/17 1206  •  insulin detemir (LEVEMIR) injection 60 Units, 60 Units, Subcutaneous,  Nightly, Magalys Paul MD, 60 Units at 10/18/17 2203  •  ipratropium-albuterol (DUO-NEB) nebulizer solution 3 mL, 3 mL, Nebulization, 4x Daily - RT, Payam Peck MD, 3 mL at 10/19/17 1139  •  lactobacillus acidophilus (RISAQUAD) capsule 1 capsule, 1 capsule, Oral, Daily, Magalys Paul MD, 1 capsule at 10/19/17 0932  •  levothyroxine (SYNTHROID, LEVOTHROID) tablet 224 mcg, 224 mcg, Oral, Daily, Magalys Paul MD, 224 mcg at 10/19/17 0931  •  metoprolol tartrate (LOPRESSOR) tablet 50 mg, 50 mg, Oral, BID, Magalys Paul MD, 50 mg at 10/19/17 0932  •  miconazole (MICOTIN) 2 % powder, , Topical, Q12H, Payam Peck MD  •  Pharmacy Consult - Pharmacy to dose, , Does not apply, Continuous PRN, Magalys Paul MD  •  Pharmacy Consult - Pharmacy to dose, , Does not apply, Continuous PRN, Magalys Paul MD  •  QUEtiapine (SEROquel) tablet 100 mg, 100 mg, Oral, BID, Magalys Paul MD, 100 mg at 10/19/17 0932  •  sodium chloride 0.9 % flush 1-10 mL, 1-10 mL, Intravenous, PRN, Magalys Paul MD  •  Insert peripheral IV, , , Once **AND** sodium chloride 0.9 % flush 10 mL, 10 mL, Intravenous, PRN, Violet Lee PA-C  •  sodium chloride 0.9 % infusion 40 mL, 40 mL, Intravenous, PRN, Magalys Paul MD, 40 mL at 10/17/17 1807  •  trimethoprim-polymyxin b (POLYTRIM) 93006-8.1 UNIT/ML-% ophthalmic solution 1 drop, 1 drop, Both Eyes, Q4H, Magalys Paul MD, 1 drop at 10/19/17 1206  •  zolpidem (AMBIEN) tablet 5 mg, 5 mg, Oral, Nightly PRN, Micah Romero MD      Assessment/Plan       ASSESSMENT AND PLAN:       SUMMARY:    ?   PROPHYLAXIS:   -DVT Prophylaxis: On Inj. Lovenox   -Yates Catheter: Not indicated at this time    NUTRITION AND FLUIDS:  -Diet/ Nutrition: Regular, cardiac, consistent carbohydrate diet   -Fluid Status/Electrolytes: Saline Lock       SOCIAL ISSUES:    -Behavioral/  Agitation Issues: NONE   -Social Issues: Lives at home with her family.       THERAPEUTIC:    -ANTIBIOTICS: Inj Clindamycin and Inj Fluconazole  -PAIN MANAGEMENT: Norco  mg              PLAN:    -Labs and diagnostic tests reviewed: WBC 9.03, Hb 9.1, Plt 202, Gluc 290, Na 143, K 4.5, Creat 2.10    -Diagnostic tests reviewed: NONE done in last 24 hours    -Consultations: Dr Mendoza (ID)    -Any new recommendations: To continue current treatment and management plan    -New Labs ordered: CBC and CMP in AM    -New diagnostic tests ordered: N/A    -Any changes in medications: N/A    -Discharge planning issues: Patient should be able to go back home once ready for discharge    -Placement issues: N/A    -Patient is clinically and hemodynamically stable    -To continue current management and supportive care    -Will follow patient closely    -Nothing new to add for right now      DIAGNOSES:      PRIMARY DIAGNOSES:         1. Panniculitis and cellulitis: Continues to slowly resolve.  Continue IV antibiotics as leukocytosis resolves.   Will switch to oral antibiotic therapy guidelines as per Dr. Mendoza from tomorrow and patient will be discharged home tomorrow.      2. Conjunctivitis:  Improving.  Continue antibiotic drops.      3. Dyslipidemia:  Continue Lipitor.      4. Hypertension:  At goal.  No change in regimen. Last /60      5. DM-2 with diabetic neuropathy:  Bedsides at goal.  No change to regimen.      6. Hypothyroidism:  Continue current replacement.      7. CKD stage IV:  Creatinine at baseline.      8. Chronic hypoxic respiratory failure and COPD:  No acute issues.      9. GERD:  Nothing acute.      10. ALTA: Nothing acute.  Continue BiPAP at sleep.      11. Morbid obesity:  Nutrition following.      12. Chronic Low back pain:  No acute issues.    13. DVT Prophylaxis: On Inj Lovenox and SCDs      ?     SECONDARY DIAGNOSES:  ?     As above      SURGICAL DIAGNOSES:      As per Problem List      Plan for  disposition: Patient will be discharged home tomorrow after IV abx and then switched to oral abx    Micah Romero MD  10/19/17  12:18 PM            Micah Romero M.D., FACP  Internal Medicine/ Hospitalist          Time:        Electronically signed by Micah Romero MD at 10/19/2017 12:40 PM

## 2017-10-19 NOTE — PROGRESS NOTES
HOSPITALIST SERVICES  @ [facility], [place]            [facility] HOSPITALIST TEAM   PROGRESS NOTE      Patient Care Team:  Jina Vaughan MD as PCP - General (Internal Medicine)  Jhonny Hoyt MD as Consulting Physician (Pulmonary Disease)  OPAL Arias as Nurse Practitioner (Endocrinology)  Konstantin Orr MD as Consulting Physician (Nephrology)  Vargas Ledezma MD as Consulting Physician (Pain Medicine)  Trinidad Barreto MD as Consulting Physician (Cardiology)        Chief Complaint:        Rash in groin      Subjective:    As per Dr Castillo's admission Note  61 y/o female with dyslipidemia, hypertension, DM-2 with diabetic neuropathy, hypothyroidism, CKD stage IV, Anemia of Chronic disease, COPD, GERD, ALTA, H/O Anxiety and depression, Morbid obesity, and Chronic hypoxic respiratory failure on 4L O2 continuous at home presented to the ER secondary to worsening rash in her groin and abdomen.  The patient notes this started about 3 months ago.  In early September she was seen by her PCP and started on Doxycycline and fluconazole.  The rash she notes got a little better and she continued to use a topical barrier cream and ABD pads but over time the rash has worsened again and she notes it itches and burns.  She denies any fevers or chills or N/V/D.  She notes chronic KAYE which is unchanged from her baseline and minimal LE edema.  She denies any CP or heart palpitations. She denies any lightheadedness.  She has chronic back pain which is fairly well controlled on her home pain regimen. She was also treated for conjuntivitis in early September and notes 4-5 days ago her eyes started to get read again with purulent yellow drainage R>L.  She has crusting as well.       Interval History and ROS:     Patient States she feels better and is anxious to go home. Requests something for sleep  Patient Complaints: As above  Patient Denies:  Any sx of shortness of breath,  "wheezing, abdominal pain or n/v  History taken from: Patient      Objective    Vital Signs  Temp:  [97.8 °F (36.6 °C)-98.3 °F (36.8 °C)] 98.1 °F (36.7 °C)  Heart Rate:  [60-78] 69  Resp:  [17-20] 20  BP: ()/(52-62) 150/60    Flowsheet Rows         First Filed Value    Admission Height  66\" (167.6 cm) Documented at 10/16/2017 1748    Admission Weight  243 lb (110 kg) Documented at 10/16/2017 1748              PHYSICAL EXAMINATION:    VS: As documented above  PE: GEN - Morbidly obese, A&O x 3, in NAD   HEENT - Normocephalic, PERRL, EOMI   CV - RRR, S1+, S2+. with no m/r/g  RESP - CTAB x no rales or rhonchi    ABD - Obese, s/nt/nd, NABS, no HSMeg, no palpable masses. Intertrigo and cellulitis under the patient's panus with significant erythema that wraps around to her sides and into her groin and upper thighs.    MS - MAEW(moves all extremities well) , 5/5 strength UE/LE   EXT - No cyanosis or clubbing; Ankle/pedal edema appreciated  NEURO - CN II-XII intact, normal motor and sensory examinations without any focal  neurologic deficits.  PSYCH - affect, mood congruent and appropriate           Results Review:     I reviewed the patient's new clinical results.    Lab Results (last 24 hours)     Procedure Component Value Units Date/Time    POC Glucose Fingerstick [[id number]]  (Normal) Collected:  10/18/17 1221    Specimen:  Blood Updated:  10/18/17 1235     Glucose 87 mg/dL     Narrative:       Meter: [id number] : [id number] [employer] CERT    POC Glucose Fingerstick [[id number]]  (Abnormal) Collected:  10/18/17 1710    Specimen:  Blood Updated:  10/18/17 1725     Glucose 185 (H) mg/dL     Narrative:       Meter: [id number] : [id number] [email or web address] NA CERT    Blood Culture - Blood, [[id number]]  (Normal) Collected:  10/16/17 1815    Specimen:  Blood from Arm, Right Updated:  10/18/17 1831     Blood Culture No growth at 2 days    Blood Culture - Blood, [[id number]]  (Normal) Collected:  10/16/17 1835    " Specimen:  Blood from Arm, Right Updated:  10/18/17 1916     Blood Culture No growth at 2 days    POC Glucose Fingerstick [[id number]]  (Abnormal) Collected:  10/18/17 2018    Specimen:  Blood Updated:  10/18/17 2024     Glucose 303 (H) mg/dL     Narrative:       Meter: [id number] : [id number] [employer]ener[employer] NURSING ASSISTANT    CBC & Differential [[id number]] Collected:  10/19/17 0443    Specimen:  Blood Updated:  10/19/17 0453    Narrative:       The following orders were created for panel order CBC & Differential.  Procedure                               Abnormality         Status                     ---------                               -----------         ------                     CBC Auto Differential[[id number]]        Abnormal            Final result                 Please view results for these tests on the individual orders.    CBC Auto Differential [[id number]]  (Abnormal) Collected:  10/19/17 0443    Specimen:  Blood Updated:  10/19/17 0453     WBC 9.03 10*3/mm3      RBC 3.08 (L) 10*6/mm3      Hemoglobin 9.1 (L) g/dL      Hematocrit 28.4 (L) %      MCV 92.2 fL      MCH 29.5 pg      MCHC 32.0 g/dL      RDW 12.6 %      RDW-SD 42.0 fl      MPV 10.2 fL      Platelets 202 10*3/mm3      Neutrophil % 59.4 %      Lymphocyte % 24.9 %      Monocyte % 8.9 (H) %      Eosinophil % 5.8 (H) %      Basophil % 0.4 %      Immature Grans % 0.6 (H) %      Neutrophils, Absolute 5.37 10*3/mm3      Lymphocytes, Absolute 2.25 10*3/mm3      Monocytes, Absolute 0.80 10*3/mm3      Eosinophils, Absolute 0.52 (H) 10*3/mm3      Basophils, Absolute 0.04 10*3/mm3      Immature Grans, Absolute 0.05 (H) 10*3/mm3      nRBC 0.0 /100 WBC     Comprehensive Metabolic Panel [[id number]]  (Abnormal) Collected:  10/19/17 0443    Specimen:  Blood Updated:  10/19/17 0600     Glucose 120 (H) mg/dL      BUN 60 (H) mg/dL      Creatinine 2.10 (H) mg/dL      Sodium 143 mmol/L      Potassium 4.5 mmol/L      Chloride 104 mmol/L      CO2 27.2 mmol/L       Calcium 8.5 (L) mg/dL      Total Protein 6.3 g/dL      Albumin 3.20 (L) g/dL      ALT (SGPT) 11 U/L      AST (SGOT) 14 U/L      Alkaline Phosphatase 64 U/L      Total Bilirubin 0.2 mg/dL      eGFR Non African Amer 24 (L) mL/min/1.73      Globulin 3.1 gm/dL      A/G Ratio 1.0 g/dL      BUN/Creatinine Ratio 28.6 (H)     Anion Gap 11.8 mmol/L     POC Glucose Fingerstick [[id number]]  (Normal) Collected:  10/19/17 0721    Specimen:  Blood Updated:  10/19/17 0738     Glucose 99 mg/dL     Narrative:       Meter: [id number] : [id number] [employer] NA CERT    POC Glucose Fingerstick [[id number]]  (Abnormal) Collected:  10/19/17 1118    Specimen:  Blood Updated:  10/19/17 1142     Glucose 290 (H) mg/dL     Narrative:       Meter: [id number] : [id number] [employer] NA CERT          Imaging Results (last 24 hours)     ** No results found for the last 24 hours. **          Xray not reviewed personally by physician.      ECG not reviewed personally by physician  ECG/EMG Results (most recent)     None          Medication Review:   I have reviewed the patient's current medication list    Current Facility-Administered Medications:   •  aspirin EC tablet 81 mg, 81 mg, Oral, Daily, Magalys Paul MD, 81 mg at 10/19/17 0932  •  atorvastatin (LIPITOR) tablet 40 mg, 40 mg, Oral, Nightly, Magalys Paul MD, 40 mg at 10/18/17 5065  •  b complex-C-folic acid capsule 1 mg, 1 capsule, Oral, Daily, Magalys Paul MD, 1 mg at 10/19/17 0932  •  budesonide-formoterol (SYMBICORT) 160-4.5 MCG/ACT inhaler 2 puff, 2 puff, Inhalation, BID - RT, Magalys Paul MD, 2 puff at 10/19/17 0746  •  bumetanide (BUMEX) tablet 2 mg, 2 mg, Oral, Daily, Magalys Paul MD, 2 mg at 10/19/17 8720  •  cetirizine (zyrTEC) tablet 10 mg, 10 mg, Oral, Nightly, Magalys Paul MD, 10 mg at 10/18/17 2635  •  clindamycin (CLEOCIN) 600 mg in dextrose 5% 50 mL IVPB (premix), 600  mg, Intravenous, Q8H, Stanley Mendoza MD, Last Rate: 0 mL/hr at 10/19/17 0315, 600 mg at 10/19/17 1206  •  dextrose (D50W) solution 25 g, 25 g, Intravenous, Q15 Min PRN, Magalys Paul MD  •  dextrose (GLUTOSE) oral gel 15 g, 15 g, Oral, Q15 Min PRN, Magalys Paul MD  •  enoxaparin (LOVENOX) syringe 30 mg, 30 mg, Subcutaneous, Daily, Magalys Paul MD, 30 mg at 10/19/17 0932  •  famotidine (PEPCID) tablet 20 mg, 20 mg, Oral, Daily, Magalys Paul MD, 20 mg at 10/19/17 0932  •  fluconazole (DIFLUCAN) IVPB 200 mg, 200 mg, Intravenous, Daily, Stanley Mendoza MD, 200 mg at 10/19/17 0944  •  glucagon (GLUCAGEN) injection 1 mg, 1 mg, Subcutaneous, Q15 Min PRN, Magalys Paul MD  •  guaiFENesin (MUCINEX) 12 hr tablet 600 mg, 600 mg, Oral, BID, Magalys Paul MD, 600 mg at 10/19/17 0931  •  HYDROcodone-acetaminophen (NORCO)  MG per tablet 2 tablet, 2 tablet, Oral, BID, Magalys Paul MD, 2 tablet at 10/19/17 0943  •  insulin aspart (novoLOG) injection 0-24 Units, 0-24 Units, Subcutaneous, 4x Daily AC & at Bedtime, Magalys Paul MD, 12 Units at 10/19/17 1206  •  insulin detemir (LEVEMIR) injection 60 Units, 60 Units, Subcutaneous, Nightly, Magalys Paul MD, 60 Units at 10/18/17 2203  •  ipratropium-albuterol (DUO-NEB) nebulizer solution 3 mL, 3 mL, Nebulization, 4x Daily - RT, Pyaam Peck MD, 3 mL at 10/19/17 1139  •  lactobacillus acidophilus (RISAQUAD) capsule 1 capsule, 1 capsule, Oral, Daily, Magalys Paul MD, 1 capsule at 10/19/17 0932  •  levothyroxine (SYNTHROID, LEVOTHROID) tablet 224 mcg, 224 mcg, Oral, Daily, Magalys Paul MD, 224 mcg at 10/19/17 0931  •  metoprolol tartrate (LOPRESSOR) tablet 50 mg, 50 mg, Oral, BID, Magalys Paul MD, 50 mg at 10/19/17 0932  •  miconazole (MICOTIN) 2 % powder, , Topical, Q12H, Payam Peck MD  •   Pharmacy Consult - Pharmacy to dose, , Does not apply, Continuous PRN, Magalys Paul MD  •  Pharmacy Consult - Pharmacy to dose, , Does not apply, Continuous PRN, Magalys Paul MD  •  QUEtiapine (SEROquel) tablet 100 mg, 100 mg, Oral, BID, Magalys Paul MD, 100 mg at 10/19/17 0932  •  sodium chloride 0.9 % flush 1-10 mL, 1-10 mL, Intravenous, PRN, Magalys Paul MD  •  Insert peripheral IV, , , Once **AND** sodium chloride 0.9 % flush 10 mL, 10 mL, Intravenous, PRN, Violet Lee PA-C  •  sodium chloride 0.9 % infusion 40 mL, 40 mL, Intravenous, PRN, Magalys Paul MD, 40 mL at 10/17/17 1807  •  trimethoprim-polymyxin b (POLYTRIM) 97970-8.1 UNIT/ML-% ophthalmic solution 1 drop, 1 drop, Both Eyes, Q4H, Magalys Paul MD, 1 drop at 10/19/17 1206  •  zolpidem (AMBIEN) tablet 5 mg, 5 mg, Oral, Nightly PRN, Micah Romero MD      Assessment/Plan       ASSESSMENT AND PLAN:       SUMMARY:    ?   PROPHYLAXIS:   -DVT Prophylaxis: On Inj. Lovenox   -Yates Catheter: Not indicated at this time    NUTRITION AND FLUIDS:  -Diet/ Nutrition: Regular, cardiac, consistent carbohydrate diet   -Fluid Status/Electrolytes: Saline Lock       SOCIAL ISSUES:    -Behavioral/ Agitation Issues: NONE   -Social Issues: Lives at home with her family.       THERAPEUTIC:    -ANTIBIOTICS: Inj Clindamycin and Inj Fluconazole  -PAIN MANAGEMENT: Norco  mg              PLAN:    -Labs and diagnostic tests reviewed: WBC 9.03, Hb 9.1, Plt 202, Gluc 290, Na 143, K 4.5, Creat 2.10    -Diagnostic tests reviewed: NONE done in last 24 hours    -Consultations: Dr Mendoza (ID)    -Any new recommendations: To continue current treatment and management plan    -New Labs ordered: CBC and CMP in AM    -New diagnostic tests ordered: N/A    -Any changes in medications: N/A    -Discharge planning issues: Patient should be able to go back home once ready for  discharge    -Placement issues: N/A    -Patient is clinically and hemodynamically stable    -To continue current management and supportive care    -Will follow patient closely    -Nothing new to add for right now      DIAGNOSES:      PRIMARY DIAGNOSES:         1. Panniculitis and cellulitis: Continues to slowly resolve.  Continue IV antibiotics as leukocytosis resolves.  Will switch to oral antibiotic therapy guidelines as per Dr. Mendoza from tomorrow and patient will be discharged home tomorrow.      2. Conjunctivitis:  Improving.  Continue antibiotic drops.      3. Dyslipidemia:  Continue Lipitor.      4. Hypertension:  At goal.  No change in regimen. Last /60      5. DM-2 with diabetic neuropathy:  Bedsides at goal.  No change to regimen.      6. Hypothyroidism:  Continue current replacement.      7. CKD stage IV:  Creatinine at baseline.      8. Chronic hypoxic respiratory failure and COPD:  No acute issues.      9. GERD:  Nothing acute.      10. ALTA: Nothing acute.  Continue BiPAP at sleep.      11. Morbid obesity:  Nutrition following.      12. Chronic Low back pain:  No acute issues.    13. DVT Prophylaxis: On Inj Lovenox and SCDs      ?     SECONDARY DIAGNOSES:  ?     As above      SURGICAL DIAGNOSES:      As per Problem List      Plan for disposition: Patient will be discharged home tomorrow after IV abx and then switched to oral abx    Micah Romero MD  10/19/17  12:18 PM            Micah Romreo M.D., [facility]  Internal Medicine/ Hospitalist          Time:

## 2017-10-19 NOTE — PLAN OF CARE
Problem: Patient Care Overview (Adult)  Goal: Plan of Care Review    10/19/17 1513   Coping/Psychosocial Response Interventions   Plan Of Care Reviewed With patient   Patient Care Overview   Progress progress toward functional goals as expected   Outcome Evaluation   Outcome Summary/Follow up Plan Encouraging and educating patient in good personal hygeine practices. Moved BSC to bathroom and pt. is compliant with ambulating to the toilet. Skin care performed this shift, excoriated areas appear to be improving. Pt. on continued IV antibiotics. Pt. states she wants to go home.        Goal: Adult Individualization and Mutuality  Outcome: Ongoing (interventions implemented as appropriate)    10/19/17 1513   Individualization   Patient Specific Goals Pt. states that she wants to go home.          Problem: Skin Integrity Impairment, Risk/Actual (Adult)  Goal: Identify Related Risk Factors and Signs and Symptoms  Outcome: Ongoing (interventions implemented as appropriate)    10/19/17 1513   Skin Integrity Impairment, Risk/Actual   Skin Integrity Impairment, Risk/Actual: Related Risk Factors immobility;infection/disease process;moisture;skin disorders   Signs and Symptoms (Skin Integrity Impairment) inflammation;rash       Goal: Skin Integrity/Wound Healing  Outcome: Ongoing (interventions implemented as appropriate)    10/19/17 1513   Skin Integrity Impairment, Risk/Actual (Adult)   Skin Integrity/Wound Healing making progress toward outcome         Problem: Pain, Acute (Adult)  Goal: Identify Related Risk Factors and Signs and Symptoms  Outcome: Ongoing (interventions implemented as appropriate)    10/19/17 1513   Pain, Acute   Related Risk Factors (Acute Pain) infection;knowledge deficit;patient perception   Signs and Symptoms (Acute Pain) verbalization of pain descriptors;fatigue/weakness;BADLs/IADLs reluctance/inability to perform       Goal: Acceptable Pain Control/Comfort Level  Outcome: Ongoing (interventions  implemented as appropriate)    10/19/17 1513   Pain, Acute (Adult)   Acceptable Pain Control/Comfort Level making progress toward outcome         Problem: Confusion, Acute (Adult)  Goal: Identify Related Risk Factors and Signs and Symptoms  Outcome: Ongoing (interventions implemented as appropriate)    10/19/17 1513   Confusion, Acute   Related Risk Factors (Acute Confusion) advanced age;infection;mobility decreased       Goal: Cognitive/Functional Impairments Minimized  Outcome: Ongoing (interventions implemented as appropriate)    10/19/17 1513   Confusion, Acute (Adult)   Cognitive/Functional Impairments Minimized making progress toward outcome       Goal: Safety  Outcome: Ongoing (interventions implemented as appropriate)

## 2017-10-19 NOTE — PLAN OF CARE
Problem: Patient Care Overview (Adult)  Goal: Plan of Care Review  Outcome: Ongoing (interventions implemented as appropriate)    10/19/17 0625   Coping/Psychosocial Response Interventions   Plan Of Care Reviewed With patient   Patient Care Overview   Progress progress toward functional goals as expected   Outcome Evaluation   Outcome Summary/Follow up Plan continues to encourage good hygiene practices. She felt this was too hard to do and was doing her best.       Goal: Adult Individualization and Mutuality  Outcome: Ongoing (interventions implemented as appropriate)  Goal: Discharge Needs Assessment  Outcome: Ongoing (interventions implemented as appropriate)    Problem: Skin Integrity Impairment, Risk/Actual (Adult)  Goal: Identify Related Risk Factors and Signs and Symptoms  Outcome: Ongoing (interventions implemented as appropriate)  Goal: Skin Integrity/Wound Healing  Outcome: Ongoing (interventions implemented as appropriate)    Problem: Pain, Acute (Adult)  Goal: Identify Related Risk Factors and Signs and Symptoms  Outcome: Ongoing (interventions implemented as appropriate)  Goal: Acceptable Pain Control/Comfort Level  Outcome: Ongoing (interventions implemented as appropriate)    Problem: Confusion, Acute (Adult)  Goal: Identify Related Risk Factors and Signs and Symptoms  Outcome: Ongoing (interventions implemented as appropriate)  Goal: Cognitive/Functional Impairments Minimized  Outcome: Ongoing (interventions implemented as appropriate)  Goal: Safety  Outcome: Ongoing (interventions implemented as appropriate)

## 2017-10-20 VITALS
OXYGEN SATURATION: 97 % | WEIGHT: 243 LBS | BODY MASS INDEX: 39.05 KG/M2 | HEART RATE: 68 BPM | RESPIRATION RATE: 20 BRPM | DIASTOLIC BLOOD PRESSURE: 69 MMHG | HEIGHT: 66 IN | SYSTOLIC BLOOD PRESSURE: 144 MMHG | TEMPERATURE: 97.8 F

## 2017-10-20 LAB
ALBUMIN SERPL-MCNC: 3.2 G/DL (ref 3.5–5.2)
ALBUMIN/GLOB SERPL: 0.9 G/DL
ALP SERPL-CCNC: 69 U/L (ref 40–129)
ALT SERPL W P-5'-P-CCNC: 14 U/L (ref 5–33)
ANION GAP SERPL CALCULATED.3IONS-SCNC: 11.6 MMOL/L
AST SERPL-CCNC: 16 U/L (ref 5–32)
BASOPHILS # BLD AUTO: 0.03 10*3/MM3 (ref 0–0.2)
BASOPHILS NFR BLD AUTO: 0.3 % (ref 0–2)
BILIRUB SERPL-MCNC: 0.2 MG/DL (ref 0.2–1.2)
BUN BLD-MCNC: 55 MG/DL (ref 8–23)
BUN/CREAT SERPL: 28.9 (ref 7–25)
CALCIUM SPEC-SCNC: 8.7 MG/DL (ref 8.8–10.5)
CHLORIDE SERPL-SCNC: 103 MMOL/L (ref 98–107)
CO2 SERPL-SCNC: 29.4 MMOL/L (ref 22–29)
CREAT BLD-MCNC: 1.9 MG/DL (ref 0.57–1)
DEPRECATED RDW RBC AUTO: 41.8 FL (ref 37–54)
EOSINOPHIL # BLD AUTO: 0.49 10*3/MM3 (ref 0.1–0.3)
EOSINOPHIL NFR BLD AUTO: 5.4 % (ref 0–4)
ERYTHROCYTE [DISTWIDTH] IN BLOOD BY AUTOMATED COUNT: 12.4 % (ref 11.5–14.5)
GFR SERPL CREATININE-BSD FRML MDRD: 27 ML/MIN/1.73
GLOBULIN UR ELPH-MCNC: 3.4 GM/DL
GLUCOSE BLD-MCNC: 129 MG/DL (ref 65–99)
GLUCOSE BLDC GLUCOMTR-MCNC: 104 MG/DL (ref 70–130)
HCT VFR BLD AUTO: 30.1 % (ref 37–47)
HGB BLD-MCNC: 9.4 G/DL (ref 12–16)
IMM GRANULOCYTES # BLD: 0.09 10*3/MM3 (ref 0–0.03)
IMM GRANULOCYTES NFR BLD: 1 % (ref 0–0.5)
LYMPHOCYTES # BLD AUTO: 2.49 10*3/MM3 (ref 0.6–4.8)
LYMPHOCYTES NFR BLD AUTO: 27.5 % (ref 20–45)
MCH RBC QN AUTO: 29.1 PG (ref 27–31)
MCHC RBC AUTO-ENTMCNC: 31.2 G/DL (ref 31–37)
MCV RBC AUTO: 93.2 FL (ref 81–99)
MONOCYTES # BLD AUTO: 0.89 10*3/MM3 (ref 0–1)
MONOCYTES NFR BLD AUTO: 9.8 % (ref 3–8)
NEUTROPHILS # BLD AUTO: 5.07 10*3/MM3 (ref 1.5–8.3)
NEUTROPHILS NFR BLD AUTO: 56 % (ref 45–70)
NRBC BLD MANUAL-RTO: 0 /100 WBC (ref 0–0)
PLATELET # BLD AUTO: 200 10*3/MM3 (ref 140–500)
PMV BLD AUTO: 10.2 FL (ref 7.4–10.4)
POTASSIUM BLD-SCNC: 4.7 MMOL/L (ref 3.5–5.2)
PROT SERPL-MCNC: 6.6 G/DL (ref 6–8.5)
RBC # BLD AUTO: 3.23 10*6/MM3 (ref 4.2–5.4)
SODIUM BLD-SCNC: 144 MMOL/L (ref 136–145)
WBC NRBC COR # BLD: 9.06 10*3/MM3 (ref 4.8–10.8)

## 2017-10-20 PROCEDURE — 85025 COMPLETE CBC W/AUTO DIFF WBC: CPT | Performed by: INTERNAL MEDICINE

## 2017-10-20 PROCEDURE — 25010000002 FLUCONAZOLE PER 200 MG: Performed by: INTERNAL MEDICINE

## 2017-10-20 PROCEDURE — 99239 HOSP IP/OBS DSCHRG MGMT >30: CPT | Performed by: INTERNAL MEDICINE

## 2017-10-20 PROCEDURE — 82962 GLUCOSE BLOOD TEST: CPT

## 2017-10-20 PROCEDURE — 80053 COMPREHEN METABOLIC PANEL: CPT | Performed by: INTERNAL MEDICINE

## 2017-10-20 PROCEDURE — 94799 UNLISTED PULMONARY SVC/PX: CPT

## 2017-10-20 PROCEDURE — 25010000002 ENOXAPARIN PER 10 MG: Performed by: HOSPITALIST

## 2017-10-20 RX ORDER — FLUCONAZOLE 100 MG/1
200 TABLET ORAL EVERY 24 HOURS
Status: DISCONTINUED | OUTPATIENT
Start: 2017-10-20 | End: 2017-10-20 | Stop reason: HOSPADM

## 2017-10-20 RX ORDER — POLYMYXIN B SULFATE AND TRIMETHOPRIM 1; 10000 MG/ML; [USP'U]/ML
1 SOLUTION OPHTHALMIC
Qty: 1 EACH | Refills: 0 | Status: SHIPPED | OUTPATIENT
Start: 2017-10-20 | End: 2018-05-24 | Stop reason: HOSPADM

## 2017-10-20 RX ORDER — MINOCYCLINE HYDROCHLORIDE 50 MG/1
100 CAPSULE ORAL EVERY 12 HOURS SCHEDULED
Status: DISCONTINUED | OUTPATIENT
Start: 2017-10-20 | End: 2017-10-20 | Stop reason: HOSPADM

## 2017-10-20 RX ORDER — MINOCYCLINE HYDROCHLORIDE 100 MG/1
100 CAPSULE ORAL EVERY 12 HOURS SCHEDULED
Qty: 20 CAPSULE | Refills: 0 | Status: SHIPPED | OUTPATIENT
Start: 2017-10-20 | End: 2017-10-30

## 2017-10-20 RX ORDER — FLUCONAZOLE 200 MG/1
200 TABLET ORAL EVERY 24 HOURS
Qty: 20 TABLET | Refills: 0 | Status: SHIPPED | OUTPATIENT
Start: 2017-10-20 | End: 2017-11-09

## 2017-10-20 RX ADMIN — MICONAZOLE NITRATE: 20 POWDER TOPICAL at 08:09

## 2017-10-20 RX ADMIN — ENOXAPARIN SODIUM 30 MG: 30 INJECTION SUBCUTANEOUS at 08:09

## 2017-10-20 RX ADMIN — ASCORBIC ACID, THIAMINE MONONITRATE,RIBOFLAVIN, NIACINAMIDE, PYRIDOXINE HYDROCHLORIDE, FOLIC ACID, CYANOCOBALAMIN, BIOTIN, CALCIUM PANTOTHENATE, 1 MG: 100; 1.5; 1.7; 20; 10; 1; 6000; 150000; 5 CAPSULE, LIQUID FILLED ORAL at 08:08

## 2017-10-20 RX ADMIN — BUMETANIDE 2 MG: 1 TABLET ORAL at 08:08

## 2017-10-20 RX ADMIN — CLINDAMYCIN PHOSPHATE 600 MG: 12 INJECTION, SOLUTION INTRAVENOUS at 05:06

## 2017-10-20 RX ADMIN — FAMOTIDINE 20 MG: 20 TABLET, FILM COATED ORAL at 08:08

## 2017-10-20 RX ADMIN — FLUCONAZOLE 200 MG: 100 TABLET ORAL at 10:23

## 2017-10-20 RX ADMIN — BUDESONIDE AND FORMOTEROL FUMARATE DIHYDRATE 2 PUFF: 160; 4.5 AEROSOL RESPIRATORY (INHALATION) at 07:33

## 2017-10-20 RX ADMIN — LEVOTHYROXINE SODIUM 224 MCG: 112 TABLET ORAL at 08:08

## 2017-10-20 RX ADMIN — METOPROLOL TARTRATE 50 MG: 50 TABLET, FILM COATED ORAL at 08:08

## 2017-10-20 RX ADMIN — Medication 1 CAPSULE: at 08:08

## 2017-10-20 RX ADMIN — IPRATROPIUM BROMIDE AND ALBUTEROL SULFATE 3 ML: .5; 3 SOLUTION RESPIRATORY (INHALATION) at 07:19

## 2017-10-20 RX ADMIN — HYDROCODONE BITARTRATE AND ACETAMINOPHEN 2 TABLET: 10; 325 TABLET ORAL at 08:08

## 2017-10-20 RX ADMIN — MINOCYCLINE HYDROCHLORIDE 100 MG: 50 CAPSULE ORAL at 10:23

## 2017-10-20 RX ADMIN — GUAIFENESIN 600 MG: 600 TABLET, EXTENDED RELEASE ORAL at 08:08

## 2017-10-20 RX ADMIN — ASPIRIN 81 MG: 81 TABLET, COATED ORAL at 08:08

## 2017-10-20 RX ADMIN — POLYMYXIN B SULFATE AND TRIMETHOPRIM 1 DROP: 1; 10000 SOLUTION OPHTHALMIC at 08:09

## 2017-10-20 RX ADMIN — POLYMYXIN B SULFATE AND TRIMETHOPRIM 1 DROP: 1; 10000 SOLUTION OPHTHALMIC at 05:06

## 2017-10-20 RX ADMIN — QUETIAPINE FUMARATE 100 MG: 100 TABLET, FILM COATED ORAL at 08:09

## 2017-10-20 RX ADMIN — FLUCONAZOLE 200 MG: 2 INJECTION, SOLUTION INTRAVENOUS at 08:08

## 2017-10-20 NOTE — DISCHARGE INSTR - APPOINTMENTS
Follow up with Jina Vaughan MD on October 25, 2017 at 9:40 am   [facility] INTERNAL MED AND PEDS (--)  [address]  [place] [address] [phone]

## 2017-10-20 NOTE — DISCHARGE INSTR - LAB
Follow up with Jina Vaughan MD on October 25, 2017 at 9:40 am   [facility] INTERNAL MED AND PEDS (--)  [address]  [place] [address] [phone]

## 2017-10-20 NOTE — DISCHARGE SUMMARY
HOSPITALIST SERVICES  @ [facility], [place]          [facility] Hospitalist Team    DISCHARGE SUMMARY        Jared RUEDA Jeferson  1955  [id number]        Hospitalists Discharge Summary    Date of Admission: 10/16/2017  Date of Discharge:  10/20/2017            DIAGNOSES:      PRIMARY DIAGNOSES:         1. Panniculitis and cellulitis      2. Conjunctivitis      3. Dyslipidemia      4. Hypertension      5. DM-2 with diabetic neuropathy      6. Hypothyroidism      7. CKD stage IV      8. Chronic hypoxic respiratory failure and COPD      9. GERD      10. ALTA      11. Morbid obesity      12. Chronic Low back pain     13. DVT Prophylaxis                 PCP  Patient Care Team:  Jina Vaughan MD as PCP - General (Internal Medicine)  Jhonny Hoyt MD as Consulting Physician (Pulmonary Disease)  OPAL Arias as Nurse Practitioner (Endocrinology)  Konstantin Orr MD as Consulting Physician (Nephrology)  Vargas Ledezma MD as Consulting Physician (Pain Medicine)  Trinidad Barreto MD as Consulting Physician (Cardiology)    Consults:   Consults     Date and Time Order Name Status Description    10/16/2017 2136 Inpatient Consult to Infectious Diseases Completed           Operations and Procedures Performed:       No results found.    Allergies:  is allergic to erythromycin; erythromycin ethylsuccinate; ativan [lorazepam]; and tape.    Graham  not reviewed    Discharge Medications:   Jared Govea   Home Medication Instructions DAE:[id number]    Printed on:10/20/17 0941   Medication Information                      albuterol (PROVENTIL HFA;VENTOLIN HFA) 108 (90 Base) MCG/ACT inhaler  Inhale 2 puffs Every 4 (Four) Hours As Needed for Wheezing.             aspirin 81 MG EC tablet  Take 81 mg by mouth daily.             atorvastatin (LIPITOR) 40 MG tablet  Take 1 tablet by mouth Daily. For cholesterol             B Complex Vitamins (VITAMIN B COMPLEX PO)  Take 1 tablet by mouth  daily.             bumetanide (BUMEX) 2 MG tablet  Take 1 tablet by mouth 2 (Two) Times a Day.             cetirizine (ZyrTEC) 10 MG tablet  Take 10 mg by mouth every night.             dimenhyDRINATE (DRAMAMINE) 50 MG tablet  Take 1 tablet by mouth Daily As Needed for movement disorders.             famotidine (PEPCID) 20 MG tablet  Take 20 mg by mouth daily.             fluconazole (DIFLUCAN) 200 MG tablet  Take 1 tablet by mouth Daily for 20 doses. Indications: Skin and Soft Tissue Infection             fluticasone-salmeterol (ADVAIR DISKUS) 250-50 MCG/DOSE DISKUS  Inhale 1 puff 2 (Two) Times a Day. For COPD and rinse mouth after use             guaiFENesin (MUCINEX) 600 MG 12 hr tablet  Take 1 tablet by mouth 2 (two) times a day.             HYDROcodone-acetaminophen (NORCO)  MG per tablet  Take 2 tablets by mouth 2 (Two) Times a Day.             INCRUSE ELLIPTA 62.5 MCG/INH aerosol powder   Inhale Daily.             insulin aspart (NOVOLOG FLEXPEN) 100 UNIT/ML solution pen-injector sc pen  SSI             Insulin Degludec (TRESIBA FLEXTOUCH) 200 UNIT/ML solution pen-injector  Inject 70 Units under the skin Daily.             Insulin Pen Needle (BD PEN NEEDLE CLAY U/F) 32G X 4 MM misc  Use as directed 7 times daily             levalbuterol (XOPENEX) 0.63 MG/3ML nebulizer solution  Take 1 ampule by nebulization every 4 (four) hours as needed for wheezing.             levothyroxine (SYNTHROID) 112 MCG tablet  Take 2 tablets by mouth Daily.             metoprolol tartrate (LOPRESSOR) 50 MG tablet  Take 1 tablet by mouth 2 (Two) Times a Day. For heart             miconazole (MICOTIN) 2 % powder  Apply  topically Every 12 (Twelve) Hours. Under folds of skin             minocycline (MINOCIN,DYNACIN) 100 MG capsule  Take 1 capsule by mouth Every 12 (Twelve) Hours for 20 doses. Indications: Skin and Soft Tissue Infection             Probiotic Product (ALIGN) 4 MG capsule  Take 4 mg by mouth Daily.              QUEtiapine (SEROquel) 100 MG tablet  Take 1 tablet by mouth 2 (Two) Times a Day.             trimethoprim-polymyxin b (POLYTRIM) 03645-9.1 UNIT/ML-% ophthalmic solution  Administer 1 drop to both eyes Every 4 (Four) Hours.             vitamin D (ERGOCALCIFEROL) 28413 UNITS capsule capsule  Take 50,000 Units by mouth Every 7 (Seven) Days. On tuesday                 History of Present Illness:    As per Dr Castillo's Admission Note  63 y/o female with dyslipidemia, hypertension, DM-2 with diabetic neuropathy, hypothyroidism, CKD stage IV, Anemia of Chronic disease, COPD, GERD, ALTA, H/O Anxiety and depression, Morbid obesity, and Chronic hypoxic respiratory failure on 4L O2 continuous at home presented to the ER secondary to worsening rash in her groin and abdomen.  The patient notes this started about 3 months ago.  In early September she was seen by her PCP and started on Doxycycline and fluconazole.  The rash she notes got a little better and she continued to use a topical barrier cream and ABD pads but over time the rash has worsened again and she notes it itches and burns.  She denies any fevers or chills or N/V/D.  She notes chronic KAYE which is unchanged from her baseline and minimal LE edema.  She denies any CP or heart palpitations. She denies any lightheadedness.  She has chronic back pain which is fairly well controlled on her home pain regimen. She was also treated for conjuntivitis in early September and notes 4-5 days ago her eyes started to get read again with purulent yellow drainage R>L.  She has crusting as well.         Hospital Course     Patient was admitted to Med Surg and   Started on Inj Zosyn and Inj Vanco. Vanco was changed to Zovyx. ID consult was obtained from Dr Mendoza and he recommended the following:    Plan :     IV  clinda and diflucan  Local care with Nystatin  Keep skin folds in groin   Home soon on 10 days of PO minocyclin and diflucan 200 and local care  Thank   greg Mendoza MD  10/17/17  2:42 PM        Patient's WBC is normal. Will put patient on oral Minocyclin and Diflucan and patient will be discharged home today.          Last Lab Results:   Lab Results (most recent)     Procedure Component Value Units Date/Time    POC Glucose Fingerstick [[id number]]  (Abnormal) Collected:  10/16/17 1810    Specimen:  Blood Updated:  10/16/17 1816     Glucose 276 (H) mg/dL     Narrative:       Meter: [id number] : [id number] Maya Zarate NURSING ASSISTANT    CBC & Differential [[id number]] Collected:  10/16/17 1815    Specimen:  Blood Updated:  10/16/17 1822    Narrative:       The following orders were created for panel order CBC & Differential.  Procedure                               Abnormality         Status                     ---------                               -----------         ------                     CBC Auto Differential[[id number]]        Abnormal            Final result                 Please view results for these tests on the individual orders.    CBC Auto Differential [[id number]]  (Abnormal) Collected:  10/16/17 1815    Specimen:  Blood Updated:  10/16/17 1822     WBC 14.78 (H) 10*3/mm3      RBC 3.42 (L) 10*6/mm3      Hemoglobin 10.0 (L) g/dL      Hematocrit 31.5 (L) %      MCV 92.1 fL      MCH 29.2 pg      MCHC 31.7 g/dL      RDW 12.2 %      RDW-SD 41.1 fl      MPV 10.8 (H) fL      Platelets 189 10*3/mm3      Neutrophil % 76.1 (H) %      Lymphocyte % 13.9 (L) %      Monocyte % 6.3 %      Eosinophil % 3.0 %      Basophil % 0.3 %      Immature Grans % 0.4 %      Neutrophils, Absolute 11.26 (H) 10*3/mm3      Lymphocytes, Absolute 2.05 10*3/mm3      Monocytes, Absolute 0.93 10*3/mm3      Eosinophils, Absolute 0.44 (H) 10*3/mm3      Basophils, Absolute 0.04 10*3/mm3      Immature Grans, Absolute 0.06 (H) 10*3/mm3      nRBC 0.0 /100 WBC     Lactic Acid, Plasma [[id number]]  (Normal) Collected:  10/16/17 1816    Specimen:  Blood Updated:  10/16/17 1835      Lactate 0.9 mmol/L     Comprehensive Metabolic Panel [[id number]]  (Abnormal) Collected:  10/16/17 1815    Specimen:  Blood Updated:  10/16/17 1839     Glucose 273 (H) mg/dL      BUN 68 (H) mg/dL      Creatinine 2.15 (H) mg/dL      Sodium 136 mmol/L      Potassium 4.5 mmol/L      Chloride 95 (L) mmol/L      CO2 28.8 mmol/L      Calcium 9.1 mg/dL      Total Protein 7.0 g/dL      Albumin 3.50 g/dL      ALT (SGPT) 10 U/L      AST (SGOT) 14 U/L      Alkaline Phosphatase 76 U/L      Total Bilirubin 0.3 mg/dL      eGFR Non African Amer 23 (L) mL/min/1.73      Globulin 3.5 gm/dL      A/G Ratio 1.0 g/dL      BUN/Creatinine Ratio 31.6 (H)     Anion Gap 12.2 mmol/L     POC Glucose Fingerstick [[id number]]  (Abnormal) Collected:  10/16/17 2243    Specimen:  Blood Updated:  10/16/17 2249     Glucose 384 (H) mg/dL     Narrative:       Meter: [id number] : [id number] Waylon Zamoratney    CBC Auto Differential [[id number]]  (Abnormal) Collected:  10/17/17 0408    Specimen:  Blood Updated:  10/17/17 0439     WBC 12.70 (H) 10*3/mm3      RBC 3.15 (L) 10*6/mm3      Hemoglobin 9.2 (L) g/dL      Hematocrit 28.9 (L) %      MCV 91.7 fL      MCH 29.2 pg      MCHC 31.8 g/dL      RDW 12.5 %      RDW-SD 41.5 fl      MPV 10.9 (H) fL      Platelets 181 10*3/mm3      Neutrophil % 72.9 (H) %      Lymphocyte % 16.1 (L) %      Monocyte % 7.2 %      Eosinophil % 3.2 %      Basophil % 0.3 %      Immature Grans % 0.3 %      Neutrophils, Absolute 9.25 (H) 10*3/mm3      Lymphocytes, Absolute 2.05 10*3/mm3      Monocytes, Absolute 0.91 10*3/mm3      Eosinophils, Absolute 0.41 (H) 10*3/mm3      Basophils, Absolute 0.04 10*3/mm3      Immature Grans, Absolute 0.04 (H) 10*3/mm3      nRBC 0.0 /100 WBC     Basic Metabolic Panel [[id number]]  (Abnormal) Collected:  10/17/17 0408    Specimen:  Blood Updated:  10/17/17 0534     Glucose 129 (H) mg/dL      BUN 68 (H) mg/dL      Creatinine 1.94 (H) mg/dL      Sodium 140 mmol/L      Potassium 4.0 mmol/L      Chloride  100 mmol/L      CO2 28.0 mmol/L      Calcium 8.9 mg/dL      eGFR Non African Amer 26 (L) mL/min/1.73      BUN/Creatinine Ratio 35.1 (H)     Anion Gap 12.0 mmol/L     Narrative:       GFR Normal >60  Chronic Kidney Disease <60  Kidney Failure <15    POC Glucose Fingerstick [[id number]]  (Normal) Collected:  10/17/17 0839    Specimen:  Blood Updated:  10/17/17 0848     Glucose 82 mg/dL     Narrative:       Meter: [id number] : [id number] Faulker Lea NA CERT    POC Glucose Fingerstick [[id number]]  (Abnormal) Collected:  10/17/17 1149    Specimen:  Blood Updated:  10/17/17 1204     Glucose 232 (H) mg/dL     Narrative:       Meter: [id number] : [id number] Faulker Lea NA CERT    POC Glucose Fingerstick [[id number]]  (Normal) Collected:  10/17/17 1652    Specimen:  Blood Updated:  10/17/17 1703     Glucose 94 mg/dL     Narrative:       Meter: [id number] : [id number] Faulker Lea NA CERT    POC Glucose Fingerstick [[id number]]  (Normal) Collected:  10/17/17 2039    Specimen:  Blood Updated:  10/17/17 2045     Glucose 127 mg/dL     Narrative:       Meter: [id number] : [id number] Waylon Tillman    POC Glucose Fingerstick [[id number]]  (Normal) Collected:  10/18/17 0721    Specimen:  Blood Updated:  10/18/17 0733     Glucose 87 mg/dL     Narrative:       Meter: [id number] : [id number] Faulker Lea NA CERT    POC Glucose Fingerstick [[id number]]  (Normal) Collected:  10/18/17 1221    Specimen:  Blood Updated:  10/18/17 1235     Glucose 87 mg/dL     Narrative:       Meter: [id number] : [id number] Faulker Lea NA CERT    POC Glucose Fingerstick [[id number]]  (Abnormal) Collected:  10/18/17 1710    Specimen:  Blood Updated:  10/18/17 1725     Glucose 185 (H) mg/dL     Narrative:       Meter: [id number] : [id number] Faulker Lea NA CERT    POC Glucose Fingerstick [[id number]]  (Abnormal) Collected:  10/18/17 2018    Specimen:  Blood Updated:  10/18/17 2024     Glucose 303 (H) mg/dL     Narrative:        Meter: [id number] : [id number] Jassi Waters NURSING ASSISTANT    CBC & Differential [[id number]] Collected:  10/19/17 0443    Specimen:  Blood Updated:  10/19/17 0453    Narrative:       The following orders were created for panel order CBC & Differential.  Procedure                               Abnormality         Status                     ---------                               -----------         ------                     CBC Auto Differential[[id number]]        Abnormal            Final result                 Please view results for these tests on the individual orders.    CBC Auto Differential [[id number]]  (Abnormal) Collected:  10/19/17 0443    Specimen:  Blood Updated:  10/19/17 0453     WBC 9.03 10*3/mm3      RBC 3.08 (L) 10*6/mm3      Hemoglobin 9.1 (L) g/dL      Hematocrit 28.4 (L) %      MCV 92.2 fL      MCH 29.5 pg      MCHC 32.0 g/dL      RDW 12.6 %      RDW-SD 42.0 fl      MPV 10.2 fL      Platelets 202 10*3/mm3      Neutrophil % 59.4 %      Lymphocyte % 24.9 %      Monocyte % 8.9 (H) %      Eosinophil % 5.8 (H) %      Basophil % 0.4 %      Immature Grans % 0.6 (H) %      Neutrophils, Absolute 5.37 10*3/mm3      Lymphocytes, Absolute 2.25 10*3/mm3      Monocytes, Absolute 0.80 10*3/mm3      Eosinophils, Absolute 0.52 (H) 10*3/mm3      Basophils, Absolute 0.04 10*3/mm3      Immature Grans, Absolute 0.05 (H) 10*3/mm3      nRBC 0.0 /100 WBC     Comprehensive Metabolic Panel [[id number]]  (Abnormal) Collected:  10/19/17 0443    Specimen:  Blood Updated:  10/19/17 0600     Glucose 120 (H) mg/dL      BUN 60 (H) mg/dL      Creatinine 2.10 (H) mg/dL      Sodium 143 mmol/L      Potassium 4.5 mmol/L      Chloride 104 mmol/L      CO2 27.2 mmol/L      Calcium 8.5 (L) mg/dL      Total Protein 6.3 g/dL      Albumin 3.20 (L) g/dL      ALT (SGPT) 11 U/L      AST (SGOT) 14 U/L      Alkaline Phosphatase 64 U/L      Total Bilirubin 0.2 mg/dL      eGFR Non African Amer 24 (L) mL/min/1.73      Globulin 3.1 gm/dL       A/G Ratio 1.0 g/dL      BUN/Creatinine Ratio 28.6 (H)     Anion Gap 11.8 mmol/L     POC Glucose Fingerstick [[id number]]  (Normal) Collected:  10/19/17 0721    Specimen:  Blood Updated:  10/19/17 0738     Glucose 99 mg/dL     Narrative:       Meter: [id number] : [id number] Janetparis Tate NA CERT    POC Glucose Fingerstick [[id number]]  (Abnormal) Collected:  10/19/17 1118    Specimen:  Blood Updated:  10/19/17 1142     Glucose 290 (H) mg/dL     Narrative:       Meter: [id number] : [id number] BlaineSecurActive NA CERT    POC Glucose Fingerstick [[id number]]  (Abnormal) Collected:  10/19/17 1639    Specimen:  Blood Updated:  10/19/17 1645     Glucose 168 (H) mg/dL     Narrative:       Meter: [id number] : [id number] Ravinder Villa [place] NURSING ASSISTANT-POOL    Blood Culture - Blood, [[id number]]  (Normal) Collected:  10/16/17 1815    Specimen:  Blood from Arm, Right Updated:  10/19/17 1831     Blood Culture No growth at 3 days    Blood Culture - Blood, [[id number]]  (Normal) Collected:  10/16/17 1835    Specimen:  Blood from Arm, Right Updated:  10/19/17 1916     Blood Culture No growth at 3 days    POC Glucose Fingerstick [[id number]]  (Abnormal) Collected:  10/19/17 2039    Specimen:  Blood Updated:  10/19/17 2046     Glucose 285 (H) mg/dL     Narrative:       Meter: [id number] : [id number] Adeola Cortez NA CERT    CBC & Differential [[id number]] Collected:  10/20/17 0346    Specimen:  Blood Updated:  10/20/17 0359    Narrative:       The following orders were created for panel order CBC & Differential.  Procedure                               Abnormality         Status                     ---------                               -----------         ------                     CBC Auto Differential[[id number]]        Abnormal            Final result                 Please view results for these tests on the individual orders.    CBC Auto Differential [[id number]]  (Abnormal) Collected:  10/20/17 0346    Specimen:   Blood Updated:  10/20/17 0359     WBC 9.06 10*3/mm3      RBC 3.23 (L) 10*6/mm3      Hemoglobin 9.4 (L) g/dL      Hematocrit 30.1 (L) %      MCV 93.2 fL      MCH 29.1 pg      MCHC 31.2 g/dL      RDW 12.4 %      RDW-SD 41.8 fl      MPV 10.2 fL      Platelets 200 10*3/mm3      Neutrophil % 56.0 %      Lymphocyte % 27.5 %      Monocyte % 9.8 (H) %      Eosinophil % 5.4 (H) %      Basophil % 0.3 %      Immature Grans % 1.0 (H) %      Neutrophils, Absolute 5.07 10*3/mm3      Lymphocytes, Absolute 2.49 10*3/mm3      Monocytes, Absolute 0.89 10*3/mm3      Eosinophils, Absolute 0.49 (H) 10*3/mm3      Basophils, Absolute 0.03 10*3/mm3      Immature Grans, Absolute 0.09 (H) 10*3/mm3      nRBC 0.0 /100 WBC     Comprehensive Metabolic Panel [[id number]]  (Abnormal) Collected:  10/20/17 0346    Specimen:  Blood Updated:  10/20/17 0502     Glucose 129 (H) mg/dL      BUN 55 (H) mg/dL      Creatinine 1.90 (H) mg/dL      Sodium 144 mmol/L      Potassium 4.7 mmol/L      Chloride 103 mmol/L      CO2 29.4 (H) mmol/L      Calcium 8.7 (L) mg/dL      Total Protein 6.6 g/dL      Albumin 3.20 (L) g/dL      ALT (SGPT) 14 U/L      AST (SGOT) 16 U/L      Alkaline Phosphatase 69 U/L      Total Bilirubin 0.2 mg/dL      eGFR Non African Amer 27 (L) mL/min/1.73      Globulin 3.4 gm/dL      A/G Ratio 0.9 g/dL      BUN/Creatinine Ratio 28.9 (H)     Anion Gap 11.6 mmol/L     POC Glucose Fingerstick [[id number]]  (Normal) Collected:  10/20/17 0732    Specimen:  Blood Updated:  10/20/17 0749     Glucose 104 mg/dL     Narrative:       Meter: [id number] : [id number] Mings Heather NURSING ASSISTANT        Imaging Results (most recent)     None          PROCEDURES      Condition on Discharge:  Clinically and hemodynamically stable    Physical Exam at Discharge  Vital Signs  Temp:  [97.5 °F (36.4 °C)-98.5 °F (36.9 °C)] 97.8 °F (36.6 °C)  Heart Rate:  [60-74] 68  Resp:  [18-20] 20  BP: (121-165)/(54-74) 144/69    Physical Exam   Constitutional: Patient  appears well-developed and well-nourished morbidly obese and in no acute distress   HEENT:   Head: Normocephalic and atraumatic.   Eyes:  Pupils are equal, round, and reactive to light. EOM are intact. Sclera are anicteric and non-injected.  Mouth and Throat: Patient has moist mucous membranes. Oropharynx is clear of any erythema or exudate.     Neck: Neck supple. No JVD present. No thyromegaly present. No lymphadenopathy present.  Cardiovascular: Regular rate, regular rhythm, S1 normal and S2 normal.  Exam reveals no gallop and no friction rub.  No murmur heard.  Pulmonary/Chest: Lungs are clear to auscultation bilaterally. No respiratory distress. No wheezes. No rhonchi. No rales.   Abdominal: Obese, soft. Bowel sounds are normal. No distension and no mass. There is no hepatosplenomegaly. There is no tenderness.   Musculoskeletal: Normal Muscle tone  Extremities: Intertrigo and cellulitis under the patient's panus with significant erythema that wraps around to her sides and into her groin and upper thighs.  No edema. Pulses are palpable in all 4 extremities.  Neurological: Patient is alert and oriented to person, place, and time. Cranial nerves II-XII are grossly intact with no focal deficits.  Skin: Skin is warm.  Intertrigo and cellulitis under the patient's panus with significant erythema that wraps around to her sides and into her groin and upper thighs. Nails show no clubbing.  No cyanosis or erythema.        Discharge Disposition  Will be going home    Visiting Nurse:    No     Home PT/OT:  No     Home Safety Evaluation:  No     DME  N/A    Discharge Diet:           Dietary Orders            Start     Ordered    10/16/17 2136  Diet Regular; Cardiac, Consistent Carbohydrate  Diet Effective Now     Question Answer Comment   Diet Texture / Consistency Regular    Common Modifiers Cardiac    Common Modifiers Consistent Carbohydrate        10/16/17 2136          Activity at Discharge:  Ad Tish    Pre-discharge  education  N/A      Follow-up Appointments  Future Appointments  Date Time Provider Department Center   10/27/2017 11:40 AM OPAL Cotton [facility] PM [facility] None   11/21/2017 11:30 AM [facility] None   11/28/2017 11:20 AM Jina Vaughan MD [facility] None   12/1/2017 10:40 AM OPAL Arias [facility] None     Follow up with PCP in 2 weeks    Test Results Pending at Discharge   Order Current Status    Blood Culture - Blood, Preliminary result    Blood Culture - Blood, Preliminary result           Micah Romero MD  10/20/17  9:41 AM    Time: Discharge 35 min (if over 30 minutes give explanation as to why it took greater than 30 minutes)  Discharge over 30 min (if over 30 minutes give explanation as to why it took greater than 30 minutes)  Secondary to:  Coordination of home care  D/W case management  Medication reconciliation

## 2017-10-21 LAB
BACTERIA SPEC AEROBE CULT: NORMAL
BACTERIA SPEC AEROBE CULT: NORMAL

## 2017-10-25 ENCOUNTER — OFFICE VISIT (OUTPATIENT)
Dept: INTERNAL MEDICINE | Facility: CLINIC | Age: 62
End: 2017-10-25

## 2017-10-25 ENCOUNTER — TELEPHONE (OUTPATIENT)
Dept: INTERNAL MEDICINE | Facility: CLINIC | Age: 62
End: 2017-10-25

## 2017-10-25 VITALS
OXYGEN SATURATION: 91 % | SYSTOLIC BLOOD PRESSURE: 106 MMHG | DIASTOLIC BLOOD PRESSURE: 58 MMHG | HEIGHT: 66 IN | BODY MASS INDEX: 38.89 KG/M2 | HEART RATE: 61 BPM | WEIGHT: 242 LBS

## 2017-10-25 DIAGNOSIS — R53.83 FATIGUE, UNSPECIFIED TYPE: ICD-10-CM

## 2017-10-25 DIAGNOSIS — Z09 HOSPITAL DISCHARGE FOLLOW-UP: Primary | ICD-10-CM

## 2017-10-25 DIAGNOSIS — R06.02 SHORTNESS OF BREATH: Primary | ICD-10-CM

## 2017-10-25 DIAGNOSIS — M79.3 PANNICULITIS: ICD-10-CM

## 2017-10-25 DIAGNOSIS — R09.89 LUNG CRACKLES: ICD-10-CM

## 2017-10-25 DIAGNOSIS — R09.89 ABNORMAL LUNG SOUNDS: ICD-10-CM

## 2017-10-25 PROCEDURE — 99214 OFFICE O/P EST MOD 30 MIN: CPT | Performed by: INTERNAL MEDICINE

## 2017-10-25 NOTE — TELEPHONE ENCOUNTER
Order placed.  Patient advised and voiced understanding.  ----- Message from Erin Luna MA sent at 10/25/2017  2:11 PM EDT -----      ----- Message -----     From: Jina Vaughan MD     Sent: 10/25/2017   1:19 PM       To: Gonz[place] [address]    Please call patient and tell her that I reviewed labs from Dr. Hoyt. He did not document that she has crackles on the right on his last note and I would like her to get a CXR 2 view (PA and lateral) today or tomorrow if possible. Please order for her and we will call her with the results.

## 2017-10-25 NOTE — TELEPHONE ENCOUNTER
----- Message from Erin Luna MA sent at 10/25/2017  2:11 PM EDT -----      ----- Message -----     From: Jina Vaughan MD     Sent: 10/25/2017   1:19 PM       To[facility]    Please call patient and tell her that I reviewed labs from Dr. Hoyt. He did not document that she has crackles on the right on his last note and I would like her to get a CXR 2 view (PA and lateral) today or tomorrow if possible. Please order for her and we will call her with the results.

## 2017-10-25 NOTE — PROGRESS NOTES
"Subjective     Jared Govea is a 62 y.o. female, who presents with a chief complaint of   Chief Complaint   Patient presents with   • Follow-up     HOS f/u ER 10/16-10/20   • Shortness of Breath   • Rash     pt states rash is \"still drainage\"       HPI Comments: 63 yo F here for hospital follow up from her panniculitis who had failed outpatient management of her breast and abdominal area. She was admitted and started on Zosyn and Vanco. Vanco was changed to Zovyx. ID consult was obtained and recommended change to IV Clinda and Diflucan and local care with Nystatin. She was discharged on 10 days of oral minocycline and diflucan. Patient's WBC is normal.She is on Minocycline and Diflucan and has two more days. Still having some fatigue and minimal yellow drainage.No fever. She is a little bit more tired when moving around. States she feels a little bit more SOB, but no CP or dizziness. She is on her supplemental oxygen.        The following portions of the patient's history were reviewed and updated as appropriate: allergies, current medications, past family history, past medical history, past social history, past surgical history and problem list.    Allergies: Erythromycin; Erythromycin ethylsuccinate; Ativan [lorazepam]; and Tape    Current Outpatient Prescriptions:   •  albuterol (PROVENTIL HFA;VENTOLIN HFA) 108 (90 Base) MCG/ACT inhaler, Inhale 2 puffs Every 4 (Four) Hours As Needed for Wheezing., Disp: 1 inhaler, Rfl: 6  •  aspirin 81 MG EC tablet, Take 81 mg by mouth daily., Disp: , Rfl:   •  atorvastatin (LIPITOR) 40 MG tablet, Take 1 tablet by mouth Daily. For cholesterol (Patient taking differently: Take 40 mg by mouth Every Night. For cholesterol), Disp: 90 tablet, Rfl: 3  •  B Complex Vitamins (VITAMIN B COMPLEX PO), Take 1 tablet by mouth daily., Disp: , Rfl:   •  bumetanide (BUMEX) 2 MG tablet, Take 1 tablet by mouth 2 (Two) Times a Day. (Patient taking differently: Take 2 mg by mouth Daily.), Disp: 60 " tablet, Rfl: 0  •  cetirizine (ZyrTEC) 10 MG tablet, Take 10 mg by mouth every night., Disp: , Rfl:   •  dimenhyDRINATE (DRAMAMINE) 50 MG tablet, Take 1 tablet by mouth Daily As Needed for movement disorders., Disp: 30 tablet, Rfl: 0  •  famotidine (PEPCID) 20 MG tablet, Take 20 mg by mouth daily., Disp: , Rfl:   •  fluconazole (DIFLUCAN) 200 MG tablet, Take 1 tablet by mouth Daily for 20 doses. Indications: Skin and Soft Tissue Infection, Disp: 20 tablet, Rfl: 0  •  fluticasone-salmeterol (ADVAIR DISKUS) 250-50 MCG/DOSE DISKUS, Inhale 1 puff 2 (Two) Times a Day. For COPD and rinse mouth after use, Disp: 60 each, Rfl: 5  •  guaiFENesin (MUCINEX) 600 MG 12 hr tablet, Take 1 tablet by mouth 2 (two) times a day., Disp: , Rfl:   •  HYDROcodone-acetaminophen (NORCO)  MG per tablet, Take 2 tablets by mouth 2 (Two) Times a Day., Disp: 120 tablet, Rfl: 0  •  INCRUSE ELLIPTA 62.5 MCG/INH aerosol powder , Inhale Daily., Disp: , Rfl:   •  insulin aspart (NOVOLOG FLEXPEN) 100 UNIT/ML solution pen-injector sc pen, SSI, Disp: 15 mL, Rfl: 3  •  Insulin Degludec (TRESIBA FLEXTOUCH) 200 UNIT/ML solution pen-injector, Inject 70 Units under the skin Daily. (Patient taking differently: Inject 72 Units under the skin Daily.), Disp: 15 mL, Rfl: 3  •  Insulin Pen Needle (BD PEN NEEDLE CLAY U/F) 32G X 4 MM misc, Use as directed 7 times daily, Disp: 250 each, Rfl: 5  •  levalbuterol (XOPENEX) 0.63 MG/3ML nebulizer solution, Take 1 ampule by nebulization every 4 (four) hours as needed for wheezing., Disp: , Rfl:   •  levothyroxine (SYNTHROID) 112 MCG tablet, Take 2 tablets by mouth Daily., Disp: 60 tablet, Rfl: 5  •  metoprolol tartrate (LOPRESSOR) 50 MG tablet, Take 1 tablet by mouth 2 (Two) Times a Day. For heart, Disp: 180 tablet, Rfl: 1  •  minocycline (MINOCIN,DYNACIN) 100 MG capsule, Take 1 capsule by mouth Every 12 (Twelve) Hours for 20 doses. Indications: Skin and Soft Tissue Infection, Disp: 20 capsule, Rfl: 0  •  Probiotic  "Product (ALIGN) 4 MG capsule, Take 4 mg by mouth Daily., Disp: 30 capsule, Rfl: 6  •  QUEtiapine (SEROquel) 100 MG tablet, Take 1 tablet by mouth 2 (Two) Times a Day., Disp: 180 tablet, Rfl: 1  •  trimethoprim-polymyxin b (POLYTRIM) 42058-6.1 UNIT/ML-% ophthalmic solution, Administer 1 drop to both eyes Every 4 (Four) Hours., Disp: 1 each, Rfl: 0  •  vitamin D (ERGOCALCIFEROL) 57002 UNITS capsule capsule, Take 50,000 Units by mouth Every 7 (Seven) Days. On tuesday, Disp: , Rfl:   •  Gauze Pads & Dressings (ABDOMINAL PAD) 8\"X10\" pads, 1 pad Daily., Disp: 50 each, Rfl: 1  Medications Discontinued During This Encounter   Medication Reason   • miconazole (MICOTIN) 2 % powder Non-compliance       Review of Systems   Constitutional: Positive for fatigue. Negative for chills and fever.   HENT: Negative for congestion.    Respiratory: Positive for shortness of breath. Negative for cough and wheezing.    Cardiovascular: Negative for chest pain and palpitations.   Skin: Positive for rash (improving ) and wound (improvin with yellow drainage).       Objective     /58 (BP Location: Left arm, Patient Position: Sitting, Cuff Size: Adult)  Pulse 61  Ht 66\" (167.6 cm)  Wt 242 lb (110 kg)  LMP  (LMP Unknown)  SpO2 91%  BMI 39.06 kg/m2      Physical Exam   Constitutional: She is oriented to person, place, and time. She appears well-developed and well-nourished. No distress.   Patient in [place] with oxygen tank. Increase work of breathing when standing up or moving.    HENT:   Head: Normocephalic and atraumatic.   Right Ear: External ear normal.   Left Ear: External ear normal.   Mouth/Throat: Oropharynx is clear and moist. No oropharyngeal exudate.   Eyes: Conjunctivae are normal. Right eye exhibits no discharge. Left eye exhibits no discharge. No scleral icterus.   Neck: Neck supple.   Cardiovascular: Normal rate, regular rhythm and normal heart sounds.  Exam reveals no gallop and no friction rub.    No murmur " heard.  Pulmonary/Chest: Effort normal. No respiratory distress. She has decreased breath sounds. She has no wheezes. She has rhonchi in the right middle field and the right lower field. She has no rales.   Lymphadenopathy:     She has no cervical adenopathy.   Neurological: She is alert and oriented to person, place, and time.   Skin: Skin is warm. No rash noted.   Psychiatric: She has a normal mood and affect. Her behavior is normal.   Nursing note and vitals reviewed.        Results for orders placed or performed during the hospital encounter of 10/16/17   Blood Culture - Blood,   Result Value Ref Range    Blood Culture No growth at 5 days    Blood Culture - Blood,   Result Value Ref Range    Blood Culture No growth at 5 days    Comprehensive Metabolic Panel   Result Value Ref Range    Glucose 273 (H) 65 - 99 mg/dL    BUN 68 (H) 8 - 23 mg/dL    Creatinine 2.15 (H) 0.57 - 1.00 mg/dL    Sodium 136 136 - 145 mmol/L    Potassium 4.5 3.5 - 5.2 mmol/L    Chloride 95 (L) 98 - 107 mmol/L    CO2 28.8 22.0 - 29.0 mmol/L    Calcium 9.1 8.8 - 10.5 mg/dL    Total Protein 7.0 6.0 - 8.5 g/dL    Albumin 3.50 3.50 - 5.20 g/dL    ALT (SGPT) 10 5 - 33 U/L    AST (SGOT) 14 5 - 32 U/L    Alkaline Phosphatase 76 40 - 129 U/L    Total Bilirubin 0.3 0.2 - 1.2 mg/dL    eGFR Non African Amer 23 (L) >60 mL/min/1.73    Globulin 3.5 gm/dL    A/G Ratio 1.0 g/dL    BUN/Creatinine Ratio 31.6 (H) 7.0 - 25.0    Anion Gap 12.2 mmol/L   Lactic Acid, Plasma   Result Value Ref Range    Lactate 0.9 0.5 - 2.0 mmol/L   CBC Auto Differential   Result Value Ref Range    WBC 14.78 (H) 4.80 - 10.80 10*3/mm3    RBC 3.42 (L) 4.20 - 5.40 10*6/mm3    Hemoglobin 10.0 (L) 12.0 - 16.0 g/dL    Hematocrit 31.5 (L) 37.0 - 47.0 %    MCV 92.1 81.0 - 99.0 fL    MCH 29.2 27.0 - 31.0 pg    MCHC 31.7 31.0 - 37.0 g/dL    RDW 12.2 11.5 - 14.5 %    RDW-SD 41.1 37.0 - 54.0 fl    MPV 10.8 (H) 7.4 - 10.4 fL    Platelets 189 140 - 500 10*3/mm3    Neutrophil % 76.1 (H) 45.0 - 70.0  %    Lymphocyte % 13.9 (L) 20.0 - 45.0 %    Monocyte % 6.3 3.0 - 8.0 %    Eosinophil % 3.0 0.0 - 4.0 %    Basophil % 0.3 0.0 - 2.0 %    Immature Grans % 0.4 0.0 - 0.5 %    Neutrophils, Absolute 11.26 (H) 1.50 - 8.30 10*3/mm3    Lymphocytes, Absolute 2.05 0.60 - 4.80 10*3/mm3    Monocytes, Absolute 0.93 0.00 - 1.00 10*3/mm3    Eosinophils, Absolute 0.44 (H) 0.10 - 0.30 10*3/mm3    Basophils, Absolute 0.04 0.00 - 0.20 10*3/mm3    Immature Grans, Absolute 0.06 (H) 0.00 - 0.03 10*3/mm3    nRBC 0.0 0.0 - 0.0 /100 WBC   Basic Metabolic Panel   Result Value Ref Range    Glucose 129 (H) 65 - 99 mg/dL    BUN 68 (H) 8 - 23 mg/dL    Creatinine 1.94 (H) 0.57 - 1.00 mg/dL    Sodium 140 136 - 145 mmol/L    Potassium 4.0 3.5 - 5.2 mmol/L    Chloride 100 98 - 107 mmol/L    CO2 28.0 22.0 - 29.0 mmol/L    Calcium 8.9 8.8 - 10.5 mg/dL    eGFR Non African Amer 26 (L) >60 mL/min/1.73    BUN/Creatinine Ratio 35.1 (H) 7.0 - 25.0    Anion Gap 12.0 mmol/L   CBC Auto Differential   Result Value Ref Range    WBC 12.70 (H) 4.80 - 10.80 10*3/mm3    RBC 3.15 (L) 4.20 - 5.40 10*6/mm3    Hemoglobin 9.2 (L) 12.0 - 16.0 g/dL    Hematocrit 28.9 (L) 37.0 - 47.0 %    MCV 91.7 81.0 - 99.0 fL    MCH 29.2 27.0 - 31.0 pg    MCHC 31.8 31.0 - 37.0 g/dL    RDW 12.5 11.5 - 14.5 %    RDW-SD 41.5 37.0 - 54.0 fl    MPV 10.9 (H) 7.4 - 10.4 fL    Platelets 181 140 - 500 10*3/mm3    Neutrophil % 72.9 (H) 45.0 - 70.0 %    Lymphocyte % 16.1 (L) 20.0 - 45.0 %    Monocyte % 7.2 3.0 - 8.0 %    Eosinophil % 3.2 0.0 - 4.0 %    Basophil % 0.3 0.0 - 2.0 %    Immature Grans % 0.3 0.0 - 0.5 %    Neutrophils, Absolute 9.25 (H) 1.50 - 8.30 10*3/mm3    Lymphocytes, Absolute 2.05 0.60 - 4.80 10*3/mm3    Monocytes, Absolute 0.91 0.00 - 1.00 10*3/mm3    Eosinophils, Absolute 0.41 (H) 0.10 - 0.30 10*3/mm3    Basophils, Absolute 0.04 0.00 - 0.20 10*3/mm3    Immature Grans, Absolute 0.04 (H) 0.00 - 0.03 10*3/mm3    nRBC 0.0 0.0 - 0.0 /100 WBC   Comprehensive Metabolic Panel   Result  Value Ref Range    Glucose 120 (H) 65 - 99 mg/dL    BUN 60 (H) 8 - 23 mg/dL    Creatinine 2.10 (H) 0.57 - 1.00 mg/dL    Sodium 143 136 - 145 mmol/L    Potassium 4.5 3.5 - 5.2 mmol/L    Chloride 104 98 - 107 mmol/L    CO2 27.2 22.0 - 29.0 mmol/L    Calcium 8.5 (L) 8.8 - 10.5 mg/dL    Total Protein 6.3 6.0 - 8.5 g/dL    Albumin 3.20 (L) 3.50 - 5.20 g/dL    ALT (SGPT) 11 5 - 33 U/L    AST (SGOT) 14 5 - 32 U/L    Alkaline Phosphatase 64 40 - 129 U/L    Total Bilirubin 0.2 0.2 - 1.2 mg/dL    eGFR Non African Amer 24 (L) >60 mL/min/1.73    Globulin 3.1 gm/dL    A/G Ratio 1.0 g/dL    BUN/Creatinine Ratio 28.6 (H) 7.0 - 25.0    Anion Gap 11.8 mmol/L   CBC Auto Differential   Result Value Ref Range    WBC 9.03 4.80 - 10.80 10*3/mm3    RBC 3.08 (L) 4.20 - 5.40 10*6/mm3    Hemoglobin 9.1 (L) 12.0 - 16.0 g/dL    Hematocrit 28.4 (L) 37.0 - 47.0 %    MCV 92.2 81.0 - 99.0 fL    MCH 29.5 27.0 - 31.0 pg    MCHC 32.0 31.0 - 37.0 g/dL    RDW 12.6 11.5 - 14.5 %    RDW-SD 42.0 37.0 - 54.0 fl    MPV 10.2 7.4 - 10.4 fL    Platelets 202 140 - 500 10*3/mm3    Neutrophil % 59.4 45.0 - 70.0 %    Lymphocyte % 24.9 20.0 - 45.0 %    Monocyte % 8.9 (H) 3.0 - 8.0 %    Eosinophil % 5.8 (H) 0.0 - 4.0 %    Basophil % 0.4 0.0 - 2.0 %    Immature Grans % 0.6 (H) 0.0 - 0.5 %    Neutrophils, Absolute 5.37 1.50 - 8.30 10*3/mm3    Lymphocytes, Absolute 2.25 0.60 - 4.80 10*3/mm3    Monocytes, Absolute 0.80 0.00 - 1.00 10*3/mm3    Eosinophils, Absolute 0.52 (H) 0.10 - 0.30 10*3/mm3    Basophils, Absolute 0.04 0.00 - 0.20 10*3/mm3    Immature Grans, Absolute 0.05 (H) 0.00 - 0.03 10*3/mm3    nRBC 0.0 0.0 - 0.0 /100 WBC   Comprehensive Metabolic Panel   Result Value Ref Range    Glucose 129 (H) 65 - 99 mg/dL    BUN 55 (H) 8 - 23 mg/dL    Creatinine 1.90 (H) 0.57 - 1.00 mg/dL    Sodium 144 136 - 145 mmol/L    Potassium 4.7 3.5 - 5.2 mmol/L    Chloride 103 98 - 107 mmol/L    CO2 29.4 (H) 22.0 - 29.0 mmol/L    Calcium 8.7 (L) 8.8 - 10.5 mg/dL    Total Protein 6.6  6.0 - 8.5 g/dL    Albumin 3.20 (L) 3.50 - 5.20 g/dL    ALT (SGPT) 14 5 - 33 U/L    AST (SGOT) 16 5 - 32 U/L    Alkaline Phosphatase 69 40 - 129 U/L    Total Bilirubin 0.2 0.2 - 1.2 mg/dL    eGFR Non African Amer 27 (L) >60 mL/min/1.73    Globulin 3.4 gm/dL    A/G Ratio 0.9 g/dL    BUN/Creatinine Ratio 28.9 (H) 7.0 - 25.0    Anion Gap 11.6 mmol/L   CBC Auto Differential   Result Value Ref Range    WBC 9.06 4.80 - 10.80 10*3/mm3    RBC 3.23 (L) 4.20 - 5.40 10*6/mm3    Hemoglobin 9.4 (L) 12.0 - 16.0 g/dL    Hematocrit 30.1 (L) 37.0 - 47.0 %    MCV 93.2 81.0 - 99.0 fL    MCH 29.1 27.0 - 31.0 pg    MCHC 31.2 31.0 - 37.0 g/dL    RDW 12.4 11.5 - 14.5 %    RDW-SD 41.8 37.0 - 54.0 fl    MPV 10.2 7.4 - 10.4 fL    Platelets 200 140 - 500 10*3/mm3    Neutrophil % 56.0 45.0 - 70.0 %    Lymphocyte % 27.5 20.0 - 45.0 %    Monocyte % 9.8 (H) 3.0 - 8.0 %    Eosinophil % 5.4 (H) 0.0 - 4.0 %    Basophil % 0.3 0.0 - 2.0 %    Immature Grans % 1.0 (H) 0.0 - 0.5 %    Neutrophils, Absolute 5.07 1.50 - 8.30 10*3/mm3    Lymphocytes, Absolute 2.49 0.60 - 4.80 10*3/mm3    Monocytes, Absolute 0.89 0.00 - 1.00 10*3/mm3    Eosinophils, Absolute 0.49 (H) 0.10 - 0.30 10*3/mm3    Basophils, Absolute 0.03 0.00 - 0.20 10*3/mm3    Immature Grans, Absolute 0.09 (H) 0.00 - 0.03 10*3/mm3    nRBC 0.0 0.0 - 0.0 /100 WBC   POC Glucose Fingerstick   Result Value Ref Range    Glucose 276 (H) 70 - 130 mg/dL   POC Glucose Fingerstick   Result Value Ref Range    Glucose 384 (H) 70 - 130 mg/dL   POC Glucose Fingerstick   Result Value Ref Range    Glucose 82 70 - 130 mg/dL   POC Glucose Fingerstick   Result Value Ref Range    Glucose 232 (H) 70 - 130 mg/dL   POC Glucose Fingerstick   Result Value Ref Range    Glucose 94 70 - 130 mg/dL   POC Glucose Fingerstick   Result Value Ref Range    Glucose 127 70 - 130 mg/dL   POC Glucose Fingerstick   Result Value Ref Range    Glucose 87 70 - 130 mg/dL   POC Glucose Fingerstick   Result Value Ref Range    Glucose 87 70 - 130  "mg/dL   POC Glucose Fingerstick   Result Value Ref Range    Glucose 185 (H) 70 - 130 mg/dL   POC Glucose Fingerstick   Result Value Ref Range    Glucose 303 (H) 70 - 130 mg/dL   POC Glucose Fingerstick   Result Value Ref Range    Glucose 99 70 - 130 mg/dL   POC Glucose Fingerstick   Result Value Ref Range    Glucose 290 (H) 70 - 130 mg/dL   POC Glucose Fingerstick   Result Value Ref Range    Glucose 168 (H) 70 - 130 mg/dL   POC Glucose Fingerstick   Result Value Ref Range    Glucose 285 (H) 70 - 130 mg/dL   POC Glucose Fingerstick   Result Value Ref Range    Glucose 104 70 - 130 mg/dL       Assessment/Plan   Jared was seen today for follow-up, shortness of breath and rash.    Diagnoses and all orders for this visit:    Hospital discharge follow-up    Panniculitis  -     Cancel: Dressing Pad Abdominal 8X10IN Sterile    Fatigue, unspecified type    Abnormal lung sounds    Other orders  -     Gauze Pads & Dressings (ABDOMINAL PAD) 8\"X10\" pads; 1 pad Daily.      Patient is doing well and will continue antibiotics and diflucan. I want her to continue to use ABD pads and keep area as dry as possible.     I think that her fatigued is related to her being hospitalized and will get better with time. Encouraged her to increase activity as tolerated.     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 No fever or chills. Will obtain notes from pulmonary and review. CXR if these are new and will watch if they are chronic. She knows to call if increased SOB from baseline, fever or other worrisome symptoms.     Return for Next scheduled follow up.    Jina Vaughan MD  10/25/2017    "

## 2017-10-30 ENCOUNTER — HOSPITAL ENCOUNTER (OUTPATIENT)
Dept: GENERAL RADIOLOGY | Facility: HOSPITAL | Age: 62
Discharge: HOME | End: 2017-10-30
Admitting: INTERNAL MEDICINE

## 2017-10-30 ENCOUNTER — OFFICE VISIT (OUTPATIENT)
Dept: PAIN MEDICINE | Facility: CLINIC | Age: 62
End: 2017-10-30

## 2017-10-30 VITALS
DIASTOLIC BLOOD PRESSURE: 61 MMHG | RESPIRATION RATE: 18 BRPM | WEIGHT: 242 LBS | TEMPERATURE: 98.1 F | BODY MASS INDEX: 38.89 KG/M2 | HEIGHT: 66 IN | HEART RATE: 66 BPM | OXYGEN SATURATION: 94 % | SYSTOLIC BLOOD PRESSURE: 115 MMHG

## 2017-10-30 DIAGNOSIS — M54.50 CHRONIC MIDLINE LOW BACK PAIN WITHOUT SCIATICA: ICD-10-CM

## 2017-10-30 DIAGNOSIS — M43.06 LUMBAR SPONDYLOLYSIS: ICD-10-CM

## 2017-10-30 DIAGNOSIS — E11.42 DIABETIC PERIPHERAL NEUROPATHY (HCC): ICD-10-CM

## 2017-10-30 DIAGNOSIS — Z79.899 ENCOUNTER FOR LONG-TERM (CURRENT) USE OF HIGH-RISK MEDICATION: ICD-10-CM

## 2017-10-30 DIAGNOSIS — G89.4 CHRONIC PAIN SYNDROME: Primary | ICD-10-CM

## 2017-10-30 DIAGNOSIS — G89.29 CHRONIC MIDLINE LOW BACK PAIN WITHOUT SCIATICA: ICD-10-CM

## 2017-10-30 PROCEDURE — 99213 OFFICE O/P EST LOW 20 MIN: CPT | Performed by: NURSE PRACTITIONER

## 2017-10-30 PROCEDURE — 71020 HC CHEST PA AND LATERAL: CPT

## 2017-10-30 RX ORDER — HYDROCODONE BITARTRATE AND ACETAMINOPHEN 10; 325 MG/1; MG/1
2 TABLET ORAL 2 TIMES DAILY
Qty: 120 TABLET | Refills: 0 | Status: SHIPPED | OUTPATIENT
Start: 2017-10-30 | End: 2017-11-29 | Stop reason: SDUPTHER

## 2017-10-30 NOTE — PROGRESS NOTES
CHIEF COMPLAINT  Back pain is unchanged since last office visit.    Subjective   Jared Govea is a 62 y.o. female  who presents to the office for follow-up.She has a history of chronic back pain. She is in the process of trying to obtain a SCS implant/trial. Was denied by insurance. Dr. Ledezma is appealing this.    Complains of pain in her back. Today her pain is 4/10VAS. Describes the pain as continuous and unchanged. Continues with Hydrocodone 10/325 2 BID. Denies any side effects form the regimen. The regimen helps decrease her pain by 30-40%. ADL's difficulty due to pulmonary issues.     Was recently in hospital for panniculitis and yeast infection. Received antibiotic and diflucan.    Remains on O2 per NC. Is on 4L.     Back Pain   This is a chronic problem. The current episode started more than 1 year ago. The problem occurs constantly. Progression since onset: unchanged since last office visit. The pain is present in the lumbar spine. The quality of the pain is described as aching. The pain does not radiate. The pain is at a severity of 4/10. The pain is moderate. The symptoms are aggravated by bending, standing and twisting. Stiffness is present in the morning, at night and all day. Associated symptoms include headaches and weakness. Pertinent negatives include no abdominal pain, bladder incontinence, bowel incontinence, chest pain, dysuria, fever or numbness. Risk factors include history of steroid use, poor posture and obesity. She has tried analgesics, bed rest, chiropractic manipulation, NSAIDs, walking, heat, ice and home exercises for the symptoms. The treatment provided mild relief.      PEG Assessment   What number best describes your pain on average in the past week?4  What number best describes how, during the past week, pain has interfered with your enjoyment of life?4  What number best describes how, during the past week, pain has interfered with your general activity?  4    The following  "portions of the patient's history were reviewed and updated as appropriate: allergies, current medications, past family history, past medical history, past social history, past surgical history and problem list.    Review of Systems   Constitutional: Negative for chills and fever.   Respiratory: Positive for shortness of breath.    Cardiovascular: Negative for chest pain.   Gastrointestinal: Negative for abdominal pain, bowel incontinence, constipation, diarrhea, nausea and vomiting.   Genitourinary: Negative for bladder incontinence, difficulty urinating, dyspareunia and dysuria.   Musculoskeletal: Positive for back pain.   Neurological: Positive for dizziness, weakness and headaches. Negative for numbness.   Psychiatric/Behavioral: Negative for confusion, hallucinations, self-injury, sleep disturbance and suicidal ideas. The patient is not nervous/anxious.        Vitals:    10/30/17 1354   BP: 115/61   Pulse: 66   Resp: 18   Temp: 98.1 °F (36.7 °C)   SpO2: 94%   Weight: 242 lb (110 kg)   Height: 66\" (167.6 cm)   PainSc:   4   PainLoc: Back     Objective   Physical Exam   Constitutional: She is oriented to person, place, and time. Vital signs are normal. She appears well-developed and well-nourished.   Chronic ill appearance   HENT:   Head: Normocephalic and atraumatic.   NC O2 in place   Eyes: Conjunctivae and EOM are normal. Pupils are equal, round, and reactive to light.   Neck: Neck supple.   Cardiovascular: Normal rate, regular rhythm and normal heart sounds.    Pulmonary/Chest: Effort normal. She has decreased breath sounds.   Using O2 per NC   Abdominal:   obese   Musculoskeletal:        Lumbar back: She exhibits decreased range of motion, tenderness, bony tenderness and pain. She exhibits no swelling, no edema and no laceration.       Vascular Status -  Her exam exhibits right foot edema. Her exam exhibits left foot edema.  Lymphadenopathy:     She has no cervical adenopathy.   Neurological: She is alert " and oriented to person, place, and time. Gait (in motorized scooter) abnormal.   Reflex Scores:       Patellar reflexes are 0 on the right side and 0 on the left side.  Skin: Skin is warm, dry and intact.   Psychiatric: She has a normal mood and affect. Her speech is normal and behavior is normal. Judgment and thought content normal.   Nursing note and vitals reviewed.      Assessment/Plan   Jared was seen today for back pain.    Diagnoses and all orders for this visit:    Chronic pain syndrome  -     Oral Fluid Drug Screen - Saliva, Oral Cavity; Future    Lumbar spondylolysis  -     Oral Fluid Drug Screen - Saliva, Oral Cavity; Future    Chronic midline low back pain without sciatica  -     HYDROcodone-acetaminophen (NORCO)  MG per tablet; Take 2 tablets by mouth 2 (Two) Times a Day.  -     Oral Fluid Drug Screen - Saliva, Oral Cavity; Future    Diabetic peripheral neuropathy  -     Oral Fluid Drug Screen - Saliva, Oral Cavity; Future    Encounter for long-term (current) use of high-risk medication      --- Routine oral drug screen in office today as part of monitoring requirements for controlled substances.  This specimen will be sent to [email or web address] for confirmation.     --- Refill Hydrocodone. Patient appears stable with current regimen. No adverse effects. Regarding continuation of opioids, there is no evidence of aberrant behavior or any red flags.  The patient continues with appropriate response to opioid therapy. ADL's remain intact by self.   --- Discussed going through with CXR as ordered by Dr. Vaughan.  --- Follow-up 1 month or sooner if needed.       JACOB REPORT    As part of the patient's treatment plan, I am prescribing controlled substances. The patient has been made aware of appropriate use of such medications, including potential risk of somnolence, limited ability to drive and/or work safely, and the potential for dependence or overdose. It has also bee made clear that these  medications are for use by this patient only, without concomitant use of alcohol or other substances unless prescribed.     Patient has completed prescribing agreement detailing terms of continued prescribing of controlled substances, including monitoring JACOB reports, urine drug screening, and pill counts if necessary. The patient is aware that inappropriate use will results in cessation of prescribing such medications.    JACOB report has been reviewed and scanned into the patient's chart.    Date of last JACOB : 10-27-17    History and physical exam exhibit continued safe and appropriate use of controlled substances.      EMR Dragon/Transcription disclaimer:   Much of this encounter note is an electronic transcription/translation of spoken language to printed text. The electronic translation of spoken language may permit erroneous, or at times, nonsensical words or phrases to be inadvertently transcribed; Although I have reviewed the note for such errors, some may still exist.

## 2017-10-31 ENCOUNTER — TELEPHONE (OUTPATIENT)
Dept: INTERNAL MEDICINE | Facility: CLINIC | Age: 62
End: 2017-10-31

## 2017-10-31 NOTE — TELEPHONE ENCOUNTER
Patients sister advised of results.     ----- Message from Jina Vaughan MD sent at 10/31/2017  8:23 AM EDT -----  Please call patient with these results and let her know that her CXR looks stable. Hope her breathing and fatigue are getting better. Call us if they are not and we will see her. Any fevers, chills, SOB increased on baseline, or increased sputum production let us know.

## 2017-11-20 ENCOUNTER — TELEPHONE (OUTPATIENT)
Dept: INTERNAL MEDICINE | Facility: CLINIC | Age: 62
End: 2017-11-20

## 2017-11-20 DIAGNOSIS — I10 ESSENTIAL HYPERTENSION: ICD-10-CM

## 2017-11-20 DIAGNOSIS — J41.8 MIXED SIMPLE AND MUCOPURULENT CHRONIC BRONCHITIS (HCC): ICD-10-CM

## 2017-11-20 DIAGNOSIS — E78.2 MIXED HYPERLIPIDEMIA: ICD-10-CM

## 2017-11-20 DIAGNOSIS — R59.0 LAD (LYMPHADENOPATHY), MEDIASTINAL: ICD-10-CM

## 2017-11-20 DIAGNOSIS — E03.9 HYPOTHYROIDISM, ACQUIRED: ICD-10-CM

## 2017-11-20 DIAGNOSIS — I50.32 CHRONIC DIASTOLIC CONGESTIVE HEART FAILURE (HCC): ICD-10-CM

## 2017-11-20 DIAGNOSIS — IMO0001 UNCONTROLLED TYPE 2 DIABETES MELLITUS WITHOUT COMPLICATION, WITH LONG-TERM CURRENT USE OF INSULIN: Primary | ICD-10-CM

## 2017-11-20 NOTE — TELEPHONE ENCOUNTER
----- Message from Jina Vaughan MD sent at 11/18/2017 11:23 AM EST -----  CBC, CMP, HgA1c, lipid panel, UA with reflex culture. Thank you.     ----- Message -----     From: Christina Chavez MA     Sent: 11/17/2017   6:26 PM       To: Jina Vaughan MD    Patient scheduled for labs 11/21/17. Unsure what to order. Please advise.

## 2017-11-29 ENCOUNTER — OFFICE VISIT (OUTPATIENT)
Dept: PAIN MEDICINE | Facility: CLINIC | Age: 62
End: 2017-11-29

## 2017-11-29 VITALS
HEART RATE: 65 BPM | RESPIRATION RATE: 16 BRPM | SYSTOLIC BLOOD PRESSURE: 122 MMHG | TEMPERATURE: 97.4 F | OXYGEN SATURATION: 94 % | DIASTOLIC BLOOD PRESSURE: 86 MMHG | HEIGHT: 66 IN

## 2017-11-29 DIAGNOSIS — M43.06 LUMBAR SPONDYLOLYSIS: ICD-10-CM

## 2017-11-29 DIAGNOSIS — G89.4 CHRONIC PAIN SYNDROME: Primary | ICD-10-CM

## 2017-11-29 DIAGNOSIS — G89.29 CHRONIC MIDLINE LOW BACK PAIN WITHOUT SCIATICA: ICD-10-CM

## 2017-11-29 DIAGNOSIS — Z79.899 ENCOUNTER FOR LONG-TERM (CURRENT) USE OF HIGH-RISK MEDICATION: ICD-10-CM

## 2017-11-29 DIAGNOSIS — M54.50 CHRONIC MIDLINE LOW BACK PAIN WITHOUT SCIATICA: ICD-10-CM

## 2017-11-29 PROCEDURE — 99213 OFFICE O/P EST LOW 20 MIN: CPT | Performed by: NURSE PRACTITIONER

## 2017-11-29 RX ORDER — HYDROCODONE BITARTRATE AND ACETAMINOPHEN 10; 325 MG/1; MG/1
2 TABLET ORAL 2 TIMES DAILY
Qty: 120 TABLET | Refills: 0 | Status: SHIPPED | OUTPATIENT
Start: 2017-11-29 | End: 2018-03-12 | Stop reason: SDUPTHER

## 2017-11-29 NOTE — PROGRESS NOTES
"CHIEF COMPLAINT  Pt states LBP continues to be tolerable \"but not improved\" since 10/30/17 office visit.    Subjective   Jared Govea is a 62 y.o. female  who presents to the office for follow-up.She has a history of chronic low back pain, which she reports as unchanged since last office visit.    Complains of pain in her low back. Today her pain is 5/10VAS. Describes the pain as continuous and unchanged. Continues with Hydrocodone 10/325 2 BID. Denies any side effects form the regimen. The regimen helps decrease her pain by 30-40%. ADL's difficulty due to pulmonary issues.     Sees pulmonary later today.    Back Pain   This is a chronic problem. The current episode started more than 1 year ago. The problem occurs constantly. Progression since onset: unchanged since last office visit. The pain is present in the lumbar spine. The quality of the pain is described as aching. The pain does not radiate. The pain is at a severity of 5/10 (ranges from 3-6/10VAS). The pain is moderate. The symptoms are aggravated by bending, standing and twisting. Stiffness is present in the morning, at night and all day. Associated symptoms include headaches and weakness (bilat. legs). Pertinent negatives include no abdominal pain, bladder incontinence, bowel incontinence, chest pain, dysuria, fever or numbness. Risk factors include history of steroid use, poor posture and obesity. She has tried analgesics, bed rest, chiropractic manipulation, NSAIDs, walking, heat, ice and home exercises for the symptoms. The treatment provided mild relief.      PEG Assessment   What number best describes your pain on average in the past week?5  What number best describes how, during the past week, pain has interfered with your enjoyment of life?5  What number best describes how, during the past week, pain has interfered with your general activity?  5    The following portions of the patient's history were reviewed and updated as appropriate: allergies, " "current medications, past family history, past medical history, past social history, past surgical history and problem list.    Review of Systems   Constitutional: Negative for activity change, chills and fever.   Respiratory: Positive for shortness of breath.    Cardiovascular: Negative for chest pain.   Gastrointestinal: Negative for abdominal pain, bowel incontinence, constipation, diarrhea, nausea and vomiting.   Genitourinary: Negative for bladder incontinence, difficulty urinating, dyspareunia and dysuria.   Musculoskeletal: Positive for back pain.   Neurological: Positive for weakness (bilat. legs), light-headedness (with \"sudden moves\") and headaches. Negative for dizziness and numbness.   Psychiatric/Behavioral: Positive for sleep disturbance. Negative for confusion, hallucinations, self-injury and suicidal ideas. The patient is not nervous/anxious.        Vitals:    11/29/17 1053   BP: 122/86   Pulse: 65   Resp: 16   Temp: 97.4 °F (36.3 °C)   SpO2: 94%   Height: 66\" (167.6 cm)   PainSc: 5  Comment: LBP ranges from 3-6,R>L side   PainLoc: Back     Objective   Physical Exam   Constitutional: She is oriented to person, place, and time. Vital signs are normal. She appears well-developed and well-nourished.   Chronic ill appearance   HENT:   Head: Normocephalic and atraumatic.   NC O2 in place   Eyes: Conjunctivae are normal.   Neck: Neck supple.   Cardiovascular: Normal rate, regular rhythm and normal heart sounds.    Pulmonary/Chest: Effort normal. She has decreased breath sounds.   Using O2 per NC   Abdominal:   obese   Musculoskeletal:        Lumbar back: She exhibits decreased range of motion, tenderness, bony tenderness and pain. She exhibits no swelling, no edema and no laceration.   Lymphadenopathy:     She has no cervical adenopathy.   Neurological: She is alert and oriented to person, place, and time. Gait (in motorized scooter) abnormal.   Reflex Scores:       Patellar reflexes are 0 on the right " side and 0 on the left side.  Skin: Skin is warm, dry and intact.   Psychiatric: She has a normal mood and affect. Her speech is normal and behavior is normal. Judgment and thought content normal.   Nursing note and vitals reviewed.    Assessment/Plan   Jared was seen today for back pain.    Diagnoses and all orders for this visit:    Chronic pain syndrome    Lumbar spondylolysis    Chronic midline low back pain without sciatica  -     HYDROcodone-acetaminophen (NORCO)  MG per tablet; Take 2 tablets by mouth 2 (Two) Times a Day.    Encounter for long-term (current) use of high-risk medication      --- The oral drug screen confirmation from 10-30-17 has been reviewed and the result is appropriate based on patient history and JACOB report  --- Refill Hydrocodone. Patient appears stable with current regimen. No adverse effects. Regarding continuation of opioids, there is no evidence of aberrant behavior or any red flags.  The patient continues with appropriate response to opioid therapy. ADL's remain intact by self.   --- Will check status of SCS appeal.   --- Follow-up 1 month or sooner if needed.       JACOB REPORT    As part of the patient's treatment plan, I am prescribing controlled substances. The patient has been made aware of appropriate use of such medications, including potential risk of somnolence, limited ability to drive and/or work safely, and the potential for dependence or overdose. It has also bee made clear that these medications are for use by this patient only, without concomitant use of alcohol or other substances unless prescribed.     Patient has completed prescribing agreement detailing terms of continued prescribing of controlled substances, including monitoring JACOB reports, urine drug screening, and pill counts if necessary. The patient is aware that inappropriate use will results in cessation of prescribing such medications.    JACOB report has been reviewed and scanned into the  patient's chart.    Date of last JACOB : 11-28-17    History and physical exam exhibit continued safe and appropriate use of controlled substances.      EMR Dragon/Transcription disclaimer:   Much of this encounter note is an electronic transcription/translation of spoken language to printed text. The electronic translation of spoken language may permit erroneous, or at times, nonsensical words or phrases to be inadvertently transcribed; Although I have reviewed the note for such errors, some may still exist.

## 2017-12-05 ENCOUNTER — OFFICE VISIT (OUTPATIENT)
Dept: INTERNAL MEDICINE | Facility: CLINIC | Age: 62
End: 2017-12-05

## 2017-12-05 VITALS
HEIGHT: 66 IN | DIASTOLIC BLOOD PRESSURE: 72 MMHG | OXYGEN SATURATION: 96 % | SYSTOLIC BLOOD PRESSURE: 126 MMHG | HEART RATE: 60 BPM

## 2017-12-05 DIAGNOSIS — N89.8 ITCHING IN THE VAGINAL AREA: ICD-10-CM

## 2017-12-05 DIAGNOSIS — B37.89 CANDIDA RASH OF GROIN: Primary | ICD-10-CM

## 2017-12-05 DIAGNOSIS — L03.311 CELLULITIS, ABDOMINAL WALL: ICD-10-CM

## 2017-12-05 PROCEDURE — 99214 OFFICE O/P EST MOD 30 MIN: CPT | Performed by: INTERNAL MEDICINE

## 2017-12-05 RX ORDER — FLUCONAZOLE 100 MG/1
100 TABLET ORAL DAILY
Qty: 14 TABLET | Refills: 0 | Status: SHIPPED | OUTPATIENT
Start: 2017-12-05 | End: 2017-12-20 | Stop reason: SDUPTHER

## 2017-12-05 RX ORDER — DOXYCYCLINE HYCLATE 100 MG/1
100 TABLET, DELAYED RELEASE ORAL 2 TIMES DAILY
Qty: 28 TABLET | Refills: 0 | Status: SHIPPED | OUTPATIENT
Start: 2017-12-05 | End: 2017-12-20

## 2017-12-05 RX ORDER — CLOTRIMAZOLE AND BETAMETHASONE DIPROPIONATE 10; .64 MG/G; MG/G
CREAM TOPICAL 2 TIMES DAILY
Qty: 45 G | Refills: 0 | Status: SHIPPED | OUTPATIENT
Start: 2017-12-05 | End: 2017-12-20 | Stop reason: SDUPTHER

## 2017-12-05 NOTE — PROGRESS NOTES
Subjective     Jared Govea is a 62 y.o. female, who presents with a chief complaint of   Chief Complaint   Patient presents with   • Rash     patient states this is a constant issue    • Eye Drainage     patient has started using rx polytrim again        HPI Comments: 61 yo F with diabetes, HTN, CKD, history of MRSA and recurrent rash in her panus as well as her breast that has flaired again. No fever or chills. She is having some mild drainage that is yellow under her panus. She states that it is red and itchy. She is using cloths to help soak up the moisture. She has also had some eye drainage form her left eye and using Polytrim that has helped.        The following portions of the patient's history were reviewed and updated as appropriate: allergies, current medications, past family history, past medical history, past social history, past surgical history and problem list.    Allergies: Erythromycin; Erythromycin ethylsuccinate; Ativan [lorazepam]; and Tape    Current Outpatient Prescriptions:   •  albuterol (PROVENTIL HFA;VENTOLIN HFA) 108 (90 Base) MCG/ACT inhaler, Inhale 2 puffs Every 4 (Four) Hours As Needed for Wheezing., Disp: 1 inhaler, Rfl: 6  •  aspirin 81 MG EC tablet, Take 81 mg by mouth daily., Disp: , Rfl:   •  atorvastatin (LIPITOR) 40 MG tablet, Take 1 tablet by mouth Daily. For cholesterol (Patient taking differently: Take 40 mg by mouth Every Night. For cholesterol), Disp: 90 tablet, Rfl: 3  •  B Complex Vitamins (VITAMIN B COMPLEX PO), Take 1 tablet by mouth daily., Disp: , Rfl:   •  bumetanide (BUMEX) 2 MG tablet, Take 1 tablet by mouth 2 (Two) Times a Day. (Patient taking differently: Take 2 mg by mouth Daily.), Disp: 60 tablet, Rfl: 0  •  cetirizine (ZyrTEC) 10 MG tablet, Take 10 mg by mouth every night., Disp: , Rfl:   •  dimenhyDRINATE (DRAMAMINE) 50 MG tablet, Take 1 tablet by mouth Daily As Needed for movement disorders., Disp: 30 tablet, Rfl: 0  •  famotidine (PEPCID) 20 MG tablet, Take  "20 mg by mouth daily., Disp: , Rfl:   •  fluticasone-salmeterol (ADVAIR DISKUS) 250-50 MCG/DOSE DISKUS, Inhale 1 puff 2 (Two) Times a Day. For COPD and rinse mouth after use, Disp: 60 each, Rfl: 5  •  Gauze Pads & Dressings (ABDOMINAL PAD) 8\"X10\" pads, 1 pad Daily., Disp: 50 each, Rfl: 1  •  guaiFENesin (MUCINEX) 600 MG 12 hr tablet, Take 1 tablet by mouth 2 (two) times a day., Disp: , Rfl:   •  HYDROcodone-acetaminophen (NORCO)  MG per tablet, Take 2 tablets by mouth 2 (Two) Times a Day., Disp: 120 tablet, Rfl: 0  •  INCRUSE ELLIPTA 62.5 MCG/INH aerosol powder , Inhale Daily., Disp: , Rfl:   •  insulin aspart (NOVOLOG FLEXPEN) 100 UNIT/ML solution pen-injector sc pen, SSI, Disp: 15 mL, Rfl: 3  •  Insulin Degludec (TRESIBA FLEXTOUCH) 200 UNIT/ML solution pen-injector, Inject 70 Units under the skin Daily. (Patient taking differently: Inject 72 Units under the skin Daily.), Disp: 15 mL, Rfl: 3  •  Insulin Pen Needle (BD PEN NEEDLE CLAY U/F) 32G X 4 MM misc, Use as directed 7 times daily, Disp: 250 each, Rfl: 5  •  levalbuterol (XOPENEX) 0.63 MG/3ML nebulizer solution, Take 1 ampule by nebulization every 4 (four) hours as needed for wheezing., Disp: , Rfl:   •  levothyroxine (SYNTHROID) 112 MCG tablet, Take 2 tablets by mouth Daily., Disp: 60 tablet, Rfl: 5  •  metoprolol tartrate (LOPRESSOR) 50 MG tablet, Take 1 tablet by mouth 2 (Two) Times a Day. For heart, Disp: 180 tablet, Rfl: 1  •  Probiotic Product (ALIGN) 4 MG capsule, Take 4 mg by mouth Daily., Disp: 30 capsule, Rfl: 6  •  QUEtiapine (SEROquel) 100 MG tablet, Take 1 tablet by mouth 2 (Two) Times a Day., Disp: 180 tablet, Rfl: 1  •  trimethoprim-polymyxin b (POLYTRIM) 12104-0.1 UNIT/ML-% ophthalmic solution, Administer 1 drop to both eyes Every 4 (Four) Hours., Disp: 1 each, Rfl: 0  •  vitamin D (ERGOCALCIFEROL) 35351 UNITS capsule capsule, Take 50,000 Units by mouth Every 7 (Seven) Days. On tuesday, Disp: , Rfl:   •  clotrimazole-betamethasone " "(LOTRISONE) 1-0.05 % cream, Apply  topically 2 (Two) Times a Day., Disp: 45 g, Rfl: 0  •  doxycycline (DORYX) 100 MG enteric coated tablet, Take 1 tablet by mouth 2 (Two) Times a Day for 14 days., Disp: 28 tablet, Rfl: 0  •  fluconazole (DIFLUCAN) 100 MG tablet, Take 1 tablet by mouth Daily for 14 days., Disp: 14 tablet, Rfl: 0  There are no discontinued medications.    Review of Systems   Constitutional: Negative for chills, fatigue and fever.   Respiratory: Negative for cough and shortness of breath.    Gastrointestinal: Negative for constipation, diarrhea and nausea.   Skin: Positive for rash and wound.       Objective     /72 (BP Location: Right arm, Patient Position: Sitting, Cuff Size: Adult)  Pulse 60  Ht 167.6 cm (65.98\")  LMP  (LMP Unknown)  SpO2 96%      Physical Exam   Constitutional: She is oriented to person, place, and time. She appears well-developed and well-nourished. No distress.   Morbidly obese in her scooter. SOB with standing.    Eyes: Conjunctivae and EOM are normal. Left eye exhibits discharge.   Neck: Neck supple.   Pulmonary/Chest:   Effort increase with standing. Nasal canula in place.    Abdominal: Soft.   Neurological: She is alert and oriented to person, place, and time.   Skin: Skin is warm. Rash (redness in panus as well as under breast bilaterally. No drainage or abscesses. ) noted. She is not diaphoretic.   Vitals reviewed.        Results for orders placed or performed during the hospital encounter of 10/16/17   Blood Culture - Blood,   Result Value Ref Range    Blood Culture No growth at 5 days    Blood Culture - Blood,   Result Value Ref Range    Blood Culture No growth at 5 days    Comprehensive Metabolic Panel   Result Value Ref Range    Glucose 273 (H) 65 - 99 mg/dL    BUN 68 (H) 8 - 23 mg/dL    Creatinine 2.15 (H) 0.57 - 1.00 mg/dL    Sodium 136 136 - 145 mmol/L    Potassium 4.5 3.5 - 5.2 mmol/L    Chloride 95 (L) 98 - 107 mmol/L    CO2 28.8 22.0 - 29.0 mmol/L    " Calcium 9.1 8.8 - 10.5 mg/dL    Total Protein 7.0 6.0 - 8.5 g/dL    Albumin 3.50 3.50 - 5.20 g/dL    ALT (SGPT) 10 5 - 33 U/L    AST (SGOT) 14 5 - 32 U/L    Alkaline Phosphatase 76 40 - 129 U/L    Total Bilirubin 0.3 0.2 - 1.2 mg/dL    eGFR Non African Amer 23 (L) >60 mL/min/1.73    Globulin 3.5 gm/dL    A/G Ratio 1.0 g/dL    BUN/Creatinine Ratio 31.6 (H) 7.0 - 25.0    Anion Gap 12.2 mmol/L   Lactic Acid, Plasma   Result Value Ref Range    Lactate 0.9 0.5 - 2.0 mmol/L   CBC Auto Differential   Result Value Ref Range    WBC 14.78 (H) 4.80 - 10.80 10*3/mm3    RBC 3.42 (L) 4.20 - 5.40 10*6/mm3    Hemoglobin 10.0 (L) 12.0 - 16.0 g/dL    Hematocrit 31.5 (L) 37.0 - 47.0 %    MCV 92.1 81.0 - 99.0 fL    MCH 29.2 27.0 - 31.0 pg    MCHC 31.7 31.0 - 37.0 g/dL    RDW 12.2 11.5 - 14.5 %    RDW-SD 41.1 37.0 - 54.0 fl    MPV 10.8 (H) 7.4 - 10.4 fL    Platelets 189 140 - 500 10*3/mm3    Neutrophil % 76.1 (H) 45.0 - 70.0 %    Lymphocyte % 13.9 (L) 20.0 - 45.0 %    Monocyte % 6.3 3.0 - 8.0 %    Eosinophil % 3.0 0.0 - 4.0 %    Basophil % 0.3 0.0 - 2.0 %    Immature Grans % 0.4 0.0 - 0.5 %    Neutrophils, Absolute 11.26 (H) 1.50 - 8.30 10*3/mm3    Lymphocytes, Absolute 2.05 0.60 - 4.80 10*3/mm3    Monocytes, Absolute 0.93 0.00 - 1.00 10*3/mm3    Eosinophils, Absolute 0.44 (H) 0.10 - 0.30 10*3/mm3    Basophils, Absolute 0.04 0.00 - 0.20 10*3/mm3    Immature Grans, Absolute 0.06 (H) 0.00 - 0.03 10*3/mm3    nRBC 0.0 0.0 - 0.0 /100 WBC   Basic Metabolic Panel   Result Value Ref Range    Glucose 129 (H) 65 - 99 mg/dL    BUN 68 (H) 8 - 23 mg/dL    Creatinine 1.94 (H) 0.57 - 1.00 mg/dL    Sodium 140 136 - 145 mmol/L    Potassium 4.0 3.5 - 5.2 mmol/L    Chloride 100 98 - 107 mmol/L    CO2 28.0 22.0 - 29.0 mmol/L    Calcium 8.9 8.8 - 10.5 mg/dL    eGFR Non African Amer 26 (L) >60 mL/min/1.73    BUN/Creatinine Ratio 35.1 (H) 7.0 - 25.0    Anion Gap 12.0 mmol/L   CBC Auto Differential   Result Value Ref Range    WBC 12.70 (H) 4.80 - 10.80  10*3/mm3    RBC 3.15 (L) 4.20 - 5.40 10*6/mm3    Hemoglobin 9.2 (L) 12.0 - 16.0 g/dL    Hematocrit 28.9 (L) 37.0 - 47.0 %    MCV 91.7 81.0 - 99.0 fL    MCH 29.2 27.0 - 31.0 pg    MCHC 31.8 31.0 - 37.0 g/dL    RDW 12.5 11.5 - 14.5 %    RDW-SD 41.5 37.0 - 54.0 fl    MPV 10.9 (H) 7.4 - 10.4 fL    Platelets 181 140 - 500 10*3/mm3    Neutrophil % 72.9 (H) 45.0 - 70.0 %    Lymphocyte % 16.1 (L) 20.0 - 45.0 %    Monocyte % 7.2 3.0 - 8.0 %    Eosinophil % 3.2 0.0 - 4.0 %    Basophil % 0.3 0.0 - 2.0 %    Immature Grans % 0.3 0.0 - 0.5 %    Neutrophils, Absolute 9.25 (H) 1.50 - 8.30 10*3/mm3    Lymphocytes, Absolute 2.05 0.60 - 4.80 10*3/mm3    Monocytes, Absolute 0.91 0.00 - 1.00 10*3/mm3    Eosinophils, Absolute 0.41 (H) 0.10 - 0.30 10*3/mm3    Basophils, Absolute 0.04 0.00 - 0.20 10*3/mm3    Immature Grans, Absolute 0.04 (H) 0.00 - 0.03 10*3/mm3    nRBC 0.0 0.0 - 0.0 /100 WBC   Comprehensive Metabolic Panel   Result Value Ref Range    Glucose 120 (H) 65 - 99 mg/dL    BUN 60 (H) 8 - 23 mg/dL    Creatinine 2.10 (H) 0.57 - 1.00 mg/dL    Sodium 143 136 - 145 mmol/L    Potassium 4.5 3.5 - 5.2 mmol/L    Chloride 104 98 - 107 mmol/L    CO2 27.2 22.0 - 29.0 mmol/L    Calcium 8.5 (L) 8.8 - 10.5 mg/dL    Total Protein 6.3 6.0 - 8.5 g/dL    Albumin 3.20 (L) 3.50 - 5.20 g/dL    ALT (SGPT) 11 5 - 33 U/L    AST (SGOT) 14 5 - 32 U/L    Alkaline Phosphatase 64 40 - 129 U/L    Total Bilirubin 0.2 0.2 - 1.2 mg/dL    eGFR Non African Amer 24 (L) >60 mL/min/1.73    Globulin 3.1 gm/dL    A/G Ratio 1.0 g/dL    BUN/Creatinine Ratio 28.6 (H) 7.0 - 25.0    Anion Gap 11.8 mmol/L   CBC Auto Differential   Result Value Ref Range    WBC 9.03 4.80 - 10.80 10*3/mm3    RBC 3.08 (L) 4.20 - 5.40 10*6/mm3    Hemoglobin 9.1 (L) 12.0 - 16.0 g/dL    Hematocrit 28.4 (L) 37.0 - 47.0 %    MCV 92.2 81.0 - 99.0 fL    MCH 29.5 27.0 - 31.0 pg    MCHC 32.0 31.0 - 37.0 g/dL    RDW 12.6 11.5 - 14.5 %    RDW-SD 42.0 37.0 - 54.0 fl    MPV 10.2 7.4 - 10.4 fL    Platelets  202 140 - 500 10*3/mm3    Neutrophil % 59.4 45.0 - 70.0 %    Lymphocyte % 24.9 20.0 - 45.0 %    Monocyte % 8.9 (H) 3.0 - 8.0 %    Eosinophil % 5.8 (H) 0.0 - 4.0 %    Basophil % 0.4 0.0 - 2.0 %    Immature Grans % 0.6 (H) 0.0 - 0.5 %    Neutrophils, Absolute 5.37 1.50 - 8.30 10*3/mm3    Lymphocytes, Absolute 2.25 0.60 - 4.80 10*3/mm3    Monocytes, Absolute 0.80 0.00 - 1.00 10*3/mm3    Eosinophils, Absolute 0.52 (H) 0.10 - 0.30 10*3/mm3    Basophils, Absolute 0.04 0.00 - 0.20 10*3/mm3    Immature Grans, Absolute 0.05 (H) 0.00 - 0.03 10*3/mm3    nRBC 0.0 0.0 - 0.0 /100 WBC   Comprehensive Metabolic Panel   Result Value Ref Range    Glucose 129 (H) 65 - 99 mg/dL    BUN 55 (H) 8 - 23 mg/dL    Creatinine 1.90 (H) 0.57 - 1.00 mg/dL    Sodium 144 136 - 145 mmol/L    Potassium 4.7 3.5 - 5.2 mmol/L    Chloride 103 98 - 107 mmol/L    CO2 29.4 (H) 22.0 - 29.0 mmol/L    Calcium 8.7 (L) 8.8 - 10.5 mg/dL    Total Protein 6.6 6.0 - 8.5 g/dL    Albumin 3.20 (L) 3.50 - 5.20 g/dL    ALT (SGPT) 14 5 - 33 U/L    AST (SGOT) 16 5 - 32 U/L    Alkaline Phosphatase 69 40 - 129 U/L    Total Bilirubin 0.2 0.2 - 1.2 mg/dL    eGFR Non African Amer 27 (L) >60 mL/min/1.73    Globulin 3.4 gm/dL    A/G Ratio 0.9 g/dL    BUN/Creatinine Ratio 28.9 (H) 7.0 - 25.0    Anion Gap 11.6 mmol/L   CBC Auto Differential   Result Value Ref Range    WBC 9.06 4.80 - 10.80 10*3/mm3    RBC 3.23 (L) 4.20 - 5.40 10*6/mm3    Hemoglobin 9.4 (L) 12.0 - 16.0 g/dL    Hematocrit 30.1 (L) 37.0 - 47.0 %    MCV 93.2 81.0 - 99.0 fL    MCH 29.1 27.0 - 31.0 pg    MCHC 31.2 31.0 - 37.0 g/dL    RDW 12.4 11.5 - 14.5 %    RDW-SD 41.8 37.0 - 54.0 fl    MPV 10.2 7.4 - 10.4 fL    Platelets 200 140 - 500 10*3/mm3    Neutrophil % 56.0 45.0 - 70.0 %    Lymphocyte % 27.5 20.0 - 45.0 %    Monocyte % 9.8 (H) 3.0 - 8.0 %    Eosinophil % 5.4 (H) 0.0 - 4.0 %    Basophil % 0.3 0.0 - 2.0 %    Immature Grans % 1.0 (H) 0.0 - 0.5 %    Neutrophils, Absolute 5.07 1.50 - 8.30 10*3/mm3    Lymphocytes,  Absolute 2.49 0.60 - 4.80 10*3/mm3    Monocytes, Absolute 0.89 0.00 - 1.00 10*3/mm3    Eosinophils, Absolute 0.49 (H) 0.10 - 0.30 10*3/mm3    Basophils, Absolute 0.03 0.00 - 0.20 10*3/mm3    Immature Grans, Absolute 0.09 (H) 0.00 - 0.03 10*3/mm3    nRBC 0.0 0.0 - 0.0 /100 WBC   POC Glucose Fingerstick   Result Value Ref Range    Glucose 276 (H) 70 - 130 mg/dL   POC Glucose Fingerstick   Result Value Ref Range    Glucose 384 (H) 70 - 130 mg/dL   POC Glucose Fingerstick   Result Value Ref Range    Glucose 82 70 - 130 mg/dL   POC Glucose Fingerstick   Result Value Ref Range    Glucose 232 (H) 70 - 130 mg/dL   POC Glucose Fingerstick   Result Value Ref Range    Glucose 94 70 - 130 mg/dL   POC Glucose Fingerstick   Result Value Ref Range    Glucose 127 70 - 130 mg/dL   POC Glucose Fingerstick   Result Value Ref Range    Glucose 87 70 - 130 mg/dL   POC Glucose Fingerstick   Result Value Ref Range    Glucose 87 70 - 130 mg/dL   POC Glucose Fingerstick   Result Value Ref Range    Glucose 185 (H) 70 - 130 mg/dL   POC Glucose Fingerstick   Result Value Ref Range    Glucose 303 (H) 70 - 130 mg/dL   POC Glucose Fingerstick   Result Value Ref Range    Glucose 99 70 - 130 mg/dL   POC Glucose Fingerstick   Result Value Ref Range    Glucose 290 (H) 70 - 130 mg/dL   POC Glucose Fingerstick   Result Value Ref Range    Glucose 168 (H) 70 - 130 mg/dL   POC Glucose Fingerstick   Result Value Ref Range    Glucose 285 (H) 70 - 130 mg/dL   POC Glucose Fingerstick   Result Value Ref Range    Glucose 104 70 - 130 mg/dL       Assessment/Plan   Jared was seen today for rash and eye drainage.    Diagnoses and all orders for this visit:    Candida rash of groin    Itching in the vaginal area    Cellulitis, abdominal wall    Other orders  -     clotrimazole-betamethasone (LOTRISONE) 1-0.05 % cream; Apply  topically 2 (Two) Times a Day.  -     doxycycline (DORYX) 100 MG enteric coated tablet; Take 1 tablet by mouth 2 (Two) Times a Day for 14  days.  -     fluconazole (DIFLUCAN) 100 MG tablet; Take 1 tablet by mouth Daily for 14 days.      Will start another 14 day course of antibiotics as well as fluconazole to cover MRSA as well as yeast   Lotrisone to help with itchiness   Return if fever  Follow up in 2 weeks with labs as she missed her last appointment and was a no show         Return in about 2 weeks (around 12/19/2017) for Recheck.    Jina Vaughan MD  12/05/2017

## 2017-12-20 ENCOUNTER — OFFICE VISIT (OUTPATIENT)
Dept: INTERNAL MEDICINE | Facility: CLINIC | Age: 62
End: 2017-12-20

## 2017-12-20 VITALS
DIASTOLIC BLOOD PRESSURE: 62 MMHG | RESPIRATION RATE: 18 BRPM | OXYGEN SATURATION: 93 % | TEMPERATURE: 98.5 F | HEIGHT: 66 IN | HEART RATE: 75 BPM | SYSTOLIC BLOOD PRESSURE: 130 MMHG

## 2017-12-20 DIAGNOSIS — N18.4 CKD (CHRONIC KIDNEY DISEASE) STAGE 4, GFR 15-29 ML/MIN (HCC): Primary | ICD-10-CM

## 2017-12-20 DIAGNOSIS — I10 ESSENTIAL HYPERTENSION: ICD-10-CM

## 2017-12-20 DIAGNOSIS — N18.30 TYPE 2 DIABETES MELLITUS WITH STAGE 3 CHRONIC KIDNEY DISEASE, WITH LONG-TERM CURRENT USE OF INSULIN (HCC): ICD-10-CM

## 2017-12-20 DIAGNOSIS — Z79.4 TYPE 2 DIABETES MELLITUS WITH STAGE 3 CHRONIC KIDNEY DISEASE, WITH LONG-TERM CURRENT USE OF INSULIN (HCC): ICD-10-CM

## 2017-12-20 DIAGNOSIS — E11.22 TYPE 2 DIABETES MELLITUS WITH STAGE 3 CHRONIC KIDNEY DISEASE, WITH LONG-TERM CURRENT USE OF INSULIN (HCC): ICD-10-CM

## 2017-12-20 DIAGNOSIS — E78.2 MIXED HYPERLIPIDEMIA: ICD-10-CM

## 2017-12-20 DIAGNOSIS — B37.2 CANDIDAL SKIN INFECTION: ICD-10-CM

## 2017-12-20 PROCEDURE — 99214 OFFICE O/P EST MOD 30 MIN: CPT | Performed by: INTERNAL MEDICINE

## 2017-12-20 RX ORDER — FLUCONAZOLE 100 MG/1
100 TABLET ORAL DAILY
Qty: 14 TABLET | Refills: 0 | Status: SHIPPED | OUTPATIENT
Start: 2017-12-20 | End: 2018-01-03

## 2017-12-20 RX ORDER — CLOTRIMAZOLE AND BETAMETHASONE DIPROPIONATE 10; .64 MG/G; MG/G
CREAM TOPICAL 2 TIMES DAILY
Qty: 180 G | Refills: 3 | Status: ON HOLD | OUTPATIENT
Start: 2017-12-20 | End: 2020-07-20

## 2017-12-20 NOTE — PROGRESS NOTES
"Subjective     Jared Govea is a 62 y.o. female, who presents with a chief complaint of   Chief Complaint   Patient presents with   • Rash     yeast derm on abdomen   • Diabetes   • Hypertension   • Hyperlipidemia       HPI Comments: 61 yo F here for follow up of her chronic medical conditions. She is doing well with no concerns today. Rash is much better and she likes using the Lotrisone. It has helped with the itchiness.     Her diabetes is doing \"bad\" per her. Her BG's have been in the  in the morning, but around 300-500 during the day. She is eating fudge and cookies during the day and not watching. Eye exam is up to date. She is on a statin and ASA 81mg. She is on 70 units of Triseba and doing SSI with Novolog.     Her HTN is chronic and under good control. She is taking BB daily.     Her breathing is doing well and has follow up with Dr. Hoyt. Still using 4L  NC and tolerates well.            The following portions of the patient's history were reviewed and updated as appropriate: allergies, current medications, past family history, past medical history, past social history, past surgical history and problem list.    Allergies: Erythromycin; Erythromycin ethylsuccinate; Ativan [lorazepam]; and Tape    Current Outpatient Prescriptions:   •  albuterol (PROVENTIL HFA;VENTOLIN HFA) 108 (90 Base) MCG/ACT inhaler, Inhale 2 puffs Every 4 (Four) Hours As Needed for Wheezing., Disp: 1 inhaler, Rfl: 6  •  aspirin 81 MG EC tablet, Take 81 mg by mouth daily., Disp: , Rfl:   •  atorvastatin (LIPITOR) 40 MG tablet, Take 1 tablet by mouth Daily. For cholesterol (Patient taking differently: Take 40 mg by mouth Every Night. For cholesterol), Disp: 90 tablet, Rfl: 3  •  B Complex Vitamins (VITAMIN B COMPLEX PO), Take 1 tablet by mouth daily., Disp: , Rfl:   •  bumetanide (BUMEX) 2 MG tablet, Take 1 tablet by mouth 2 (Two) Times a Day. (Patient taking differently: Take 2 mg by mouth Daily.), Disp: 60 tablet, Rfl: 0  •  " "cetirizine (ZyrTEC) 10 MG tablet, Take 10 mg by mouth every night., Disp: , Rfl:   •  clotrimazole-betamethasone (LOTRISONE) 1-0.05 % cream, Apply  topically 2 (Two) Times a Day., Disp: 180 g, Rfl: 3  •  dimenhyDRINATE (DRAMAMINE) 50 MG tablet, Take 1 tablet by mouth Daily As Needed for movement disorders., Disp: 30 tablet, Rfl: 0  •  famotidine (PEPCID) 20 MG tablet, Take 20 mg by mouth daily., Disp: , Rfl:   •  fluticasone-salmeterol (ADVAIR DISKUS) 250-50 MCG/DOSE DISKUS, Inhale 1 puff 2 (Two) Times a Day. For COPD and rinse mouth after use, Disp: 60 each, Rfl: 5  •  Gauze Pads & Dressings (ABDOMINAL PAD) 8\"X10\" pads, 1 pad Daily., Disp: 50 each, Rfl: 1  •  guaiFENesin (MUCINEX) 600 MG 12 hr tablet, Take 1 tablet by mouth 2 (two) times a day., Disp: , Rfl:   •  HYDROcodone-acetaminophen (NORCO)  MG per tablet, Take 2 tablets by mouth 2 (Two) Times a Day., Disp: 120 tablet, Rfl: 0  •  INCRUSE ELLIPTA 62.5 MCG/INH aerosol powder , Inhale Daily., Disp: , Rfl:   •  insulin aspart (NOVOLOG FLEXPEN) 100 UNIT/ML solution pen-injector sc pen, SSI, Disp: 15 mL, Rfl: 3  •  Insulin Degludec (TRESIBA FLEXTOUCH) 200 UNIT/ML solution pen-injector, Inject 70 Units under the skin Daily. (Patient taking differently: Inject 72 Units under the skin Daily.), Disp: 15 mL, Rfl: 3  •  Insulin Pen Needle (BD PEN NEEDLE CLAY U/F) 32G X 4 MM misc, Use as directed 7 times daily, Disp: 250 each, Rfl: 5  •  levalbuterol (XOPENEX) 0.63 MG/3ML nebulizer solution, Take 1 ampule by nebulization every 4 (four) hours as needed for wheezing., Disp: , Rfl:   •  levothyroxine (SYNTHROID) 112 MCG tablet, Take 2 tablets by mouth Daily., Disp: 60 tablet, Rfl: 5  •  metoprolol tartrate (LOPRESSOR) 50 MG tablet, Take 1 tablet by mouth 2 (Two) Times a Day. For heart, Disp: 180 tablet, Rfl: 1  •  Probiotic Product (ALIGN) 4 MG capsule, Take 4 mg by mouth Daily., Disp: 30 capsule, Rfl: 6  •  QUEtiapine (SEROquel) 100 MG tablet, Take 1 tablet by mouth 2 " "(Two) Times a Day., Disp: 180 tablet, Rfl: 1  •  vitamin D (ERGOCALCIFEROL) 74614 UNITS capsule capsule, Take 50,000 Units by mouth Every 7 (Seven) Days. On tuesday, Disp: , Rfl:   •  fluconazole (DIFLUCAN) 100 MG tablet, Take 1 tablet by mouth Daily for 14 days., Disp: 14 tablet, Rfl: 0  •  trimethoprim-polymyxin b (POLYTRIM) 94183-3.1 UNIT/ML-% ophthalmic solution, Administer 1 drop to both eyes Every 4 (Four) Hours., Disp: 1 each, Rfl: 0  Medications Discontinued During This Encounter   Medication Reason   • clotrimazole-betamethasone (LOTRISONE) 1-0.05 % cream Reorder   • fluconazole (DIFLUCAN) 100 MG tablet Reorder   • doxycycline (DORYX) 100 MG enteric coated tablet Therapy completed       Review of Systems   Constitutional: Negative for chills and fever.   HENT: Negative for congestion and rhinorrhea.    Respiratory: Positive for shortness of breath (baseline and stable on 4L NC ). Negative for cough and wheezing.    Cardiovascular: Negative for chest pain and palpitations.   Gastrointestinal: Negative for abdominal pain, diarrhea, nausea and vomiting.   Skin: Positive for rash (doing much better).       Objective     /62 (BP Location: Left arm, Patient Position: Sitting, Cuff Size: Large Adult)  Pulse 75  Temp 98.5 °F (36.9 °C) (Oral)   Resp 18  Ht 167.6 cm (66\")  LMP  (LMP Unknown)  SpO2 93% Comment: 4 liters o2      Physical Exam   Constitutional: She is oriented to person, place, and time. She appears well-developed and well-nourished. She has a sickly appearance (chronically ill appearing on 4L NC ). Ill appearance: on 4L NC  No distress.   Morbidly obese    HENT:   Head: Normocephalic and atraumatic.   Right Ear: Hearing, external ear and ear canal normal.   Left Ear: Hearing, external ear and ear canal normal.   Nose: Nose normal.   Mouth/Throat: Oropharynx is clear and moist and mucous membranes are normal. No oropharyngeal exudate. Tonsils are 0 on the right. Tonsils are 0 on the left. No " tonsillar exudate.   Eyes: Conjunctivae and lids are normal. Right eye exhibits no discharge. Left eye exhibits no discharge. Right conjunctiva is not injected. Left conjunctiva is not injected. No scleral icterus.   Neck: Neck supple.   Cardiovascular: Normal rate, regular rhythm and normal heart sounds.  Exam reveals no gallop and no friction rub.    No murmur heard.  Pulmonary/Chest: Effort normal. No respiratory distress. She has decreased breath sounds. She has no wheezes. She has no rales.   Abdominal: Soft. Bowel sounds are normal. She exhibits no distension and no mass. There is no tenderness. There is no guarding.   Lymphadenopathy:     She has no cervical adenopathy.   Neurological: She is alert and oriented to person, place, and time.   Skin: Skin is warm. No rash noted.   Psychiatric: She has a normal mood and affect. Her behavior is normal.   Nursing note and vitals reviewed.        Results for orders placed or performed during the hospital encounter of 10/16/17   Blood Culture - Blood,   Result Value Ref Range    Blood Culture No growth at 5 days    Blood Culture - Blood,   Result Value Ref Range    Blood Culture No growth at 5 days    Comprehensive Metabolic Panel   Result Value Ref Range    Glucose 273 (H) 65 - 99 mg/dL    BUN 68 (H) 8 - 23 mg/dL    Creatinine 2.15 (H) 0.57 - 1.00 mg/dL    Sodium 136 136 - 145 mmol/L    Potassium 4.5 3.5 - 5.2 mmol/L    Chloride 95 (L) 98 - 107 mmol/L    CO2 28.8 22.0 - 29.0 mmol/L    Calcium 9.1 8.8 - 10.5 mg/dL    Total Protein 7.0 6.0 - 8.5 g/dL    Albumin 3.50 3.50 - 5.20 g/dL    ALT (SGPT) 10 5 - 33 U/L    AST (SGOT) 14 5 - 32 U/L    Alkaline Phosphatase 76 40 - 129 U/L    Total Bilirubin 0.3 0.2 - 1.2 mg/dL    eGFR Non African Amer 23 (L) >60 mL/min/1.73    Globulin 3.5 gm/dL    A/G Ratio 1.0 g/dL    BUN/Creatinine Ratio 31.6 (H) 7.0 - 25.0    Anion Gap 12.2 mmol/L   Lactic Acid, Plasma   Result Value Ref Range    Lactate 0.9 0.5 - 2.0 mmol/L   CBC Auto  Differential   Result Value Ref Range    WBC 14.78 (H) 4.80 - 10.80 10*3/mm3    RBC 3.42 (L) 4.20 - 5.40 10*6/mm3    Hemoglobin 10.0 (L) 12.0 - 16.0 g/dL    Hematocrit 31.5 (L) 37.0 - 47.0 %    MCV 92.1 81.0 - 99.0 fL    MCH 29.2 27.0 - 31.0 pg    MCHC 31.7 31.0 - 37.0 g/dL    RDW 12.2 11.5 - 14.5 %    RDW-SD 41.1 37.0 - 54.0 fl    MPV 10.8 (H) 7.4 - 10.4 fL    Platelets 189 140 - 500 10*3/mm3    Neutrophil % 76.1 (H) 45.0 - 70.0 %    Lymphocyte % 13.9 (L) 20.0 - 45.0 %    Monocyte % 6.3 3.0 - 8.0 %    Eosinophil % 3.0 0.0 - 4.0 %    Basophil % 0.3 0.0 - 2.0 %    Immature Grans % 0.4 0.0 - 0.5 %    Neutrophils, Absolute 11.26 (H) 1.50 - 8.30 10*3/mm3    Lymphocytes, Absolute 2.05 0.60 - 4.80 10*3/mm3    Monocytes, Absolute 0.93 0.00 - 1.00 10*3/mm3    Eosinophils, Absolute 0.44 (H) 0.10 - 0.30 10*3/mm3    Basophils, Absolute 0.04 0.00 - 0.20 10*3/mm3    Immature Grans, Absolute 0.06 (H) 0.00 - 0.03 10*3/mm3    nRBC 0.0 0.0 - 0.0 /100 WBC   Basic Metabolic Panel   Result Value Ref Range    Glucose 129 (H) 65 - 99 mg/dL    BUN 68 (H) 8 - 23 mg/dL    Creatinine 1.94 (H) 0.57 - 1.00 mg/dL    Sodium 140 136 - 145 mmol/L    Potassium 4.0 3.5 - 5.2 mmol/L    Chloride 100 98 - 107 mmol/L    CO2 28.0 22.0 - 29.0 mmol/L    Calcium 8.9 8.8 - 10.5 mg/dL    eGFR Non African Amer 26 (L) >60 mL/min/1.73    BUN/Creatinine Ratio 35.1 (H) 7.0 - 25.0    Anion Gap 12.0 mmol/L   CBC Auto Differential   Result Value Ref Range    WBC 12.70 (H) 4.80 - 10.80 10*3/mm3    RBC 3.15 (L) 4.20 - 5.40 10*6/mm3    Hemoglobin 9.2 (L) 12.0 - 16.0 g/dL    Hematocrit 28.9 (L) 37.0 - 47.0 %    MCV 91.7 81.0 - 99.0 fL    MCH 29.2 27.0 - 31.0 pg    MCHC 31.8 31.0 - 37.0 g/dL    RDW 12.5 11.5 - 14.5 %    RDW-SD 41.5 37.0 - 54.0 fl    MPV 10.9 (H) 7.4 - 10.4 fL    Platelets 181 140 - 500 10*3/mm3    Neutrophil % 72.9 (H) 45.0 - 70.0 %    Lymphocyte % 16.1 (L) 20.0 - 45.0 %    Monocyte % 7.2 3.0 - 8.0 %    Eosinophil % 3.2 0.0 - 4.0 %    Basophil % 0.3 0.0 -  2.0 %    Immature Grans % 0.3 0.0 - 0.5 %    Neutrophils, Absolute 9.25 (H) 1.50 - 8.30 10*3/mm3    Lymphocytes, Absolute 2.05 0.60 - 4.80 10*3/mm3    Monocytes, Absolute 0.91 0.00 - 1.00 10*3/mm3    Eosinophils, Absolute 0.41 (H) 0.10 - 0.30 10*3/mm3    Basophils, Absolute 0.04 0.00 - 0.20 10*3/mm3    Immature Grans, Absolute 0.04 (H) 0.00 - 0.03 10*3/mm3    nRBC 0.0 0.0 - 0.0 /100 WBC   Comprehensive Metabolic Panel   Result Value Ref Range    Glucose 120 (H) 65 - 99 mg/dL    BUN 60 (H) 8 - 23 mg/dL    Creatinine 2.10 (H) 0.57 - 1.00 mg/dL    Sodium 143 136 - 145 mmol/L    Potassium 4.5 3.5 - 5.2 mmol/L    Chloride 104 98 - 107 mmol/L    CO2 27.2 22.0 - 29.0 mmol/L    Calcium 8.5 (L) 8.8 - 10.5 mg/dL    Total Protein 6.3 6.0 - 8.5 g/dL    Albumin 3.20 (L) 3.50 - 5.20 g/dL    ALT (SGPT) 11 5 - 33 U/L    AST (SGOT) 14 5 - 32 U/L    Alkaline Phosphatase 64 40 - 129 U/L    Total Bilirubin 0.2 0.2 - 1.2 mg/dL    eGFR Non African Amer 24 (L) >60 mL/min/1.73    Globulin 3.1 gm/dL    A/G Ratio 1.0 g/dL    BUN/Creatinine Ratio 28.6 (H) 7.0 - 25.0    Anion Gap 11.8 mmol/L   CBC Auto Differential   Result Value Ref Range    WBC 9.03 4.80 - 10.80 10*3/mm3    RBC 3.08 (L) 4.20 - 5.40 10*6/mm3    Hemoglobin 9.1 (L) 12.0 - 16.0 g/dL    Hematocrit 28.4 (L) 37.0 - 47.0 %    MCV 92.2 81.0 - 99.0 fL    MCH 29.5 27.0 - 31.0 pg    MCHC 32.0 31.0 - 37.0 g/dL    RDW 12.6 11.5 - 14.5 %    RDW-SD 42.0 37.0 - 54.0 fl    MPV 10.2 7.4 - 10.4 fL    Platelets 202 140 - 500 10*3/mm3    Neutrophil % 59.4 45.0 - 70.0 %    Lymphocyte % 24.9 20.0 - 45.0 %    Monocyte % 8.9 (H) 3.0 - 8.0 %    Eosinophil % 5.8 (H) 0.0 - 4.0 %    Basophil % 0.4 0.0 - 2.0 %    Immature Grans % 0.6 (H) 0.0 - 0.5 %    Neutrophils, Absolute 5.37 1.50 - 8.30 10*3/mm3    Lymphocytes, Absolute 2.25 0.60 - 4.80 10*3/mm3    Monocytes, Absolute 0.80 0.00 - 1.00 10*3/mm3    Eosinophils, Absolute 0.52 (H) 0.10 - 0.30 10*3/mm3    Basophils, Absolute 0.04 0.00 - 0.20 10*3/mm3     Immature Grans, Absolute 0.05 (H) 0.00 - 0.03 10*3/mm3    nRBC 0.0 0.0 - 0.0 /100 WBC   Comprehensive Metabolic Panel   Result Value Ref Range    Glucose 129 (H) 65 - 99 mg/dL    BUN 55 (H) 8 - 23 mg/dL    Creatinine 1.90 (H) 0.57 - 1.00 mg/dL    Sodium 144 136 - 145 mmol/L    Potassium 4.7 3.5 - 5.2 mmol/L    Chloride 103 98 - 107 mmol/L    CO2 29.4 (H) 22.0 - 29.0 mmol/L    Calcium 8.7 (L) 8.8 - 10.5 mg/dL    Total Protein 6.6 6.0 - 8.5 g/dL    Albumin 3.20 (L) 3.50 - 5.20 g/dL    ALT (SGPT) 14 5 - 33 U/L    AST (SGOT) 16 5 - 32 U/L    Alkaline Phosphatase 69 40 - 129 U/L    Total Bilirubin 0.2 0.2 - 1.2 mg/dL    eGFR Non African Amer 27 (L) >60 mL/min/1.73    Globulin 3.4 gm/dL    A/G Ratio 0.9 g/dL    BUN/Creatinine Ratio 28.9 (H) 7.0 - 25.0    Anion Gap 11.6 mmol/L   CBC Auto Differential   Result Value Ref Range    WBC 9.06 4.80 - 10.80 10*3/mm3    RBC 3.23 (L) 4.20 - 5.40 10*6/mm3    Hemoglobin 9.4 (L) 12.0 - 16.0 g/dL    Hematocrit 30.1 (L) 37.0 - 47.0 %    MCV 93.2 81.0 - 99.0 fL    MCH 29.1 27.0 - 31.0 pg    MCHC 31.2 31.0 - 37.0 g/dL    RDW 12.4 11.5 - 14.5 %    RDW-SD 41.8 37.0 - 54.0 fl    MPV 10.2 7.4 - 10.4 fL    Platelets 200 140 - 500 10*3/mm3    Neutrophil % 56.0 45.0 - 70.0 %    Lymphocyte % 27.5 20.0 - 45.0 %    Monocyte % 9.8 (H) 3.0 - 8.0 %    Eosinophil % 5.4 (H) 0.0 - 4.0 %    Basophil % 0.3 0.0 - 2.0 %    Immature Grans % 1.0 (H) 0.0 - 0.5 %    Neutrophils, Absolute 5.07 1.50 - 8.30 10*3/mm3    Lymphocytes, Absolute 2.49 0.60 - 4.80 10*3/mm3    Monocytes, Absolute 0.89 0.00 - 1.00 10*3/mm3    Eosinophils, Absolute 0.49 (H) 0.10 - 0.30 10*3/mm3    Basophils, Absolute 0.03 0.00 - 0.20 10*3/mm3    Immature Grans, Absolute 0.09 (H) 0.00 - 0.03 10*3/mm3    nRBC 0.0 0.0 - 0.0 /100 WBC   POC Glucose Fingerstick   Result Value Ref Range    Glucose 276 (H) 70 - 130 mg/dL   POC Glucose Fingerstick   Result Value Ref Range    Glucose 384 (H) 70 - 130 mg/dL   POC Glucose Fingerstick   Result Value Ref  Range    Glucose 82 70 - 130 mg/dL   POC Glucose Fingerstick   Result Value Ref Range    Glucose 232 (H) 70 - 130 mg/dL   POC Glucose Fingerstick   Result Value Ref Range    Glucose 94 70 - 130 mg/dL   POC Glucose Fingerstick   Result Value Ref Range    Glucose 127 70 - 130 mg/dL   POC Glucose Fingerstick   Result Value Ref Range    Glucose 87 70 - 130 mg/dL   POC Glucose Fingerstick   Result Value Ref Range    Glucose 87 70 - 130 mg/dL   POC Glucose Fingerstick   Result Value Ref Range    Glucose 185 (H) 70 - 130 mg/dL   POC Glucose Fingerstick   Result Value Ref Range    Glucose 303 (H) 70 - 130 mg/dL   POC Glucose Fingerstick   Result Value Ref Range    Glucose 99 70 - 130 mg/dL   POC Glucose Fingerstick   Result Value Ref Range    Glucose 290 (H) 70 - 130 mg/dL   POC Glucose Fingerstick   Result Value Ref Range    Glucose 168 (H) 70 - 130 mg/dL   POC Glucose Fingerstick   Result Value Ref Range    Glucose 285 (H) 70 - 130 mg/dL   POC Glucose Fingerstick   Result Value Ref Range    Glucose 104 70 - 130 mg/dL       Assessment/Plan   Jared was seen today for rash, diabetes, hypertension and hyperlipidemia.    Diagnoses and all orders for this visit:    CKD (chronic kidney disease) stage 4, GFR 15-29 ml/min    Type 2 diabetes mellitus with stage 3 chronic kidney disease, with long-term current use of insulin    Essential hypertension    Mixed hyperlipidemia    Candidal skin infection    Other orders  -     clotrimazole-betamethasone (LOTRISONE) 1-0.05 % cream; Apply  topically 2 (Two) Times a Day.  -     fluconazole (DIFLUCAN) 100 MG tablet; Take 1 tablet by mouth Daily for 14 days.        Patient's type 2 diabetes is  under good control. HgA1c is 6.7%. Will continue current regimen of Triseba as well as SSI Novolog with meals.No reported side effects from medications. Will follow up in 3 months. She is not on ACE due to CKD, but is on ASA and statin. Eye exam is up to date.     HTN is under good control and patient  will continue to monitor with home BP cuff. No side effects from medications. Will continue current regimen of BB. Will follow up in 3 months.     Patient's cholesterol is under good control. Will continue current regimen of atorvastatin3. No side effects from medications reported. Will follow up in 3 months to monitor levels.     Her anemia is stable and likely related to CKD and chronic medical conditions. Workup for [place], folate and B12 deficiency were all normal. She does not want to have further workup including evaluation of GI tract. Discussed risks and benefits with patient.     Will continue Lotrisone for her rash as well as another 14 days of Fluconazole which I hope will help.           Return in about 3 months (around 3/20/2018) for Recheck.    Jina Vaughan MD  12/20/2017

## 2017-12-21 LAB
ALBUMIN SERPL-MCNC: 3.6 G/DL (ref 3.5–5.2)
ALBUMIN/GLOB SERPL: 1.4 G/DL
ALP SERPL-CCNC: 86 U/L (ref 40–129)
ALT SERPL-CCNC: 15 U/L (ref 5–33)
APPEARANCE UR: CLEAR
AST SERPL-CCNC: 16 U/L (ref 5–32)
BACTERIA #/AREA URNS HPF: ABNORMAL /HPF
BACTERIA UR CULT: NORMAL
BACTERIA UR CULT: NORMAL
BASOPHILS # BLD AUTO: 0.03 10*3/MM3 (ref 0–0.2)
BASOPHILS NFR BLD AUTO: 0.4 % (ref 0–2)
BILIRUB SERPL-MCNC: 0.3 MG/DL (ref 0.2–1.2)
BILIRUB UR QL STRIP: NEGATIVE
BUN SERPL-MCNC: 46 MG/DL (ref 8–23)
BUN/CREAT SERPL: 29.3 (ref 7–25)
CALCIUM SERPL-MCNC: 9.1 MG/DL (ref 8.8–10.5)
CASTS URNS MICRO: ABNORMAL
CASTS URNS QL MICRO: PRESENT
CHLORIDE SERPL-SCNC: 102 MMOL/L (ref 98–107)
CHOLEST SERPL-MCNC: 120 MG/DL (ref 0–200)
CO2 SERPL-SCNC: 32.5 MMOL/L (ref 22–29)
COLOR UR: YELLOW
CREAT SERPL-MCNC: 1.57 MG/DL (ref 0.57–1)
EOSINOPHIL # BLD AUTO: 0.34 10*3/MM3 (ref 0.1–0.3)
EOSINOPHIL NFR BLD AUTO: 4.1 % (ref 0–4)
EPI CELLS #/AREA URNS HPF: ABNORMAL /HPF
ERYTHROCYTE [DISTWIDTH] IN BLOOD BY AUTOMATED COUNT: 13.4 % (ref 11.5–14.5)
GLOBULIN SER CALC-MCNC: 2.6 GM/DL
GLUCOSE SERPL-MCNC: 55 MG/DL (ref 65–99)
GLUCOSE UR QL: NEGATIVE
HBA1C MFR BLD: 6.7 % (ref 4.8–5.6)
HCT VFR BLD AUTO: 31.9 % (ref 37–47)
HDLC SERPL-MCNC: 40 MG/DL (ref 40–60)
HGB BLD-MCNC: 9.9 G/DL (ref 12–16)
HGB UR QL STRIP: NEGATIVE
IMM GRANULOCYTES # BLD: 0.02 10*3/MM3 (ref 0–0.03)
IMM GRANULOCYTES NFR BLD: 0.2 % (ref 0–0.5)
KETONES UR QL STRIP: NEGATIVE
LDLC SERPL CALC-MCNC: 52 MG/DL (ref 0–100)
LDLC/HDLC SERPL: 1.31 {RATIO}
LEUKOCYTE ESTERASE UR QL STRIP: ABNORMAL
LYMPHOCYTES # BLD AUTO: 2.16 10*3/MM3 (ref 0.6–4.8)
LYMPHOCYTES NFR BLD AUTO: 25.7 % (ref 20–45)
MCH RBC QN AUTO: 29.4 PG (ref 27–31)
MCHC RBC AUTO-ENTMCNC: 31 G/DL (ref 31–37)
MCV RBC AUTO: 94.7 FL (ref 81–99)
MICRO URNS: ABNORMAL
MONOCYTES # BLD AUTO: 0.56 10*3/MM3 (ref 0–1)
MONOCYTES NFR BLD AUTO: 6.7 % (ref 3–8)
NEUTROPHILS # BLD AUTO: 5.28 10*3/MM3 (ref 1.5–8.3)
NEUTROPHILS NFR BLD AUTO: 62.9 % (ref 45–70)
NITRITE UR QL STRIP: NEGATIVE
NRBC BLD AUTO-RTO: 0 /100 WBC (ref 0–0)
PH UR STRIP: 5.5 [PH] (ref 5–7.5)
PLATELET # BLD AUTO: 184 10*3/MM3 (ref 140–500)
POTASSIUM SERPL-SCNC: 4.3 MMOL/L (ref 3.5–5.2)
PROT SERPL-MCNC: 6.2 G/DL (ref 6–8.5)
PROT UR QL STRIP: NEGATIVE
RBC # BLD AUTO: 3.37 10*6/MM3 (ref 4.2–5.4)
RBC #/AREA URNS HPF: ABNORMAL /HPF
SODIUM SERPL-SCNC: 143 MMOL/L (ref 136–145)
SP GR UR: 1.01 (ref 1–1.03)
TRIGL SERPL-MCNC: 139 MG/DL (ref 0–150)
URINALYSIS REFLEX: ABNORMAL
UROBILINOGEN UR STRIP-MCNC: 0.2 MG/DL (ref 0.2–1)
VLDLC SERPL CALC-MCNC: 27.8 MG/DL (ref 7–27)
WBC # BLD AUTO: 8.39 10*3/MM3 (ref 4.8–10.8)
WBC #/AREA URNS HPF: ABNORMAL /HPF

## 2017-12-22 ENCOUNTER — TELEPHONE (OUTPATIENT)
Dept: INTERNAL MEDICINE | Facility: CLINIC | Age: 62
End: 2017-12-22

## 2017-12-22 NOTE — TELEPHONE ENCOUNTER
----- Message from Jina Vaughan MD sent at 12/22/2017  7:45 AM EST -----  Reviewed with patient in clinic. Nothing further to do.

## 2018-01-11 RX ORDER — ERGOCALCIFEROL 1.25 MG/1
50000 CAPSULE ORAL
Qty: 12 CAPSULE | Refills: 3 | Status: SHIPPED | OUTPATIENT
Start: 2018-01-11 | End: 2019-02-18 | Stop reason: SDUPTHER

## 2018-03-02 RX ORDER — ATORVASTATIN CALCIUM 40 MG/1
TABLET, FILM COATED ORAL
Qty: 90 TABLET | Refills: 2 | OUTPATIENT
Start: 2018-03-02

## 2018-03-08 RX ORDER — METOPROLOL TARTRATE 50 MG/1
50 TABLET, FILM COATED ORAL EVERY 12 HOURS SCHEDULED
Qty: 180 TABLET | Refills: 1 | Status: SHIPPED | OUTPATIENT
Start: 2018-03-08 | End: 2018-09-06 | Stop reason: HOSPADM

## 2018-03-09 RX ORDER — QUETIAPINE FUMARATE 100 MG/1
100 TABLET, FILM COATED ORAL 2 TIMES DAILY
Qty: 180 TABLET | Refills: 0 | Status: SHIPPED | OUTPATIENT
Start: 2018-03-09 | End: 2018-06-11 | Stop reason: SDUPTHER

## 2018-03-12 ENCOUNTER — RESULTS ENCOUNTER (OUTPATIENT)
Dept: PAIN MEDICINE | Facility: CLINIC | Age: 63
End: 2018-03-12

## 2018-03-12 ENCOUNTER — OFFICE VISIT (OUTPATIENT)
Dept: PAIN MEDICINE | Facility: CLINIC | Age: 63
End: 2018-03-12

## 2018-03-12 VITALS
HEIGHT: 66 IN | RESPIRATION RATE: 18 BRPM | WEIGHT: 250 LBS | SYSTOLIC BLOOD PRESSURE: 155 MMHG | DIASTOLIC BLOOD PRESSURE: 80 MMHG | OXYGEN SATURATION: 91 % | BODY MASS INDEX: 40.18 KG/M2 | HEART RATE: 59 BPM

## 2018-03-12 DIAGNOSIS — M43.06 LUMBAR SPONDYLOLYSIS: ICD-10-CM

## 2018-03-12 DIAGNOSIS — G89.4 CHRONIC PAIN SYNDROME: Primary | ICD-10-CM

## 2018-03-12 DIAGNOSIS — G89.4 CHRONIC PAIN SYNDROME: ICD-10-CM

## 2018-03-12 DIAGNOSIS — G89.29 CHRONIC MIDLINE LOW BACK PAIN WITHOUT SCIATICA: ICD-10-CM

## 2018-03-12 DIAGNOSIS — M54.50 CHRONIC MIDLINE LOW BACK PAIN WITHOUT SCIATICA: ICD-10-CM

## 2018-03-12 DIAGNOSIS — Z79.899 ENCOUNTER FOR LONG-TERM (CURRENT) USE OF HIGH-RISK MEDICATION: ICD-10-CM

## 2018-03-12 PROCEDURE — 99213 OFFICE O/P EST LOW 20 MIN: CPT | Performed by: NURSE PRACTITIONER

## 2018-03-12 RX ORDER — HYDROCODONE BITARTRATE AND ACETAMINOPHEN 10; 325 MG/1; MG/1
2 TABLET ORAL 2 TIMES DAILY PRN
Qty: 120 TABLET | Refills: 0 | Status: SHIPPED | OUTPATIENT
Start: 2018-03-12 | End: 2018-04-11 | Stop reason: SDUPTHER

## 2018-03-12 NOTE — PROGRESS NOTES
CHIEF COMPLAINT  LBP is unchanged since last office visit.    Subjective   Jared Govea is a 62 y.o. female  who presents to the office for follow-up.She has a history of chronic back pain. Reports her pain is worse since being without medication, otherwise it was unchanged.    Complains of pain in her low back. Today her pain is 6/10VAS. Describes the pain as continuous and worse. Has been without pain medication since early January. She missed her appointment then and then was worried about her COPD.  Previously, she had been on Hydrocodone 10/325 2 BID. Denies any side effects from the regimen. The regimen helped decrease her pain by 40%. Has difficulty with ADL's by self due to COPD. Per report, patient has not been out of house since before [place].     Is due to see her pulmonologist.     Back Pain   This is a chronic problem. The current episode started more than 1 year ago. The problem occurs constantly. Progression since onset: unchanged since last office visit. The pain is present in the lumbar spine. The quality of the pain is described as aching. The pain does not radiate. The pain is at a severity of 6/10. The pain is moderate. The symptoms are aggravated by bending, standing and twisting. Stiffness is present in the morning, at night and all day. Associated symptoms include chest pain, headaches, numbness and weakness. Pertinent negatives include no abdominal pain, bladder incontinence, bowel incontinence, dysuria or fever. Risk factors include history of steroid use, poor posture and obesity. She has tried analgesics, bed rest, chiropractic manipulation, NSAIDs, walking, heat, ice and home exercises for the symptoms. The treatment provided mild relief.      PEG Assessment   What number best describes your pain on average in the past week?6  What number best describes how, during the past week, pain has interfered with your enjoyment of life?7  What number best describes how, during the past week,  "pain has interfered with your general activity?  7    The following portions of the patient's history were reviewed and updated as appropriate: allergies, current medications, past family history, past medical history, past social history, past surgical history and problem list.    Review of Systems   Constitutional: Positive for chills. Negative for fever.   Respiratory: Positive for shortness of breath.    Cardiovascular: Positive for chest pain.   Gastrointestinal: Negative for abdominal pain, bowel incontinence, constipation, diarrhea, nausea and vomiting.   Genitourinary: Negative for bladder incontinence, difficulty urinating, dyspareunia and dysuria.   Musculoskeletal: Positive for back pain.   Neurological: Positive for weakness, numbness and headaches. Negative for dizziness and light-headedness.   Psychiatric/Behavioral: Positive for sleep disturbance. Negative for confusion, hallucinations, self-injury and suicidal ideas. The patient is not nervous/anxious.        Vitals:    03/12/18 1408   BP: 155/80   Pulse: 59   Resp: 18   SpO2: 91%   Weight: 113 kg (250 lb)   Height: 167.6 cm (66\")   PainSc:   6   PainLoc: Back     Objective   Physical Exam   Constitutional: She is oriented to person, place, and time. Vital signs are normal. She appears well-developed and well-nourished.   Chronic ill appearance   HENT:   Head: Normocephalic and atraumatic.   NC O2 in place   Eyes: Conjunctivae are normal.   Neck: Neck supple.   Cardiovascular: Normal rate.    Pulmonary/Chest: Effort normal.   Using O2 per NC   Abdominal:   obese   Musculoskeletal:        Lumbar back: She exhibits decreased range of motion, tenderness, bony tenderness and pain. She exhibits no swelling, no edema and no laceration.   Neurological: She is alert and oriented to person, place, and time. Gait (in motorized scooter) abnormal.   Reflex Scores:       Patellar reflexes are 0 on the right side and 0 on the left side.  Skin: Skin is warm, dry " and intact.   Psychiatric: She has a normal mood and affect. Her speech is normal and behavior is normal. Judgment and thought content normal.   Nursing note and vitals reviewed.      Assessment/Plan   Jared was seen today for back pain.    Diagnoses and all orders for this visit:    Chronic pain syndrome  -     Oral Fluid Drug Screen - Saliva, Oral Cavity; Future    Lumbar spondylolysis  -     Oral Fluid Drug Screen - Saliva, Oral Cavity; Future    Chronic midline low back pain without sciatica  -     Oral Fluid Drug Screen - Saliva, Oral Cavity; Future  -     HYDROcodone-acetaminophen (NORCO)  MG per tablet; Take 2 tablets by mouth 2 (Two) Times a Day As Needed for Moderate Pain .    Encounter for long-term (current) use of high-risk medication      --- Routine oral drug screen in office today as part of monitoring requirements for controlled substances. Patient unable to void.  This specimen will be sent to [facility] laboratory for confirmation.     --- Re-start Hydrocodone. Patient appears stable with current regimen. No adverse effects. Regarding continuation of opioids, there is no evidence of aberrant behavior or any red flags.  The patient continues with appropriate response to opioid therapy. ADL's remain intact by self.   --- Update medication management agreement. A medication management agreement is signed by the patient and the provider today.  Reviewed with the patient that this contract is specific to controlled substances, specifically pain medication.  Reviewed core of pain medicine is to decrease pain and increase functional level.  Reviewed the medication must be picked up as a written prescription from this office and will not be called into any pharmacy.  Reviewed the use of [place] within the office setting.  Reviewed causes for potential of discontinuation of opiates,  including but not limited to consideration for diversion, obtaining other controlled substances from other license  practitioners, or  inappropriate office behavior.  Patient understands to use a controlled substance as prescribed by physician and to avoid improper use of controlled substances.  Patient will also verbalized understanding that prescriptions to be filled at the same pharmacy  unless office staff is made aware of this.  Patient understands they can be submitted to random urine drug screens and/or random pill counts on any request.  Patient understands they are not to receive early refills.  The patient must produce an official police report for any effort to replace controlled substances that are lost or stolen.  No use of illegal street drugs while receiving controlled substances from this prescribing provider.  The patient is to take medication as prescribed  and not deviate from the normal schedule without consultation from the provider.  Patient is not to share the medication with others or to take medication with alcohol or other sedatives.  Use caution when driving or operating machinery.  Alert office if the patient becomes pregnant or begins nursing a child.  Reviewed the use of controlled substances recreates a risk for respiratory depression, which may result in serious harm or even death.  Must always keep the prescription in the original container.  Patient is to store controlled substances in a locked cabinet or other secure storage unit that is cool, dry and out of sunlight.  Patient must immediately notify office if any controlled substances is stolen or improperly taken by another individual.  Reviewed risk for physical dependence, tolerance and addiction.  --- Follow-up 1 month or sooner if needed. Follow-up with Dr. Ledezma to determine SCS/appeal status/ long-term plan       JACOB REPORT    As part of the patient's treatment plan, I am prescribing controlled substances. The patient has been made aware of appropriate use of such medications, including potential risk of somnolence, limited ability  to drive and/or work safely, and the potential for dependence or overdose. It has also bee made clear that these medications are for use by this patient only, without concomitant use of alcohol or other substances unless prescribed.     Patient has completed prescribing agreement detailing terms of continued prescribing of controlled substances, including monitoring JACOB reports, urine drug screening, and pill counts if necessary. The patient is aware that inappropriate use will results in cessation of prescribing such medications.    JACOB report has been reviewed and scanned into the patient's chart.    Date of last JACOB : 3-8-18    History and physical exam exhibit continued safe and appropriate use of controlled substances.      EMR Dragon/Transcription disclaimer:   Much of this encounter note is an electronic transcription/translation of spoken language to printed text. The electronic translation of spoken language may permit erroneous, or at times, nonsensical words or phrases to be inadvertently transcribed; Although I have reviewed the note for such errors, some may still exist.

## 2018-03-13 DIAGNOSIS — E78.2 MIXED HYPERLIPIDEMIA: ICD-10-CM

## 2018-03-13 DIAGNOSIS — Z79.4 TYPE 2 DIABETES MELLITUS WITH STAGE 3 CHRONIC KIDNEY DISEASE, WITH LONG-TERM CURRENT USE OF INSULIN (HCC): Primary | ICD-10-CM

## 2018-03-13 DIAGNOSIS — E11.22 TYPE 2 DIABETES MELLITUS WITH STAGE 3 CHRONIC KIDNEY DISEASE, WITH LONG-TERM CURRENT USE OF INSULIN (HCC): Primary | ICD-10-CM

## 2018-03-13 DIAGNOSIS — D64.9 NORMOCYTIC ANEMIA: ICD-10-CM

## 2018-03-13 DIAGNOSIS — N18.4 CKD (CHRONIC KIDNEY DISEASE) STAGE 4, GFR 15-29 ML/MIN (HCC): ICD-10-CM

## 2018-03-13 DIAGNOSIS — E03.9 HYPOTHYROIDISM, ACQUIRED: ICD-10-CM

## 2018-03-13 DIAGNOSIS — I10 ESSENTIAL HYPERTENSION: ICD-10-CM

## 2018-03-13 DIAGNOSIS — N18.30 TYPE 2 DIABETES MELLITUS WITH STAGE 3 CHRONIC KIDNEY DISEASE, WITH LONG-TERM CURRENT USE OF INSULIN (HCC): Primary | ICD-10-CM

## 2018-03-15 RX ORDER — ATORVASTATIN CALCIUM 40 MG/1
TABLET, FILM COATED ORAL
Qty: 30 TABLET | Refills: 0 | Status: SHIPPED | OUTPATIENT
Start: 2018-03-15 | End: 2018-03-15 | Stop reason: SDUPTHER

## 2018-03-15 RX ORDER — ATORVASTATIN CALCIUM 40 MG/1
40 TABLET, FILM COATED ORAL DAILY
Qty: 90 TABLET | Refills: 1 | Status: SHIPPED | OUTPATIENT
Start: 2018-03-15 | End: 2018-10-15 | Stop reason: SDUPTHER

## 2018-04-02 ENCOUNTER — TELEPHONE (OUTPATIENT)
Dept: INTERNAL MEDICINE | Facility: CLINIC | Age: 63
End: 2018-04-02

## 2018-04-04 RX ORDER — OCTISALATE, AVOBENZONE, HOMOSALATE, AND OCTOCRYLENE 29.4; 29.4; 49; 25.48 MG/ML; MG/ML; MG/ML; MG/ML
4 LOTION TOPICAL DAILY
Qty: 30 CAPSULE | Refills: 6 | Status: SHIPPED | OUTPATIENT
Start: 2018-04-04 | End: 2020-08-14 | Stop reason: HOSPADM

## 2018-04-11 ENCOUNTER — OFFICE VISIT (OUTPATIENT)
Dept: PAIN MEDICINE | Facility: CLINIC | Age: 63
End: 2018-04-11

## 2018-04-11 VITALS
HEIGHT: 66 IN | DIASTOLIC BLOOD PRESSURE: 45 MMHG | HEART RATE: 64 BPM | SYSTOLIC BLOOD PRESSURE: 103 MMHG | RESPIRATION RATE: 16 BRPM | TEMPERATURE: 98 F | OXYGEN SATURATION: 91 %

## 2018-04-11 DIAGNOSIS — G89.4 CHRONIC PAIN SYNDROME: Primary | ICD-10-CM

## 2018-04-11 DIAGNOSIS — E11.42 DIABETIC PERIPHERAL NEUROPATHY (HCC): ICD-10-CM

## 2018-04-11 DIAGNOSIS — M54.50 CHRONIC MIDLINE LOW BACK PAIN WITHOUT SCIATICA: ICD-10-CM

## 2018-04-11 DIAGNOSIS — Z79.899 ENCOUNTER FOR LONG-TERM (CURRENT) USE OF HIGH-RISK MEDICATION: ICD-10-CM

## 2018-04-11 DIAGNOSIS — M43.06 LUMBAR SPONDYLOLYSIS: ICD-10-CM

## 2018-04-11 DIAGNOSIS — G89.29 CHRONIC MIDLINE LOW BACK PAIN WITHOUT SCIATICA: ICD-10-CM

## 2018-04-11 PROCEDURE — 99214 OFFICE O/P EST MOD 30 MIN: CPT | Performed by: ANESTHESIOLOGY

## 2018-04-11 RX ORDER — HYDROCODONE BITARTRATE AND ACETAMINOPHEN 10; 325 MG/1; MG/1
2 TABLET ORAL 2 TIMES DAILY PRN
Qty: 120 TABLET | Refills: 0 | Status: SHIPPED | OUTPATIENT
Start: 2018-04-11 | End: 2018-05-09 | Stop reason: SDUPTHER

## 2018-04-11 NOTE — PROGRESS NOTES
"CHIEF COMPLAINT  Pt waiting for appeal of SCS trial; LBP is basically unchanged since 3/12/18 office visit, continuing to be \"aggravating\".    Subjective   Jared Govea is a 62 y.o. female  who presents to the office for follow-up.She has a history of low back pain and also peripheral neuropathy.    We had sought authorization for SCS trialing for both issues.  She is not a neurosurgical candidate.  She was deemed appropriate by the pain psychologist.      Below is the letter of denial from [facility], signed by Dr. Garett Mabry.  Also below is the pain psychologist's discussion.   I wish to clarify and refute the denial letter's statements...  -- First, there is an implication that her pain is psychosomatic.  Let's be clear that she has spinal degeneration and also peripheral neuropathy.    -- Next, let it be observed that the pain psychologist did a thorough and nuanced assessment of this patient.   This psychologist is quick to rule out candidates who are not psychologically appropriate, and he sees about 300 patients from my practice alone per year.  -- The psychologist notes that she is \"generally free of psychopathology\" and also notes her reasonable expectations for SCS use.  He does note that she has been treated for anxiety.    -- I have reflected previously that she has continued to treat this and in fact is at a quite high level of improvement, perhaps maximum medical improvement for anxiety management.  She engages in counseling and also pastoral care.  -- The psychologist concludes by stating clearly \"The noted psychological factors should not prevent the patient from proceeding with SCS trial and implant.\"  Again, this is the exact quote and the excerpt from the document us below.  -- I must stress that this patient has excellent family support.       I must express concern that Dr. Mabry has denied this care to Ms. Govea out of a lack of clarity of information.   I think that in light of these findings that " Dr. Mabry should please reconsider it and approve the requested therapy.    If this cannot be done, then I respectfully ask for a peer review from a pain specialist if necessary.      Also of note, reflecting on the 2015 article in Neuromodulation from Luke Kelly et al, it is noted that the burst stimulation energy delivery patterns influence the medial pathways, activating the dorsal anterior cingulate cortex, and thereby have effect not just on the cognitive processing of painful stimuli, but also on the emotional aspects of pain processing.  She would be a great candidate for this !     We need the SCS therapy to help better manage her pain rather than becoming more dependent on opioid therapy in this sickly woman with multiple medical comorbidity.      Back Pain   This is a chronic problem. The current episode started more than 1 year ago. The problem occurs constantly. The problem has been gradually worsening (unchanged since last office visit) since onset. The pain is present in the lumbar spine. The quality of the pain is described as aching. The pain does not radiate. The pain is severe. The symptoms are aggravated by bending, standing and twisting. Stiffness is present in the morning, at night and all day. Associated symptoms include headaches, numbness, paresthesias, tingling and weakness. Pertinent negatives include no abdominal pain, bladder incontinence, bowel incontinence, chest pain, dysuria or fever. Risk factors include history of steroid use, poor posture and obesity. She has tried analgesics, bed rest, chiropractic manipulation, NSAIDs, walking, heat, ice and home exercises for the symptoms. The treatment provided mild relief.        PEG Assessment   What number best describes your pain on average in the past week?5  What number best describes how, during the past week, pain has interfered with your enjoyment of life?5  What number best describes how, during the past week, pain has interfered with  your general activity?  6                  This is the summary & discussion from the pain psychologist's 5-page note....      The following portions of the patient's history were reviewed and updated as appropriate: allergies, current medications, past family history, past medical history, past social history, past surgical history and problem list.    -------    The following portions of the patient's history were reviewed and updated as appropriate: allergies, current medications, past family history, past medical history, past social history, past surgical history and problem list.    Allergies   Allergen Reactions   • Erythromycin Nausea And Vomiting   • Erythromycin Ethylsuccinate Nausea And Vomiting   • Ativan [Lorazepam] Hallucinations   • Tape Rash     Paper tape           Current Outpatient Prescriptions:   •  albuterol (PROVENTIL HFA;VENTOLIN HFA) 108 (90 Base) MCG/ACT inhaler, Inhale 2 puffs Every 4 (Four) Hours As Needed for Wheezing., Disp: 1 inhaler, Rfl: 6  •  aspirin 81 MG EC tablet, Take 81 mg by mouth daily., Disp: , Rfl:   •  atorvastatin (LIPITOR) 40 MG tablet, Take 1 tablet by mouth Daily. for cholesterol., Disp: 90 tablet, Rfl: 1  •  B Complex Vitamins (VITAMIN B COMPLEX PO), Take 1 tablet by mouth daily., Disp: , Rfl:   •  bumetanide (BUMEX) 2 MG tablet, Take 1 tablet by mouth 2 (Two) Times a Day. (Patient taking differently: Take 2 mg by mouth Daily.), Disp: 60 tablet, Rfl: 0  •  cetirizine (ZyrTEC) 10 MG tablet, Take 10 mg by mouth every night., Disp: , Rfl:   •  clotrimazole-betamethasone (LOTRISONE) 1-0.05 % cream, Apply  topically 2 (Two) Times a Day., Disp: 180 g, Rfl: 3  •  dimenhyDRINATE (DRAMAMINE) 50 MG tablet, Take 1 tablet by mouth Daily As Needed for movement disorders., Disp: 30 tablet, Rfl: 0  •  famotidine (PEPCID) 20 MG tablet, Take 20 mg by mouth daily., Disp: , Rfl:   •  fluticasone-salmeterol (ADVAIR DISKUS) 250-50 MCG/DOSE DISKUS, Inhale 1 puff 2 (Two) Times a Day. For  "COPD and rinse mouth after use, Disp: 60 each, Rfl: 5  •  Gauze Pads & Dressings (ABDOMINAL PAD) 8\"X10\" pads, 1 pad Daily., Disp: 50 each, Rfl: 1  •  guaiFENesin (MUCINEX) 600 MG 12 hr tablet, Take 1 tablet by mouth 2 (two) times a day., Disp: , Rfl:   •  HYDROcodone-acetaminophen (NORCO)  MG per tablet, Take 2 tablets by mouth 2 (Two) Times a Day As Needed for Moderate Pain ., Disp: 120 tablet, Rfl: 0  •  INCRUSE ELLIPTA 62.5 MCG/INH aerosol powder , Inhale Daily., Disp: , Rfl:   •  insulin aspart (NOVOLOG FLEXPEN) 100 UNIT/ML solution pen-injector sc pen, SSI, Disp: 15 mL, Rfl: 3  •  Insulin Degludec (TRESIBA FLEXTOUCH) 200 UNIT/ML solution pen-injector, Inject 70 Units under the skin Daily., Disp: 15 mL, Rfl: 3  •  Insulin Pen Needle (BD PEN NEEDLE CLAY U/F) 32G X 4 MM misc, Use as directed 7 times daily, Disp: 250 each, Rfl: 5  •  levalbuterol (XOPENEX) 0.63 MG/3ML nebulizer solution, Take 1 ampule by nebulization every 4 (four) hours as needed for wheezing., Disp: , Rfl:   •  levothyroxine (SYNTHROID) 112 MCG tablet, Take 2 tablets by mouth Daily., Disp: 60 tablet, Rfl: 5  •  metoprolol tartrate (LOPRESSOR) 50 MG tablet, Take 1 tablet by mouth Every 12 (Twelve) Hours. For heart, Disp: 180 tablet, Rfl: 1  •  Probiotic Product (ALIGN) 4 MG capsule, Take 4 mg by mouth Daily., Disp: 30 capsule, Rfl: 6  •  QUEtiapine (SEROquel) 100 MG tablet, Take 1 tablet by mouth 2 (Two) Times a Day., Disp: 180 tablet, Rfl: 0  •  trimethoprim-polymyxin b (POLYTRIM) 67512-3.1 UNIT/ML-% ophthalmic solution, Administer 1 drop to both eyes Every 4 (Four) Hours., Disp: 1 each, Rfl: 0  •  vitamin D (ERGOCALCIFEROL) 60837 units capsule capsule, Take 1 capsule by mouth Every 7 (Seven) Days. On tuesday, Disp: 12 capsule, Rfl: 3    Current Outpatient Prescriptions on File Prior to Visit   Medication Sig Dispense Refill   • albuterol (PROVENTIL HFA;VENTOLIN HFA) 108 (90 Base) MCG/ACT inhaler Inhale 2 puffs Every 4 (Four) Hours As " "Needed for Wheezing. 1 inhaler 6   • aspirin 81 MG EC tablet Take 81 mg by mouth daily.     • atorvastatin (LIPITOR) 40 MG tablet Take 1 tablet by mouth Daily. for cholesterol. 90 tablet 1   • B Complex Vitamins (VITAMIN B COMPLEX PO) Take 1 tablet by mouth daily.     • bumetanide (BUMEX) 2 MG tablet Take 1 tablet by mouth 2 (Two) Times a Day. (Patient taking differently: Take 2 mg by mouth Daily.) 60 tablet 0   • cetirizine (ZyrTEC) 10 MG tablet Take 10 mg by mouth every night.     • clotrimazole-betamethasone (LOTRISONE) 1-0.05 % cream Apply  topically 2 (Two) Times a Day. 180 g 3   • dimenhyDRINATE (DRAMAMINE) 50 MG tablet Take 1 tablet by mouth Daily As Needed for movement disorders. 30 tablet 0   • famotidine (PEPCID) 20 MG tablet Take 20 mg by mouth daily.     • fluticasone-salmeterol (ADVAIR DISKUS) 250-50 MCG/DOSE DISKUS Inhale 1 puff 2 (Two) Times a Day. For COPD and rinse mouth after use 60 each 5   • Gauze Pads & Dressings (ABDOMINAL PAD) 8\"X10\" pads 1 pad Daily. 50 each 1   • guaiFENesin (MUCINEX) 600 MG 12 hr tablet Take 1 tablet by mouth 2 (two) times a day.     • INCRUSE ELLIPTA 62.5 MCG/INH aerosol powder  Inhale Daily.     • insulin aspart (NOVOLOG FLEXPEN) 100 UNIT/ML solution pen-injector sc pen SSI 15 mL 3   • Insulin Degludec (TRESIBA FLEXTOUCH) 200 UNIT/ML solution pen-injector Inject 70 Units under the skin Daily. 15 mL 3   • Insulin Pen Needle (BD PEN NEEDLE CLAY U/F) 32G X 4 MM misc Use as directed 7 times daily 250 each 5   • levalbuterol (XOPENEX) 0.63 MG/3ML nebulizer solution Take 1 ampule by nebulization every 4 (four) hours as needed for wheezing.     • levothyroxine (SYNTHROID) 112 MCG tablet Take 2 tablets by mouth Daily. 60 tablet 5   • metoprolol tartrate (LOPRESSOR) 50 MG tablet Take 1 tablet by mouth Every 12 (Twelve) Hours. For heart 180 tablet 1   • Probiotic Product (ALIGN) 4 MG capsule Take 4 mg by mouth Daily. 30 capsule 6   • QUEtiapine (SEROquel) 100 MG tablet Take 1 " tablet by mouth 2 (Two) Times a Day. 180 tablet 0   • trimethoprim-polymyxin b (POLYTRIM) 33044-8.1 UNIT/ML-% ophthalmic solution Administer 1 drop to both eyes Every 4 (Four) Hours. 1 each 0   • vitamin D (ERGOCALCIFEROL) 33279 units capsule capsule Take 1 capsule by mouth Every 7 (Seven) Days. On tuesday 12 capsule 3     No current facility-administered medications on file prior to visit.        Patient Active Problem List   Diagnosis   • SERENA (generalized anxiety disorder)   • Chronic back pain   • Chronic diastolic congestive heart failure   • COPD (chronic obstructive pulmonary disease)   • Depression   • Type 2 diabetes mellitus with stage 3 chronic kidney disease, with long-term current use of insulin   • Venous stasis dermatitis of both lower extremities   • HLD (hyperlipidemia)   • Essential hypertension   • Hypothyroidism, acquired   • Osteoporosis   • Primary insomnia   • LAD (lymphadenopathy), mediastinal   • Lung nodule, solitary   • Diabetic peripheral neuropathy   • CKD (chronic kidney disease) stage 4, GFR 15-29 ml/min   • Chronic pain syndrome   • Lumbar spondylolysis   • Normocytic anemia   • Candidal skin infection   • Wound, open, toe   • Pleural scarring   • Osteopetrosis   • Swelling of lower extremity   • Panniculitis   • Fatigue   • Encounter for long-term (current) use of high-risk medication       Past Medical History:   Diagnosis Date   • Anxiety    • Arthritis    • CHF (congestive heart failure)    • Chronic back pain    • COPD (chronic obstructive pulmonary disease)    • Depression    • Essential hypertension    • SERENA (generalized anxiety disorder)    • GERD (gastroesophageal reflux disease)    • Headache    • HLD (hyperlipidemia)    • Hypothyroidism, acquired    • Low back pain    • Neuropathy in diabetes    • Sleep apnea    • Type 2 diabetes mellitus    • Wound, open, toe 8/22/2017       Past Surgical History:   Procedure Laterality Date   • LUNG SURGERY Right    • TRACHEOSTOMY    "      Family History   Problem Relation Age of Onset   • Cancer Mother      bone   • Cancer Father    • Cancer Brother        Social History     Social History   • Marital status:      Spouse name: N/A   • Number of children: N/A   • Years of education: N/A     Occupational History   • Not on file.     Social History Main Topics   • Smoking status: Former Smoker     Packs/day: 0.50     Years: 40.00     Types: Cigarettes     Quit date: 10/4/2010   • Smokeless tobacco: Former User     Quit date: 8/3/2012   • Alcohol use No   • Drug use: No   • Sexual activity: Defer     Other Topics Concern   • Not on file     Social History Narrative   • No narrative on file       -------        Review of Systems   Constitutional: Positive for chills. Negative for activity change, appetite change and fever.   Respiratory: Positive for apnea and shortness of breath (02 @ 4L/M).    Cardiovascular: Negative for chest pain.   Gastrointestinal: Negative for abdominal pain, bowel incontinence, constipation, diarrhea, nausea and vomiting.   Genitourinary: Negative for bladder incontinence, difficulty urinating, dyspareunia and dysuria.   Musculoskeletal: Positive for back pain.   Neurological: Positive for tingling, weakness, numbness, headaches and paresthesias. Negative for dizziness and light-headedness (occasional).   Psychiatric/Behavioral: Positive for sleep disturbance. Negative for confusion, hallucinations and self-injury. The patient is not nervous/anxious.          Vitals:    04/11/18 1336   BP: 103/45   Pulse: 64   Resp: 16   Temp: 98 °F (36.7 °C)   SpO2: 91%   Height: 167.6 cm (65.98\")   PainSc: 6  Comment: R sided LBP ranges from 4-7/10   PainLoc: Back         Objective   Physical Exam   Constitutional: She is oriented to person, place, and time. Vital signs are normal. She appears well-developed and well-nourished.   Chronic ill appearance   HENT:   Head: Normocephalic and atraumatic.   NC O2 in place   Eyes: " Conjunctivae are normal.   Pulmonary/Chest: Effort normal.   Using O2 per NC   Abdominal:   obese   Musculoskeletal:        Lumbar back: She exhibits decreased range of motion, tenderness, bony tenderness and pain. She exhibits no swelling, no edema and no laceration.        Right foot: There is tenderness.        Left foot: There is tenderness.   Neurological: She is alert and oriented to person, place, and time. She displays atrophy. Gait (in motorized scooter) abnormal.   Reflex Scores:       Patellar reflexes are 0 on the right side and 0 on the left side.       Achilles reflexes are 0 on the right side and 0 on the left side.  Skin: Skin is dry and intact.   Psychiatric: She has a normal mood and affect. Her speech is normal and behavior is normal. Judgment and thought content normal.   Nursing note and vitals reviewed.          Assessment/Plan   Jared was seen today for back pain.    Diagnoses and all orders for this visit:    Chronic pain syndrome    Encounter for long-term (current) use of high-risk medication    Diabetic peripheral neuropathy  -     Case Request    Lumbar spondylolysis  -     Case Request    Chronic midline low back pain without sciatica  -     HYDROcodone-acetaminophen (NORCO)  MG per tablet; Take 2 tablets by mouth 2 (Two) Times a Day As Needed for Moderate Pain .      --- we will resubmit request for SCS trialing with [facility]    --- Follow-up 2 months       Patient appears stable with current regimen. No adverse effects. Regarding continuation of opioids, there is no evidence of aberrant behavior or any red flags.  The patient continues with appropriate response to opioid therapy. ADL's remain intact by self.     she does have a significant history of chronic pain and demonstrates moderate improvement with multimodal therapy including opioid therapy, which allows her to engage in ADLs and family activities. The continuation of opioid therapy is therefore not contraindicated. We will  however respect the March 2016 CDC Guidelines and will plan to avoid overexposure to opioids and avoid dose escalation.          JACOB REPORT    As part of the patient's treatment plan, I am prescribing controlled substances. The patient has been made aware of appropriate use of such medications, including potential risk of somnolence, limited ability to drive and/or work safely, and the potential for dependence or overdose. It has also bee made clear that these medications are for use by this patient only, without concomitant use of alcohol or other substances unless prescribed.     Patient has completed prescribing agreement detailing terms of continued prescribing of controlled substances, including monitoring JACOB reports, urine drug screening, and pill counts if necessary. The patient is aware that inappropriate use will results in cessation of prescribing such medications.    JACOB report has been reviewed and scanned into the patient's chart.    Date of last JACOB : as above    History and physical exam exhibit continued safe and appropriate use of controlled substances.      EMR Dragon/Transcription disclaimer:   Much of this encounter note is an electronic transcription/translation of spoken language to printed text. The electronic translation of spoken language may permit erroneous, or at times, nonsensical words or phrases to be inadvertently transcribed; Although I have reviewed the note for such errors, some may still exist.

## 2018-05-07 ENCOUNTER — TRANSCRIBE ORDERS (OUTPATIENT)
Dept: ADMINISTRATIVE | Facility: HOSPITAL | Age: 63
End: 2018-05-07

## 2018-05-07 DIAGNOSIS — J98.4 RESTRICTIVE LUNG DISEASE: Primary | ICD-10-CM

## 2018-05-09 ENCOUNTER — RESULTS ENCOUNTER (OUTPATIENT)
Dept: PAIN MEDICINE | Facility: CLINIC | Age: 63
End: 2018-05-09

## 2018-05-09 ENCOUNTER — OFFICE VISIT (OUTPATIENT)
Dept: PAIN MEDICINE | Facility: CLINIC | Age: 63
End: 2018-05-09

## 2018-05-09 VITALS
HEIGHT: 66 IN | OXYGEN SATURATION: 91 % | WEIGHT: 251 LBS | TEMPERATURE: 97.8 F | DIASTOLIC BLOOD PRESSURE: 71 MMHG | SYSTOLIC BLOOD PRESSURE: 156 MMHG | RESPIRATION RATE: 18 BRPM | BODY MASS INDEX: 40.34 KG/M2 | HEART RATE: 59 BPM

## 2018-05-09 DIAGNOSIS — Z79.899 ENCOUNTER FOR LONG-TERM (CURRENT) USE OF HIGH-RISK MEDICATION: ICD-10-CM

## 2018-05-09 DIAGNOSIS — G89.29 OTHER CHRONIC PAIN: ICD-10-CM

## 2018-05-09 DIAGNOSIS — E11.42 DIABETIC PERIPHERAL NEUROPATHY (HCC): ICD-10-CM

## 2018-05-09 DIAGNOSIS — M43.06 LUMBAR SPONDYLOLYSIS: ICD-10-CM

## 2018-05-09 DIAGNOSIS — G89.29 CHRONIC MIDLINE LOW BACK PAIN WITHOUT SCIATICA: ICD-10-CM

## 2018-05-09 DIAGNOSIS — M54.50 CHRONIC MIDLINE LOW BACK PAIN WITHOUT SCIATICA: ICD-10-CM

## 2018-05-09 DIAGNOSIS — Z79.899 ENCOUNTER FOR LONG-TERM (CURRENT) USE OF HIGH-RISK MEDICATION: Primary | ICD-10-CM

## 2018-05-09 PROCEDURE — 99213 OFFICE O/P EST LOW 20 MIN: CPT | Performed by: ANESTHESIOLOGY

## 2018-05-09 RX ORDER — HYDROCODONE BITARTRATE AND ACETAMINOPHEN 10; 325 MG/1; MG/1
2 TABLET ORAL 2 TIMES DAILY PRN
Qty: 120 TABLET | Refills: 0 | Status: SHIPPED | OUTPATIENT
Start: 2018-05-09 | End: 2018-05-24 | Stop reason: HOSPADM

## 2018-05-09 NOTE — PROGRESS NOTES
CHIEF COMPLAINT  Back pain is unchanged since last office visit.    Subjective   Jared Govea is a 62 y.o. female  who presents to the office for follow-up.She has a history of back pain.      Back Pain   This is a chronic problem. The current episode started more than 1 year ago. The problem occurs constantly. The problem is unchanged. The pain is present in the lumbar spine. The quality of the pain is described as aching. The pain does not radiate. The pain is severe. The symptoms are aggravated by bending, standing and twisting. Stiffness is present in the morning, at night and all day. Associated symptoms include paresthesias, tingling and weakness. Pertinent negatives include no abdominal pain, bladder incontinence, bowel incontinence, headaches or numbness. Risk factors include history of steroid use, poor posture, obesity and sedentary lifestyle (she is quite limited by her pulmonary pathology and her pain). She has tried analgesics, bed rest, chiropractic manipulation, NSAIDs, walking, heat, ice and home exercises for the symptoms. The treatment provided mild (we are hoping to trial SCS therapy) relief.        PEG Assessment   What number best describes your pain on average in the past week?7  What number best describes how, during the past week, pain has interfered with your enjoyment of life?8  What number best describes how, during the past week, pain has interfered with your general activity?  7      The following portions of the patient's history were reviewed and updated as appropriate: allergies, current medications, past family history, past medical history, past social history, past surgical history and problem list.    Review of Systems   Constitutional: Negative for chills.   Respiratory: Negative for cough.    Gastrointestinal: Negative for abdominal pain, bowel incontinence, constipation, diarrhea, nausea and vomiting.   Genitourinary: Negative for bladder incontinence, difficulty urinating and  "dyspareunia.   Musculoskeletal: Positive for back pain.   Neurological: Positive for tingling, weakness and paresthesias. Negative for dizziness, light-headedness, numbness and headaches.   Psychiatric/Behavioral: Negative for confusion, hallucinations, self-injury, sleep disturbance and suicidal ideas. The patient is not nervous/anxious.                Vitals:    05/09/18 1331   BP: 156/71   Pulse: 59   Resp: 18   Temp: 97.8 °F (36.6 °C)   SpO2: 91%   Weight: 114 kg (251 lb)   Height: 167.6 cm (66\")   PainSc:   6   PainLoc: Back         Objective   Physical Exam   Constitutional: She is oriented to person, place, and time. Vital signs are normal. She appears well-developed and well-nourished.  Non-toxic appearance. She has a sickly appearance.   Chronic ill appearance   HENT:   Head: Normocephalic and atraumatic.   Nasal O2 in place   Pulmonary/Chest: Effort normal. No respiratory distress.   Using O2 per NC   Abdominal:   obese   Musculoskeletal:        Lumbar back: She exhibits decreased range of motion, tenderness, bony tenderness and pain. She exhibits no spasm.   Neurological: She is alert and oriented to person, place, and time. She displays atrophy. Gait (in motorized scooter) abnormal.   Reflex Scores:       Patellar reflexes are 0 on the right side and 0 on the left side.       Achilles reflexes are 0 on the right side and 0 on the left side.  Skin: Skin is dry and intact.   Psychiatric: She has a normal mood and affect. Her speech is normal. Thought content normal.   Nursing note and vitals reviewed.          Assessment/Plan   Jared was seen today for back pain.    Diagnoses and all orders for this visit:    Encounter for long-term (current) use of high-risk medication  -     Oral Fluid Drug Screen - Saliva, Oral Cavity; Future    Chronic midline low back pain without sciatica  -     HYDROcodone-acetaminophen (NORCO)  MG per tablet; Take 2 tablets by mouth 2 (Two) Times a Day As Needed for Moderate " Pain .  -     Oral Fluid Drug Screen - Saliva, Oral Cavity; Future    Other chronic pain  -     Oral Fluid Drug Screen - Saliva, Oral Cavity; Future    Lumbar spondylolysis  -     Oral Fluid Drug Screen - Saliva, Oral Cavity; Future    Diabetic peripheral neuropathy  -     Oral Fluid Drug Screen - Saliva, Oral Cavity; Future        --- Follow-up monthly  --- repeat an oral drug screen today... The last one as above was appropriately negative as she was out of medication and this also is c/w the [facility].... So we should check another to monitor compliance       Patient appears stable with current regimen. No adverse effects. Regarding continuation of opioids, there is no evidence of aberrant behavior or any red flags.  The patient continues with appropriate response to opioid therapy. ADL's remain intact by self.     she does have a significant history of debilitating pain with observable pathology and demonstrates moderate improvement with multimodal therapy including opioid therapy, which allows her to engage in ADLs and family activities. The continuation of opioid therapy is therefore not contraindicated. We will however respect the March 2016 CDC Guidelines and will plan to avoid overexposure to opioids and avoid dose escalation.          JACOB REPORT    As part of the patient's treatment plan, I am prescribing controlled substances. The patient has been made aware of appropriate use of such medications, including potential risk of somnolence, limited ability to drive and/or work safely, and the potential for dependence or overdose. It has also bee made clear that these medications are for use by this patient only, without concomitant use of alcohol or other substances unless prescribed.     Patient has completed prescribing agreement detailing terms of continued prescribing of controlled substances, including monitoring JACOB reports, urine drug screening, and pill counts if necessary. The patient is aware  that inappropriate use will results in cessation of prescribing such medications.    JACOB report has been reviewed and scanned into the patient's chart.    Date of last JACOB : as above    History and physical exam exhibit continued safe and appropriate use of controlled substances.      EMR Dragon/Transcription disclaimer:   Much of this encounter note is an electronic transcription/translation of spoken language to printed text. The electronic translation of spoken language may permit erroneous, or at times, nonsensical words or phrases to be inadvertently transcribed; Although I have reviewed the note for such errors, some may still exist.

## 2018-05-16 ENCOUNTER — TELEPHONE (OUTPATIENT)
Dept: INTERNAL MEDICINE | Facility: CLINIC | Age: 63
End: 2018-05-16

## 2018-05-16 DIAGNOSIS — E03.9 HYPOTHYROIDISM, ACQUIRED: ICD-10-CM

## 2018-05-16 DIAGNOSIS — I50.32 CHRONIC DIASTOLIC CONGESTIVE HEART FAILURE (HCC): ICD-10-CM

## 2018-05-16 DIAGNOSIS — N18.30 TYPE 2 DIABETES MELLITUS WITH STAGE 3 CHRONIC KIDNEY DISEASE, WITH LONG-TERM CURRENT USE OF INSULIN (HCC): Primary | ICD-10-CM

## 2018-05-16 DIAGNOSIS — E11.22 TYPE 2 DIABETES MELLITUS WITH STAGE 3 CHRONIC KIDNEY DISEASE, WITH LONG-TERM CURRENT USE OF INSULIN (HCC): Primary | ICD-10-CM

## 2018-05-16 DIAGNOSIS — E78.2 MIXED HYPERLIPIDEMIA: ICD-10-CM

## 2018-05-16 DIAGNOSIS — D64.9 NORMOCYTIC ANEMIA: ICD-10-CM

## 2018-05-16 DIAGNOSIS — Z79.4 TYPE 2 DIABETES MELLITUS WITH STAGE 3 CHRONIC KIDNEY DISEASE, WITH LONG-TERM CURRENT USE OF INSULIN (HCC): Primary | ICD-10-CM

## 2018-05-16 DIAGNOSIS — I10 ESSENTIAL HYPERTENSION: ICD-10-CM

## 2018-05-16 DIAGNOSIS — N18.4 CKD (CHRONIC KIDNEY DISEASE) STAGE 4, GFR 15-29 ML/MIN (HCC): ICD-10-CM

## 2018-05-16 NOTE — TELEPHONE ENCOUNTER
Sania scheduled patient for lab and f/u. Orders placed for lab work.        ----- Message from Jina Vaughan MD sent at 5/16/2018  7:45 AM EDT -----  Patient needs to schedule follow up with me in the next 3-4 weeks. She cancelled her last lab appt and follow up. Labs one week prior. Needs CBC, CMP, UA with reflex, mircroalbumin if due, HgA1c and TSH and FT4.     ----- Message -----  From: Vargas Ledezma MD  Sent: 5/15/2018  10:42 PM  To: Jhonny Hoyt MD, Konstantin Orr MD, #

## 2018-05-22 ENCOUNTER — APPOINTMENT (OUTPATIENT)
Dept: GENERAL RADIOLOGY | Facility: HOSPITAL | Age: 63
End: 2018-05-22

## 2018-05-22 ENCOUNTER — HOSPITAL ENCOUNTER (OUTPATIENT)
Facility: HOSPITAL | Age: 63
Setting detail: OBSERVATION
Discharge: HOME | End: 2018-05-24
Attending: EMERGENCY MEDICINE | Admitting: HOSPITALIST

## 2018-05-22 DIAGNOSIS — J44.1 COPD WITH ACUTE EXACERBATION (HCC): Primary | ICD-10-CM

## 2018-05-22 DIAGNOSIS — E11.22 TYPE 2 DIABETES MELLITUS WITH STAGE 3 CHRONIC KIDNEY DISEASE, WITH LONG-TERM CURRENT USE OF INSULIN (HCC): ICD-10-CM

## 2018-05-22 DIAGNOSIS — N18.30 TYPE 2 DIABETES MELLITUS WITH STAGE 3 CHRONIC KIDNEY DISEASE, WITH LONG-TERM CURRENT USE OF INSULIN (HCC): ICD-10-CM

## 2018-05-22 DIAGNOSIS — Z79.4 TYPE 2 DIABETES MELLITUS WITH STAGE 3 CHRONIC KIDNEY DISEASE, WITH LONG-TERM CURRENT USE OF INSULIN (HCC): ICD-10-CM

## 2018-05-22 DIAGNOSIS — R77.8 ELEVATED TROPONIN: ICD-10-CM

## 2018-05-22 DIAGNOSIS — N39.0 ACUTE UTI: ICD-10-CM

## 2018-05-22 LAB
ALBUMIN SERPL-MCNC: 3.8 G/DL (ref 3.5–5.2)
ALBUMIN/GLOB SERPL: 1.3 G/DL
ALP SERPL-CCNC: 94 U/L (ref 40–129)
ALT SERPL W P-5'-P-CCNC: 16 U/L (ref 5–33)
ANION GAP SERPL CALCULATED.3IONS-SCNC: 10.4 MMOL/L
ANION GAP SERPL CALCULATED.3IONS-SCNC: 8.3 MMOL/L
AST SERPL-CCNC: 35 U/L (ref 5–32)
BACTERIA UR QL AUTO: ABNORMAL /HPF
BASOPHILS # BLD AUTO: 0.04 10*3/MM3 (ref 0–0.2)
BASOPHILS NFR BLD AUTO: 0.5 % (ref 0–2)
BILIRUB SERPL-MCNC: 0.3 MG/DL (ref 0.2–1.2)
BILIRUB UR QL STRIP: NEGATIVE
BUN BLD-MCNC: 45 MG/DL (ref 8–23)
BUN BLD-MCNC: 45 MG/DL (ref 8–23)
BUN/CREAT SERPL: 17.6 (ref 7–25)
BUN/CREAT SERPL: 19.8 (ref 7–25)
CALCIUM SPEC-SCNC: 8.5 MG/DL (ref 8.8–10.5)
CALCIUM SPEC-SCNC: 9 MG/DL (ref 8.8–10.5)
CHLORIDE SERPL-SCNC: 97 MMOL/L (ref 98–107)
CHLORIDE SERPL-SCNC: 98 MMOL/L (ref 98–107)
CLARITY UR: CLEAR
CO2 SERPL-SCNC: 33.6 MMOL/L (ref 22–29)
CO2 SERPL-SCNC: 34.7 MMOL/L (ref 22–29)
COLOR UR: YELLOW
CREAT BLD-MCNC: 2.27 MG/DL (ref 0.57–1)
CREAT BLD-MCNC: 2.55 MG/DL (ref 0.57–1)
DEPRECATED RDW RBC AUTO: 46.2 FL (ref 37–54)
EOSINOPHIL # BLD AUTO: 0.43 10*3/MM3 (ref 0.1–0.3)
EOSINOPHIL NFR BLD AUTO: 5.7 % (ref 0–4)
ERYTHROCYTE [DISTWIDTH] IN BLOOD BY AUTOMATED COUNT: 12.9 % (ref 11.5–14.5)
GFR SERPL CREATININE-BSD FRML MDRD: 19 ML/MIN/1.73
GFR SERPL CREATININE-BSD FRML MDRD: 22 ML/MIN/1.73
GLOBULIN UR ELPH-MCNC: 2.9 GM/DL
GLUCOSE BLD-MCNC: 155 MG/DL (ref 65–99)
GLUCOSE BLD-MCNC: 56 MG/DL (ref 65–99)
GLUCOSE BLDC GLUCOMTR-MCNC: 59 MG/DL (ref 70–130)
GLUCOSE BLDC GLUCOMTR-MCNC: 64 MG/DL (ref 70–130)
GLUCOSE BLDC GLUCOMTR-MCNC: 66 MG/DL (ref 70–130)
GLUCOSE BLDC GLUCOMTR-MCNC: 98 MG/DL (ref 70–130)
GLUCOSE UR STRIP-MCNC: NEGATIVE MG/DL
HCT VFR BLD AUTO: 35.9 % (ref 37–47)
HGB BLD-MCNC: 11.1 G/DL (ref 12–16)
HGB UR QL STRIP.AUTO: ABNORMAL
HOLD SPECIMEN: NORMAL
HOLD SPECIMEN: NORMAL
HYALINE CASTS UR QL AUTO: ABNORMAL /LPF
IMM GRANULOCYTES # BLD: 0.03 10*3/MM3 (ref 0–0.03)
IMM GRANULOCYTES NFR BLD: 0.4 % (ref 0–0.5)
KETONES UR QL STRIP: NEGATIVE
LEUKOCYTE ESTERASE UR QL STRIP.AUTO: ABNORMAL
LYMPHOCYTES # BLD AUTO: 1.52 10*3/MM3 (ref 0.6–4.8)
LYMPHOCYTES NFR BLD AUTO: 20 % (ref 20–45)
MCH RBC QN AUTO: 30.2 PG (ref 27–31)
MCHC RBC AUTO-ENTMCNC: 30.9 G/DL (ref 31–37)
MCV RBC AUTO: 97.8 FL (ref 81–99)
MONOCYTES # BLD AUTO: 0.51 10*3/MM3 (ref 0–1)
MONOCYTES NFR BLD AUTO: 6.7 % (ref 3–8)
NEUTROPHILS # BLD AUTO: 5.08 10*3/MM3 (ref 1.5–8.3)
NEUTROPHILS NFR BLD AUTO: 66.7 % (ref 45–70)
NITRITE UR QL STRIP: NEGATIVE
NRBC BLD MANUAL-RTO: 0 /100 WBC (ref 0–0)
NT-PROBNP SERPL-MCNC: 79.8 PG/ML (ref 5–125)
PH UR STRIP.AUTO: 5.5 [PH] (ref 4.5–8)
PLATELET # BLD AUTO: 154 10*3/MM3 (ref 140–500)
PMV BLD AUTO: 10.5 FL (ref 7.4–10.4)
POTASSIUM BLD-SCNC: 3.9 MMOL/L (ref 3.5–5.2)
POTASSIUM BLD-SCNC: 4.6 MMOL/L (ref 3.5–5.2)
PROT SERPL-MCNC: 6.7 G/DL (ref 6–8.5)
PROT UR QL STRIP: NEGATIVE
RBC # BLD AUTO: 3.67 10*6/MM3 (ref 4.2–5.4)
RBC # UR: ABNORMAL /HPF
REF LAB TEST METHOD: ABNORMAL
SODIUM BLD-SCNC: 141 MMOL/L (ref 136–145)
SODIUM BLD-SCNC: 141 MMOL/L (ref 136–145)
SP GR UR STRIP: 1.01 (ref 1–1.03)
SQUAMOUS #/AREA URNS HPF: ABNORMAL /HPF
TROPONIN T SERPL-MCNC: 0.17 NG/ML (ref 0–0.03)
UROBILINOGEN UR QL STRIP: ABNORMAL
WBC CLUMPS # UR AUTO: ABNORMAL /HPF
WBC NRBC COR # BLD: 7.61 10*3/MM3 (ref 4.8–10.8)
WBC UR QL AUTO: ABNORMAL /HPF
WHOLE BLOOD HOLD SPECIMEN: NORMAL
WHOLE BLOOD HOLD SPECIMEN: NORMAL

## 2018-05-22 PROCEDURE — 84484 ASSAY OF TROPONIN QUANT: CPT | Performed by: EMERGENCY MEDICINE

## 2018-05-22 PROCEDURE — G0378 HOSPITAL OBSERVATION PER HR: HCPCS

## 2018-05-22 PROCEDURE — 87086 URINE CULTURE/COLONY COUNT: CPT | Performed by: EMERGENCY MEDICINE

## 2018-05-22 PROCEDURE — 80053 COMPREHEN METABOLIC PANEL: CPT | Performed by: EMERGENCY MEDICINE

## 2018-05-22 PROCEDURE — 94799 UNLISTED PULMONARY SVC/PX: CPT

## 2018-05-22 PROCEDURE — 25010000002 ENOXAPARIN PER 10 MG: Performed by: INTERNAL MEDICINE

## 2018-05-22 PROCEDURE — 82962 GLUCOSE BLOOD TEST: CPT

## 2018-05-22 PROCEDURE — 94640 AIRWAY INHALATION TREATMENT: CPT

## 2018-05-22 PROCEDURE — 96372 THER/PROPH/DIAG INJ SC/IM: CPT

## 2018-05-22 PROCEDURE — 85025 COMPLETE CBC W/AUTO DIFF WBC: CPT | Performed by: EMERGENCY MEDICINE

## 2018-05-22 PROCEDURE — 93005 ELECTROCARDIOGRAM TRACING: CPT | Performed by: EMERGENCY MEDICINE

## 2018-05-22 PROCEDURE — 96365 THER/PROPH/DIAG IV INF INIT: CPT

## 2018-05-22 PROCEDURE — 99284 EMERGENCY DEPT VISIT MOD MDM: CPT | Performed by: EMERGENCY MEDICINE

## 2018-05-22 PROCEDURE — 25010000002 CEFTRIAXONE PER 250 MG: Performed by: EMERGENCY MEDICINE

## 2018-05-22 PROCEDURE — 99285 EMERGENCY DEPT VISIT HI MDM: CPT

## 2018-05-22 PROCEDURE — 99220 PR INITIAL OBSERVATION CARE/DAY 70 MINUTES: CPT | Performed by: INTERNAL MEDICINE

## 2018-05-22 PROCEDURE — 80048 BASIC METABOLIC PNL TOTAL CA: CPT | Performed by: INTERNAL MEDICINE

## 2018-05-22 PROCEDURE — 81001 URINALYSIS AUTO W/SCOPE: CPT | Performed by: INTERNAL MEDICINE

## 2018-05-22 PROCEDURE — 81001 URINALYSIS AUTO W/SCOPE: CPT | Performed by: EMERGENCY MEDICINE

## 2018-05-22 PROCEDURE — 71046 X-RAY EXAM CHEST 2 VIEWS: CPT

## 2018-05-22 PROCEDURE — 83880 ASSAY OF NATRIURETIC PEPTIDE: CPT | Performed by: EMERGENCY MEDICINE

## 2018-05-22 PROCEDURE — 93010 ELECTROCARDIOGRAM REPORT: CPT | Performed by: INTERNAL MEDICINE

## 2018-05-22 RX ORDER — IPRATROPIUM BROMIDE AND ALBUTEROL SULFATE 2.5; .5 MG/3ML; MG/3ML
3 SOLUTION RESPIRATORY (INHALATION)
Status: DISCONTINUED | OUTPATIENT
Start: 2018-05-22 | End: 2018-05-24 | Stop reason: HOSPADM

## 2018-05-22 RX ORDER — SODIUM CHLORIDE 0.9 % (FLUSH) 0.9 %
1-10 SYRINGE (ML) INJECTION AS NEEDED
Status: DISCONTINUED | OUTPATIENT
Start: 2018-05-22 | End: 2018-05-24 | Stop reason: HOSPADM

## 2018-05-22 RX ORDER — ASPIRIN 81 MG/1
325 TABLET, CHEWABLE ORAL ONCE
Status: COMPLETED | OUTPATIENT
Start: 2018-05-22 | End: 2018-05-22

## 2018-05-22 RX ORDER — LEVOTHYROXINE SODIUM 112 UG/1
224 TABLET ORAL
Status: DISCONTINUED | OUTPATIENT
Start: 2018-05-23 | End: 2018-05-24 | Stop reason: HOSPADM

## 2018-05-22 RX ORDER — NICOTINE POLACRILEX 4 MG
15 LOZENGE BUCCAL
Status: DISCONTINUED | OUTPATIENT
Start: 2018-05-22 | End: 2018-05-24 | Stop reason: HOSPADM

## 2018-05-22 RX ORDER — ASPIRIN 81 MG/1
81 TABLET ORAL DAILY
Status: DISCONTINUED | OUTPATIENT
Start: 2018-05-23 | End: 2018-05-24 | Stop reason: HOSPADM

## 2018-05-22 RX ORDER — ALPRAZOLAM 0.25 MG/1
0.5 TABLET ORAL ONCE
Status: COMPLETED | OUTPATIENT
Start: 2018-05-22 | End: 2018-05-22

## 2018-05-22 RX ORDER — ALBUTEROL SULFATE 2.5 MG/3ML
2.5 SOLUTION RESPIRATORY (INHALATION) EVERY 6 HOURS PRN
Status: DISCONTINUED | OUTPATIENT
Start: 2018-05-22 | End: 2018-05-24 | Stop reason: HOSPADM

## 2018-05-22 RX ORDER — ONDANSETRON 4 MG/1
4 TABLET, FILM COATED ORAL EVERY 6 HOURS PRN
Status: DISCONTINUED | OUTPATIENT
Start: 2018-05-22 | End: 2018-05-24 | Stop reason: HOSPADM

## 2018-05-22 RX ORDER — HYDROCODONE BITARTRATE AND ACETAMINOPHEN 10; 325 MG/1; MG/1
1 TABLET ORAL EVERY 6 HOURS PRN
Status: DISCONTINUED | OUTPATIENT
Start: 2018-05-22 | End: 2018-05-24 | Stop reason: HOSPADM

## 2018-05-22 RX ORDER — METOPROLOL TARTRATE 50 MG/1
50 TABLET, FILM COATED ORAL EVERY 12 HOURS SCHEDULED
Status: DISCONTINUED | OUTPATIENT
Start: 2018-05-22 | End: 2018-05-24 | Stop reason: HOSPADM

## 2018-05-22 RX ORDER — GUAIFENESIN 600 MG/1
1200 TABLET, EXTENDED RELEASE ORAL 2 TIMES DAILY
COMMUNITY

## 2018-05-22 RX ORDER — SODIUM CHLORIDE 0.9 % (FLUSH) 0.9 %
10 SYRINGE (ML) INJECTION AS NEEDED
Status: DISCONTINUED | OUTPATIENT
Start: 2018-05-22 | End: 2018-05-24 | Stop reason: HOSPADM

## 2018-05-22 RX ORDER — CALCIUM CARBONATE 200(500)MG
1 TABLET,CHEWABLE ORAL 2 TIMES DAILY PRN
Status: DISCONTINUED | OUTPATIENT
Start: 2018-05-22 | End: 2018-05-24 | Stop reason: HOSPADM

## 2018-05-22 RX ORDER — GUAIFENESIN 600 MG/1
1200 TABLET, EXTENDED RELEASE ORAL EVERY 12 HOURS SCHEDULED
Status: DISCONTINUED | OUTPATIENT
Start: 2018-05-22 | End: 2018-05-24 | Stop reason: HOSPADM

## 2018-05-22 RX ORDER — SODIUM CHLORIDE 9 MG/ML
40 INJECTION, SOLUTION INTRAVENOUS AS NEEDED
Status: DISCONTINUED | OUTPATIENT
Start: 2018-05-22 | End: 2018-05-24 | Stop reason: HOSPADM

## 2018-05-22 RX ORDER — SENNA AND DOCUSATE SODIUM 50; 8.6 MG/1; MG/1
2 TABLET, FILM COATED ORAL NIGHTLY PRN
Status: DISCONTINUED | OUTPATIENT
Start: 2018-05-22 | End: 2018-05-24 | Stop reason: HOSPADM

## 2018-05-22 RX ORDER — ALBUTEROL SULFATE 2.5 MG/3ML
2.5 SOLUTION RESPIRATORY (INHALATION) ONCE
Status: COMPLETED | OUTPATIENT
Start: 2018-05-22 | End: 2018-05-22

## 2018-05-22 RX ORDER — QUETIAPINE FUMARATE 100 MG/1
100 TABLET, FILM COATED ORAL 2 TIMES DAILY
Status: DISCONTINUED | OUTPATIENT
Start: 2018-05-22 | End: 2018-05-24 | Stop reason: HOSPADM

## 2018-05-22 RX ORDER — BUMETANIDE 2 MG/1
2 TABLET ORAL DAILY
COMMUNITY
End: 2018-09-06 | Stop reason: HOSPADM

## 2018-05-22 RX ORDER — DEXTROSE MONOHYDRATE 25 G/50ML
25 INJECTION, SOLUTION INTRAVENOUS
Status: DISCONTINUED | OUTPATIENT
Start: 2018-05-22 | End: 2018-05-24 | Stop reason: HOSPADM

## 2018-05-22 RX ORDER — TRAMADOL HYDROCHLORIDE 50 MG/1
25 TABLET ORAL EVERY 12 HOURS PRN
Status: DISCONTINUED | OUTPATIENT
Start: 2018-05-22 | End: 2018-05-22

## 2018-05-22 RX ORDER — ACETAMINOPHEN 325 MG/1
650 TABLET ORAL EVERY 4 HOURS PRN
Status: DISCONTINUED | OUTPATIENT
Start: 2018-05-22 | End: 2018-05-24 | Stop reason: HOSPADM

## 2018-05-22 RX ORDER — ATORVASTATIN CALCIUM 40 MG/1
40 TABLET, FILM COATED ORAL DAILY
Status: DISCONTINUED | OUTPATIENT
Start: 2018-05-23 | End: 2018-05-24 | Stop reason: HOSPADM

## 2018-05-22 RX ORDER — POLYMYXIN B SULFATE AND TRIMETHOPRIM 1; 10000 MG/ML; [USP'U]/ML
1 SOLUTION OPHTHALMIC
Status: DISCONTINUED | OUTPATIENT
Start: 2018-05-22 | End: 2018-05-23

## 2018-05-22 RX ADMIN — ALBUTEROL SULFATE 2.5 MG: 2.5 SOLUTION RESPIRATORY (INHALATION) at 11:46

## 2018-05-22 RX ADMIN — CEFTRIAXONE 1 G: 1 INJECTION, SOLUTION INTRAVENOUS at 15:42

## 2018-05-22 RX ADMIN — SODIUM CHLORIDE 1000 ML: 9 INJECTION, SOLUTION INTRAVENOUS at 15:42

## 2018-05-22 RX ADMIN — ENOXAPARIN SODIUM 30 MG: 30 INJECTION SUBCUTANEOUS at 22:38

## 2018-05-22 RX ADMIN — ALBUTEROL SULFATE 2.5 MG: 2.5 SOLUTION RESPIRATORY (INHALATION) at 16:37

## 2018-05-22 RX ADMIN — ALPRAZOLAM 0.5 MG: 0.25 TABLET ORAL at 11:35

## 2018-05-22 RX ADMIN — QUETIAPINE FUMARATE 100 MG: 100 TABLET ORAL at 22:36

## 2018-05-22 RX ADMIN — METOPROLOL TARTRATE 50 MG: 50 TABLET, FILM COATED ORAL at 22:36

## 2018-05-22 RX ADMIN — GUAIFENESIN 1200 MG: 600 TABLET, EXTENDED RELEASE ORAL at 22:36

## 2018-05-22 RX ADMIN — Medication 10 ML: at 11:23

## 2018-05-22 RX ADMIN — IPRATROPIUM BROMIDE AND ALBUTEROL SULFATE 3 ML: .5; 3 SOLUTION RESPIRATORY (INHALATION) at 22:22

## 2018-05-22 RX ADMIN — ASPIRIN 81 MG 325 MG: 81 TABLET ORAL at 16:51

## 2018-05-22 NOTE — ED NOTES
Called RT to inform r/t new order albuterol.  Asael RT verified     Tory Barragan RN  05/22/18 [id number]

## 2018-05-22 NOTE — ED PROVIDER NOTES
"Subjective   Ms Jared Govea is a 61 yo WF who presents secondary to a jerking motion and SOA.  This began yesterday.  It has occurred sporadically throughout the night.  Daughter reports this is occurred in the past when patient was \"getting sick\".  Patient has chronic shortness of breath - worse the past 2 days as well as chronic pedal edema.  Daughter reports available edema is been worse over the past 3 days.  Patient reports she accidentally took additional doses of her medication yesterday.  She was confused as to the day of the week and she took both Monday and Saturdays medications yesterday.  Patient denies chest pain, abdominal pain, back pain.  She presents for evaluation.        History provided by:  Patient (and daughter)  Shortness of Breath   Severity:  Moderate  Timing:  Constant  Progression:  Worsening  Chronicity:  Chronic  Context comment:  As described above.  Relieved by: improvement with home nebulizer or metered-dose inhaler however this improvement is short-lived.  Worsened by:  Activity and exertion  Associated symptoms: no abdominal pain, no chest pain, no cough, no diaphoresis, no fever, no headaches, no rash, no syncope, no swollen glands and no wheezing    Risk factors: obesity    Risk factors: no recent alcohol use and no tobacco use (former)        Review of Systems   Constitutional: Negative.  Negative for appetite change, diaphoresis and fever.   HENT: Negative.    Eyes: Negative.    Respiratory: Positive for shortness of breath. Negative for cough, chest tightness and wheezing.    Cardiovascular: Negative for chest pain, palpitations, leg swelling and syncope.   Gastrointestinal: Negative for abdominal pain.   Genitourinary: Negative.  Negative for difficulty urinating, flank pain, frequency and hematuria.   Musculoskeletal: Negative.    Skin: Negative.  Negative for color change, pallor and rash.   Neurological: Negative.  Negative for dizziness, seizures, syncope and headaches. "   Psychiatric/Behavioral: Negative.  Negative for agitation, behavioral problems and hallucinations.       Past Medical History:   Diagnosis Date   • Anxiety    • Arthritis    • CHF (congestive heart failure)    • Chronic back pain    • COPD (chronic obstructive pulmonary disease)    • Depression    • Essential hypertension    • SERENA (generalized anxiety disorder)    • GERD (gastroesophageal reflux disease)    • Headache    • HLD (hyperlipidemia)    • Hypothyroidism, acquired    • Low back pain    • Neuropathy in diabetes    • Sleep apnea    • Type 2 diabetes mellitus    • Wound, open, toe 8/22/2017       Allergies   Allergen Reactions   • Erythromycin Nausea And Vomiting   • Erythromycin Ethylsuccinate Nausea And Vomiting   • Ativan [Lorazepam] Hallucinations   • Tape Rash     Paper tape         Past Surgical History:   Procedure Laterality Date   • DILATATION AND CURETTAGE      x5   • LUNG SURGERY Right    • TRACHEOSTOMY         Family History   Problem Relation Age of Onset   • Cancer Mother         bone   • Cancer Father    • Cancer Brother        Social History     Social History   • Marital status:      Social History Main Topics   • Smoking status: Former Smoker     Packs/day: 0.50     Years: 40.00     Types: Cigarettes     Quit date: 10/4/2010   • Smokeless tobacco: Former User     Quit date: 8/3/2012   • Alcohol use No   • Drug use: No   • Sexual activity: Defer     Other Topics Concern   • Not on file           Objective   Physical Exam   Constitutional: She is oriented to person, place, and time. She appears well-developed and well-nourished. No distress.   62-year-old white female sitting in bed.  Patient is moderately obese.  She appears in fair health for age.  Mild dyspnea noted.  Patient's daughters at bedside.  She provides substantial additional history.   HENT:   Head: Normocephalic and atraumatic.   Right Ear: External ear normal.   Left Ear: External ear normal.   Nose: Nose normal.  "  Mouth/Throat: Oropharynx is clear and moist.   Eyes: Conjunctivae and EOM are normal. Pupils are equal, round, and reactive to light.   Neck: Normal range of motion. Neck supple.   Cardiovascular: Normal rate, regular rhythm, normal heart sounds and intact distal pulses.  Exam reveals no gallop and no friction rub.    No murmur heard.  Pulmonary/Chest: She is in respiratory distress (mild). She has decreased breath sounds (all lung fields). She has wheezes (slight expiratory in lung base. ). She has no rhonchi. She has no rales.   Abdominal: Soft. Bowel sounds are normal. She exhibits no distension. There is no tenderness.   Musculoskeletal: Normal range of motion. She exhibits edema (RLE - trace; LLE - 1+).   Neurological: She is alert and oriented to person, place, and time. She has normal reflexes.   Skin: Skin is warm and dry. She is not diaphoretic.   Psychiatric: She has a normal mood and affect. Her behavior is normal.   Nursing note and vitals reviewed.      ECG 12 Lead    Date/Time: 5/22/2018 11:08 AM  Performed by: DAWSON MIRAMONTES  Authorized by: DAWSON MIRAMONTES   Interpreted by physician  Previous ECG: no previous ECG available  Rhythm: sinus rhythm  Rate: normal  QRS axis: normal  Conduction: conduction normal  ST Segments: ST segments normal  T Waves: T waves normal  Other: no other findings  Clinical impression: normal ECG                 ED Course  ED Course as of May 22 1654   Tue May 22, 2018   1122 Patient is having episodic jerking in her extremities.  This has occurred in the past when patient was \"getting sick\".  EKG is unremarkable.  Will obtain full set of lab work.  Physical exam notable for decreased air movement in all lung fields and mild expiratory  wheeze.  Will give Mini-Neb treatment.   [SS]   1524 Patient's level worse was evidence of UTI. 2+ bacteria, 21-30 WBCs and large leukocyte esterase.  White count is unremarkable.  Patient's anemia is improved.  CMP notable for worsening " of patient's chronic renal function with BUN 45 creatinine 2.55. Troponin is elevated at 0.165.  ProBNP is negative.  I suspect patient's troponin is artificially elevated due to her renal disease.  Giving IV antibiotics as well as IV fluids.    [SS]   1625 The patient had indeterminate troponin in April 2017.  However today's is significantly higher.  Patient had a negative stress test in September 2014.  I feel patient requires hospitalization and further evaluation for possible silent MI.  Discussed with Dr. ROLY Peck.  He will  place in observation.  Consult in cardiology.  [SS]   1643 Discussed with Dr. Trinidad Barreto.  She does not feel patient needs urgent/emergent cardiac intervention.  She recommends conservative treatment.    [SS]      ED Course User Index  [SS] Primo Feliciano MD      Labs Reviewed   COMPREHENSIVE METABOLIC PANEL - Abnormal; Notable for the following:        Result Value    Glucose 155 (*)     BUN 45 (*)     Creatinine 2.55 (*)     Chloride 97 (*)     CO2 33.6 (*)     AST (SGOT) 35 (*)     eGFR Non  Amer 19 (*)     All other components within normal limits   TROPONIN (IN-HOUSE) - Abnormal; Notable for the following:     Troponin T 0.165 (*)     All other components within normal limits    Narrative:     Troponin T Reference Ranges:  Less than 0.03 ng/mL:    Negative for AMI  0.03 to 0.09 ng/mL:      Indeterminant for AMI  Greater than 0.09 ng/mL: Positive for AMI   CBC WITH AUTO DIFFERENTIAL - Abnormal; Notable for the following:     RBC 3.67 (*)     Hemoglobin 11.1 (*)     Hematocrit 35.9 (*)     MCHC 30.9 (*)     MPV 10.5 (*)     Eosinophil % 5.7 (*)     Eosinophils, Absolute 0.43 (*)     All other components within normal limits   URINALYSIS W/ CULTURE IF INDICATED - Abnormal; Notable for the following:     Blood, UA Trace (*)     Leuk Esterase, UA Large (3+) (*)     All other components within normal limits   URINALYSIS, MICROSCOPIC ONLY - Abnormal; Notable for the  following:     RBC, UA 3-5 (*)     WBC, UA 21-30 (*)     Bacteria, UA 2+ (*)     Squamous Epithelial Cells, UA 13-20 (*)     All other components within normal limits   BNP (IN-HOUSE) - Normal    Narrative:     Among patients with dyspnea, NT-proBNP is highly sensitive for the detection of acute congestive heart failure. In addition NT-proBNP of <300 pg/ml effectively rules out acute congestive heart failure with 99% negative predictive value.   URINE CULTURE   RAINBOW DRAW    Narrative:     The following orders were created for panel order [place] Draw.  Procedure                               Abnormality         Status                     ---------                               -----------         ------                     Light Blue Top[[id number]]                                   Final result               Green Top (Gel)[[id number]]                                  Final result               Lavender Top[[id number]]                                     Final result               Gold Top - SST[[id number]]                                   Final result                 Please view results for these tests on the individual orders.   CBC AND DIFFERENTIAL    Narrative:     The following orders were created for panel order CBC & Differential.  Procedure                               Abnormality         Status                     ---------                               -----------         ------                     CBC Auto Differential[[id number]]        Abnormal            Final result                 Please view results for these tests on the individual orders.   LIGHT BLUE TOP   GREEN TOP   LAVENDER TOP   GOLD TOP - SST       Xr Chest 2 View    Result Date: 5/22/2018  Narrative: CHEST X-RAY, 5/22/2018  HISTORY: 62-year-old female in the ED complaining of one week history of shortness of air. History of COPD.  TECHNIQUE: AP and lateral upright chest series.  COMPARISON: *  Chest x-ray, 5/3/2017, 4/30/2017 and  10/4/2016. CT chest, 7/27/2017.  FINDINGS: No active disease the chest.  Previous right lung surgery with partial resection of the right 5th rib. Old healed bilateral rib fractures. Chronic scarring and volume loss in the right lung base.  No acute pulmonary infiltrate, pneumothorax or visible pleural effusion. Stable moderate cardiomegaly. Pulmonary vascularity is normal.      Impression: 1. No active disease. 2. Previous right lung surgery. 3. Chronic right basilar scarring. 4. Stable cardiomegaly. 5. Old bilateral rib fracture deformities.  This report was finalized on 5/22/2018 12:54 PM by Dr. David Lester MD.      My differential diagnosis for chest pain includes but is not limited to:  Muscle strain, costochondritis, myositis, pleurisy, rib fracture, intercostal neuritis, herpes zoster, tumor, myocardial infarction, coronary syndrome, unstable angina, angina, aortic dissection, mitral valve prolapse, pericarditis, palpitations, pulmonary embolus, pneumonia, pneumothorax, lung cancer, GERD, esophagitis, esophageal spasm              MDM  Number of Diagnoses or Management Options  Acute UTI: new and requires workup  COPD with acute exacerbation: new and requires workup  Elevated troponin: new and requires workup     Amount and/or Complexity of Data Reviewed  Clinical lab tests: reviewed and ordered  Tests in the radiology section of CPT®: reviewed and ordered  Tests in the medicine section of CPT®: ordered and reviewed  Obtain history from someone other than the patient: yes (daughter)  Review and summarize past medical records: yes  Discuss the patient with other providers: yes (Dr. RT Cisco Barreto)    Risk of Complications, Morbidity, and/or Mortality  Presenting problems: moderate  Diagnostic procedures: moderate  Management options: moderate    Patient Progress  Patient progress: improved        Final diagnoses:   COPD with acute exacerbation   Elevated troponin   Acute UTI            Primo  ELIANE Feliciano MD  05/22/18 [id number]

## 2018-05-23 ENCOUNTER — APPOINTMENT (OUTPATIENT)
Dept: CARDIOLOGY | Facility: HOSPITAL | Age: 63
End: 2018-05-23
Attending: INTERNAL MEDICINE

## 2018-05-23 PROBLEM — R79.89 TROPONIN I ABOVE REFERENCE RANGE: Status: ACTIVE | Noted: 2018-05-23

## 2018-05-23 PROBLEM — N18.4 ACUTE RENAL FAILURE SUPERIMPOSED ON STAGE 4 CHRONIC KIDNEY DISEASE (HCC): Status: ACTIVE | Noted: 2018-05-23

## 2018-05-23 PROBLEM — R77.8 TROPONIN I ABOVE REFERENCE RANGE: Status: ACTIVE | Noted: 2018-05-23

## 2018-05-23 PROBLEM — E66.01 MORBID OBESITY (HCC): Chronic | Status: ACTIVE | Noted: 2018-05-23

## 2018-05-23 PROBLEM — N17.9 ACUTE RENAL FAILURE SUPERIMPOSED ON STAGE 4 CHRONIC KIDNEY DISEASE (HCC): Status: ACTIVE | Noted: 2018-05-23

## 2018-05-23 LAB
AMORPH URATE CRY URNS QL MICRO: ABNORMAL /HPF
ANION GAP SERPL CALCULATED.3IONS-SCNC: 10.1 MMOL/L
AORTIC DIMENSIONLESS INDEX: 1 (DI)
BACTERIA SPEC AEROBE CULT: NORMAL
BACTERIA UR QL AUTO: ABNORMAL /HPF
BASOPHILS # BLD AUTO: 0.04 10*3/MM3 (ref 0–0.2)
BASOPHILS NFR BLD AUTO: 0.6 % (ref 0–2)
BH CV ECHO MEAS - ACS: 2.1 CM
BH CV ECHO MEAS - AO MAX PG (FULL): 0.48 MMHG
BH CV ECHO MEAS - AO MAX PG: 6 MMHG
BH CV ECHO MEAS - AO MEAN PG (FULL): 0 MMHG
BH CV ECHO MEAS - AO MEAN PG: 3 MMHG
BH CV ECHO MEAS - AO ROOT AREA (BSA CORRECTED): 1.5
BH CV ECHO MEAS - AO ROOT AREA: 9.1 CM^2
BH CV ECHO MEAS - AO ROOT DIAM: 3.4 CM
BH CV ECHO MEAS - AO V2 MAX: 122 CM/SEC
BH CV ECHO MEAS - AO V2 MEAN: 85.3 CM/SEC
BH CV ECHO MEAS - AO V2 VTI: 35.5 CM
BH CV ECHO MEAS - AVA(I,A): 3.1 CM^2
BH CV ECHO MEAS - AVA(I,D): 3.1 CM^2
BH CV ECHO MEAS - AVA(V,A): 3 CM^2
BH CV ECHO MEAS - AVA(V,D): 3 CM^2
BH CV ECHO MEAS - BSA(HAYCOCK): 2.5 M^2
BH CV ECHO MEAS - BSA: 2.3 M^2
BH CV ECHO MEAS - BZI_BMI: 43.7 KILOGRAMS/M^2
BH CV ECHO MEAS - BZI_METRIC_HEIGHT: 167.6 CM
BH CV ECHO MEAS - BZI_METRIC_WEIGHT: 122.9 KG
BH CV ECHO MEAS - CONTRAST EF (2CH): 70.3 ML/M^2
BH CV ECHO MEAS - CONTRAST EF 4CH: 70 ML/M^2
BH CV ECHO MEAS - EDV(CUBED): 165.5 ML
BH CV ECHO MEAS - EDV(MOD-SP2): 126 ML
BH CV ECHO MEAS - EDV(MOD-SP4): 106 ML
BH CV ECHO MEAS - EDV(TEICH): 146.8 ML
BH CV ECHO MEAS - EF(CUBED): 77.1 %
BH CV ECHO MEAS - EF(MOD-SP2): 70.3 %
BH CV ECHO MEAS - EF(MOD-SP4): 70 %
BH CV ECHO MEAS - EF(TEICH): 68.6 %
BH CV ECHO MEAS - ESV(CUBED): 37.9 ML
BH CV ECHO MEAS - ESV(MOD-SP2): 37.4 ML
BH CV ECHO MEAS - ESV(MOD-SP4): 31.8 ML
BH CV ECHO MEAS - ESV(TEICH): 46.1 ML
BH CV ECHO MEAS - FS: 38.8 %
BH CV ECHO MEAS - IVS/LVPW: 1
BH CV ECHO MEAS - IVSD: 0.78 CM
BH CV ECHO MEAS - LA DIMENSION: 3.9 CM
BH CV ECHO MEAS - LA/AO: 1.1
BH CV ECHO MEAS - LAT PEAK E' VEL: 11 CM/SEC
BH CV ECHO MEAS - LV DIASTOLIC VOL/BSA (35-75): 46.6 ML/M^2
BH CV ECHO MEAS - LV MASS(C)D: 151 GRAMS
BH CV ECHO MEAS - LV MASS(C)DI: 66.4 GRAMS/M^2
BH CV ECHO MEAS - LV MAX PG: 5.5 MMHG
BH CV ECHO MEAS - LV MEAN PG: 3 MMHG
BH CV ECHO MEAS - LV SYSTOLIC VOL/BSA (12-30): 14 ML/M^2
BH CV ECHO MEAS - LV V1 MAX: 117 CM/SEC
BH CV ECHO MEAS - LV V1 MEAN: 79.9 CM/SEC
BH CV ECHO MEAS - LV V1 VTI: 35 CM
BH CV ECHO MEAS - LVIDD: 5.5 CM
BH CV ECHO MEAS - LVIDS: 3.4 CM
BH CV ECHO MEAS - LVLD AP2: 9.7 CM
BH CV ECHO MEAS - LVLD AP4: 9.6 CM
BH CV ECHO MEAS - LVLS AP2: 8.3 CM
BH CV ECHO MEAS - LVLS AP4: 8.1 CM
BH CV ECHO MEAS - LVOT AREA (M): 3.1 CM^2
BH CV ECHO MEAS - LVOT AREA: 3.1 CM^2
BH CV ECHO MEAS - LVOT DIAM: 2 CM
BH CV ECHO MEAS - LVPWD: 0.75 CM
BH CV ECHO MEAS - MED PEAK E' VEL: 10 CM/SEC
BH CV ECHO MEAS - MV A DUR: 0.2 SEC
BH CV ECHO MEAS - MV A MAX VEL: 89.2 CM/SEC
BH CV ECHO MEAS - MV DEC SLOPE: 396 CM/SEC^2
BH CV ECHO MEAS - MV DEC TIME: 0.25 SEC
BH CV ECHO MEAS - MV E MAX VEL: 129 CM/SEC
BH CV ECHO MEAS - MV E/A: 1.4
BH CV ECHO MEAS - MV MAX PG: 7 MMHG
BH CV ECHO MEAS - MV MEAN PG: 2 MMHG
BH CV ECHO MEAS - MV P1/2T MAX VEL: 132 CM/SEC
BH CV ECHO MEAS - MV P1/2T: 97.6 MSEC
BH CV ECHO MEAS - MV V2 MAX: 132 CM/SEC
BH CV ECHO MEAS - MV V2 MEAN: 69.8 CM/SEC
BH CV ECHO MEAS - MV V2 VTI: 41.9 CM
BH CV ECHO MEAS - MVA P1/2T LCG: 1.7 CM^2
BH CV ECHO MEAS - MVA(P1/2T): 2.3 CM^2
BH CV ECHO MEAS - MVA(VTI): 2.6 CM^2
BH CV ECHO MEAS - PA ACC TIME: 0.14 SEC
BH CV ECHO MEAS - PA MAX PG (FULL): 2.4 MMHG
BH CV ECHO MEAS - PA MAX PG: 4.2 MMHG
BH CV ECHO MEAS - PA PR(ACCEL): 15.6 MMHG
BH CV ECHO MEAS - PA V2 MAX: 102 CM/SEC
BH CV ECHO MEAS - PULM A REVS DUR: 0.21 SEC
BH CV ECHO MEAS - PULM A REVS VEL: 31.7 CM/SEC
BH CV ECHO MEAS - PULM DIAS VEL: 65.6 CM/SEC
BH CV ECHO MEAS - PULM S/D: 0.85
BH CV ECHO MEAS - PULM SYS VEL: 56 CM/SEC
BH CV ECHO MEAS - PVA(V,A): 3.2 CM^2
BH CV ECHO MEAS - PVA(V,D): 3.2 CM^2
BH CV ECHO MEAS - QP/QS: 0.63
BH CV ECHO MEAS - RV MAX PG: 1.8 MMHG
BH CV ECHO MEAS - RV MEAN PG: 1 MMHG
BH CV ECHO MEAS - RV V1 MAX: 66.5 CM/SEC
BH CV ECHO MEAS - RV V1 MEAN: 41.3 CM/SEC
BH CV ECHO MEAS - RV V1 VTI: 14.2 CM
BH CV ECHO MEAS - RVOT AREA: 4.9 CM^2
BH CV ECHO MEAS - RVOT DIAM: 2.5 CM
BH CV ECHO MEAS - SI(AO): 141.6 ML/M^2
BH CV ECHO MEAS - SI(CUBED): 56 ML/M^2
BH CV ECHO MEAS - SI(LVOT): 48.3 ML/M^2
BH CV ECHO MEAS - SI(MOD-SP2): 38.9 ML/M^2
BH CV ECHO MEAS - SI(MOD-SP4): 32.6 ML/M^2
BH CV ECHO MEAS - SI(TEICH): 44.2 ML/M^2
BH CV ECHO MEAS - SV(AO): 322.3 ML
BH CV ECHO MEAS - SV(CUBED): 127.5 ML
BH CV ECHO MEAS - SV(LVOT): 110 ML
BH CV ECHO MEAS - SV(MOD-SP2): 88.6 ML
BH CV ECHO MEAS - SV(MOD-SP4): 74.2 ML
BH CV ECHO MEAS - SV(RVOT): 69.7 ML
BH CV ECHO MEAS - SV(TEICH): 100.7 ML
BH CV ECHO MEAS - TAPSE (>1.6): 3 CM2
BH CV ECHO MEASUREMENTS AVERAGE E/E' RATIO: 12.29
BH CV VAS BP RIGHT ARM: NORMAL MMHG
BH CV XLRA - RV BASE: 3.5 CM
BH CV XLRA - TDI S': 13 CM/SEC
BILIRUB UR QL STRIP: NEGATIVE
BUN BLD-MCNC: 44 MG/DL (ref 8–23)
BUN/CREAT SERPL: 21.4 (ref 7–25)
CALCIUM SPEC-SCNC: 8.3 MG/DL (ref 8.8–10.5)
CHLORIDE SERPL-SCNC: 100 MMOL/L (ref 98–107)
CLARITY UR: CLEAR
CO2 SERPL-SCNC: 31.9 MMOL/L (ref 22–29)
COLOR UR: YELLOW
CREAT BLD-MCNC: 2.06 MG/DL (ref 0.57–1)
DEPRECATED RDW RBC AUTO: 45 FL (ref 37–54)
EOSINOPHIL # BLD AUTO: 0.4 10*3/MM3 (ref 0.1–0.3)
EOSINOPHIL NFR BLD AUTO: 6.1 % (ref 0–4)
ERYTHROCYTE [DISTWIDTH] IN BLOOD BY AUTOMATED COUNT: 12.9 % (ref 11.5–14.5)
GFR SERPL CREATININE-BSD FRML MDRD: 24 ML/MIN/1.73
GLUCOSE BLD-MCNC: 253 MG/DL (ref 65–99)
GLUCOSE BLDC GLUCOMTR-MCNC: 214 MG/DL (ref 70–130)
GLUCOSE BLDC GLUCOMTR-MCNC: 225 MG/DL (ref 70–130)
GLUCOSE BLDC GLUCOMTR-MCNC: 278 MG/DL (ref 70–130)
GLUCOSE BLDC GLUCOMTR-MCNC: 330 MG/DL (ref 70–130)
GLUCOSE UR STRIP-MCNC: NEGATIVE MG/DL
HCT VFR BLD AUTO: 33.6 % (ref 37–47)
HGB BLD-MCNC: 10.4 G/DL (ref 12–16)
HGB UR QL STRIP.AUTO: ABNORMAL
HYALINE CASTS UR QL AUTO: ABNORMAL /LPF
IMM GRANULOCYTES # BLD: 0.01 10*3/MM3 (ref 0–0.03)
IMM GRANULOCYTES NFR BLD: 0.2 % (ref 0–0.5)
KETONES UR QL STRIP: NEGATIVE
LEFT ATRIUM VOLUME INDEX: 24 ML/M2
LEFT ATRIUM VOLUME: 50 CM3
LEUKOCYTE ESTERASE UR QL STRIP.AUTO: ABNORMAL
LYMPHOCYTES # BLD AUTO: 1.7 10*3/MM3 (ref 0.6–4.8)
LYMPHOCYTES NFR BLD AUTO: 25.8 % (ref 20–45)
MAXIMAL PREDICTED HEART RATE: 158 BPM
MCH RBC QN AUTO: 29.8 PG (ref 27–31)
MCHC RBC AUTO-ENTMCNC: 31 G/DL (ref 31–37)
MCV RBC AUTO: 96.3 FL (ref 81–99)
MONOCYTES # BLD AUTO: 0.46 10*3/MM3 (ref 0–1)
MONOCYTES NFR BLD AUTO: 7 % (ref 3–8)
NEUTROPHILS # BLD AUTO: 3.97 10*3/MM3 (ref 1.5–8.3)
NEUTROPHILS NFR BLD AUTO: 60.3 % (ref 45–70)
NITRITE UR QL STRIP: NEGATIVE
NRBC BLD MANUAL-RTO: 0 /100 WBC (ref 0–0)
PH UR STRIP.AUTO: 5.5 [PH] (ref 4.5–8)
PLATELET # BLD AUTO: 140 10*3/MM3 (ref 140–500)
PMV BLD AUTO: 10.6 FL (ref 7.4–10.4)
POTASSIUM BLD-SCNC: 4 MMOL/L (ref 3.5–5.2)
PROT UR QL STRIP: NEGATIVE
RBC # BLD AUTO: 3.49 10*6/MM3 (ref 4.2–5.4)
RBC # UR: ABNORMAL /HPF
REF LAB TEST METHOD: ABNORMAL
SODIUM BLD-SCNC: 142 MMOL/L (ref 136–145)
SP GR UR STRIP: 1.01 (ref 1–1.03)
SQUAMOUS #/AREA URNS HPF: ABNORMAL /HPF
STRESS TARGET HR: 134 BPM
TRANS CELLS #/AREA URNS HPF: ABNORMAL /HPF
TROPONIN T SERPL-MCNC: 0.1 NG/ML (ref 0–0.03)
UROBILINOGEN UR QL STRIP: ABNORMAL
WBC NRBC COR # BLD: 6.58 10*3/MM3 (ref 4.8–10.8)
WBC UR QL AUTO: ABNORMAL /HPF

## 2018-05-23 PROCEDURE — 99244 OFF/OP CNSLTJ NEW/EST MOD 40: CPT | Performed by: INTERNAL MEDICINE

## 2018-05-23 PROCEDURE — 96372 THER/PROPH/DIAG INJ SC/IM: CPT

## 2018-05-23 PROCEDURE — 82962 GLUCOSE BLOOD TEST: CPT

## 2018-05-23 PROCEDURE — 99225 PR SBSQ OBSERVATION CARE/DAY 25 MINUTES: CPT | Performed by: NURSE PRACTITIONER

## 2018-05-23 PROCEDURE — 93010 ELECTROCARDIOGRAM REPORT: CPT | Performed by: INTERNAL MEDICINE

## 2018-05-23 PROCEDURE — 25010000002 ENOXAPARIN PER 10 MG: Performed by: INTERNAL MEDICINE

## 2018-05-23 PROCEDURE — 93306 TTE W/DOPPLER COMPLETE: CPT

## 2018-05-23 PROCEDURE — 87070 CULTURE OTHR SPECIMN AEROBIC: CPT | Performed by: INTERNAL MEDICINE

## 2018-05-23 PROCEDURE — 93306 TTE W/DOPPLER COMPLETE: CPT | Performed by: INTERNAL MEDICINE

## 2018-05-23 PROCEDURE — 94799 UNLISTED PULMONARY SVC/PX: CPT

## 2018-05-23 PROCEDURE — 85025 COMPLETE CBC W/AUTO DIFF WBC: CPT | Performed by: INTERNAL MEDICINE

## 2018-05-23 PROCEDURE — G0378 HOSPITAL OBSERVATION PER HR: HCPCS

## 2018-05-23 PROCEDURE — 87205 SMEAR GRAM STAIN: CPT | Performed by: INTERNAL MEDICINE

## 2018-05-23 PROCEDURE — 63710000001 INSULIN ASPART PER 5 UNITS: Performed by: INTERNAL MEDICINE

## 2018-05-23 PROCEDURE — 25010000002 PERFLUTREN (DEFINITY) 8.476 MG IN SODIUM CHLORIDE 0.9 % 10 ML INJECTION: Performed by: HOSPITALIST

## 2018-05-23 PROCEDURE — 80048 BASIC METABOLIC PNL TOTAL CA: CPT | Performed by: INTERNAL MEDICINE

## 2018-05-23 PROCEDURE — 63710000001 INSULIN DETEMIR PER 5 UNITS: Performed by: INTERNAL MEDICINE

## 2018-05-23 PROCEDURE — 84484 ASSAY OF TROPONIN QUANT: CPT | Performed by: NURSE PRACTITIONER

## 2018-05-23 PROCEDURE — 93005 ELECTROCARDIOGRAM TRACING: CPT | Performed by: NURSE PRACTITIONER

## 2018-05-23 RX ADMIN — METOPROLOL TARTRATE 50 MG: 50 TABLET, FILM COATED ORAL at 20:05

## 2018-05-23 RX ADMIN — IPRATROPIUM BROMIDE AND ALBUTEROL SULFATE 3 ML: .5; 3 SOLUTION RESPIRATORY (INHALATION) at 11:27

## 2018-05-23 RX ADMIN — INSULIN ASPART 4 UNITS: 100 INJECTION, SOLUTION INTRAVENOUS; SUBCUTANEOUS at 17:29

## 2018-05-23 RX ADMIN — GUAIFENESIN 1200 MG: 600 TABLET, EXTENDED RELEASE ORAL at 08:23

## 2018-05-23 RX ADMIN — IPRATROPIUM BROMIDE AND ALBUTEROL SULFATE 3 ML: .5; 3 SOLUTION RESPIRATORY (INHALATION) at 07:47

## 2018-05-23 RX ADMIN — HYDROCODONE BITARTRATE AND ACETAMINOPHEN 1 TABLET: 10; 325 TABLET ORAL at 01:45

## 2018-05-23 RX ADMIN — ENOXAPARIN SODIUM 30 MG: 30 INJECTION SUBCUTANEOUS at 21:18

## 2018-05-23 RX ADMIN — IPRATROPIUM BROMIDE AND ALBUTEROL SULFATE 3 ML: .5; 3 SOLUTION RESPIRATORY (INHALATION) at 15:34

## 2018-05-23 RX ADMIN — QUETIAPINE FUMARATE 100 MG: 100 TABLET ORAL at 08:23

## 2018-05-23 RX ADMIN — POLYMYXIN B SULFATE AND TRIMETHOPRIM 1 DROP: 1; 10000 SOLUTION OPHTHALMIC at 12:47

## 2018-05-23 RX ADMIN — PERFLUTREN 2 ML: 6.52 INJECTION, SUSPENSION INTRAVENOUS at 15:19

## 2018-05-23 RX ADMIN — INSULIN ASPART 7 UNITS: 100 INJECTION, SOLUTION INTRAVENOUS; SUBCUTANEOUS at 12:24

## 2018-05-23 RX ADMIN — INSULIN ASPART 4 UNITS: 100 INJECTION, SOLUTION INTRAVENOUS; SUBCUTANEOUS at 21:19

## 2018-05-23 RX ADMIN — LEVOTHYROXINE SODIUM 224 MCG: 112 TABLET ORAL at 06:28

## 2018-05-23 RX ADMIN — METOPROLOL TARTRATE 50 MG: 50 TABLET, FILM COATED ORAL at 08:23

## 2018-05-23 RX ADMIN — POLYMYXIN B SULFATE AND TRIMETHOPRIM 1 DROP: 1; 10000 SOLUTION OPHTHALMIC at 08:23

## 2018-05-23 RX ADMIN — QUETIAPINE FUMARATE 100 MG: 100 TABLET ORAL at 20:05

## 2018-05-23 RX ADMIN — ATORVASTATIN CALCIUM 40 MG: 40 TABLET, FILM COATED ORAL at 08:23

## 2018-05-23 RX ADMIN — INSULIN DETEMIR 30 UNITS: 100 INJECTION, SOLUTION SUBCUTANEOUS at 21:19

## 2018-05-23 RX ADMIN — INSULIN ASPART 6 UNITS: 100 INJECTION, SOLUTION INTRAVENOUS; SUBCUTANEOUS at 08:23

## 2018-05-23 RX ADMIN — ASPIRIN 81 MG: 81 TABLET, COATED ORAL at 08:23

## 2018-05-23 RX ADMIN — HYDROCODONE BITARTRATE AND ACETAMINOPHEN 1 TABLET: 10; 325 TABLET ORAL at 08:58

## 2018-05-23 RX ADMIN — HYDROCODONE BITARTRATE AND ACETAMINOPHEN 1 TABLET: 10; 325 TABLET ORAL at 20:03

## 2018-05-23 RX ADMIN — GUAIFENESIN 1200 MG: 600 TABLET, EXTENDED RELEASE ORAL at 20:05

## 2018-05-23 RX ADMIN — IPRATROPIUM BROMIDE AND ALBUTEROL SULFATE 3 ML: .5; 3 SOLUTION RESPIRATORY (INHALATION) at 19:22

## 2018-05-23 NOTE — CONSULTS
Patient Name: Jared Govea  :1955  62 y.o.    Date of Admission: 2018  Date of Consultation:  18  Encounter Provider: Milton Dowling III, MD  Place of Service: [facility] CARDIOLOGY  Referring Provider: Abe     Patient Care Team:  Jina Vaughan MD as PCP - General (Internal Medicine)  Jhonny Hoyt MD as Consulting Physician (Pulmonary Disease)  OPAL Arias as Nurse Practitioner (Endocrinology)  Konstantin Orr MD as Consulting Physician (Nephrology)  Vargas Ledezma MD as Consulting Physician (Pain Medicine)  Trinidad Barreto MD as Consulting Physician (Cardiology)      Chief complaint: SOA     History of Present Illness:   Ms. Jared Govea is a 62 year old female patient with a history of HLD, HTN, COPD (chronic home O2), ALTA, uncontrolled DM2, CKD stage 3, morbid obesity, anemia, tobacco abuse and hypothyroidism. In , she had a treadmill stress test that was non diagnostic, no symptoms or EKG changes were noted during testing.    Dr. Barreto saw her in consult on 17 for worsening lower extremity edema with a minimally elevated troponin (0.068). At this time, she had reportedly gained 30 pounds of fluid two weeks prior to presentation. She had an echo that showed normal left ventricular wall thickness and grade 1 diastolic dysfunction with an EF of 56% (decreased from EF 65-70% in 2014). Her volume overload was felt to be related to renal disease.     She presented to the emergency room with complaint of worsening shortness of breath as well as chronic lower extremity edema.  She states the swelling got worse over several days before.  She also is concerned about episodic jerking in her extremities.  She denies any chest pain, pressure, tightness, squeezing, or heartburn.  We are asked to see her because of troponin above reference.  She denies any palpitations or heart racing.  She was noted to have acute on chronic renal failure  with worsening creatinine and it was speculated that the troponin above reference was due to her acute renal failure.  She was given 1 L of IV fluid in the emergency room and was admitted for further evaluation.  Patient typically is on U makes at home because of her chronic lower extremity edema; that has been held since admission because of her acute renal failure; she denies any orthopnea or PND.      Previous Testing:     US Renal Bilateral 5/5/17      BLE Venous Doppler 5/2/17      ECHO 5/2/17      Treadmill Stress Test 9/20/2014          Past Medical History:   Diagnosis Date   • Anxiety    • Arthritis    • CHF (congestive heart failure)    • Chronic back pain    • COPD (chronic obstructive pulmonary disease)    • Depression    • Essential hypertension    • SERENA (generalized anxiety disorder)    • GERD (gastroesophageal reflux disease)    • Headache    • HLD (hyperlipidemia)    • Hypothyroidism, acquired    • Low back pain    • Neuropathy in diabetes    • Sleep apnea    • Type 2 diabetes mellitus    • Wound, open, toe 8/22/2017       Past Surgical History:   Procedure Laterality Date   • DILATATION AND CURETTAGE      x5   • LUNG SURGERY Right    • TRACHEOSTOMY           Prior to Admission medications    Medication Sig Start Date End Date Taking? Authorizing Provider   albuterol (PROVENTIL HFA;VENTOLIN HFA) 108 (90 Base) MCG/ACT inhaler Inhale 2 puffs Every 4 (Four) Hours As Needed for Wheezing. 9/26/17  Yes Jina Vaughan MD   aspirin 81 MG EC tablet Take 81 mg by mouth daily.   Yes Historical Provider, MD   atorvastatin (LIPITOR) 40 MG tablet Take 1 tablet by mouth Daily. for cholesterol. 3/15/18  Yes Jina Vaughan MD   B Complex Vitamins (VITAMIN B COMPLEX PO) Take 1 tablet by mouth daily.   Yes Historical Provider, MD   bumetanide (BUMEX) 2 MG tablet Take 2 mg by mouth Daily.   Yes Historical Provider, MD   cetirizine (ZyrTEC) 10 MG tablet Take 10 mg by mouth every night.   Yes Historical Provider, MD  "  famotidine (PEPCID) 20 MG tablet Take 20 mg by mouth daily.   Yes Historical Provider, MD   fluticasone-salmeterol (ADVAIR DISKUS) 250-50 MCG/DOSE DISKUS Inhale 1 puff 2 (Two) Times a Day. For COPD and rinse mouth after use 5/15/17  Yes Shantell Ford PA-C   guaiFENesin (MUCINEX) 600 MG 12 hr tablet Take 1,200 mg by mouth 2 (Two) Times a Day.   Yes Historical Provider, MD   HYDROcodone-acetaminophen (NORCO)  MG per tablet Take 2 tablets by mouth 2 (Two) Times a Day As Needed for Moderate Pain . 5/9/18  Yes Vargas Ledezma MD   Insulin Degludec (TRESIBA FLEXTOUCH) 200 UNIT/ML solution pen-injector Inject 70 Units under the skin Daily. 4/2/18  Yes Jina Vaughan MD   metoprolol tartrate (LOPRESSOR) 50 MG tablet Take 1 tablet by mouth Every 12 (Twelve) Hours. For heart 3/8/18  Yes Jina Vaughan MD   Probiotic Product (ALIGN) 4 MG capsule Take 4 mg by mouth Daily. 4/4/18  Yes Jina Vaughan MD   bumetanide (BUMEX) 2 MG tablet Take 1 tablet by mouth 2 (Two) Times a Day.  Patient taking differently: Take 2 mg by mouth Daily. 8/22/17   Jina Vaughan MD   clotrimazole-betamethasone (LOTRISONE) 1-0.05 % cream Apply  topically 2 (Two) Times a Day. 12/20/17   Jina Vaughan MD   dimenhyDRINATE (DRAMAMINE) 50 MG tablet Take 1 tablet by mouth Daily As Needed for movement disorders. 9/14/17   Jina Vaughan MD   Gauze Pads & Dressings (ABDOMINAL PAD) 8\"X10\" pads 1 pad Daily. 10/25/17   Jina Vaughan MD   guaiFENesin (MUCINEX) 600 MG 12 hr tablet Take 1 tablet by mouth 2 (two) times a day. 6/10/13   Historical Provider, MD   insulin aspart (NOVOLOG FLEXPEN) 100 UNIT/ML solution pen-injector sc pen SSI 4/2/18   Jina Vaughan MD   Insulin Pen Needle (BD PEN NEEDLE CLAY U/F) 32G X 4 MM misc Use as directed 7 times daily 3/30/17   OPAL Arias   levalbuterol (XOPENEX) 0.63 MG/3ML nebulizer solution Take 1 ampule by nebulization every 4 (four) hours as needed for wheezing.    Historical Provider, MD "   levothyroxine (SYNTHROID) 112 MCG tablet Take 2 tablets by mouth Daily. 7/10/17 7/10/18  OPAL Arias   QUEtiapine (SEROquel) 100 MG tablet Take 1 tablet by mouth 2 (Two) Times a Day. 3/9/18   Jina Vaughan MD   trimethoprim-polymyxin b (POLYTRIM) 68141-2.1 UNIT/ML-% ophthalmic solution Administer 1 drop to both eyes Every 4 (Four) Hours. 10/20/17   Micah Romero MD   Umeclidinium Bromide (INCRUSE ELLIPTA IN) Inhale Daily.    Historical Provider, MD   vitamin D (ERGOCALCIFEROL) 72763 units capsule capsule Take 1 capsule by mouth Every 7 (Seven) Days. On tuesday 1/11/18   Jina Vaughan MD       Allergies   Allergen Reactions   • Erythromycin Nausea And Vomiting   • Erythromycin Ethylsuccinate Nausea And Vomiting   • Ativan [Lorazepam] Hallucinations   • Tape Rash     Paper tape         Social History     Social History   • Marital status:      Social History Main Topics   • Smoking status: Former Smoker     Packs/day: 0.50     Years: 40.00     Types: Cigarettes     Quit date: 10/4/2010   • Smokeless tobacco: Former User     Quit date: 8/3/2012   • Alcohol use No   • Drug use: No   • Sexual activity: Defer     Other Topics Concern   • Not on file       Family History   Problem Relation Age of Onset   • Cancer Mother         bone   • Cancer Father    • Cancer Brother        REVIEW OF SYSTEMS:   All systems reviewed.  Pertinent positives identified in HPI.  All other systems are negative.      Objective:     Vitals:    05/23/18 0346 05/23/18 0617 05/23/18 0747 05/23/18 1100   BP: 127/66 106/69  130/65   BP Location: Left arm Left arm  Left arm   Patient Position: Lying Lying  Sitting   Pulse: 65 58 61 58   Resp: 18 18 20 18   Temp:  98 °F (36.7 °C)  97.6 °F (36.4 °C)   TempSrc:  Axillary  Oral   SpO2: 95% 95% 95% 91%   Weight:  123 kg (271 lb 9 oz)     Height:         Body mass index is 43.83 kg/m².    Physical Exam:  General Appearance:    Alert, cooperative, in no acute distress   Head:     Normocephalic, without obvious abnormality, atraumatic   Eyes:            Lids and lashes normal, conjunctivae and sclerae normal, no   icterus, no pallor, corneas clear, PERRLA   Ears:    Ears appear intact with no abnormalities noted   Throat:   No oral lesions, no thrush, oral mucosa moist   Neck:   No adenopathy, supple, trachea midline, no thyromegaly, no   carotid bruit, no JVD   Back:     No kyphosis present, no scoliosis present, no skin lesions, erythema or scars, no tenderness to percussion or palpation, range of motion normal   Lungs:     Coarse BS,respirations regular, even and unlabored    Heart:    Regular rhythm and normal rate, normal S1 and S2, no murmur, no gallop, no rub, no click   Chest Wall:    No abnormalities observed   Abdomen:     Normal bowel sounds, no masses, no organomegaly, soft        non-tender, non-distended, no guarding, no rebound  tenderness   Extremities:   Moves all extremities well, 1+ edema, no cyanosis, no redness   Pulses:   Pulses palpable and equal bilaterally. Normal radial, carotid, femoral, dorsalis pedis and posterior tibial pulses bilaterally. Normal abdominal aorta   Skin:  Psychiatric:   No bleeding, bruising or rash    Alert and oriented x 3, normal mood and affect         Lab Review:       Results from last 7 days  Lab Units 05/23/18  0353  05/22/18  1115   SODIUM mmol/L 142  < > 141   POTASSIUM mmol/L 4.0  < > 4.6   CHLORIDE mmol/L 100  < > 97*   CO2 mmol/L 31.9*  < > 33.6*   BUN mg/dL 44*  < > 45*   CREATININE mg/dL 2.06*  < > 2.55*   CALCIUM mg/dL 8.3*  < > 9.0   BILIRUBIN mg/dL  --   --  0.3   ALK PHOS U/L  --   --  94   ALT (SGPT) U/L  --   --  16   AST (SGOT) U/L  --   --  35*   GLUCOSE mg/dL 253*  < > 155*   < > = values in this interval not displayed.    Results from last 7 days  Lab Units 05/23/18  0353 05/22/18  1115   TROPONIN T ng/mL 0.103* 0.165*       Results from last 7 days  Lab Units 05/23/18  0353   WBC 10*3/mm3 6.58   HEMOGLOBIN g/dL 10.4*    HEMATOCRIT % 33.6*   PLATELETS 10*3/mm3 140           EKG 5/23/2018      EKG 10/5/2016                  I personally viewed and interpreted the patient's EKG/Telemetry data.      Current Facility-Administered Medications:   •  acetaminophen (TYLENOL) tablet 650 mg, 650 mg, Oral, Q4H PRN, Mik Fish MD  •  albuterol (PROVENTIL) nebulizer solution 0.083% 2.5 mg/3mL, 2.5 mg, Nebulization, Q6H PRN, Mik Fish MD  •  aspirin EC tablet 81 mg, 81 mg, Oral, Daily, Mik Fish MD, 81 mg at 05/23/18 0823  •  atorvastatin (LIPITOR) tablet 40 mg, 40 mg, Oral, Daily, Mik Fish MD, 40 mg at 05/23/18 0823  •  calcium carbonate (TUMS) chewable tablet 500 mg (200 mg elemental), 1 tablet, Oral, BID PRN, Mik Fish MD  •  dextrose (D50W) solution 25 g, 25 g, Intravenous, Q15 Min PRN, Mik Fish MD  •  dextrose (GLUTOSE) oral gel 15 g, 15 g, Oral, Q15 Min PRN, Mik Fish MD  •  enoxaparin (LOVENOX) syringe 30 mg, 30 mg, Subcutaneous, Q24H, Mik Fish MD, 30 mg at 05/22/18 2238  •  glucagon (GLUCAGEN) injection 1 mg, 1 mg, Subcutaneous, PRN, Mik Fish MD  •  guaiFENesin (MUCINEX) 12 hr tablet 1,200 mg, 1,200 mg, Oral, Q12H, Mik Fish MD, 1,200 mg at 05/23/18 0823  •  HYDROcodone-acetaminophen (NORCO)  MG per tablet 1 tablet, 1 tablet, Oral, Q6H PRN, Mik Fish MD, 1 tablet at 05/23/18 0858  •  insulin aspart (novoLOG) injection 0-9 Units, 0-9 Units, Subcutaneous, 4x Daily AC & at Bedtime, Mik Fish MD, 6 Units at 05/23/18 0823  •  insulin detemir (LEVEMIR) injection 30 Units, 30 Units, Subcutaneous, Nightly, Mik Fish MD  •  ipratropium-albuterol (DUO-NEB) nebulizer solution 3 mL, 3 mL, Nebulization, 4x Daily - RT, Mik Fish MD, 3 mL at 05/23/18 0747  •  levothyroxine (SYNTHROID, LEVOTHROID) tablet 224 mcg, 224 mcg, Oral, Q AM, Mik Fish MD, 224 mcg at 05/23/18 0628  •   metoprolol tartrate (LOPRESSOR) tablet 50 mg, 50 mg, Oral, Q12H, Mik Fish MD, 50 mg at 05/23/18 0823  •  ondansetron (ZOFRAN) tablet 4 mg, 4 mg, Oral, Q6H PRN, Mik Fish MD  •  QUEtiapine (SEROquel) tablet 100 mg, 100 mg, Oral, BID, Mik Fish MD, 100 mg at 05/23/18 0823  •  sennosides-docusate sodium (SENOKOT-S) 8.6-50 MG tablet 2 tablet, 2 tablet, Oral, Nightly PRN, Mik Fish MD  •  sodium chloride 0.9 % flush 1-10 mL, 1-10 mL, Intravenous, PRN, Mik Fish MD  •  sodium chloride 0.9 % flush 10 mL, 10 mL, Intravenous, PRN, Primo Feliciano MD, 10 mL at 05/22/18 1123  •  sodium chloride 0.9 % infusion 40 mL, 40 mL, Intravenous, PRN, Mik Fish MD  •  trimethoprim-polymyxin b (POLYTRIM) 58836-2.1 UNIT/ML-% ophthalmic solution 1 drop, 1 drop, Both Eyes, Q4H, Mik Fish MD, 1 drop at 05/23/18 0823    Assessment and Plan:       Active Hospital Problems (** Indicates Principal Problem)    Diagnosis Date Noted   • **COPD with acute exacerbation [J44.1] 05/22/2018   • Acute renal failure superimposed on stage 4 chronic kidney disease [N17.9, N18.4] 05/23/2018   • Morbid obesity [E66.01] 05/23/2018   • Troponin I above reference range [R74.8] 05/23/2018   • COPD (chronic obstructive pulmonary disease) [J44.9]    • Essential hypertension [I10]    • Type 2 diabetes mellitus with stage 3 chronic kidney disease, with long-term current use of insulin [E11.22, N18.3, Z79.4]    • Venous stasis dermatitis of both lower extremities [I87.2]       Resolved Hospital Problems    Diagnosis Date Noted Date Resolved   No resolved problems to display.     1.  Troponin above reference-no evidence of ACS.  EKG is normal.  This will be secondary to her acute renal failure.  We will repeat an echocardiogram  2.  Exacerbation COPD  3.  Acute renal failure superimposed on chronic renal failure, stage IV  4.  Morbid obesity  5.  Essential hypertension  6.  Type 2 diabetes  mellitus with chronic renal failure  7.  Chronic lower extremity edema with chronic venous stasis-chronic diuretic therapy is held given her acute renal failure    Milton Dowling III, MD  05/23/18  11:27 AM

## 2018-05-23 NOTE — PLAN OF CARE
Problem: Pain, Chronic (Adult)  Goal: Identify Related Risk Factors and Signs and Symptoms  Outcome: Ongoing (interventions implemented as appropriate)   05/23/18 1522   Pain, Chronic (Adult)   Related Risk Factors (Chronic Pain) knowledge deficit;pain control inadequate   Signs and Symptoms (Chronic Pain) verbalization of pain descriptors

## 2018-05-23 NOTE — CONSULTS
Patient Identification:  NAME:  Jared Govea  Age:  62 y.o.   Sex:  female   :  1955   MRN:  [id number]       Chief complaint: She does not have one, reason for consult myoclonus    History of present illness:  This patient is 62-year-old right-handed white female to history of COPD currently admitted with an exacerbation also has acute on chronic renal failure sleep apnea a diabetes with diabetic neuropathy she complains of myoclonus is primarily in the upper extremity she can drop things she twitches easily duration is a split second it is only 1 little movement at a time it is not consistent with seizure activity no associated symptoms context the patient with renal failure and COPD and COPD medications she also takes 200 mg of cerebral per day , modifying factors none      Past medical history:  Past Medical History:   Diagnosis Date   • Anxiety    • Arthritis    • CHF (congestive heart failure)    • Chronic back pain    • COPD (chronic obstructive pulmonary disease)    • Depression    • Essential hypertension    • SERENA (generalized anxiety disorder)    • GERD (gastroesophageal reflux disease)    • Headache    • HLD (hyperlipidemia)    • Hypothyroidism, acquired    • Low back pain    • Neuropathy in diabetes    • Sleep apnea    • Type 2 diabetes mellitus    • Wound, open, toe 2017       Past surgical history:  Past Surgical History:   Procedure Laterality Date   • DILATATION AND CURETTAGE      x5   • LUNG SURGERY Right    • TRACHEOSTOMY         Allergies:  Erythromycin; Erythromycin ethylsuccinate; Ativan [lorazepam]; and Tape    Home medications:  Prescriptions Prior to Admission   Medication Sig Dispense Refill Last Dose   • albuterol (PROVENTIL HFA;VENTOLIN HFA) 108 (90 Base) MCG/ACT inhaler Inhale 2 puffs Every 4 (Four) Hours As Needed for Wheezing. 1 inhaler 6 2018 at 0830   • aspirin 81 MG EC tablet Take 81 mg by mouth daily.   2018 at Unknown time   • atorvastatin (LIPITOR) 40 MG  "tablet Take 1 tablet by mouth Daily. for cholesterol. 90 tablet 1 5/21/2018 at Unknown time   • B Complex Vitamins (VITAMIN B COMPLEX PO) Take 1 tablet by mouth daily.   5/22/2018 at Unknown time   • bumetanide (BUMEX) 2 MG tablet Take 2 mg by mouth Daily.   5/22/2018 at Unknown time   • cetirizine (ZyrTEC) 10 MG tablet Take 10 mg by mouth every night.   5/21/2018 at Unknown time   • famotidine (PEPCID) 20 MG tablet Take 20 mg by mouth daily.   5/22/2018 at Unknown time   • fluticasone-salmeterol (ADVAIR DISKUS) 250-50 MCG/DOSE DISKUS Inhale 1 puff 2 (Two) Times a Day. For COPD and rinse mouth after use 60 each 5 5/22/2018 at Unknown time   • guaiFENesin (MUCINEX) 600 MG 12 hr tablet Take 1,200 mg by mouth 2 (Two) Times a Day.   5/22/2018 at Unknown time   • HYDROcodone-acetaminophen (NORCO)  MG per tablet Take 2 tablets by mouth 2 (Two) Times a Day As Needed for Moderate Pain . 120 tablet 0 5/22/2018 at Unknown time   • Insulin Degludec (TRESIBA FLEXTOUCH) 200 UNIT/ML solution pen-injector Inject 70 Units under the skin Daily. 15 mL 3 5/21/2018 at Unknown time   • metoprolol tartrate (LOPRESSOR) 50 MG tablet Take 1 tablet by mouth Every 12 (Twelve) Hours. For heart 180 tablet 1 5/22/2018 at Unknown time   • Probiotic Product (ALIGN) 4 MG capsule Take 4 mg by mouth Daily. 30 capsule 6 5/22/2018 at Unknown time   • bumetanide (BUMEX) 2 MG tablet Take 1 tablet by mouth 2 (Two) Times a Day. (Patient taking differently: Take 2 mg by mouth Daily.) 60 tablet 0 Taking   • clotrimazole-betamethasone (LOTRISONE) 1-0.05 % cream Apply  topically 2 (Two) Times a Day. 180 g 3 Unknown at Unknown time   • dimenhyDRINATE (DRAMAMINE) 50 MG tablet Take 1 tablet by mouth Daily As Needed for movement disorders. 30 tablet 0 Unknown at Unknown time   • Gauze Pads & Dressings (ABDOMINAL PAD) 8\"X10\" pads 1 pad Daily. 50 each 1 Unknown at Unknown time   • guaiFENesin (MUCINEX) 600 MG 12 hr tablet Take 1 tablet by mouth 2 (two) times a " day.   Taking   • insulin aspart (NOVOLOG FLEXPEN) 100 UNIT/ML solution pen-injector sc pen SSI 15 mL 3 Unknown at Unknown time   • Insulin Pen Needle (BD PEN NEEDLE CLYA U/F) 32G X 4 MM misc Use as directed 7 times daily 250 each 5 Unknown at Unknown time   • levalbuterol (XOPENEX) 0.63 MG/3ML nebulizer solution Take 1 ampule by nebulization every 4 (four) hours as needed for wheezing.   Unknown at Unknown time   • levothyroxine (SYNTHROID) 112 MCG tablet Take 2 tablets by mouth Daily. 60 tablet 5 Unknown at Unknown time   • QUEtiapine (SEROquel) 100 MG tablet Take 1 tablet by mouth 2 (Two) Times a Day. 180 tablet 0 Unknown at Unknown time   • trimethoprim-polymyxin b (POLYTRIM) 83491-3.1 UNIT/ML-% ophthalmic solution Administer 1 drop to both eyes Every 4 (Four) Hours. 1 each 0 Unknown at Unknown time   • Umeclidinium Bromide (INCRUSE ELLIPTA IN) Inhale Daily.   Unknown at Unknown time   • vitamin D (ERGOCALCIFEROL) 43575 units capsule capsule Take 1 capsule by mouth Every 7 (Seven) Days. On tuesday 12 capsule 3 Unknown at Unknown time        Hospital medications:    aspirin 81 mg Oral Daily   atorvastatin 40 mg Oral Daily   enoxaparin 30 mg Subcutaneous Q24H   guaiFENesin 1,200 mg Oral Q12H   insulin aspart 0-9 Units Subcutaneous 4x Daily AC & at Bedtime   insulin detemir 30 Units Subcutaneous Nightly   ipratropium-albuterol 3 mL Nebulization 4x Daily - RT   levothyroxine 224 mcg Oral Q AM   metoprolol tartrate 50 mg Oral Q12H   QUEtiapine 100 mg Oral BID        •  acetaminophen  •  albuterol  •  calcium carbonate  •  dextrose  •  dextrose  •  glucagon (human recombinant)  •  HYDROcodone-acetaminophen  •  ondansetron  •  sennosides-docusate sodium  •  sodium chloride  •  sodium chloride  •  sodium chloride    Family history:  Family History   Problem Relation Age of Onset   • Cancer Mother         bone   • Cancer Father    • Cancer Brother        Social history:  Social History   Substance Use Topics   • Smoking  status: Former Smoker     Packs/day: 0.50     Years: 40.00     Types: Cigarettes     Quit date: 10/4/2010   • Smokeless tobacco: Former User     Quit date: 8/3/2012   • Alcohol use No       Review of systems:    She is short of breath she admits she's twitchy and drops things but denies headache hair eyes ears nose throat skin bone joint  lymphatic hematologic or oncologic complaints no current neck pain back pain bowel bladder incontinence fever chills or rash    Objective:  Vitals Ranges:   Temp:  [97.6 °F (36.4 °C)-98 °F (36.7 °C)] 97.6 °F (36.4 °C)  Heart Rate:  [53-69] 57  Resp:  [18-22] 20  BP: (106-180)/(65-96) 130/65      Physical Exam:  Awake alert looks older than her stated age fund of knowledge poor attention span and concentration fair recent and remote memory poor language function dysarthria looks older than stated age but in no distress pupils 1-1/2 constricting to 1 normal disc retinas visual fields extraocular movements full without nystagmus nasolabial folds palate tongue symmetrical decreased hearing normal facial sensation head turning shoulder shrug motor 4 out of 5 times for extremities she definitely has some generalized weakness bilateral asterixis but no definite mild clonus noted at this time.  Reflexes absent throughout toes downgoing bilaterally sensation normal light touch face both arms and both legs coordination superimposed tremor in the upper extremities which is very mild station and gait cannot be tested heart regular without murmur neck supple without bruits extremities no clubbing cyanosis edema    Results review:   I reviewed the patient's new clinical results.    Data review:  Lab Results (last 24 hours)     Procedure Component Value Units Date/Time    POC Glucose Once [[id number]]  (Abnormal) Collected:  05/23/18 1156    Specimen:  Blood Updated:  05/23/18 1222     Glucose 330 (H) mg/dL     Narrative:       Meter: [id number] : [id number] Jen Lugo Tech     Respiratory Culture - Sputum, Cough [[id number]] Collected:  05/23/18 0125    Specimen:  Sputum from Cough Updated:  05/23/18 1015     Gram Stain Result Few (2+) WBCs seen      Few (2+) Epithelial cells per low power field      Rare (1+) Gram positive cocci    Urine Culture - Urine, [[id number]] Collected:  05/22/18 1216    Specimen:  Urine from Urine, Clean Catch Updated:  05/23/18 0913     Urine Culture >100,000 CFU/mL Mixed Levi Isolated    Narrative:         Specimen contains mixed organisms of questionable pathogenicity which indicates contamination with commensal levi.  Further identification is unlikely to provide clinically useful information.  Suggest recollection.    Troponin [[id number]]  (Abnormal) Collected:  05/23/18 0353    Specimen:  Blood Updated:  05/23/18 0848     Troponin T 0.103 (C) ng/mL     Narrative:       Troponin T Reference Ranges:  Less than 0.03 ng/mL:    Negative for AMI  0.03 to 0.09 ng/mL:      Indeterminant for AMI  Greater than 0.09 ng/mL: Positive for AMI    POC Glucose Once [[id number]]  (Abnormal) Collected:  05/23/18 0730    Specimen:  Blood Updated:  05/23/18 0737     Glucose 278 (H) mg/dL     Narrative:       Meter: [id number] : [id number] Jen Quinn [facility]    Basic Metabolic Panel [[id number]]  (Abnormal) Collected:  05/23/18 0353    Specimen:  Blood Updated:  05/23/18 0450     Glucose 253 (H) mg/dL      BUN 44 (H) mg/dL      Creatinine 2.06 (H) mg/dL      Sodium 142 mmol/L      Potassium 4.0 mmol/L      Chloride 100 mmol/L      CO2 31.9 (H) mmol/L      Calcium 8.3 (L) mg/dL      eGFR Non African Amer 24 (L) mL/min/1.73      BUN/Creatinine Ratio 21.4     Anion Gap 10.1 mmol/L     Narrative:       GFR Normal >60  Chronic Kidney Disease <60  Kidney Failure <15    CBC Auto Differential [[id number]]  (Abnormal) Collected:  05/23/18 0353    Specimen:  Blood Updated:  05/23/18 0433     WBC 6.58 10*3/mm3      RBC 3.49 (L) 10*6/mm3      Hemoglobin 10.4 (L) g/dL      Hematocrit  33.6 (L) %      MCV 96.3 fL      MCH 29.8 pg      MCHC 31.0 g/dL      RDW 12.9 %      RDW-SD 45.0 fl      MPV 10.6 (H) fL      Platelets 140 10*3/mm3      Neutrophil % 60.3 %      Lymphocyte % 25.8 %      Monocyte % 7.0 %      Eosinophil % 6.1 (H) %      Basophil % 0.6 %      Immature Grans % 0.2 %      Neutrophils, Absolute 3.97 10*3/mm3      Lymphocytes, Absolute 1.70 10*3/mm3      Monocytes, Absolute 0.46 10*3/mm3      Eosinophils, Absolute 0.40 (H) 10*3/mm3      Basophils, Absolute 0.04 10*3/mm3      Immature Grans, Absolute 0.01 10*3/mm3      nRBC 0.0 /100 WBC     Urinalysis With / Culture If Indicated - Urine, Catheter In/Out [[id number]]  (Abnormal) Collected:  05/22/18 2356    Specimen:  Urine from Urine, Catheter In/Out Updated:  05/23/18 0016     Color, UA Yellow     Appearance, UA Clear     pH, UA 5.5     Specific Gravity, UA 1.010     Glucose, UA Negative     Ketones, UA Negative     Bilirubin, UA Negative     Blood, UA Trace (A)     Protein, UA Negative     Leuk Esterase, UA Small (1+) (A)     Nitrite, UA Negative     Urobilinogen, UA 0.2 E.U./dL    Urinalysis, Microscopic Only - Urine, Clean Catch [[id number]]  (Abnormal) Collected:  05/22/18 2356    Specimen:  Urine from Urine, Catheter In/Out Updated:  05/23/18 0016     RBC, UA 0-2 (A) /HPF      WBC, UA 3-5 (A) /HPF      Bacteria, UA None Seen /HPF      Squamous Epithelial Cells, UA 3-6 (A) /HPF      Transitional Epithelial Cells, UA 3-6 (A) /HPF      Hyaline Casts, UA None Seen /LPF      Amorphous Crystals, UA Small/1+ /HPF      Methodology Manual Light Microscopy    POC Glucose Once [[id number]]  (Normal) Collected:  05/22/18 2208    Specimen:  Blood Updated:  05/22/18 2214     Glucose 98 mg/dL     Narrative:       Meter: [id number] : [id number] Yoel Virk RN    POC Glucose Once [[id number]]  (Abnormal) Collected:  05/22/18 2139    Specimen:  Blood Updated:  05/22/18 2203     Glucose 64 (L) mg/dL     Narrative:       Meter: [id number]  : [id number] Graciela RUTHERFORD PRN    Basic Metabolic Panel [[id number]]  (Abnormal) Collected:  05/22/18 2111    Specimen:  Blood Updated:  05/22/18 2151     Glucose 56 (L) mg/dL      BUN 45 (H) mg/dL      Creatinine 2.27 (H) mg/dL      Sodium 141 mmol/L      Potassium 3.9 mmol/L      Chloride 98 mmol/L      CO2 34.7 (H) mmol/L      Calcium 8.5 (L) mg/dL      eGFR Non African Amer 22 (L) mL/min/1.73      BUN/Creatinine Ratio 19.8     Anion Gap 8.3 mmol/L     Narrative:       GFR Normal >60  Chronic Kidney Disease <60  Kidney Failure <15    POC Glucose Once [[id number]]  (Abnormal) Collected:  05/22/18 2116    Specimen:  Blood Updated:  05/22/18 2122     Glucose 59 (L) mg/dL     Narrative:       Meter: [id number] : [id number] Yoel Virk RN    POC Glucose Once [[id number]]  (Abnormal) Collected:  05/22/18 2050    Specimen:  Blood Updated:  05/22/18 2056     Glucose 66 (L) mg/dL     Narrative:       Meter: [id number] : [id number] Owensmarilu RUTHERFORD PRN           Imaging:  Imaging Results (last 24 hours)     ** No results found for the last 24 hours. 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  The fact that she is on 200 mg of cerebral per day may also play a role in any movement disorder.  This is not consistent with ALS Parkinson's disease or any other significant progressive movement disorder.  She likely does have a mild degree of dementia  At this  time however this all appears to be mild and when her breathing is better and she is discharged I think the myoclonus and asterixis will both improve there is not really a medication I can give her at this time but will make it better.  I will sign off in follow-up.  Reconsult thanks      Supa Moore MD  05/23/18  3:25 PM

## 2018-05-23 NOTE — PLAN OF CARE
Problem: Patient Care Overview  Goal: Plan of Care Review  Outcome: Ongoing (interventions implemented as appropriate)   05/23/18 1519   Coping/Psychosocial   Plan of Care Reviewed With patient   Plan of Care Review   Progress no change   OTHER   Outcome Summary VSS. Cont. O2 4L NC. CPAP to sleep. [facility] consulted d/t elevated troponins. Echo and EKG completed this shift. No c/o chest pain, but pt. does c/o chronic back pain. PO meds for pain control.        Problem: Fall Risk (Adult)  Goal: Identify Related Risk Factors and Signs and Symptoms  Outcome: Ongoing (interventions implemented as appropriate)   05/23/18 1519   Fall Risk (Adult)   Related Risk Factors (Fall Risk) confusion/agitation;culprit medication(s);fatigue/slow reaction;gait/mobility problems;environment unfamiliar;polypharmacy   Signs and Symptoms (Fall Risk) presence of risk factors     Goal: Absence of Fall  Outcome: Ongoing (interventions implemented as appropriate)   05/23/18 1519   Fall Risk (Adult)   Absence of Fall making progress toward outcome       Problem: Skin Injury Risk (Adult)  Goal: Identify Related Risk Factors and Signs and Symptoms  Outcome: Ongoing (interventions implemented as appropriate)   05/23/18 1519   Skin Injury Risk (Adult)   Related Risk Factors (Skin Injury Risk) body weight extremes;cognitive impairment;edema;mechanical forces;mobility impaired;moisture;nutritional deficiencies     Goal: Skin Health and Integrity  Outcome: Ongoing (interventions implemented as appropriate)   05/23/18 1519   Skin Injury Risk (Adult)   Skin Health and Integrity making progress toward outcome       Problem: Chronic Obstructive Pulmonary Disease (Adult)  Goal: Signs and Symptoms of Listed Potential Problems Will be Absent, Minimized or Managed (Chronic Obstructive Pulmonary Disease)  Outcome: Ongoing (interventions implemented as appropriate)   05/23/18 1519   Goal/Outcome Evaluation   Problems Assessed (Chronic Obstructive Pulmonary Disease  (COPD)) all   Problems Present (COPD, Bronch/Emphy) functional deficit;respiratory compromise;situational response

## 2018-05-23 NOTE — PLAN OF CARE
Problem: Pain, Chronic (Adult)  Goal: Acceptable Pain/Comfort Level and Functional Ability  Outcome: Ongoing (interventions implemented as appropriate)   05/23/18 1523   Pain, Chronic (Adult)   Acceptable Pain/Comfort Level and Functional Ability making progress toward outcome

## 2018-05-23 NOTE — PLAN OF CARE
Problem: Patient Care Overview  Goal: Plan of Care Review  Outcome: Ongoing (interventions implemented as appropriate)   05/23/18 0229   Coping/Psychosocial   Plan of Care Reviewed With patient       Problem: Fall Risk (Adult)  Goal: Identify Related Risk Factors and Signs and Symptoms  Outcome: Ongoing (interventions implemented as appropriate)   05/23/18 0229   Fall Risk (Adult)   Related Risk Factors (Fall Risk) culprit medication(s);environment unfamiliar       Problem: Skin Injury Risk (Adult)  Goal: Identify Related Risk Factors and Signs and Symptoms  Outcome: Ongoing (interventions implemented as appropriate)      Problem: Chronic Obstructive Pulmonary Disease (Adult)  Goal: Signs and Symptoms of Listed Potential Problems Will be Absent, Minimized or Managed (Chronic Obstructive Pulmonary Disease)  Outcome: Ongoing (interventions implemented as appropriate)   05/23/18 0229   Goal/Outcome Evaluation   Problems Assessed (Chronic Obstructive Pulmonary Disease (COPD)) all   Problems Present (COPD, Bronch/Emphy) respiratory compromise

## 2018-05-23 NOTE — H&P
[facility] HOSPITALIST     Jina Vaughan MD    CHIEF COMPLAINT: 'Jerks'    HISTORY OF PRESENT ILLNESS:    61 yo WF / PMH of dCHF,  Chronic Hypoxic Resp Failure on 4L Home o2, CKD Stage 4, DM w/ perpherial neuropathy, HTN, Hypothyriodism, chronic LBP, who presents with severe myoclonic jerks that were severe enough that kept her from sleeping the night before admission. These started ~ 2 days ago, and were associated with SOA, lethargy and nausea w/ decreased appetite. Preceeding these symptoms, she had a two day bought of diarrhea w/ 5-9 watery stools a day.    Denies, polyuria, polydipesia, dsyuria, fever/chills.       Past Medical History:   Diagnosis Date   • Anxiety    • Arthritis    • CHF (congestive heart failure)    • Chronic back pain    • COPD (chronic obstructive pulmonary disease)    • Depression    • Essential hypertension    • SERENA (generalized anxiety disorder)    • GERD (gastroesophageal reflux disease)    • Headache    • HLD (hyperlipidemia)    • Hypothyroidism, acquired    • Low back pain    • Neuropathy in diabetes    • Sleep apnea    • Type 2 diabetes mellitus    • Wound, open, toe 8/22/2017     Past Surgical History:   Procedure Laterality Date   • DILATATION AND CURETTAGE      x5   • LUNG SURGERY Right    • TRACHEOSTOMY       Family History   Problem Relation Age of Onset   • Cancer Mother         bone   • Cancer Father    • Cancer Brother      Social History   Substance Use Topics   • Smoking status: Former Smoker     Packs/day: 0.50     Years: 40.00     Types: Cigarettes     Quit date: 10/4/2010   • Smokeless tobacco: Former User     Quit date: 8/3/2012   • Alcohol use No     Prescriptions Prior to Admission   Medication Sig Dispense Refill Last Dose   • albuterol (PROVENTIL HFA;VENTOLIN HFA) 108 (90 Base) MCG/ACT inhaler Inhale 2 puffs Every 4 (Four) Hours As Needed for Wheezing. 1 inhaler 6 5/22/2018 at 0830   • aspirin 81 MG EC tablet Take 81 mg by mouth daily.    "5/22/2018 at Unknown time   • atorvastatin (LIPITOR) 40 MG tablet Take 1 tablet by mouth Daily. for cholesterol. 90 tablet 1 5/21/2018 at Unknown time   • B Complex Vitamins (VITAMIN B COMPLEX PO) Take 1 tablet by mouth daily.   5/22/2018 at Unknown time   • bumetanide (BUMEX) 2 MG tablet Take 2 mg by mouth Daily.   5/22/2018 at Unknown time   • cetirizine (ZyrTEC) 10 MG tablet Take 10 mg by mouth every night.   5/21/2018 at Unknown time   • famotidine (PEPCID) 20 MG tablet Take 20 mg by mouth daily.   5/22/2018 at Unknown time   • fluticasone-salmeterol (ADVAIR DISKUS) 250-50 MCG/DOSE DISKUS Inhale 1 puff 2 (Two) Times a Day. For COPD and rinse mouth after use 60 each 5 5/22/2018 at Unknown time   • guaiFENesin (MUCINEX) 600 MG 12 hr tablet Take 1,200 mg by mouth 2 (Two) Times a Day.   5/22/2018 at Unknown time   • HYDROcodone-acetaminophen (NORCO)  MG per tablet Take 2 tablets by mouth 2 (Two) Times a Day As Needed for Moderate Pain . 120 tablet 0 5/22/2018 at Unknown time   • Insulin Degludec (TRESIBA FLEXTOUCH) 200 UNIT/ML solution pen-injector Inject 70 Units under the skin Daily. 15 mL 3 5/21/2018 at Unknown time   • metoprolol tartrate (LOPRESSOR) 50 MG tablet Take 1 tablet by mouth Every 12 (Twelve) Hours. For heart 180 tablet 1 5/22/2018 at Unknown time   • Probiotic Product (ALIGN) 4 MG capsule Take 4 mg by mouth Daily. 30 capsule 6 5/22/2018 at Unknown time   • bumetanide (BUMEX) 2 MG tablet Take 1 tablet by mouth 2 (Two) Times a Day. (Patient taking differently: Take 2 mg by mouth Daily.) 60 tablet 0 Taking   • clotrimazole-betamethasone (LOTRISONE) 1-0.05 % cream Apply  topically 2 (Two) Times a Day. 180 g 3 Unknown at Unknown time   • dimenhyDRINATE (DRAMAMINE) 50 MG tablet Take 1 tablet by mouth Daily As Needed for movement disorders. 30 tablet 0 Unknown at Unknown time   • Gauze Pads & Dressings (ABDOMINAL PAD) 8\"X10\" pads 1 pad Daily. 50 each 1 Unknown at Unknown time   • guaiFENesin (MUCINEX) " "600 MG 12 hr tablet Take 1 tablet by mouth 2 (two) times a day.   Taking   • INCRUSE ELLIPTA 62.5 MCG/INH aerosol powder  Inhale Daily.   Taking   • insulin aspart (NOVOLOG FLEXPEN) 100 UNIT/ML solution pen-injector sc pen SSI 15 mL 3 Unknown at Unknown time   • Insulin Pen Needle (BD PEN NEEDLE CLAY U/F) 32G X 4 MM misc Use as directed 7 times daily 250 each 5 Unknown at Unknown time   • levalbuterol (XOPENEX) 0.63 MG/3ML nebulizer solution Take 1 ampule by nebulization every 4 (four) hours as needed for wheezing.   Unknown at Unknown time   • levothyroxine (SYNTHROID) 112 MCG tablet Take 2 tablets by mouth Daily. 60 tablet 5 Unknown at Unknown time   • QUEtiapine (SEROquel) 100 MG tablet Take 1 tablet by mouth 2 (Two) Times a Day. 180 tablet 0 Unknown at Unknown time   • trimethoprim-polymyxin b (POLYTRIM) 93448-4.1 UNIT/ML-% ophthalmic solution Administer 1 drop to both eyes Every 4 (Four) Hours. 1 each 0 Unknown at Unknown time   • Umeclidinium Bromide (INCRUSE ELLIPTA IN) Inhale Daily.   Unknown at Unknown time   • vitamin D (ERGOCALCIFEROL) 18017 units capsule capsule Take 1 capsule by mouth Every 7 (Seven) Days. On tuesday 12 capsule 3 Unknown at Unknown time     Allergies:  Erythromycin; Erythromycin ethylsuccinate; Ativan [lorazepam]; and Tape    REVIEW OF SYSTEMS:  Please see the above history of present illness for pertinent positives and negatives.  The remainder of the patient's systems have been reviewed and are negative.     Vital Signs  Temp:  [97.6 °F (36.4 °C)-98.3 °F (36.8 °C)] 97.8 °F (36.6 °C)  Heart Rate:  [53-73] 54  Resp:  [18-20] 18  BP: (102-180)/(55-96) 125/75    Flowsheet Rows      First Filed Value   Admission Height  167.6 cm (66\") Documented at 05/22/2018 1036   Admission Weight  118 kg (260 lb) Documented at 05/22/2018 1036           Physical Exam:  Physical Exam   Constitutional: She appears well-developed and well-nourished. No distress.   Morbidly obese, chronically ill appearing " lady, laying almost flat in bed without distress   HENT:   Head: Normocephalic and atraumatic.   Right Ear: External ear normal.   Left Ear: External ear normal.   Nose: Nose normal.   NC in place   Eyes: Conjunctivae and EOM are normal. Pupils are equal, round, and reactive to light. No scleral icterus.   Neck: Normal range of motion. Neck supple. No tracheal deviation present.   Unable to visualize JVD 2/2 thick subcutaneous tissue around neck   Cardiovascular: Normal rate, regular rhythm and normal heart sounds.    No murmur heard.  Pulmonary/Chest: Effort normal and breath sounds normal. No respiratory distress. She has no wheezes.   Abdominal: Soft. Bowel sounds are normal. She exhibits no distension. There is no tenderness.   Musculoskeletal: Normal range of motion. She exhibits edema. She exhibits no tenderness.   Trace to 1+ pitting edema to knees, ?Lymhedema   Lymphadenopathy:     She has no cervical adenopathy.   Neurological: She is alert. A sensory deficit is present. No cranial nerve deficit. She exhibits normal muscle tone.   Skin: Skin is warm and dry. She is not diaphoretic. No erythema.   Psychiatric:   Flat restricted affect, depressed mood   Nursing note and vitals reviewed.       Results Review:    I reviewed the patient's new clinical results.  Lab Results (most recent)     Procedure Component Value Units Date/Time    Urinalysis, Microscopic Only - Urine, Clean Catch [[id number]]  (Abnormal) Collected:  05/22/18 1216    Specimen:  Urine from Urine, Clean Catch Updated:  05/22/18 1238     RBC, UA 3-5 (A) /HPF      WBC, UA 21-30 (A) /HPF      Bacteria, UA 2+ (A) /HPF      Squamous Epithelial Cells, UA 13-20 (A) /HPF      Hyaline Casts, UA None Seen /LPF      WBC Clumps, UA Small/1+ /HPF      Methodology Manual Light Microscopy    Urine Culture - Urine, [[id number]] Collected:  05/22/18 1216    Specimen:  Urine from Urine, Clean Catch Updated:  05/22/18 1238    [place] Draw [[id number]] Collected:   05/22/18 1115    Specimen:  Blood Updated:  05/22/18 1230    Narrative:       The following orders were created for panel order [place] Draw.  Procedure                               Abnormality         Status                     ---------                               -----------         ------                     Light Blue Top[[id number]]                                   Final result               Green Top (Gel)[[id number]]                                  Final result               Lavender Top[[id number]]                                     Final result               Gold Top - SST[[id number]]                                   Final result                 Please view results for these tests on the individual orders.    Light Blue Top [[id number]] Collected:  05/22/18 1115    Specimen:  Blood Updated:  05/22/18 1230     Extra Tube hold for add-on     Comment: Auto resulted       Green Top (Gel) [[id number]] Collected:  05/22/18 1115    Specimen:  Blood Updated:  05/22/18 1230     Extra Tube Hold for add-ons.     Comment: Auto resulted.       Lavender Top [[id number]] Collected:  05/22/18 1115    Specimen:  Blood Updated:  05/22/18 1230     Extra Tube hold for add-on     Comment: Auto resulted       Gold Top - SST [[id number]] Collected:  05/22/18 1115    Specimen:  Blood Updated:  05/22/18 1230     Extra Tube Hold for add-ons.     Comment: Auto resulted.       Urinalysis With / Culture If Indicated - Urine, Clean Catch [[id number]]  (Abnormal) Collected:  05/22/18 1216    Specimen:  Urine from Urine, Clean Catch Updated:  05/22/18 1226     Color, UA Yellow     Appearance, UA Clear     pH, UA 5.5     Specific Gravity, UA 1.015     Glucose, UA Negative     Ketones, UA Negative     Bilirubin, UA Negative     Blood, UA Trace (A)     Protein, UA Negative     Leuk Esterase, UA Large (3+) (A)     Nitrite, UA Negative     Urobilinogen, UA 0.2 E.U./dL    Troponin [[id number]]  (Abnormal) Collected:  05/22/18 1115    Specimen:   Blood Updated:  05/22/18 1208     Troponin T 0.165 (C) ng/mL     Narrative:       Troponin T Reference Ranges:  Less than 0.03 ng/mL:    Negative for AMI  0.03 to 0.09 ng/mL:      Indeterminant for AMI  Greater than 0.09 ng/mL: Positive for AMI    BNP [[id number]]  (Normal) Collected:  05/22/18 1115    Specimen:  Blood Updated:  05/22/18 1153     proBNP 79.8 pg/mL     Narrative:       Among patients with dyspnea, NT-proBNP is highly sensitive for the detection of acute congestive heart failure. In addition NT-proBNP of <300 pg/ml effectively rules out acute congestive heart failure with 99% negative predictive value.    Comprehensive Metabolic Panel [[id number]]  (Abnormal) Collected:  05/22/18 1115    Specimen:  Blood Updated:  05/22/18 1141     Glucose 155 (H) mg/dL      BUN 45 (H) mg/dL      Creatinine 2.55 (H) mg/dL      Sodium 141 mmol/L      Potassium 4.6 mmol/L      Chloride 97 (L) mmol/L      CO2 33.6 (H) mmol/L      Calcium 9.0 mg/dL      Total Protein 6.7 g/dL      Albumin 3.80 g/dL      ALT (SGPT) 16 U/L      AST (SGOT) 35 (H) U/L      Alkaline Phosphatase 94 U/L      Total Bilirubin 0.3 mg/dL      eGFR Non African Amer 19 (L) mL/min/1.73      Globulin 2.9 gm/dL      A/G Ratio 1.3 g/dL      BUN/Creatinine Ratio 17.6     Anion Gap 10.4 mmol/L     CBC & Differential [[id number]] Collected:  05/22/18 1115    Specimen:  Blood Updated:  05/22/18 1124    Narrative:       The following orders were created for panel order CBC & Differential.  Procedure                               Abnormality         Status                     ---------                               -----------         ------                     CBC Auto Differential[[id number]]        Abnormal            Final result                 Please view results for these tests on the individual orders.    CBC Auto Differential [[id number]]  (Abnormal) Collected:  05/22/18 1115    Specimen:  Blood Updated:  05/22/18 1124     WBC 7.61 10*3/mm3      RBC 3.67  (L) 10*6/mm3      Hemoglobin 11.1 (L) g/dL      Hematocrit 35.9 (L) %      MCV 97.8 fL      MCH 30.2 pg      MCHC 30.9 (L) g/dL      RDW 12.9 %      RDW-SD 46.2 fl      MPV 10.5 (H) fL      Platelets 154 10*3/mm3      Neutrophil % 66.7 %      Lymphocyte % 20.0 %      Monocyte % 6.7 %      Eosinophil % 5.7 (H) %      Basophil % 0.5 %      Immature Grans % 0.4 %      Neutrophils, Absolute 5.08 10*3/mm3      Lymphocytes, Absolute 1.52 10*3/mm3      Monocytes, Absolute 0.51 10*3/mm3      Eosinophils, Absolute 0.43 (H) 10*3/mm3      Basophils, Absolute 0.04 10*3/mm3      Immature Grans, Absolute 0.03 10*3/mm3      nRBC 0.0 /100 WBC           Imaging Results (most recent)     Procedure Component Value Units Date/Time    XR Chest 2 View [[id number]] Collected:  05/22/18 1251     Updated:  05/22/18 1256    Narrative:       CHEST X-RAY, 5/22/2018     HISTORY:   62-year-old female in the ED complaining of one week history of  shortness of air. History of COPD.     TECHNIQUE:  AP and lateral upright chest series.     COMPARISON:  *  Chest x-ray, 5/3/2017, 4/30/2017 and 10/4/2016. CT chest, 7/27/2017.     FINDINGS:  No active disease the chest.     Previous right lung surgery with partial resection of the right 5th rib.  Old healed bilateral rib fractures. Chronic scarring and volume loss in  the right lung base.     No acute pulmonary infiltrate, pneumothorax or visible pleural effusion.  Stable moderate cardiomegaly. Pulmonary vascularity is normal.       Impression:       1. No active disease.  2. Previous right lung surgery.  3. Chronic right basilar scarring.  4. Stable cardiomegaly.  5. Old bilateral rib fracture deformities.     This report was finalized on 5/22/2018 12:54 PM by Dr. David Lester MD.           reviewed    ECG/EMG Results (most recent)     Procedure Component Value Units Date/Time    ECG 12 Lead [[id number]] Resulted:  05/22/18 1654     Updated:  05/22/18 1654        reviewed    Assessment/Plan     LADY  on CKD stage IV w/ h/o dCHF  Cr 2.55 - Up from 1.57 in Dec  - Likely pre-renal from dehydration after viral/food bourne enteritis  - Got 1L NS in ER, w/ h/o CHF, and pt reports of worsening swelling, check BMP tonight before giving more fluids  - Holding home Bumex, exam inconsistent w/ fluid overload    COPD   - Unclear if exacerbation, on home o2, diminished bs, but no wheezes  - Dounebs  - Continue to monitor    UTI  - 21-30 epis on sample done in the ER, can not tell if was a clean catch or in and out cath, repeat with in and out cath specimen  - WBC normal  - Got ceftriaxone in ER  - WIll hold ABX until repeat Urine is resulted, or pt's clinical syndrome changes    NSTEMI  - Due to underlying medical condition or reduced clearance  - Asymptotic, no EKG changes  - Will trend, but likely due to reduced clearance    DM  - Reports 70 units degladuc w/ ssi at home  - Glu 66 this evening, will give 30 units of Levemir with SSI    I discussed the patients findings and my recommendations with patient.     Mik Fish MD  05/22/18  8:19 PM

## 2018-05-23 NOTE — PLAN OF CARE
Problem: Patient Care Overview  Goal: Discharge Needs Assessment  Outcome: Ongoing (interventions implemented as appropriate)   05/23/18 9819   Discharge Needs Assessment   Readmission Within the Last 30 Days no previous admission in last 30 days   Concerns to be Addressed denies needs/concerns at this time   Patient/Family Anticipates Transition to home with family   Patient/Family Anticipated Services at Transition none   Transportation Concerns car, none   Transportation Anticipated family or friend will provide   Anticipated Changes Related to Illness none   Equipment Needed After Discharge none   Outpatient/Agency/Support Group Needs homecare agency  ([facility] in the past)   Offered/Gave Vendor List no   Disability   Equipment Currently Used at Home bipap/cpap;glucometer;commode;walker, rolling;shower chair;oxygen;nebulizer

## 2018-05-23 NOTE — NURSING NOTE
Discharge Planning Assessment  TEODORA Dobbs     Patient Name: Jared Govea  MRN: [id number]  Today's Date: 5/23/2018    Admit Date: 5/22/2018          Discharge Needs Assessment     Row Name 05/23/18 0987       Living Environment    Lives With grandchild(waleska);sibling(s);other relative(s)    Name(s) of Who Lives With Patient sister, brother in law and 3 grandchildren (ages 15,17,19)    Current Living Arrangements home/apartment/condo    Primary Care Provided by self    Provides Primary Care For no one    Family Caregiver if Needed sibling(s)    Quality of Family Relationships helpful;involved    Able to Return to Prior Arrangements yes       Resource/Environmental Concerns    Resource/Environmental Concerns none    Transportation Concerns car, none       Transition Planning    Patient/Family Anticipates Transition to home with family    Patient/Family Anticipated Services at Transition none    Transportation Anticipated family or friend will provide       Discharge Needs Assessment    Readmission Within the Last 30 Days no previous admission in last 30 days    Concerns to be Addressed denies needs/concerns at this time    Equipment Currently Used at Home bipap/cpap;glucometer;commode;walker, rolling;shower chair;oxygen;nebulizer    Anticipated Changes Related to Illness none    Equipment Needed After Discharge none    Outpatient/Agency/Support Group Needs homecare agency   [facility] and [facility] in the past    Offered/Gave Vendor List no            Discharge Plan     Row Name 05/23/18 4556       Plan    Plan home    Patient/Family in Agreement with Plan yes    Plan Comments spoke with patient at bedside. patient agreeable to speak to . patient lives with sister, brother in law and 3 grandchildren in a one level home. [facility] and [facility] in the past. DME: rolling walker, BSC, Glucometer, c-pap, shower chair, BP machine, O2 and nebulizer. spoke with [facility] @ [facility] and current O2 order is for 5L  continuously. independent with ADL's including shopping & meal prep. sister assist with shopping and driving. patient does not drive. uses [email or web address] and denies having trouble paying for medications. hx of difficulty with prescriptions. samples from MD office given in the past. living will on file. face sheet verified. plan is home when medically stable. will continue to follow.         Destination     No service coordination in this encounter.      Durable Medical Equipment     No service coordination in this encounter.      Dialysis/Infusion     No service coordination in this encounter.      Home Medical Care     No service coordination in this encounter.      Social Care     No service coordination in this encounter.                Demographic Summary     Row Name 05/23/18 0947       General Information    Admission Type observation    Arrived From home    Referral Source admission list    Reason for Consult discharge planning    Preferred Language English     Used During This Interaction no       Contact Information    Permission Granted to Share Info With             Functional Status    No documentation.           Psychosocial    No documentation.           Abuse/Neglect    No documentation.           Legal    No documentation.           Substance Abuse    No documentation.           Patient Forms    No documentation.         Christina Galvan RN

## 2018-05-23 NOTE — PROGRESS NOTES
"SERVICE: [facility] HOSPITALIST    CONSULTANTS: neurology/cardiology    CHIEF COMPLAINT: f/u soa/LE edema    SUBJECTIVE: Upon entry to room patient states \"Are you the one that can change my pain medicine?\"  Patient notes that she takes 2 pain tablets 4 times a day at home and not just 1 and requests it be changed for her chronic pain.  She also requests Ambien, even though she is not on this typically at home. She states \"I can't take care of myself, my sister takes care of me\". When asked why she has a caregiver, she states \"I can't walk 2 steps without getting short of breath and I'm confused sometimes\". Sister notes patient was confused and took am and pm medications yesterday. She further notes that her LE swelling looks better today. She otherwise denies f/c/cough/chest pain/n/v/d/abdominal pain or other new concerns.    OBJECTIVE:    /65 (BP Location: Left arm, Patient Position: Sitting)   Pulse 57   Temp 97.6 °F (36.4 °C) (Oral)   Resp 20   Ht 167.6 cm (66\")   Wt 123 kg (271 lb 9 oz)   LMP  (LMP Unknown)   SpO2 95%   BMI 43.83 kg/m²     MEDS/LABS REVIEWED AND ORDERED    aspirin 81 mg Oral Daily   atorvastatin 40 mg Oral Daily   enoxaparin 30 mg Subcutaneous Q24H   guaiFENesin 1,200 mg Oral Q12H   insulin aspart 0-9 Units Subcutaneous 4x Daily AC & at Bedtime   insulin detemir 30 Units Subcutaneous Nightly   ipratropium-albuterol 3 mL Nebulization 4x Daily - RT   levothyroxine 224 mcg Oral Q AM   metoprolol tartrate 50 mg Oral Q12H   QUEtiapine 100 mg Oral BID     Physical Exam   Constitutional: She is oriented to person, place, and time. She appears well-developed and well-nourished.   Obese   HENT:   Head: Normocephalic and atraumatic.   Eyes: EOM are normal. Pupils are equal, round, and reactive to light.   Cardiovascular: Regular rhythm.    Bradycardic   Pulmonary/Chest: Effort normal.   Diminished all fields   Abdominal: Soft. Bowel sounds are normal. She exhibits no " distension. There is no tenderness. There is no guarding.   Obese   Musculoskeletal:   2-3+ bilateral lower extremity edema, nonpitting   Neurological: She is oriented to person, place, and time.   Skin: Skin is warm and dry. No erythema.   Psychiatric: She has a normal mood and affect. Her behavior is normal.   Vitals reviewed.    LAB/DIAGNOSTICS:    Lab Results (last 24 hours)     Procedure Component Value Units Date/Time    POC Glucose Once [[id number]]  (Abnormal) Collected:  05/23/18 1156    Specimen:  Blood Updated:  05/23/18 1222     Glucose 330 (H) mg/dL     Narrative:       Meter: [id number] : [id number] Diavibe    Respiratory Culture - Sputum, Cough [[id number]] Collected:  05/23/18 0125    Specimen:  Sputum from Cough Updated:  05/23/18 1015     Gram Stain Result Few (2+) WBCs seen      Few (2+) Epithelial cells per low power field      Rare (1+) Gram positive cocci    Urine Culture - Urine, [[id number]] Collected:  05/22/18 1216    Specimen:  Urine from Urine, Clean Catch Updated:  05/23/18 0913     Urine Culture >100,000 CFU/mL Mixed Levi Isolated    Narrative:         Specimen contains mixed organisms of questionable pathogenicity which indicates contamination with commensal levi.  Further identification is unlikely to provide clinically useful information.  Suggest recollection.    Troponin [[id number]]  (Abnormal) Collected:  05/23/18 0353    Specimen:  Blood Updated:  05/23/18 0848     Troponin T 0.103 (C) ng/mL     Narrative:       Troponin T Reference Ranges:  Less than 0.03 ng/mL:    Negative for AMI  0.03 to 0.09 ng/mL:      Indeterminant for AMI  Greater than 0.09 ng/mL: Positive for AMI    POC Glucose Once [[id number]]  (Abnormal) Collected:  05/23/18 0730    Specimen:  Blood Updated:  05/23/18 0737     Glucose 278 (H) mg/dL     Narrative:       Meter: [id number] : [id number] Li Cheyenne Mointor Tech    Basic Metabolic Panel [[id number]]  (Abnormal) Collected:  05/23/18  0353    Specimen:  Blood Updated:  05/23/18 0450     Glucose 253 (H) mg/dL      BUN 44 (H) mg/dL      Creatinine 2.06 (H) mg/dL      Sodium 142 mmol/L      Potassium 4.0 mmol/L      Chloride 100 mmol/L      CO2 31.9 (H) mmol/L      Calcium 8.3 (L) mg/dL      eGFR Non African Amer 24 (L) mL/min/1.73      BUN/Creatinine Ratio 21.4     Anion Gap 10.1 mmol/L     Narrative:       GFR Normal >60  Chronic Kidney Disease <60  Kidney Failure <15    CBC Auto Differential [[id number]]  (Abnormal) Collected:  05/23/18 0353    Specimen:  Blood Updated:  05/23/18 0433     WBC 6.58 10*3/mm3      RBC 3.49 (L) 10*6/mm3      Hemoglobin 10.4 (L) g/dL      Hematocrit 33.6 (L) %      MCV 96.3 fL      MCH 29.8 pg      MCHC 31.0 g/dL      RDW 12.9 %      RDW-SD 45.0 fl      MPV 10.6 (H) fL      Platelets 140 10*3/mm3      Neutrophil % 60.3 %      Lymphocyte % 25.8 %      Monocyte % 7.0 %      Eosinophil % 6.1 (H) %      Basophil % 0.6 %      Immature Grans % 0.2 %      Neutrophils, Absolute 3.97 10*3/mm3      Lymphocytes, Absolute 1.70 10*3/mm3      Monocytes, Absolute 0.46 10*3/mm3      Eosinophils, Absolute 0.40 (H) 10*3/mm3      Basophils, Absolute 0.04 10*3/mm3      Immature Grans, Absolute 0.01 10*3/mm3      nRBC 0.0 /100 WBC     Urinalysis With / Culture If Indicated - Urine, Catheter In/Out [[id number]]  (Abnormal) Collected:  05/22/18 0179    Specimen:  Urine from Urine, Catheter In/Out Updated:  05/23/18 0016     Color, UA Yellow     Appearance, UA Clear     pH, UA 5.5     Specific Gravity, UA 1.010     Glucose, UA Negative     Ketones, UA Negative     Bilirubin, UA Negative     Blood, UA Trace (A)     Protein, UA Negative     Leuk Esterase, UA Small (1+) (A)     Nitrite, UA Negative     Urobilinogen, UA 0.2 E.U./dL    Urinalysis, Microscopic Only - Urine, Clean Catch [[id number]]  (Abnormal) Collected:  05/22/18 6608    Specimen:  Urine from Urine, Catheter In/Out Updated:  05/23/18 0016     RBC, UA 0-2 (A) /HPF      WBC, UA 3-5  (A) /HPF      Bacteria, UA None Seen /HPF      Squamous Epithelial Cells, UA 3-6 (A) /HPF      Transitional Epithelial Cells, UA 3-6 (A) /HPF      Hyaline Casts, UA None Seen /LPF      Amorphous Crystals, UA Small/1+ /HPF      Methodology Manual Light Microscopy    POC Glucose Once [[id number]]  (Normal) Collected:  05/22/18 2208    Specimen:  Blood Updated:  05/22/18 2214     Glucose 98 mg/dL     Narrative:       Meter: [id number] : [id number] Yoel Virk RN    POC Glucose Once [[id number]]  (Abnormal) Collected:  05/22/18 2139    Specimen:  Blood Updated:  05/22/18 2203     Glucose 64 (L) mg/dL     Narrative:       Meter: [id number] : [id number] Graciela ROSENBAUM    Basic Metabolic Panel [[id number]]  (Abnormal) Collected:  05/22/18 2111    Specimen:  Blood Updated:  05/22/18 2151     Glucose 56 (L) mg/dL      BUN 45 (H) mg/dL      Creatinine 2.27 (H) mg/dL      Sodium 141 mmol/L      Potassium 3.9 mmol/L      Chloride 98 mmol/L      CO2 34.7 (H) mmol/L      Calcium 8.5 (L) mg/dL      eGFR Non African Amer 22 (L) mL/min/1.73      BUN/Creatinine Ratio 19.8     Anion Gap 8.3 mmol/L     Narrative:       GFR Normal >60  Chronic Kidney Disease <60  Kidney Failure <15    POC Glucose Once [[id number]]  (Abnormal) Collected:  05/22/18 2116    Specimen:  Blood Updated:  05/22/18 2122     Glucose 59 (L) mg/dL     Narrative:       Meter: [id number] : [id number] Yoel Virk RN    POC Glucose Once [[id number]]  (Abnormal) Collected:  05/22/18 2050    Specimen:  Blood Updated:  05/22/18 2056     Glucose 66 (L) mg/dL     Narrative:       Meter: [id number] : [id number] Graciela ROSENBAUM        ECG 12 Lead   Preliminary Result   RR Interval= 1091 ms   WV Interval= 192 ms   QRSD Interval= 100 ms   QT Interval= 496 ms   QTc Interval= 475 ms   Heart Rate= 55 ms   P Axis= 41 deg   QRS Axis= 58 deg   T Wave Axis= 55 deg   I: 40 Axis= 34 deg   T: 40 Axis= 91 deg   ST Axis= 62 deg   SINUS RHYTHM   CONSIDER  "ANTEROSEPTAL INFARCT   Electronically Signed by:   Date and Time of Study: 2018-05-23 07:40:35      ECG 12 Lead   ED Interpretation   Dawson Miramontes MD     5/22/2018  4:54 PM   ECG 12 Lead      Date/Time: 5/22/2018 11:08 AM   Performed by: DAWSON MIRAMONTES   Authorized by: DAWSON MIRAMONTES    Interpreted by physician   Previous ECG: no previous ECG available   Rhythm: sinus rhythm   Rate: normal   QRS axis: normal   Conduction: conduction normal   ST Segments: ST segments normal   T Waves: T waves normal   Other: no other findings   Clinical impression: normal ECG           Results for orders placed during the hospital encounter of 04/30/17   Adult Transthoracic Echo Complete    Narrative · Left ventricle not well visualized. Left ventricular systolic function   is normal. Calculated EF = 56.2%. Estimated EF was in agreement with the   calculated EF. Normal left ventricular cavity size and wall thickness   noted. Left ventricular diastolic dysfunction is noted (grade I)   consistent with impaired relaxation.        Xr Chest 2 View    Result Date: 5/22/2018  1. No active disease. 2. Previous right lung surgery. 3. Chronic right basilar scarring. 4. Stable cardiomegaly. 5. Old bilateral rib fracture deformities.  This report was finalized on 5/22/2018 12:54 PM by Dr. David Lester MD.      ASSESSMENT/PLAN:  1. \"Myoclonic jerking movements\" with confusion: neurology consulted to evaluate  No acute issues currently    2, Chronic hypoxic respiratory failure: 4 liters chronic, no increase over baseline  3. COPD: no exacerbation   4. ALTA: follows with Dr. Hoyt outpatient  Continue home CPAP, DuoNeb's 4 times daily, Mucinex    5. Acute kidney injury on CKD IV:  Creatinine 2.55 on admit, baseline average 2.0 with range of 1.5 to 3.3 in past  Received 1 liter IVFs in ER, now back to near baseline at 2.06  Monitor     6. Elevated troponin, R/O NSTEMI:   Personal h/o chronic dCHF:Cardiology following  Likely secondary " to CKD as troponin is decreasing  No ACS per cardiology, echo pending    7. Chronic LE edema/chronic venous stasis:  Diuretics held for now secondary to LADY  monitor    8. Hypertension: BP at goal on metoprolol tartrate 50 mg every 12 hours    9. Hypothyroidism: No acute issues on levothyroxine 224 µg daily    10. DM2 with diabetic peripheral neuropathy:  Continue Levemir 30 units nightly, moderate dose sliding scale insulin, accuchecks ac/hs    11. Dyslipidemia: No acute issues on Lipitor 40 mg daily    12. Chronic narcotic dependent back pain: patient requesting double medication  JACOB reviewed, patient has RX 5/17/18 for 120 tablets hydrocodone/apap 10/325 mg  NO increase in medication at this time    13. Diarrhea: resolved    14. Chronic Normocytic anemia/anemia of chronic disease:  Likely secondary to CKD, no acute issues here, no active blood loss noted     15. Morbid obesity: nutrition following  Body mass index is 43.83 kg/m².     16. GERD: no acute issues here    17. Rule out UTI: received one time dose rocephin, await culture results to continue    18. Anxiety: No acute issues on Seroquel 100 mg twice daily    Plan: AD in am

## 2018-05-23 NOTE — PLAN OF CARE
Problem: Patient Care Overview  Goal: Plan of Care Review  Outcome: Ongoing (interventions implemented as appropriate)   05/22/18 2225   Coping/Psychosocial   Plan of Care Reviewed With patient   Plan of Care Review   Progress no change   OTHER   Outcome Summary wears 4L O2 @ home     Goal: Individualization and Mutuality  Outcome: Ongoing (interventions implemented as appropriate)      Problem: Chronic Obstructive Pulmonary Disease (Adult)  Intervention: Optimize Oxygenation/Ventilation/Perfusion   05/22/18 2225   Respiratory Interventions   Airway/Ventilation Management airway patency maintained;humidification applied;pulmonary hygiene promoted   Positioning   Head of Bed (HOB) HOB elevated

## 2018-05-24 VITALS
BODY MASS INDEX: 43.63 KG/M2 | HEART RATE: 56 BPM | SYSTOLIC BLOOD PRESSURE: 124 MMHG | HEIGHT: 66 IN | TEMPERATURE: 98 F | DIASTOLIC BLOOD PRESSURE: 62 MMHG | RESPIRATION RATE: 20 BRPM | OXYGEN SATURATION: 97 % | WEIGHT: 271.5 LBS

## 2018-05-24 LAB
ANION GAP SERPL CALCULATED.3IONS-SCNC: 7.8 MMOL/L
BUN BLD-MCNC: 43 MG/DL (ref 8–23)
BUN/CREAT SERPL: 23.9 (ref 7–25)
CALCIUM SPEC-SCNC: 8.2 MG/DL (ref 8.8–10.5)
CHLORIDE SERPL-SCNC: 101 MMOL/L (ref 98–107)
CO2 SERPL-SCNC: 32.2 MMOL/L (ref 22–29)
CREAT BLD-MCNC: 1.8 MG/DL (ref 0.57–1)
GFR SERPL CREATININE-BSD FRML MDRD: 29 ML/MIN/1.73
GLUCOSE BLD-MCNC: 146 MG/DL (ref 65–99)
GLUCOSE BLDC GLUCOMTR-MCNC: 91 MG/DL (ref 70–130)
POTASSIUM BLD-SCNC: 4.8 MMOL/L (ref 3.5–5.2)
SODIUM BLD-SCNC: 141 MMOL/L (ref 136–145)

## 2018-05-24 PROCEDURE — G0378 HOSPITAL OBSERVATION PER HR: HCPCS

## 2018-05-24 PROCEDURE — 80048 BASIC METABOLIC PNL TOTAL CA: CPT | Performed by: INTERNAL MEDICINE

## 2018-05-24 PROCEDURE — 99212 OFFICE O/P EST SF 10 MIN: CPT | Performed by: INTERNAL MEDICINE

## 2018-05-24 PROCEDURE — 99217 PR OBSERVATION CARE DISCHARGE MANAGEMENT: CPT | Performed by: NURSE PRACTITIONER

## 2018-05-24 PROCEDURE — 94799 UNLISTED PULMONARY SVC/PX: CPT

## 2018-05-24 PROCEDURE — 82962 GLUCOSE BLOOD TEST: CPT

## 2018-05-24 RX ORDER — ACETAMINOPHEN 325 MG/1
650 TABLET ORAL EVERY 4 HOURS PRN
Start: 2018-05-24 | End: 2020-08-14 | Stop reason: HOSPADM

## 2018-05-24 RX ORDER — SENNA AND DOCUSATE SODIUM 50; 8.6 MG/1; MG/1
2 TABLET, FILM COATED ORAL NIGHTLY PRN
Start: 2018-05-24

## 2018-05-24 RX ORDER — HYDROCODONE BITARTRATE AND ACETAMINOPHEN 10; 325 MG/1; MG/1
1 TABLET ORAL EVERY 6 HOURS PRN
Start: 2018-05-24 | End: 2018-06-07 | Stop reason: SDUPTHER

## 2018-05-24 RX ADMIN — ATORVASTATIN CALCIUM 40 MG: 40 TABLET, FILM COATED ORAL at 08:42

## 2018-05-24 RX ADMIN — METOPROLOL TARTRATE 50 MG: 50 TABLET, FILM COATED ORAL at 08:42

## 2018-05-24 RX ADMIN — LEVOTHYROXINE SODIUM 224 MCG: 112 TABLET ORAL at 06:09

## 2018-05-24 RX ADMIN — GUAIFENESIN 1200 MG: 600 TABLET, EXTENDED RELEASE ORAL at 08:42

## 2018-05-24 RX ADMIN — IPRATROPIUM BROMIDE AND ALBUTEROL SULFATE 3 ML: .5; 3 SOLUTION RESPIRATORY (INHALATION) at 08:50

## 2018-05-24 RX ADMIN — QUETIAPINE FUMARATE 100 MG: 100 TABLET ORAL at 08:42

## 2018-05-24 RX ADMIN — ASPIRIN 81 MG: 81 TABLET, COATED ORAL at 08:42

## 2018-05-24 NOTE — PLAN OF CARE
Problem: Chronic Obstructive Pulmonary Disease (Adult)  Intervention: Reduce/Relieve Breathlessness   05/24/18 0513   Cognitive Interventions   Sensory Stimulation Regulation quiet environment promoted     Intervention: Optimize Functional Ability/Increase Activity Tolerance   05/24/18 0513   Activity   Activity Management activity adjusted per tolerance     Intervention: Optimize Oxygenation/Ventilation/Perfusion   05/24/18 0513   Respiratory Interventions   Airway/Ventilation Management pulmonary hygiene promoted   Breathing Techniques/Airway Clearance pursed-lip breathing encouraged   Positioning   Head of Bed (HOB) HOB at 30-45 degrees     Intervention: Support/Optimize Psychosocial Response to Chronic Pulmonary Disease   05/24/18 0513   Coping/Psychosocial Interventions   Supportive Measures positive reinforcement provided   Interventions   Trust Relationship/Rapport emotional support provided       Goal: Signs and Symptoms of Listed Potential Problems Will be Absent, Minimized or Managed (Chronic Obstructive Pulmonary Disease)  Outcome: Ongoing (interventions implemented as appropriate)   05/24/18 0513   Goal/Outcome Evaluation   Problems Assessed (Chronic Obstructive Pulmonary Disease (COPD)) all   Problems Present (COPD, Bronch/Emphy) respiratory compromise

## 2018-05-24 NOTE — PROGRESS NOTES
"    Patient Name: Jared Govea  :1955  62 y.o.      Patient Care Team:  Jina Vaughan MD as PCP - General (Internal Medicine)  Jhonny Hoyt MD as Consulting Physician (Pulmonary Disease)  OPAL Arias as Nurse Practitioner (Endocrinology)  Konstantin Orr MD as Consulting Physician (Nephrology)  Vargas Ledezma MD as Consulting Physician (Pain Medicine)  Trinidad Barreto MD as Consulting Physician (Cardiology)    Chief Complaint: troponin above reference    Interval History: No CP   Echo yesterday:  Interpretation Summary     · Left ventricular systolic function is normal.  · Estimated EF appears to be in the range of 66 - 70%.            Objective   Vital Signs  Temp:  [97.1 °F (36.2 °C)-98.4 °F (36.9 °C)] 98 °F (36.7 °C)  Heart Rate:  [54-63] 55  Resp:  [18-20] 20  BP: (124-159)/(62-82) 124/62    Intake/Output Summary (Last 24 hours) at 18 0811  Last data filed at 18 1700   Gross per 24 hour   Intake              480 ml   Output              700 ml   Net             -220 ml     Flowsheet Rows      First Filed Value   Admission Height  167.6 cm (66\") Documented at 2018 1036   Admission Weight  118 kg (260 lb) Documented at 2018 1036          Physical Exam:   General Appearance:    Alert, cooperative, in no acute distress   Lungs:     Clear to auscultation.  Normal respiratory effort and rate.      Heart:    Regular rhythm and normal rate, normal S1 and S2, no murmurs, gallops or rubs.     Chest Wall:    No abnormalities observed   Abdomen:     Soft, nontender, positive bowel sounds.     Extremities:   no cyanosis, clubbing or edema.  No marked joint deformities.  Adequate musculoskeletal strength.       Results Review:      Results from last 7 days  Lab Units 18  0403   SODIUM mmol/L 141   POTASSIUM mmol/L 4.8   CHLORIDE mmol/L 101   CO2 mmol/L 32.2*   BUN mg/dL 43*   CREATININE mg/dL 1.80*   GLUCOSE mg/dL 146*   CALCIUM mg/dL 8.2*       Results from last " 7 days  Lab Units 05/23/18  0353 05/22/18  1115   TROPONIN T ng/mL 0.103* 0.165*       Results from last 7 days  Lab Units 05/23/18  0353   WBC 10*3/mm3 6.58   HEMOGLOBIN g/dL 10.4*   HEMATOCRIT % 33.6*   PLATELETS 10*3/mm3 140                           Medication Review:     aspirin 81 mg Oral Daily   atorvastatin 40 mg Oral Daily   enoxaparin 30 mg Subcutaneous Q24H   guaiFENesin 1,200 mg Oral Q12H   insulin aspart 0-9 Units Subcutaneous 4x Daily AC & at Bedtime   insulin detemir 30 Units Subcutaneous Nightly   ipratropium-albuterol 3 mL Nebulization 4x Daily - RT   levothyroxine 224 mcg Oral Q AM   metoprolol tartrate 50 mg Oral Q12H   QUEtiapine 100 mg Oral BID             Assessment/Plan   Active Hospital Problems (** Indicates Principal Problem)    Diagnosis Date Noted   • **COPD with acute exacerbation [J44.1] 05/22/2018   • Acute renal failure superimposed on stage 4 chronic kidney disease [N17.9, N18.4] 05/23/2018   • Morbid obesity [E66.01] 05/23/2018   • Troponin I above reference range [R74.8] 05/23/2018   • COPD (chronic obstructive pulmonary disease) [J44.9]    • Essential hypertension [I10]    • Type 2 diabetes mellitus with stage 3 chronic kidney disease, with long-term current use of insulin [E11.22, N18.3, Z79.4]    • Venous stasis dermatitis of both lower extremities [I87.2]       Resolved Hospital Problems    Diagnosis Date Noted Date Resolved   No resolved problems to display.     1.  Troponin above reference-no evidence of ACS.  EKG is normal.  Appears to be secondary to her acute renal failure.  Echocardiogram is normal.  2.  Exacerbation COPD  3.  Acute renal failure superimposed on chronic renal failure, stage IV  4.  Morbid obesity  5.  Essential hypertension  6.  Type 2 diabetes mellitus with chronic renal failure  7.  Chronic lower extremity edema with chronic venous stasis-chronic diuretic therapy is held given her acute renal failure     No active cardiac issues-we'll sign off, please  call if we can help in any way.    Milton Dowling III, MD  [facility]  05/24/18  8:11 AM

## 2018-05-24 NOTE — NURSING NOTE
Case Management Discharge Note    Final Note: home to self care & resume home O2.      Destination     No service coordination in this encounter.      Durable Medical Equipment     No service coordination in this encounter.      Dialysis/Infusion     No service coordination in this encounter.      Home Medical Care     No service coordination in this encounter.      Social Care     No service coordination in this encounter.             Final Discharge Disposition Code: 01 - home or self-care

## 2018-05-24 NOTE — NURSING NOTE
Continued Stay Note  TEODORA Dobbs     Patient Name: Jared Govea  MRN: [id number]  Today's Date: 5/24/2018    Admit Date: 5/22/2018          Discharge Plan     Row Name 05/24/18 1032       Plan    Plan Comments spoke with patient and she declined for  to arrange home health. will continue to follow.               Discharge Codes    No documentation.       Expected Discharge Date and Time     Expected Discharge Date Expected Discharge Time    May 24, 2018             Christina Galvan RN

## 2018-05-24 NOTE — PLAN OF CARE
Problem: Patient Care Overview  Goal: Plan of Care Review  Outcome: Ongoing (interventions implemented as appropriate)   05/24/18 0301   Coping/Psychosocial   Plan of Care Reviewed With patient   Plan of Care Review   Progress no change   OTHER   Outcome Summary Pt VSS. Pt on cont O2 4L NC during the day. CPAP at night. Pt c/o chronic back pain. PO meds for pain control. Pt expressed concern about low dosage of pain medication.Patient resting well at this time. Will continue to monitor patient.      Goal: Individualization and Mutuality  Outcome: Ongoing (interventions implemented as appropriate)    Goal: Discharge Needs Assessment  Outcome: Ongoing (interventions implemented as appropriate)    Goal: Interprofessional Rounds/Family Conf  Outcome: Ongoing (interventions implemented as appropriate)      Problem: Fall Risk (Adult)  Goal: Identify Related Risk Factors and Signs and Symptoms  Outcome: Ongoing (interventions implemented as appropriate)    Goal: Absence of Fall  Outcome: Ongoing (interventions implemented as appropriate)      Problem: Skin Injury Risk (Adult)  Goal: Identify Related Risk Factors and Signs and Symptoms  Outcome: Ongoing (interventions implemented as appropriate)    Goal: Skin Health and Integrity  Outcome: Ongoing (interventions implemented as appropriate)      Problem: Chronic Obstructive Pulmonary Disease (Adult)  Goal: Signs and Symptoms of Listed Potential Problems Will be Absent, Minimized or Managed (Chronic Obstructive Pulmonary Disease)  Outcome: Ongoing (interventions implemented as appropriate)      Problem: Pain, Chronic (Adult)  Goal: Identify Related Risk Factors and Signs and Symptoms  Outcome: Ongoing (interventions implemented as appropriate)    Goal: Acceptable Pain/Comfort Level and Functional Ability  Outcome: Ongoing (interventions implemented as appropriate)

## 2018-05-24 NOTE — DISCHARGE SUMMARY
Jared Govea  1955  [id number]    Hospitalists Discharge Summary    Date of Admission: 5/22/2018  Date of Discharge:  5/24/2018    Primary Discharge Diagnoses:  1. Myoclonus/asterixis, suspected mild dementia  Secondary Discharge Diagnoses:   2. Chronic hypoxic respiratory failure  3. Oxygen dependent COPD   4. ALTA   5. Acute kidney injury on CKD IV   6. Elevated troponin, R/O NSTEMI   7. Personal h/o chronic dCHF   8. Chronic LE edema/chronic venous stasis   9. Hypertension  10. Hypothyroidism   11. DM2 with diabetic peripheral neuropathy   12. Dyslipidemia   13. Chronic narcotic dependent back pain   14. Diarrhea   15. Chronic Normocytic anemia/anemia of chronic disease   16. Morbid obesity   17. GERD   18. Rule out UTI   19. Anxiety  PCP  Patient Care Team:  Jina Vaughan MD as PCP - General (Internal Medicine)  Jhonny Hoyt MD as Consulting Physician (Pulmonary Disease)  OPAL Arias as Nurse Practitioner (Endocrinology)  Konstantin Orr MD as Consulting Physician (Nephrology)  Vargas Ledezma MD as Consulting Physician (Pain Medicine)  Trinidad Barreto MD as Consulting Physician (Cardiology)    Consults:   Consults     Date and Time Order Name Status Description    5/23/2018 1120 Inpatient Neurology Consult Completed     5/23/2018 0730 Inpatient Cardiology Consult Completed         Operations and Procedures Performed:     Xr Chest 2 View    Result Date: 5/22/2018  Narrative: CHEST X-RAY, 5/22/2018  HISTORY: 62-year-old female in the ED complaining of one week history of shortness of air. History of COPD.  TECHNIQUE: AP and lateral upright chest series.  COMPARISON: *  Chest x-ray, 5/3/2017, 4/30/2017 and 10/4/2016. CT chest, 7/27/2017.  FINDINGS: No active disease the chest.  Previous right lung surgery with partial resection of the right 5th rib. Old healed bilateral rib fractures. Chronic scarring and volume loss in the right lung base.  No acute pulmonary infiltrate, pneumothorax  "or visible pleural effusion. Stable moderate cardiomegaly. Pulmonary vascularity is normal.      Impression: 1. No active disease. 2. Previous right lung surgery. 3. Chronic right basilar scarring. 4. Stable cardiomegaly. 5. Old bilateral rib fracture deformities.  This report was finalized on 5/22/2018 12:54 PM by Dr. David Lester MD.      Allergies:  is allergic to erythromycin; erythromycin ethylsuccinate; ativan [lorazepam]; and tape.    Graham  Reviewed: Hydrocodone 10/325 mg #120 on 5/17/18 for 30 days per report of 5/22/18    Discharge Medications:   Jared Govea   Home Medication Instructions DAE:[id number]    Printed on:05/24/18 7280   Medication Information                      acetaminophen (TYLENOL) 325 MG tablet  Take 2 tablets by mouth Every 4 (Four) Hours As Needed for Mild Pain , Headache or Fever.             albuterol (PROVENTIL HFA;VENTOLIN HFA) 108 (90 Base) MCG/ACT inhaler  Inhale 2 puffs Every 4 (Four) Hours As Needed for Wheezing.             aspirin 81 MG EC tablet  Take 81 mg by mouth daily.             atorvastatin (LIPITOR) 40 MG tablet  Take 1 tablet by mouth Daily. for cholesterol.             B Complex Vitamins (VITAMIN B COMPLEX PO)  Take 1 tablet by mouth daily.             bumetanide (BUMEX) 2 MG tablet  Take 2 mg by mouth Daily.             cetirizine (ZyrTEC) 10 MG tablet  Take 10 mg by mouth every night.             clotrimazole-betamethasone (LOTRISONE) 1-0.05 % cream  Apply  topically 2 (Two) Times a Day.             dimenhyDRINATE (DRAMAMINE) 50 MG tablet  Take 1 tablet by mouth Daily As Needed for movement disorders.             famotidine (PEPCID) 20 MG tablet  Take 20 mg by mouth daily.             fluticasone-salmeterol (ADVAIR DISKUS) 250-50 MCG/DOSE DISKUS  Inhale 1 puff 2 (Two) Times a Day. For COPD and rinse mouth after use             Gauze Pads & Dressings (ABDOMINAL PAD) 8\"X10\" pads  1 pad Daily.             guaiFENesin (MUCINEX) 600 MG 12 hr tablet  Take " "1,200 mg by mouth 2 (Two) Times a Day.             HYDROcodone-acetaminophen (NORCO)  MG per tablet  Take 1 tablet by mouth Every 6 (Six) Hours As Needed for Moderate Pain  for up to 8 days.             insulin aspart (NOVOLOG FLEXPEN) 100 UNIT/ML solution pen-injector sc pen  SSI             Insulin Degludec (TRESIBA FLEXTOUCH) 200 UNIT/ML solution pen-injector  Inject 70 Units under the skin Daily.             Insulin Pen Needle (BD PEN NEEDLE CLAY U/F) 32G X 4 MM misc  Use as directed 7 times daily             levalbuterol (XOPENEX) 0.63 MG/3ML nebulizer solution  Take 1 ampule by nebulization every 4 (four) hours as needed for wheezing.             levothyroxine (SYNTHROID) 112 MCG tablet  Take 2 tablets by mouth Daily.             metoprolol tartrate (LOPRESSOR) 50 MG tablet  Take 1 tablet by mouth Every 12 (Twelve) Hours. For heart             Probiotic Product (ALIGN) 4 MG capsule  Take 4 mg by mouth Daily.             QUEtiapine (SEROquel) 100 MG tablet  Take 1 tablet by mouth 2 (Two) Times a Day.             sennosides-docusate sodium (SENOKOT-S) 8.6-50 MG tablet  Take 2 tablets by mouth At Night As Needed for Constipation.             Umeclidinium Bromide (INCRUSE ELLIPTA IN)  Inhale Daily.             vitamin D (ERGOCALCIFEROL) 62158 units capsule capsule  Take 1 capsule by mouth Every 7 (Seven) Days. On tuesday               History of Present Illness: Taken from [facility] on admit:  \"61 yo WF / PMH of dCHF,  Chronic Hypoxic Resp Failure on 4L Home o2, CKD Stage 4, DM w/ perpherial neuropathy, HTN, Hypothyriodism, chronic LBP, who presents with severe myoclonic jerks that were severe enough that kept her from sleeping the night before admission. These started ~ 2 days ago, and were associated with SOA, lethargy and nausea w/ decreased appetite. Preceeding these symptoms, she had a two day bought of diarrhea w/ 5-9 watery stools a day.    Denies, polyuria, polydipesia, dsyuria, fever/chills.\"     Hospital " Course  1. Myoclonus/asterixis, suspected mild dementia: neurology followed  No acute findings, felt secondary to chronic illness/medications (seroquel twice daily)/renal dysfunction   ordered  F/U Jina Vaughan MD 6/18/18 as scheduled     2. Chronic hypoxic respiratory failure: 4 liters chronic, no increase over baseline  3. Oxygen dependent COPD: no exacerbation   4. ALTA: follows with Dr. Hoyt outpatient  Continue home CPAP, inhalers, Mucinex  F/U Jina Vaughan MD     5. Acute kidney injury on CKD IV:  Creatinine 2.55 on admit, baseline average 2.0 with range of 1.5 to 3.3 in past  Currently 1.80 today  Received 1 liter IVFs in ER  F/U nephrology as scheduled     6. Elevated troponin, R/O NSTEMI:   7. Personal h/o chronic dCHF:Cardiology following  Likely secondary to CKD as troponin decreased  No ACS per cardiology, echo with EF 66-70%, no acute issues  F/U Jina Vaughan MD     8. Chronic LE edema/chronic venous stasis:  Diuretics held secondary to number 5, resume      9. Hypertension: BP at goal on metoprolol tartrate 50 mg every 12 hours     10. Hypothyroidism: No acute issues on levothyroxine 224 µg daily     11. DM2 with diabetic peripheral neuropathy:  Received Levemir 30 units nightly, moderate dose sliding scale insulin, accuchecks ac/hs  Resume home regimen  F/U Jina Vaughan MD     12. Dyslipidemia: No acute issues on Lipitor 40 mg daily     13. Chronic narcotic dependent back pain: patient requesting double medication  JACOB reviewed, patient has RX 5/17/18 for 120 tablets hydrocodone/apap 10/325 mg  NO increase in medication at this time     14. Diarrhea: resolved     15. Chronic Normocytic anemia/anemia of chronic disease:  Likely secondary to CKD, no acute issues here, no active blood loss noted      16. Morbid obesity: nutrition following  Body mass index is 43.83 kg/m².      17. GERD: no acute issues here     18. Rule out UTI: received one time dose rocephin, culture mixed  F/U Jina HOLMAN  MD Clement for further issues     19. Anxiety: No acute issues on Seroquel 100 mg twice daily  Last Lab Results:   Lab Results (most recent)     Procedure Component Value Units Date/Time    Basic Metabolic Panel [[id number]]  (Abnormal) Collected:  05/24/18 0403    Specimen:  Blood Updated:  05/24/18 0459     Glucose 146 (H) mg/dL      BUN 43 (H) mg/dL      Creatinine 1.80 (H) mg/dL      Sodium 141 mmol/L      Potassium 4.8 mmol/L      Chloride 101 mmol/L      CO2 32.2 (H) mmol/L      Calcium 8.2 (L) mg/dL      eGFR Non African Amer 29 (L) mL/min/1.73      BUN/Creatinine Ratio 23.9     Anion Gap 7.8 mmol/L     Narrative:       GFR Normal >60  Chronic Kidney Disease <60  Kidney Failure <15    POC Glucose Once [[id number]]  (Abnormal) Collected:  05/23/18 2044    Specimen:  Blood Updated:  05/23/18 2051     Glucose 225 (H) mg/dL     Narrative:       Meter: [id number] : [id number] Lissette Flannery RN    POC Glucose Once [[id number]]  (Abnormal) Collected:  05/23/18 1721    Specimen:  Blood Updated:  05/23/18 1730     Glucose 214 (H) mg/dL     Narrative:       Meter: [id number] : [id number] Kye [facility]    POC Glucose Once [[id number]]  (Abnormal) Collected:  05/23/18 1156    Specimen:  Blood Updated:  05/23/18 1222     Glucose 330 (H) mg/dL     Narrative:       Meter: [id number] : [id number] Jen Quinn [facility]    Respiratory Culture - Sputum, Cough [[id number]] Collected:  05/23/18 0125    Specimen:  Sputum from Cough Updated:  05/23/18 1015     Gram Stain Result Few (2+) WBCs seen      Few (2+) Epithelial cells per low power field      Rare (1+) Gram positive cocci    Urine Culture - Urine, [[id number]] Collected:  05/22/18 1216    Specimen:  Urine from Urine, Clean Catch Updated:  05/23/18 0913     Urine Culture >100,000 CFU/mL Mixed Levi Isolated    Narrative:         Specimen contains mixed organisms of questionable pathogenicity which indicates contamination with commensal levi.  Further  identification is unlikely to provide clinically useful information.  Suggest recollection.    Troponin [[id number]]  (Abnormal) Collected:  05/23/18 0353    Specimen:  Blood Updated:  05/23/18 0848     Troponin T 0.103 (C) ng/mL     Narrative:       Troponin T Reference Ranges:  Less than 0.03 ng/mL:    Negative for AMI  0.03 to 0.09 ng/mL:      Indeterminant for AMI  Greater than 0.09 ng/mL: Positive for AMI    POC Glucose Once [[id number]]  (Abnormal) Collected:  05/23/18 0730    Specimen:  Blood Updated:  05/23/18 0737     Glucose 278 (H) mg/dL     Narrative:       Meter: [id number] : [id number] Li Cheyenne [facility]    Basic Metabolic Panel [[id number]]  (Abnormal) Collected:  05/23/18 0353    Specimen:  Blood Updated:  05/23/18 0450     Glucose 253 (H) mg/dL      BUN 44 (H) mg/dL      Creatinine 2.06 (H) mg/dL      Sodium 142 mmol/L      Potassium 4.0 mmol/L      Chloride 100 mmol/L      CO2 31.9 (H) mmol/L      Calcium 8.3 (L) mg/dL      eGFR Non African Amer 24 (L) mL/min/1.73      BUN/Creatinine Ratio 21.4     Anion Gap 10.1 mmol/L     Narrative:       GFR Normal >60  Chronic Kidney Disease <60  Kidney Failure <15    CBC Auto Differential [[id number]]  (Abnormal) Collected:  05/23/18 0353    Specimen:  Blood Updated:  05/23/18 0433     WBC 6.58 10*3/mm3      RBC 3.49 (L) 10*6/mm3      Hemoglobin 10.4 (L) g/dL      Hematocrit 33.6 (L) %      MCV 96.3 fL      MCH 29.8 pg      MCHC 31.0 g/dL      RDW 12.9 %      RDW-SD 45.0 fl      MPV 10.6 (H) fL      Platelets 140 10*3/mm3      Neutrophil % 60.3 %      Lymphocyte % 25.8 %      Monocyte % 7.0 %      Eosinophil % 6.1 (H) %      Basophil % 0.6 %      Immature Grans % 0.2 %      Neutrophils, Absolute 3.97 10*3/mm3      Lymphocytes, Absolute 1.70 10*3/mm3      Monocytes, Absolute 0.46 10*3/mm3      Eosinophils, Absolute 0.40 (H) 10*3/mm3      Basophils, Absolute 0.04 10*3/mm3      Immature Grans, Absolute 0.01 10*3/mm3      nRBC 0.0 /100 WBC     Urinalysis  With / Culture If Indicated - Urine, Catheter In/Out [[id number]]  (Abnormal) Collected:  05/22/18 2356    Specimen:  Urine from Urine, Catheter In/Out Updated:  05/23/18 0016     Color, UA Yellow     Appearance, UA Clear     pH, UA 5.5     Specific Gravity, UA 1.010     Glucose, UA Negative     Ketones, UA Negative     Bilirubin, UA Negative     Blood, UA Trace (A)     Protein, UA Negative     Leuk Esterase, UA Small (1+) (A)     Nitrite, UA Negative     Urobilinogen, UA 0.2 E.U./dL    Urinalysis, Microscopic Only - Urine, Clean Catch [[id number]]  (Abnormal) Collected:  05/22/18 2356    Specimen:  Urine from Urine, Catheter In/Out Updated:  05/23/18 0016     RBC, UA 0-2 (A) /HPF      WBC, UA 3-5 (A) /HPF      Bacteria, UA None Seen /HPF      Squamous Epithelial Cells, UA 3-6 (A) /HPF      Transitional Epithelial Cells, UA 3-6 (A) /HPF      Hyaline Casts, UA None Seen /LPF      Amorphous Crystals, UA Small/1+ /HPF      Methodology Manual Light Microscopy    POC Glucose Once [[id number]]  (Normal) Collected:  05/22/18 2208    Specimen:  Blood Updated:  05/22/18 2214     Glucose 98 mg/dL     Narrative:       Meter: [id number] : [id number] Yoel Virk RN    POC Glucose Once [[id number]]  (Abnormal) Collected:  05/22/18 2139    Specimen:  Blood Updated:  05/22/18 2203     Glucose 64 (L) mg/dL     Narrative:       Meter: [id number] : [id number] Graciela DIAZ PRN    Basic Metabolic Panel [[id number]]  (Abnormal) Collected:  05/22/18 2111    Specimen:  Blood Updated:  05/22/18 2151     Glucose 56 (L) mg/dL      BUN 45 (H) mg/dL      Creatinine 2.27 (H) mg/dL      Sodium 141 mmol/L      Potassium 3.9 mmol/L      Chloride 98 mmol/L      CO2 34.7 (H) mmol/L      Calcium 8.5 (L) mg/dL      eGFR Non African Amer 22 (L) mL/min/1.73      BUN/Creatinine Ratio 19.8     Anion Gap 8.3 mmol/L     Narrative:       GFR Normal >60  Chronic Kidney Disease <60  Kidney Failure <15    POC Glucose Once [[id number]]  (Abnormal)  Collected:  05/22/18 2116    Specimen:  Blood Updated:  05/22/18 2122     Glucose 59 (L) mg/dL     Narrative:       Meter: [id number] : [id number] Yoel Virk RN    POC Glucose Once [[id number]]  (Abnormal) Collected:  05/22/18 2050    Specimen:  Blood Updated:  05/22/18 2056     Glucose 66 (L) mg/dL     Narrative:       Meter: [id number] : [id number] Graciela DIAZ PRN    Urinalysis, Microscopic Only - Urine, Clean Catch [[id number]]  (Abnormal) Collected:  05/22/18 1216    Specimen:  Urine from Urine, Clean Catch Updated:  05/22/18 1238     RBC, UA 3-5 (A) /HPF      WBC, UA 21-30 (A) /HPF      Bacteria, UA 2+ (A) /HPF      Squamous Epithelial Cells, UA 13-20 (A) /HPF      Hyaline Casts, UA None Seen /LPF      WBC Clumps, UA Small/1+ /HPF      Methodology Manual Light Microscopy    Dawson Draw [[id number]] Collected:  05/22/18 1115    Specimen:  Blood Updated:  05/22/18 1230    Narrative:       The following orders were created for panel order [place] Draw.  Procedure                               Abnormality         Status                     ---------                               -----------         ------                     Light Blue Top[[id number]]                                   Final result               Green Top (Gel)[[id number]]                                  Final result               Lavender Top[[id number]]                                     Final result               Gold Top - SST[[id number]]                                   Final result                 Please view results for these tests on the individual orders.    Light Blue Top [[id number]] Collected:  05/22/18 1115    Specimen:  Blood Updated:  05/22/18 1230     Extra Tube hold for add-on     Comment: Auto resulted       Green Top (Gel) [[id number]] Collected:  05/22/18 1115    Specimen:  Blood Updated:  05/22/18 1230     Extra Tube Hold for add-ons.     Comment: Auto resulted.       Lavender Top [[id number]] Collected:  05/22/18  1115    Specimen:  Blood Updated:  05/22/18 1230     Extra Tube hold for add-on     Comment: Auto resulted       Gold Top - SST [[id number]] Collected:  05/22/18 1115    Specimen:  Blood Updated:  05/22/18 1230     Extra Tube Hold for add-ons.     Comment: Auto resulted.       Urinalysis With / Culture If Indicated - Urine, Clean Catch [[id number]]  (Abnormal) Collected:  05/22/18 1216    Specimen:  Urine from Urine, Clean Catch Updated:  05/22/18 1226     Color, UA Yellow     Appearance, UA Clear     pH, UA 5.5     Specific Gravity, UA 1.015     Glucose, UA Negative     Ketones, UA Negative     Bilirubin, UA Negative     Blood, UA Trace (A)     Protein, UA Negative     Leuk Esterase, UA Large (3+) (A)     Nitrite, UA Negative     Urobilinogen, UA 0.2 E.U./dL    Troponin [[id number]]  (Abnormal) Collected:  05/22/18 1115    Specimen:  Blood Updated:  05/22/18 1208     Troponin T 0.165 (C) ng/mL     Narrative:       Troponin T Reference Ranges:  Less than 0.03 ng/mL:    Negative for AMI  0.03 to 0.09 ng/mL:      Indeterminant for AMI  Greater than 0.09 ng/mL: Positive for AMI    BNP [[id number]]  (Normal) Collected:  05/22/18 1115    Specimen:  Blood Updated:  05/22/18 1153     proBNP 79.8 pg/mL     Narrative:       Among patients with dyspnea, NT-proBNP is highly sensitive for the detection of acute congestive heart failure. In addition NT-proBNP of <300 pg/ml effectively rules out acute congestive heart failure with 99% negative predictive value.    Comprehensive Metabolic Panel [[id number]]  (Abnormal) Collected:  05/22/18 1115    Specimen:  Blood Updated:  05/22/18 1141     Glucose 155 (H) mg/dL      BUN 45 (H) mg/dL      Creatinine 2.55 (H) mg/dL      Sodium 141 mmol/L      Potassium 4.6 mmol/L      Chloride 97 (L) mmol/L      CO2 33.6 (H) mmol/L      Calcium 9.0 mg/dL      Total Protein 6.7 g/dL      Albumin 3.80 g/dL      ALT (SGPT) 16 U/L      AST (SGOT) 35 (H) U/L      Alkaline Phosphatase 94 U/L      Total  Bilirubin 0.3 mg/dL      eGFR Non African Amer 19 (L) mL/min/1.73      Globulin 2.9 gm/dL      A/G Ratio 1.3 g/dL      BUN/Creatinine Ratio 17.6     Anion Gap 10.4 mmol/L     CBC & Differential [[id number]] Collected:  05/22/18 1115    Specimen:  Blood Updated:  05/22/18 1124    Narrative:       The following orders were created for panel order CBC & Differential.  Procedure                               Abnormality         Status                     ---------                               -----------         ------                     CBC Auto Differential[[id number]]        Abnormal            Final result                 Please view results for these tests on the individual orders.    CBC Auto Differential [[id number]]  (Abnormal) Collected:  05/22/18 1115    Specimen:  Blood Updated:  05/22/18 1124     WBC 7.61 10*3/mm3      RBC 3.67 (L) 10*6/mm3      Hemoglobin 11.1 (L) g/dL      Hematocrit 35.9 (L) %      MCV 97.8 fL      MCH 30.2 pg      MCHC 30.9 (L) g/dL      RDW 12.9 %      RDW-SD 46.2 fl      MPV 10.5 (H) fL      Platelets 154 10*3/mm3      Neutrophil % 66.7 %      Lymphocyte % 20.0 %      Monocyte % 6.7 %      Eosinophil % 5.7 (H) %      Basophil % 0.5 %      Immature Grans % 0.4 %      Neutrophils, Absolute 5.08 10*3/mm3      Lymphocytes, Absolute 1.52 10*3/mm3      Monocytes, Absolute 0.51 10*3/mm3      Eosinophils, Absolute 0.43 (H) 10*3/mm3      Basophils, Absolute 0.04 10*3/mm3      Immature Grans, Absolute 0.03 10*3/mm3      nRBC 0.0 /100 WBC         Imaging Results (most recent)     Procedure Component Value Units Date/Time    XR Chest 2 View [[id number]] Collected:  05/22/18 1251     Updated:  05/22/18 1256    Narrative:       CHEST X-RAY, 5/22/2018     HISTORY:   62-year-old female in the ED complaining of one week history of  shortness of air. History of COPD.     TECHNIQUE:  AP and lateral upright chest series.     COMPARISON:  *  Chest x-ray, 5/3/2017, 4/30/2017 and 10/4/2016. CT chest,  7/27/2017.     FINDINGS:  No active disease the chest.     Previous right lung surgery with partial resection of the right 5th rib.  Old healed bilateral rib fractures. Chronic scarring and volume loss in  the right lung base.     No acute pulmonary infiltrate, pneumothorax or visible pleural effusion.  Stable moderate cardiomegaly. Pulmonary vascularity is normal.       Impression:       1. No active disease.  2. Previous right lung surgery.  3. Chronic right basilar scarring.  4. Stable cardiomegaly.  5. Old bilateral rib fracture deformities.     This report was finalized on 5/22/2018 12:54 PM by Dr. David Lester MD.           PROCEDURES: NONE    Condition on Discharge:  stable    Physical Exam at Discharge  Vital Signs  Temp:  [97.1 °F (36.2 °C)-98.4 °F (36.9 °C)] 98 °F (36.7 °C)  Heart Rate:  [54-63] 55  Resp:  [18-20] 20  BP: (124-159)/(62-82) 124/62    Physical Exam:  Physical Exam   Constitutional: Patient appears well-developed and well-nourished and in no acute distress, morbidly obese, much older than stated age   HEENT:   Head: Normocephalic and atraumatic.   Eyes:  Pupils are equal, round, and reactive to light. EOM are intact. Sclera are anicteric and non-injected.  Mouth and Throat: Patient has moist mucous membranes. Oropharynx is clear of any erythema or exudate.     Neck: Unable to determine secondary to habitus  Cardiovascular: Bradycardic rate, regular rhythm, S1 normal and S2 normal.  Exam reveals no gallop and no friction rub.  No murmur heard.  Pulmonary/Chest: Lungs diminished throughout  Abdominal: Obese, Soft. Bowel sounds are normal. No distension and no mass. There is no hepatosplenomegaly. There is no tenderness.   Musculoskeletal: Unable to determine secondary to habitus  Extremities: 2+ bilateral LE non-pitting edema. Pulses are palpable in all 4 extremities.  Neurological: Patient is alert and oriented to person, place, and time. Cranial nerves II-XII are grossly intact with no  focal deficits.  Skin: Skin is warm. No rash noted. Nails show no clubbing.  No cyanosis or erythema.    Discharge Disposition  Home    Visiting Nurse:    Yes     Home PT/OT:  Yes     Home Safety Evaluation:  Yes     DME  Already has    Discharge Diet:    Dietary Orders     Start     Ordered    05/22/18 2046  Diet Regular; Cardiac, Consistent Carbohydrate  Diet Effective Now     Question Answer Comment   Diet Texture / Consistency Regular    Common Modifiers Cardiac    Common Modifiers Consistent Carbohydrate        05/22/18 2047        Activity at Discharge:  As tolerated    Pre-discharge education  Diabetic, Cardiac, Injectables, medications, follow up    Follow-up Appointments  Future Appointments  Date Time Provider Department Center   6/7/2018 1:20 PM OPAL Powers [facility] None   6/11/2018 11:00 AM [facility] None   6/18/2018 3:00 PM Jina Vaughan MD [facility] None   7/5/2018 11:30 AM L[facility] CT 1 [facility] CT [facility]     Additional Instructions for the Follow-ups that You Need to Schedule     Discharge Follow-up with PCP    As directed      Follow Up Details:  1 week               Test Results Pending at Discharge: will need f/u by Jina Vaughan MD    Order Current Status    Respiratory Culture - Sputum, Cough Preliminary result           OPAL Ricci  05/24/18  9:32 AM    Time: Discharge over 30 min (if over 30 minutes give explanation as to why it took greater than 30 minutes)  Secondary to:  Coordination of care/follow up  Medication reconciliation  D/W patient and family  Review of JACOB

## 2018-05-24 NOTE — CONSULTS
Adult Nutrition  Assessment/PES    Patient Name:  Jared Govea  YOB: 1955  MRN: [id number]  Admit Date:  5/22/2018    Assessment Date:  5/24/2018  Poor compliance expected, see details below.           Adult Nutrition Assessment     Row Name 05/24/18 0940       Reason for Assessment    Reason For Assessment nurse/nurse practitioner consult   consult to edu b/c obese BMI    Diagnosis pulmonary disease;endocrine conditions   AE COPD, DM, HTN, CKD4, narcotic dependent    Row Name 05/24/18 0642       Anthropometrics    Weight 123 kg (271 lb 8 oz)              Education/Evaluation     Row Name 05/24/18 0941       Education    Education Education topics   Pt not very engaged. 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 She does have scale she can weigh on but she doesn't cook. Pt doesn't eat 3 times per day. Edu on weighing daily on scale & montior signs of fluid retention.     Education Topics Cardiac diabetic;CHF;Na+   Edu on high Na+ foods to avoid, making half plate veggies, avoiding fried/hidden fat.        Monitor/Evaluation    Education Follow-up Other (comment)   Verbalized understanding, doesn't appear interested in changing. Expect poor compliance.         Electronically signed by:  Flor Valencia RD  05/24/18 9:44 AM

## 2018-05-25 LAB
BACTERIA SPEC RESP CULT: NORMAL
GRAM STN SPEC: NORMAL

## 2018-06-06 ENCOUNTER — TELEPHONE (OUTPATIENT)
Dept: INTERNAL MEDICINE | Facility: CLINIC | Age: 63
End: 2018-06-06

## 2018-06-06 DIAGNOSIS — I10 HYPERTENSION, ESSENTIAL: ICD-10-CM

## 2018-06-06 DIAGNOSIS — R53.83 OTHER FATIGUE: ICD-10-CM

## 2018-06-06 DIAGNOSIS — I50.9 CONGESTIVE HEART FAILURE, UNSPECIFIED CONGESTIVE HEART FAILURE CHRONICITY, UNSPECIFIED CONGESTIVE HEART FAILURE TYPE: Primary | ICD-10-CM

## 2018-06-06 DIAGNOSIS — E78.5 HYPERLIPIDEMIA, UNSPECIFIED HYPERLIPIDEMIA TYPE: ICD-10-CM

## 2018-06-06 DIAGNOSIS — E11.8 TYPE 2 DIABETES MELLITUS WITH COMPLICATION, UNSPECIFIED LONG TERM INSULIN USE STATUS: ICD-10-CM

## 2018-06-06 DIAGNOSIS — E03.9 HYPOTHYROIDISM, UNSPECIFIED TYPE: ICD-10-CM

## 2018-06-06 NOTE — TELEPHONE ENCOUNTER
----- Message from Erin Luna MA sent at 6/6/2018  8:30 AM EDT -----  Orders entered.      ----- Message -----  From: Jina Vaughan MD  Sent: 6/6/2018   5:15 AM  To: [facility] Clinical Pool    CBC, CMP, HgA1c, lipid panel, TSH with reflex, iron panel, ferritin, folate and B12. Most of this can be ordered under her diabetes or HLD and CKD, but others will need to go under anemia   ----- Message -----  From: Christina Chavez CMA  Sent: 6/5/2018   3:00 PM  To: Jina Vaughan MD    Patient scheduled for labs please advise and send to pool thank you

## 2018-06-07 ENCOUNTER — OFFICE VISIT (OUTPATIENT)
Dept: PAIN MEDICINE | Facility: CLINIC | Age: 63
End: 2018-06-07

## 2018-06-07 ENCOUNTER — RESULTS ENCOUNTER (OUTPATIENT)
Dept: INTERNAL MEDICINE | Facility: CLINIC | Age: 63
End: 2018-06-07

## 2018-06-07 ENCOUNTER — TELEPHONE (OUTPATIENT)
Dept: PAIN MEDICINE | Facility: CLINIC | Age: 63
End: 2018-06-07

## 2018-06-07 VITALS
DIASTOLIC BLOOD PRESSURE: 67 MMHG | HEIGHT: 66 IN | TEMPERATURE: 99.4 F | HEART RATE: 65 BPM | SYSTOLIC BLOOD PRESSURE: 168 MMHG

## 2018-06-07 DIAGNOSIS — E78.5 HYPERLIPIDEMIA, UNSPECIFIED HYPERLIPIDEMIA TYPE: ICD-10-CM

## 2018-06-07 DIAGNOSIS — Z79.899 ENCOUNTER FOR LONG-TERM (CURRENT) USE OF HIGH-RISK MEDICATION: ICD-10-CM

## 2018-06-07 DIAGNOSIS — I50.9 CONGESTIVE HEART FAILURE, UNSPECIFIED CONGESTIVE HEART FAILURE CHRONICITY, UNSPECIFIED CONGESTIVE HEART FAILURE TYPE: ICD-10-CM

## 2018-06-07 DIAGNOSIS — E03.9 HYPOTHYROIDISM, UNSPECIFIED TYPE: ICD-10-CM

## 2018-06-07 DIAGNOSIS — G89.4 CHRONIC PAIN SYNDROME: Primary | ICD-10-CM

## 2018-06-07 DIAGNOSIS — R53.83 OTHER FATIGUE: ICD-10-CM

## 2018-06-07 DIAGNOSIS — J44.9 CHRONIC OBSTRUCTIVE PULMONARY DISEASE, UNSPECIFIED COPD TYPE (HCC): ICD-10-CM

## 2018-06-07 DIAGNOSIS — M43.06 LUMBAR SPONDYLOLYSIS: ICD-10-CM

## 2018-06-07 DIAGNOSIS — E11.8 TYPE 2 DIABETES MELLITUS WITH COMPLICATION, UNSPECIFIED LONG TERM INSULIN USE STATUS: ICD-10-CM

## 2018-06-07 DIAGNOSIS — I10 HYPERTENSION, ESSENTIAL: ICD-10-CM

## 2018-06-07 PROCEDURE — 99213 OFFICE O/P EST LOW 20 MIN: CPT | Performed by: NURSE PRACTITIONER

## 2018-06-07 RX ORDER — HYDROCODONE BITARTRATE AND ACETAMINOPHEN 10; 325 MG/1; MG/1
1 TABLET ORAL EVERY 6 HOURS PRN
Qty: 120 TABLET | Refills: 0 | Status: SHIPPED | OUTPATIENT
Start: 2018-06-07 | End: 2018-07-16 | Stop reason: SDUPTHER

## 2018-06-07 NOTE — TELEPHONE ENCOUNTER
Please call patient. A new product is available in case of accidental overdose of opioids. It is called Evzio. Please call patient and let her know I am prescribing this. This will be sent to pharmacy. And please send order to correct pharmacy. Thanks. GABO

## 2018-06-07 NOTE — PROGRESS NOTES
"CHIEF COMPLAINT  Since 5/9/18 office visit, Pt states LBP is basically unchanged ,being tolerably controlled overall.  Pt was hospitalized for 2 days 2 weeks ago at [facility] for \"Twitching\"- apparently this was due to her breathing medicine.    Subjective   Jared Govea is a 62 y.o. female  who presents to the office for follow-up.She has a history of chronic low back pain. Reports her pain is unchanged from last office visit.    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 Patient states she was there because her breathing medicine made her twitch.    Complains of pain in her low back. Today her pain is 4/10VAS. Describes the pain as intermittent and unchanged. Continues with Hydrocodone 10/325 2 BID.  Denies any side effects. The regimen helps decrease her pain by 45-50%. ADL's are difficult with COPD. Recent evaluation by pulmonary.    Presents with her sister who helps with history. Reports patient's pain is much better controlled with medication. She reports increased activity except for hospitalization.    Back Pain   This is a chronic problem. The current episode started more than 1 year ago. The problem occurs constantly. Progression since onset: unchanged since last office visit. The pain is present in the lumbar spine. The quality of the pain is described as aching. The pain does not radiate. The pain is at a severity of 4/10 (ranges from 3-8/10VAS). The pain is moderate. The symptoms are aggravated by bending, standing and twisting. Stiffness is present in the morning, at night and all day. Associated symptoms include chest pain and weakness. Pertinent negatives include no abdominal pain, bladder incontinence, bowel incontinence, dysuria, fever, headaches or numbness. Risk factors include history of steroid use, poor posture and obesity. She has tried analgesics, bed rest, chiropractic manipulation, NSAIDs, walking, heat, ice and home exercises for the symptoms. The treatment " "provided mild relief.      PEG Assessment   What number best describes your pain on average in the past week?4  What number best describes how, during the past week, pain has interfered with your enjoyment of life?4  What number best describes how, during the past week, pain has interfered with your general activity?  4    The following portions of the patient's history were reviewed and updated as appropriate: allergies, current medications, past family history, past medical history, past social history, past surgical history and problem list.    Review of Systems   Constitutional: Negative for activity change, chills and fever.   Respiratory: Positive for shortness of breath (COPD on O2 at 4L/M). Negative for cough.    Cardiovascular: Positive for chest pain.   Gastrointestinal: Negative for abdominal pain, bowel incontinence, constipation, diarrhea, nausea and vomiting.   Genitourinary: Negative for bladder incontinence, difficulty urinating, dyspareunia and dysuria.   Musculoskeletal: Positive for back pain.   Neurological: Positive for weakness and light-headedness (occasional). Negative for dizziness, numbness and headaches.   Psychiatric/Behavioral: Negative for confusion, hallucinations, self-injury, sleep disturbance and suicidal ideas. The patient is not nervous/anxious.        Vitals:    06/07/18 1307   BP: 168/67   Pulse: 65   Temp: 99.4 °F (37.4 °C)   Height: 167.6 cm (65.98\")   PainSc: 4  Comment: bilateral LBP (L>R) ranges from 3-8/10   PainLoc: Back     Objective   Physical Exam   Constitutional: She is oriented to person, place, and time. Vital signs are normal. She appears well-developed and well-nourished.   Chronic ill appearance   HENT:   Head: Normocephalic and atraumatic.   Eyes: Conjunctivae are normal.   Neck: Neck supple.   Cardiovascular: Normal rate.    Pulmonary/Chest: Effort normal.   Using O2 per NC   Abdominal:   obese   Musculoskeletal:        Lumbar back: She exhibits decreased " range of motion, tenderness, bony tenderness and pain.   Neurological: She is alert and oriented to person, place, and time. Gait (in motorized scooter) abnormal.   Reflex Scores:       Patellar reflexes are 0 on the right side and 0 on the left side.  Skin: Skin is warm, dry and intact.   Psychiatric: She has a normal mood and affect. Her speech is normal and behavior is normal. Judgment and thought content normal.   Nursing note and vitals reviewed.      Assessment/Plan   Diagnoses and all orders for this visit:    Chronic pain syndrome    Lumbar spondylolysis    Encounter for long-term (current) use of high-risk medication    Chronic obstructive pulmonary disease, unspecified COPD type    Other orders  -     HYDROcodone-acetaminophen (NORCO)  MG per tablet; Take 1 tablet by mouth Every 6 (Six) Hours As Needed for Moderate Pain  (DNF until 6-15-18) for up to 8 days.      --- The oral drug screen confirmation from 5-9-18 has been reviewed and the result is APPROPRIATE based on patient history and JACOB report  --- await [facility] authorization. No questions at this time.   --- Refill Hydrocodone. DNF until 6-15-18. Patient appears stable with current regimen. No adverse effects. Regarding continuation of opioids, there is no evidence of aberrant behavior or any red flags.  The patient continues with appropriate response to opioid therapy. ADL's remain intact by self.   --- Patient is high risk due to concurrent respiratory issues. Will be sending Evzio(naloxone) to pharmacy in case of overdose.   --- Follow-up 1 month or sooner if needed.       JACOB REPORT    As part of the patient's treatment plan, I am prescribing controlled substances. The patient has been made aware of appropriate use of such medications, including potential risk of somnolence, limited ability to drive and/or work safely, and the potential for dependence or overdose. It has also bee made clear that these medications are for use by this  patient only, without concomitant use of alcohol or other substances unless prescribed.     Patient has completed prescribing agreement detailing terms of continued prescribing of controlled substances, including monitoring JACOB reports, urine drug screening, and pill counts if necessary. The patient is aware that inappropriate use will results in cessation of prescribing such medications.    JACOB report has been reviewed and scanned into the patient's chart.    As the clinician, I personally reviewed the JACOB from 6-5-18 while the patient was in the office today.    History and physical exam exhibit continued safe and appropriate use of controlled substances.      EMR Dragon/Transcription disclaimer:   Much of this encounter note is an electronic transcription/translation of spoken language to printed text. The electronic translation of spoken language may permit erroneous, or at times, nonsensical words or phrases to be inadvertently transcribed; Although I have reviewed the note for such errors, some may still exist.

## 2018-06-11 RX ORDER — QUETIAPINE FUMARATE 100 MG/1
TABLET, FILM COATED ORAL
Qty: 180 TABLET | Refills: 0 | Status: SHIPPED | OUTPATIENT
Start: 2018-06-11 | End: 2018-09-13 | Stop reason: SDUPTHER

## 2018-06-14 LAB
ALBUMIN SERPL-MCNC: 4.1 G/DL (ref 3.5–5.2)
ALBUMIN/GLOB SERPL: 1.6 G/DL
ALP SERPL-CCNC: 121 U/L (ref 40–129)
ALT SERPL-CCNC: 16 U/L (ref 5–33)
AST SERPL-CCNC: 27 U/L (ref 5–32)
BASOPHILS # BLD AUTO: 0.04 10*3/MM3 (ref 0–0.2)
BASOPHILS NFR BLD AUTO: 0.6 % (ref 0–2)
BILIRUB SERPL-MCNC: 0.4 MG/DL (ref 0.2–1.2)
BUN SERPL-MCNC: 46 MG/DL (ref 8–23)
BUN/CREAT SERPL: 20.3 (ref 7–25)
CALCIUM SERPL-MCNC: 9.2 MG/DL (ref 8.8–10.5)
CHLORIDE SERPL-SCNC: 100 MMOL/L (ref 98–107)
CO2 SERPL-SCNC: 37.5 MMOL/L (ref 22–29)
CREAT SERPL-MCNC: 2.27 MG/DL (ref 0.57–1)
EOSINOPHIL # BLD AUTO: 0.28 10*3/MM3 (ref 0.1–0.3)
EOSINOPHIL NFR BLD AUTO: 4 % (ref 0–4)
ERYTHROCYTE [DISTWIDTH] IN BLOOD BY AUTOMATED COUNT: 13.2 % (ref 11.5–14.5)
FERRITIN SERPL-MCNC: 314 NG/ML (ref 13–150)
FOLATE SERPL-MCNC: >20 NG/ML (ref 4.78–20)
GFR SERPLBLD CREATININE-BSD FMLA CKD-EPI: 22 ML/MIN/1.73
GFR SERPLBLD CREATININE-BSD FMLA CKD-EPI: 26 ML/MIN/1.73
GLOBULIN SER CALC-MCNC: 2.5 GM/DL
GLUCOSE SERPL-MCNC: 128 MG/DL (ref 65–99)
HBA1C MFR BLD: 8.4 % (ref 4.8–5.6)
HCT VFR BLD AUTO: 37 % (ref 37–47)
HGB BLD-MCNC: 11 G/DL (ref 12–16)
IMM GRANULOCYTES # BLD: 0.02 10*3/MM3 (ref 0–0.03)
IMM GRANULOCYTES NFR BLD: 0.3 % (ref 0–0.5)
IRON SATN MFR SERPL: 24 %
IRON SERPL-MCNC: 52 MCG/DL (ref 37–145)
LYMPHOCYTES # BLD AUTO: 1.79 10*3/MM3 (ref 0.6–4.8)
LYMPHOCYTES NFR BLD AUTO: 25.4 % (ref 20–45)
MCH RBC QN AUTO: 30.1 PG (ref 27–31)
MCHC RBC AUTO-ENTMCNC: 29.7 G/DL (ref 31–37)
MCV RBC AUTO: 101.1 FL (ref 81–99)
MONOCYTES # BLD AUTO: 0.52 10*3/MM3 (ref 0–1)
MONOCYTES NFR BLD AUTO: 7.4 % (ref 3–8)
NEUTROPHILS # BLD AUTO: 4.41 10*3/MM3 (ref 1.5–8.3)
NEUTROPHILS NFR BLD AUTO: 62.3 % (ref 45–70)
NRBC BLD AUTO-RTO: 0 /100 WBC (ref 0–0)
PLATELET # BLD AUTO: 149 10*3/MM3 (ref 140–500)
POTASSIUM SERPL-SCNC: 5.2 MMOL/L (ref 3.5–5.2)
PROT SERPL-MCNC: 6.6 G/DL (ref 6–8.5)
RBC # BLD AUTO: 3.66 10*6/MM3 (ref 4.2–5.4)
SODIUM SERPL-SCNC: 147 MMOL/L (ref 136–145)
T4 FREE SERPL-MCNC: 0.1 NG/DL (ref 0.93–1.7)
TIBC SERPL-MCNC: 213 MCG/DL (ref 261–478)
TSH SERPL DL<=0.005 MIU/L-ACNC: 134.2 MIU/ML (ref 0.27–4.2)
UIBC SERPL-MCNC: 161 MCG/DL (ref 112–346)
VIT B12 SERPL-MCNC: 636 PG/ML
WBC # BLD AUTO: 7.06 10*3/MM3 (ref 4.8–10.8)

## 2018-06-15 ENCOUNTER — TELEPHONE (OUTPATIENT)
Dept: INTERNAL MEDICINE | Facility: CLINIC | Age: 63
End: 2018-06-15

## 2018-06-15 NOTE — TELEPHONE ENCOUNTER
----- Message from Jina Vaughan MD sent at 6/15/2018 10:57 AM EDT -----  Will review all labs with patient and her daughter next week. Her TSH which is her thyroid number is extremely high. Did she stop taking this or run out for some reason? If so, we need to get her started back on this.

## 2018-06-15 NOTE — PROGRESS NOTES
Will review all labs with patient and her daughter next week. Her TSH which is her thyroid number is extremely high. Did she stop taking this or run out for some reason? If so, we need to get her started back on this.

## 2018-06-18 ENCOUNTER — HOSPITAL ENCOUNTER (OUTPATIENT)
Facility: HOSPITAL | Age: 63
Setting detail: HOSPITAL OUTPATIENT SURGERY
End: 2018-06-18
Attending: ANESTHESIOLOGY | Admitting: ANESTHESIOLOGY

## 2018-06-18 ENCOUNTER — OFFICE VISIT (OUTPATIENT)
Dept: INTERNAL MEDICINE | Facility: CLINIC | Age: 63
End: 2018-06-18

## 2018-06-18 VITALS
HEIGHT: 66 IN | SYSTOLIC BLOOD PRESSURE: 126 MMHG | RESPIRATION RATE: 20 BRPM | DIASTOLIC BLOOD PRESSURE: 82 MMHG | BODY MASS INDEX: 42.59 KG/M2 | OXYGEN SATURATION: 96 % | HEART RATE: 68 BPM | WEIGHT: 265 LBS

## 2018-06-18 DIAGNOSIS — E78.2 MIXED HYPERLIPIDEMIA: Primary | ICD-10-CM

## 2018-06-18 DIAGNOSIS — F34.1 DYSTHYMIA: ICD-10-CM

## 2018-06-18 DIAGNOSIS — E03.9 HYPOTHYROIDISM, ACQUIRED: ICD-10-CM

## 2018-06-18 DIAGNOSIS — E66.01 MORBID OBESITY (HCC): Chronic | ICD-10-CM

## 2018-06-18 DIAGNOSIS — J44.9 CHRONIC OBSTRUCTIVE PULMONARY DISEASE, UNSPECIFIED COPD TYPE (HCC): ICD-10-CM

## 2018-06-18 DIAGNOSIS — H10.9 BACTERIAL CONJUNCTIVITIS OF LEFT EYE: ICD-10-CM

## 2018-06-18 DIAGNOSIS — IMO0002 UNCONTROLLED TYPE 2 DIABETES MELLITUS WITH STAGE 4 CHRONIC KIDNEY DISEASE, WITH LONG-TERM CURRENT USE OF INSULIN: ICD-10-CM

## 2018-06-18 DIAGNOSIS — I10 ESSENTIAL HYPERTENSION: ICD-10-CM

## 2018-06-18 PROBLEM — J44.1 COPD WITH ACUTE EXACERBATION (HCC): Status: RESOLVED | Noted: 2018-05-22 | Resolved: 2018-06-18

## 2018-06-18 LAB
CHOLEST SERPL-MCNC: 174 MG/DL (ref 0–200)
HDLC SERPL-MCNC: 69 MG/DL (ref 40–60)
LDLC SERPL CALC-MCNC: 85 MG/DL (ref 0–100)
LDLC/HDLC SERPL: 1.23 {RATIO}
TRIGL SERPL-MCNC: 102 MG/DL (ref 0–150)
VLDLC SERPL CALC-MCNC: 20.4 MG/DL (ref 7–27)

## 2018-06-18 PROCEDURE — 99215 OFFICE O/P EST HI 40 MIN: CPT | Performed by: INTERNAL MEDICINE

## 2018-06-18 RX ORDER — POLYMYXIN B SULFATE AND TRIMETHOPRIM 1; 10000 MG/ML; [USP'U]/ML
1 SOLUTION OPHTHALMIC EVERY 6 HOURS
Qty: 10 ML | Refills: 0 | Status: SHIPPED | OUTPATIENT
Start: 2018-06-18 | End: 2018-06-25

## 2018-06-18 RX ORDER — LEVOTHYROXINE SODIUM 300 UG/1
300 TABLET ORAL DAILY
Qty: 30 TABLET | Refills: 2 | Status: SHIPPED | OUTPATIENT
Start: 2018-06-18 | End: 2018-09-06 | Stop reason: HOSPADM

## 2018-06-18 NOTE — PROGRESS NOTES
"      Jared Govea is a 62 y.o. female, who presents with a chief complaint of   Chief Complaint   Patient presents with   • Follow-up     6 month f/u, lab results    • Diabetes       63 yo F with many chronic medical conditions here for follow up.     She has been changing her diuretics on her own. She is taking Bumex 2mg daily and sometimes takes her sister's lasix once per week. She is worried about her leg swelling and takes extra because of this. She sees Dr. Orr on 7/2 f or follow up of her kidney function.      She is eating \"junk\" at home. She is taking 70 units of Triseba and SSI Novolog. She can't state how much SSI she normally takes. She is taking Lipitor daily as well as ASA. Eye exam is up to date.     She states that her breathing has been \"bad\" with the humidity. She is taking Incruse as well as Advair and uses albuterol nebulizer a few times per day. She is still using her oxygen daily.     She states that she has been having twitches. She is taking her synthroid daily as prescribed and had this before when her thyroid was not working well and she was not on enough medication. No constipation, but she is chronically tired.     She is still having left eye discharge and redness at time. Antibiotic drops have helped in the future.                  The following portions of the patient's history were reviewed and updated as appropriate: allergies, current medications, past family history, past medical history, past social history, past surgical history and problem list.    Allergies: Erythromycin; Erythromycin ethylsuccinate; Ativan [lorazepam]; and Tape    Current Outpatient Prescriptions:   •  acetaminophen (TYLENOL) 325 MG tablet, Take 2 tablets by mouth Every 4 (Four) Hours As Needed for Mild Pain , Headache or Fever., Disp: , Rfl:   •  aspirin 81 MG EC tablet, Take 81 mg by mouth daily., Disp: , Rfl:   •  atorvastatin (LIPITOR) 40 MG tablet, Take 1 tablet by mouth Daily. for cholesterol., Disp: " "90 tablet, Rfl: 1  •  B Complex Vitamins (VITAMIN B COMPLEX PO), Take 1 tablet by mouth daily., Disp: , Rfl:   •  bumetanide (BUMEX) 2 MG tablet, Take 2 mg by mouth Daily., Disp: , Rfl:   •  cetirizine (ZyrTEC) 10 MG tablet, Take 10 mg by mouth every night., Disp: , Rfl:   •  clotrimazole-betamethasone (LOTRISONE) 1-0.05 % cream, Apply  topically 2 (Two) Times a Day., Disp: 180 g, Rfl: 3  •  dimenhyDRINATE (DRAMAMINE) 50 MG tablet, Take 1 tablet by mouth Daily As Needed for movement disorders., Disp: 30 tablet, Rfl: 0  •  famotidine (PEPCID) 20 MG tablet, Take 20 mg by mouth daily., Disp: , Rfl:   •  fluticasone-salmeterol (ADVAIR DISKUS) 250-50 MCG/DOSE DISKUS, Inhale 1 puff 2 (Two) Times a Day. For COPD and rinse mouth after use, Disp: 60 each, Rfl: 5  •  Gauze Pads & Dressings (ABDOMINAL PAD) 8\"X10\" pads, 1 pad Daily., Disp: 50 each, Rfl: 1  •  guaiFENesin (MUCINEX) 600 MG 12 hr tablet, Take 1,200 mg by mouth 2 (Two) Times a Day., Disp: , Rfl:   •  insulin aspart (NOVOLOG FLEXPEN) 100 UNIT/ML solution pen-injector sc pen, SSI, Disp: 15 mL, Rfl: 3  •  Insulin Degludec (TRESIBA FLEXTOUCH) 200 UNIT/ML solution pen-injector, Inject 70 Units under the skin Daily., Disp: 15 mL, Rfl: 3  •  Insulin Pen Needle (BD PEN NEEDLE CLAY U/F) 32G X 4 MM misc, Use as directed 7 times daily, Disp: 250 each, Rfl: 5  •  levalbuterol (XOPENEX) 0.63 MG/3ML nebulizer solution, Take 1 ampule by nebulization every 4 (four) hours as needed for wheezing., Disp: , Rfl:   •  metoprolol tartrate (LOPRESSOR) 50 MG tablet, Take 1 tablet by mouth Every 12 (Twelve) Hours. For heart, Disp: 180 tablet, Rfl: 1  •  Naloxone HCl (EVZIO) 0.4 MG/0.4ML solution auto-injector, Inject 0.4 mL as directed 1 (One) Time As Needed (respiratory depression or overdose) for up to 1 dose., Disp: 0.4 mL, Rfl: 0  •  PROAIR  (90 Base) MCG/ACT inhaler, INHALE TWO PUFFS BY MOUTH EVERY 4 HOURS AS NEEDED FOR WHEEZING, Disp: 18 g, Rfl: 5  •  Probiotic Product (ALIGN) " "4 MG capsule, Take 4 mg by mouth Daily., Disp: 30 capsule, Rfl: 6  •  QUEtiapine (SEROquel) 100 MG tablet, TAKE ONE TABLET BY MOUTH TWICE A DAY, Disp: 180 tablet, Rfl: 0  •  sennosides-docusate sodium (SENOKOT-S) 8.6-50 MG tablet, Take 2 tablets by mouth At Night As Needed for Constipation., Disp: , Rfl:   •  Umeclidinium Bromide (INCRUSE ELLIPTA IN), Inhale Daily., Disp: , Rfl:   •  vitamin D (ERGOCALCIFEROL) 48210 units capsule capsule, Take 1 capsule by mouth Every 7 (Seven) Days. On tuesday, Disp: 12 capsule, Rfl: 3  •  HYDROcodone-acetaminophen (NORCO)  MG per tablet, Take 1 tablet by mouth Every 6 (Six) Hours As Needed for Moderate Pain  (DNF until 6-15-18) for up to 8 days., Disp: 120 tablet, Rfl: 0  •  levothyroxine (SYNTHROID) 300 MCG tablet, Take 1 tablet by mouth Daily., Disp: 30 tablet, Rfl: 2  •  trimethoprim-polymyxin b (POLYTRIM) 52900-6.1 UNIT/ML-% ophthalmic solution, Administer 1 drop into the left eye Every 6 (Six) Hours for 7 days., Disp: 10 mL, Rfl: 0  Medications Discontinued During This Encounter   Medication Reason   • levothyroxine (SYNTHROID) 112 MCG tablet *Therapy completed       Review of Systems   Constitutional: Positive for fatigue. Negative for chills and fever.   HENT: Negative for congestion and rhinorrhea.    Eyes: Positive for discharge, redness and itching.   Respiratory: Positive for cough, shortness of breath and wheezing.    Cardiovascular: Positive for leg swelling. Negative for chest pain and palpitations.   Endocrine: Positive for cold intolerance.   Genitourinary: Negative for difficulty urinating and dysuria.   Neurological: Negative for dizziness and headaches.   Hematological: Negative for adenopathy.             /82 (BP Location: Left arm, Patient Position: Sitting, Cuff Size: Large Adult)   Pulse 68   Resp 20   Ht 167.6 cm (65.98\")   Wt 120 kg (265 lb) Comment: pt unable to weigh today, states recent lb  LMP  (LMP Unknown)   SpO2 96%   BMI 42.79 " kg/m²       Physical Exam   Constitutional: She is oriented to person, place, and time. She appears well-developed and well-nourished. She appears ill (chronically ill appearing in scooter and wearing nasal canula ). No distress.   HENT:   Head: Normocephalic and atraumatic.   Right Ear: External ear normal.   Left Ear: External ear normal.   Mouth/Throat: Oropharynx is clear and moist. No oropharyngeal exudate.   Eyes: Lids are normal. Right eye exhibits no discharge. Left eye exhibits discharge. Left conjunctiva is injected. No scleral icterus.   Neck: Neck supple.   Cardiovascular: Normal rate, regular rhythm and normal heart sounds.  Exam reveals no gallop and no friction rub.    No murmur heard.  Pulmonary/Chest: Effort normal. No respiratory distress. She has decreased breath sounds (diffusely ). She has no wheezes. She has no rales.   Lymphadenopathy:     She has no cervical adenopathy.   Neurological: She is alert and oriented to person, place, and time.   Skin: Skin is warm. No rash noted.   Psychiatric: Her speech is normal and behavior is normal. Cognition and memory are normal. She exhibits a depressed mood.   Nursing note and vitals reviewed.        Results for orders placed or performed in visit on 06/18/18   Lipid Panel With LDL / HDL Ratio   Result Value Ref Range    Total Cholesterol 174 0 - 200 mg/dL    Triglycerides 102 0 - 150 mg/dL    HDL Cholesterol 69 (H) 40 - 60 mg/dL    VLDL Cholesterol 20.4 7 - 27 mg/dL    LDL Cholesterol  85 0 - 100 mg/dL    LDL/HDL Ratio 1.23            Jared was seen today for follow-up and diabetes.    Diagnoses and all orders for this visit:    Mixed hyperlipidemia  -     Lipid Panel With LDL / HDL Ratio    Essential hypertension    Chronic obstructive pulmonary disease, unspecified COPD type    Morbid obesity    Uncontrolled type 2 diabetes mellitus with stage 4 chronic kidney disease, with long-term current use of insulin    Hypothyroidism, acquired  -     TSH  -      T4, Free    Dysthymia    Bacterial conjunctivitis of left eye    Other orders  -     levothyroxine (SYNTHROID) 300 MCG tablet; Take 1 tablet by mouth Daily.  -     trimethoprim-polymyxin b (POLYTRIM) 19162-5.1 UNIT/ML-% ophthalmic solution; Administer 1 drop into the left eye Every 6 (Six) Hours for 7 days.      Patient's cholesterol is under good control. Will continue current regimen of Lipitor. No side effects from medications reported. Will follow up in 6 months to monitor levels.     HTN is under good control and patient will continue to monitor with home BP cuff. No side effects from medications. Will continue current regimen of BB. Will follow up in 6 months      Her TSH is 134, but patient states that she is taking 224mcg daily. Will increase to 300mcg daily and recheck in 6 weeks     Patient's type 2 diabetes is not under good control . Will continue current regimen of Triseba and SSI. She is going to bring her log with her when she returns.No reported side effects from medications. Will follow up in 6 weeks.      COPD is stable. Continue current regimen. Discussed reasons to treat exacerbations.     Polytrim for eye infection.     Spent 50% of 40 minutes in counseling regarding her uncontrolled diabetes and hypothyroidism and importance for compliance with medication and logging BG's.         Return in about 6 weeks (around 7/30/2018) for Recheck of diabetes and thyroid .    Jina Vaughan MD  06/18/2018

## 2018-06-20 ENCOUNTER — APPOINTMENT (OUTPATIENT)
Dept: PREADMISSION TESTING | Facility: HOSPITAL | Age: 63
End: 2018-06-20

## 2018-06-25 RX ORDER — INSULIN DEGLUDEC 200 U/ML
74 INJECTION, SOLUTION SUBCUTANEOUS DAILY
Refills: 2 | OUTPATIENT
Start: 2018-06-25

## 2018-06-26 RX ORDER — INSULIN DEGLUDEC 200 U/ML
74 INJECTION, SOLUTION SUBCUTANEOUS DAILY
Refills: 2 | OUTPATIENT
Start: 2018-06-26

## 2018-06-29 ENCOUNTER — TELEPHONE (OUTPATIENT)
Dept: INTERNAL MEDICINE | Facility: CLINIC | Age: 63
End: 2018-06-29

## 2018-06-29 NOTE — TELEPHONE ENCOUNTER
Aware   Sent to pharmacy          ----- Message from Jina Vaughan MD sent at 6/29/2018 12:06 PM EDT -----  Regarding: RE: Refill   Contact: [phone]  Yes, please refill.     ----- Message -----  From: Melinda Casiano MA  Sent: 6/29/2018  11:01 AM  To: Jina Vaughan MD  Subject: FW: Refill                                           ----- Message -----  From: Nigel Oneill MA  Sent: 6/29/2018  10:48 AM  To: [facility]  Subject: Refill                                           Sania Goins (sister) called to see if [facility] will refill Tresiba. Pt sees Dr. Orr for Tresiba refills but can't see him till 8/1/18. His office will not refill medication at this time. Pt has enough for today but needs enough to get through until her appointment. Pt currently takes 70 units daily.        Ha Goins   [phone]

## 2018-07-16 ENCOUNTER — OFFICE VISIT (OUTPATIENT)
Dept: PAIN MEDICINE | Facility: CLINIC | Age: 63
End: 2018-07-16

## 2018-07-16 VITALS
WEIGHT: 265 LBS | DIASTOLIC BLOOD PRESSURE: 75 MMHG | TEMPERATURE: 97.3 F | HEART RATE: 76 BPM | SYSTOLIC BLOOD PRESSURE: 179 MMHG | OXYGEN SATURATION: 91 % | HEIGHT: 66 IN | RESPIRATION RATE: 18 BRPM | BODY MASS INDEX: 42.59 KG/M2

## 2018-07-16 DIAGNOSIS — M43.06 LUMBAR SPONDYLOLYSIS: ICD-10-CM

## 2018-07-16 DIAGNOSIS — E11.42 DIABETIC PERIPHERAL NEUROPATHY (HCC): ICD-10-CM

## 2018-07-16 DIAGNOSIS — Z79.899 ENCOUNTER FOR LONG-TERM (CURRENT) USE OF HIGH-RISK MEDICATION: ICD-10-CM

## 2018-07-16 DIAGNOSIS — G89.4 CHRONIC PAIN SYNDROME: Primary | ICD-10-CM

## 2018-07-16 PROCEDURE — 99214 OFFICE O/P EST MOD 30 MIN: CPT | Performed by: NURSE PRACTITIONER

## 2018-07-16 RX ORDER — HYDROCODONE BITARTRATE AND ACETAMINOPHEN 10; 325 MG/1; MG/1
1 TABLET ORAL EVERY 6 HOURS PRN
Qty: 120 TABLET | Refills: 0 | Status: SHIPPED | OUTPATIENT
Start: 2018-07-16 | End: 2018-08-20 | Stop reason: SDUPTHER

## 2018-07-16 NOTE — PROGRESS NOTES
CHIEF COMPLAINT  Back pain is unchanged since last visit.    Subjective   Jared Govea is a 63 y.o. female  who presents to the office for follow-up.She has a history of chronic back pain. REports her pain is unchanged since last office visit.    Complains of pain in her low back. Today her pain is 6/10VAs. Describes the pain as continuous and unchanged. She was to have SCS trial but was cancelled due to illness. Is awaiting on rescheduling. Continues with Hydrocodone 10/325 2 BID.  Denies any side effects. The regimen helps decrease her pain by 40%. ADL's are difficult with COPD.     Presents with niece.    Back Pain   This is a chronic problem. The current episode started more than 1 year ago. The problem occurs constantly. The problem has been gradually worsening (unchanged since last office visit) since onset. The pain is present in the lumbar spine. The quality of the pain is described as aching. The pain does not radiate. The pain is at a severity of 6/10. The pain is moderate. The symptoms are aggravated by bending, standing and twisting. Stiffness is present in the morning, at night and all day. Associated symptoms include headaches and numbness. Pertinent negatives include no abdominal pain, bladder incontinence, bowel incontinence, chest pain, dysuria, fever or weakness. Risk factors include history of steroid use, poor posture and obesity. She has tried analgesics, bed rest, chiropractic manipulation, NSAIDs, walking, heat, ice and home exercises for the symptoms. The treatment provided mild relief.      PEG Assessment   What number best describes your pain on average in the past week?5  What number best describes how, during the past week, pain has interfered with your enjoyment of life?5  What number best describes how, during the past week, pain has interfered with your general activity?  5    The following portions of the patient's history were reviewed and updated as appropriate: allergies, current  "medications, past family history, past medical history, past social history, past surgical history and problem list.    Review of Systems   Constitutional: Negative for chills and fever.   Respiratory: Positive for shortness of breath.    Cardiovascular: Negative for chest pain.   Gastrointestinal: Negative for abdominal pain, bowel incontinence, constipation, diarrhea, nausea and vomiting.   Genitourinary: Negative for bladder incontinence, difficulty urinating, dyspareunia and dysuria.   Musculoskeletal: Positive for back pain.   Neurological: Positive for numbness and headaches. Negative for dizziness, weakness and light-headedness.   Psychiatric/Behavioral: Negative for confusion, hallucinations, self-injury, sleep disturbance and suicidal ideas. The patient is nervous/anxious.        Vitals:    07/16/18 1356   BP: 179/75   Pulse: 76   Resp: 18   Temp: 97.3 °F (36.3 °C)   SpO2: 91%   Weight: 120 kg (265 lb)   Height: 167.6 cm (65.98\")   PainSc:   6   PainLoc: Back     Objective   Physical Exam   Constitutional: She is oriented to person, place, and time. Vital signs are normal. She appears well-developed and well-nourished.   Chronic ill appearance   HENT:   Head: Normocephalic and atraumatic.   Eyes: Conjunctivae are normal.   Neck: Neck supple.   Cardiovascular: Normal rate.    Pulmonary/Chest: Effort normal.   Using O2 per NC   Abdominal:   obese   Musculoskeletal:        Lumbar back: She exhibits decreased range of motion, tenderness, bony tenderness and pain.   Neurological: She is alert and oriented to person, place, and time. Gait (in motorized scooter) abnormal.   Reflex Scores:       Patellar reflexes are 0 on the right side and 0 on the left side.  Skin: Skin is warm, dry and intact.   Psychiatric: She has a normal mood and affect. Her speech is normal and behavior is normal. Judgment and thought content normal.   Nursing note and vitals reviewed.  I HAVE PERFORMED THE PHYSICAL EXAMINATION AS " DOCUMENTED ABOVE TODAY, 07/16/2018.  THE EXAM IS OTHERWISE UNCHANGED FROM PREVIOUS VISIT.        Assessment/Plan   Jared was seen today for back pain.    Diagnoses and all orders for this visit:    Chronic pain syndrome    Lumbar spondylolysis    Diabetic peripheral neuropathy (CMS/HCC)    Encounter for long-term (current) use of high-risk medication    Other orders  -     HYDROcodone-acetaminophen (NORCO)  MG per tablet; Take 1 tablet by mouth Every 6 (Six) Hours As Needed for Moderate Pain  for up to 8 days.      --- The urine drug screen confirmation from 5-9-18 has been reviewed and the result is APPROPRIATE based on patient history and JACOB report  --- Refill Hydrocodone. Patient appears stable with current regimen. No adverse effects. Regarding continuation of opioids, there is no evidence of aberrant behavior or any red flags.  The patient continues with appropriate response to opioid therapy. ADL's remain intact by self.  She did ask about weaning down to 3/day and less frequent office visits.  --- Reschedule SCS when able to.  --- Follow-up 1 month or sooner if needed.       JACOB REPORT    As part of the patient's treatment plan, I am prescribing controlled substances. The patient has been made aware of appropriate use of such medications, including potential risk of somnolence, limited ability to drive and/or work safely, and the potential for dependence or overdose. It has also bee made clear that these medications are for use by this patient only, without concomitant use of alcohol or other substances unless prescribed.     Patient has completed prescribing agreement detailing terms of continued prescribing of controlled substances, including monitoring JACOB reports, urine drug screening, and pill counts if necessary. The patient is aware that inappropriate use will results in cessation of prescribing such medications.    JACOB report has been reviewed and scanned into the patient's  chart.    As the clinician, I personally reviewed the JACOB from 7-13-18 while the patient was in the office today.    History and physical exam exhibit continued safe and appropriate use of controlled substances.      EMR Dragon/Transcription disclaimer:   Much of this encounter note is an electronic transcription/translation of spoken language to printed text. The electronic translation of spoken language may permit erroneous, or at times, nonsensical words or phrases to be inadvertently transcribed; Although I have reviewed the note for such errors, some may still exist.

## 2018-07-29 ENCOUNTER — LAB (OUTPATIENT)
Dept: LAB | Facility: HOSPITAL | Age: 63
End: 2018-07-29
Attending: INTERNAL MEDICINE

## 2018-07-29 ENCOUNTER — TRANSCRIBE ORDERS (OUTPATIENT)
Dept: ADMINISTRATIVE | Facility: HOSPITAL | Age: 63
End: 2018-07-29

## 2018-07-29 DIAGNOSIS — I12.9 HYPERTENSIVE NEPHROPATHY: ICD-10-CM

## 2018-07-29 DIAGNOSIS — N18.30 CHRONIC KIDNEY DISEASE, STAGE III (MODERATE) (HCC): ICD-10-CM

## 2018-07-29 DIAGNOSIS — N17.9 ACUTE RENAL FAILURE, UNSPECIFIED ACUTE RENAL FAILURE TYPE (HCC): ICD-10-CM

## 2018-07-29 DIAGNOSIS — N18.30 CHRONIC KIDNEY DISEASE, STAGE III (MODERATE) (HCC): Primary | ICD-10-CM

## 2018-07-29 LAB
ALBUMIN SERPL-MCNC: 3.6 G/DL (ref 3.5–5.2)
ALBUMIN/GLOB SERPL: 1.1 G/DL
ALP SERPL-CCNC: 122 U/L (ref 40–129)
ALT SERPL W P-5'-P-CCNC: 10 U/L (ref 5–33)
ANION GAP SERPL CALCULATED.3IONS-SCNC: 9.9 MMOL/L
AST SERPL-CCNC: 12 U/L (ref 5–32)
BACTERIA UR QL AUTO: ABNORMAL /HPF
BASOPHILS # BLD AUTO: 0.03 10*3/MM3 (ref 0–0.2)
BASOPHILS NFR BLD AUTO: 0.4 % (ref 0–2)
BILIRUB SERPL-MCNC: 0.3 MG/DL (ref 0.2–1.2)
BILIRUB UR QL STRIP: NEGATIVE
BUN BLD-MCNC: 56 MG/DL (ref 8–23)
BUN/CREAT SERPL: 26.2 (ref 7–25)
CALCIUM SPEC-SCNC: 8.7 MG/DL (ref 8.8–10.5)
CHLORIDE SERPL-SCNC: 99 MMOL/L (ref 98–107)
CLARITY UR: CLEAR
CO2 SERPL-SCNC: 31.1 MMOL/L (ref 22–29)
COLOR UR: YELLOW
CREAT BLD-MCNC: 2.14 MG/DL (ref 0.57–1)
DEPRECATED RDW RBC AUTO: 43.1 FL (ref 37–54)
EOSINOPHIL # BLD AUTO: 0.24 10*3/MM3 (ref 0.1–0.3)
EOSINOPHIL NFR BLD AUTO: 2.9 % (ref 0–4)
ERYTHROCYTE [DISTWIDTH] IN BLOOD BY AUTOMATED COUNT: 12 % (ref 11.5–14.5)
GFR SERPL CREATININE-BSD FRML MDRD: 23 ML/MIN/1.73
GLOBULIN UR ELPH-MCNC: 3.3 GM/DL
GLUCOSE BLD-MCNC: 219 MG/DL (ref 65–99)
GLUCOSE UR STRIP-MCNC: NEGATIVE MG/DL
HCT VFR BLD AUTO: 32 % (ref 37–47)
HGB BLD-MCNC: 10.2 G/DL (ref 12–16)
HGB UR QL STRIP.AUTO: ABNORMAL
HYALINE CASTS UR QL AUTO: ABNORMAL /LPF
IMM GRANULOCYTES # BLD: 0.02 10*3/MM3 (ref 0–0.03)
IMM GRANULOCYTES NFR BLD: 0.2 % (ref 0–0.5)
KETONES UR QL STRIP: NEGATIVE
LEUKOCYTE ESTERASE UR QL STRIP.AUTO: ABNORMAL
LYMPHOCYTES # BLD AUTO: 1.82 10*3/MM3 (ref 0.6–4.8)
LYMPHOCYTES NFR BLD AUTO: 22.4 % (ref 20–45)
MCH RBC QN AUTO: 30.8 PG (ref 27–31)
MCHC RBC AUTO-ENTMCNC: 31.9 G/DL (ref 31–37)
MCV RBC AUTO: 96.7 FL (ref 81–99)
MONOCYTES # BLD AUTO: 0.5 10*3/MM3 (ref 0–1)
MONOCYTES NFR BLD AUTO: 6.1 % (ref 3–8)
NEUTROPHILS # BLD AUTO: 5.53 10*3/MM3 (ref 1.5–8.3)
NEUTROPHILS NFR BLD AUTO: 68 % (ref 45–70)
NITRITE UR QL STRIP: NEGATIVE
NRBC BLD MANUAL-RTO: 0 /100 WBC (ref 0–0)
PH UR STRIP.AUTO: <=5 [PH] (ref 4.5–8)
PLATELET # BLD AUTO: 177 10*3/MM3 (ref 140–500)
PMV BLD AUTO: 10.5 FL (ref 7.4–10.4)
POTASSIUM BLD-SCNC: 5.3 MMOL/L (ref 3.5–5.2)
PROT SERPL-MCNC: 6.9 G/DL (ref 6–8.5)
PROT UR QL STRIP: NEGATIVE
RBC # BLD AUTO: 3.31 10*6/MM3 (ref 4.2–5.4)
RBC # UR: ABNORMAL /HPF
REF LAB TEST METHOD: ABNORMAL
SODIUM BLD-SCNC: 140 MMOL/L (ref 136–145)
SP GR UR STRIP: 1.01 (ref 1–1.03)
SQUAMOUS #/AREA URNS HPF: ABNORMAL /HPF
UROBILINOGEN UR QL STRIP: ABNORMAL
WBC NRBC COR # BLD: 8.14 10*3/MM3 (ref 4.8–10.8)
WBC UR QL AUTO: ABNORMAL /HPF

## 2018-07-29 PROCEDURE — 87086 URINE CULTURE/COLONY COUNT: CPT

## 2018-07-29 PROCEDURE — 81001 URINALYSIS AUTO W/SCOPE: CPT

## 2018-07-29 PROCEDURE — 85025 COMPLETE CBC W/AUTO DIFF WBC: CPT

## 2018-07-29 PROCEDURE — 80053 COMPREHEN METABOLIC PANEL: CPT

## 2018-07-29 PROCEDURE — 36415 COLL VENOUS BLD VENIPUNCTURE: CPT

## 2018-07-31 LAB — BACTERIA SPEC AEROBE CULT: NORMAL

## 2018-08-20 ENCOUNTER — OFFICE VISIT (OUTPATIENT)
Dept: PAIN MEDICINE | Facility: CLINIC | Age: 63
End: 2018-08-20

## 2018-08-20 VITALS
OXYGEN SATURATION: 93 % | HEART RATE: 72 BPM | WEIGHT: 265 LBS | RESPIRATION RATE: 16 BRPM | DIASTOLIC BLOOD PRESSURE: 63 MMHG | BODY MASS INDEX: 42.59 KG/M2 | SYSTOLIC BLOOD PRESSURE: 153 MMHG | TEMPERATURE: 98 F | HEIGHT: 66 IN

## 2018-08-20 DIAGNOSIS — G89.4 CHRONIC PAIN SYNDROME: Primary | ICD-10-CM

## 2018-08-20 DIAGNOSIS — Z79.899 ENCOUNTER FOR LONG-TERM (CURRENT) USE OF HIGH-RISK MEDICATION: ICD-10-CM

## 2018-08-20 DIAGNOSIS — G89.29 CHRONIC MIDLINE LOW BACK PAIN WITHOUT SCIATICA: ICD-10-CM

## 2018-08-20 DIAGNOSIS — M43.06 LUMBAR SPONDYLOLYSIS: ICD-10-CM

## 2018-08-20 DIAGNOSIS — E11.42 DIABETIC PERIPHERAL NEUROPATHY (HCC): ICD-10-CM

## 2018-08-20 DIAGNOSIS — M54.50 CHRONIC MIDLINE LOW BACK PAIN WITHOUT SCIATICA: ICD-10-CM

## 2018-08-20 PROCEDURE — 99213 OFFICE O/P EST LOW 20 MIN: CPT | Performed by: NURSE PRACTITIONER

## 2018-08-20 RX ORDER — HYDROCODONE BITARTRATE AND ACETAMINOPHEN 10; 325 MG/1; MG/1
1 TABLET ORAL EVERY 8 HOURS PRN
Qty: 90 TABLET | Refills: 0 | Status: SHIPPED | OUTPATIENT
Start: 2018-08-20 | End: 2019-12-31

## 2018-08-20 NOTE — PROGRESS NOTES
CHIEF COMPLAINT  Pt states LBP has been adequately controlled since last 2 months, and continues to gradually improve.     Subjective   Jared Govea is a 63 y.o. female  who presents to the office for follow-up.She has a history of chronic back pain. Reports her pain is improved since last office visit. Has been able to decrease Hydrocodone.    Complains of pain in her low back. Today her pain is 1/10VAS. Describes the pain as less continuous and improved. Is wanting to decrease Hydrocodone.  Has been able to decrease to Hydrocodone 10/325 3/day. Wants to possibly try decreasing again. Denies any side effects from the regimen. It helps decrease her pain by 50-75%. ADL's by self except for COPD restrictions.     Presents with sister who helps with history.    Back Pain   This is a chronic problem. The current episode started more than 1 year ago. The problem occurs constantly. The problem has been gradually worsening (improved since last office visit) since onset. The pain is present in the lumbar spine. The quality of the pain is described as aching. The pain does not radiate. The pain is at a severity of 1/10 (range from 1-6/10VAS). The pain is moderate. The symptoms are aggravated by bending, standing and twisting. Stiffness is present in the morning, at night and all day. Associated symptoms include numbness (hands bilat.,occasionally.). Pertinent negatives include no abdominal pain, bladder incontinence, bowel incontinence, chest pain, dysuria, fever, headaches or weakness. Risk factors include history of steroid use, poor posture and obesity. She has tried analgesics, bed rest, chiropractic manipulation, NSAIDs, walking, heat, ice and home exercises for the symptoms. The treatment provided mild relief.      PEG Assessment   What number best describes your pain on average in the past week?3  What number best describes how, during the past week, pain has interfered with your enjoyment of life?3  What number best  "describes how, during the past week, pain has interfered with your general activity?  3    The following portions of the patient's history were reviewed and updated as appropriate: allergies, current medications, past family history, past medical history, past social history, past surgical history and problem list.    Review of Systems   Constitutional: Negative for activity change, chills and fever.   Respiratory: Positive for apnea (cpap) and shortness of breath (O2 at 4L/M).    Cardiovascular: Negative for chest pain.   Gastrointestinal: Negative for abdominal pain, bowel incontinence, constipation, diarrhea, nausea and vomiting.   Genitourinary: Negative for bladder incontinence, difficulty urinating, dyspareunia and dysuria.   Musculoskeletal: Positive for back pain.   Neurological: Positive for light-headedness (occasionally) and numbness (hands bilat.,occasionally.). Negative for dizziness, weakness and headaches.   Psychiatric/Behavioral: Negative for confusion, hallucinations, self-injury, sleep disturbance and suicidal ideas. The patient is nervous/anxious.        Vitals:    08/20/18 1437   BP: 153/63   Pulse: 72   Resp: 16   Temp: 98 °F (36.7 °C)   SpO2: 93%   Weight: 120 kg (265 lb)   Height: 167.6 cm (65.98\")   PainSc: 3  Comment: LBP ,R > L, ranges from 3-6/10   PainLoc: Back     Objective   Physical Exam   Constitutional: She is oriented to person, place, and time. Vital signs are normal. She appears well-developed and well-nourished.   Chronic ill appearance   HENT:   Head: Normocephalic and atraumatic.   Eyes: Conjunctivae are normal.   Neck: Neck supple.   Cardiovascular: Normal rate.    Pulmonary/Chest: Effort normal.   Using O2 per NC   Abdominal:   obese   Musculoskeletal:        Lumbar back: She exhibits decreased range of motion, tenderness, bony tenderness and pain.   Neurological: She is alert and oriented to person, place, and time. Gait (in motorized scooter) abnormal.   Reflex Scores:     "   Patellar reflexes are 0 on the right side and 0 on the left side.  Skin: Skin is warm, dry and intact.   Psychiatric: She has a normal mood and affect. Her speech is normal and behavior is normal. Judgment and thought content normal.   Nursing note and vitals reviewed.      Assessment/Plan   Jared was seen today for back pain.    Diagnoses and all orders for this visit:    Chronic pain syndrome    Lumbar spondylolysis    Chronic midline low back pain without sciatica    Diabetic peripheral neuropathy (CMS/HCC)    Encounter for long-term (current) use of high-risk medication      --- The urine drug screen confirmation from 5-9-18 has been reviewed and the result is APPROPRIATE based on patient history and JACOB report  --- Refill Hydrocodone 10/325 q8hrs but discussed weaning schedule potential. Patient and sister verbalized understanding.  Will follow-up if she needs a refill.  --- Does not want to pursue SCS at this time.   --- Follow-up 2 months or sooner if needed.       JACOB REPORT    As part of the patient's treatment plan, I am prescribing controlled substances. The patient has been made aware of appropriate use of such medications, including potential risk of somnolence, limited ability to drive and/or work safely, and the potential for dependence or overdose. It has also bee made clear that these medications are for use by this patient only, without concomitant use of alcohol or other substances unless prescribed.     Patient has completed prescribing agreement detailing terms of continued prescribing of controlled substances, including monitoring JACOB reports, urine drug screening, and pill counts if necessary. The patient is aware that inappropriate use will results in cessation of prescribing such medications.    JACOB report has been reviewed and scanned into the patient's chart.    As the clinician, I personally reviewed the JACOB from 8-17-18 while the patient was in the office  today.    History and physical exam exhibit continued safe and appropriate use of controlled substances.      EMR Dragon/Transcription disclaimer:   Much of this encounter note is an electronic transcription/translation of spoken language to printed text. The electronic translation of spoken language may permit erroneous, or at times, nonsensical words or phrases to be inadvertently transcribed; Although I have reviewed the note for such errors, some may still exist.

## 2018-08-30 ENCOUNTER — APPOINTMENT (OUTPATIENT)
Dept: GENERAL RADIOLOGY | Facility: HOSPITAL | Age: 63
End: 2018-08-30

## 2018-08-30 ENCOUNTER — HOSPITAL ENCOUNTER (INPATIENT)
Facility: HOSPITAL | Age: 63
LOS: 6 days | Discharge: HOME HEALTH | End: 2018-09-06
Attending: EMERGENCY MEDICINE | Admitting: HOSPITALIST

## 2018-08-30 DIAGNOSIS — E66.01 MORBID OBESITY (HCC): Chronic | ICD-10-CM

## 2018-08-30 DIAGNOSIS — R06.2 WHEEZING: ICD-10-CM

## 2018-08-30 DIAGNOSIS — J96.01 ACUTE HYPOXEMIC RESPIRATORY FAILURE (HCC): Primary | ICD-10-CM

## 2018-08-30 DIAGNOSIS — IMO0002 UNCONTROLLED TYPE 2 DIABETES MELLITUS WITH STAGE 3 CHRONIC KIDNEY DISEASE, WITH LONG-TERM CURRENT USE OF INSULIN: ICD-10-CM

## 2018-08-30 DIAGNOSIS — J96.12 CHRONIC RESPIRATORY FAILURE WITH HYPOXIA AND HYPERCAPNIA (HCC): ICD-10-CM

## 2018-08-30 DIAGNOSIS — J96.11 CHRONIC RESPIRATORY FAILURE WITH HYPOXIA AND HYPERCAPNIA (HCC): ICD-10-CM

## 2018-08-30 DIAGNOSIS — J44.9 CHRONIC OBSTRUCTIVE PULMONARY DISEASE, UNSPECIFIED COPD TYPE (HCC): ICD-10-CM

## 2018-08-30 DIAGNOSIS — J81.0 ACUTE PULMONARY EDEMA (HCC): ICD-10-CM

## 2018-08-30 DIAGNOSIS — N18.9 CHRONIC RENAL IMPAIRMENT, UNSPECIFIED CKD STAGE: ICD-10-CM

## 2018-08-30 LAB
ALBUMIN SERPL-MCNC: 3.7 G/DL (ref 3.5–5.2)
ALBUMIN/GLOB SERPL: 1.1 G/DL
ALP SERPL-CCNC: 119 U/L (ref 40–129)
ALT SERPL W P-5'-P-CCNC: 11 U/L (ref 5–33)
ANION GAP SERPL CALCULATED.3IONS-SCNC: 10.6 MMOL/L
APTT PPP: 29.3 SECONDS (ref 24.3–38.1)
ARTERIAL PATENCY WRIST A: POSITIVE
AST SERPL-CCNC: 14 U/L (ref 5–32)
ATMOSPHERIC PRESS: 742 MMHG
BASE EXCESS BLDA CALC-SCNC: 9.2 MMOL/L (ref 0–2)
BASOPHILS # BLD AUTO: 0.03 10*3/MM3 (ref 0–0.2)
BASOPHILS NFR BLD AUTO: 0.3 % (ref 0–2)
BDY SITE: ABNORMAL
BILIRUB SERPL-MCNC: 0.4 MG/DL (ref 0.2–1.2)
BODY TEMPERATURE: 37 C
BUN BLD-MCNC: 28 MG/DL (ref 8–23)
BUN/CREAT SERPL: 19 (ref 7–25)
CALCIUM SPEC-SCNC: 9 MG/DL (ref 8.8–10.5)
CHLORIDE SERPL-SCNC: 98 MMOL/L (ref 98–107)
CO2 SERPL-SCNC: 35.4 MMOL/L (ref 22–29)
CREAT BLD-MCNC: 1.47 MG/DL (ref 0.57–1)
DEPRECATED RDW RBC AUTO: 40.4 FL (ref 37–54)
EOSINOPHIL # BLD AUTO: 0.32 10*3/MM3 (ref 0.1–0.3)
EOSINOPHIL NFR BLD AUTO: 3.1 % (ref 0–4)
ERYTHROCYTE [DISTWIDTH] IN BLOOD BY AUTOMATED COUNT: 11.8 % (ref 11.5–14.5)
GAS FLOW AIRWAY: 5 LPM
GFR SERPL CREATININE-BSD FRML MDRD: 36 ML/MIN/1.73
GLOBULIN UR ELPH-MCNC: 3.3 GM/DL
GLUCOSE BLD-MCNC: 87 MG/DL (ref 65–99)
HCO3 BLDA-SCNC: 35.6 MMOL/L (ref 20–26)
HCT VFR BLD AUTO: 34.2 % (ref 37–47)
HGB BLD-MCNC: 10.8 G/DL (ref 12–16)
HGB BLDA-MCNC: 10.8 G/DL (ref 13.5–17.5)
IMM GRANULOCYTES # BLD: 0.03 10*3/MM3 (ref 0–0.03)
IMM GRANULOCYTES NFR BLD: 0.3 % (ref 0–0.5)
INR PPP: 1.05 (ref 0.9–1.1)
LYMPHOCYTES # BLD AUTO: 2.09 10*3/MM3 (ref 0.6–4.8)
LYMPHOCYTES NFR BLD AUTO: 20 % (ref 20–45)
Lab: ABNORMAL
MCH RBC QN AUTO: 29.6 PG (ref 27–31)
MCHC RBC AUTO-ENTMCNC: 31.6 G/DL (ref 31–37)
MCV RBC AUTO: 93.7 FL (ref 81–99)
MODALITY: ABNORMAL
MONOCYTES # BLD AUTO: 0.66 10*3/MM3 (ref 0–1)
MONOCYTES NFR BLD AUTO: 6.3 % (ref 3–8)
NEUTROPHILS # BLD AUTO: 7.33 10*3/MM3 (ref 1.5–8.3)
NEUTROPHILS NFR BLD AUTO: 70 % (ref 45–70)
NRBC BLD MANUAL-RTO: 0 /100 WBC (ref 0–0)
NT-PROBNP SERPL-MCNC: 965.3 PG/ML (ref 5–125)
PCO2 BLDA: 57.5 MM HG (ref 35–45)
PCO2 TEMP ADJ BLD: 57.5 MM HG (ref 35–45)
PH BLDA: 7.4 PH UNITS (ref 7.35–7.45)
PH, TEMP CORRECTED: 7.4 PH UNITS (ref 7.35–7.45)
PLATELET # BLD AUTO: 183 10*3/MM3 (ref 140–500)
PMV BLD AUTO: 10.3 FL (ref 7.4–10.4)
PO2 BLDA: 64.4 MM HG (ref 83–108)
PO2 TEMP ADJ BLD: 64.4 MM HG (ref 83–108)
POTASSIUM BLD-SCNC: 5.1 MMOL/L (ref 3.5–5.2)
PROT SERPL-MCNC: 7 G/DL (ref 6–8.5)
PROTHROMBIN TIME: 13.7 SECONDS (ref 12.1–15)
RBC # BLD AUTO: 3.65 10*6/MM3 (ref 4.2–5.4)
SAO2 % BLDCOA: 93.5 % (ref 94–99)
SODIUM BLD-SCNC: 144 MMOL/L (ref 136–145)
TROPONIN T SERPL-MCNC: 0.03 NG/ML (ref 0–0.03)
VENTILATOR MODE: ABNORMAL
WBC NRBC COR # BLD: 10.46 10*3/MM3 (ref 4.8–10.8)

## 2018-08-30 PROCEDURE — 25010000002 FUROSEMIDE PER 20 MG: Performed by: EMERGENCY MEDICINE

## 2018-08-30 PROCEDURE — 84484 ASSAY OF TROPONIN QUANT: CPT | Performed by: EMERGENCY MEDICINE

## 2018-08-30 PROCEDURE — 93010 ELECTROCARDIOGRAM REPORT: CPT | Performed by: INTERNAL MEDICINE

## 2018-08-30 PROCEDURE — 36600 WITHDRAWAL OF ARTERIAL BLOOD: CPT

## 2018-08-30 PROCEDURE — 99285 EMERGENCY DEPT VISIT HI MDM: CPT

## 2018-08-30 PROCEDURE — 80053 COMPREHEN METABOLIC PANEL: CPT | Performed by: EMERGENCY MEDICINE

## 2018-08-30 PROCEDURE — 93005 ELECTROCARDIOGRAM TRACING: CPT | Performed by: EMERGENCY MEDICINE

## 2018-08-30 PROCEDURE — 25010000002 METHYLPREDNISOLONE PER 125 MG: Performed by: EMERGENCY MEDICINE

## 2018-08-30 PROCEDURE — 94660 CPAP INITIATION&MGMT: CPT

## 2018-08-30 PROCEDURE — 85025 COMPLETE CBC W/AUTO DIFF WBC: CPT | Performed by: EMERGENCY MEDICINE

## 2018-08-30 PROCEDURE — 94799 UNLISTED PULMONARY SVC/PX: CPT

## 2018-08-30 PROCEDURE — 85610 PROTHROMBIN TIME: CPT | Performed by: EMERGENCY MEDICINE

## 2018-08-30 PROCEDURE — 99285 EMERGENCY DEPT VISIT HI MDM: CPT | Performed by: EMERGENCY MEDICINE

## 2018-08-30 PROCEDURE — 71045 X-RAY EXAM CHEST 1 VIEW: CPT

## 2018-08-30 PROCEDURE — 85730 THROMBOPLASTIN TIME PARTIAL: CPT | Performed by: EMERGENCY MEDICINE

## 2018-08-30 PROCEDURE — 94640 AIRWAY INHALATION TREATMENT: CPT

## 2018-08-30 PROCEDURE — 82803 BLOOD GASES ANY COMBINATION: CPT

## 2018-08-30 PROCEDURE — 83880 ASSAY OF NATRIURETIC PEPTIDE: CPT | Performed by: EMERGENCY MEDICINE

## 2018-08-30 RX ORDER — METHYLPREDNISOLONE SODIUM SUCCINATE 125 MG/2ML
125 INJECTION, POWDER, LYOPHILIZED, FOR SOLUTION INTRAMUSCULAR; INTRAVENOUS ONCE
Status: COMPLETED | OUTPATIENT
Start: 2018-08-30 | End: 2018-08-30

## 2018-08-30 RX ORDER — FUROSEMIDE 10 MG/ML
80 INJECTION INTRAMUSCULAR; INTRAVENOUS ONCE
Status: COMPLETED | OUTPATIENT
Start: 2018-08-30 | End: 2018-08-30

## 2018-08-30 RX ORDER — SODIUM CHLORIDE 0.9 % (FLUSH) 0.9 %
10 SYRINGE (ML) INJECTION AS NEEDED
Status: DISCONTINUED | OUTPATIENT
Start: 2018-08-30 | End: 2018-09-06 | Stop reason: HOSPADM

## 2018-08-30 RX ORDER — IPRATROPIUM BROMIDE AND ALBUTEROL SULFATE 2.5; .5 MG/3ML; MG/3ML
3 SOLUTION RESPIRATORY (INHALATION) ONCE
Status: COMPLETED | OUTPATIENT
Start: 2018-08-30 | End: 2018-08-30

## 2018-08-30 RX ADMIN — METHYLPREDNISOLONE SODIUM SUCCINATE 125 MG: 125 INJECTION, POWDER, FOR SOLUTION INTRAMUSCULAR; INTRAVENOUS at 22:10

## 2018-08-30 RX ADMIN — FUROSEMIDE 80 MG: 10 INJECTION, SOLUTION INTRAMUSCULAR; INTRAVENOUS at 23:46

## 2018-08-30 RX ADMIN — IPRATROPIUM BROMIDE AND ALBUTEROL SULFATE 3 ML: .5; 3 SOLUTION RESPIRATORY (INHALATION) at 22:13

## 2018-08-31 ENCOUNTER — APPOINTMENT (OUTPATIENT)
Dept: CARDIOLOGY | Facility: HOSPITAL | Age: 63
End: 2018-08-31
Attending: INTERNAL MEDICINE

## 2018-08-31 PROBLEM — J96.01 ACUTE HYPOXEMIC RESPIRATORY FAILURE (HCC): Status: ACTIVE | Noted: 2018-08-31

## 2018-08-31 LAB
ANION GAP SERPL CALCULATED.3IONS-SCNC: 11.1 MMOL/L
B PERT DNA SPEC QL NAA+PROBE: NOT DETECTED
BH CV ECHO MEAS - BSA(HAYCOCK): 2.4 M^2
BH CV ECHO MEAS - BSA: 2.3 M^2
BH CV ECHO MEAS - BZI_BMI: 43.4 KILOGRAMS/M^2
BH CV ECHO MEAS - BZI_METRIC_HEIGHT: 167.6 CM
BH CV ECHO MEAS - BZI_METRIC_WEIGHT: 122 KG
BH CV ECHO MEAS - EDV(CUBED): 106.5 ML
BH CV ECHO MEAS - EDV(MOD-SP2): 116 ML
BH CV ECHO MEAS - EDV(MOD-SP4): 126 ML
BH CV ECHO MEAS - EDV(TEICH): 104.4 ML
BH CV ECHO MEAS - EF(CUBED): 76.4 %
BH CV ECHO MEAS - EF(MOD-BP): 65 %
BH CV ECHO MEAS - EF(MOD-SP2): 67.1 %
BH CV ECHO MEAS - EF(MOD-SP4): 63.7 %
BH CV ECHO MEAS - EF(TEICH): 68.4 %
BH CV ECHO MEAS - ESV(CUBED): 25.2 ML
BH CV ECHO MEAS - ESV(MOD-SP2): 38.2 ML
BH CV ECHO MEAS - ESV(MOD-SP4): 45.7 ML
BH CV ECHO MEAS - ESV(TEICH): 33 ML
BH CV ECHO MEAS - FS: 38.2 %
BH CV ECHO MEAS - IVS/LVPW: 1
BH CV ECHO MEAS - IVSD: 1.3 CM
BH CV ECHO MEAS - LAT PEAK E' VEL: 8 CM/SEC
BH CV ECHO MEAS - LV DIASTOLIC VOL/BSA (35-75): 55.5 ML/M^2
BH CV ECHO MEAS - LV MASS(C)D: 230.4 GRAMS
BH CV ECHO MEAS - LV MASS(C)DI: 101.5 GRAMS/M^2
BH CV ECHO MEAS - LV SYSTOLIC VOL/BSA (12-30): 20.1 ML/M^2
BH CV ECHO MEAS - LVIDD: 4.7 CM
BH CV ECHO MEAS - LVIDS: 2.9 CM
BH CV ECHO MEAS - LVLD AP2: 8.6 CM
BH CV ECHO MEAS - LVLD AP4: 9 CM
BH CV ECHO MEAS - LVLS AP2: 7.8 CM
BH CV ECHO MEAS - LVLS AP4: 8.2 CM
BH CV ECHO MEAS - LVPWD: 1.2 CM
BH CV ECHO MEAS - MED PEAK E' VEL: 5 CM/SEC
BH CV ECHO MEAS - MV A DUR: 0.13 SEC
BH CV ECHO MEAS - MV A MAX VEL: 124 CM/SEC
BH CV ECHO MEAS - MV DEC SLOPE: 489 CM/SEC^2
BH CV ECHO MEAS - MV DEC TIME: 0.29 SEC
BH CV ECHO MEAS - MV E MAX VEL: 98.6 CM/SEC
BH CV ECHO MEAS - MV E/A: 0.8
BH CV ECHO MEAS - MV MAX PG: 6 MMHG
BH CV ECHO MEAS - MV MEAN PG: 4 MMHG
BH CV ECHO MEAS - MV P1/2T MAX VEL: 124 CM/SEC
BH CV ECHO MEAS - MV P1/2T: 74.3 MSEC
BH CV ECHO MEAS - MV V2 MAX: 122 CM/SEC
BH CV ECHO MEAS - MV V2 MEAN: 97.3 CM/SEC
BH CV ECHO MEAS - MV V2 VTI: 38.2 CM
BH CV ECHO MEAS - MVA P1/2T LCG: 1.8 CM^2
BH CV ECHO MEAS - MVA(P1/2T): 3 CM^2
BH CV ECHO MEAS - SI(CUBED): 35.9 ML/M^2
BH CV ECHO MEAS - SI(MOD-SP2): 34.3 ML/M^2
BH CV ECHO MEAS - SI(MOD-SP4): 35.4 ML/M^2
BH CV ECHO MEAS - SI(TEICH): 31.5 ML/M^2
BH CV ECHO MEAS - SV(CUBED): 81.3 ML
BH CV ECHO MEAS - SV(MOD-SP2): 77.8 ML
BH CV ECHO MEAS - SV(MOD-SP4): 80.3 ML
BH CV ECHO MEAS - SV(TEICH): 71.4 ML
BH CV ECHO MEASUREMENTS AVERAGE E/E' RATIO: 15.17
BH CV VAS BP RIGHT ARM: NORMAL MMHG
BUN BLD-MCNC: 28 MG/DL (ref 8–23)
BUN/CREAT SERPL: 20 (ref 7–25)
C PNEUM DNA NPH QL NAA+NON-PROBE: NOT DETECTED
CALCIUM SPEC-SCNC: 9 MG/DL (ref 8.8–10.5)
CHLORIDE SERPL-SCNC: 99 MMOL/L (ref 98–107)
CHOLEST SERPL-MCNC: 126 MG/DL (ref 0–200)
CO2 SERPL-SCNC: 33.9 MMOL/L (ref 22–29)
CREAT BLD-MCNC: 1.4 MG/DL (ref 0.57–1)
DEPRECATED RDW RBC AUTO: 39.9 FL (ref 37–54)
ERYTHROCYTE [DISTWIDTH] IN BLOOD BY AUTOMATED COUNT: 11.8 % (ref 11.5–14.5)
FLUAV H1 2009 PAND RNA NPH QL NAA+PROBE: NOT DETECTED
FLUAV H1 HA GENE NPH QL NAA+PROBE: NOT DETECTED
FLUAV H3 RNA NPH QL NAA+PROBE: NOT DETECTED
FLUAV SUBTYP SPEC NAA+PROBE: NOT DETECTED
FLUBV RNA ISLT QL NAA+PROBE: NOT DETECTED
GFR SERPL CREATININE-BSD FRML MDRD: 38 ML/MIN/1.73
GLUCOSE BLD-MCNC: 145 MG/DL (ref 65–99)
GLUCOSE BLDC GLUCOMTR-MCNC: 313 MG/DL (ref 70–130)
GLUCOSE BLDC GLUCOMTR-MCNC: 402 MG/DL (ref 70–130)
GLUCOSE BLDC GLUCOMTR-MCNC: 418 MG/DL (ref 70–130)
HADV DNA SPEC NAA+PROBE: NOT DETECTED
HBA1C MFR BLD: 7.4 % (ref 4.8–5.6)
HCOV 229E RNA SPEC QL NAA+PROBE: NOT DETECTED
HCOV HKU1 RNA SPEC QL NAA+PROBE: NOT DETECTED
HCOV NL63 RNA SPEC QL NAA+PROBE: NOT DETECTED
HCOV OC43 RNA SPEC QL NAA+PROBE: NOT DETECTED
HCT VFR BLD AUTO: 34.3 % (ref 37–47)
HDLC SERPL-MCNC: 64 MG/DL (ref 40–60)
HGB BLD-MCNC: 10.8 G/DL (ref 12–16)
HMPV RNA NPH QL NAA+NON-PROBE: NOT DETECTED
HPIV1 RNA SPEC QL NAA+PROBE: NOT DETECTED
HPIV2 RNA SPEC QL NAA+PROBE: NOT DETECTED
HPIV3 RNA NPH QL NAA+PROBE: NOT DETECTED
HPIV4 P GENE NPH QL NAA+PROBE: NOT DETECTED
LDLC SERPL CALC-MCNC: 44 MG/DL (ref 0–100)
LDLC/HDLC SERPL: 0.69 {RATIO}
LEFT ATRIUM VOLUME INDEX: 15 ML/M2
M PNEUMO IGG SER IA-ACNC: NOT DETECTED
MAGNESIUM SERPL-MCNC: 1.8 MG/DL (ref 1.7–2.5)
MAXIMAL PREDICTED HEART RATE: 157 BPM
MCH RBC QN AUTO: 29.4 PG (ref 27–31)
MCHC RBC AUTO-ENTMCNC: 31.5 G/DL (ref 31–37)
MCV RBC AUTO: 93.5 FL (ref 81–99)
PHOSPHATE SERPL-MCNC: 3.5 MG/DL (ref 2.7–4.5)
PLATELET # BLD AUTO: 185 10*3/MM3 (ref 140–500)
PMV BLD AUTO: 10.7 FL (ref 7.4–10.4)
POTASSIUM BLD-SCNC: 4.8 MMOL/L (ref 3.5–5.2)
RBC # BLD AUTO: 3.67 10*6/MM3 (ref 4.2–5.4)
RHINOVIRUS RNA SPEC NAA+PROBE: NOT DETECTED
RSV RNA NPH QL NAA+NON-PROBE: NOT DETECTED
SODIUM BLD-SCNC: 144 MMOL/L (ref 136–145)
STRESS TARGET HR: 133 BPM
T4 FREE SERPL-MCNC: 1.82 NG/DL (ref 0.93–1.7)
TRIGL SERPL-MCNC: 88 MG/DL (ref 0–150)
TROPONIN T SERPL-MCNC: 0.01 NG/ML (ref 0–0.03)
TROPONIN T SERPL-MCNC: 0.02 NG/ML (ref 0–0.03)
TSH SERPL DL<=0.05 MIU/L-ACNC: 0.02 MIU/ML (ref 0.27–4.2)
VLDLC SERPL-MCNC: 17.6 MG/DL (ref 7–27)
WBC NRBC COR # BLD: 9.76 10*3/MM3 (ref 4.8–10.8)

## 2018-08-31 PROCEDURE — 63710000001 INSULIN DETEMIR PER 5 UNITS: Performed by: HOSPITALIST

## 2018-08-31 PROCEDURE — 87581 M.PNEUMON DNA AMP PROBE: CPT | Performed by: INTERNAL MEDICINE

## 2018-08-31 PROCEDURE — 63710000001 INSULIN ASPART PER 5 UNITS: Performed by: HOSPITALIST

## 2018-08-31 PROCEDURE — 87798 DETECT AGENT NOS DNA AMP: CPT | Performed by: INTERNAL MEDICINE

## 2018-08-31 PROCEDURE — 87486 CHLMYD PNEUM DNA AMP PROBE: CPT | Performed by: INTERNAL MEDICINE

## 2018-08-31 PROCEDURE — 80061 LIPID PANEL: CPT | Performed by: HOSPITALIST

## 2018-08-31 PROCEDURE — 83735 ASSAY OF MAGNESIUM: CPT | Performed by: HOSPITALIST

## 2018-08-31 PROCEDURE — 99244 OFF/OP CNSLTJ NEW/EST MOD 40: CPT | Performed by: INTERNAL MEDICINE

## 2018-08-31 PROCEDURE — 25010000002 PERFLUTREN 6.52 MG/ML SUSPENSION: Performed by: HOSPITALIST

## 2018-08-31 PROCEDURE — 25010000002 ENOXAPARIN PER 10 MG: Performed by: HOSPITALIST

## 2018-08-31 PROCEDURE — 93308 TTE F-UP OR LMTD: CPT | Performed by: INTERNAL MEDICINE

## 2018-08-31 PROCEDURE — 84484 ASSAY OF TROPONIN QUANT: CPT | Performed by: HOSPITALIST

## 2018-08-31 PROCEDURE — 84439 ASSAY OF FREE THYROXINE: CPT | Performed by: HOSPITALIST

## 2018-08-31 PROCEDURE — 84443 ASSAY THYROID STIM HORMONE: CPT | Performed by: HOSPITALIST

## 2018-08-31 PROCEDURE — 94799 UNLISTED PULMONARY SVC/PX: CPT

## 2018-08-31 PROCEDURE — 84100 ASSAY OF PHOSPHORUS: CPT | Performed by: HOSPITALIST

## 2018-08-31 PROCEDURE — 83036 HEMOGLOBIN GLYCOSYLATED A1C: CPT | Performed by: HOSPITALIST

## 2018-08-31 PROCEDURE — 82962 GLUCOSE BLOOD TEST: CPT

## 2018-08-31 PROCEDURE — 99221 1ST HOSP IP/OBS SF/LOW 40: CPT | Performed by: HOSPITALIST

## 2018-08-31 PROCEDURE — 93308 TTE F-UP OR LMTD: CPT

## 2018-08-31 PROCEDURE — 80048 BASIC METABOLIC PNL TOTAL CA: CPT | Performed by: HOSPITALIST

## 2018-08-31 PROCEDURE — 94640 AIRWAY INHALATION TREATMENT: CPT

## 2018-08-31 PROCEDURE — 94660 CPAP INITIATION&MGMT: CPT

## 2018-08-31 PROCEDURE — 85027 COMPLETE CBC AUTOMATED: CPT | Performed by: HOSPITALIST

## 2018-08-31 PROCEDURE — 63710000001 INSULIN ASPART PER 5 UNITS

## 2018-08-31 PROCEDURE — 87633 RESP VIRUS 12-25 TARGETS: CPT | Performed by: INTERNAL MEDICINE

## 2018-08-31 RX ORDER — QUETIAPINE FUMARATE 100 MG/1
100 TABLET, FILM COATED ORAL 2 TIMES DAILY
Status: DISCONTINUED | OUTPATIENT
Start: 2018-08-31 | End: 2018-09-06 | Stop reason: HOSPADM

## 2018-08-31 RX ORDER — BUMETANIDE 0.25 MG/ML
2 INJECTION INTRAMUSCULAR; INTRAVENOUS EVERY 12 HOURS
Status: DISCONTINUED | OUTPATIENT
Start: 2018-08-31 | End: 2018-09-01

## 2018-08-31 RX ORDER — ATORVASTATIN CALCIUM 40 MG/1
40 TABLET, FILM COATED ORAL DAILY
Status: DISCONTINUED | OUTPATIENT
Start: 2018-08-31 | End: 2018-09-06 | Stop reason: HOSPADM

## 2018-08-31 RX ORDER — VITAMIN B COMPLEX
1 CAPSULE ORAL DAILY
Status: DISCONTINUED | OUTPATIENT
Start: 2018-08-31 | End: 2018-08-31 | Stop reason: CLARIF

## 2018-08-31 RX ORDER — FAMOTIDINE 20 MG/1
20 TABLET, FILM COATED ORAL DAILY
Status: DISCONTINUED | OUTPATIENT
Start: 2018-08-31 | End: 2018-09-06 | Stop reason: HOSPADM

## 2018-08-31 RX ORDER — SODIUM CHLORIDE 0.9 % (FLUSH) 0.9 %
1-10 SYRINGE (ML) INJECTION AS NEEDED
Status: DISCONTINUED | OUTPATIENT
Start: 2018-08-31 | End: 2018-09-06 | Stop reason: HOSPADM

## 2018-08-31 RX ORDER — DEXTROSE MONOHYDRATE 25 G/50ML
25 INJECTION, SOLUTION INTRAVENOUS
Status: DISCONTINUED | OUTPATIENT
Start: 2018-08-31 | End: 2018-09-06 | Stop reason: HOSPADM

## 2018-08-31 RX ORDER — METOPROLOL TARTRATE 50 MG/1
50 TABLET, FILM COATED ORAL EVERY 12 HOURS SCHEDULED
Status: DISCONTINUED | OUTPATIENT
Start: 2018-08-31 | End: 2018-09-03

## 2018-08-31 RX ORDER — NICOTINE POLACRILEX 4 MG
15 LOZENGE BUCCAL
Status: DISCONTINUED | OUTPATIENT
Start: 2018-08-31 | End: 2018-09-06 | Stop reason: HOSPADM

## 2018-08-31 RX ORDER — L.ACID,PARA/B.BIFIDUM/S.THERM 8B CELL
1 CAPSULE ORAL DAILY
Status: DISCONTINUED | OUTPATIENT
Start: 2018-08-31 | End: 2018-09-06 | Stop reason: HOSPADM

## 2018-08-31 RX ORDER — GUAIFENESIN 600 MG/1
1200 TABLET, EXTENDED RELEASE ORAL EVERY 12 HOURS SCHEDULED
Status: DISCONTINUED | OUTPATIENT
Start: 2018-08-31 | End: 2018-09-06 | Stop reason: HOSPADM

## 2018-08-31 RX ORDER — HYDROCODONE BITARTRATE AND ACETAMINOPHEN 10; 325 MG/1; MG/1
1 TABLET ORAL EVERY 8 HOURS PRN
Status: DISCONTINUED | OUTPATIENT
Start: 2018-08-31 | End: 2018-09-06 | Stop reason: HOSPADM

## 2018-08-31 RX ORDER — ASPIRIN 81 MG/1
81 TABLET ORAL DAILY
Status: DISCONTINUED | OUTPATIENT
Start: 2018-08-31 | End: 2018-09-06 | Stop reason: HOSPADM

## 2018-08-31 RX ORDER — SODIUM CHLORIDE 9 MG/ML
40 INJECTION, SOLUTION INTRAVENOUS AS NEEDED
Status: DISCONTINUED | OUTPATIENT
Start: 2018-08-31 | End: 2018-09-06 | Stop reason: HOSPADM

## 2018-08-31 RX ORDER — CETIRIZINE HYDROCHLORIDE 10 MG/1
10 TABLET ORAL NIGHTLY
Status: DISCONTINUED | OUTPATIENT
Start: 2018-08-31 | End: 2018-09-06 | Stop reason: HOSPADM

## 2018-08-31 RX ORDER — LEVOTHYROXINE SODIUM 0.15 MG/1
300 TABLET ORAL EVERY MORNING
Status: DISCONTINUED | OUTPATIENT
Start: 2018-08-31 | End: 2018-08-31

## 2018-08-31 RX ORDER — IPRATROPIUM BROMIDE AND ALBUTEROL SULFATE 2.5; .5 MG/3ML; MG/3ML
3 SOLUTION RESPIRATORY (INHALATION) ONCE
Status: COMPLETED | OUTPATIENT
Start: 2018-08-31 | End: 2018-08-31

## 2018-08-31 RX ORDER — ALBUTEROL SULFATE 2.5 MG/3ML
2.5 SOLUTION RESPIRATORY (INHALATION) EVERY 6 HOURS PRN
Status: DISCONTINUED | OUTPATIENT
Start: 2018-08-31 | End: 2018-09-02

## 2018-08-31 RX ORDER — ALBUTEROL SULFATE 2.5 MG/3ML
1.25 SOLUTION RESPIRATORY (INHALATION) EVERY 6 HOURS PRN
Status: DISCONTINUED | OUTPATIENT
Start: 2018-08-31 | End: 2018-08-31

## 2018-08-31 RX ORDER — AMLODIPINE BESYLATE 2.5 MG/1
2.5 TABLET ORAL
Status: DISCONTINUED | OUTPATIENT
Start: 2018-08-31 | End: 2018-09-06 | Stop reason: HOSPADM

## 2018-08-31 RX ORDER — SENNA AND DOCUSATE SODIUM 50; 8.6 MG/1; MG/1
2 TABLET, FILM COATED ORAL NIGHTLY PRN
Status: DISCONTINUED | OUTPATIENT
Start: 2018-08-31 | End: 2018-09-06 | Stop reason: HOSPADM

## 2018-08-31 RX ORDER — LEVOTHYROXINE SODIUM 0.12 MG/1
250 TABLET ORAL EVERY MORNING
Status: DISCONTINUED | OUTPATIENT
Start: 2018-09-01 | End: 2018-09-06 | Stop reason: HOSPADM

## 2018-08-31 RX ORDER — BUMETANIDE 1 MG/1
2 TABLET ORAL 2 TIMES DAILY
Status: DISCONTINUED | OUTPATIENT
Start: 2018-08-31 | End: 2018-08-31

## 2018-08-31 RX ORDER — BUDESONIDE AND FORMOTEROL FUMARATE DIHYDRATE 160; 4.5 UG/1; UG/1
2 AEROSOL RESPIRATORY (INHALATION)
Status: DISCONTINUED | OUTPATIENT
Start: 2018-08-31 | End: 2018-09-06 | Stop reason: HOSPADM

## 2018-08-31 RX ORDER — ACETAMINOPHEN 325 MG/1
650 TABLET ORAL EVERY 4 HOURS PRN
Status: DISCONTINUED | OUTPATIENT
Start: 2018-08-31 | End: 2018-09-06 | Stop reason: HOSPADM

## 2018-08-31 RX ADMIN — METOPROLOL TARTRATE 50 MG: 50 TABLET, FILM COATED ORAL at 20:05

## 2018-08-31 RX ADMIN — BUMETANIDE 2 MG: 0.25 INJECTION INTRAMUSCULAR; INTRAVENOUS at 20:04

## 2018-08-31 RX ADMIN — GUAIFENESIN 1200 MG: 600 TABLET, EXTENDED RELEASE ORAL at 20:05

## 2018-08-31 RX ADMIN — IPRATROPIUM BROMIDE AND ALBUTEROL SULFATE 3 ML: .5; 3 SOLUTION RESPIRATORY (INHALATION) at 00:30

## 2018-08-31 RX ADMIN — Medication 1 CAPSULE: at 08:50

## 2018-08-31 RX ADMIN — INSULIN ASPART 24 UNITS: 100 INJECTION, SOLUTION INTRAVENOUS; SUBCUTANEOUS at 20:05

## 2018-08-31 RX ADMIN — LEVOTHYROXINE SODIUM 300 MCG: 150 TABLET ORAL at 08:52

## 2018-08-31 RX ADMIN — BUMETANIDE 2 MG: 1 TABLET ORAL at 08:51

## 2018-08-31 RX ADMIN — ENOXAPARIN SODIUM 40 MG: 40 INJECTION SUBCUTANEOUS at 08:50

## 2018-08-31 RX ADMIN — INSULIN DETEMIR 50 UNITS: 100 INJECTION, SOLUTION SUBCUTANEOUS at 20:02

## 2018-08-31 RX ADMIN — BUDESONIDE AND FORMOTEROL FUMARATE DIHYDRATE 2 PUFF: 160; 4.5 AEROSOL RESPIRATORY (INHALATION) at 19:16

## 2018-08-31 RX ADMIN — FAMOTIDINE 20 MG: 20 TABLET, FILM COATED ORAL at 08:51

## 2018-08-31 RX ADMIN — IPRATROPIUM BROMIDE 0.5 MG: 0.5 SOLUTION RESPIRATORY (INHALATION) at 07:16

## 2018-08-31 RX ADMIN — AMLODIPINE BESYLATE 2.5 MG: 2.5 TABLET ORAL at 10:39

## 2018-08-31 RX ADMIN — Medication 1 MG: at 08:52

## 2018-08-31 RX ADMIN — ATORVASTATIN CALCIUM 40 MG: 40 TABLET, FILM COATED ORAL at 08:51

## 2018-08-31 RX ADMIN — INSULIN ASPART 24 UNITS: 100 INJECTION, SOLUTION INTRAVENOUS; SUBCUTANEOUS at 12:39

## 2018-08-31 RX ADMIN — BUDESONIDE AND FORMOTEROL FUMARATE DIHYDRATE 2 PUFF: 160; 4.5 AEROSOL RESPIRATORY (INHALATION) at 07:27

## 2018-08-31 RX ADMIN — QUETIAPINE FUMARATE 100 MG: 100 TABLET ORAL at 08:51

## 2018-08-31 RX ADMIN — ASPIRIN 81 MG: 81 TABLET, COATED ORAL at 08:51

## 2018-08-31 RX ADMIN — QUETIAPINE FUMARATE 100 MG: 100 TABLET ORAL at 20:04

## 2018-08-31 RX ADMIN — INSULIN ASPART 16 UNITS: 100 INJECTION, SOLUTION INTRAVENOUS; SUBCUTANEOUS at 16:59

## 2018-08-31 RX ADMIN — CETIRIZINE HYDROCHLORIDE 10 MG: 10 TABLET, FILM COATED ORAL at 20:04

## 2018-08-31 RX ADMIN — PERFLUTREN 13.04 MG: 6.52 INJECTION, SUSPENSION INTRAVENOUS at 14:23

## 2018-08-31 RX ADMIN — IPRATROPIUM BROMIDE 0.5 MG: 0.5 SOLUTION RESPIRATORY (INHALATION) at 19:16

## 2018-08-31 RX ADMIN — ALBUTEROL SULFATE 2.5 MG: 2.5 SOLUTION RESPIRATORY (INHALATION) at 04:33

## 2018-08-31 RX ADMIN — HYDROCODONE BITARTRATE AND ACETAMINOPHEN 1 TABLET: 10; 325 TABLET ORAL at 20:05

## 2018-08-31 RX ADMIN — ALBUTEROL SULFATE 2.5 MG: 2.5 SOLUTION RESPIRATORY (INHALATION) at 15:58

## 2018-08-31 RX ADMIN — METOPROLOL TARTRATE 50 MG: 50 TABLET, FILM COATED ORAL at 08:51

## 2018-09-01 LAB
ANION GAP SERPL CALCULATED.3IONS-SCNC: 8.7 MMOL/L
BUN BLD-MCNC: 42 MG/DL (ref 8–23)
BUN/CREAT SERPL: 20.5 (ref 7–25)
CALCIUM SPEC-SCNC: 8.5 MG/DL (ref 8.8–10.5)
CHLORIDE SERPL-SCNC: 97 MMOL/L (ref 98–107)
CO2 SERPL-SCNC: 37.3 MMOL/L (ref 22–29)
CREAT BLD-MCNC: 2.05 MG/DL (ref 0.57–1)
GFR SERPL CREATININE-BSD FRML MDRD: 24 ML/MIN/1.73
GLUCOSE BLD-MCNC: 51 MG/DL (ref 65–99)
GLUCOSE BLDC GLUCOMTR-MCNC: 186 MG/DL (ref 70–130)
GLUCOSE BLDC GLUCOMTR-MCNC: 238 MG/DL (ref 70–130)
GLUCOSE BLDC GLUCOMTR-MCNC: 238 MG/DL (ref 70–130)
GLUCOSE BLDC GLUCOMTR-MCNC: 256 MG/DL (ref 70–130)
GLUCOSE BLDC GLUCOMTR-MCNC: 58 MG/DL (ref 70–130)
GLUCOSE BLDC GLUCOMTR-MCNC: 59 MG/DL (ref 70–130)
GLUCOSE BLDC GLUCOMTR-MCNC: 85 MG/DL (ref 70–130)
POTASSIUM BLD-SCNC: 4.4 MMOL/L (ref 3.5–5.2)
SODIUM BLD-SCNC: 143 MMOL/L (ref 136–145)

## 2018-09-01 PROCEDURE — 94799 UNLISTED PULMONARY SVC/PX: CPT

## 2018-09-01 PROCEDURE — 63710000001 INSULIN ASPART PER 5 UNITS: Performed by: HOSPITALIST

## 2018-09-01 PROCEDURE — 82962 GLUCOSE BLOOD TEST: CPT

## 2018-09-01 PROCEDURE — 99232 SBSQ HOSP IP/OBS MODERATE 35: CPT | Performed by: HOSPITALIST

## 2018-09-01 PROCEDURE — 63710000001 INSULIN DETEMIR PER 5 UNITS: Performed by: HOSPITALIST

## 2018-09-01 PROCEDURE — 94660 CPAP INITIATION&MGMT: CPT

## 2018-09-01 PROCEDURE — 93005 ELECTROCARDIOGRAM TRACING: CPT | Performed by: HOSPITALIST

## 2018-09-01 PROCEDURE — 99232 SBSQ HOSP IP/OBS MODERATE 35: CPT | Performed by: INTERNAL MEDICINE

## 2018-09-01 PROCEDURE — 25010000002 ENOXAPARIN PER 10 MG: Performed by: HOSPITALIST

## 2018-09-01 PROCEDURE — 80048 BASIC METABOLIC PNL TOTAL CA: CPT | Performed by: HOSPITALIST

## 2018-09-01 RX ORDER — NYSTATIN 100000 [USP'U]/G
POWDER TOPICAL EVERY 12 HOURS SCHEDULED
Status: DISCONTINUED | OUTPATIENT
Start: 2018-09-01 | End: 2018-09-02

## 2018-09-01 RX ADMIN — IPRATROPIUM BROMIDE 0.5 MG: 0.5 SOLUTION RESPIRATORY (INHALATION) at 19:41

## 2018-09-01 RX ADMIN — INSULIN DETEMIR 50 UNITS: 100 INJECTION, SOLUTION SUBCUTANEOUS at 20:19

## 2018-09-01 RX ADMIN — GUAIFENESIN 1200 MG: 600 TABLET, EXTENDED RELEASE ORAL at 20:16

## 2018-09-01 RX ADMIN — AMLODIPINE BESYLATE 2.5 MG: 2.5 TABLET ORAL at 09:50

## 2018-09-01 RX ADMIN — FAMOTIDINE 20 MG: 20 TABLET, FILM COATED ORAL at 09:50

## 2018-09-01 RX ADMIN — QUETIAPINE FUMARATE 100 MG: 100 TABLET ORAL at 20:16

## 2018-09-01 RX ADMIN — CETIRIZINE HYDROCHLORIDE 10 MG: 10 TABLET, FILM COATED ORAL at 20:17

## 2018-09-01 RX ADMIN — ENOXAPARIN SODIUM 40 MG: 40 INJECTION SUBCUTANEOUS at 09:50

## 2018-09-01 RX ADMIN — HYDROCODONE BITARTRATE AND ACETAMINOPHEN 1 TABLET: 10; 325 TABLET ORAL at 13:43

## 2018-09-01 RX ADMIN — LEVOTHYROXINE SODIUM 250 MCG: 150 TABLET ORAL at 06:08

## 2018-09-01 RX ADMIN — METOPROLOL TARTRATE 50 MG: 50 TABLET, FILM COATED ORAL at 09:50

## 2018-09-01 RX ADMIN — Medication 1 CAPSULE: at 09:50

## 2018-09-01 RX ADMIN — BUDESONIDE AND FORMOTEROL FUMARATE DIHYDRATE 2 PUFF: 160; 4.5 AEROSOL RESPIRATORY (INHALATION) at 10:10

## 2018-09-01 RX ADMIN — BUDESONIDE AND FORMOTEROL FUMARATE DIHYDRATE 2 PUFF: 160; 4.5 AEROSOL RESPIRATORY (INHALATION) at 19:42

## 2018-09-01 RX ADMIN — ATORVASTATIN CALCIUM 40 MG: 40 TABLET, FILM COATED ORAL at 09:50

## 2018-09-01 RX ADMIN — ALBUTEROL SULFATE 2.5 MG: 2.5 SOLUTION RESPIRATORY (INHALATION) at 15:32

## 2018-09-01 RX ADMIN — HYDROCODONE BITARTRATE AND ACETAMINOPHEN 1 TABLET: 10; 325 TABLET ORAL at 20:16

## 2018-09-01 RX ADMIN — Medication 1 MG: at 09:50

## 2018-09-01 RX ADMIN — GUAIFENESIN 1200 MG: 600 TABLET, EXTENDED RELEASE ORAL at 09:50

## 2018-09-01 RX ADMIN — QUETIAPINE FUMARATE 100 MG: 100 TABLET ORAL at 09:50

## 2018-09-01 RX ADMIN — INSULIN ASPART 4 UNITS: 100 INJECTION, SOLUTION INTRAVENOUS; SUBCUTANEOUS at 12:21

## 2018-09-01 RX ADMIN — METOPROLOL TARTRATE 50 MG: 50 TABLET, FILM COATED ORAL at 21:10

## 2018-09-01 RX ADMIN — IPRATROPIUM BROMIDE 0.5 MG: 0.5 SOLUTION RESPIRATORY (INHALATION) at 10:07

## 2018-09-01 RX ADMIN — INSULIN ASPART 12 UNITS: 100 INJECTION, SOLUTION INTRAVENOUS; SUBCUTANEOUS at 17:34

## 2018-09-01 RX ADMIN — ASPIRIN 81 MG: 81 TABLET, COATED ORAL at 09:50

## 2018-09-01 NOTE — PROGRESS NOTES
LOS: 1 day   Patient Care Team:  Jina Vaughan MD as PCP - General (Internal Medicine)  Jhonny Hoyt MD as Consulting Physician (Pulmonary Disease)  Gricelda Flores APRN as Nurse Practitioner (Endocrinology)  Konstantin Orr MD as Consulting Physician (Nephrology)  Vargas Ledezma MD as Consulting Physician (Pain Medicine)  Trinidad Barreto MD as Consulting Physician (Cardiology)    Chief Complaint:     F/u volume overload    Interval History:     Is overall slightly better.  No cp, dyspnea is better. No nausea. Mildly lightheaded with standing.  No vomiting or diarrhea. No fever. Mild cough.     Echo 8/31  · Left ventricular wall thickness is consistent with mild-to-moderate concentric hypertrophy.  · Left ventricular systolic function is normal.  · Left ventricular diastolic dysfunction (grade I a) consistent with impaired relaxation.  · Calculated EF = 65%.         Objective   Vital Signs  Temp:  [97 °F (36.1 °C)-98.4 °F (36.9 °C)] 98.1 °F (36.7 °C)  Heart Rate:  [60-88] 79  Resp:  [18-20] 20  BP: (107-165)/(58-80) 107/58    Intake/Output Summary (Last 24 hours) at 09/01/18 1131  Last data filed at 09/01/18 0929   Gross per 24 hour   Intake              240 ml   Output              800 ml   Net             -560 ml       Comfortable NAD  Neck supple, no JVD or thyromegaly appreciated  S1/S2 RRR, no m/r/g  Lungs CTA B, normal effort  Abdomen S/NT/ND (+) BS, no HSM appreciated  Extremities warm, no clubbing, cyanosis, or edema  No visible or palpable skin lesions  A/Ox4, mood and affect appropriate    Results Review:        Results from last 7 days  Lab Units 09/01/18  0409 08/31/18  0317 08/30/18  2222   SODIUM mmol/L 143 144 144   POTASSIUM mmol/L 4.4 4.8 5.1   CHLORIDE mmol/L 97* 99 98   CO2 mmol/L 37.3* 33.9* 35.4*   BUN mg/dL 42* 28* 28*   CREATININE mg/dL 2.05* 1.40* 1.47*   GLUCOSE mg/dL 51* 145* 87   CALCIUM mg/dL 8.5* 9.0 9.0       Results from last 7 days  Lab Units 08/31/18  0994  08/31/18  0317 08/30/18  2222   TROPONIN T ng/mL 0.011 0.023 0.029       Results from last 7 days  Lab Units 08/31/18  0317 08/30/18  2222   WBC 10*3/mm3 9.76 10.46   HEMOGLOBIN g/dL 10.8* 10.8*   HEMATOCRIT % 34.3* 34.2*   PLATELETS 10*3/mm3 185 183       Results from last 7 days  Lab Units 08/30/18  2222   INR  1.05   APTT seconds 29.3       Results from last 7 days  Lab Units 08/31/18  0317   CHOLESTEROL mg/dL 126       Results from last 7 days  Lab Units 08/31/18  0317   MAGNESIUM mg/dL 1.8       Results from last 7 days  Lab Units 08/31/18  0317   CHOLESTEROL mg/dL 126   TRIGLYCERIDES mg/dL 88   HDL CHOL mg/dL 64*   LDL CHOL mg/dL 44       I reviewed the patient's new clinical results.  I personally viewed and interpreted the patient's EKG/Telemetry data        Medication Review:     amLODIPine 2.5 mg Oral Q24H   aspirin 81 mg Oral Daily   atorvastatin 40 mg Oral Daily   b complex-C-folic acid 1 capsule Oral Daily   budesonide-formoterol 2 puff Inhalation BID - RT   cetirizine 10 mg Oral Nightly   enoxaparin 40 mg Subcutaneous Q24H   famotidine 20 mg Oral Daily   guaiFENesin 1,200 mg Oral Q12H   insulin aspart 0-24 Units Subcutaneous 4x Daily AC & at Bedtime   insulin detemir 50 Units Subcutaneous Nightly   ipratropium 0.5 mg Nebulization BID - RT   lactobacillus acidophilus 1 capsule Oral Daily   levothyroxine 250 mcg Oral QAM   metoprolol tartrate 50 mg Oral Q12H   QUEtiapine 100 mg Oral BID            Assessment/Plan     Active Problems:    Acute hypoxemic respiratory failure (CMS/HCC)    1. Volume overload.  CXR looks like volume overload.  Her BNP is normal with normal systolic and diastolic function on echo 5/2018.  Has CKD, uncontrolled DM and HTN anemia, low TSH and normal albumen. Diurese and normal viral panel.  has diastolic dysfunction.   2. Low TSH.   3. Obesity.   4. CKD - creatinine rising.    5. DM, uncontrolled.  6. Mild anemia  7. ALTA  8. HTN, now controlled. Try amlodipine and target is  <120/80.  If still high, add hydralazine.  Watch for LE edema.     Diuretics now held.  BP better.  Will see Monday or Tuesday.   Watch as may need diuretics again.       Trinidad Barreto MD  09/01/18  11:31 AM

## 2018-09-01 NOTE — PLAN OF CARE
Problem: Pain, Acute (Adult)  Goal: Identify Related Risk Factors and Signs and Symptoms  Outcome: Ongoing (interventions implemented as appropriate)   09/01/18 9460   Pain, Acute (Adult)   Related Risk Factors (Acute Pain) disease process   Signs and Symptoms (Acute Pain) fatigue/weakness;facial mask of pain/grimace  (Patient is encouraged to request pain med as needed.)   Patient medicated for pain, relief occurred.

## 2018-09-01 NOTE — PLAN OF CARE
Problem: Patient Care Overview  Goal: Plan of Care Review  Outcome: Ongoing (interventions implemented as appropriate)      Problem: Infection, Risk/Actual (Adult)  Goal: Identify Related Risk Factors and Signs and Symptoms  Outcome: Ongoing (interventions implemented as appropriate)    Goal: Infection Prevention/Resolution  Outcome: Ongoing (interventions implemented as appropriate)  Will monitor

## 2018-09-01 NOTE — CONSULTS
Group: [facility]         CONSULT NOTE    Patient Identification:  Jared Govea  63 y.o.  female  1955  [id number]            Requesting physician:  Dr. Judd    Reason for Consultation:  Obesity hypoventilation syndrome, chronic respiratory failure, ALTA, obesity    CC: Shortness of breath    History of Present Illness:  63-year-old morbidly obese female who presents with shortness of breath.  She says it has been going on for 2 weeks, progressively worsening, now described as severe.  Alleviated by rest and oxygen.  Exacerbated by exertion.  Negative for fever, chills, sputum or cough.  Associated with bilateral lower extremity swelling which has been progressive over the past few weeks.  Her nephrologist recently increased her diuretic dose without any improvement in her symptoms.  She usually uses oxygen and positive airway pressure therapy at home.    I reviewed history and physical dictated by Dr. Judd yesterday as well as Dr. Barreto.      Review of Systems   Constitutional: Positive for fatigue. Negative for diaphoresis and fever.   HENT: Negative for ear discharge and sore throat.    Eyes: Negative for pain and visual disturbance.   Respiratory: Positive for shortness of breath. Negative for cough.    Cardiovascular: Positive for leg swelling. Negative for chest pain.   Gastrointestinal: Negative for abdominal pain and diarrhea.   Endocrine: Negative for cold intolerance and polyuria.   Genitourinary: Negative for dysuria and hematuria.   Musculoskeletal: Negative for joint swelling and myalgias.   Skin: Negative for rash and wound.   Neurological: Positive for weakness. Negative for speech difficulty and numbness.   Hematological: Negative for adenopathy. Does not bruise/bleed easily.   Psychiatric/Behavioral: Negative for agitation and confusion.       Past Medical History:  Past Medical History:   Diagnosis Date   • Anxiety    • Arthritis    • CHF (congestive heart failure)  (CMS/Formerly [facility])    • Chronic back pain    • COPD (chronic obstructive pulmonary disease) (CMS/Formerly [facility])    • Depression    • Essential hypertension    • SERENA (generalized anxiety disorder)    • GERD (gastroesophageal reflux disease)    • Headache    • HLD (hyperlipidemia)    • Hypothyroidism, acquired    • Low back pain    • Neuropathy in diabetes (CMS/Formerly [facility])    • Sleep apnea    • Type 2 diabetes mellitus (CMS/Formerly [facility])    • Wound, open, toe 8/22/2017       Past Surgical History:  Past Surgical History:   Procedure Laterality Date   • DILATATION AND CURETTAGE      x5   • LUNG SURGERY Right    • TRACHEOSTOMY          Home Meds:  Prescriptions Prior to Admission   Medication Sig Dispense Refill Last Dose   • acetaminophen (TYLENOL) 325 MG tablet Take 2 tablets by mouth Every 4 (Four) Hours As Needed for Mild Pain , Headache or Fever.   Past Month at Unknown time   • aspirin 81 MG EC tablet Take 81 mg by mouth daily.   8/30/2018 at Unknown time   • atorvastatin (LIPITOR) 40 MG tablet Take 1 tablet by mouth Daily. for cholesterol. 90 tablet 1 8/30/2018 at Unknown time   • B Complex Vitamins (VITAMIN B COMPLEX PO) Take 1 tablet by mouth daily.   8/30/2018 at 0800   • bumetanide (BUMEX) 2 MG tablet Take 2 mg by mouth Daily.   8/30/2018 at 0800   • cetirizine (ZyrTEC) 10 MG tablet Take 10 mg by mouth every night.   8/30/2018 at 2000   • famotidine (PEPCID) 20 MG tablet Take 20 mg by mouth daily.   8/30/2018 at 0800   • fluticasone-salmeterol (ADVAIR DISKUS) 250-50 MCG/DOSE DISKUS Inhale 1 puff 2 (Two) Times a Day. For COPD and rinse mouth after use 60 each 5 8/30/2018 at 0800   • guaiFENesin (MUCINEX) 600 MG 12 hr tablet Take 1,200 mg by mouth 2 (Two) Times a Day.   8/30/2018 at 0800   • HYDROcodone-acetaminophen (NORCO)  MG per tablet Take 1 tablet by mouth Every 8 (Eight) Hours As Needed for Moderate Pain . 90 tablet 0 8/30/2018 at 0800   • insulin aspart (NOVOLOG FLEXPEN) 100 UNIT/ML solution pen-injector sc pen SSI (Patient taking  differently: 3 (Three) Times a Day With Meals. SSI) 15 mL 3 8/30/2018 at Unknown time   • Insulin Degludec (TRESIBA FLEXTOUCH) 200 UNIT/ML solution pen-injector Inject 70 Units under the skin Daily. (Patient taking differently: Inject 70 Units under the skin into the appropriate area as directed Every Night.) 9 mL 3 8/30/2018 at 2000   • levalbuterol (XOPENEX) 0.63 MG/3ML nebulizer solution Take 1 ampule by nebulization every 4 (four) hours as needed for wheezing.   8/30/2018 at 2000   • levothyroxine (SYNTHROID) 300 MCG tablet Take 1 tablet by mouth Daily. 30 tablet 2 8/30/2018 at 0800   • metoprolol tartrate (LOPRESSOR) 50 MG tablet Take 1 tablet by mouth Every 12 (Twelve) Hours. For heart 180 tablet 1 8/30/2018 at 0800   • PROAIR  (90 Base) MCG/ACT inhaler INHALE TWO PUFFS BY MOUTH EVERY 4 HOURS AS NEEDED FOR WHEEZING 18 g 5 8/30/2018 at 2000   • Probiotic Product (ALIGN) 4 MG capsule Take 4 mg by mouth Daily. 30 capsule 6 8/30/2018 at 0800   • QUEtiapine (SEROquel) 100 MG tablet TAKE ONE TABLET BY MOUTH TWICE A  tablet 0 8/30/2018 at 2000   • Umeclidinium Bromide (INCRUSE ELLIPTA IN) Inhale Daily.   8/30/2018 at 2000   • vitamin D (ERGOCALCIFEROL) 15179 units capsule capsule Take 1 capsule by mouth Every 7 (Seven) Days. On tuesday 12 capsule 3 Past Week at 0800   • clotrimazole-betamethasone (LOTRISONE) 1-0.05 % cream Apply  topically 2 (Two) Times a Day. 180 g 3 More than a month at Unknown time   • dimenhyDRINATE (DRAMAMINE) 50 MG tablet Take 1 tablet by mouth Daily As Needed for movement disorders. 30 tablet 0 More than a month at Unknown time   • sennosides-docusate sodium (SENOKOT-S) 8.6-50 MG tablet Take 2 tablets by mouth At Night As Needed for Constipation.   More than a month at Unknown time       Allergies:  Allergies   Allergen Reactions   • Erythromycin Nausea And Vomiting   • Erythromycin Ethylsuccinate Nausea And Vomiting   • Ativan [Lorazepam] Hallucinations   • Tape Rash     Paper  "tape         Social History:   Social History     Social History   • Marital status:      Spouse name: N/A   • Number of children: N/A   • Years of education: N/A     Occupational History   • Not on file.     Social History Main Topics   • Smoking status: Former Smoker     Packs/day: 0.50     Years: 40.00     Types: Cigarettes     Quit date: 10/4/2010   • Smokeless tobacco: Former User     Quit date: 8/3/2012   • Alcohol use No   • Drug use: No   • Sexual activity: Defer     Other Topics Concern   • Not on file     Social History Narrative   • No narrative on file       Family History:  Family History   Problem Relation Age of Onset   • Cancer Mother         bone   • Cancer Father    • Cancer Brother        Physical Exam:  /58 (BP Location: Right arm, Patient Position: Sitting)   Pulse 79   Temp 98.1 °F (36.7 °C) (Oral)   Resp 20   Ht 167.6 cm (66\")   Wt 121 kg (267 lb 0.6 oz)   LMP  (LMP Unknown)   SpO2 92%   BMI 43.10 kg/m²  Body mass index is 43.1 kg/m². 92% 121 kg (267 lb 0.6 oz)  Physical Exam   Constitutional: No distress.   Morbidly obese   HENT:   Head: Normocephalic.   Mouth/Throat: Oropharynx is clear and moist.   Eyes: Conjunctivae and EOM are normal. No scleral icterus.   Neck: No tracheal deviation present. No thyromegaly present.   Cardiovascular: Normal rate, regular rhythm and normal heart sounds.    2+ lower extremity edema   Pulmonary/Chest: No respiratory distress. She has no wheezes. She exhibits no tenderness.   Distant and diminished breath sounds bilaterally but I don't hear any crackles.  She has poor inspiratory effort.  Large body habitus makes examination very difficult.  Percussion is hampered by large body habitus   Abdominal:   Morbidly obese, nontender.  Her large body habitus hampers the rest of the abdominal exam   Musculoskeletal: Normal range of motion. She exhibits no deformity.   Neurological: She is alert. No cranial nerve deficit. She exhibits normal muscle " tone.   Skin: Skin is warm. No rash noted. No erythema.   Psychiatric: She has a normal mood and affect. Thought content normal.       LABS:  Lab Results   Component Value Date    CALCIUM 8.5 (L) 09/01/2018    PHOS 3.5 08/31/2018     Results from last 7 days  Lab Units 09/01/18  0409 08/31/18  0317 08/30/18  2222   MAGNESIUM mg/dL  --  1.8  --    SODIUM mmol/L 143 144 144   POTASSIUM mmol/L 4.4 4.8 5.1   CHLORIDE mmol/L 97* 99 98   CO2 mmol/L 37.3* 33.9* 35.4*   BUN mg/dL 42* 28* 28*   CREATININE mg/dL 2.05* 1.40* 1.47*   GLUCOSE mg/dL 51* 145* 87   CALCIUM mg/dL 8.5* 9.0 9.0   WBC 10*3/mm3  --  9.76 10.46   HEMOGLOBIN g/dL  --  10.8* 10.8*   PLATELETS 10*3/mm3  --  185 183   ALT (SGPT) U/L  --   --  11   AST (SGOT) U/L  --   --  14   PROBNP pg/mL  --   --  965.3*     Lab Results   Component Value Date    CKTOTAL 119 06/01/2014    TROPONINT 0.011 08/31/2018       Results from last 7 days  Lab Units 08/31/18  0927 08/31/18  0317 08/30/18  2222   TROPONIN T ng/mL 0.011 0.023 0.029               Results from last 7 days  Lab Units 08/30/18  2225   PH, ARTERIAL pH units 7.401   PCO2, ARTERIAL mm Hg 57.5*   PO2 ART mm Hg 64.4*   O2 SATURATION ART % 93.5*   FLOW RATE lpm 5.0   MODALITY  Nasal Cannula       Results from last 7 days  Lab Units 08/31/18  1104   ADENOVIRUS DETECTION BY PCR  Not Detected   CORONAVIRUS 229E  Not Detected   CORONAVIRUS HKU1  Not Detected   CORONAVIRUS NL63  Not Detected   CORONAVIRUS OC43  Not Detected   HUMAN METAPNEUMOVIRUS  Not Detected   HUMAN RHINOVIRUS/ENTEROVIRUS  Not Detected   INFLUENZA B PCR  Not Detected   PARAINFLUENZA 1  Not Detected   PARAINFLUENZA VIRUS 2  Not Detected   PARAINFLUENZA VIRUS 3  Not Detected   PARAINFLUENZA VIRUS 4  Not Detected   BORDETELLA PERTUSSIS PCR  Not Detected   CHLAMYDOPHILA PNEUMONIAE PCR  Not Detected   MYCOPLAMA PNEUMO PCR  Not Detected   INFLUENZA A PCR  Not Detected   INFLUENZA A H3  Not Detected   INFLUENZA A H1  Not Detected   RSV, PCR  Not Detected        Results from last 7 days  Lab Units 08/30/18  2222   INR  1.05         Lab Results   Component Value Date    TSH 0.016 (L) 08/31/2018     Estimated Creatinine Clearance: 37.2 mL/min (A) (by C-G formula based on SCr of 2.05 mg/dL (H)).         Imaging: I personally visualized the images of some prominent linear markings of both lungs bilaterally suggesting perhaps fluid overload.  There is chronic elevation of the right hemidiaphragm.  Don't see any discrete infiltrates      Assessment:  Volume overload  Heart failure, diastolic  Acute on chronic kidney injury  Obesity hypoventilation syndrome  Chronic respiratory failure  ALTA        Recommendations:  Agree with bronchodilators to control symptoms, if effective.  Low-flow oxygen, with goal SPO2 percentage between 88 and 94%.  Continue positive airway pressure therapy at nighttime, and when necessary during the day.  Continue diuresis.  Check respiratory secretions for viral or bacterial infection.  We will follow along          Jhonny Hoyt MD  9/1/2018  11:48 AM      Much of this encounter note is an electronic transcription/translation of spoken language to printed text using Dragon Software.

## 2018-09-01 NOTE — PLAN OF CARE
Problem: Patient Care Overview  Goal: Plan of Care Review  Outcome: Ongoing (interventions implemented as appropriate)   09/01/18 0125   Coping/Psychosocial   Plan of Care Reviewed With patient   OTHER   Outcome Summary continue home meds       Problem: NPPV/CPAP (Adult)  Intervention: Optimize Tolerance of Noninvasive Ventilation   09/01/18 0125   Respiratory Interventions   Airway/Ventilation Management airway patency maintained;pulmonary hygiene promoted     Intervention: Prevent/Minimize Device Friction/Shearing and Pressure Points   09/01/18 0125   Respiratory Interventions   NPPV/CPAP Maintenance mask adjusted

## 2018-09-01 NOTE — PROGRESS NOTES
"Hospitalist Team      Patient Care Team:  Jina Vaughan MD as PCP - General (Internal Medicine)  Jhonny Hoyt MD as Consulting Physician (Pulmonary Disease)  Gricelda Flores APRN as Nurse Practitioner (Endocrinology)  Konstantin Orr MD as Consulting Physician (Nephrology)  Vargas Ledezma MD as Consulting Physician (Pain Medicine)  Trinidad Barreto MD as Consulting Physician (Cardiology)        Chief Complaint:  F/U Volume overload    Subjective    Interval History and ROS:     Patient notes her breathing feels better today. She notes her LE edema has improved. She denies any CP or N/V. She notes a non-productive cough. She is afebrile. She is tolerating her diet well. She remains on her home 4L NC. Weight down 2 lbs from yesterday.     Objective    Vital Signs  Temp:  [97 °F (36.1 °C)-99.1 °F (37.3 °C)] 99.1 °F (37.3 °C)  Heart Rate:  [60-90] 90  Resp:  [18-22] 22  BP: (107-135)/(56-80) 110/56  Oxygen Therapy  SpO2: 92 %  Pulse Oximetry Type: Continuous  Device (Oxygen Therapy): NPPV/NIV  Flow (L/min): 4     Flowsheet Rows      First Filed Value   Admission Height  167.6 cm (66\") Documented at 08/31/2018 0500   Admission Weight  122 kg (269 lb 0.6 oz) Documented at 08/31/2018 0655            Physical Exam:  Constitutional: Patient appears well-developed, morbidly obese and in no acute distress   HEENT:   Head: Normocephalic and atraumatic.   Eyes: EOM are intact. Sclera are anicteric and non-injected.  Mouth and Throat: Patient has moist mucous membranes.      Neck: Neck supple. Unable to assess JVD secondary to body habitus.  Cardiovascular: Regular rate, regular rhythm.  Exam reveals no gallop and no friction rub.  No murmur heard.  Pulmonary/Chest: Lungs with diminished breath sounds throughout. No respiratory distress. No wheezes. No rhonchi. No rales.   Abdominal: Obese. Soft. Bowel sounds are normal. There is no tenderness.   Extremities: 1+ LE edema.   Neurological: Patient is alert and " oriented to person, place, and time.   Skin: Skin is warm. No cyanosis or erythema.      Results Review:     I reviewed the patient's new clinical results.    Lab Results (last 24 hours)     Procedure Component Value Units Date/Time    POC Glucose Once [[id number]]  (Abnormal) Collected:  09/01/18 1724    Specimen:  Blood Updated:  09/01/18 1730     Glucose 256 (H) mg/dL     Narrative:       Meter: [id number] : [id number] Roger Thompson CNA    POC Glucose Once [[id number]]  (Abnormal) Collected:  09/01/18 1141    Specimen:  Blood Updated:  09/01/18 1147     Glucose 186 (H) mg/dL     Narrative:       Meter: [id number] : [id number] Roger Thompson CNA    POC Glucose Once [[id number]]  (Normal) Collected:  09/01/18 0932    Specimen:  Blood Updated:  09/01/18 0939     Glucose 85 mg/dL     Narrative:       Meter: [id number] : [id number] Rajendra Weinberg RN    POC Glucose Once [[id number]]  (Abnormal) Collected:  09/01/18 0724    Specimen:  Blood Updated:  09/01/18 0731     Glucose 58 (L) mg/dL     Narrative:       Meter: [id number] : [id number] Roger Thompson CNA    POC Glucose Once [[id number]]  (Abnormal) Collected:  09/01/18 0606    Specimen:  Blood Updated:  09/01/18 0613     Glucose 59 (L) mg/dL     Narrative:       Meter: [id number] : [id number] Sylvia Pritchett RN    Basic Metabolic Panel [[id number]]  (Abnormal) Collected:  09/01/18 0409    Specimen:  Blood Updated:  09/01/18 0507     Glucose 51 (L) mg/dL      BUN 42 (H) mg/dL      Creatinine 2.05 (H) mg/dL      Sodium 143 mmol/L      Potassium 4.4 mmol/L      Chloride 97 (L) mmol/L      CO2 37.3 (H) mmol/L      Calcium 8.5 (L) mg/dL      eGFR Non African Amer 24 (L) mL/min/1.73      BUN/Creatinine Ratio 20.5     Anion Gap 8.7 mmol/L     Narrative:       GFR Normal >60  Chronic Kidney Disease <60  Kidney Failure <15    POC Glucose Once [[id number]]  (Abnormal) Collected:  08/31/18 1953    Specimen:  Blood Updated:  08/31/18 2001     Glucose 418 (H) mg/dL      Narrative:       Critical repeat  DR NOTIFIED Meter: [id number] : [id number] Sylvia SIMON          Imaging Results (last 24 hours)     ** No results found for the last 24 hours. **        ECG/EMG Results (most recent)     Procedure Component Value Units Date/Time    ECG 12 Lead [[id number]] Collected:  08/30/18 2151     Updated:  08/31/18 0758    Narrative:       RR Interval= 667 ms  IA Interval= 139 ms  QRSD Interval= 96 ms  QT Interval= 389 ms  QTc Interval= 476 ms  Heart Rate= 90 ms  P Axis= 52 deg  QRS Axis= 44 deg  T Wave Axis= 27 deg  I: 40 Axis= 17 deg  T: 40 Axis= 50 deg  ST Axis= 39 deg  Sinus rhythm  No Change from Prior Tracing  Electronically Signed by:  Trinidad Barreto ([facility]) 31-Aug-2018 07:57:24  Date and Time of Study: 2018-08-30 21:51:37    Adult Transthoracic Echo Limited W/ Cont if Necessary Per Protocol [[id number]] Collected:  08/31/18 1344     Updated:  08/31/18 1520     BSA 2.3 m^2      IVSd 1.3 cm      LVIDd 4.7 cm      LVIDs 2.9 cm      LVPWd 1.2 cm      IVS/LVPW 1.0     FS 38.2 %      EDV(Teich) 104.4 ml      ESV(Teich) 33.0 ml      EF(Teich) 68.4 %      EDV(cubed) 106.5 ml      ESV(cubed) 25.2 ml      EF(cubed) 76.4 %      LV mass(C)d 230.4 grams      LV mass(C)dI 101.5 grams/m^2      SV(Teich) 71.4 ml      SI(Teich) 31.5 ml/m^2      SV(cubed) 81.3 ml      SI(cubed) 35.9 ml/m^2      LVLd ap4 9.0 cm      EDV(MOD-sp4) 126.0 ml      LVLs ap4 8.2 cm      ESV(MOD-sp4) 45.7 ml      EF(MOD-sp4) 63.7 %      LVLd ap2 8.6 cm      EDV(MOD-sp2) 116.0 ml      LVLs ap2 7.8 cm      ESV(MOD-sp2) 38.2 ml      EF(MOD-sp2) 67.1 %      SV(MOD-sp4) 80.3 ml      SI(MOD-sp4) 35.4 ml/m^2      SV(MOD-sp2) 77.8 ml      SI(MOD-sp2) 34.3 ml/m^2      LV Meraz Vol (BSA corrected) 55.5 ml/m^2      LV Sys Vol (BSA corrected) 20.1 ml/m^2      MV A dur 0.13 sec      MV E max kraig 98.6 cm/sec      MV A max kraig 124.0 cm/sec      MV E/A 0.8     MV V2 max 122.0 cm/sec      MV max PG 6.0 mmHg      MV V2 mean 97.3 cm/sec       MV mean PG 4.0 mmHg      MV V2 VTI 38.2 cm      MV P1/2t max rich 124.0 cm/sec      MV P1/2t 74.3 msec      MVA(P1/2t) 3.0 cm^2      MV dec slope 489.0 cm/sec^2      MV dec time 0.29 sec      MVA P1/2T LCG 1.8 cm^2       CV ECHO TERRI - BZI_BMI 43.4 kilograms/m^2       CV ECHO TERRI - BSA(HAYCOCK) 2.4 m^2       CV ECHO TERRI - BZI_METRIC_WEIGHT 122.0 kg       CV ECHO TERRI - BZI_METRIC_HEIGHT 167.6 cm      Target HR (85%) 133 bpm      Max. Pred. HR (100%) 157 bpm       CV VAS BP RIGHT /78 mmHg      LA Volume Index 15.0 mL/m2      Avg E/e' ratio 15.17     EF(MOD-bp) 65 %      Lat Peak E' Rich 8.0 cm/sec      Med Peak E' Rich 5.00 cm/sec     Narrative:       · Left ventricular wall thickness is consistent with mild-to-moderate   concentric hypertrophy.  · Left ventricular systolic function is normal.  · Left ventricular diastolic dysfunction (grade I a) consistent with   impaired relaxation.  · Calculated EF = 65%.       ECG 12 Lead [[id number]] Collected:  09/01/18 1026     Updated:  09/01/18 1415    Narrative:       RR Interval= 779 ms  MD Interval= 153 ms  QRSD Interval= 94 ms  QT Interval= 407 ms  QTc Interval= 461 ms  Heart Rate= 77 ms  P Axis= 43 deg  QRS Axis= 42 deg  T Wave Axis= 48 deg  I: 40 Axis= -10 deg  T: 40 Axis= 56 deg  ST Axis= 50 deg  Sinus rhythm  Low voltage, precordial leads  Minimal ST elevation, lateral leads  no significant change  Electronically Signed by:  Milton Dowling ([facility]) 01-Sep-2018 14:15:09  Date and Time of Study: 2018-09-01 10:26:26          Medication Review:   I have reviewed the patient's current medication list    Current Facility-Administered Medications:   •  acetaminophen (TYLENOL) tablet 650 mg, 650 mg, Oral, Q4H PRN, Elis Judd,   •  albuterol (PROVENTIL) nebulizer solution 0.083% 2.5 mg/3mL, 2.5 mg, Nebulization, Q6H PRN, Elis Judd DO, 2.5 mg at 09/01/18 1532  •  amLODIPine (NORVASC) tablet 2.5 mg, 2.5 mg, Oral, Q24H, Trinidad Barreto MD,  2.5 mg at 09/01/18 0950  •  aspirin EC tablet 81 mg, 81 mg, Oral, Daily, Elis Judd DO, 81 mg at 09/01/18 0950  •  atorvastatin (LIPITOR) tablet 40 mg, 40 mg, Oral, Daily, Elis Judd DO, 40 mg at 09/01/18 0950  •  b complex-C-folic acid capsule 1 mg, 1 capsule, Oral, Daily, Elis Judd DO, 1 mg at 09/01/18 0950  •  budesonide-formoterol (SYMBICORT) 160-4.5 MCG/ACT inhaler 2 puff, 2 puff, Inhalation, BID - RT, Elis Judd DO, 2 puff at 09/01/18 1942  •  cetirizine (zyrTEC) tablet 10 mg, 10 mg, Oral, Nightly, Elis Judd DO, 10 mg at 08/31/18 2004  •  dextrose (D50W) solution 25 g, 25 g, Intravenous, Q15 Min PRN, Magalys Paul MD  •  dextrose (GLUTOSE) oral gel 15 g, 15 g, Oral, Q15 Min PRN, Magalys Paul MD  •  enoxaparin (LOVENOX) syringe 40 mg, 40 mg, Subcutaneous, Q24H, Elis Judd DO, 40 mg at 09/01/18 0950  •  famotidine (PEPCID) tablet 20 mg, 20 mg, Oral, Daily, Elis Judd DO, 20 mg at 09/01/18 0950  •  glucagon (GLUCAGEN) injection 1 mg, 1 mg, Subcutaneous, PRN, Magalys Paul MD  •  guaiFENesin (MUCINEX) 12 hr tablet 1,200 mg, 1,200 mg, Oral, Q12H, Magalys Paul MD, 1,200 mg at 09/01/18 0950  •  HYDROcodone-acetaminophen (NORCO)  MG per tablet 1 tablet, 1 tablet, Oral, Q8H PRN, Elis Judd DO, 1 tablet at 09/01/18 1343  •  insulin aspart (novoLOG) injection 0-24 Units, 0-24 Units, Subcutaneous, 4x Daily AC & at Bedtime, Magalys Paul MD, 12 Units at 09/01/18 1734  •  insulin detemir (LEVEMIR) injection 50 Units, 50 Units, Subcutaneous, Nightly, Magalys Paul MD, 50 Units at 08/31/18 2002  •  ipratropium (ATROVENT) nebulizer solution 0.5 mg, 0.5 mg, Nebulization, BID - RT, Elis Judd DO, 0.5 mg at 09/01/18 1941  •  lactobacillus acidophilus (RISAQUAD) capsule 1 capsule, 1 capsule, Oral, Daily, Elis Judd DO, 1 capsule at  09/01/18 0950  •  levothyroxine (SYNTHROID, LEVOTHROID) tablet 250 mcg, 250 mcg, Oral, JEFFBeverly Michelle Theresa, MD, 250 mcg at 09/01/18 0608  •  metoprolol tartrate (LOPRESSOR) tablet 50 mg, 50 mg, Oral, Q12H, Nacho Judda, DO, 50 mg at 09/01/18 0950  •  QUEtiapine (SEROquel) tablet 100 mg, 100 mg, Oral, BID, Nacho Judda, DO, 100 mg at 09/01/18 0950  •  sennosides-docusate sodium (SENOKOT-S) 8.6-50 MG tablet 2 tablet, 2 tablet, Oral, Nightly PRN, Binta, Nachoa, DO  •  sodium chloride 0.9 % flush 1-10 mL, 1-10 mL, Intravenous, PRN, Binta, Nachoa, DO  •  Insert peripheral IV, , , Once **AND** sodium chloride 0.9 % flush 10 mL, 10 mL, Intravenous, PRN, Jorge Whiteside MD  •  sodium chloride 0.9 % infusion 40 mL, 40 mL, Intravenous, PRN, Binta, Nachoa, DO      Assessment/Plan     1. Volume overload probably multifactorial with mild acute on chronic diastolic CHF, CKD, Low albumen, obesity and anemia: Patient diuresed yesterday with po and IV bumex and Cr is elevated today. Will hold diuretics today and resume when renal function returns to baseline. Weight down 2 lbs from yesterday. Monitor. Cardiology following.      2. LADY on CKD stage III-IV: Renal function worse today with diuresis yesterday. Diuretics on hold and will recheck BMP in AM.      3. Chronic hypoxic respiratory failure: Patient remains on home 4L NC.      4. ALTA: Patient Bipap is broken here and order has been placed to get new unit, using ours currently. Will have new unit Tuesday.      5. COPD: No acute issues here. Patient with no wheezing on exam and viral panel negative here. No s/s of acute infection or exacerbation. Patient follows with Dr. Hoyt outpatient. On symbicort and Atrovent substituted for home Advair and Incruse ellipta.      6. Chronic LE edema and venous stasis: LE edema improved with diuresis. Monitor.      7. DM-2 with peripheral neuropathy: HbA1C 7.4. Patient was hypoglycemic this AM, on Levemir  here and SSI substituted for home Novolog and Tresiba. May need to hold SSI at HS if hypoglycemia recurs again in AM and bedsides become elevated during the day here. Resume home regimen at discharge. Monitor.      8. Hypertension: BP is WNL on home metoprolol and norvasc added by cardiology yesterday. Monitor.      9. Hypothyroidism. TSH low and FT4 elevated. Will reduce patient's home dose of levothyroxine here. Will need TFTs rechecked outpatient in 6-8 weeks.      10. Dylipidemia: On home lipitor.      11. Chronic narcotic dependence: On home dose Norco. No acute issues here. Confirmed on Graham report.      12. GERD: On home pepcid. No acute issues here.      13. Morbid Obesity: Nutrition to see here.      14. Chronic normocytic anemia/Anemia of chronic disease: Hb stable and near baseline.     Plan for disposition: Home when able.     Magalys Paul MD  09/01/18  7:58 PM

## 2018-09-02 LAB
ANION GAP SERPL CALCULATED.3IONS-SCNC: 10.3 MMOL/L
BUN BLD-MCNC: 53 MG/DL (ref 8–23)
BUN/CREAT SERPL: 24.8 (ref 7–25)
CALCIUM SPEC-SCNC: 7.8 MG/DL (ref 8.8–10.5)
CHLORIDE SERPL-SCNC: 96 MMOL/L (ref 98–107)
CO2 SERPL-SCNC: 34.7 MMOL/L (ref 22–29)
CREAT BLD-MCNC: 2.14 MG/DL (ref 0.57–1)
GFR SERPL CREATININE-BSD FRML MDRD: 23 ML/MIN/1.73
GLUCOSE BLD-MCNC: 191 MG/DL (ref 65–99)
GLUCOSE BLDC GLUCOMTR-MCNC: 113 MG/DL (ref 70–130)
GLUCOSE BLDC GLUCOMTR-MCNC: 134 MG/DL (ref 70–130)
GLUCOSE BLDC GLUCOMTR-MCNC: 267 MG/DL (ref 70–130)
GLUCOSE BLDC GLUCOMTR-MCNC: 367 MG/DL (ref 70–130)
POTASSIUM BLD-SCNC: 4.7 MMOL/L (ref 3.5–5.2)
SODIUM BLD-SCNC: 141 MMOL/L (ref 136–145)

## 2018-09-02 PROCEDURE — 94799 UNLISTED PULMONARY SVC/PX: CPT

## 2018-09-02 PROCEDURE — 63710000001 INSULIN DETEMIR PER 5 UNITS: Performed by: HOSPITALIST

## 2018-09-02 PROCEDURE — 99232 SBSQ HOSP IP/OBS MODERATE 35: CPT | Performed by: HOSPITALIST

## 2018-09-02 PROCEDURE — 82962 GLUCOSE BLOOD TEST: CPT

## 2018-09-02 PROCEDURE — 63710000001 INSULIN ASPART PER 5 UNITS: Performed by: HOSPITALIST

## 2018-09-02 PROCEDURE — 25010000002 ENOXAPARIN PER 10 MG: Performed by: HOSPITALIST

## 2018-09-02 PROCEDURE — 80048 BASIC METABOLIC PNL TOTAL CA: CPT | Performed by: HOSPITALIST

## 2018-09-02 RX ORDER — FLUCONAZOLE 100 MG/1
100 TABLET ORAL EVERY 24 HOURS
Status: DISCONTINUED | OUTPATIENT
Start: 2018-09-02 | End: 2018-09-06 | Stop reason: HOSPADM

## 2018-09-02 RX ADMIN — ACETAMINOPHEN 650 MG: 325 TABLET, FILM COATED ORAL at 16:49

## 2018-09-02 RX ADMIN — CETIRIZINE HYDROCHLORIDE 10 MG: 10 TABLET, FILM COATED ORAL at 22:16

## 2018-09-02 RX ADMIN — Medication 1 CAPSULE: at 09:27

## 2018-09-02 RX ADMIN — BUDESONIDE AND FORMOTEROL FUMARATE DIHYDRATE 2 PUFF: 160; 4.5 AEROSOL RESPIRATORY (INHALATION) at 08:11

## 2018-09-02 RX ADMIN — ATORVASTATIN CALCIUM 40 MG: 40 TABLET, FILM COATED ORAL at 09:27

## 2018-09-02 RX ADMIN — MICONAZOLE NITRATE: 20 CREAM TOPICAL at 22:16

## 2018-09-02 RX ADMIN — Medication 1 MG: at 09:27

## 2018-09-02 RX ADMIN — METOPROLOL TARTRATE 50 MG: 50 TABLET, FILM COATED ORAL at 22:15

## 2018-09-02 RX ADMIN — INSULIN DETEMIR 50 UNITS: 100 INJECTION, SOLUTION SUBCUTANEOUS at 22:17

## 2018-09-02 RX ADMIN — AMLODIPINE BESYLATE 2.5 MG: 2.5 TABLET ORAL at 09:27

## 2018-09-02 RX ADMIN — METOPROLOL TARTRATE 50 MG: 50 TABLET, FILM COATED ORAL at 09:27

## 2018-09-02 RX ADMIN — IPRATROPIUM BROMIDE 0.5 MG: 0.5 SOLUTION RESPIRATORY (INHALATION) at 08:11

## 2018-09-02 RX ADMIN — GUAIFENESIN 1200 MG: 600 TABLET, EXTENDED RELEASE ORAL at 22:16

## 2018-09-02 RX ADMIN — QUETIAPINE FUMARATE 100 MG: 100 TABLET ORAL at 22:16

## 2018-09-02 RX ADMIN — ALBUTEROL SULFATE 2.5 MG: 2.5 SOLUTION RESPIRATORY (INHALATION) at 14:25

## 2018-09-02 RX ADMIN — INSULIN ASPART 12 UNITS: 100 INJECTION, SOLUTION INTRAVENOUS; SUBCUTANEOUS at 22:17

## 2018-09-02 RX ADMIN — ENOXAPARIN SODIUM 40 MG: 40 INJECTION SUBCUTANEOUS at 09:27

## 2018-09-02 RX ADMIN — FLUCONAZOLE 100 MG: 100 TABLET ORAL at 16:50

## 2018-09-02 RX ADMIN — NYSTATIN: 100000 POWDER TOPICAL at 09:27

## 2018-09-02 RX ADMIN — INSULIN ASPART 20 UNITS: 100 INJECTION, SOLUTION INTRAVENOUS; SUBCUTANEOUS at 11:51

## 2018-09-02 RX ADMIN — LEVOTHYROXINE SODIUM 250 MCG: 150 TABLET ORAL at 06:00

## 2018-09-02 RX ADMIN — GUAIFENESIN 1200 MG: 600 TABLET, EXTENDED RELEASE ORAL at 09:27

## 2018-09-02 RX ADMIN — ASPIRIN 81 MG: 81 TABLET, COATED ORAL at 09:27

## 2018-09-02 RX ADMIN — QUETIAPINE FUMARATE 100 MG: 100 TABLET ORAL at 09:27

## 2018-09-02 RX ADMIN — FAMOTIDINE 20 MG: 20 TABLET, FILM COATED ORAL at 09:27

## 2018-09-02 NOTE — PLAN OF CARE
Problem: Pain, Acute (Adult)  Goal: Acceptable Pain Control/Comfort Level  Outcome: Ongoing (interventions implemented as appropriate)   09/02/18 1504   Pain, Acute (Adult)   Acceptable Pain Control/Comfort Level making progress toward outcome  (Patient is doing well with pain control. )   She has not requested any pain medication at this time. No compliant or problems noted.

## 2018-09-02 NOTE — PLAN OF CARE
Problem: Patient Care Overview  Goal: Plan of Care Review  Outcome: Ongoing (interventions implemented as appropriate)   09/02/18 0035   Coping/Psychosocial   Plan of Care Reviewed With patient   OTHER   Outcome Summary continue home meds and bipap with sleep       Problem: NPPV/CPAP (Adult)  Intervention: Optimize Tolerance of Noninvasive Ventilation   09/02/18 0035   Respiratory Interventions   Airway/Ventilation Management airway patency maintained;pulmonary hygiene promoted     Intervention: Prevent/Minimize Device Friction/Shearing and Pressure Points   09/02/18 0035   Respiratory Interventions   NPPV/CPAP Maintenance mask adjusted

## 2018-09-02 NOTE — PROGRESS NOTES
"Hospitalist Team      Patient Care Team:  Jina Vaughan MD as PCP - General (Internal Medicine)  Jhonny Hoyt MD as Consulting Physician (Pulmonary Disease)  Gricelda Flores APRN as Nurse Practitioner (Endocrinology)  Konstantin Orr MD as Consulting Physician (Nephrology)  Vargas Ledezma MD as Consulting Physician (Pain Medicine)  Trinidad Barreto MD as Consulting Physician (Cardiology)        Chief Complaint:  F/U Volume overload    Subjective    Interval History and ROS:     Patient notes her SOA is better today but her LE edema is worse. 2 weights were taken at different times this AM and one shows weight gain and the other weight loss. Patient remains on her home 4L NC. She denies any CP or N/V and is eating well. She wishes to use her own Xopenex here. She notes yeast in her folds and states she powder never works and she always requires the cream and an oral agent. She is afebrile.       Objective    Vital Signs  Temp:  [96.9 °F (36.1 °C)-99.1 °F (37.3 °C)] 97.4 °F (36.3 °C)  Heart Rate:  [65-90] 80  Resp:  [20-22] 20  BP: (107-140)/(56-73) 120/63  Oxygen Therapy  SpO2: 94 %  Pulse Oximetry Type: Continuous  Device (Oxygen Therapy): nasal cannula  Flow (L/min): 4     Flowsheet Rows      First Filed Value   Admission Height  167.6 cm (66\") Documented at 08/31/2018 0500   Admission Weight  122 kg (269 lb 0.6 oz) Documented at 08/31/2018 0655            Physical Exam:  Constitutional: Patient appears well-developed, morbidly obese and in no acute distress   HEENT:   Head: Normocephalic and atraumatic.   Eyes: EOM are intact. Sclera are anicteric and non-injected.  Mouth and Throat: Patient has moist mucous membranes.      Neck: Neck supple. Unable to assess JVD secondary to body habitus.  Cardiovascular: Regular rate, regular rhythm.  Exam reveals no gallop and no friction rub.  No murmur heard.  Pulmonary/Chest: Lungs with diminished breath sounds throughout. No respiratory distress. No " wheezes. No rhonchi. No rales.   Abdominal: Obese. Soft. Bowel sounds are normal. There is no tenderness.   Extremities: 2+ LE edema.   Neurological: Patient is alert and oriented to person, place, and time.   Skin: Skin is warm. No cyanosis or erythema.    Results Review:     I reviewed the patient's new clinical results.    Lab Results (last 24 hours)     Procedure Component Value Units Date/Time    POC Glucose Once [[id number]]  (Abnormal) Collected:  09/02/18 0737    Specimen:  Blood Updated:  09/02/18 0744     Glucose 134 (H) mg/dL     Narrative:       Meter: [id number] : [id number] Celina TORRES ASSISTANT    Basic Metabolic Panel [[id number]]  (Abnormal) Collected:  09/02/18 0418    Specimen:  Blood Updated:  09/02/18 0531     Glucose 191 (H) mg/dL      BUN 53 (H) mg/dL      Creatinine 2.14 (H) mg/dL      Sodium 141 mmol/L      Potassium 4.7 mmol/L      Chloride 96 (L) mmol/L      CO2 34.7 (H) mmol/L      Calcium 7.8 (L) mg/dL      eGFR Non African Amer 23 (L) mL/min/1.73      BUN/Creatinine Ratio 24.8     Anion Gap 10.3 mmol/L     Narrative:       GFR Normal >60  Chronic Kidney Disease <60  Kidney Failure <15    POC Glucose Once [[id number]]  (Abnormal) Collected:  09/01/18 2057    Specimen:  Blood Updated:  09/01/18 2104     Glucose 238 (H) mg/dL     Narrative:       Meter: [id number] : [id number] Rickey SAENZ    POC Glucose Once [[id number]]  (Abnormal) Collected:  09/01/18 2007    Specimen:  Blood Updated:  09/01/18 2022     Glucose 238 (H) mg/dL     Narrative:       Meter: [id number] : [id number] Sylvia Pritchett RN    POC Glucose Once [[id number]]  (Abnormal) Collected:  09/01/18 1724    Specimen:  Blood Updated:  09/01/18 1730     Glucose 256 (H) mg/dL     Narrative:       Meter: [id number] : [id number] Roger Thompson CNA    POC Glucose Once [[id number]]  (Abnormal) Collected:  09/01/18 1141    Specimen:  Blood Updated:  09/01/18 1147     Glucose 186 (H) mg/dL     Narrative:        Meter: [id number] : [id number] Roger Thompson CNA    POC Glucose Once [[id number]]  (Normal) Collected:  09/01/18 0932    Specimen:  Blood Updated:  09/01/18 0939     Glucose 85 mg/dL     Narrative:       Meter: [id number] : [id number] Rajendra Weinberg RN          Imaging Results (last 24 hours)     ** No results found for the last 24 hours. **        ECG/EMG Results (most recent)     Procedure Component Value Units Date/Time    ECG 12 Lead [[id number]] Collected:  08/30/18 2151     Updated:  08/31/18 0758    Narrative:       RR Interval= 667 ms  VA Interval= 139 ms  QRSD Interval= 96 ms  QT Interval= 389 ms  QTc Interval= 476 ms  Heart Rate= 90 ms  P Axis= 52 deg  QRS Axis= 44 deg  T Wave Axis= 27 deg  I: 40 Axis= 17 deg  T: 40 Axis= 50 deg  ST Axis= 39 deg  Sinus rhythm  No Change from Prior Tracing  Electronically Signed by:  Trinidad Barreto ([facility]) 31-Aug-2018 07:57:24  Date and Time of Study: 2018-08-30 21:51:37    Adult Transthoracic Echo Limited W/ Cont if Necessary Per Protocol [[id number]] Collected:  08/31/18 1344     Updated:  08/31/18 1520     BSA 2.3 m^2      IVSd 1.3 cm      LVIDd 4.7 cm      LVIDs 2.9 cm      LVPWd 1.2 cm      IVS/LVPW 1.0     FS 38.2 %      EDV(Teich) 104.4 ml      ESV(Teich) 33.0 ml      EF(Teich) 68.4 %      EDV(cubed) 106.5 ml      ESV(cubed) 25.2 ml      EF(cubed) 76.4 %      LV mass(C)d 230.4 grams      LV mass(C)dI 101.5 grams/m^2      SV(Teich) 71.4 ml      SI(Teich) 31.5 ml/m^2      SV(cubed) 81.3 ml      SI(cubed) 35.9 ml/m^2      LVLd ap4 9.0 cm      EDV(MOD-sp4) 126.0 ml      LVLs ap4 8.2 cm      ESV(MOD-sp4) 45.7 ml      EF(MOD-sp4) 63.7 %      LVLd ap2 8.6 cm      EDV(MOD-sp2) 116.0 ml      LVLs ap2 7.8 cm      ESV(MOD-sp2) 38.2 ml      EF(MOD-sp2) 67.1 %      SV(MOD-sp4) 80.3 ml      SI(MOD-sp4) 35.4 ml/m^2      SV(MOD-sp2) 77.8 ml      SI(MOD-sp2) 34.3 ml/m^2      LV Meraz Vol (BSA corrected) 55.5 ml/m^2      LV Sys Vol (BSA corrected) 20.1 ml/m^2      MV A dur  0.13 sec      MV E max rich 98.6 cm/sec      MV A max rich 124.0 cm/sec      MV E/A 0.8     MV V2 max 122.0 cm/sec      MV max PG 6.0 mmHg      MV V2 mean 97.3 cm/sec      MV mean PG 4.0 mmHg      MV V2 VTI 38.2 cm      MV P1/2t max rich 124.0 cm/sec      MV P1/2t 74.3 msec      MVA(P1/2t) 3.0 cm^2      MV dec slope 489.0 cm/sec^2      MV dec time 0.29 sec      MVA P1/2T LCG 1.8 cm^2       CV ECHO TERRI - BZI_BMI 43.4 kilograms/m^2       CV ECHO TERRI - BSA(HAYCOCK) 2.4 m^2       CV ECHO TERRI - BZI_METRIC_WEIGHT 122.0 kg       CV ECHO TERRI - BZI_METRIC_HEIGHT 167.6 cm      Target HR (85%) 133 bpm      Max. Pred. HR (100%) 157 bpm       CV VAS BP RIGHT /78 mmHg      LA Volume Index 15.0 mL/m2      Avg E/e' ratio 15.17     EF(MOD-bp) 65 %      Lat Peak E' Rich 8.0 cm/sec      Med Peak E' Rich 5.00 cm/sec     Narrative:       · Left ventricular wall thickness is consistent with mild-to-moderate   concentric hypertrophy.  · Left ventricular systolic function is normal.  · Left ventricular diastolic dysfunction (grade I a) consistent with   impaired relaxation.  · Calculated EF = 65%.       ECG 12 Lead [[id number]] Collected:  09/01/18 1026     Updated:  09/01/18 1415    Narrative:       RR Interval= 779 ms  MS Interval= 153 ms  QRSD Interval= 94 ms  QT Interval= 407 ms  QTc Interval= 461 ms  Heart Rate= 77 ms  P Axis= 43 deg  QRS Axis= 42 deg  T Wave Axis= 48 deg  I: 40 Axis= -10 deg  T: 40 Axis= 56 deg  ST Axis= 50 deg  Sinus rhythm  Low voltage, precordial leads  Minimal ST elevation, lateral leads  no significant change  Electronically Signed by:  Milton Dowling ([facility]) 01-Sep-2018 14:15:09  Date and Time of Study: 2018-09-01 10:26:26          Medication Review:   I have reviewed the patient's current medication list    Current Facility-Administered Medications:   •  acetaminophen (TYLENOL) tablet 650 mg, 650 mg, Oral, Q4H PRN, Elis Judd,   •  albuterol (PROVENTIL) nebulizer solution 0.083% 2.5  mg/3mL, 2.5 mg, Nebulization, Q6H PRN, Elis Judd DO, 2.5 mg at 09/01/18 1532  •  amLODIPine (NORVASC) tablet 2.5 mg, 2.5 mg, Oral, Q24H, Trinidad Barreto MD, 2.5 mg at 09/01/18 0950  •  aspirin EC tablet 81 mg, 81 mg, Oral, Daily, Elis Judd DO, 81 mg at 09/01/18 0950  •  atorvastatin (LIPITOR) tablet 40 mg, 40 mg, Oral, Daily, Elis Judd DO, 40 mg at 09/01/18 0950  •  b complex-C-folic acid capsule 1 mg, 1 capsule, Oral, Daily, Elis Judd DO, 1 mg at 09/01/18 0950  •  budesonide-formoterol (SYMBICORT) 160-4.5 MCG/ACT inhaler 2 puff, 2 puff, Inhalation, BID - RT, Elis Judd DO, 2 puff at 09/02/18 0811  •  cetirizine (zyrTEC) tablet 10 mg, 10 mg, Oral, Nightly, Elis Judd DO, 10 mg at 09/01/18 2017  •  dextrose (D50W) solution 25 g, 25 g, Intravenous, Q15 Min PRN, Magalys Paul MD  •  dextrose (GLUTOSE) oral gel 15 g, 15 g, Oral, Q15 Min PRN, Magalys Paul MD  •  enoxaparin (LOVENOX) syringe 40 mg, 40 mg, Subcutaneous, Q24H, Elis Judd DO, 40 mg at 09/01/18 0950  •  famotidine (PEPCID) tablet 20 mg, 20 mg, Oral, Daily, Elis Judd DO, 20 mg at 09/01/18 0950  •  glucagon (GLUCAGEN) injection 1 mg, 1 mg, Subcutaneous, PRN, Magalys Paul MD  •  guaiFENesin (MUCINEX) 12 hr tablet 1,200 mg, 1,200 mg, Oral, Q12H, Magalys Paul MD, 1,200 mg at 09/01/18 2016  •  HYDROcodone-acetaminophen (NORCO)  MG per tablet 1 tablet, 1 tablet, Oral, Q8H PRN, Elis Judd DO, 1 tablet at 09/01/18 2016  •  insulin aspart (novoLOG) injection 0-24 Units, 0-24 Units, Subcutaneous, 4x Daily AC & at Bedtime, Magalys Paul MD, Stopped at 09/01/18 2010  •  insulin detemir (LEVEMIR) injection 50 Units, 50 Units, Subcutaneous, Nightly, Magalys Paul MD, 50 Units at 09/01/18 2019  •  ipratropium (ATROVENT) nebulizer solution 0.5 mg, 0.5 mg, Nebulization, BID - RT,  Elis Judd DO, 0.5 mg at 09/02/18 0811  •  lactobacillus acidophilus (RISAQUAD) capsule 1 capsule, 1 capsule, Oral, Daily, Elis Judd DO, 1 capsule at 09/01/18 0950  •  levothyroxine (SYNTHROID, LEVOTHROID) tablet 250 mcg, 250 mcg, Oral, QAM, Magalys Paul MD, 250 mcg at 09/02/18 0600  •  metoprolol tartrate (LOPRESSOR) tablet 50 mg, 50 mg, Oral, Q12H, Elis Judd DO, 50 mg at 09/01/18 2110  •  nystatin (MYCOSTATIN) powder, , Topical, Q12H, Magalys Paul MD  •  QUEtiapine (SEROquel) tablet 100 mg, 100 mg, Oral, BID, Elis Judd DO, 100 mg at 09/01/18 2016  •  sennosides-docusate sodium (SENOKOT-S) 8.6-50 MG tablet 2 tablet, 2 tablet, Oral, Nightly PRN, Elis Judd DO  •  sodium chloride 0.9 % flush 1-10 mL, 1-10 mL, Intravenous, PRN, Elis Judd DO  •  Insert peripheral IV, , , Once **AND** sodium chloride 0.9 % flush 10 mL, 10 mL, Intravenous, PRN, Jorge Whiteside MD  •  sodium chloride 0.9 % infusion 40 mL, 40 mL, Intravenous, PRN, Elis Judd DO      Assessment/Plan     1. Volume overload likely multifactorial with mild acute on chronic diastolic CHF, CKD, Low albumen, obesity and anemia: Patient diuresed with po and IV bumex and developed LADY. Cr still up this AM so will continue to hold diuretics. 2 weights done today one up and one down but patient with increased LE edema. Cardiology following. Will ask nephrology to see patient to help with diuretics. Monitor.     2. LADY on CKD stage III-IV: Renal function slightly worse today off diuretics. Continue to hold Bumex and will recheck BMP in AM. Hopefully can resume diuretics soon.     3. Chronic hypoxic respiratory failure: Patient remains on home 4L NC.      4. ALTA: Patient Bipap is broken here and order has been placed to get new unit, using ours currently. Will have new unit Tuesday.      5. COPD: No acute issues here. Patient with no wheezing on exam and viral panel  negative here. No s/s of acute infection or exacerbation. Patient follows with Dr. Hoyt outpatient. On symbicort and Atrovent substituted for home Advair and Incruse ellipta. Will order that patient can use her home Xopenex instead of our albuterol.     6. Chronic LE edema and venous stasis: LE edema worse today with diuresis on hold secondary to LADY. Monitor.      7. DM-2 with peripheral neuropathy: HbA1C 7.4. On Levemir here and SSI substituted for home Novolog and Tresiba. Bedsides variable. Resume home regimen at discharge. Monitor.      8. Hypertension: BP is WNL on home metoprolol and norvasc added by cardiology here. Monitor.      9. Hypothyroidism. TSH low and FT4 elevated. Reduced patient's home dose of levothyroxine here. Will need TFTs rechecked outpatient in 6-8 weeks.      10. Dylipidemia: On home lipitor.      11. Chronic narcotic dependence: On home dose Norco. No acute issues here. Confirmed on Graham report.      12. GERD: On home pepcid. No acute issues here.      13. Morbid Obesity: Nutrition to see here.      14. Chronic normocytic anemia/Anemia of chronic disease: Hb stable and near baseline.    Plan for disposition: Home possibly Tuesday after new Bipap obtained    Magalys Paul MD  09/02/18  8:36 AM

## 2018-09-02 NOTE — PROGRESS NOTES
Dr. ZHU [facility] MED SURG    9/2/2018    Patient ID:  Name:  Jared Govea  MRN:  [id number]  1955  63 y.o.  female            CC/Reason for visit: Obesity hypoventilation syndrome, chronic respiratory failure, ALTA, obesity    HPI: Patient is up in chair today with nasal cannula of oxygen.  She is upset that they did not check her weight upon admission.  Otherwise she says she only has shortness of breath with exertion which is chronic, unchanged.  She denies cough, fever or sputum.  She is breathing better and her edema is improving    Vitals:  Vitals:    09/02/18 0913 09/02/18 1046 09/02/18 1425 09/02/18 1432   BP:  113/59     BP Location:  Right arm     Patient Position:  Sitting     Pulse:  84 80 80   Resp:  20 20 20   Temp:  97.2 °F (36.2 °C)     TempSrc:  Oral     SpO2:  95% 95% 95%   Weight: 123 kg (271 lb 0.2 oz)      Height:               Body mass index is 43.74 kg/m².    Intake/Output Summary (Last 24 hours) at 09/02/18 1438  Last data filed at 09/02/18 1303   Gross per 24 hour   Intake             1080 ml   Output              650 ml   Net              430 ml       Exam:  GEN:  No distress, appears stated age  EYES:   EOM-i, anicteric sclera bilat  ENT:    External ears/nose normal, OP clear  NECK:  Supple, midline trachea  LUNGS: Diminished breath sounds bilaterally but no wheezing or crackles, nonlabored breathing  CV:  Normal S1S2, without murmur, 2+ edema lower extremities  ABD:  Morbidly obese    Scheduled meds:    amLODIPine 2.5 mg Oral Q24H   aspirin 81 mg Oral Daily   atorvastatin 40 mg Oral Daily   b complex-C-folic acid 1 capsule Oral Daily   budesonide-formoterol 2 puff Inhalation BID - RT   cetirizine 10 mg Oral Nightly   enoxaparin 40 mg Subcutaneous Q24H   famotidine 20 mg Oral Daily   guaiFENesin 1,200 mg Oral Q12H   insulin aspart 0-24 Units Subcutaneous 4x Daily AC & at Bedtime   insulin detemir 50 Units Subcutaneous Nightly   ipratropium 0.5 mg Nebulization  BID - RT   lactobacillus acidophilus 1 capsule Oral Daily   levothyroxine 250 mcg Oral QAM   metoprolol tartrate 50 mg Oral Q12H   nystatin  Topical Q12H   QUEtiapine 100 mg Oral BID     IV meds:                           Data Review:   I reviewed the patient's medications and new clinical results.  Lab Results   Component Value Date    CALCIUM 7.8 (L) 09/02/2018    PHOS 3.5 08/31/2018    MG 1.8 08/31/2018    MG 1.9 08/04/2016    MG 1.8 02/29/2016       Results from last 7 days  Lab Units 09/02/18  0418 09/01/18  0409 08/31/18  0317 08/30/18  2222   SODIUM mmol/L 141 143 144 144   POTASSIUM mmol/L 4.7 4.4 4.8 5.1   CHLORIDE mmol/L 96* 97* 99 98   CO2 mmol/L 34.7* 37.3* 33.9* 35.4*   BUN mg/dL 53* 42* 28* 28*   CREATININE mg/dL 2.14* 2.05* 1.40* 1.47*   CALCIUM mg/dL 7.8* 8.5* 9.0 9.0   BILIRUBIN mg/dL  --   --   --  0.4   ALK PHOS U/L  --   --   --  119   ALT (SGPT) U/L  --   --   --  11   AST (SGOT) U/L  --   --   --  14   GLUCOSE mg/dL 191* 51* 145* 87   WBC 10*3/mm3  --   --  9.76 10.46   HEMOGLOBIN g/dL  --   --  10.8* 10.8*   PLATELETS 10*3/mm3  --   --  185 183   INR   --   --   --  1.05   PROBNP pg/mL  --   --   --  965.3*           Results from last 7 days  Lab Units 08/31/18  1104   ADENOVIRUS DETECTION BY PCR  Not Detected   CORONAVIRUS 229E  Not Detected   CORONAVIRUS HKU1  Not Detected   CORONAVIRUS NL63  Not Detected   CORONAVIRUS OC43  Not Detected   HUMAN METAPNEUMOVIRUS  Not Detected   HUMAN RHINOVIRUS/ENTEROVIRUS  Not Detected   INFLUENZA B PCR  Not Detected   PARAINFLUENZA 1  Not Detected   PARAINFLUENZA VIRUS 2  Not Detected   PARAINFLUENZA VIRUS 3  Not Detected   PARAINFLUENZA VIRUS 4  Not Detected   BORDETELLA PERTUSSIS PCR  Not Detected   CHLAMYDOPHILA PNEUMONIAE PCR  Not Detected   MYCOPLAMA PNEUMO PCR  Not Detected   INFLUENZA A PCR  Not Detected   INFLUENZA A H3  Not Detected   INFLUENZA A H1  Not Detected   RSV, PCR  Not Detected         Results from last 7 days  Lab Units 08/31/18  7218  08/31/18  0317 08/30/18  2222   TROPONIN T ng/mL 0.011 0.023 0.029       Results from last 7 days  Lab Units 08/30/18  2225   PH, ARTERIAL pH units 7.401   PCO2, ARTERIAL mm Hg 57.5*   PO2 ART mm Hg 64.4*   O2 SATURATION ART % 93.5*   FLOW RATE lpm 5.0   MODALITY  Nasal Cannula       ASSESSMENT:   Active Problems:    Acute hypoxemic respiratory failure (CMS/HCC)  Obesity hypoventilation syndrome, chronic respiratory failure, ALTA, obesity      PLAN:  It is very difficult to gauge this person's fluid balance.  She does have some edema, but that is chronic for her.  Continue positive airway pressure therapy at nighttime, new unit is presumably going to be available Tuesday.  Continue management of volume overload.  Continue chronic oxygen therapy.  No evidence of respiratory infection during this hospital stay.          Jhonny Hoyt MD  9/2/2018

## 2018-09-03 LAB
GLUCOSE BLDC GLUCOMTR-MCNC: 161 MG/DL (ref 70–130)
GLUCOSE BLDC GLUCOMTR-MCNC: 167 MG/DL (ref 70–130)
GLUCOSE BLDC GLUCOMTR-MCNC: 169 MG/DL (ref 70–130)
GLUCOSE BLDC GLUCOMTR-MCNC: 297 MG/DL (ref 70–130)

## 2018-09-03 PROCEDURE — 63710000001 INSULIN DETEMIR PER 5 UNITS: Performed by: HOSPITALIST

## 2018-09-03 PROCEDURE — 87077 CULTURE AEROBIC IDENTIFY: CPT | Performed by: INTERNAL MEDICINE

## 2018-09-03 PROCEDURE — 82962 GLUCOSE BLOOD TEST: CPT

## 2018-09-03 PROCEDURE — 99232 SBSQ HOSP IP/OBS MODERATE 35: CPT | Performed by: INTERNAL MEDICINE

## 2018-09-03 PROCEDURE — 94660 CPAP INITIATION&MGMT: CPT

## 2018-09-03 PROCEDURE — 87205 SMEAR GRAM STAIN: CPT | Performed by: INTERNAL MEDICINE

## 2018-09-03 PROCEDURE — 87186 SC STD MICRODIL/AGAR DIL: CPT | Performed by: INTERNAL MEDICINE

## 2018-09-03 PROCEDURE — 94799 UNLISTED PULMONARY SVC/PX: CPT

## 2018-09-03 PROCEDURE — 87070 CULTURE OTHR SPECIMN AEROBIC: CPT | Performed by: INTERNAL MEDICINE

## 2018-09-03 PROCEDURE — 63710000001 INSULIN ASPART PER 5 UNITS: Performed by: HOSPITALIST

## 2018-09-03 PROCEDURE — 25010000002 ENOXAPARIN PER 10 MG: Performed by: HOSPITALIST

## 2018-09-03 PROCEDURE — 99232 SBSQ HOSP IP/OBS MODERATE 35: CPT | Performed by: NURSE PRACTITIONER

## 2018-09-03 RX ORDER — ALBUTEROL SULFATE 2.5 MG/3ML
2.5 SOLUTION RESPIRATORY (INHALATION) ONCE AS NEEDED
Status: DISCONTINUED | OUTPATIENT
Start: 2018-09-03 | End: 2018-09-04

## 2018-09-03 RX ORDER — SODIUM CHLORIDE FOR INHALATION 3 %
4 VIAL, NEBULIZER (ML) INHALATION ONCE AS NEEDED
Status: DISCONTINUED | OUTPATIENT
Start: 2018-09-03 | End: 2018-09-03

## 2018-09-03 RX ORDER — BUMETANIDE 0.25 MG/ML
1 INJECTION INTRAMUSCULAR; INTRAVENOUS EVERY 8 HOURS
Status: DISCONTINUED | OUTPATIENT
Start: 2018-09-03 | End: 2018-09-06

## 2018-09-03 RX ORDER — LEVALBUTEROL INHALATION SOLUTION 0.63 MG/3ML
0.63 SOLUTION RESPIRATORY (INHALATION) EVERY 6 HOURS PRN
Status: DISCONTINUED | OUTPATIENT
Start: 2018-09-03 | End: 2018-09-06 | Stop reason: HOSPADM

## 2018-09-03 RX ORDER — METOPROLOL TARTRATE 50 MG/1
100 TABLET, FILM COATED ORAL EVERY 12 HOURS SCHEDULED
Status: DISCONTINUED | OUTPATIENT
Start: 2018-09-03 | End: 2018-09-06 | Stop reason: HOSPADM

## 2018-09-03 RX ADMIN — INSULIN ASPART 12 UNITS: 100 INJECTION, SOLUTION INTRAVENOUS; SUBCUTANEOUS at 21:09

## 2018-09-03 RX ADMIN — CETIRIZINE HYDROCHLORIDE 10 MG: 10 TABLET, FILM COATED ORAL at 20:59

## 2018-09-03 RX ADMIN — METOPROLOL TARTRATE 100 MG: 50 TABLET, FILM COATED ORAL at 20:59

## 2018-09-03 RX ADMIN — GUAIFENESIN 1200 MG: 600 TABLET, EXTENDED RELEASE ORAL at 08:37

## 2018-09-03 RX ADMIN — INSULIN DETEMIR 50 UNITS: 100 INJECTION, SOLUTION SUBCUTANEOUS at 20:59

## 2018-09-03 RX ADMIN — INSULIN ASPART 4 UNITS: 100 INJECTION, SOLUTION INTRAVENOUS; SUBCUTANEOUS at 08:37

## 2018-09-03 RX ADMIN — FLUCONAZOLE 100 MG: 100 TABLET ORAL at 15:32

## 2018-09-03 RX ADMIN — MICONAZOLE NITRATE: 20 CREAM TOPICAL at 08:39

## 2018-09-03 RX ADMIN — INSULIN ASPART 4 UNITS: 100 INJECTION, SOLUTION INTRAVENOUS; SUBCUTANEOUS at 12:29

## 2018-09-03 RX ADMIN — LEVALBUTEROL INHALATION SOLUTION 0.63 MG: 0.63 SOLUTION RESPIRATORY (INHALATION) at 21:35

## 2018-09-03 RX ADMIN — METOPROLOL TARTRATE 100 MG: 50 TABLET, FILM COATED ORAL at 08:37

## 2018-09-03 RX ADMIN — ASPIRIN 81 MG: 81 TABLET, COATED ORAL at 08:38

## 2018-09-03 RX ADMIN — Medication 1 MG: at 10:17

## 2018-09-03 RX ADMIN — AMLODIPINE BESYLATE 2.5 MG: 2.5 TABLET ORAL at 08:38

## 2018-09-03 RX ADMIN — ENOXAPARIN SODIUM 40 MG: 40 INJECTION SUBCUTANEOUS at 08:38

## 2018-09-03 RX ADMIN — LEVALBUTEROL INHALATION SOLUTION 0.63 MG: 0.63 SOLUTION RESPIRATORY (INHALATION) at 15:51

## 2018-09-03 RX ADMIN — BUMETANIDE 1 MG: 0.25 INJECTION INTRAMUSCULAR; INTRAVENOUS at 12:29

## 2018-09-03 RX ADMIN — BUDESONIDE AND FORMOTEROL FUMARATE DIHYDRATE 2 PUFF: 160; 4.5 AEROSOL RESPIRATORY (INHALATION) at 21:37

## 2018-09-03 RX ADMIN — INSULIN ASPART 4 UNITS: 100 INJECTION, SOLUTION INTRAVENOUS; SUBCUTANEOUS at 17:33

## 2018-09-03 RX ADMIN — IPRATROPIUM BROMIDE 0.5 MG: 0.5 SOLUTION RESPIRATORY (INHALATION) at 01:00

## 2018-09-03 RX ADMIN — MICONAZOLE NITRATE: 20 CREAM TOPICAL at 21:00

## 2018-09-03 RX ADMIN — BUDESONIDE AND FORMOTEROL FUMARATE DIHYDRATE 2 PUFF: 160; 4.5 AEROSOL RESPIRATORY (INHALATION) at 01:01

## 2018-09-03 RX ADMIN — BUMETANIDE 1 MG: 0.25 INJECTION INTRAMUSCULAR; INTRAVENOUS at 20:59

## 2018-09-03 RX ADMIN — QUETIAPINE FUMARATE 100 MG: 100 TABLET ORAL at 10:18

## 2018-09-03 RX ADMIN — BUDESONIDE AND FORMOTEROL FUMARATE DIHYDRATE 2 PUFF: 160; 4.5 AEROSOL RESPIRATORY (INHALATION) at 08:55

## 2018-09-03 RX ADMIN — ATORVASTATIN CALCIUM 40 MG: 40 TABLET, FILM COATED ORAL at 08:38

## 2018-09-03 RX ADMIN — IPRATROPIUM BROMIDE 0.5 MG: 0.5 SOLUTION RESPIRATORY (INHALATION) at 21:35

## 2018-09-03 RX ADMIN — QUETIAPINE FUMARATE 100 MG: 100 TABLET ORAL at 20:59

## 2018-09-03 RX ADMIN — Medication 1 CAPSULE: at 08:38

## 2018-09-03 RX ADMIN — GUAIFENESIN 1200 MG: 600 TABLET, EXTENDED RELEASE ORAL at 20:59

## 2018-09-03 RX ADMIN — LEVOTHYROXINE SODIUM 250 MCG: 150 TABLET ORAL at 06:50

## 2018-09-03 RX ADMIN — FAMOTIDINE 20 MG: 20 TABLET, FILM COATED ORAL at 08:38

## 2018-09-03 RX ADMIN — IPRATROPIUM BROMIDE 0.5 MG: 0.5 SOLUTION RESPIRATORY (INHALATION) at 08:44

## 2018-09-03 NOTE — PLAN OF CARE
Problem: Patient Care Overview  Goal: Plan of Care Review  Outcome: Ongoing (interventions implemented as appropriate)   09/03/18 7948   Coping/Psychosocial   Plan of Care Reviewed With patient   OTHER   Outcome Summary continue home regimen

## 2018-09-03 NOTE — PROGRESS NOTES
[facility]    9/3/2018    Patient ID:  Name:  Jared Govea  MRN:  [id number]  1955  63 y.o.  female            CC/Reason for visit: Obesity hypoventilation syndrome, chronic respiratory failure, ALTA, obesity    HPI: No new complaints.  Edema is better.  Shortness of breath is better    Vitals:  Vitals:    09/03/18 0851 09/03/18 0855 09/03/18 0900 09/03/18 1101   BP:    128/61   BP Location:    Right arm   Patient Position:    Sitting   Pulse: 84 88 88 69   Resp: 20 20 20 20   Temp:    97.8 °F (36.6 °C)   TempSrc:    Oral   SpO2: 94% 94% 94% 91%   Weight:       Height:               Body mass index is 43.74 kg/m².    Intake/Output Summary (Last 24 hours) at 09/03/18 1518  Last data filed at 09/03/18 1435   Gross per 24 hour   Intake              480 ml   Output             1150 ml   Net             -670 ml       Exam:  GEN:  No distress, appears stated age  EYES:   EOM-i, anicteric sclera bilat  ENT:    External ears/nose normal, OP clear  NECK:  Supple, midline trachea  LUNGS: Diminished breath sounds bilaterally but overall Clear breath sounds bilat, nonlabored breathing  CV:  Normal S1S2, without murmur, 1+ edema lower extremities  ABD:  Obese, nontender    Scheduled meds:    amLODIPine 2.5 mg Oral Q24H   aspirin 81 mg Oral Daily   atorvastatin 40 mg Oral Daily   b complex-C-folic acid 1 capsule Oral Daily   budesonide-formoterol 2 puff Inhalation BID - RT   bumetanide 1 mg Intravenous Q8H   cetirizine 10 mg Oral Nightly   enoxaparin 40 mg Subcutaneous Q24H   famotidine 20 mg Oral Daily   fluconazole 100 mg Oral Q24H   guaiFENesin 1,200 mg Oral Q12H   insulin aspart 0-24 Units Subcutaneous 4x Daily AC & at Bedtime   insulin detemir 50 Units Subcutaneous Nightly   ipratropium 0.5 mg Nebulization BID - RT   lactobacillus acidophilus 1 capsule Oral Daily   levothyroxine 250 mcg Oral QAM   metoprolol tartrate 100 mg Oral Q12H   miconazole  Topical Q12H   QUEtiapine 100 mg  Oral BID     IV meds:                           Data Review:   I reviewed the patient's medications and new clinical results.  Lab Results   Component Value Date    CALCIUM 7.8 (L) 09/02/2018    PHOS 3.5 08/31/2018    MG 1.8 08/31/2018    MG 1.9 08/04/2016    MG 1.8 02/29/2016       Results from last 7 days  Lab Units 09/02/18  0418 09/01/18  0409 08/31/18  0317 08/30/18  2222   SODIUM mmol/L 141 143 144 144   POTASSIUM mmol/L 4.7 4.4 4.8 5.1   CHLORIDE mmol/L 96* 97* 99 98   CO2 mmol/L 34.7* 37.3* 33.9* 35.4*   BUN mg/dL 53* 42* 28* 28*   CREATININE mg/dL 2.14* 2.05* 1.40* 1.47*   CALCIUM mg/dL 7.8* 8.5* 9.0 9.0   BILIRUBIN mg/dL  --   --   --  0.4   ALK PHOS U/L  --   --   --  119   ALT (SGPT) U/L  --   --   --  11   AST (SGOT) U/L  --   --   --  14   GLUCOSE mg/dL 191* 51* 145* 87   WBC 10*3/mm3  --   --  9.76 10.46   HEMOGLOBIN g/dL  --   --  10.8* 10.8*   PLATELETS 10*3/mm3  --   --  185 183   INR   --   --   --  1.05   PROBNP pg/mL  --   --   --  965.3*           Results from last 7 days  Lab Units 08/31/18  1104   ADENOVIRUS DETECTION BY PCR  Not Detected   CORONAVIRUS 229E  Not Detected   CORONAVIRUS HKU1  Not Detected   CORONAVIRUS NL63  Not Detected   CORONAVIRUS OC43  Not Detected   HUMAN METAPNEUMOVIRUS  Not Detected   HUMAN RHINOVIRUS/ENTEROVIRUS  Not Detected   INFLUENZA B PCR  Not Detected   PARAINFLUENZA 1  Not Detected   PARAINFLUENZA VIRUS 2  Not Detected   PARAINFLUENZA VIRUS 3  Not Detected   PARAINFLUENZA VIRUS 4  Not Detected   BORDETELLA PERTUSSIS PCR  Not Detected   CHLAMYDOPHILA PNEUMONIAE PCR  Not Detected   MYCOPLAMA PNEUMO PCR  Not Detected   INFLUENZA A PCR  Not Detected   INFLUENZA A H3  Not Detected   INFLUENZA A H1  Not Detected   RSV, PCR  Not Detected         Results from last 7 days  Lab Units 08/31/18  0927 08/31/18  0317 08/30/18  2222   TROPONIN T ng/mL 0.011 0.023 0.029       Results from last 7 days  Lab Units 08/30/18  2225   PH, ARTERIAL pH units 7.401   PCO2, ARTERIAL mm Hg  57.5*   PO2 ART mm Hg 64.4*   O2 SATURATION ART % 93.5*   FLOW RATE lpm 5.0   MODALITY  Nasal Cannula         ASSESSMENT:   Acute on chronic hypoxemic respiratory failure (CMS/HCC)  Obesity hypoventilation syndrome, chronic respiratory failure, ALTA, obesity      PLAN:  From the pulmonary standpoint, this patient has maximally benefited from inpatient care.  Perform ambulatory oximetry to determine her oxygen needs.  Continue bronchodilators exactly as per home regimen.  Continue CPAP every night.  Send sputum culture since she had some yellow sputum last night but that should not keep her in the hospital.  May be discharged anytime from our standpoint.        Jhonny Hoyt MD  9/3/2018

## 2018-09-03 NOTE — PLAN OF CARE
Problem: NPPV/CPAP (Adult)  Intervention: Optimize Tolerance of Noninvasive Ventilation   09/03/18 7027   Respiratory Interventions   Airway/Ventilation Management airway patency maintained;pulmonary hygiene promoted

## 2018-09-03 NOTE — PROGRESS NOTES
"SERVICE: [facility] HOSPITALIST    CONSULTANTS: Nephrology, cardiology, pulmonary    CHIEF COMPLAINT: Follow up acute volume overload, obesity hypoventilation syndrome    SUBJECTIVE: Patient reports she is just awakening at 9 this morning and tolerated BiPAP without issue overnight.  She reports her swelling is improved.  She notes her appetite is decent.  She otherwise denies f/c/cough/soa/chest pain/n/v/d/abdominal pain or other new concerns.    OBJECTIVE:    /61 (BP Location: Right arm, Patient Position: Sitting)   Pulse 69   Temp 97.8 °F (36.6 °C) (Oral)   Resp 20   Ht 167.6 cm (66\")   Wt 123 kg (271 lb 0.2 oz)   LMP  (LMP Unknown)   SpO2 91%   BMI 43.74 kg/m²     MEDS/LABS REVIEWED AND ORDERED    amLODIPine 2.5 mg Oral Q24H   aspirin 81 mg Oral Daily   atorvastatin 40 mg Oral Daily   b complex-C-folic acid 1 capsule Oral Daily   budesonide-formoterol 2 puff Inhalation BID - RT   bumetanide 1 mg Intravenous Q8H   cetirizine 10 mg Oral Nightly   enoxaparin 40 mg Subcutaneous Q24H   famotidine 20 mg Oral Daily   fluconazole 100 mg Oral Q24H   guaiFENesin 1,200 mg Oral Q12H   insulin aspart 0-24 Units Subcutaneous 4x Daily AC & at Bedtime   insulin detemir 50 Units Subcutaneous Nightly   ipratropium 0.5 mg Nebulization BID - RT   lactobacillus acidophilus 1 capsule Oral Daily   levothyroxine 250 mcg Oral QAM   metoprolol tartrate 100 mg Oral Q12H   miconazole  Topical Q12H   QUEtiapine 100 mg Oral BID     Physical Exam   Constitutional: She is oriented to person, place, and time. She appears well-developed and well-nourished.   Morbidly obese   HENT:   Head: Normocephalic and atraumatic.   Eyes: Pupils are equal, round, and reactive to light. EOM are normal.   Cardiovascular: Normal rate and regular rhythm.    Pulmonary/Chest: Effort normal.   Diminished all fields   Abdominal: Soft. Bowel sounds are normal. She exhibits no distension. There is no tenderness. There is no guarding. "   Obese   Musculoskeletal:   1+ bilateral lower extremity nonpitting edema   Neurological: She is alert and oriented to person, place, and time.   Skin: Skin is warm and dry. No erythema.   Psychiatric: She has a normal mood and affect. Her behavior is normal.   Vitals reviewed.    LAB/DIAGNOSTICS:    Lab Results (last 24 hours)     Procedure Component Value Units Date/Time    POC Glucose Once [[id number]]  (Abnormal) Collected:  09/03/18 1210    Specimen:  Blood Updated:  09/03/18 1216     Glucose 169 (H) mg/dL     Narrative:       Meter: [id number] : [id number] Roger Thompson CNA    POC Glucose Once [[id number]]  (Abnormal) Collected:  09/03/18 0732    Specimen:  Blood Updated:  09/03/18 0738     Glucose 161 (H) mg/dL     Narrative:       Meter: [id number] : [id number] Roger Thompson CNA    POC Glucose Once [[id number]]  (Abnormal) Collected:  09/02/18 2045    Specimen:  Blood Updated:  09/02/18 2108     Glucose 267 (H) mg/dL     Narrative:       Meter: [id number] : [id number] Tiffanie Madison RN    POC Glucose Once [[id number]]  (Normal) Collected:  09/02/18 1629    Specimen:  Blood Updated:  09/02/18 1635     Glucose 113 mg/dL     Narrative:       Meter: [id number] : [id number] Colton Romero NURSING ASSISTANT        ECG 12 Lead   Final Result   RR Interval= 779 ms   MA Interval= 153 ms   QRSD Interval= 94 ms   QT Interval= 407 ms   QTc Interval= 461 ms   Heart Rate= 77 ms   P Axis= 43 deg   QRS Axis= 42 deg   T Wave Axis= 48 deg   I: 40 Axis= -10 deg   T: 40 Axis= 56 deg   ST Axis= 50 deg   Sinus rhythm   Low voltage, precordial leads   Minimal ST elevation, lateral leads   no significant change   Electronically Signed by:  Milton Dowling (E) 01-Sep-2018 14:15:09   Date and Time of Study: 2018-09-01 10:26:26      ECG 12 Lead   Final Result   RR Interval= 667 ms   MA Interval= 139 ms   QRSD Interval= 96 ms   QT Interval= 389 ms   QTc Interval= 476 ms   Heart Rate= 90 ms   P Axis= 52 deg   QRS Axis= 44 deg   T  Wave Axis= 27 deg   I: 40 Axis= 17 deg   T: 40 Axis= 50 deg   ST Axis= 39 deg   Sinus rhythm   No Change from Prior Tracing   Electronically Signed by:  Trinidad Barreto ([facility]) 31-Aug-2018 07:57:24   Date and Time of Study: 2018-08-30 21:51:37        Results for orders placed during the hospital encounter of 08/30/18   Adult Transthoracic Echo Limited W/ Cont if Necessary Per Protocol    Narrative · Left ventricular wall thickness is consistent with mild-to-moderate   concentric hypertrophy.  · Left ventricular systolic function is normal.  · Left ventricular diastolic dysfunction (grade I a) consistent with   impaired relaxation.  · Calculated EF = 65%.        No radiology results for the last day    ASSESSMENT/PLAN:   Volume overload with grade 1 diastolic dysfunction: Cardiology following  Per nephrology, okay to resume IV diuretics with Bumex 1 mg every 8 hours  Baseline creatinine approximately 2.1, currently at baseline  Continue to monitor daily weight, strict intake and output     CKD stage IV: Nephrology following  Bumex held previously secondary to initial creatinine 1.47 which continued to trend up  Per nephrology, patient's baseline creatinine is 2.1 and currently is at 2.14  Bumex was resumed today as above   Suspect creatinine was 1.47 secondary to volume overload and has now normalized  Monitor    Hypertension: Blood pressure at goal on metoprolol 100 mg every 12 hours, amlodipine 2.5 mg daily, and now on Bumex 1 mg IV every 8 hours, monitor     Obesity hypoventilation syndrome/ALTA:   COPD with chronic hypoxic respiratory failure: Pulmonary following and follows with Dr. Hoyt outpatient  BiPAP broken, awaiting new machine to be delivered  Tolerating hospital machine without acute issues  No acute issues on Symbicort and Atrovent that were substituted for home Advair and Incruse Ellipta  Tolerating home Xopenex     Chronic LE edema and venous stasis: LE edema worse today with diuresis on hold  secondary to LADY. Monitor.      DM-2 with peripheral neuropathy: HbA1C 7.4%   No acute issues on levemir 50 units nightly with high-dose sliding scale insulin  On Levemir here and SSI substituted for home Novolog and Tresiba. Bedsides variable. Resume home regimen at discharge. Monitor. .      Hypothyroidism: levothyroxine changed this admit to 250 µg daily due to TSH low/FT4 high  Repeat TFTs 6-8 weeks     Dylipidemia: No acute issues on atorvastatin 40 mg daily and aspirin 81 mg daily with cardiac consistent carbohydrate diet      Chronic narcotic dependence: On home dose Norco. No acute issues here. Confirmed on [facility] report.      GERD: On home pepcid. No acute issues here.      Morbid Obesity: Nutrition to see here.      Chronic normocytic anemia/Anemia of chronic disease: Hemoglobin stable at 10.8, no active blood loss noted

## 2018-09-03 NOTE — PROGRESS NOTES
LOS: 3 days   Patient Care Team:  Jina Vaughan MD as PCP - General (Internal Medicine)  Jhonny Hoyt MD as Consulting Physician (Pulmonary Disease)  Gricelda Flores APRN as Nurse Practitioner (Endocrinology)  Konstantin Orr MD as Consulting Physician (Nephrology)  Vargas Ledezma MD as Consulting Physician (Pain Medicine)  Trinidad Barreto MD as Consulting Physician (Cardiology)    Chief Complaint:  F/u volume overload     Interval History:     overall, her breathing is better.  Her lower extremity edema is better.  She has no chest pain.  She denies dizziness, palpitations, nausea.      Objective   Vital Signs  Temp:  [97.2 °F (36.2 °C)-99.3 °F (37.4 °C)] 97.3 °F (36.3 °C)  Heart Rate:  [79-89] 80  Resp:  [20] 20  BP: (113-147)/(59-75) 147/75    Intake/Output Summary (Last 24 hours) at 09/03/18 0723  Last data filed at 09/02/18 1749   Gross per 24 hour   Intake             1320 ml   Output              850 ml   Net              470 ml       Comfortable NAD, obese  Neck supple, no JVD or thyromegaly appreciated  S1/S2 RRR, no m/r/g  Lungs CTA B, normal effort  Abdomen S/NT/ND (+) BS, no HSM appreciated  Extremities warm, no clubbing, cyanosis, 1+ LEedema  No visible or palpable skin lesions  A/Ox4, mood and affect appropriate    Results Review:        Results from last 7 days  Lab Units 09/02/18  0418 09/01/18  0409 08/31/18  0317   SODIUM mmol/L 141 143 144   POTASSIUM mmol/L 4.7 4.4 4.8   CHLORIDE mmol/L 96* 97* 99   CO2 mmol/L 34.7* 37.3* 33.9*   BUN mg/dL 53* 42* 28*   CREATININE mg/dL 2.14* 2.05* 1.40*   GLUCOSE mg/dL 191* 51* 145*   CALCIUM mg/dL 7.8* 8.5* 9.0       Results from last 7 days  Lab Units 08/31/18  0927 08/31/18  0317 08/30/18  2222   TROPONIN T ng/mL 0.011 0.023 0.029       Results from last 7 days  Lab Units 08/31/18  0317 08/30/18  2222   WBC 10*3/mm3 9.76 10.46   HEMOGLOBIN g/dL 10.8* 10.8*   HEMATOCRIT % 34.3* 34.2*   PLATELETS 10*3/mm3 185 183       Results from  last 7 days  Lab Units 08/30/18  2222   INR  1.05   APTT seconds 29.3       Results from last 7 days  Lab Units 08/31/18  0317   CHOLESTEROL mg/dL 126       Results from last 7 days  Lab Units 08/31/18  0317   MAGNESIUM mg/dL 1.8       Results from last 7 days  Lab Units 08/31/18  0317   CHOLESTEROL mg/dL 126   TRIGLYCERIDES mg/dL 88   HDL CHOL mg/dL 64*   LDL CHOL mg/dL 44       I reviewed the patient's new clinical results.  I personally viewed and interpreted the patient's EKG/Telemetry data        Medication Review:     amLODIPine 2.5 mg Oral Q24H   aspirin 81 mg Oral Daily   atorvastatin 40 mg Oral Daily   b complex-C-folic acid 1 capsule Oral Daily   budesonide-formoterol 2 puff Inhalation BID - RT   cetirizine 10 mg Oral Nightly   enoxaparin 40 mg Subcutaneous Q24H   famotidine 20 mg Oral Daily   fluconazole 100 mg Oral Q24H   guaiFENesin 1,200 mg Oral Q12H   insulin aspart 0-24 Units Subcutaneous 4x Daily AC & at Bedtime   insulin detemir 50 Units Subcutaneous Nightly   ipratropium 0.5 mg Nebulization BID - RT   lactobacillus acidophilus 1 capsule Oral Daily   levothyroxine 250 mcg Oral QAM   metoprolol tartrate 50 mg Oral Q12H   miconazole  Topical Q12H   QUEtiapine 100 mg Oral BID            Assessment/Plan     Active Problems:    Acute hypoxemic respiratory failure (CMS/HCC)    1. Volume overload. Her BNP is normal with normal systolic and diastolic function on echo 5/2018.  Has CKD, uncontrolled DM and HTN anemia, low TSH and normal albumen. Diurese and normal viral panel.  has diastolic dysfunction. improving  2. Low TSH.   3. Obesity.   4. CKD - creatinine rising.    5. DM, uncontrolled.  6. Mild anemia  7. ALTA  8. HTN, BP high.      Adjust meds - overall better.     Trinidad Barreto MD  09/03/18  7:23 AM

## 2018-09-03 NOTE — PLAN OF CARE
Problem: Patient Care Overview  Goal: Plan of Care Review  Outcome: Ongoing (interventions implemented as appropriate)   09/03/18 6063   Coping/Psychosocial   Plan of Care Reviewed With patient   Plan of Care Review   Progress improving   OTHER   Outcome Summary Patient denies pain. Up to bedside commode. VSS. Glucose monitoring maintained       Problem: Infection, Risk/Actual (Adult)  Goal: Identify Related Risk Factors and Signs and Symptoms  Outcome: Ongoing (interventions implemented as appropriate)      Problem: Skin Injury Risk (Adult)  Goal: Identify Related Risk Factors and Signs and Symptoms  Outcome: Ongoing (interventions implemented as appropriate)    Goal: Skin Health and Integrity  Outcome: Ongoing (interventions implemented as appropriate)      Problem: Pain, Acute (Adult)  Goal: Identify Related Risk Factors and Signs and Symptoms  Outcome: Ongoing (interventions implemented as appropriate)

## 2018-09-03 NOTE — PROGRESS NOTES
"Chart reviewed, full consult to follow  Mrs. Govea has stage 4 chronic kidney disease, followed by me in the office  Chronic hypoxemic respiratory failure, CHF  She was seen by me in the office one month ago and an extra 1 mg of Bumex was added at night  She is chronically on 3 L of oxygen at home, current sats are 94% on 4 L  X-ray from 8/30 personally reviewed, chronic changes but no acute pulmonary edema  She has not tolerated high-dose diuretics well in the past  Historically her baseline creatinine is around 2.1 and creatinine today is 2.14 so she's fairly close to baseline  2D echocardiogram with an EF of 65%, diastolic dysfunction noted    Her creatinine was 1.4 on admission, below usual baseline likely due to fluid overload and her creatinine has \"normalized\" back to her usual baseline after diuresis    I think it is reasonable to restart low-dose IV diuretics as long as creatinine stays within 2.1-2.5 range, may have to settle for a slightly higher serum creatinine at the expense of better volume control    Will follow-up with patient in a.chidi.      Konstantin Orr M.D.  [facility]  [phone]  "

## 2018-09-04 ENCOUNTER — APPOINTMENT (OUTPATIENT)
Dept: GENERAL RADIOLOGY | Facility: HOSPITAL | Age: 63
End: 2018-09-04

## 2018-09-04 PROBLEM — M79.3 PANNICULITIS: Status: RESOLVED | Noted: 2017-10-16 | Resolved: 2018-09-04

## 2018-09-04 PROBLEM — B37.2 CANDIDAL SKIN INFECTION: Status: RESOLVED | Noted: 2017-08-22 | Resolved: 2018-09-04

## 2018-09-04 PROBLEM — R53.83 FATIGUE: Status: RESOLVED | Noted: 2017-10-25 | Resolved: 2018-09-04

## 2018-09-04 PROBLEM — S91.109A WOUND, OPEN, TOE: Status: RESOLVED | Noted: 2017-08-22 | Resolved: 2018-09-04

## 2018-09-04 LAB
ANION GAP SERPL CALCULATED.3IONS-SCNC: 8.7 MMOL/L
BUN BLD-MCNC: 51 MG/DL (ref 8–23)
BUN/CREAT SERPL: 29.1 (ref 7–25)
CALCIUM SPEC-SCNC: 8.8 MG/DL (ref 8.8–10.5)
CHLORIDE SERPL-SCNC: 96 MMOL/L (ref 98–107)
CO2 SERPL-SCNC: 33.3 MMOL/L (ref 22–29)
CREAT BLD-MCNC: 1.75 MG/DL (ref 0.57–1)
GFR SERPL CREATININE-BSD FRML MDRD: 29 ML/MIN/1.73
GLUCOSE BLD-MCNC: 198 MG/DL (ref 65–99)
GLUCOSE BLDC GLUCOMTR-MCNC: 190 MG/DL (ref 70–130)
GLUCOSE BLDC GLUCOMTR-MCNC: 196 MG/DL (ref 70–130)
GLUCOSE BLDC GLUCOMTR-MCNC: 354 MG/DL (ref 70–130)
GLUCOSE BLDC GLUCOMTR-MCNC: 406 MG/DL (ref 70–130)
GLUCOSE BLDC GLUCOMTR-MCNC: 411 MG/DL (ref 70–130)
GLUCOSE BLDC GLUCOMTR-MCNC: 57 MG/DL (ref 70–130)
POTASSIUM BLD-SCNC: 4.8 MMOL/L (ref 3.5–5.2)
SODIUM BLD-SCNC: 138 MMOL/L (ref 136–145)

## 2018-09-04 PROCEDURE — 94799 UNLISTED PULMONARY SVC/PX: CPT

## 2018-09-04 PROCEDURE — 25010000002 ENOXAPARIN PER 10 MG: Performed by: HOSPITALIST

## 2018-09-04 PROCEDURE — 63710000001 INSULIN DETEMIR PER 5 UNITS: Performed by: HOSPITALIST

## 2018-09-04 PROCEDURE — 80048 BASIC METABOLIC PNL TOTAL CA: CPT | Performed by: NURSE PRACTITIONER

## 2018-09-04 PROCEDURE — 63710000001 INSULIN ASPART PER 5 UNITS: Performed by: HOSPITALIST

## 2018-09-04 PROCEDURE — 71046 X-RAY EXAM CHEST 2 VIEWS: CPT

## 2018-09-04 PROCEDURE — 82962 GLUCOSE BLOOD TEST: CPT

## 2018-09-04 PROCEDURE — 99232 SBSQ HOSP IP/OBS MODERATE 35: CPT | Performed by: INTERNAL MEDICINE

## 2018-09-04 PROCEDURE — 99232 SBSQ HOSP IP/OBS MODERATE 35: CPT | Performed by: NURSE PRACTITIONER

## 2018-09-04 RX ADMIN — ENOXAPARIN SODIUM 40 MG: 40 INJECTION SUBCUTANEOUS at 08:16

## 2018-09-04 RX ADMIN — IPRATROPIUM BROMIDE 0.5 MG: 0.5 SOLUTION RESPIRATORY (INHALATION) at 09:29

## 2018-09-04 RX ADMIN — ATORVASTATIN CALCIUM 40 MG: 40 TABLET, FILM COATED ORAL at 08:17

## 2018-09-04 RX ADMIN — BUMETANIDE 1 MG: 0.25 INJECTION INTRAMUSCULAR; INTRAVENOUS at 18:23

## 2018-09-04 RX ADMIN — ASPIRIN 81 MG: 81 TABLET, COATED ORAL at 08:15

## 2018-09-04 RX ADMIN — QUETIAPINE FUMARATE 100 MG: 100 TABLET ORAL at 21:03

## 2018-09-04 RX ADMIN — FAMOTIDINE 20 MG: 20 TABLET, FILM COATED ORAL at 08:17

## 2018-09-04 RX ADMIN — LEVALBUTEROL INHALATION SOLUTION 0.63 MG: 0.63 SOLUTION RESPIRATORY (INHALATION) at 10:14

## 2018-09-04 RX ADMIN — HYDROCODONE BITARTRATE AND ACETAMINOPHEN 1 TABLET: 10; 325 TABLET ORAL at 23:08

## 2018-09-04 RX ADMIN — IPRATROPIUM BROMIDE 0.5 MG: 0.5 SOLUTION RESPIRATORY (INHALATION) at 19:23

## 2018-09-04 RX ADMIN — BUMETANIDE 1 MG: 0.25 INJECTION INTRAMUSCULAR; INTRAVENOUS at 04:16

## 2018-09-04 RX ADMIN — MICONAZOLE NITRATE: 20 CREAM TOPICAL at 21:05

## 2018-09-04 RX ADMIN — INSULIN ASPART 20 UNITS: 100 INJECTION, SOLUTION INTRAVENOUS; SUBCUTANEOUS at 18:19

## 2018-09-04 RX ADMIN — MICONAZOLE NITRATE: 20 CREAM TOPICAL at 08:17

## 2018-09-04 RX ADMIN — FLUCONAZOLE 100 MG: 100 TABLET ORAL at 18:19

## 2018-09-04 RX ADMIN — QUETIAPINE FUMARATE 100 MG: 100 TABLET ORAL at 08:17

## 2018-09-04 RX ADMIN — GUAIFENESIN 1200 MG: 600 TABLET, EXTENDED RELEASE ORAL at 21:04

## 2018-09-04 RX ADMIN — AMLODIPINE BESYLATE 2.5 MG: 2.5 TABLET ORAL at 08:16

## 2018-09-04 RX ADMIN — ACETAMINOPHEN 650 MG: 325 TABLET, FILM COATED ORAL at 21:20

## 2018-09-04 RX ADMIN — HYDROCODONE BITARTRATE AND ACETAMINOPHEN 1 TABLET: 10; 325 TABLET ORAL at 00:19

## 2018-09-04 RX ADMIN — GUAIFENESIN 1200 MG: 600 TABLET, EXTENDED RELEASE ORAL at 08:17

## 2018-09-04 RX ADMIN — CETIRIZINE HYDROCHLORIDE 10 MG: 10 TABLET, FILM COATED ORAL at 21:04

## 2018-09-04 RX ADMIN — BUDESONIDE AND FORMOTEROL FUMARATE DIHYDRATE 2 PUFF: 160; 4.5 AEROSOL RESPIRATORY (INHALATION) at 19:30

## 2018-09-04 RX ADMIN — Medication 1 CAPSULE: at 08:17

## 2018-09-04 RX ADMIN — BUDESONIDE AND FORMOTEROL FUMARATE DIHYDRATE 2 PUFF: 160; 4.5 AEROSOL RESPIRATORY (INHALATION) at 09:37

## 2018-09-04 RX ADMIN — METOPROLOL TARTRATE 100 MG: 50 TABLET, FILM COATED ORAL at 21:03

## 2018-09-04 RX ADMIN — INSULIN ASPART 24 UNITS: 100 INJECTION, SOLUTION INTRAVENOUS; SUBCUTANEOUS at 21:00

## 2018-09-04 RX ADMIN — LEVALBUTEROL INHALATION SOLUTION 0.63 MG: 0.63 SOLUTION RESPIRATORY (INHALATION) at 19:17

## 2018-09-04 RX ADMIN — METOPROLOL TARTRATE 100 MG: 50 TABLET, FILM COATED ORAL at 08:15

## 2018-09-04 RX ADMIN — INSULIN ASPART 4 UNITS: 100 INJECTION, SOLUTION INTRAVENOUS; SUBCUTANEOUS at 12:27

## 2018-09-04 RX ADMIN — BUMETANIDE 1 MG: 0.25 INJECTION INTRAMUSCULAR; INTRAVENOUS at 12:30

## 2018-09-04 RX ADMIN — Medication 1 MG: at 08:16

## 2018-09-04 RX ADMIN — INSULIN DETEMIR 50 UNITS: 100 INJECTION, SOLUTION SUBCUTANEOUS at 21:02

## 2018-09-04 RX ADMIN — LEVOTHYROXINE SODIUM 250 MCG: 150 TABLET ORAL at 08:15

## 2018-09-04 NOTE — PROGRESS NOTES
Nephrology Note    Chart reviewed, full consult to follow  Mrs. Govea has stage 4 chronic kidney disease  Chronic hypoxemic respiratory failure, CHF  Historically her baseline creatinine is around 2.1     Her creatinine was 1.4 on admission, below usual baseline likely due to fluid overload    Cr is stable at 1.7 today    Continue  low-dose IV diuretics as long as creatinine stays within 2.1-2.5 range, may have to settle for a slightly higher serum creatinine at the expense of better volume control    Will follow-up with patient in a.m.    Judie Bhatt MD  [facility]  [phone]

## 2018-09-04 NOTE — PROGRESS NOTES
"SERVICE: [facility] HOSPITALIST    CONSULTANTS: cardiology, pulmonary, nephrology    CHIEF COMPLAINT: follow up acute volume overload/obesity hypoventilation syndrome    SUBJECTIVE: the patient reports she is feeling much improved and feels that her swelling is diminished and her shortness of breath is less today and wants to go home.  She reports she is tolerating by mouth.  She notes she is independent at home and does not require any home health services.  She otherwise denies f/c/cough/soa/chest pain/n/v/d/abdominal pain or other new concerns.    OBJECTIVE:    /50 (BP Location: Left arm, Patient Position: Lying)   Pulse 72   Temp 98 °F (36.7 °C) (Oral)   Resp 20   Ht 167.6 cm (66\")   Wt 122 kg (269 lb)   LMP  (LMP Unknown)   SpO2 100%   BMI 43.42 kg/m²     MEDS/LABS REVIEWED AND ORDERED    amLODIPine 2.5 mg Oral Q24H   aspirin 81 mg Oral Daily   atorvastatin 40 mg Oral Daily   b complex-C-folic acid 1 capsule Oral Daily   budesonide-formoterol 2 puff Inhalation BID - RT   bumetanide 1 mg Intravenous Q8H   cetirizine 10 mg Oral Nightly   enoxaparin 40 mg Subcutaneous Q24H   famotidine 20 mg Oral Daily   fluconazole 100 mg Oral Q24H   guaiFENesin 1,200 mg Oral Q12H   insulin aspart 0-24 Units Subcutaneous 4x Daily AC & at Bedtime   insulin detemir 50 Units Subcutaneous Nightly   ipratropium 0.5 mg Nebulization BID - RT   lactobacillus acidophilus 1 capsule Oral Daily   levothyroxine 250 mcg Oral QAM   metoprolol tartrate 100 mg Oral Q12H   miconazole  Topical Q12H   QUEtiapine 100 mg Oral BID     Physical Exam   Constitutional: She is oriented to person, place, and time. She appears well-developed and well-nourished.   Obese   HENT:   Head: Normocephalic and atraumatic.   Eyes: Pupils are equal, round, and reactive to light. EOM are normal.   Cardiovascular: Normal rate and regular rhythm.    Pulmonary/Chest: Effort normal.   Diminished all fields   Abdominal: Soft. Bowel sounds are " normal. She exhibits no distension. There is no tenderness. There is no guarding.   Obese   Musculoskeletal:   2+ bilateral lower extremity pitting edema, right greater than left   Neurological: She is alert and oriented to person, place, and time.   Skin: Skin is warm and dry. No erythema.   Psychiatric: She has a normal mood and affect. Her behavior is normal.   Vitals reviewed.    LAB/DIAGNOSTICS:    Lab Results (last 24 hours)     Procedure Component Value Units Date/Time    Respiratory Culture - Sputum, Cough [[id number]] Collected:  09/03/18 1546    Specimen:  Sputum from Cough Updated:  09/04/18 1320     Respiratory Culture Culture in progress     Gram Stain Result Rare (1+) Epithelial cells per low power field      Many (4+) WBCs seen      Rare (1+) Mixed bacterial morphotypes seen on Gram Stain    POC Glucose Once [[id number]]  (Abnormal) Collected:  09/04/18 1148    Specimen:  Blood Updated:  09/04/18 1156     Glucose 190 (H) mg/dL     Narrative:       Meter: [id number] : [id number] AGC    Basic Metabolic Panel [[id number]]  (Abnormal) Collected:  09/04/18 1040    Specimen:  Blood Updated:  09/04/18 1108     Glucose 198 (H) mg/dL      BUN 51 (H) mg/dL      Creatinine 1.75 (H) mg/dL      Sodium 138 mmol/L      Potassium 4.8 mmol/L      Chloride 96 (L) mmol/L      CO2 33.3 (H) mmol/L      Calcium 8.8 mg/dL      eGFR Non African Amer 29 (L) mL/min/1.73      BUN/Creatinine Ratio 29.1 (H)     Anion Gap 8.7 mmol/L     Narrative:       GFR Normal >60  Chronic Kidney Disease <60  Kidney Failure <15    POC Glucose Once [[id number]]  (Abnormal) Collected:  09/04/18 0742    Specimen:  Blood Updated:  09/04/18 0750     Glucose 57 (L) mg/dL     Narrative:       Meter: [id number] : [id number]    POC Glucose Once [[id number]]  (Abnormal) Collected:  09/03/18 2015    Specimen:  Blood Updated:  09/03/18 2022     Glucose 297 (H) mg/dL     Narrative:       Meter: [id number]  : [id number] Waylon Tillman CNA    POC Glucose Once [[id number]]  (Abnormal) Collected:  09/03/18 1706    Specimen:  Blood Updated:  09/03/18 1713     Glucose 167 (H) mg/dL     Narrative:       Meter: [id number] : [id number] Roger Thompson CNA        ECG 12 Lead   Final Result   RR Interval= 779 ms   NJ Interval= 153 ms   QRSD Interval= 94 ms   QT Interval= 407 ms   QTc Interval= 461 ms   Heart Rate= 77 ms   P Axis= 43 deg   QRS Axis= 42 deg   T Wave Axis= 48 deg   I: 40 Axis= -10 deg   T: 40 Axis= 56 deg   ST Axis= 50 deg   Sinus rhythm   Low voltage, precordial leads   Minimal ST elevation, lateral leads   no significant change   Electronically Signed by:  Milton Dowling ([facility] 01-Sep-2018 14:15:09   Date and Time of Study: 2018-09-01 10:26:26      ECG 12 Lead   Final Result   RR Interval= 667 ms   NJ Interval= 139 ms   QRSD Interval= 96 ms   QT Interval= 389 ms   QTc Interval= 476 ms   Heart Rate= 90 ms   P Axis= 52 deg   QRS Axis= 44 deg   T Wave Axis= 27 deg   I: 40 Axis= 17 deg   T: 40 Axis= 50 deg   ST Axis= 39 deg   Sinus rhythm   No Change from Prior Tracing   Electronically Signed by:  Trinidad Barreto ([facility] 31-Aug-2018 07:57:24   Date and Time of Study: 2018-08-30 21:51:37        Results for orders placed during the hospital encounter of 08/30/18   Adult Transthoracic Echo Limited W/ Cont if Necessary Per Protocol    Narrative · Left ventricular wall thickness is consistent with mild-to-moderate   concentric hypertrophy.  · Left ventricular systolic function is normal.  · Left ventricular diastolic dysfunction (grade I a) consistent with   impaired relaxation.  · Calculated EF = 65%.        Xr Chest Pa & Lateral    Result Date: 9/4/2018  Right lower lobe infiltrate or atelectasis. COPD.  This report was finalized on 9/4/2018 8:12 AM by Dr. Kike Cotto MD.      ASSESSMENT/PLAN:  Volume overload with grade 1 diastolic dysfunction: Cardiology following  Continues on Bumex 1 mg every 8  hours  Baseline creatinine approximately 2.1,  Now back down to 1.75  Continue to monitor daily weight, strict intake and output     CKD stage IV: Nephrology following  Bumex held previously secondary to initial creatinine 1.47 which continued to trend up  Per nephrology, patient's baseline creatinine is 2.1 and currently  1.75 with IV diuretic  Suspect creatinine was 1.47 secondary to volume overload and has now normalized  Monitor     Hypertension: Blood pressure at goal on metoprolol 100 mg every 12 hours, amlodipine 2.5 mg daily, Bumex 1 mg IV every 8 hours, monitor     Obesity hypoventilation syndrome/ALTA:   COPD with chronic hypoxic respiratory failure: Pulmonary following and follows with Dr. Hoyt outpatient  BiPAP broken, awaiting new machine to be delivered  Tolerating hospital machine without acute issues  No acute issues on Symbicort and Atrovent that were substituted for home Advair and Incruse Ellipta  Tolerating home Xopenex     Chronic LE edema and venous stasis: LE edema  Very slightly improved since yesterday, continuing IV diuresis as above     DM-2 with peripheral neuropathy: HbA1C 7.4%   No acute issues on levemir 50 units nightly with high-dose sliding scale insulin  On Levemir here and SSI substituted for home Novolog and Tresiba. Bedsides variable. Resume home regimen at discharge. Monitor. .      Hypothyroidism: levothyroxine changed this admit to 250 µg daily due to TSH low/FT4 high  Repeat TFTs 6-8 weeks     Dylipidemia: No acute issues on atorvastatin 40 mg daily and aspirin 81 mg daily with cardiac consistent carbohydrate diet      Chronic narcotic dependence: On home dose Norco. No acute issues here. Confirmed on [facility] report.      GERD: On home pepcid. No acute issues here.      Morbid Obesity: Nutrition to see here.      Chronic normocytic anemia/Anemia of chronic disease: Hemoglobin stable at 10.8, no active blood loss noted    Plan: AD home when BiPAP received and diuresis is  complete

## 2018-09-04 NOTE — PLAN OF CARE
Problem: Patient Care Overview  Goal: Plan of Care Review  Outcome: Ongoing (interventions implemented as appropriate)   09/04/18 0451   Coping/Psychosocial   Plan of Care Reviewed With patient   Plan of Care Review   Progress improving     Goal: Individualization and Mutuality  Outcome: Ongoing (interventions implemented as appropriate)      Problem: NPPV/CPAP (Adult)  Intervention: Optimize Tolerance of Noninvasive Ventilation   09/04/18 0451   Respiratory Interventions   Airway/Ventilation Management airway patency maintained

## 2018-09-04 NOTE — PROGRESS NOTES
LOS: 4 days   Patient Care Team:  Jina Vaughan MD as PCP - General (Internal Medicine)  Jhonny Hoyt MD as Consulting Physician (Pulmonary Disease)  Gricelda Flores APRN as Nurse Practitioner (Endocrinology)  Konstantin Orr MD as Consulting Physician (Nephrology)  Vargas Ledezma MD as Consulting Physician (Pain Medicine)  Trinidad Barreto MD as Consulting Physician (Cardiology)    Chief Complaint: SOA       Interval History: She states she feels better and would like to go home.       Objective   Vital Signs  Temp:  [97.8 °F (36.6 °C)-98 °F (36.7 °C)] 98 °F (36.7 °C)  Heart Rate:  [69-99] 77  Resp:  [18-22] 18  BP: (128-168)/(61-74) 137/65    Intake/Output Summary (Last 24 hours) at 09/04/18 0848  Last data filed at 09/03/18 2353   Gross per 24 hour   Intake             1200 ml   Output             1950 ml   Net             -750 ml           Physical Exam   Constitutional: She is oriented to person, place, and time.   obese   HENT:   Head: Normocephalic.   Nose: Nose normal.   Eyes: Pupils are equal, round, and reactive to light. Conjunctivae and EOM are normal.   Neck: Normal range of motion.   Cannot assess JVD due to body habitus   Cardiovascular: Normal rate and regular rhythm.  Exam reveals distant heart sounds.    Pulmonary/Chest: Effort normal. She has rales in the right middle field and the right lower field.   Abdominal: Soft.   Cannot feel organs or aorta   Musculoskeletal: Normal range of motion. She exhibits edema (2-3+ edema).   Neurological: She is alert and oriented to person, place, and time. No cranial nerve deficit.   Skin: Skin is warm and dry. No erythema.   Psychiatric: She has a normal mood and affect. Her behavior is normal.   Vitals reviewed.      Results Review:        Results from last 7 days  Lab Units 09/02/18  0418 09/01/18  0409 08/31/18  0317   SODIUM mmol/L 141 143 144   POTASSIUM mmol/L 4.7 4.4 4.8   CHLORIDE mmol/L 96* 97* 99   CO2 mmol/L 34.7* 37.3* 33.9*    BUN mg/dL 53* 42* 28*   CREATININE mg/dL 2.14* 2.05* 1.40*   GLUCOSE mg/dL 191* 51* 145*   CALCIUM mg/dL 7.8* 8.5* 9.0       Results from last 7 days  Lab Units 08/31/18  0927 08/31/18 0317 08/30/18  2222   TROPONIN T ng/mL 0.011 0.023 0.029       Results from last 7 days  Lab Units 08/31/18  0317 08/30/18  2222   WBC 10*3/mm3 9.76 10.46   HEMOGLOBIN g/dL 10.8* 10.8*   HEMATOCRIT % 34.3* 34.2*   PLATELETS 10*3/mm3 185 183       Results from last 7 days  Lab Units 08/30/18  2222   INR  1.05   APTT seconds 29.3       Results from last 7 days  Lab Units 08/31/18  0317   CHOLESTEROL mg/dL 126       Results from last 7 days  Lab Units 08/31/18  0317   MAGNESIUM mg/dL 1.8       Results from last 7 days  Lab Units 08/31/18  0317   CHOLESTEROL mg/dL 126   TRIGLYCERIDES mg/dL 88   HDL CHOL mg/dL 64*   LDL CHOL mg/dL 44       I reviewed the patient's new clinical results.  I personally viewed and interpreted the patient's EKG/Telemetry data        Medication Review:     amLODIPine 2.5 mg Oral Q24H   aspirin 81 mg Oral Daily   atorvastatin 40 mg Oral Daily   b complex-C-folic acid 1 capsule Oral Daily   budesonide-formoterol 2 puff Inhalation BID - RT   bumetanide 1 mg Intravenous Q8H   cetirizine 10 mg Oral Nightly   enoxaparin 40 mg Subcutaneous Q24H   famotidine 20 mg Oral Daily   fluconazole 100 mg Oral Q24H   guaiFENesin 1,200 mg Oral Q12H   insulin aspart 0-24 Units Subcutaneous 4x Daily AC & at Bedtime   insulin detemir 50 Units Subcutaneous Nightly   ipratropium 0.5 mg Nebulization BID - RT   lactobacillus acidophilus 1 capsule Oral Daily   levothyroxine 250 mcg Oral QAM   metoprolol tartrate 100 mg Oral Q12H   miconazole  Topical Q12H   QUEtiapine 100 mg Oral BID            Assessment/Plan     1.  Chronic diastolic congestive heart failure (CMS/HCC)  2.  Morbid obesity and obesity hypoventilation syndrome  3.  COPD  4.  ALTA  5.  LADY on CKD  6.  HTN  7.  DM2    She states she feels like she's ready to go home, but  she still seems volume overloaded to me (rales on the right, pitting edema).  She's on IV bumetanide and hasn't had repeat labs in two days so it's hard to know how her renal function is tolerating it.  Dr. Orr is coming to see her today and will help us with that.      Her BP is labile, and somewhat high.  I don't want to increase her CCB due to leg swelling, and she can't be on an ACE/ARB due to her renal dysfunction.  Hopefully the BP will improve with diuresis.     Valdo Reece MD  09/04/18  8:48 AM

## 2018-09-04 NOTE — PLAN OF CARE
Problem: Patient Care Overview  Goal: Plan of Care Review  Outcome: Ongoing (interventions implemented as appropriate)   09/04/18 1853   Coping/Psychosocial   Plan of Care Reviewed With patient;sibling   Plan of Care Review   Progress improving       Problem: Infection, Risk/Actual (Adult)  Goal: Identify Related Risk Factors and Signs and Symptoms  Outcome: Ongoing (interventions implemented as appropriate)   09/04/18 1853   Infection, Risk/Actual (Adult)   Related Risk Factors (Infection, Risk/Actual) poor personal hygiene   Signs and Symptoms (Infection, Risk/Actual) edema;weakness       Problem: NPPV/CPAP (Adult)  Goal: Signs and Symptoms of Listed Potential Problems Will be Absent, Minimized or Managed (NPPV/CPAP)  Outcome: Ongoing (interventions implemented as appropriate)   09/04/18 1853   Goal/Outcome Evaluation   Problems Assessed (NPPV/CPAP) all   Problems Present (NPPV/CPAP) none       Problem: Skin Injury Risk (Adult)  Goal: Skin Health and Integrity  Outcome: Ongoing (interventions implemented as appropriate)   09/04/18 1853   Skin Injury Risk (Adult)   Skin Health and Integrity making progress toward outcome       Problem: Pain, Acute (Adult)  Goal: Acceptable Pain Control/Comfort Level  Outcome: Ongoing (interventions implemented as appropriate)   09/04/18 1853   Pain, Acute (Adult)   Acceptable Pain Control/Comfort Level making progress toward outcome       Problem: Fall Risk (Adult)  Goal: Absence of Fall  Outcome: Ongoing (interventions implemented as appropriate)   09/04/18 1853   Fall Risk (Adult)   Absence of Fall making progress toward outcome

## 2018-09-04 NOTE — NURSING NOTE
Continued Stay Note  TEODORA Dobbs     Patient Name: Jared Govea  MRN: [id number]  Today's Date: 9/4/2018    Admit Date: 8/30/2018          Discharge Plan     Row Name 09/04/18 1102       Plan    Plan Plan home    Plan Comments Call placed to [facility] [phone], voice message left for Silvia to return call and update on status of bipap coverage/set up for patient at home. Will continue to follow.              Discharge Codes    No documentation.           Davion Pryor RN

## 2018-09-04 NOTE — PROGRESS NOTES
Dr. [facility] MED SURG    9/4/2018    Patient ID:  Name:  Jared Govea  MRN:  [id number]  1955  63 y.o.  female            CC/Reason for visit: Obesity hypoventilation syndrome, COPD, chronic respiratory failure, ALTA, obesity    HPI: Still quite short of breath with minimal exertion.  Still swelling of lower extremities.  Does not feel much better today    Vitals:  Vitals:    09/04/18 0944 09/04/18 1014 09/04/18 1023 09/04/18 1100   BP:    126/50   BP Location:    Left arm   Patient Position:    Lying   Pulse: 112 116 116 72   Resp: 20 20 20 20   Temp:       TempSrc:       SpO2: 97% 96% 97% 100%   Weight:       Height:               Body mass index is 43.42 kg/m².    Intake/Output Summary (Last 24 hours) at 09/04/18 1225  Last data filed at 09/04/18 0854   Gross per 24 hour   Intake             1280 ml   Output             2550 ml   Net            -1270 ml       Exam:  GEN:  No distress, appears stated age  EYES:   EOM-i, anicteric sclera bilat  ENT:    External ears/nose normal, OP clear  NECK:  Supple, midline trachea  LUNGS: Diminished breath sounds, perhaps some crackles at the bases but very large body habitus, but I don't hear any wheezes.  nonlabored breathing  CV:  Normal S1S2, without murmur, 1+ edema  ABD:  Obese, nontender    Scheduled meds:    amLODIPine 2.5 mg Oral Q24H   aspirin 81 mg Oral Daily   atorvastatin 40 mg Oral Daily   b complex-C-folic acid 1 capsule Oral Daily   budesonide-formoterol 2 puff Inhalation BID - RT   bumetanide 1 mg Intravenous Q8H   cetirizine 10 mg Oral Nightly   enoxaparin 40 mg Subcutaneous Q24H   famotidine 20 mg Oral Daily   fluconazole 100 mg Oral Q24H   guaiFENesin 1,200 mg Oral Q12H   insulin aspart 0-24 Units Subcutaneous 4x Daily AC & at Bedtime   insulin detemir 50 Units Subcutaneous Nightly   ipratropium 0.5 mg Nebulization BID - RT   lactobacillus acidophilus 1 capsule Oral Daily   levothyroxine 250 mcg Oral QAM   metoprolol  tartrate 100 mg Oral Q12H   miconazole  Topical Q12H   QUEtiapine 100 mg Oral BID     IV meds:                           Data Review:   I reviewed the patient's medications and new clinical results.  Lab Results   Component Value Date    CALCIUM 8.8 09/04/2018    PHOS 3.5 08/31/2018    MG 1.8 08/31/2018    MG 1.9 08/04/2016    MG 1.8 02/29/2016       Results from last 7 days  Lab Units 09/04/18  1040 09/02/18  0418 09/01/18  0409 08/31/18  0317 08/30/18  2222   SODIUM mmol/L 138 141 143 144 144   POTASSIUM mmol/L 4.8 4.7 4.4 4.8 5.1   CHLORIDE mmol/L 96* 96* 97* 99 98   CO2 mmol/L 33.3* 34.7* 37.3* 33.9* 35.4*   BUN mg/dL 51* 53* 42* 28* 28*   CREATININE mg/dL 1.75* 2.14* 2.05* 1.40* 1.47*   CALCIUM mg/dL 8.8 7.8* 8.5* 9.0 9.0   BILIRUBIN mg/dL  --   --   --   --  0.4   ALK PHOS U/L  --   --   --   --  119   ALT (SGPT) U/L  --   --   --   --  11   AST (SGOT) U/L  --   --   --   --  14   GLUCOSE mg/dL 198* 191* 51* 145* 87   WBC 10*3/mm3  --   --   --  9.76 10.46   HEMOGLOBIN g/dL  --   --   --  10.8* 10.8*   PLATELETS 10*3/mm3  --   --   --  185 183   INR   --   --   --   --  1.05   PROBNP pg/mL  --   --   --   --  965.3*       Results from last 7 days  Lab Units 09/03/18  1546   RESPCX  Culture in progress       Results from last 7 days  Lab Units 08/31/18  1104   ADENOVIRUS DETECTION BY PCR  Not Detected   CORONAVIRUS 229E  Not Detected   CORONAVIRUS HKU1  Not Detected   CORONAVIRUS NL63  Not Detected   CORONAVIRUS OC43  Not Detected   HUMAN METAPNEUMOVIRUS  Not Detected   HUMAN RHINOVIRUS/ENTEROVIRUS  Not Detected   INFLUENZA B PCR  Not Detected   PARAINFLUENZA 1  Not Detected   PARAINFLUENZA VIRUS 2  Not Detected   PARAINFLUENZA VIRUS 3  Not Detected   PARAINFLUENZA VIRUS 4  Not Detected   BORDETELLA PERTUSSIS PCR  Not Detected   CHLAMYDOPHILA PNEUMONIAE PCR  Not Detected   MYCOPLAMA PNEUMO PCR  Not Detected   INFLUENZA A PCR  Not Detected   INFLUENZA A H3  Not Detected   INFLUENZA A H1  Not Detected   RSV, PCR   Not Detected         Results from last 7 days  Lab Units 08/31/18  0927 08/31/18  0317 08/30/18  2222   TROPONIN T ng/mL 0.011 0.023 0.029       Results from last 7 days  Lab Units 08/30/18  2225   PH, ARTERIAL pH units 7.401   PCO2, ARTERIAL mm Hg 57.5*   PO2 ART mm Hg 64.4*   O2 SATURATION ART % 93.5*   FLOW RATE lpm 5.0   MODALITY  Nasal Cannula         ASSESSMENT:   Active Problems:    Chronic diastolic congestive heart failure (CMS/[facility])    COPD (chronic obstructive pulmonary disease) (CMS/[facility])    Uncontrolled type 2 diabetes mellitus with chronic kidney disease, with long-term current use of insulin (CMS/[facility])    Essential hypertension    Acute renal failure superimposed on stage 4 chronic kidney disease (CMS/[facility])    Morbid obesity (CMS/[facility])    Acute hypoxemic respiratory failure on chronic respiratory failure      PLAN:  Patient should resume Advair and Incruse as she does at home.  No active wheezing at this time, therefore does not need any steroids.  Continue DuoNeb 4 times a day when needed for symptom control.  Continue diuretics.  Continue CPAP at night.  I believe she still has some excess volume on board.        Jhonny Hoyt MD  9/4/2018

## 2018-09-04 NOTE — NURSING NOTE
Continued Stay Note  TEODORA Dobbs     Patient Name: Jared Govea  MRN: [id number]  Today's Date: 9/4/2018    Admit Date: 8/30/2018          Discharge Plan     Row Name 09/04/18 2529       Plan    Plan plan home    Plan Comments Spoke with Casey/Sofie who states awaiting prior auth for home bipap. Request return call when prior auth rec'd and calrification of setup for home bipap. Informed patient dc from hospital pending set up of home equipment  aDkota APRN updated. Will continue to follow.    Row Name 09/04/18 1102       Plan    Plan Plan home    Plan Comments Call placed to Sofie [phone], voice message left for Silvia to return call and update on status of bipap coverage/set up for patient at home. Will continue to follow.              Discharge Codes    No documentation.           Davion Pryor RN

## 2018-09-04 NOTE — NURSING NOTE
Continued Stay Note  TEODORA Dobbs     Patient Name: Jared Govea  MRN: [id number]  Today's Date: 9/4/2018    Admit Date: 8/30/2018          Discharge Plan     Row Name 09/04/18 1600       Plan    Plan plan home    Plan Comments Rec'd call from Ramandeep who states prior auth rec'd - RT available to do in home set up tomorrow 9/5 after 1pm. CM will call with dc time to coordinate arrival of equipment to the home. Dakota JOSEPH & Gricelda Elam RT updated, will continue to follow.    Row Name 09/04/18 1339       Plan    Plan plan home    Plan Comments Spoke with Nelli who states awaiting prior auth for home bipap. Request return call when prior auth rec'd and calrification of setup for home bipap. Informed patient dc from hospital pending set up of home equipment  Dakota JOSEPH updated. Will continue to follow.              Discharge Codes    No documentation.           Davion Pryor RN

## 2018-09-05 LAB
ANION GAP SERPL CALCULATED.3IONS-SCNC: 11.3 MMOL/L
BASOPHILS # BLD AUTO: 0.03 10*3/MM3 (ref 0–0.2)
BASOPHILS NFR BLD AUTO: 0.3 % (ref 0–2)
BUN BLD-MCNC: 58 MG/DL (ref 8–23)
BUN/CREAT SERPL: 29.4 (ref 7–25)
CALCIUM SPEC-SCNC: 9 MG/DL (ref 8.8–10.5)
CHLORIDE SERPL-SCNC: 93 MMOL/L (ref 98–107)
CO2 SERPL-SCNC: 32.7 MMOL/L (ref 22–29)
CREAT BLD-MCNC: 1.97 MG/DL (ref 0.57–1)
DEPRECATED RDW RBC AUTO: 39.8 FL (ref 37–54)
EOSINOPHIL # BLD AUTO: 0.36 10*3/MM3 (ref 0.1–0.3)
EOSINOPHIL NFR BLD AUTO: 4 % (ref 0–4)
ERYTHROCYTE [DISTWIDTH] IN BLOOD BY AUTOMATED COUNT: 11.6 % (ref 11.5–14.5)
GFR SERPL CREATININE-BSD FRML MDRD: 26 ML/MIN/1.73
GLUCOSE BLD-MCNC: 183 MG/DL (ref 65–99)
GLUCOSE BLDC GLUCOMTR-MCNC: 187 MG/DL (ref 70–130)
GLUCOSE BLDC GLUCOMTR-MCNC: 241 MG/DL (ref 70–130)
GLUCOSE BLDC GLUCOMTR-MCNC: 420 MG/DL (ref 70–130)
GLUCOSE BLDC GLUCOMTR-MCNC: 96 MG/DL (ref 70–130)
HCT VFR BLD AUTO: 33.7 % (ref 37–47)
HGB BLD-MCNC: 10.6 G/DL (ref 12–16)
IMM GRANULOCYTES # BLD: 0.03 10*3/MM3 (ref 0–0.03)
IMM GRANULOCYTES NFR BLD: 0.3 % (ref 0–0.5)
LYMPHOCYTES # BLD AUTO: 1.79 10*3/MM3 (ref 0.6–4.8)
LYMPHOCYTES NFR BLD AUTO: 20 % (ref 20–45)
MCH RBC QN AUTO: 29.4 PG (ref 27–31)
MCHC RBC AUTO-ENTMCNC: 31.5 G/DL (ref 31–37)
MCV RBC AUTO: 93.6 FL (ref 81–99)
MONOCYTES # BLD AUTO: 0.74 10*3/MM3 (ref 0–1)
MONOCYTES NFR BLD AUTO: 8.3 % (ref 3–8)
NEUTROPHILS # BLD AUTO: 6 10*3/MM3 (ref 1.5–8.3)
NEUTROPHILS NFR BLD AUTO: 67.1 % (ref 45–70)
NRBC BLD MANUAL-RTO: 0 /100 WBC (ref 0–0)
PLATELET # BLD AUTO: 172 10*3/MM3 (ref 140–500)
PMV BLD AUTO: 11 FL (ref 7.4–10.4)
POTASSIUM BLD-SCNC: 5.1 MMOL/L (ref 3.5–5.2)
PROCALCITONIN SERPL-MCNC: 0.06 NG/ML (ref 0.1–0.25)
RBC # BLD AUTO: 3.6 10*6/MM3 (ref 4.2–5.4)
SODIUM BLD-SCNC: 137 MMOL/L (ref 136–145)
WBC NRBC COR # BLD: 8.95 10*3/MM3 (ref 4.8–10.8)

## 2018-09-05 PROCEDURE — 63710000001 INSULIN ASPART PER 5 UNITS: Performed by: HOSPITALIST

## 2018-09-05 PROCEDURE — 82962 GLUCOSE BLOOD TEST: CPT

## 2018-09-05 PROCEDURE — 94799 UNLISTED PULMONARY SVC/PX: CPT

## 2018-09-05 PROCEDURE — 85025 COMPLETE CBC W/AUTO DIFF WBC: CPT | Performed by: NURSE PRACTITIONER

## 2018-09-05 PROCEDURE — 99232 SBSQ HOSP IP/OBS MODERATE 35: CPT | Performed by: NURSE PRACTITIONER

## 2018-09-05 PROCEDURE — 80048 BASIC METABOLIC PNL TOTAL CA: CPT | Performed by: NURSE PRACTITIONER

## 2018-09-05 PROCEDURE — 99232 SBSQ HOSP IP/OBS MODERATE 35: CPT | Performed by: INTERNAL MEDICINE

## 2018-09-05 PROCEDURE — 25010000002 ENOXAPARIN PER 10 MG: Performed by: HOSPITALIST

## 2018-09-05 PROCEDURE — 63710000001 INSULIN DETEMIR PER 5 UNITS: Performed by: HOSPITALIST

## 2018-09-05 PROCEDURE — 84145 PROCALCITONIN (PCT): CPT | Performed by: NURSE PRACTITIONER

## 2018-09-05 RX ORDER — LEVOFLOXACIN 500 MG/1
500 TABLET, FILM COATED ORAL EVERY 24 HOURS
Status: DISCONTINUED | OUTPATIENT
Start: 2018-09-05 | End: 2018-09-06 | Stop reason: HOSPADM

## 2018-09-05 RX ADMIN — INSULIN ASPART 20 UNITS: 100 INJECTION, SOLUTION INTRAVENOUS; SUBCUTANEOUS at 20:58

## 2018-09-05 RX ADMIN — BUMETANIDE 1 MG: 0.25 INJECTION INTRAMUSCULAR; INTRAVENOUS at 18:38

## 2018-09-05 RX ADMIN — INSULIN ASPART 4 UNITS: 100 INJECTION, SOLUTION INTRAVENOUS; SUBCUTANEOUS at 18:38

## 2018-09-05 RX ADMIN — Medication 1 MG: at 10:55

## 2018-09-05 RX ADMIN — QUETIAPINE FUMARATE 100 MG: 100 TABLET ORAL at 10:56

## 2018-09-05 RX ADMIN — FAMOTIDINE 20 MG: 20 TABLET, FILM COATED ORAL at 10:57

## 2018-09-05 RX ADMIN — AMLODIPINE BESYLATE 2.5 MG: 2.5 TABLET ORAL at 10:55

## 2018-09-05 RX ADMIN — BUMETANIDE 1 MG: 0.25 INJECTION INTRAMUSCULAR; INTRAVENOUS at 03:24

## 2018-09-05 RX ADMIN — MICONAZOLE NITRATE 1 APPLICATION: 20 CREAM TOPICAL at 08:38

## 2018-09-05 RX ADMIN — LEVALBUTEROL INHALATION SOLUTION 0.63 MG: 0.63 SOLUTION RESPIRATORY (INHALATION) at 07:08

## 2018-09-05 RX ADMIN — CETIRIZINE HYDROCHLORIDE 10 MG: 10 TABLET, FILM COATED ORAL at 21:11

## 2018-09-05 RX ADMIN — BUDESONIDE AND FORMOTEROL FUMARATE DIHYDRATE 2 PUFF: 160; 4.5 AEROSOL RESPIRATORY (INHALATION) at 20:54

## 2018-09-05 RX ADMIN — BUMETANIDE 1 MG: 0.25 INJECTION INTRAMUSCULAR; INTRAVENOUS at 10:56

## 2018-09-05 RX ADMIN — MICONAZOLE NITRATE: 20 CREAM TOPICAL at 21:06

## 2018-09-05 RX ADMIN — ENOXAPARIN SODIUM 40 MG: 40 INJECTION SUBCUTANEOUS at 08:38

## 2018-09-05 RX ADMIN — LEVOTHYROXINE SODIUM 250 MCG: 150 TABLET ORAL at 06:54

## 2018-09-05 RX ADMIN — IPRATROPIUM BROMIDE 0.5 MG: 0.5 SOLUTION RESPIRATORY (INHALATION) at 07:08

## 2018-09-05 RX ADMIN — Medication 1 CAPSULE: at 10:55

## 2018-09-05 RX ADMIN — INSULIN DETEMIR 50 UNITS: 100 INJECTION, SOLUTION SUBCUTANEOUS at 21:00

## 2018-09-05 RX ADMIN — GUAIFENESIN 1200 MG: 600 TABLET, EXTENDED RELEASE ORAL at 10:55

## 2018-09-05 RX ADMIN — FLUCONAZOLE 100 MG: 100 TABLET ORAL at 16:27

## 2018-09-05 RX ADMIN — INSULIN ASPART 4 UNITS: 100 INJECTION, SOLUTION INTRAVENOUS; SUBCUTANEOUS at 08:38

## 2018-09-05 RX ADMIN — IPRATROPIUM BROMIDE 0.5 MG: 0.5 SOLUTION RESPIRATORY (INHALATION) at 20:54

## 2018-09-05 RX ADMIN — METOPROLOL TARTRATE 100 MG: 50 TABLET, FILM COATED ORAL at 10:56

## 2018-09-05 RX ADMIN — METOPROLOL TARTRATE 100 MG: 50 TABLET, FILM COATED ORAL at 21:04

## 2018-09-05 RX ADMIN — ATORVASTATIN CALCIUM 40 MG: 40 TABLET, FILM COATED ORAL at 10:55

## 2018-09-05 RX ADMIN — GUAIFENESIN 1200 MG: 600 TABLET, EXTENDED RELEASE ORAL at 21:07

## 2018-09-05 RX ADMIN — QUETIAPINE FUMARATE 100 MG: 100 TABLET ORAL at 21:09

## 2018-09-05 RX ADMIN — LEVALBUTEROL INHALATION SOLUTION 0.63 MG: 0.63 SOLUTION RESPIRATORY (INHALATION) at 16:58

## 2018-09-05 RX ADMIN — ASPIRIN 81 MG: 81 TABLET, COATED ORAL at 10:55

## 2018-09-05 RX ADMIN — LEVOFLOXACIN 500 MG: 500 TABLET, FILM COATED ORAL at 16:27

## 2018-09-05 NOTE — DISCHARGE INSTR - OTHER ORDERS
[facility] ([facility] in [place] will provide BiPAP machine for home.       Phone:  [phone]

## 2018-09-05 NOTE — PLAN OF CARE
Problem: Patient Care Overview  Goal: Plan of Care Review  Outcome: Ongoing (interventions implemented as appropriate)   09/05/18 1712   Coping/Psychosocial   Plan of Care Reviewed With patient   Plan of Care Review   Progress improving   OTHER   Outcome Summary VSS. Patient encouraged to ambulate more in her room. BSC moved to the bathroom. Per [facility], patient recieving one more day of IV Bumex. Sputum culture is positive for Pseudomonas but has not been finalized. Isolation status discussed with Ophelia Nagy, infection control nurse. Patient remains in isolation until sputum culture is finalized. If sputum culture is negative for any bacteria requiring isolation, both contact and droplet isolations can be discontinued. Possibly home tomorrow. BiPAP to be fitted once patient arrives home.     Goal: Individualization and Mutuality  Outcome: Ongoing (interventions implemented as appropriate)   09/05/18 1712   Individualization   Patient Specific Goals (Include Timeframe) go home tomorrow   Patient Specific Interventions patient still on IV Bumex for today, possibly home tomorrow pending cardiac and nephrology clearance     Goal: Discharge Needs Assessment  Outcome: Ongoing (interventions implemented as appropriate)   09/05/18 1712   Discharge Needs Assessment   Concerns to be Addressed denies needs/concerns at this time     Goal: Interprofessional Rounds/Family Conf  Outcome: Ongoing (interventions implemented as appropriate)      Problem: Infection, Risk/Actual (Adult)  Goal: Identify Related Risk Factors and Signs and Symptoms  Outcome: Outcome(s) achieved Date Met: 09/05/18 09/04/18 1853   Infection, Risk/Actual (Adult)   Related Risk Factors (Infection, Risk/Actual) poor personal hygiene   Signs and Symptoms (Infection, Risk/Actual) edema;weakness     Goal: Infection Prevention/Resolution  Outcome: Ongoing (interventions implemented as appropriate)   09/05/18 1712   Infection, Risk/Actual (Adult)   Infection  Prevention/Resolution making progress toward outcome       Problem: NPPV/CPAP (Adult)  Goal: Signs and Symptoms of Listed Potential Problems Will be Absent, Minimized or Managed (NPPV/CPAP)  Outcome: Ongoing (interventions implemented as appropriate)   09/05/18 1712   Goal/Outcome Evaluation   Problems Assessed (NPPV/CPAP) all   Problems Present (NPPV/CPAP) hypoxia/hypoxemia;situational response       Problem: Skin Injury Risk (Adult)  Goal: Identify Related Risk Factors and Signs and Symptoms  Outcome: Outcome(s) achieved Date Met: 09/05/18 09/03/18 1551   Skin Injury Risk (Adult)   Related Risk Factors (Skin Injury Risk) body weight extremes     Goal: Skin Health and Integrity  Outcome: Ongoing (interventions implemented as appropriate)   09/05/18 1712   Skin Injury Risk (Adult)   Skin Health and Integrity making progress toward outcome       Problem: Pain, Acute (Adult)  Goal: Identify Related Risk Factors and Signs and Symptoms  Outcome: Outcome(s) achieved Date Met: 09/05/18 09/03/18 1551   Pain, Acute (Adult)   Related Risk Factors (Acute Pain) disease process;infection   Signs and Symptoms (Acute Pain) fatigue/weakness;sleep pattern alteration     Goal: Acceptable Pain Control/Comfort Level  Outcome: Ongoing (interventions implemented as appropriate)   09/05/18 1712   Pain, Acute (Adult)   Acceptable Pain Control/Comfort Level making progress toward outcome       Problem: Fall Risk (Adult)  Goal: Identify Related Risk Factors and Signs and Symptoms  Outcome: Outcome(s) achieved Date Met: 09/05/18 09/05/18 1712   Fall Risk (Adult)   Related Risk Factors (Fall Risk) gait/mobility problems;environment unfamiliar;age-related changes;culprit medication(s);fatigue/slow reaction   Signs and Symptoms (Fall Risk) presence of risk factors     Goal: Absence of Fall  Outcome: Ongoing (interventions implemented as appropriate)   09/05/18 1712   Fall Risk (Adult)   Absence of Fall making progress toward outcome        Problem: Diabetes, Type 2 (Adult)  Goal: Signs and Symptoms of Listed Potential Problems Will be Absent, Minimized or Managed (Diabetes, Type 2)  Outcome: Ongoing (interventions implemented as appropriate)   09/05/18 1712   Goal/Outcome Evaluation   Problems Assessed (Type 2 Diabetes) all   Problems Present (Type 2 Diabetes) hyperglycemia;situational response       Problem: ARDS (Acute Resp Distress Syndrome) (Adult)  Goal: Signs and Symptoms of Listed Potential Problems Will be Absent, Minimized or Managed (ARDS)  Outcome: Ongoing (interventions implemented as appropriate)   09/05/18 1712   Goal/Outcome Evaluation   Problems Assessed (Acute Respiratory Distress Syndrome) all   Problems Present (ARDS) hypoxia/hypoxemia;situational response       Problem: Breathing Pattern Ineffective (Adult)  Goal: Identify Related Risk Factors and Signs and Symptoms  Outcome: Outcome(s) achieved Date Met: 09/05/18 09/05/18 1712   Breathing Pattern Ineffective (Adult)   Related Risk Factors (Breathing Pattern Ineffective) fatigue;obesity;underlying condition   Signs and Symptoms (Breathing Pattern Ineffective) activity intolerance;breath sounds abnormal;breathing pattern altered;breathlessness     Goal: Effective Oxygenation/Ventilation  Outcome: Ongoing (interventions implemented as appropriate)   09/05/18 1712   Breathing Pattern Ineffective (Adult)   Effective Oxygenation/Ventilation making progress toward outcome     Goal: Anxiety/Fear Reduction  Outcome: Ongoing (interventions implemented as appropriate)   09/05/18 1712   Breathing Pattern Ineffective (Adult)   Anxiety/Fear Reduction making progress toward outcome

## 2018-09-05 NOTE — CONSULTS
Patient Care Team:  Jina Vaughan MD as PCP - General (Internal Medicine)  Jhonny Hoyt MD as Consulting Physician (Pulmonary Disease)  Gricelda Flores APRN as Nurse Practitioner (Endocrinology)  Konstantin Orr MD as Consulting Physician (Nephrology)  Vargas Ledezma MD as Consulting Physician (Pain Medicine)  Trinidad Barreto MD as Consulting Physician (Cardiology)    Chief complaint: CKD4      History of Present Illness  Patient is a 64 yo WF with CKD4, followed by Dr. JACQUE Orr in the outpatient setting, who was admitted on 8/31/18 with SOB. She was admitted with volume overload and CHF exacerbation and started on diuretics.   Her baseline Cr is around 2.1. On admission her Cr was 1.4 which was thought to be due to dilution from volume overload. Cr improved to 1.7, but no labs today.  She reports breathing better, but still with edema in lower extremities and she feels she is not voiding as much.   Per Dr. Orr's note, patient has not tolerated high-dose diuretics in the past.    She is currently on bumex 1 mg IV TID.    Review of Systems   Review of Systems - General ROS: negative  Ophthalmic ROS: negative  ENT ROS: negative  Hematological and Lymphatic ROS: negative  Respiratory ROS: + SOB, she reports close to baseline. No cough  Cardiovascular ROS: no chest pain or dyspnea on exertion  Gastrointestinal ROS: no abdominal pain, change in bowel habits, or black or bloody stools  Genito-Urinary ROS: no dysuria, trouble voiding, or hematuria  Musculoskeletal ROS: negative  +edema  Neurological ROS: no TIA or stroke symptoms  Dermatological ROS: negative  Past Medical History:   Diagnosis Date   • Anxiety    • Arthritis    • CHF (congestive heart failure) (CMS/[facility])    • Chronic back pain    • COPD (chronic obstructive pulmonary disease) (CMS/[facility])    • Depression    • Essential hypertension    • SERENA (generalized anxiety disorder)    • GERD (gastroesophageal reflux disease)    • Headache     • HLD (hyperlipidemia)    • Hypothyroidism, acquired    • Low back pain    • Neuropathy in diabetes (CMS/HCC)    • Sleep apnea    • Type 2 diabetes mellitus (CMS/HCC)    • Wound, open, toe 8/22/2017   ,   Past Surgical History:   Procedure Laterality Date   • DILATATION AND CURETTAGE      x5   • LUNG SURGERY Right    • TRACHEOSTOMY     ,   Family History   Problem Relation Age of Onset   • Cancer Mother         bone   • Cancer Father    • Cancer Brother    ,   Social History   Substance Use Topics   • Smoking status: Former Smoker     Packs/day: 0.50     Years: 40.00     Types: Cigarettes     Quit date: 10/4/2010   • Smokeless tobacco: Former User     Quit date: 8/3/2012   • Alcohol use No   ,   Prescriptions Prior to Admission   Medication Sig Dispense Refill Last Dose   • acetaminophen (TYLENOL) 325 MG tablet Take 2 tablets by mouth Every 4 (Four) Hours As Needed for Mild Pain , Headache or Fever.   Past Month at Unknown time   • aspirin 81 MG EC tablet Take 81 mg by mouth daily.   8/30/2018 at Unknown time   • atorvastatin (LIPITOR) 40 MG tablet Take 1 tablet by mouth Daily. for cholesterol. 90 tablet 1 8/30/2018 at Unknown time   • B Complex Vitamins (VITAMIN B COMPLEX PO) Take 1 tablet by mouth daily.   8/30/2018 at 0800   • bumetanide (BUMEX) 2 MG tablet Take 2 mg by mouth Daily.   8/30/2018 at 0800   • cetirizine (ZyrTEC) 10 MG tablet Take 10 mg by mouth every night.   8/30/2018 at 2000   • famotidine (PEPCID) 20 MG tablet Take 20 mg by mouth daily.   8/30/2018 at 0800   • fluticasone-salmeterol (ADVAIR DISKUS) 250-50 MCG/DOSE DISKUS Inhale 1 puff 2 (Two) Times a Day. For COPD and rinse mouth after use 60 each 5 8/30/2018 at 0800   • guaiFENesin (MUCINEX) 600 MG 12 hr tablet Take 1,200 mg by mouth 2 (Two) Times a Day.   8/30/2018 at 0800   • HYDROcodone-acetaminophen (NORCO)  MG per tablet Take 1 tablet by mouth Every 8 (Eight) Hours As Needed for Moderate Pain . 90 tablet 0 8/30/2018 at 0800   •  insulin aspart (NOVOLOG FLEXPEN) 100 UNIT/ML solution pen-injector sc pen SSI (Patient taking differently: 3 (Three) Times a Day With Meals. SSI) 15 mL 3 8/30/2018 at Unknown time   • Insulin Degludec (TRESIBA FLEXTOUCH) 200 UNIT/ML solution pen-injector Inject 70 Units under the skin Daily. (Patient taking differently: Inject 70 Units under the skin into the appropriate area as directed Every Night.) 9 mL 3 8/30/2018 at 2000   • levalbuterol (XOPENEX) 0.63 MG/3ML nebulizer solution Take 1 ampule by nebulization every 4 (four) hours as needed for wheezing.   8/30/2018 at 2000   • levothyroxine (SYNTHROID) 300 MCG tablet Take 1 tablet by mouth Daily. 30 tablet 2 8/30/2018 at 0800   • metoprolol tartrate (LOPRESSOR) 50 MG tablet Take 1 tablet by mouth Every 12 (Twelve) Hours. For heart 180 tablet 1 8/30/2018 at 0800   • PROAIR  (90 Base) MCG/ACT inhaler INHALE TWO PUFFS BY MOUTH EVERY 4 HOURS AS NEEDED FOR WHEEZING 18 g 5 8/30/2018 at 2000   • Probiotic Product (ALIGN) 4 MG capsule Take 4 mg by mouth Daily. 30 capsule 6 8/30/2018 at 0800   • QUEtiapine (SEROquel) 100 MG tablet TAKE ONE TABLET BY MOUTH TWICE A  tablet 0 8/30/2018 at 2000   • Umeclidinium Bromide (INCRUSE ELLIPTA IN) Inhale Daily.   8/30/2018 at 2000   • vitamin D (ERGOCALCIFEROL) 26889 units capsule capsule Take 1 capsule by mouth Every 7 (Seven) Days. On tuesday 12 capsule 3 Past Week at 0800   • clotrimazole-betamethasone (LOTRISONE) 1-0.05 % cream Apply  topically 2 (Two) Times a Day. 180 g 3 More than a month at Unknown time   • dimenhyDRINATE (DRAMAMINE) 50 MG tablet Take 1 tablet by mouth Daily As Needed for movement disorders. 30 tablet 0 More than a month at Unknown time   • sennosides-docusate sodium (SENOKOT-S) 8.6-50 MG tablet Take 2 tablets by mouth At Night As Needed for Constipation.   More than a month at Unknown time   , Scheduled Meds:    amLODIPine 2.5 mg Oral Q24H   aspirin 81 mg Oral Daily   atorvastatin 40 mg Oral  Daily   b complex-C-folic acid 1 capsule Oral Daily   budesonide-formoterol 2 puff Inhalation BID - RT   bumetanide 1 mg Intravenous Q8H   cetirizine 10 mg Oral Nightly   enoxaparin 40 mg Subcutaneous Q24H   famotidine 20 mg Oral Daily   fluconazole 100 mg Oral Q24H   guaiFENesin 1,200 mg Oral Q12H   insulin aspart 0-24 Units Subcutaneous 4x Daily AC & at Bedtime   insulin detemir 50 Units Subcutaneous Nightly   ipratropium 0.5 mg Nebulization BID - RT   lactobacillus acidophilus 1 capsule Oral Daily   levothyroxine 250 mcg Oral QAM   metoprolol tartrate 100 mg Oral Q12H   miconazole  Topical Q12H   QUEtiapine 100 mg Oral BID   , Continuous Infusions:   , PRN Meds:  •  acetaminophen  •  dextrose  •  dextrose  •  glucagon (human recombinant)  •  HYDROcodone-acetaminophen  •  levalbuterol  •  sennosides-docusate sodium  •  sodium chloride  •  Insert peripheral IV **AND** sodium chloride  •  sodium chloride and Allergies:  Erythromycin; Erythromycin ethylsuccinate; Ativan [lorazepam]; and Tape    Objective     Vital Signs  Temp:  [97.8 °F (36.6 °C)-97.9 °F (36.6 °C)] 97.8 °F (36.6 °C)  Heart Rate:  [61-93] 73  Resp:  [16-20] 16  BP: (118-159)/(54-71) 129/71    I/O this shift:  In: 0   Out: 425 [Urine:425]  I/O last 3 completed shifts:  In: 1400 [P.O.:1400]  Out: 4500 [Urine:4500]    Physical Exam  Physical Examination: General appearance - alert, well appearing, and in no distress  Mental status - alert, oriented to person, place, and time  Mouth - mucous membranes moist, pharynx normal without lesions  Chest - clear to auscultation, no wheezes, rales or rhonchi, symmetric air entry  Heart - normal rate, regular rhythm, normal S1, S2, no murmurs, rubs, clicks or gallops  Abdomen - soft, nontender, nondistended, no masses or organomegaly  Neurological - alert, oriented, normal speech, no focal findings or movement disorder noted  Extremities - + edema in lower extremities  Skin - normal coloration and turgor, no rashes,  no suspicious skin lesions noted  Results Review:    I reviewed the patient's new clinical results.    WBC No results found for: WBC   HGB No results found for: HGB   HCT No results found for: HCT   Platlets No results found for: LABPLAT   MCV No results found for: MCV       Sodium Sodium   Date Value Ref Range Status   09/04/2018 138 136 - 145 mmol/L Final      Potassium Potassium   Date Value Ref Range Status   09/04/2018 4.8 3.5 - 5.2 mmol/L Final      Chloride Chloride   Date Value Ref Range Status   09/04/2018 96 (L) 98 - 107 mmol/L Final      CO2 CO2   Date Value Ref Range Status   09/04/2018 33.3 (H) 22.0 - 29.0 mmol/L Final      BUN BUN   Date Value Ref Range Status   09/04/2018 51 (H) 8 - 23 mg/dL Final      Creatinine Creatinine   Date Value Ref Range Status   09/04/2018 1.75 (H) 0.57 - 1.00 mg/dL Final      Calcium Calcium   Date Value Ref Range Status   09/04/2018 8.8 8.8 - 10.5 mg/dL Final      PO4 No results found for: CAPO4   Albumin No results found for: ALBUMIN   Magnesium No results found for: MG   Uric Acid No results found for: URICACID         Assessment/Plan     Active Problems:    Chronic diastolic congestive heart failure (CMS/[facility])    COPD (chronic obstructive pulmonary disease) (CMS/HCC)    Uncontrolled type 2 diabetes mellitus with chronic kidney disease, with long-term current use of insulin (CMS/HCC)    Essential hypertension    Acute renal failure superimposed on stage 4 chronic kidney disease (CMS/[facility])    Morbid obesity (CMS/HCC)    Acute hypoxemic respiratory failure (CMS/HCC)      Current Facility-Administered Medications:   •  acetaminophen (TYLENOL) tablet 650 mg, 650 mg, Oral, Q4H PRN, Nacho Judda, DO, 650 mg at 09/04/18 2120  •  amLODIPine (NORVASC) tablet 2.5 mg, 2.5 mg, Oral, Q24H, Trinidad Barreto MD, 2.5 mg at 09/05/18 1055  •  aspirin EC tablet 81 mg, 81 mg, Oral, Daily, Elis Judd, DO, 81 mg at 09/05/18 1055  •  atorvastatin (LIPITOR) tablet 40 mg, 40 mg,  Oral, Daily, Elis Judd DO, 40 mg at 09/05/18 1055  •  b complex-C-folic acid capsule 1 mg, 1 capsule, Oral, Daily, Elis Judd DO, 1 mg at 09/05/18 1055  •  budesonide-formoterol (SYMBICORT) 160-4.5 MCG/ACT inhaler 2 puff, 2 puff, Inhalation, BID - RT, Elis Judd DO, 2 puff at 09/04/18 1930  •  bumetanide (BUMEX) injection 1 mg, 1 mg, Intravenous, Q8H, Konstantin Orr MD, 1 mg at 09/05/18 1056  •  cetirizine (zyrTEC) tablet 10 mg, 10 mg, Oral, Nightly, Elis Judd DO, 10 mg at 09/04/18 2104  •  dextrose (D50W) solution 25 g, 25 g, Intravenous, Q15 Min PRN, Magalys Paul MD  •  dextrose (GLUTOSE) oral gel 15 g, 15 g, Oral, Q15 Min PRN, Magalys Paul MD  •  enoxaparin (LOVENOX) syringe 40 mg, 40 mg, Subcutaneous, Q24H, Elis Judd DO, 40 mg at 09/05/18 0838  •  famotidine (PEPCID) tablet 20 mg, 20 mg, Oral, Daily, Elis Judd DO, 20 mg at 09/05/18 1057  •  fluconazole (DIFLUCAN) tablet 100 mg, 100 mg, Oral, Q24H, Magalys Paul MD, 100 mg at 09/04/18 1819  •  glucagon (GLUCAGEN) injection 1 mg, 1 mg, Subcutaneous, PRN, Magalys Paul MD  •  guaiFENesin (MUCINEX) 12 hr tablet 1,200 mg, 1,200 mg, Oral, Q12H, Magalys Paul MD, 1,200 mg at 09/05/18 1055  •  HYDROcodone-acetaminophen (NORCO)  MG per tablet 1 tablet, 1 tablet, Oral, Q8H PRN, Elis Judd DO, 1 tablet at 09/04/18 2308  •  insulin aspart (novoLOG) injection 0-24 Units, 0-24 Units, Subcutaneous, 4x Daily AC & at Bedtime, Magalys Paul MD, 4 Units at 09/05/18 0838  •  insulin detemir (LEVEMIR) injection 50 Units, 50 Units, Subcutaneous, Nightly, Magalys Paul MD, 50 Units at 09/04/18 2102  •  ipratropium (ATROVENT) nebulizer solution 0.5 mg, 0.5 mg, Nebulization, BID - RT, Elis Judd DO, 0.5 mg at 09/05/18 0708  •  lactobacillus acidophilus (RISAQUAD) capsule 1 capsule, 1  capsule, Oral, Daily, Elis Judd DO, 1 capsule at 09/05/18 1055  •  levalbuterol (XOPENEX) nebulizer solution 0.63 mg, 0.63 mg, Nebulization, Q6H PRN, Elis Judd DO, 0.63 mg at 09/05/18 0708  •  levothyroxine (SYNTHROID, LEVOTHROID) tablet 250 mcg, 250 mcg, Oral, QAM, Magalys Paul MD, 250 mcg at 09/05/18 0654  •  metoprolol tartrate (LOPRESSOR) tablet 100 mg, 100 mg, Oral, Q12H, Trinidad Barreto MD, 100 mg at 09/05/18 1056  •  miconazole (MICOTIN) 2 % cream, , Topical, Q12H, Magalys Paul MD, 1 application at 09/05/18 0838  •  QUEtiapine (SEROquel) tablet 100 mg, 100 mg, Oral, BID, Elis Judd DO, 100 mg at 09/05/18 1056  •  sennosides-docusate sodium (SENOKOT-S) 8.6-50 MG tablet 2 tablet, 2 tablet, Oral, Nightly PRN, Elis Judd, DO  •  sodium chloride 0.9 % flush 1-10 mL, 1-10 mL, Intravenous, PRN, Elis Judd, DO  •  Insert peripheral IV, , , Once **AND** sodium chloride 0.9 % flush 10 mL, 10 mL, Intravenous, PRN, Jorge Whiteside MD  •  sodium chloride 0.9 % infusion 40 mL, 40 mL, Intravenous, PRN, Nacho Judda, DO       Assessment & Plan  CKD4  Volume overload  CHF  Anemia of CKD  Metabolic alkalosis- diuretics +/- COPD  COPD    Renal function had been stable. No labs today, will order.  Continue current regimen until results back. Might need to give extra dose to help more fluid removal. Will assess changing to PO in am. Hemoglobin at goal on admission. Will check CBC in am.         I discussed the patients findings and my recommendations with patient    Judie Bhatt MD  09/05/18  1:04 PM

## 2018-09-05 NOTE — PLAN OF CARE
Problem: Patient Care Overview  Goal: Plan of Care Review  Outcome: Ongoing (interventions implemented as appropriate)   09/05/18 0511   Coping/Psychosocial   Plan of Care Reviewed With patient   OTHER   Outcome Summary No falls noted this shift - fall precautions in place; pt. had a headache - resolved w/Tylenol; no new skin issues; on CPAP to sleep; bumex for edema; sleeping comfortably at this time

## 2018-09-05 NOTE — NURSING NOTE
Continued Stay Note  TEODORA Dobbs     Patient Name: Jared Govea  MRN: [id number]  Today's Date: 9/5/2018    Admit Date: 8/30/2018          Discharge Plan     Row Name 09/05/18 1140       Plan    Plan Comments Notified Silvia @[facility] ([phone]) that patient is not discharging home today.   will continue to follow to assist in having home Bipap machine for patient.                Discharge Codes    No documentation.           Imani Cabrera RN

## 2018-09-05 NOTE — PLAN OF CARE
Problem: NPPV/CPAP (Adult)  Intervention: Monitor and Manage Anxiety Related to NPPV/CPAP   09/04/18 2222   Interventions   Trust Relationship/Rapport questions answered     Intervention: Prevent Aspiration During Therapy   09/04/18 2222   Positioning   Head of Bed (HOB) HOB at 30-45 degrees     Intervention: Optimize Tolerance of Noninvasive Ventilation   09/04/18 2222   Respiratory Interventions   Airway/Ventilation Management airway patency maintained;calming measures promoted;pulmonary hygiene promoted

## 2018-09-05 NOTE — PROGRESS NOTES
"SERVICE: [facility] HOSPITALIST    CONSULTANTS: nephrology, pulmonary, cardiology    CHIEF COMPLAINT: f/u pseudomonas RLL pneumonia, volume overload on chronic dCHF    SUBJECTIVE: The patient reports continued improvement and wanting to go home but agreeable to treatment plan. She reports being independent once at home and does not want any home health.  She reports mucus production constantly with green colored sputum.  She reports last night she had extreme muscle cramps when glucose was lowered quickly.  She denies f/c/soa/chest pain/n/v/d/abdominal pain or other new concerns.    OBJECTIVE:    /71 (BP Location: Right arm, Patient Position: Lying)   Pulse 73   Temp 97.8 °F (36.6 °C) (Oral)   Resp 16   Ht 167.6 cm (66\")   Wt 122 kg (268 lb 15.4 oz)   LMP  (LMP Unknown)   SpO2 95%   BMI 43.41 kg/m²     MEDS/LABS REVIEWED AND ORDERED    amLODIPine 2.5 mg Oral Q24H   aspirin 81 mg Oral Daily   atorvastatin 40 mg Oral Daily   b complex-C-folic acid 1 capsule Oral Daily   budesonide-formoterol 2 puff Inhalation BID - RT   bumetanide 1 mg Intravenous Q8H   cetirizine 10 mg Oral Nightly   enoxaparin 40 mg Subcutaneous Q24H   famotidine 20 mg Oral Daily   fluconazole 100 mg Oral Q24H   guaiFENesin 1,200 mg Oral Q12H   insulin aspart 0-24 Units Subcutaneous 4x Daily AC & at Bedtime   insulin detemir 50 Units Subcutaneous Nightly   ipratropium 0.5 mg Nebulization BID - RT   lactobacillus acidophilus 1 capsule Oral Daily   levoFLOXacin 500 mg Oral Q24H   levothyroxine 250 mcg Oral QAM   metoprolol tartrate 100 mg Oral Q12H   miconazole  Topical Q12H   QUEtiapine 100 mg Oral BID     Physical Exam   Constitutional: She is oriented to person, place, and time. She appears well-developed and well-nourished.   Obese   HENT:   Head: Normocephalic and atraumatic.   Eyes: Pupils are equal, round, and reactive to light. EOM are normal.   Cardiovascular: Normal rate and regular rhythm.    Pulmonary/Chest: " Effort normal.   Right middle lobe, right lower lobe crackles  Diminished all fields   Abdominal: Soft. Bowel sounds are normal. She exhibits no distension. There is no tenderness. There is no guarding.   Musculoskeletal:   2+ bilateral lower extremity nonpitting edema   Neurological: She is alert and oriented to person, place, and time.   Skin: Skin is warm and dry. No erythema.   Psychiatric: She has a normal mood and affect. Her behavior is normal.   Vitals reviewed.    LAB/DIAGNOSTICS:    Lab Results (last 24 hours)     Procedure Component Value Units Date/Time    POC Glucose Once [[id number]]  (Normal) Collected:  09/05/18 1225    Specimen:  Blood Updated:  09/05/18 1231     Glucose 96 mg/dL     Narrative:       Meter: [id number] : [id number] Jose L Onofre CNA    Respiratory Culture - Sputum, Cough [[id number]]  (Abnormal) Collected:  09/03/18 1546    Specimen:  Sputum from Cough Updated:  09/05/18 0914     Respiratory Culture >100,000 CFU/mL Pseudomonas species (A)      >100,000 CFU/mL Normal Respiratory Zeinab     Gram Stain Result Rare (1+) Epithelial cells per low power field      Many (4+) WBCs seen      Rare (1+) Mixed bacterial morphotypes seen on Gram Stain    POC Glucose Once [[id number]]  (Abnormal) Collected:  09/05/18 0751    Specimen:  Blood Updated:  09/05/18 0801     Glucose 187 (H) mg/dL     Narrative:       Meter: [id number] : [id number] Jose L MOBLEYA    POC Glucose Once [[id number]]  (Abnormal) Collected:  09/04/18 2307    Specimen:  Blood Updated:  09/04/18 2315     Glucose 196 (H) mg/dL     Narrative:       Meter: [id number] : [id number] Yoel Virk RN    POC Glucose Once [[id number]]  (Abnormal) Collected:  09/04/18 2028    Specimen:  Blood Updated:  09/04/18 2035     Glucose 411 (H) mg/dL     Narrative:       Confirmed by Repeat Meter: [id number] : [id number] Waylon Tillman CNA    POC Glucose Once [[id number]]  (Abnormal) Collected:  09/04/18 2027    Specimen:   Blood Updated:  09/04/18 2035     Glucose 406 (H) mg/dL     Narrative:       Critical repeat  Meter: [id number] : [id number] Waylon Tillman CNA    POC Glucose Once [[id number]]  (Abnormal) Collected:  09/04/18 1647    Specimen:  Blood Updated:  09/04/18 1653     Glucose 354 (H) mg/dL     Narrative:       Meter: [id number] : [id number] Rudy Zarate CNA-POOL        ECG 12 Lead   Final Result   RR Interval= 779 ms   MN Interval= 153 ms   QRSD Interval= 94 ms   QT Interval= 407 ms   QTc Interval= 461 ms   Heart Rate= 77 ms   P Axis= 43 deg   QRS Axis= 42 deg   T Wave Axis= 48 deg   I: 40 Axis= -10 deg   T: 40 Axis= 56 deg   ST Axis= 50 deg   Sinus rhythm   Low voltage, precordial leads   Minimal ST elevation, lateral leads   no significant change   Electronically Signed by:  Milton Dowling ([facility]) 01-Sep-2018 14:15:09   Date and Time of Study: 2018-09-01 10:26:26      ECG 12 Lead   Final Result   RR Interval= 667 ms   MN Interval= 139 ms   QRSD Interval= 96 ms   QT Interval= 389 ms   QTc Interval= 476 ms   Heart Rate= 90 ms   P Axis= 52 deg   QRS Axis= 44 deg   T Wave Axis= 27 deg   I: 40 Axis= 17 deg   T: 40 Axis= 50 deg   ST Axis= 39 deg   Sinus rhythm   No Change from Prior Tracing   Electronically Signed by:  Trinidad Barreto ([facility]) 31-Aug-2018 07:57:24   Date and Time of Study: 2018-08-30 21:51:37        Results for orders placed during the hospital encounter of 08/30/18   Adult Transthoracic Echo Limited W/ Cont if Necessary Per Protocol    Narrative · Left ventricular wall thickness is consistent with mild-to-moderate   concentric hypertrophy.  · Left ventricular systolic function is normal.  · Left ventricular diastolic dysfunction (grade I a) consistent with   impaired relaxation.  · Calculated EF = 65%.        Xr Chest Pa & Lateral    Result Date: 9/4/2018  Right lower lobe infiltrate or atelectasis. COPD.  This report was finalized on 9/4/2018 8:12 AM by Dr. Kike Cotto MD.       ASSESSMENT/PLAN:  Volume overload with grade 1 diastolic dysfunction: Cardiology following  Continues on Bumex 1 mg every 8 hours  Baseline creatinine approximately 2.1, yesterday 1.75  -6.8 liters thus far despite no change in daily weight  Continue to monitor daily weight, strict intake and output    RLL pseudomonas pneumonia: pulmonary following  start oral levaquin x 7 days  Review of past micro data does not show colonization  No oxygen requirement above usual 4 liters chronic  Stable, monitor     CKD stage IV: Nephrology following  Bumex held previously secondary to initial creatinine 1.47 which continued to trend up  Per nephrology, patient's baseline creatinine is 2.1, yesterday 1.75 with IV diuretic, repeat pending today  Nephrology suspected creatinine was 1.47 secondary to volume overload and has now normalized  Monitor     Hypertension: Blood pressure at goal on metoprolol 100 mg every 12 hours, amlodipine 2.5 mg daily, Bumex 1 mg IV every 8 hours, monitor     Obesity hypoventilation syndrome/ALTA:   COPD with chronic hypoxic respiratory failure: Pulmonary following and follows with Dr. Hoyt outpatient  BiPAP broken, awaiting new machine to be delivered to home  Tolerating hospital machine without acute issues  No acute issues on Symbicort and Atrovent that were substituted for home Advair and Incruse Ellipta  Tolerating home Xopenex     Chronic LE edema and venous stasis:   LE edema slightly improved since yesterday, continuing IV diuresis as above     DM-2 with peripheral neuropathy: HbA1C 7.4%   No acute issues on levemir 50 units nightly with high-dose sliding scale insulin  On Levemir here and SSI substituted for home Novolog and Tresiba. Bedsides variable. Resume home regimen at discharge. Monitor. .      Hypothyroidism: levothyroxine changed this admit to 250 µg daily due to TSH low/FT4 high  Repeat TFTs 6-8 weeks     Dylipidemia: No acute issues on atorvastatin 40 mg daily and aspirin 81 mg  daily with cardiac consistent carbohydrate diet      Chronic narcotic dependence: On home dose Norco. No acute issues here. Confirmed on Graham report.      GERD: On home pepcid. No acute issues here.      Morbid Obesity: Nutrition to see here.      Chronic normocytic anemia/Anemia of chronic disease: Hemoglobin stable at 10.8, no active blood loss noted     Plan: AD home when BiPAP received and diuresis is complete

## 2018-09-05 NOTE — PLAN OF CARE
Problem: Fluid Volume Excess (Adult)  Goal: Identify Related Risk Factors and Signs and Symptoms  Outcome: Outcome(s) achieved Date Met: 09/05/18 09/05/18 1735   Fluid Volume Excess (Adult)   Related Risk Factors (Fluid Volume Excess) organ failure   Signs and Symptoms (Fluid Volume Excess) edema;lab value changes;respiratory pattern changes;acute weight gain     Goal: Optimal Fluid Balance  Outcome: Ongoing (interventions implemented as appropriate)   09/05/18 1735   Fluid Volume Excess (Adult)   Optimal Fluid Balance making progress toward outcome

## 2018-09-05 NOTE — PROGRESS NOTES
Dr. MARKO Hoyt    [facility] MED SURG    9/5/2018    Patient ID:  Name:  Jared Govae  MRN:  [id number]  1955  63 y.o.  female            CC/Reason for visit: Obesity hypoventilation syndrome, COPD, chronic respiratory failure, ALTA, obesity and now with pseudomonas on respiratory culture.    HPI: Events noted chart reviewed.  She feels overall much better.  Continues to improve.  She reports coughing up some greenish sputum.    Vitals:  Vitals:    09/05/18 0013 09/05/18 0347 09/05/18 0644 09/05/18 1046   BP: 118/56 139/71 129/71    BP Location: Right arm Right arm Right arm    Patient Position: Lying Lying Lying    Pulse: 82 61 73    Resp: 16 16 16    Temp: 97.9 °F (36.6 °C) 97.9 °F (36.6 °C) 97.8 °F (36.6 °C)    TempSrc: Oral Oral Oral    SpO2: 97% 96% 95%    Weight:   122 kg (268 lb 8 oz) 122 kg (268 lb 15.4 oz)   Height:               Body mass index is 43.41 kg/m².    Intake/Output Summary (Last 24 hours) at 09/05/18 1518  Last data filed at 09/05/18 1430   Gross per 24 hour   Intake              240 ml   Output             3675 ml   Net            -3435 ml       Exam:  GEN:  No distress, appears stated age  EYES:   EOM-i, anicteric sclera bilat  ENT:    External ears/nose normal, OP clear  NECK:  Supple, midline trachea  LUNGS: Diminished breath sounds, crackles more prominent on the right base.  CV:  Normal S1S2, without murmur, 1+ edema  ABD:  Obese, nontender  Extremities 2+ edema    Scheduled meds:      amLODIPine 2.5 mg Oral Q24H   aspirin 81 mg Oral Daily   atorvastatin 40 mg Oral Daily   b complex-C-folic acid 1 capsule Oral Daily   budesonide-formoterol 2 puff Inhalation BID - RT   bumetanide 1 mg Intravenous Q8H   cetirizine 10 mg Oral Nightly   enoxaparin 40 mg Subcutaneous Q24H   famotidine 20 mg Oral Daily   fluconazole 100 mg Oral Q24H   guaiFENesin 1,200 mg Oral Q12H   insulin aspart 0-24 Units Subcutaneous 4x Daily AC & at Bedtime   insulin detemir 50 Units Subcutaneous Nightly    ipratropium 0.5 mg Nebulization BID - RT   lactobacillus acidophilus 1 capsule Oral Daily   levoFLOXacin 500 mg Oral Q24H   levothyroxine 250 mcg Oral QAM   metoprolol tartrate 100 mg Oral Q12H   miconazole  Topical Q12H   QUEtiapine 100 mg Oral BID     IV meds:                          Pharmacy to Dose LevoFLOXacin (LEVAQUIN)        Data Review:   I reviewed the patient's medications and new clinical results.  Lab Results   Component Value Date    CALCIUM 8.8 09/04/2018    PHOS 3.5 08/31/2018    MG 1.8 08/31/2018    MG 1.9 08/04/2016    MG 1.8 02/29/2016       Results from last 7 days  Lab Units 09/04/18  1040 09/02/18  0418 09/01/18  0409 08/31/18  0317 08/30/18  2222   SODIUM mmol/L 138 141 143 144 144   POTASSIUM mmol/L 4.8 4.7 4.4 4.8 5.1   CHLORIDE mmol/L 96* 96* 97* 99 98   CO2 mmol/L 33.3* 34.7* 37.3* 33.9* 35.4*   BUN mg/dL 51* 53* 42* 28* 28*   CREATININE mg/dL 1.75* 2.14* 2.05* 1.40* 1.47*   CALCIUM mg/dL 8.8 7.8* 8.5* 9.0 9.0   BILIRUBIN mg/dL  --   --   --   --  0.4   ALK PHOS U/L  --   --   --   --  119   ALT (SGPT) U/L  --   --   --   --  11   AST (SGOT) U/L  --   --   --   --  14   GLUCOSE mg/dL 198* 191* 51* 145* 87   WBC 10*3/mm3  --   --   --  9.76 10.46   HEMOGLOBIN g/dL  --   --   --  10.8* 10.8*   PLATELETS 10*3/mm3  --   --   --  185 183   INR   --   --   --   --  1.05   PROBNP pg/mL  --   --   --   --  965.3*       Results from last 7 days  Lab Units 09/03/18  1546   RESPCX  >100,000 CFU/mL Pseudomonas species*  >100,000 CFU/mL Normal Respiratory Zeinab   GRAM STAIN RESULT  Rare (1+) Epithelial cells per low power field  Many (4+) WBCs seen  Rare (1+) Mixed bacterial morphotypes seen on Gram Stain       Results from last 7 days  Lab Units 08/31/18  1104   ADENOVIRUS DETECTION BY PCR  Not Detected   CORONAVIRUS 229E  Not Detected   CORONAVIRUS HKU1  Not Detected   CORONAVIRUS NL63  Not Detected   CORONAVIRUS OC43  Not Detected   HUMAN METAPNEUMOVIRUS  Not Detected   HUMAN  RHINOVIRUS/ENTEROVIRUS  Not Detected   INFLUENZA B PCR  Not Detected   PARAINFLUENZA 1  Not Detected   PARAINFLUENZA VIRUS 2  Not Detected   PARAINFLUENZA VIRUS 3  Not Detected   PARAINFLUENZA VIRUS 4  Not Detected   BORDETELLA PERTUSSIS PCR  Not Detected   CHLAMYDOPHILA PNEUMONIAE PCR  Not Detected   MYCOPLAMA PNEUMO PCR  Not Detected   INFLUENZA A PCR  Not Detected   INFLUENZA A H3  Not Detected   INFLUENZA A H1  Not Detected   RSV, PCR  Not Detected         Results from last 7 days  Lab Units 08/31/18  0927 08/31/18  0317 08/30/18  2222   TROPONIN T ng/mL 0.011 0.023 0.029       Results from last 7 days  Lab Units 08/30/18  2225   PH, ARTERIAL pH units 7.401   PCO2, ARTERIAL mm Hg 57.5*   PO2 ART mm Hg 64.4*   O2 SATURATION ART % 93.5*   FLOW RATE lpm 5.0   MODALITY  Nasal Cannula         ASSESSMENT:   Active Problems:    Chronic diastolic congestive heart failure (CMS/[facility])    Pseudomonas in the sputum    COPD (chronic obstructive pulmonary disease) (CMS/[facility])    Uncontrolled type 2 diabetes mellitus with chronic kidney disease, with long-term current use of insulin (CMS/[facility])    Essential hypertension    Acute renal failure superimposed on stage 4 chronic kidney disease (CMS/[facility])    Morbid obesity (CMS/[facility])    Acute hypoxemic respiratory failure on chronic respiratory failure      PLAN:  No Pseudomonas noted on respiratory culture.  Reviewed chest x-ray with possible right lower lobe infiltrate and crackles on the same low.  Patient started on Levaquin.  She does not appear to be septic or toxic at this time.  I would continue Levaquin for a total of 7 days with follow-up chest x-ray down the road.  Continue Advair and Incruse as she does at home.  No active wheezing at this time, therefore does not need any steroids.  Continue DuoNeb 4 times a day when needed for symptom control.  Continue diuretics.  Creatinine is improving.  Continue CPAP at night.  She reports her home machine broke down when she came here.   She will likely need set up with a new CPAP prior to returning home.  She still appears to be clinically volume overloaded        Geovanni Gilmore MD  9/5/2018

## 2018-09-05 NOTE — NURSING NOTE
Patient adamantly requested for her BSC to be but right next to her bed. Ambulation to the restroom still encouraged by staff. Patient now up in chair.

## 2018-09-05 NOTE — PROGRESS NOTES
LOS: 5 days   Patient Care Team:  Jina Vaughan MD as PCP - General (Internal Medicine)  Jhonny Hoyt MD as Consulting Physician (Pulmonary Disease)  Gricelda Flores APRN as Nurse Practitioner (Endocrinology)  Konstantin Orr MD as Consulting Physician (Nephrology)  Vargas Ledezma MD as Consulting Physician (Pain Medicine)  Trinidad Barreto MD as Consulting Physician (Cardiology)    Chief Complaint: SOA       Interval History: Some improvement, wants to go home      Objective   Vital Signs  Temp:  [97.8 °F (36.6 °C)-97.9 °F (36.6 °C)] 97.8 °F (36.6 °C)  Heart Rate:  [] 73  Resp:  [16-20] 16  BP: (118-159)/(50-71) 129/71    Intake/Output Summary (Last 24 hours) at 09/05/18 0839  Last data filed at 09/05/18 0644   Gross per 24 hour   Intake              800 ml   Output             3600 ml   Net            -2800 ml           Physical Exam   Constitutional: She is oriented to person, place, and time.   obese   HENT:   Head: Normocephalic.   Nose: Nose normal.   Eyes: Pupils are equal, round, and reactive to light. Conjunctivae and EOM are normal.   Neck: Normal range of motion.   Cannot assess JVD due to body habitus   Cardiovascular: Normal rate and regular rhythm.  Exam reveals distant heart sounds.    Pulmonary/Chest: Effort normal. She has rales in the right middle field and the right lower field.   Abdominal: Soft.   Cannot feel organs or aorta   Musculoskeletal: Normal range of motion. She exhibits edema (2-3+ edema).   Neurological: She is alert and oriented to person, place, and time. No cranial nerve deficit.   Skin: Skin is warm and dry. No erythema.   Psychiatric: She has a normal mood and affect. Her behavior is normal.   Vitals reviewed.      Results Review:        Results from last 7 days  Lab Units 09/04/18  1040 09/02/18  0418 09/01/18  0409   SODIUM mmol/L 138 141 143   POTASSIUM mmol/L 4.8 4.7 4.4   CHLORIDE mmol/L 96* 96* 97*   CO2 mmol/L 33.3* 34.7* 37.3*   BUN mg/dL  51* 53* 42*   CREATININE mg/dL 1.75* 2.14* 2.05*   GLUCOSE mg/dL 198* 191* 51*   CALCIUM mg/dL 8.8 7.8* 8.5*       Results from last 7 days  Lab Units 08/31/18  0927 08/31/18 0317 08/30/18  2222   TROPONIN T ng/mL 0.011 0.023 0.029       Results from last 7 days  Lab Units 08/31/18  0317 08/30/18  2222   WBC 10*3/mm3 9.76 10.46   HEMOGLOBIN g/dL 10.8* 10.8*   HEMATOCRIT % 34.3* 34.2*   PLATELETS 10*3/mm3 185 183       Results from last 7 days  Lab Units 08/30/18  2222   INR  1.05   APTT seconds 29.3       Results from last 7 days  Lab Units 08/31/18  0317   CHOLESTEROL mg/dL 126       Results from last 7 days  Lab Units 08/31/18  0317   MAGNESIUM mg/dL 1.8       Results from last 7 days  Lab Units 08/31/18  0317   CHOLESTEROL mg/dL 126   TRIGLYCERIDES mg/dL 88   HDL CHOL mg/dL 64*   LDL CHOL mg/dL 44       I reviewed the patient's new clinical results.  I personally viewed and interpreted the patient's EKG/Telemetry data        Medication Review:     amLODIPine 2.5 mg Oral Q24H   aspirin 81 mg Oral Daily   atorvastatin 40 mg Oral Daily   b complex-C-folic acid 1 capsule Oral Daily   budesonide-formoterol 2 puff Inhalation BID - RT   bumetanide 1 mg Intravenous Q8H   cetirizine 10 mg Oral Nightly   enoxaparin 40 mg Subcutaneous Q24H   famotidine 20 mg Oral Daily   fluconazole 100 mg Oral Q24H   guaiFENesin 1,200 mg Oral Q12H   insulin aspart 0-24 Units Subcutaneous 4x Daily AC & at Bedtime   insulin detemir 50 Units Subcutaneous Nightly   ipratropium 0.5 mg Nebulization BID - RT   lactobacillus acidophilus 1 capsule Oral Daily   levothyroxine 250 mcg Oral QAM   metoprolol tartrate 100 mg Oral Q12H   miconazole  Topical Q12H   QUEtiapine 100 mg Oral BID            Assessment/Plan     1.  Chronic diastolic congestive heart failure (CMS/HCC)- She is improved but I still think she is volume overloaded. I would continue IV diuresis for one more day  2.  Morbid obesity and obesity hypoventilation syndrome  3.  COPD  4.   ALTA  5.  LADY on CKD- stage 4 renal failure with GFR 23-29 over the last 2 days, renal is following.  Creatinine is actually better today compared to yesterday  6.  Hypertensive heart disease with heart failure-blood pressure has improved somewhat, will follow  7.  DM2        Milton Dowling III, MD  09/05/18  8:39 AM

## 2018-09-06 ENCOUNTER — APPOINTMENT (OUTPATIENT)
Dept: CT IMAGING | Facility: HOSPITAL | Age: 63
End: 2018-09-06

## 2018-09-06 VITALS
HEART RATE: 74 BPM | OXYGEN SATURATION: 94 % | DIASTOLIC BLOOD PRESSURE: 55 MMHG | RESPIRATION RATE: 20 BRPM | TEMPERATURE: 97.9 F | SYSTOLIC BLOOD PRESSURE: 114 MMHG | HEIGHT: 66 IN | WEIGHT: 264.38 LBS | BODY MASS INDEX: 42.49 KG/M2

## 2018-09-06 LAB
ANION GAP SERPL CALCULATED.3IONS-SCNC: 9.9 MMOL/L
BACTERIA SPEC RESP CULT: ABNORMAL
BACTERIA SPEC RESP CULT: ABNORMAL
BASOPHILS # BLD AUTO: 0.04 10*3/MM3 (ref 0–0.2)
BASOPHILS NFR BLD AUTO: 0.4 % (ref 0–2)
BUN BLD-MCNC: 65 MG/DL (ref 8–23)
BUN/CREAT SERPL: 34.8 (ref 7–25)
CALCIUM SPEC-SCNC: 8.5 MG/DL (ref 8.8–10.5)
CHLORIDE SERPL-SCNC: 94 MMOL/L (ref 98–107)
CO2 SERPL-SCNC: 34.1 MMOL/L (ref 22–29)
CREAT BLD-MCNC: 1.87 MG/DL (ref 0.57–1)
DEPRECATED RDW RBC AUTO: 39.8 FL (ref 37–54)
EOSINOPHIL # BLD AUTO: 0.4 10*3/MM3 (ref 0.1–0.3)
EOSINOPHIL NFR BLD AUTO: 4.1 % (ref 0–4)
ERYTHROCYTE [DISTWIDTH] IN BLOOD BY AUTOMATED COUNT: 11.6 % (ref 11.5–14.5)
GFR SERPL CREATININE-BSD FRML MDRD: 27 ML/MIN/1.73
GLUCOSE BLD-MCNC: 169 MG/DL (ref 65–99)
GLUCOSE BLDC GLUCOMTR-MCNC: 178 MG/DL (ref 70–130)
GLUCOSE BLDC GLUCOMTR-MCNC: 290 MG/DL (ref 70–130)
GLUCOSE BLDC GLUCOMTR-MCNC: 340 MG/DL (ref 70–130)
GRAM STN SPEC: ABNORMAL
HCT VFR BLD AUTO: 31.6 % (ref 37–47)
HGB BLD-MCNC: 9.8 G/DL (ref 12–16)
IMM GRANULOCYTES # BLD: 0.03 10*3/MM3 (ref 0–0.03)
IMM GRANULOCYTES NFR BLD: 0.3 % (ref 0–0.5)
LYMPHOCYTES # BLD AUTO: 1.74 10*3/MM3 (ref 0.6–4.8)
LYMPHOCYTES NFR BLD AUTO: 17.8 % (ref 20–45)
MCH RBC QN AUTO: 29.2 PG (ref 27–31)
MCHC RBC AUTO-ENTMCNC: 31 G/DL (ref 31–37)
MCV RBC AUTO: 94 FL (ref 81–99)
MONOCYTES # BLD AUTO: 0.97 10*3/MM3 (ref 0–1)
MONOCYTES NFR BLD AUTO: 9.9 % (ref 3–8)
NEUTROPHILS # BLD AUTO: 6.57 10*3/MM3 (ref 1.5–8.3)
NEUTROPHILS NFR BLD AUTO: 67.5 % (ref 45–70)
NRBC BLD MANUAL-RTO: 0 /100 WBC (ref 0–0)
PLATELET # BLD AUTO: 185 10*3/MM3 (ref 140–500)
PMV BLD AUTO: 10.9 FL (ref 7.4–10.4)
POTASSIUM BLD-SCNC: 5 MMOL/L (ref 3.5–5.2)
RBC # BLD AUTO: 3.36 10*6/MM3 (ref 4.2–5.4)
SODIUM BLD-SCNC: 138 MMOL/L (ref 136–145)
WBC NRBC COR # BLD: 9.75 10*3/MM3 (ref 4.8–10.8)

## 2018-09-06 PROCEDURE — 25010000002 ENOXAPARIN PER 10 MG: Performed by: HOSPITALIST

## 2018-09-06 PROCEDURE — 99232 SBSQ HOSP IP/OBS MODERATE 35: CPT | Performed by: INTERNAL MEDICINE

## 2018-09-06 PROCEDURE — 82962 GLUCOSE BLOOD TEST: CPT

## 2018-09-06 PROCEDURE — 99239 HOSP IP/OBS DSCHRG MGMT >30: CPT | Performed by: NURSE PRACTITIONER

## 2018-09-06 PROCEDURE — 85025 COMPLETE CBC W/AUTO DIFF WBC: CPT | Performed by: NURSE PRACTITIONER

## 2018-09-06 PROCEDURE — 80048 BASIC METABOLIC PNL TOTAL CA: CPT | Performed by: NURSE PRACTITIONER

## 2018-09-06 PROCEDURE — 71250 CT THORAX DX C-: CPT

## 2018-09-06 PROCEDURE — 94799 UNLISTED PULMONARY SVC/PX: CPT

## 2018-09-06 PROCEDURE — 63710000001 INSULIN ASPART PER 5 UNITS: Performed by: HOSPITALIST

## 2018-09-06 RX ORDER — BUMETANIDE 1 MG/1
1 TABLET ORAL 3 TIMES DAILY
Qty: 90 TABLET | Refills: 1 | Status: SHIPPED | OUTPATIENT
Start: 2018-09-06 | End: 2018-10-25 | Stop reason: SDUPTHER

## 2018-09-06 RX ORDER — METOPROLOL TARTRATE 100 MG/1
100 TABLET ORAL EVERY 12 HOURS SCHEDULED
Qty: 30 TABLET | Refills: 1 | Status: SHIPPED | OUTPATIENT
Start: 2018-09-06 | End: 2018-10-16 | Stop reason: SDUPTHER

## 2018-09-06 RX ORDER — AMLODIPINE BESYLATE 2.5 MG/1
2.5 TABLET ORAL
Qty: 30 TABLET | Refills: 1 | Status: SHIPPED | OUTPATIENT
Start: 2018-09-07 | End: 2018-12-04 | Stop reason: SDUPTHER

## 2018-09-06 RX ORDER — LEVOFLOXACIN 500 MG/1
500 TABLET, FILM COATED ORAL EVERY 24 HOURS
Qty: 6 TABLET | Refills: 0 | Status: SHIPPED | OUTPATIENT
Start: 2018-09-05 | End: 2018-09-11

## 2018-09-06 RX ORDER — BUMETANIDE 1 MG/1
1 TABLET ORAL 3 TIMES DAILY
Status: DISCONTINUED | OUTPATIENT
Start: 2018-09-06 | End: 2018-09-06 | Stop reason: HOSPADM

## 2018-09-06 RX ORDER — LEVOTHYROXINE SODIUM 0.12 MG/1
250 TABLET ORAL EVERY MORNING
Qty: 30 TABLET | Refills: 1 | Status: SHIPPED | OUTPATIENT
Start: 2018-09-07 | End: 2018-10-25 | Stop reason: SDUPTHER

## 2018-09-06 RX ORDER — FLUCONAZOLE 100 MG/1
100 TABLET ORAL EVERY 24 HOURS
Qty: 1 TABLET | Refills: 0 | Status: SHIPPED | OUTPATIENT
Start: 2018-09-06 | End: 2018-09-07

## 2018-09-06 RX ADMIN — BUMETANIDE 1 MG: 0.25 INJECTION INTRAMUSCULAR; INTRAVENOUS at 10:57

## 2018-09-06 RX ADMIN — Medication 1 MG: at 08:11

## 2018-09-06 RX ADMIN — GUAIFENESIN 1200 MG: 600 TABLET, EXTENDED RELEASE ORAL at 08:12

## 2018-09-06 RX ADMIN — IPRATROPIUM BROMIDE 0.5 MG: 0.5 SOLUTION RESPIRATORY (INHALATION) at 08:30

## 2018-09-06 RX ADMIN — MICONAZOLE NITRATE: 20 CREAM TOPICAL at 08:11

## 2018-09-06 RX ADMIN — ASPIRIN 81 MG: 81 TABLET, COATED ORAL at 08:12

## 2018-09-06 RX ADMIN — Medication 1 CAPSULE: at 08:12

## 2018-09-06 RX ADMIN — LEVALBUTEROL INHALATION SOLUTION 0.63 MG: 0.63 SOLUTION RESPIRATORY (INHALATION) at 08:30

## 2018-09-06 RX ADMIN — INSULIN ASPART 4 UNITS: 100 INJECTION, SOLUTION INTRAVENOUS; SUBCUTANEOUS at 08:12

## 2018-09-06 RX ADMIN — AMLODIPINE BESYLATE 2.5 MG: 2.5 TABLET ORAL at 08:12

## 2018-09-06 RX ADMIN — INSULIN ASPART 12 UNITS: 100 INJECTION, SOLUTION INTRAVENOUS; SUBCUTANEOUS at 10:57

## 2018-09-06 RX ADMIN — BUDESONIDE AND FORMOTEROL FUMARATE DIHYDRATE 2 PUFF: 160; 4.5 AEROSOL RESPIRATORY (INHALATION) at 08:40

## 2018-09-06 RX ADMIN — LEVOTHYROXINE SODIUM 250 MCG: 150 TABLET ORAL at 06:12

## 2018-09-06 RX ADMIN — QUETIAPINE FUMARATE 100 MG: 100 TABLET ORAL at 08:12

## 2018-09-06 RX ADMIN — BUMETANIDE 1 MG: 0.25 INJECTION INTRAMUSCULAR; INTRAVENOUS at 04:38

## 2018-09-06 RX ADMIN — ENOXAPARIN SODIUM 40 MG: 40 INJECTION SUBCUTANEOUS at 08:11

## 2018-09-06 RX ADMIN — FAMOTIDINE 20 MG: 20 TABLET, FILM COATED ORAL at 08:12

## 2018-09-06 RX ADMIN — HYDROCODONE BITARTRATE AND ACETAMINOPHEN 1 TABLET: 10; 325 TABLET ORAL at 01:45

## 2018-09-06 RX ADMIN — ATORVASTATIN CALCIUM 40 MG: 40 TABLET, FILM COATED ORAL at 08:12

## 2018-09-06 RX ADMIN — METOPROLOL TARTRATE 100 MG: 50 TABLET, FILM COATED ORAL at 08:12

## 2018-09-06 NOTE — PROGRESS NOTES
Labs reviewed, renal function stable/ at baseline  OK for D/C  Change IV bumex to PO: 1mg tid  Follow-up with Dr. Zendejas next month with repeat BMP one week prior      Konstantin Orr MD  [facility]  [phone]

## 2018-09-06 NOTE — NURSING NOTE
Case Management Discharge Note    Final Note: Patient discharged home with home health services.    Destination     No service has been selected for the patient.      Durable Medical Equipment - Selection Complete     Service Request Status Selected Specialties Address Phone Number Fax Number    [facility] Selected DME Services [address] [place] [facility] [address] [phone]      Dialysis/Infusion     No service has been selected for the patient.      Home Medical Care - Selection Complete     Service Request Status Selected Specialties Address Phone Number Fax Number    [facility] [address] [place] [address] [phone]      Social Care     No service has been selected for the patient.             Final Discharge Disposition Code: 06 - home with home health care

## 2018-09-06 NOTE — PROGRESS NOTES
LOS: 6 days   Patient Care Team:  Jina Vaughan MD as PCP - General (Internal Medicine)  Jhonny Hoyt MD as Consulting Physician (Pulmonary Disease)  Gricelda Flores APRN as Nurse Practitioner (Endocrinology)  Konstantin Orr MD as Consulting Physician (Nephrology)  Vargas Ledezma MD as Consulting Physician (Pain Medicine)  Trinidad Barreto MD as Consulting Physician (Cardiology)    Chief Complaint: SOA       Interval History: feels better, wants to go home      Objective   Vital Signs  Temp:  [97.4 °F (36.3 °C)-98.3 °F (36.8 °C)] 97.9 °F (36.6 °C)  Heart Rate:  [74-90] 74  Resp:  [16-24] 20  BP: (114-157)/(55-77) 114/55    Intake/Output Summary (Last 24 hours) at 09/06/18 0706  Last data filed at 09/06/18 0645   Gross per 24 hour   Intake             1560 ml   Output             3475 ml   Net            -1915 ml           Physical Exam   Constitutional: She is oriented to person, place, and time.   obese   HENT:   Head: Normocephalic.   Nose: Nose normal.   Eyes: Pupils are equal, round, and reactive to light. Conjunctivae and EOM are normal.   Neck: Normal range of motion.   Cannot assess JVD due to body habitus   Cardiovascular: Normal rate and regular rhythm.  Exam reveals distant heart sounds.    Pulmonary/Chest: Effort normal. She has no rales.   Abdominal: Soft.   Cannot feel organs or aorta   Musculoskeletal: Normal range of motion. She exhibits edema (2-3+ edema).   Neurological: She is alert and oriented to person, place, and time. No cranial nerve deficit.   Skin: Skin is warm and dry. No erythema.   Psychiatric: She has a normal mood and affect. Her behavior is normal.   Vitals reviewed.      Results Review:        Results from last 7 days  Lab Units 09/06/18  0351 09/05/18  1647 09/04/18  1040   SODIUM mmol/L 138 137 138   POTASSIUM mmol/L 5.0 5.1 4.8   CHLORIDE mmol/L 94* 93* 96*   CO2 mmol/L 34.1* 32.7* 33.3*   BUN mg/dL 65* 58* 51*   CREATININE mg/dL 1.87* 1.97* 1.75*    GLUCOSE mg/dL 169* 183* 198*   CALCIUM mg/dL 8.5* 9.0 8.8       Results from last 7 days  Lab Units 08/31/18  0927 08/31/18  0317 08/30/18  2222   TROPONIN T ng/mL 0.011 0.023 0.029       Results from last 7 days  Lab Units 09/06/18  0351 09/05/18  1647 08/31/18  0317   WBC 10*3/mm3 9.75 8.95 9.76   HEMOGLOBIN g/dL 9.8* 10.6* 10.8*   HEMATOCRIT % 31.6* 33.7* 34.3*   PLATELETS 10*3/mm3 185 172 185       Results from last 7 days  Lab Units 08/30/18  2222   INR  1.05   APTT seconds 29.3       Results from last 7 days  Lab Units 08/31/18  0317   CHOLESTEROL mg/dL 126       Results from last 7 days  Lab Units 08/31/18  0317   MAGNESIUM mg/dL 1.8       Results from last 7 days  Lab Units 08/31/18  0317   CHOLESTEROL mg/dL 126   TRIGLYCERIDES mg/dL 88   HDL CHOL mg/dL 64*   LDL CHOL mg/dL 44       I reviewed the patient's new clinical results.  I personally viewed and interpreted the patient's EKG/Telemetry data        Medication Review:     amLODIPine 2.5 mg Oral Q24H   aspirin 81 mg Oral Daily   atorvastatin 40 mg Oral Daily   b complex-C-folic acid 1 capsule Oral Daily   budesonide-formoterol 2 puff Inhalation BID - RT   bumetanide 1 mg Intravenous Q8H   cetirizine 10 mg Oral Nightly   enoxaparin 40 mg Subcutaneous Q24H   famotidine 20 mg Oral Daily   fluconazole 100 mg Oral Q24H   guaiFENesin 1,200 mg Oral Q12H   insulin aspart 0-24 Units Subcutaneous 4x Daily AC & at Bedtime   insulin detemir 50 Units Subcutaneous Nightly   ipratropium 0.5 mg Nebulization BID - RT   lactobacillus acidophilus 1 capsule Oral Daily   levoFLOXacin 500 mg Oral Q24H   levothyroxine 250 mcg Oral QAM   metoprolol tartrate 100 mg Oral Q12H   miconazole  Topical Q12H   QUEtiapine 100 mg Oral BID         Pharmacy to Dose LevoFLOXacin (LEVAQUIN)        Assessment/Plan     1.  Chronic diastolic congestive heart failure (CMS/HCC)- has diuresed roughly another 2 L, Cr remains stable. OK to change to oral diuretic from my standpoint- defer to  renal  2.  Morbid obesity and obesity hypoventilation syndrome  3.  COPD  4.  ALTA  5.  LADY on CKD- stage 4 renal failure with GFR 23-29 over the last 2 days, renal is following.   6.  Hypertensive heart disease with heart failure-blood pressure has improved somewhat, will follow  7.  DM2        Milton Dowling III, MD  09/06/18  7:06 AM

## 2018-09-06 NOTE — NURSING NOTE
Patient blood sugar 420, Doctor Jonathan notified no new order,  give order insulin cont to monitor.

## 2018-09-06 NOTE — NURSING NOTE
Patient education thoroughly reviewed.  Patient and sister able to teach back new medications, how to obtain and take as well as potential side effects.  Patient able to teach back follow up recommendations and signs and symptoms to seek further medical care.  Patient able to teach back home health and durable medical equipment services.  Patient education to diabetes management and COPD, etc in AVS thoroughly reviewed.     Patient discharged in stable condition per wheel chair to private transportation provided by sister.    Geovanny Mendiola RN

## 2018-09-06 NOTE — NURSING NOTE
Patient confused pull her I/v out, patient cleaned and new I/v inserted and patient re orientated  Cont to monitor.

## 2018-09-06 NOTE — NURSING NOTE
Continued Stay Note  TEODORA Dobbs     Patient Name: Jared Govea  MRN: [id number]  Today's Date: 9/6/2018    Admit Date: 8/30/2018          Discharge Plan     Row Name 09/06/18 1405       Plan    Plan Comments Recieved home health order;  after review of home health list with patient, per patient's request referral made to Mally Reardon @[facility].  Notified Silvia @[facility] in [place] of patient's planned discharge home today.  Plan for R.T. from [facility] to be at patient's home around 4pm;  gave them sister's cell phone number.  Updated patient's nurse.               Discharge Codes    No documentation.       Expected Discharge Date and Time     Expected Discharge Date Expected Discharge Time    Sep 6, 2018             Imani Cabrera RN

## 2018-09-06 NOTE — DISCHARGE SUMMARY
Jared RUEDA Jeferson  1955  [id number]    Hospitalists Discharge Summary    Date of Admission: 8/30/2018  Date of Discharge:  9/6/2018    Primary Discharge Diagnoses:   Volume overload with grade 1 diastolic dysfunction   RLL pseudomonas bronchitis  Secondary Discharge Diagnoses:   CKD stage IV   Hypertension   Obesity hypoventilation syndrome/ALTA   COPD with chronic hypoxic respiratory failure   Chronic LE edema and venous stasis   DM-2 with hyperglycemia, obesity, peripheral neuropathy  Hypothyroidism   Dylipidemia   Chronic narcotic dependence   GERD   Morbid Obesity   Chronic normocytic anemia/anemia of chronic disease  PCP  Patient Care Team:  Jina Vaughan MD as PCP - General (Internal Medicine)  Jhonny Hoyt MD as Consulting Physician (Pulmonary Disease)  Gricelda Flores APRN as Nurse Practitioner (Endocrinology)  Konstantin Orr MD as Consulting Physician (Nephrology)  Vargas Ledezma MD as Consulting Physician (Pain Medicine)  Trinidad Barreto MD as Consulting Physician (Cardiology)    Consults:   Consults     Date and Time Order Name Status Description    9/2/2018 1634 Inpatient Nephrology Consult Completed     8/31/2018 0714 Inpatient Pulmonology Consult Completed     8/31/2018 0229 Inpatient Cardiology Consult Completed         Operations and Procedures Performed:     Ct Chest Without Contrast    Result Date: 9/6/2018  Narrative: CT CHEST, NONCONTRAST, 9/6/2018  HISTORY: 63-year-old female recently admitted to the hospital with one week history shortness of breath. Past history right lung surgery (no details available).  TECHNIQUE:  CT examination of the chest without IV contrast. Radiation dose reduction techniques included automated exposure control or exposure modulation based on body size. Radiation audit for CT and nuclear cardiology exams in the last 12 months: 0.  COMPARISON: *  Chest x-ray, 9/4/2018 and 8/30/2018. *  CT chest, 7/27/2017 and 9/21/2015. *  High-resolution CT chest,  9/23/2014.  FINDINGS:  Chronic postthoracotomy changes in the right hemithorax including old right upper rib resections and mild chronic elevation right hemidiaphragm. There are also multiple old left-sided rib fracture deformities.  There is chronic coarse fibrotic scarring in the peripheral right lower lobe with associated retractile bronchiectasis, unchanged since prior exams.  Mild to moderate diffuse centrilobular pulmonary emphysema. Bronchial wall thickening throughout the central portions of both lungs, greater on the right, may be associated with acute or chronic bronchitis. There is also evidence of inflammatory bronchiolitis within the periphery of the posterior segment right upper lobe.  No airspace consolidation or pleural effusion. No suspicious mass or adenopathy within the mediastinum or pulmonary sanjuana. Small scattered mediastinal lymph nodes are stable.  Heart size is normal. Mild central pulmonary artery enlargement. Normal caliber thoracic aorta. Limited upper abdominal images show cholelithiasis.      Impression: 1. Pulmonary emphysema. Central bronchial wall thickening may be associated with acute or chronic bronchitis. Probable bronchiolitis in the posterior segment right upper lobe. 2. No airspace consolidation or pleural effusion. 3. Chronic coarse fibrotic scarring in the peripheral right lung base with some associated bronchiectasis, unchanged since older studies. 4. Old postthoracotomy change in the right hemithorax. Bilateral chronic postoperative and/or post traumatic rib deformities.  This report was finalized on 9/6/2018 11:02 AM by Dr. David Lester MD.      Xr Chest 1 View    Result Date: 8/31/2018  Narrative: CHEST X-RAY, 8/30/2018     HISTORY: 63-year-old female in the ED complaining of one week history shortness of air. Past history includes COPD and congestive heart failure. Previous right lung surgery.  TECHNIQUE: AP upright chest x-ray.  COMPARISON: *  Chest x-ray,  5/22/2018 and 5/3/2017. *  CT chest, 7/27/2017 and 9/21/2015.  FINDINGS: The examination shows COPD with chronic fibrosis and volume loss in the right lower lung and chronic elevation of the right hemidiaphragm. Previous right lung surgery with postoperative right upper rib deformities. There are also multiple old healed left fractures.  The heart is mildly enlarged, and the central pulmonary arteries are prominent. Pulmonary venous vascularity is within normal limits. No visible airspace consolidation or pleural effusion.      Impression: 1. Stable mild cardiomegaly. 2. Postop changes right lung surgery with rib deformities and chronic elevation right hemidiaphragm. 3. COPD. 4. Chronic pulmonary and pleural scarring in the right lower chest.  This report was finalized on 8/31/2018 7:24 AM by Dr. David Lester MD.      Xr Chest Pa & Lateral    Result Date: 9/4/2018  Narrative: Chest x-ray  INDICATION: Increasing shortness of air for 2 days. COPD.  COMPARISON: 8/30/2018  FINDINGS: PA and lateral views of the chest. Heart size stable. There is emphysema. Left lung is clear. On the right side, there is infiltrate or atelectasis in the lower lobe. Right-sided chronic rib deformities are noted. No pneumothorax.      Impression: Right lower lobe infiltrate or atelectasis. COPD.  This report was finalized on 9/4/2018 8:12 AM by Dr. Kike Cotto MD.      Allergies:  is allergic to erythromycin; erythromycin ethylsuccinate; ativan [lorazepam]; and tape.    [facility]  Hydrocodone 8/2018 per report of 8/30/18    Discharge Medications:     Discharge Medications      New Medications      Instructions Start Date   amLODIPine 2.5 MG tablet  Commonly known as:  NORVASC   2.5 mg, Oral, Every 24 Hours Scheduled      fluconazole 100 MG tablet  Commonly known as:  DIFLUCAN   100 mg, Oral, Every 24 Hours      levoFLOXacin 500 MG tablet  Commonly known as:  LEVAQUIN   500 mg, Oral, Every 24 Hours         Changes to Medications       Instructions Start Date   bumetanide 1 MG tablet  Commonly known as:  BUMEX  What changed:  · medication strength  · how much to take  · when to take this   1 mg, Oral, 3 Times Daily      insulin aspart 100 UNIT/ML solution pen-injector sc pen  Commonly known as:  NOVOLOG FLEXPEN  What changed:  · when to take this  · additional instructions   SSI      Insulin Degludec 200 UNIT/ML solution pen-injector  Commonly known as:  TRESIBA FLEXTOUCH  What changed:  when to take this   70 Units, Subcutaneous, Daily      levothyroxine 125 MCG tablet  Commonly known as:  SYNTHROID, LEVOTHROID  What changed:  · medication strength  · how much to take  · when to take this   250 mcg, Oral, Every Morning      metoprolol tartrate 100 MG tablet  Commonly known as:  LOPRESSOR  What changed:  · medication strength  · how much to take  · additional instructions   100 mg, Oral, Every 12 Hours Scheduled         Continue These Medications      Instructions Start Date   acetaminophen 325 MG tablet  Commonly known as:  TYLENOL   650 mg, Oral, Every 4 Hours PRN      ALIGN 4 MG capsule   4 mg, Oral, Daily      aspirin 81 MG EC tablet   81 mg, Oral, Daily      atorvastatin 40 MG tablet  Commonly known as:  LIPITOR   40 mg, Oral, Daily, for cholesterol.      cetirizine 10 MG tablet  Commonly known as:  zyrTEC   10 mg, Oral, Nightly      clotrimazole-betamethasone 1-0.05 % cream  Commonly known as:  LOTRISONE   Topical, 2 Times Daily      dimenhyDRINATE 50 MG tablet  Commonly known as:  DRAMAMINE   50 mg, Oral, Daily PRN      famotidine 20 MG tablet  Commonly known as:  PEPCID   20 mg, Oral, Daily      fluticasone-salmeterol 250-50 MCG/DOSE DISKUS  Commonly known as:  ADVAIR DISKUS   1 puff, Inhalation, 2 Times Daily - RT, For COPD and rinse mouth after use      guaiFENesin 600 MG 12 hr tablet  Commonly known as:  MUCINEX   1,200 mg, Oral, 2 Times Daily      HYDROcodone-acetaminophen  MG per tablet  Commonly known as:  NORCO   1 tablet,  "Oral, Every 8 Hours PRN      INCRUSE ELLIPTA IN   Inhalation, Daily      levalbuterol 0.63 MG/3ML nebulizer solution  Commonly known as:  XOPENEX   1 ampule, Nebulization, Every 4 Hours PRN      PROAIR  (90 Base) MCG/ACT inhaler  Generic drug:  albuterol   INHALE TWO PUFFS BY MOUTH EVERY 4 HOURS AS NEEDED FOR WHEEZING      QUEtiapine 100 MG tablet  Commonly known as:  SEROquel   TAKE ONE TABLET BY MOUTH TWICE A DAY      sennosides-docusate sodium 8.6-50 MG tablet  Commonly known as:  SENOKOT-S   2 tablets, Oral, Nightly PRN      VITAMIN B COMPLEX PO   1 tablet, Oral, Daily      vitamin D 95209 units capsule capsule  Commonly known as:  ERGOCALCIFEROL   50,000 Units, Oral, Every 7 Days, On tuesday           History of Present Illness: Taken from HPI per Dr. Barreto:  \"Ms Alexander Govea is a 63-year-old female with a history of uncontrolled diabetes mellitus type 2, GERD, hypertension, hyperlipidemia, hypothyroidism, obstructive sleep apnea, morbid obesity, COPD on home oxygen.  She had nondiagnostic treadmill stress study in 2014.  She had negative bilateral lower extremity venous duplex studies on 5/3/17.  She had a renal ultrasound on 5/5/17 showing borderline renal cortical atrophy.     She is admitted 5/22/18 with increasing dyspnea and lower extremity edema.  Her troponin was 0.165 in the setting of acute renal insufficiency.  ECG was normal.  Her echo done 5/22/18 showed normal ejection fraction at 66-70%.     She presented to the emergency department on 8/30/18 with increasing function edema, dyspnea for 2 weeks.  She was wearing a BiPAP at home almost constantly due to short windedness.  Her nephrologist and increased her Bumex dosing at home but this did not improve her situation.  When she arrived, her troponin was 0.029 and is now 0.023.  Her BNP was 965, hemoglobin 10.8, creatinine 1.47.  Chest x-ray showed interstitial pattern consistent with pulmonary edema.  She was admitted for further dilation " "got one dose of IV Lasix.     At home she normally takes Bumex 2mg twice daily, aspirin and oral Lopressor.  She diuresed 1250 this morning.     she says that her breathing is better this morning.  She denies nausea, chest pain, tachycardia or dizziness.  She says she is urinating quite a bit.  Her lower extremity edema is also better.\"    Hospital Course  Volume overload with grade 1 diastolic dysfunction: Cardiology following  Received IV Bumex 1 mg every 8 hours, changed today to oral bumex 1 mg three times a day per Dr. Orr  Baseline creatinine approximately 2.1, now 1.87  -7.5 liters this admit  Continue daily weight at home  Nephrology to manage diuretics  F/U Jina Vaughan MD 1 week  F/U Dr. Barreto 2 weeks  F/U nephrology 2 weeks     RLL pseudomonas bronchitis: pulmonary followed  CT chest completed  Start oral levaquin x 7 days total  Review of past micro data does not show colonization  No oxygen requirement above usual 4 liters chronic  Stable, monitor     CKD stage IV: Nephrology followed  Bumex held previously secondary to initial creatinine 1.47 which continued to trend up  Per nephrology, patient's baseline creatinine is 2.1, yesterday 1.87 with IV diuretic, repeat pending today  Nephrology suspected creatinine was 1.47 secondary to volume overload and has now normalized  F/U nephrology 2 weeks     Hypertension: Blood pressure at goal on metoprolol 100 mg every 12 hours, amlodipine 2.5 mg daily, diuresed well on Bumex 1 mg IV every 8 hours, now changed to oral 1 mg every 8 hours per Dr. Orr.  Medications adjusted this admit  F/U as above     Obesity hypoventilation syndrome/ALTA:   COPD with chronic hypoxic respiratory failure: Pulmonary followed and follows with Dr. Hoyt outpatient  CT chest done, f/u Dr. Hoyt as scheduled  BiPAP broken, new machine delivering today to home  Tolerating hospital machine without acute issues  No acute issues on Symbicort and Atrovent that were substituted " for home Advair and Incruse Drea  Remained on home Xopenex     Chronic LE edema and venous stasis:   Improved with diuresis     DM-2 with hyperglycemia, obesity, peripheral neuropathy: HbA1C 7.4%   Am glucose at goal, pms elevated, home tresiba 70 units, only receiving 50 units of levemir here, resume home regimen then f/u Jina Vaughan MD with glucose log     Hypothyroidism: levothyroxine changed this admit to 250 µg daily due to TSH low/FT4 high  Repeat TFTs 6-8 weeks     Dylipidemia: No acute issues on atorvastatin 40 mg daily and aspirin 81 mg daily with cardiac consistent carbohydrate diet      Chronic narcotic dependence:   On home dose Norco. No acute issues here. Confirmed on [facility] report.      GERD: On home pepcid. No acute issues here.      Morbid Obesity: Nutrition to see here.      Chronic normocytic anemia/Anemia of chronic disease:   Hemoglobin stable at 10.8, no active blood loss noted    Last Lab Results:   Lab Results (most recent)     Procedure Component Value Units Date/Time    POC Glucose Once [[id number]]  (Abnormal) Collected:  09/06/18 0801    Specimen:  Blood Updated:  09/06/18 0824     Glucose 178 (H) mg/dL     Narrative:       Meter: [id number] : [id number] Marlena Small RN Float-Pool Validator    Basic Metabolic Panel [[id number]]  (Abnormal) Collected:  09/06/18 0351    Specimen:  Blood Updated:  09/06/18 0424     Glucose 169 (H) mg/dL      BUN 65 (H) mg/dL      Creatinine 1.87 (H) mg/dL      Sodium 138 mmol/L      Potassium 5.0 mmol/L      Chloride 94 (L) mmol/L      CO2 34.1 (H) mmol/L      Calcium 8.5 (L) mg/dL      eGFR Non African Amer 27 (L) mL/min/1.73      BUN/Creatinine Ratio 34.8 (H)     Anion Gap 9.9 mmol/L     Narrative:       GFR Normal >60  Chronic Kidney Disease <60  Kidney Failure <15    CBC & Differential [[id number]] Collected:  09/06/18 0351    Specimen:  Blood Updated:  09/06/18 0408    Narrative:       The following orders were created for panel order CBC &  Differential.  Procedure                               Abnormality         Status                     ---------                               -----------         ------                     CBC Auto Differential[[id number]]        Abnormal            Final result                 Please view results for these tests on the individual orders.    CBC Auto Differential [[id number]]  (Abnormal) Collected:  09/06/18 0351    Specimen:  Blood Updated:  09/06/18 0408     WBC 9.75 10*3/mm3      RBC 3.36 (L) 10*6/mm3      Hemoglobin 9.8 (L) g/dL      Hematocrit 31.6 (L) %      MCV 94.0 fL      MCH 29.2 pg      MCHC 31.0 g/dL      RDW 11.6 %      RDW-SD 39.8 fl      MPV 10.9 (H) fL      Platelets 185 10*3/mm3      Neutrophil % 67.5 %      Lymphocyte % 17.8 (L) %      Monocyte % 9.9 (H) %      Eosinophil % 4.1 (H) %      Basophil % 0.4 %      Immature Grans % 0.3 %      Neutrophils, Absolute 6.57 10*3/mm3      Lymphocytes, Absolute 1.74 10*3/mm3      Monocytes, Absolute 0.97 10*3/mm3      Eosinophils, Absolute 0.40 (H) 10*3/mm3      Basophils, Absolute 0.04 10*3/mm3      Immature Grans, Absolute 0.03 10*3/mm3      nRBC 0.0 /100 WBC     POC Glucose Once [[id number]]  (Abnormal) Collected:  09/06/18 0002    Specimen:  Blood Updated:  09/06/18 0008     Glucose 340 (H) mg/dL     Narrative:       Meter: [id number] : [id number] Jassi Waters NURSING ASSISTANT    POC Glucose Once [[id number]]  (Abnormal) Collected:  09/05/18 2014    Specimen:  Blood Updated:  09/05/18 2026     Glucose 420 (H) mg/dL     Narrative:       [id number] Meter: [id number] : [id number] Jassi aWters NURSING ASSISTA    Procalcitonin [[id number]]  (Abnormal) Collected:  09/05/18 1647    Specimen:  Blood Updated:  09/05/18 1819     Procalcitonin 0.06 (L) ng/mL     Narrative:       As a Marker for Sepsis (Non-Neonates):   1. <0.5 ng/mL represents a low risk of severe sepsis and/or septic shock.  2. >2 ng/mL represents a high risk of severe sepsis and/or  septic shock.    As a Marker for Lower Respiratory Tract Infections that require antibiotic therapy:    PCT on Admission     Antibiotic Therapy       6-12 Hrs later  > 0.5                Strongly Recommended             >0.25 - <0.5         Recommended  0.1 - 0.25           Discouraged              Remeasure/reassess PCT  <0.1                 Strongly Discouraged     Remeasure/reassess PCT                     PCT values of < 0.5 ng/mL do not exclude an infection, because localized infections (without systemic signs) may be associated with such low concentrations, or a systemic infection in its initial stages (< 6 hours). Furthermore, increased PCT can occur without infection. PCT concentrations between 0.5 and 2.0 ng/mL should be interpreted taking into account the patient's history. It is recommended to retest PCT within 6-24 hours if any concentrations < 2 ng/mL are obtained.    POC Glucose Once [[id number]]  (Abnormal) Collected:  09/05/18 1743    Specimen:  Blood Updated:  09/05/18 1749     Glucose 241 (H) mg/dL     Narrative:       Meter: [id number] : [id number] Jose L Onofre CNA    Basic Metabolic Panel [[id number]]  (Abnormal) Collected:  09/05/18 1647    Specimen:  Blood Updated:  09/05/18 1717     Glucose 183 (H) mg/dL      BUN 58 (H) mg/dL      Creatinine 1.97 (H) mg/dL      Sodium 137 mmol/L      Potassium 5.1 mmol/L      Chloride 93 (L) mmol/L      CO2 32.7 (H) mmol/L      Calcium 9.0 mg/dL      eGFR Non African Amer 26 (L) mL/min/1.73      BUN/Creatinine Ratio 29.4 (H)     Anion Gap 11.3 mmol/L     Narrative:       GFR Normal >60  Chronic Kidney Disease <60  Kidney Failure <15    CBC & Differential [[id number]] Collected:  09/05/18 1647    Specimen:  Blood Updated:  09/05/18 1654    Narrative:       The following orders were created for panel order CBC & Differential.  Procedure                               Abnormality         Status                     ---------                                -----------         ------                     CBC Auto Differential[[id number]]        Abnormal            Final result                 Please view results for these tests on the individual orders.    CBC Auto Differential [[id number]]  (Abnormal) Collected:  09/05/18 1647    Specimen:  Blood Updated:  09/05/18 1654     WBC 8.95 10*3/mm3      RBC 3.60 (L) 10*6/mm3      Hemoglobin 10.6 (L) g/dL      Hematocrit 33.7 (L) %      MCV 93.6 fL      MCH 29.4 pg      MCHC 31.5 g/dL      RDW 11.6 %      RDW-SD 39.8 fl      MPV 11.0 (H) fL      Platelets 172 10*3/mm3      Neutrophil % 67.1 %      Lymphocyte % 20.0 %      Monocyte % 8.3 (H) %      Eosinophil % 4.0 %      Basophil % 0.3 %      Immature Grans % 0.3 %      Neutrophils, Absolute 6.00 10*3/mm3      Lymphocytes, Absolute 1.79 10*3/mm3      Monocytes, Absolute 0.74 10*3/mm3      Eosinophils, Absolute 0.36 (H) 10*3/mm3      Basophils, Absolute 0.03 10*3/mm3      Immature Grans, Absolute 0.03 10*3/mm3      nRBC 0.0 /100 WBC     POC Glucose Once [[id number]]  (Normal) Collected:  09/05/18 1225    Specimen:  Blood Updated:  09/05/18 1231     Glucose 96 mg/dL     Narrative:       Meter: [id number] : [id number] Jose L Onofre CNA    Respiratory Culture - Sputum, Cough [[id number]]  (Abnormal) Collected:  09/03/18 1546    Specimen:  Sputum from Cough Updated:  09/05/18 0914     Respiratory Culture >100,000 CFU/mL Pseudomonas species (A)      >100,000 CFU/mL Normal Respiratory Zeinab     Gram Stain Result Rare (1+) Epithelial cells per low power field      Many (4+) WBCs seen      Rare (1+) Mixed bacterial morphotypes seen on Gram Stain    POC Glucose Once [[id number]]  (Abnormal) Collected:  09/05/18 0751    Specimen:  Blood Updated:  09/05/18 0801     Glucose 187 (H) mg/dL     Narrative:       Meter: [id number] : [id number] Signaturelynn  CNA    POC Glucose Once [[id number]]  (Abnormal) Collected:  09/04/18 2307    Specimen:  Blood Updated:  09/04/18 4391      Glucose 196 (H) mg/dL     Narrative:       Meter: [id number] : [id number] Yoel Virk RN    POC Glucose Once [[id number]]  (Abnormal) Collected:  09/04/18 2028    Specimen:  Blood Updated:  09/04/18 2035     Glucose 411 (H) mg/dL     Narrative:       Confirmed by Repeat Meter: [id number] : [id number] Waylon Layne CNA    POC Glucose Once [[id number]]  (Abnormal) Collected:  09/04/18 2027    Specimen:  Blood Updated:  09/04/18 2035     Glucose 406 (H) mg/dL     Narrative:       Critical repeat  Meter: [id number] : [id number] Waylon Layne CNA    POC Glucose Once [[id number]]  (Abnormal) Collected:  09/04/18 1647    Specimen:  Blood Updated:  09/04/18 1653     Glucose 354 (H) mg/dL     Narrative:       Meter: [id number] : [id number] AXADOnah CNA-POOL    POC Glucose Once [[id number]]  (Abnormal) Collected:  09/04/18 1148    Specimen:  Blood Updated:  09/04/18 1156     Glucose 190 (H) mg/dL     Narrative:       Meter: [id number] : [id number] Aquantia CNA-POOL    Basic Metabolic Panel [[id number]]  (Abnormal) Collected:  09/04/18 1040    Specimen:  Blood Updated:  09/04/18 1108     Glucose 198 (H) mg/dL      BUN 51 (H) mg/dL      Creatinine 1.75 (H) mg/dL      Sodium 138 mmol/L      Potassium 4.8 mmol/L      Chloride 96 (L) mmol/L      CO2 33.3 (H) mmol/L      Calcium 8.8 mg/dL      eGFR Non African Amer 29 (L) mL/min/1.73      BUN/Creatinine Ratio 29.1 (H)     Anion Gap 8.7 mmol/L     Narrative:       GFR Normal >60  Chronic Kidney Disease <60  Kidney Failure <15    POC Glucose Once [[id number]]  (Abnormal) Collected:  09/04/18 0742    Specimen:  Blood Updated:  09/04/18 0750     Glucose 57 (L) mg/dL     Narrative:       Meter: [id number] : [id number] Aquantia CNA-POOL    POC Glucose Once [[id number]]  (Abnormal) Collected:  09/03/18 2015    Specimen:  Blood Updated:  09/03/18 2022     Glucose 297 (H) mg/dL     Narrative:       Meter: [id number] : [id number] Waylon  Layne CNA    POC Glucose Once [[id number]]  (Abnormal) Collected:  09/03/18 1706    Specimen:  Blood Updated:  09/03/18 1713     Glucose 167 (H) mg/dL     Narrative:       Meter: [id number] : [id number] Roger Ocampon CNA    POC Glucose Once [[id number]]  (Abnormal) Collected:  09/03/18 1210    Specimen:  Blood Updated:  09/03/18 1216     Glucose 169 (H) mg/dL     Narrative:       Meter: [id number] : [id number] Roger Thompson CNA    POC Glucose Once [[id number]]  (Abnormal) Collected:  09/03/18 0732    Specimen:  Blood Updated:  09/03/18 0738     Glucose 161 (H) mg/dL     Narrative:       Meter: [id number] : [id number] Roger Thompson CNA    POC Glucose Once [[id number]]  (Abnormal) Collected:  09/02/18 2045    Specimen:  Blood Updated:  09/02/18 2108     Glucose 267 (H) mg/dL     Narrative:       Meter: [id number] : [id number] Tiffanie Madison RN    POC Glucose Once [[id number]]  (Normal) Collected:  09/02/18 1629    Specimen:  Blood Updated:  09/02/18 1635     Glucose 113 mg/dL     Narrative:       Meter: [id number] : [id number] Colton Romero NURSING ASSISTANT    POC Glucose Once [[id number]]  (Abnormal) Collected:  09/02/18 1125    Specimen:  Blood Updated:  09/02/18 1142     Glucose 367 (H) mg/dL     Narrative:       Meter: [id number] : [id number] Celina Tolbert NURSING ASSISTANT    POC Glucose Once [[id number]]  (Abnormal) Collected:  09/02/18 0737    Specimen:  Blood Updated:  09/02/18 0744     Glucose 134 (H) mg/dL     Narrative:       Meter: [id number] : [id number] Celina Tolbert NURSING ASSISTANT    Basic Metabolic Panel [[id number]]  (Abnormal) Collected:  09/02/18 0418    Specimen:  Blood Updated:  09/02/18 0531     Glucose 191 (H) mg/dL      BUN 53 (H) mg/dL      Creatinine 2.14 (H) mg/dL      Sodium 141 mmol/L      Potassium 4.7 mmol/L      Chloride 96 (L) mmol/L      CO2 34.7 (H) mmol/L      Calcium 7.8 (L) mg/dL      eGFR Non African Amer 23 (L) mL/min/1.73      BUN/Creatinine Ratio 24.8      Anion Gap 10.3 mmol/L     Narrative:       GFR Normal >60  Chronic Kidney Disease <60  Kidney Failure <15    POC Glucose Once [[id number]]  (Abnormal) Collected:  09/01/18 2057    Specimen:  Blood Updated:  09/01/18 2104     Glucose 238 (H) mg/dL     Narrative:       Meter: [id number] : [id number] Rickey SAENZ    POC Glucose Once [[id number]]  (Abnormal) Collected:  09/01/18 2007    Specimen:  Blood Updated:  09/01/18 2022     Glucose 238 (H) mg/dL     Narrative:       Meter: [id number] : [id number] Sylvia Pritchett RN    POC Glucose Once [[id number]]  (Abnormal) Collected:  09/01/18 1724    Specimen:  Blood Updated:  09/01/18 1730     Glucose 256 (H) mg/dL     Narrative:       Meter: [id number] : [id number] Roger Thompson CNA    POC Glucose Once [[id number]]  (Abnormal) Collected:  09/01/18 1141    Specimen:  Blood Updated:  09/01/18 1147     Glucose 186 (H) mg/dL     Narrative:       Meter: [id number] : [id number] Roger Thompson CNA    POC Glucose Once [[id number]]  (Normal) Collected:  09/01/18 0932    Specimen:  Blood Updated:  09/01/18 0939     Glucose 85 mg/dL     Narrative:       Meter: [id number] : [id number] Rajendra Weinberg RN    POC Glucose Once [[id number]]  (Abnormal) Collected:  09/01/18 0724    Specimen:  Blood Updated:  09/01/18 0731     Glucose 58 (L) mg/dL     Narrative:       Meter: [id number] : [id number] Roger Thompson CNA    POC Glucose Once [[id number]]  (Abnormal) Collected:  09/01/18 0606    Specimen:  Blood Updated:  09/01/18 0613     Glucose 59 (L) mg/dL     Narrative:       Meter: [id number] : [id number] Sylvia Pritchett RN    Basic Metabolic Panel [[id number]]  (Abnormal) Collected:  09/01/18 0409    Specimen:  Blood Updated:  09/01/18 0507     Glucose 51 (L) mg/dL      BUN 42 (H) mg/dL      Creatinine 2.05 (H) mg/dL      Sodium 143 mmol/L      Potassium 4.4 mmol/L      Chloride 97 (L) mmol/L      CO2 37.3 (H) mmol/L      Calcium 8.5 (L) mg/dL      eGFR Non African Amer  24 (L) mL/min/1.73      BUN/Creatinine Ratio 20.5     Anion Gap 8.7 mmol/L     Narrative:       GFR Normal >60  Chronic Kidney Disease <60  Kidney Failure <15    POC Glucose Once [[id number]]  (Abnormal) Collected:  08/31/18 1953    Specimen:  Blood Updated:  08/31/18 2001     Glucose 418 (H) mg/dL     Narrative:       Critical repeat  DR NOTIFIED Meter: [id number] : [id number] Sylvia SIMON    Respiratory Panel, PCR - Swab, Nasopharynx [[id number]]  (Normal) Collected:  08/31/18 1104    Specimen:  Swab from Nasopharynx Updated:  08/31/18 1822     ADENOVIRUS, PCR Not Detected     Coronavirus 229E Not Detected     Coronavirus HKU1 Not Detected     Coronavirus NL63 Not Detected     Coronavirus OC43 Not Detected     Human Metapneumovirus Not Detected     Human Rhinovirus/Enterovirus Not Detected     Influenza B PCR Not Detected     Parainfluenza Virus 1 Not Detected     Parainfluenza Virus 2 Not Detected     Parainfluenza Virus 3 Not Detected     Parainfluenza Virus 4 Not Detected     Bordetella pertussis pcr Not Detected     Influenza A H1 2009 PCR Not Detected     Chlamydophila pneumoniae PCR Not Detected     Mycoplasma pneumo by PCR Not Detected     Influenza A PCR Not Detected     Influenza A H3 Not Detected     Influenza A H1 Not Detected     RSV, PCR Not Detected    POC Glucose Once [[id number]]  (Abnormal) Collected:  08/31/18 1622    Specimen:  Blood Updated:  08/31/18 1631     Glucose 313 (H) mg/dL     Narrative:       Meter: [id number] : [id number] Roger Thompson CNA    POC Glucose Once [[id number]]  (Abnormal) Collected:  08/31/18 1224    Specimen:  Blood Updated:  08/31/18 1249     Glucose 402 (H) mg/dL     Narrative:       Meter: [id number] : [id number] Rajendra Weinberg RN    T4, Free [[id number]]  (Abnormal) Collected:  08/31/18 0317    Specimen:  Blood Updated:  08/31/18 1205     Free T4 1.82 (H) ng/dL     Troponin [[id number]]  (Normal) Collected:  08/31/18 0927    Specimen:  Blood Updated:   08/31/18 0956     Troponin T 0.011 ng/mL     Narrative:       Troponin T Reference Ranges:  Less than 0.03 ng/mL:    Negative for AMI  0.03 to 0.09 ng/mL:      Indeterminant for AMI  Greater than 0.09 ng/mL: Positive for AMI    Troponin [[id number]]  (Normal) Collected:  08/31/18 0317    Specimen:  Blood Updated:  08/31/18 0502     Troponin T 0.023 ng/mL     Narrative:       Troponin T Reference Ranges:  Less than 0.03 ng/mL:    Negative for AMI  0.03 to 0.09 ng/mL:      Indeterminant for AMI  Greater than 0.09 ng/mL: Positive for AMI    TSH [[id number]]  (Abnormal) Collected:  08/31/18 0317    Specimen:  Blood Updated:  08/31/18 0502     TSH 0.016 (L) mIU/mL     Basic Metabolic Panel [[id number]]  (Abnormal) Collected:  08/31/18 0317    Specimen:  Blood Updated:  08/31/18 0502     Glucose 145 (H) mg/dL      BUN 28 (H) mg/dL      Creatinine 1.40 (H) mg/dL      Sodium 144 mmol/L      Potassium 4.8 mmol/L      Chloride 99 mmol/L      CO2 33.9 (H) mmol/L      Calcium 9.0 mg/dL      eGFR Non African Amer 38 (L) mL/min/1.73      BUN/Creatinine Ratio 20.0     Anion Gap 11.1 mmol/L     Narrative:       GFR Normal >60  Chronic Kidney Disease <60  Kidney Failure <15    Hemoglobin A1c [[id number]]  (Abnormal) Collected:  08/31/18 0317    Specimen:  Blood Updated:  08/31/18 0458     Hemoglobin A1C 7.40 (H) %     Narrative:       Hemoglobin A1C Ranges:    Increased Risk for Diabetes  5.7% to 6.4%  Diabetes                     >= 6.5%  Diabetic Goal                < 7.0%    Lipid Panel [[id number]]  (Abnormal) Collected:  08/31/18 0317    Specimen:  Blood Updated:  08/31/18 0448     Total Cholesterol 126 mg/dL      Triglycerides 88 mg/dL      HDL Cholesterol 64 (H) mg/dL      LDL Cholesterol  44 mg/dL      VLDL Cholesterol 17.6 mg/dL      LDL/HDL Ratio 0.69    Magnesium [[id number]]  (Normal) Collected:  08/31/18 0317    Specimen:  Blood Updated:  08/31/18 0448     Magnesium 1.8 mg/dL     Phosphorus [[id number]]  (Normal) Collected:   08/31/18 0317    Specimen:  Blood Updated:  08/31/18 0448     Phosphorus 3.5 mg/dL     CBC (No Diff) [[id number]]  (Abnormal) Collected:  08/31/18 0317    Specimen:  Blood Updated:  08/31/18 0423     WBC 9.76 10*3/mm3      RBC 3.67 (L) 10*6/mm3      Hemoglobin 10.8 (L) g/dL      Hematocrit 34.3 (L) %      MCV 93.5 fL      MCH 29.4 pg      MCHC 31.5 g/dL      RDW 11.8 %      RDW-SD 39.9 fl      MPV 10.7 (H) fL      Platelets 185 10*3/mm3     BNP [[id number]]  (Abnormal) Collected:  08/30/18 2222    Specimen:  Blood Updated:  08/30/18 2257     proBNP 965.3 (H) pg/mL     Narrative:       Among patients with dyspnea, NT-proBNP is highly sensitive for the detection of acute congestive heart failure. In addition NT-proBNP of <300 pg/ml effectively rules out acute congestive heart failure with 99% negative predictive value.    Troponin [[id number]]  (Normal) Collected:  08/30/18 2222    Specimen:  Blood Updated:  08/30/18 2249     Troponin T 0.029 ng/mL     Narrative:       Troponin T Reference Ranges:  Less than 0.03 ng/mL:    Negative for AMI  0.03 to 0.09 ng/mL:      Indeterminant for AMI  Greater than 0.09 ng/mL: Positive for AMI    Comprehensive Metabolic Panel [[id number]]  (Abnormal) Collected:  08/30/18 2222    Specimen:  Blood Updated:  08/30/18 2244     Glucose 87 mg/dL      BUN 28 (H) mg/dL      Creatinine 1.47 (H) mg/dL      Sodium 144 mmol/L      Potassium 5.1 mmol/L      Chloride 98 mmol/L      CO2 35.4 (H) mmol/L      Calcium 9.0 mg/dL      Total Protein 7.0 g/dL      Albumin 3.70 g/dL      ALT (SGPT) 11 U/L      AST (SGOT) 14 U/L      Alkaline Phosphatase 119 U/L      Total Bilirubin 0.4 mg/dL      eGFR Non African Amer 36 (L) mL/min/1.73      Globulin 3.3 gm/dL      A/G Ratio 1.1 g/dL      BUN/Creatinine Ratio 19.0     Anion Gap 10.6 mmol/L     Protime-INR [[id number]]  (Normal) Collected:  08/30/18 2222    Specimen:  Blood Updated:  08/30/18 2243     Protime 13.7 Seconds      INR 1.05    Narrative:        Therapeutic Ranges for INR: 2.0-3.0 (PT 20-30)                              2.5-3.5 (PT 25-34)    aPTT [[id number]]  (Normal) Collected:  08/30/18 2222    Specimen:  Blood Updated:  08/30/18 2243     PTT 29.3 seconds     Narrative:       PTT = The equivalent PTT values for the therapeutic range of heparin levels at 0.1 to 0.7 U/ml are 53 to 110 seconds.    Blood Gas, Arterial [[id number]]  (Abnormal) Collected:  08/30/18 2225    Specimen:  Arterial Blood Updated:  08/30/18 2235     Site Left Radial     Kevyn's Test Positive     pH, Arterial 7.401 pH units      pCO2, Arterial 57.5 (H) mm Hg      pO2, Arterial 64.4 (L) mm Hg      HCO3, Arterial 35.6 (H) mmol/L      Base Excess, Arterial 9.2 (H) mmol/L      O2 Saturation, Arterial 93.5 (L) %      Hemoglobin, Blood Gas 10.8 (L) g/dL      Temperature 37.0 C      Barometric Pressure for Blood Gas 742 mmHg      Modality Nasal Cannula     Flow Rate 5.0 lpm      Ventilator Mode NA     Collected by [id number]     pCO2, Temperature Corrected 57.5 (H) mm Hg      pH, Temp Corrected 7.401 pH Units      pO2, Temperature Corrected 64.4 (L) mm Hg     CBC & Differential [[id number]] Collected:  08/30/18 2222    Specimen:  Blood Updated:  08/30/18 2227    Narrative:       The following orders were created for panel order CBC & Differential.  Procedure                               Abnormality         Status                     ---------                               -----------         ------                     CBC Auto Differential[[id number]]        Abnormal            Final result                 Please view results for these tests on the individual orders.    CBC Auto Differential [[id number]]  (Abnormal) Collected:  08/30/18 2222    Specimen:  Blood Updated:  08/30/18 2227     WBC 10.46 10*3/mm3      RBC 3.65 (L) 10*6/mm3      Hemoglobin 10.8 (L) g/dL      Hematocrit 34.2 (L) %      MCV 93.7 fL      MCH 29.6 pg      MCHC 31.6 g/dL      RDW 11.8 %      RDW-SD 40.4 fl      MPV 10.3 fL       Platelets 183 10*3/mm3      Neutrophil % 70.0 %      Lymphocyte % 20.0 %      Monocyte % 6.3 %      Eosinophil % 3.1 %      Basophil % 0.3 %      Immature Grans % 0.3 %      Neutrophils, Absolute 7.33 10*3/mm3      Lymphocytes, Absolute 2.09 10*3/mm3      Monocytes, Absolute 0.66 10*3/mm3      Eosinophils, Absolute 0.32 (H) 10*3/mm3      Basophils, Absolute 0.03 10*3/mm3      Immature Grans, Absolute 0.03 10*3/mm3      nRBC 0.0 /100 WBC         Imaging Results (most recent)     Procedure Component Value Units Date/Time    CT Chest Without Contrast [[id number]] Collected:  09/06/18 1053     Updated:  09/06/18 1104    Narrative:       CT CHEST, NONCONTRAST, 9/6/2018     HISTORY:  63-year-old female recently admitted to the hospital with one week  history shortness of breath. Past history right lung surgery (no details  available).     TECHNIQUE:    CT examination of the chest without IV contrast. Radiation dose  reduction techniques included automated exposure control or exposure  modulation based on body size. Radiation audit for CT and nuclear  cardiology exams in the last 12 months: 0.      COMPARISON:  *  Chest x-ray, 9/4/2018 and 8/30/2018.  *  CT chest, 7/27/2017 and 9/21/2015.  *  High-resolution CT chest, 9/23/2014.     FINDINGS:    Chronic postthoracotomy changes in the right hemithorax including old  right upper rib resections and mild chronic elevation right  hemidiaphragm. There are also multiple old left-sided rib fracture  deformities.     There is chronic coarse fibrotic scarring in the peripheral right lower  lobe with associated retractile bronchiectasis, unchanged since prior  exams.      Mild to moderate diffuse centrilobular pulmonary emphysema. Bronchial  wall thickening throughout the central portions of both lungs, greater  on the right, may be associated with acute or chronic bronchitis. There  is also evidence of inflammatory bronchiolitis within the periphery of  the posterior segment right  upper lobe.     No airspace consolidation or pleural effusion. No suspicious mass or  adenopathy within the mediastinum or pulmonary sanjuana. Small scattered  mediastinal lymph nodes are stable.     Heart size is normal. Mild central pulmonary artery enlargement. Normal  caliber thoracic aorta. Limited upper abdominal images show  cholelithiasis.       Impression:       1. Pulmonary emphysema. Central bronchial wall thickening may be  associated with acute or chronic bronchitis. Probable bronchiolitis in  the posterior segment right upper lobe.  2. No airspace consolidation or pleural effusion.  3. Chronic coarse fibrotic scarring in the peripheral right lung base  with some associated bronchiectasis, unchanged since older studies.  4. Old postthoracotomy change in the right hemithorax. Bilateral chronic  postoperative and/or post traumatic rib deformities.     This report was finalized on 9/6/2018 11:02 AM by Dr. Dvaid Lester MD.       XR Chest PA & Lateral [[id number]] Collected:  09/04/18 0810     Updated:  09/04/18 0814    Narrative:       Chest x-ray     INDICATION: Increasing shortness of air for 2 days. COPD.     COMPARISON: 8/30/2018     FINDINGS: PA and lateral views of the chest. Heart size stable. There is  emphysema. Left lung is clear. On the right side, there is infiltrate or  atelectasis in the lower lobe. Right-sided chronic rib deformities are  noted. No pneumothorax.       Impression:       Right lower lobe infiltrate or atelectasis. COPD.     This report was finalized on 9/4/2018 8:12 AM by Dr. Kike Cotto MD.       XR Chest 1 View [[id number]] Collected:  08/31/18 0718     Updated:  08/31/18 0726    Narrative:       CHEST X-RAY, 8/30/2018         HISTORY:  63-year-old female in the ED complaining of one week history shortness  of air. Past history includes COPD and congestive heart failure.  Previous right lung surgery.     TECHNIQUE:  AP upright chest x-ray.     COMPARISON:  *  Chest x-ray,  5/22/2018 and 5/3/2017.  *  CT chest, 7/27/2017 and 9/21/2015.     FINDINGS:  The examination shows COPD with chronic fibrosis and volume loss in the  right lower lung and chronic elevation of the right hemidiaphragm.  Previous right lung surgery with postoperative right upper rib  deformities. There are also multiple old healed left fractures.     The heart is mildly enlarged, and the central pulmonary arteries are  prominent. Pulmonary venous vascularity is within normal limits. No  visible airspace consolidation or pleural effusion.       Impression:       1. Stable mild cardiomegaly.  2. Postop changes right lung surgery with rib deformities and chronic  elevation right hemidiaphragm.  3. COPD.  4. Chronic pulmonary and pleural scarring in the right lower chest.     This report was finalized on 8/31/2018 7:24 AM by Dr. David Lester MD.           PROCEDURES: NONE    Condition on Discharge:  stable    Physical Exam at Discharge  Vital Signs  Temp:  [97.4 °F (36.3 °C)-98.3 °F (36.8 °C)] 97.9 °F (36.6 °C)  Heart Rate:  [74-90] 74  Resp:  [18-24] 20  BP: (114-157)/(55-77) 114/55    Physical Exam:  Physical Exam   Constitutional: Patient appears well-developed and well-nourished and in no acute distress, morbidly obese   HEENT:   Head: Normocephalic and atraumatic.   Eyes:  Pupils are equal, round, and reactive to light. EOM are intact. Sclera are anicteric and non-injected.  Mouth and Throat: Patient has moist mucous membranes. Oropharynx is clear of any erythema or exudate.     Neck: unable to assess secondary to habitus.  Cardiovascular: Regular rate, regular rhythm, S1 normal and S2 normal.  Exam reveals no gallop and no friction rub.  distant heart sounds  Pulmonary/Chest: Lungs diminished all fields, RLL crackles  Abdominal: Obese, soft. Bowel sounds are normal. No distension and no mass. There is no hepatosplenomegaly. There is no tenderness.   Musculoskeletal: Normal Muscle tone  Extremities: 2+  bilateral LE non-pitting edema. Pulses are palpable in all 4 extremities.  Neurological: Patient is alert and oriented to person, place, and time. Cranial nerves II-XII are grossly intact with no focal deficits.  Skin: Skin is warm. No rash noted. Nails show no clubbing.  No cyanosis or erythema.    Discharge Disposition  Home    Visiting Nurse:    Ordered, patient verbally refuses     Home PT/OT:  Ordered, patient verbally refuses     Home Safety Evaluation:  Ordered, patient verbally refuses     DME  Ordered, patient verbally refuses     Discharge Diet:    Dietary Orders     Start     Ordered    08/31/18 1210  Diet Regular; Consistent Carbohydrate, Cardiac  Diet Effective Now     Question Answer Comment   Diet Texture / Consistency Regular    Common Modifiers Consistent Carbohydrate    Common Modifiers Cardiac        08/31/18 1216        Activity at Discharge:  As tolerated    Pre-discharge education  Diabetic, Smoking, Cardiac, medications, follow up    Follow-up Appointments  No future appointments.  Additional Instructions for the Follow-ups that You Need to Schedule     Discharge Follow-up with PCP    As directed      Currently Documented PCP:  Jina Vaughan MD  PCP Phone Number:  [phone]    Follow Up Details:  1 week         Discharge Follow-up with Specialty: Nephrology; 2 Weeks    As directed      Specialty:  Nephrology    Follow Up:  2 Weeks         Discharge Follow-up with Specified Provider: Dr. Barreto; 2 Weeks    As directed      To:  Dr. Barreto    Follow Up:  2 Weeks             Test Results Pending at Discharge: NONE     OPAL Ricci  09/06/18  1:45 PM    Time: Discharge over 30 min (if over 30 minutes give explanation as to why it took greater than 30 minutes)  Secondary to:   Coordination of care/follow up  Medication reconciliation  D/W patient and family

## 2018-09-06 NOTE — NURSING NOTE
Pager Dr. Pratt with nephrology to review patient status and appropriatness for discharge including medication reccs.    Geovanny Mendiola RN

## 2018-09-06 NOTE — NURSING NOTE
Critical value respiratory culture positive for multi-drug resistant pseudomonas with susceptability reported to OPAL Hunter read back and verified.    Geovanny Mendiola RN

## 2018-09-07 ENCOUNTER — READMISSION MANAGEMENT (OUTPATIENT)
Dept: CALL CENTER | Facility: HOSPITAL | Age: 63
End: 2018-09-07

## 2018-09-07 ENCOUNTER — TELEPHONE (OUTPATIENT)
Dept: INTERNAL MEDICINE | Facility: CLINIC | Age: 63
End: 2018-09-07

## 2018-09-07 NOTE — TELEPHONE ENCOUNTER
LVM on [facility] line to ok verbal orders for PT, OT and nursing care.     ----- Message from Jina Vaughan MD sent at 9/7/2018  9:29 AM EDT -----  Regarding: RE: HOME HEALTH CARE REQUEST  Yes, that's great     ----- Message -----  From: Christina Chavez CMA  Sent: 9/7/2018   9:14 AM  To: Jina Vaughan MD  Subject: FW: HOME HEALTH CARE REQUEST                     Ok for me to call back and give ok verbal?    ----- Message -----  From: Erin Luna MA  Sent: 9/6/2018   3:57 PM  To: Christina Chavez CMA  Subject: FW: HOME HEALTH CARE REQUEST                     ----- Message -----  From: Tay Gardner  Sent: 9/6/2018   3:38 PM  To: [facility]  Subject: HOME HEALTH CARE REQUEST                         Clement pt    I received a call from [facility], to request Physical therapy, occupational therapy, and nursing care at home. She is being released today - diagnosed with dystolic disfunction, and bronchitis.  Her number is [phone]    Thanks!  tay

## 2018-09-07 NOTE — OUTREACH NOTE
Prep Survey      Responses   Facility patient discharged from?  [facility]   Is LACE score < 7 ?  No   Is patient eligible?  Yes   Discharge diagnosis  Volume overload with grade 1 diastolic dysfunction    Does the patient have one of the following disease processes/diagnoses(primary or secondary)?  CHF   Does the patient have Home health ordered?  Yes   What is the Home health agency?   [facility]    Is there a DME ordered?  No   Prep survey completed?  Yes          Siria Peters RN

## 2018-09-10 NOTE — PAYOR COMM NOTE
"Estuardo Govea (63 y.o. Female)     ATTN: NURSE REVIEWER  AUTH#[id number]  FAXING DISCHARGE INFO ON APPROVED AUTH. THANK YOU.       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), MRSA/History Only (08/05/16)   Code Status:  Prior    Ht:  167.6 cm (66\")   Wt:  120 kg (264 lb 6 oz)    Admission Cmt:  None   Principal Problem:  None                Active Insurance as of 8/30/2018     Primary Coverage     Payor Plan Insurance Group Employer/Plan Group    [employer] EM[employer]OYEE [id number]     Payor Plan Address Payor Plan Phone Number Effective From Effective To    [id number] [phone] 1/1/2015     [facility] [id number]       Subscriber Name Subscriber Birth Date Member ID       ESTUARDO GOVEA 1955 [id number]                 Emergency Contacts      (Rel.) Home Phone Work Phone Mobile Phone    Sania Goins (Sister) -- -- [phone]               Discharge Summary      Paola Silva, APRN at 9/6/2018  9:27 AM     Attestation signed by Elis Judd DO at 9/6/2018  2:37 PM    Review note and saw patient and agree with nurse practitioner.                    Estuardo Govea  1955  [id number]    Hospitalists Discharge Summary    Date of Admission: 8/30/2018  Date of Discharge:  9/6/2018    Primary Discharge Diagnoses:   Volume overload with grade 1 diastolic " dysfunction   RLL pseudomonas bronchitis  Secondary Discharge Diagnoses:   CKD stage IV   Hypertension   Obesity hypoventilation syndrome/ALTA   COPD with chronic hypoxic respiratory failure   Chronic LE edema and venous stasis   DM-2 with hyperglycemia, obesity, peripheral neuropathy  Hypothyroidism   Dylipidemia   Chronic narcotic dependence   GERD   Morbid Obesity   Chronic normocytic anemia/anemia of chronic disease  PCP  Patient Care Team:  Jina Vaughan MD as PCP - General (Internal Medicine)  Jhonny Hoyt MD as Consulting Physician (Pulmonary Disease)  Gricelda Flores APRN as Nurse Practitioner (Endocrinology)  Konstantin Orr MD as Consulting Physician (Nephrology)  Vargas Ledezma MD as Consulting Physician (Pain Medicine)  Trinidad Barreto MD as Consulting Physician (Cardiology)    Consults:   Consults     Date and Time Order Name Status Description    9/2/2018 1634 Inpatient Nephrology Consult Completed     8/31/2018 0714 Inpatient Pulmonology Consult Completed     8/31/2018 0229 Inpatient Cardiology Consult Completed         Operations and Procedures Performed:     Ct Chest Without Contrast    Result Date: 9/6/2018  Narrative: CT CHEST, NONCONTRAST, 9/6/2018  HISTORY: 63-year-old female recently admitted to the hospital with one week history shortness of breath. Past history right lung surgery (no details available).  TECHNIQUE:  CT examination of the chest without IV contrast. Radiation dose reduction techniques included automated exposure control or exposure modulation based on body size. Radiation audit for CT and nuclear cardiology exams in the last 12 months: 0.  COMPARISON: *  Chest x-ray, 9/4/2018 and 8/30/2018. *  CT chest, 7/27/2017 and 9/21/2015. *  High-resolution CT chest, 9/23/2014.  FINDINGS:  Chronic postthoracotomy changes in the right hemithorax including old right upper rib resections and mild chronic elevation right hemidiaphragm. There are also multiple old  left-sided rib fracture deformities.  There is chronic coarse fibrotic scarring in the peripheral right lower lobe with associated retractile bronchiectasis, unchanged since prior exams.  Mild to moderate diffuse centrilobular pulmonary emphysema. Bronchial wall thickening throughout the central portions of both lungs, greater on the right, may be associated with acute or chronic bronchitis. There is also evidence of inflammatory bronchiolitis within the periphery of the posterior segment right upper lobe.  No airspace consolidation or pleural effusion. No suspicious mass or adenopathy within the mediastinum or pulmonary sanjuana. Small scattered mediastinal lymph nodes are stable.  Heart size is normal. Mild central pulmonary artery enlargement. Normal caliber thoracic aorta. Limited upper abdominal images show cholelithiasis.      Impression: 1. Pulmonary emphysema. Central bronchial wall thickening may be associated with acute or chronic bronchitis. Probable bronchiolitis in the posterior segment right upper lobe. 2. No airspace consolidation or pleural effusion. 3. Chronic coarse fibrotic scarring in the peripheral right lung base with some associated bronchiectasis, unchanged since older studies. 4. Old postthoracotomy change in the right hemithorax. Bilateral chronic postoperative and/or post traumatic rib deformities.  This report was finalized on 9/6/2018 11:02 AM by Dr. David Lester MD.      Xr Chest 1 View    Result Date: 8/31/2018  Narrative: CHEST X-RAY, 8/30/2018     HISTORY: 63-year-old female in the ED complaining of one week history shortness of air. Past history includes COPD and congestive heart failure. Previous right lung surgery.  TECHNIQUE: AP upright chest x-ray.  COMPARISON: *  Chest x-ray, 5/22/2018 and 5/3/2017. *  CT chest, 7/27/2017 and 9/21/2015.  FINDINGS: The examination shows COPD with chronic fibrosis and volume loss in the right lower lung and chronic elevation of the right  hemidiaphragm. Previous right lung surgery with postoperative right upper rib deformities. There are also multiple old healed left fractures.  The heart is mildly enlarged, and the central pulmonary arteries are prominent. Pulmonary venous vascularity is within normal limits. No visible airspace consolidation or pleural effusion.      Impression: 1. Stable mild cardiomegaly. 2. Postop changes right lung surgery with rib deformities and chronic elevation right hemidiaphragm. 3. COPD. 4. Chronic pulmonary and pleural scarring in the right lower chest.  This report was finalized on 8/31/2018 7:24 AM by Dr. David Lester MD.      Xr Chest Pa & Lateral    Result Date: 9/4/2018  Narrative: Chest x-ray  INDICATION: Increasing shortness of air for 2 days. COPD.  COMPARISON: 8/30/2018  FINDINGS: PA and lateral views of the chest. Heart size stable. There is emphysema. Left lung is clear. On the right side, there is infiltrate or atelectasis in the lower lobe. Right-sided chronic rib deformities are noted. No pneumothorax.      Impression: Right lower lobe infiltrate or atelectasis. COPD.  This report was finalized on 9/4/2018 8:12 AM by Dr. Kike Cotto MD.      Allergies:  is allergic to erythromycin; erythromycin ethylsuccinate; ativan [lorazepam]; and tape.    [facility]  Hydrocodone 8/2018 per report of 8/30/18    Discharge Medications:     Discharge Medications      New Medications      Instructions Start Date   amLODIPine 2.5 MG tablet  Commonly known as:  NORVASC   2.5 mg, Oral, Every 24 Hours Scheduled      fluconazole 100 MG tablet  Commonly known as:  DIFLUCAN   100 mg, Oral, Every 24 Hours      levoFLOXacin 500 MG tablet  Commonly known as:  LEVAQUIN   500 mg, Oral, Every 24 Hours         Changes to Medications      Instructions Start Date   bumetanide 1 MG tablet  Commonly known as:  BUMEX  What changed:  · medication strength  · how much to take  · when to take this   1 mg, Oral, 3 Times Daily      insulin  aspart 100 UNIT/ML solution pen-injector sc pen  Commonly known as:  NOVOLOG FLEXPEN  What changed:  · when to take this  · additional instructions   SSI      Insulin Degludec 200 UNIT/ML solution pen-injector  Commonly known as:  TRESIBA FLEXTOUCH  What changed:  when to take this   70 Units, Subcutaneous, Daily      levothyroxine 125 MCG tablet  Commonly known as:  SYNTHROID, LEVOTHROID  What changed:  · medication strength  · how much to take  · when to take this   250 mcg, Oral, Every Morning      metoprolol tartrate 100 MG tablet  Commonly known as:  LOPRESSOR  What changed:  · medication strength  · how much to take  · additional instructions   100 mg, Oral, Every 12 Hours Scheduled         Continue These Medications      Instructions Start Date   acetaminophen 325 MG tablet  Commonly known as:  TYLENOL   650 mg, Oral, Every 4 Hours PRN      ALIGN 4 MG capsule   4 mg, Oral, Daily      aspirin 81 MG EC tablet   81 mg, Oral, Daily      atorvastatin 40 MG tablet  Commonly known as:  LIPITOR   40 mg, Oral, Daily, for cholesterol.      cetirizine 10 MG tablet  Commonly known as:  zyrTEC   10 mg, Oral, Nightly      clotrimazole-betamethasone 1-0.05 % cream  Commonly known as:  LOTRISONE   Topical, 2 Times Daily      dimenhyDRINATE 50 MG tablet  Commonly known as:  DRAMAMINE   50 mg, Oral, Daily PRN      famotidine 20 MG tablet  Commonly known as:  PEPCID   20 mg, Oral, Daily      fluticasone-salmeterol 250-50 MCG/DOSE DISKUS  Commonly known as:  ADVAIR DISKUS   1 puff, Inhalation, 2 Times Daily - RT, For COPD and rinse mouth after use      guaiFENesin 600 MG 12 hr tablet  Commonly known as:  MUCINEX   1,200 mg, Oral, 2 Times Daily      HYDROcodone-acetaminophen  MG per tablet  Commonly known as:  NORCO   1 tablet, Oral, Every 8 Hours PRN      INCRUSE ELLIPTA IN   Inhalation, Daily      levalbuterol 0.63 MG/3ML nebulizer solution  Commonly known as:  XOPENEX   1 ampule, Nebulization, Every 4 Hours PRN     "  PROAIR  (90 Base) MCG/ACT inhaler  Generic drug:  albuterol   INHALE TWO PUFFS BY MOUTH EVERY 4 HOURS AS NEEDED FOR WHEEZING      QUEtiapine 100 MG tablet  Commonly known as:  SEROquel   TAKE ONE TABLET BY MOUTH TWICE A DAY      sennosides-docusate sodium 8.6-50 MG tablet  Commonly known as:  SENOKOT-S   2 tablets, Oral, Nightly PRN      VITAMIN B COMPLEX PO   1 tablet, Oral, Daily      vitamin D 23451 units capsule capsule  Commonly known as:  ERGOCALCIFEROL   50,000 Units, Oral, Every 7 Days, On tuesday           History of Present Illness: Taken from HPI per Dr. Barreto:  \"Ms Alexander Govea is a 63-year-old female with a history of uncontrolled diabetes mellitus type 2, GERD, hypertension, hyperlipidemia, hypothyroidism, obstructive sleep apnea, morbid obesity, COPD on home oxygen.  She had nondiagnostic treadmill stress study in 2014.  She had negative bilateral lower extremity venous duplex studies on 5/3/17.  She had a renal ultrasound on 5/5/17 showing borderline renal cortical atrophy.     She is admitted 5/22/18 with increasing dyspnea and lower extremity edema.  Her troponin was 0.165 in the setting of acute renal insufficiency.  ECG was normal.  Her echo done 5/22/18 showed normal ejection fraction at 66-70%.     She presented to the emergency department on 8/30/18 with increasing function edema, dyspnea for 2 weeks.  She was wearing a BiPAP at home almost constantly due to short windedness.  Her nephrologist and increased her Bumex dosing at home but this did not improve her situation.  When she arrived, her troponin was 0.029 and is now 0.023.  Her BNP was 965, hemoglobin 10.8, creatinine 1.47.  Chest x-ray showed interstitial pattern consistent with pulmonary edema.  She was admitted for further dilation got one dose of IV Lasix.     At home she normally takes Bumex 2mg twice daily, aspirin and oral Lopressor.  She diuresed 1250 this morning.     she says that her breathing is better this " "morning.  She denies nausea, chest pain, tachycardia or dizziness.  She says she is urinating quite a bit.  Her lower extremity edema is also better.\"    Hospital Course  Volume overload with grade 1 diastolic dysfunction: Cardiology following  Received IV Bumex 1 mg every 8 hours, changed today to oral bumex 1 mg three times a day per Dr. Orr  Baseline creatinine approximately 2.1, now 1.87  -7.5 liters this admit  Continue daily weight at home  Nephrology to manage diuretics  F/U Jina Vaughan MD 1 week  F/U Dr. Barreto 2 weeks  F/U nephrology 2 weeks     RLL pseudomonas bronchitis: pulmonary followed  CT chest completed  Start oral levaquin x 7 days total  Review of past micro data does not show colonization  No oxygen requirement above usual 4 liters chronic  Stable, monitor     CKD stage IV: Nephrology followed  Bumex held previously secondary to initial creatinine 1.47 which continued to trend up  Per nephrology, patient's baseline creatinine is 2.1, yesterday 1.87 with IV diuretic, repeat pending today  Nephrology suspected creatinine was 1.47 secondary to volume overload and has now normalized  F/U nephrology 2 weeks     Hypertension: Blood pressure at goal on metoprolol 100 mg every 12 hours, amlodipine 2.5 mg daily, diuresed well on Bumex 1 mg IV every 8 hours, now changed to oral 1 mg every 8 hours per Dr. Orr.  Medications adjusted this admit  F/U as above     Obesity hypoventilation syndrome/ALTA:   COPD with chronic hypoxic respiratory failure: Pulmonary followed and follows with Dr. Hoyt outpatient  CT chest done, f/u Dr. Hoyt as scheduled  BiPAP broken, new machine delivering today to home  Tolerating hospital machine without acute issues  No acute issues on Symbicort and Atrovent that were substituted for home Advair and Incruse Ellipta  Remained on home Xopenex     Chronic LE edema and venous stasis:   Improved with diuresis     DM-2 with hyperglycemia, obesity, peripheral neuropathy: " HbA1C 7.4%   Am glucose at goal, pms elevated, home tresiba 70 units, only receiving 50 units of levemir here, resume home regimen then f/u Jina Vaughan MD with glucose log     Hypothyroidism: levothyroxine changed this admit to 250 µg daily due to TSH low/FT4 high  Repeat TFTs 6-8 weeks     Dylipidemia: No acute issues on atorvastatin 40 mg daily and aspirin 81 mg daily with cardiac consistent carbohydrate diet      Chronic narcotic dependence:   On home dose Norco. No acute issues here. Confirmed on [facility] report.      GERD: On home pepcid. No acute issues here.      Morbid Obesity: Nutrition to see here.      Chronic normocytic anemia/Anemia of chronic disease:   Hemoglobin stable at 10.8, no active blood loss noted    Last Lab Results:   Lab Results (most recent)     Procedure Component Value Units Date/Time    POC Glucose Once [[id number]]  (Abnormal) Collected:  09/06/18 0801    Specimen:  Blood Updated:  09/06/18 0824     Glucose 178 (H) mg/dL     Narrative:       Meter: [id number] : [id number] Marlena Small RN Float-Pool Validator    Basic Metabolic Panel [[id number]]  (Abnormal) Collected:  09/06/18 0351    Specimen:  Blood Updated:  09/06/18 0424     Glucose 169 (H) mg/dL      BUN 65 (H) mg/dL      Creatinine 1.87 (H) mg/dL      Sodium 138 mmol/L      Potassium 5.0 mmol/L      Chloride 94 (L) mmol/L      CO2 34.1 (H) mmol/L      Calcium 8.5 (L) mg/dL      eGFR Non African Amer 27 (L) mL/min/1.73      BUN/Creatinine Ratio 34.8 (H)     Anion Gap 9.9 mmol/L     Narrative:       GFR Normal >60  Chronic Kidney Disease <60  Kidney Failure <15    CBC & Differential [[id number]] Collected:  09/06/18 0351    Specimen:  Blood Updated:  09/06/18 0408    Narrative:       The following orders were created for panel order CBC & Differential.  Procedure                               Abnormality         Status                     ---------                               -----------         ------                      CBC Auto Differential[[id number]]        Abnormal            Final result                 Please view results for these tests on the individual orders.    CBC Auto Differential [[id number]]  (Abnormal) Collected:  09/06/18 0351    Specimen:  Blood Updated:  09/06/18 0408     WBC 9.75 10*3/mm3      RBC 3.36 (L) 10*6/mm3      Hemoglobin 9.8 (L) g/dL      Hematocrit 31.6 (L) %      MCV 94.0 fL      MCH 29.2 pg      MCHC 31.0 g/dL      RDW 11.6 %      RDW-SD 39.8 fl      MPV 10.9 (H) fL      Platelets 185 10*3/mm3      Neutrophil % 67.5 %      Lymphocyte % 17.8 (L) %      Monocyte % 9.9 (H) %      Eosinophil % 4.1 (H) %      Basophil % 0.4 %      Immature Grans % 0.3 %      Neutrophils, Absolute 6.57 10*3/mm3      Lymphocytes, Absolute 1.74 10*3/mm3      Monocytes, Absolute 0.97 10*3/mm3      Eosinophils, Absolute 0.40 (H) 10*3/mm3      Basophils, Absolute 0.04 10*3/mm3      Immature Grans, Absolute 0.03 10*3/mm3      nRBC 0.0 /100 WBC     POC Glucose Once [[id number]]  (Abnormal) Collected:  09/06/18 0002    Specimen:  Blood Updated:  09/06/18 0008     Glucose 340 (H) mg/dL     Narrative:       Meter: [id number] : [id number] Jassi Waters NURSING ASSISTANT    POC Glucose Once [[id number]]  (Abnormal) Collected:  09/05/18 2014    Specimen:  Blood Updated:  09/05/18 2026     Glucose 420 (H) mg/dL     Narrative:       [id number] Meter: [id number] : [id number] Jassi Waters NURSING ASSISTA    Procalcitonin [[id number]]  (Abnormal) Collected:  09/05/18 1647    Specimen:  Blood Updated:  09/05/18 1819     Procalcitonin 0.06 (L) ng/mL     Narrative:       As a Marker for Sepsis (Non-Neonates):   1. <0.5 ng/mL represents a low risk of severe sepsis and/or septic shock.  2. >2 ng/mL represents a high risk of severe sepsis and/or septic shock.    As a Marker for Lower Respiratory Tract Infections that require antibiotic therapy:    PCT on Admission     Antibiotic Therapy       6-12 Hrs later  > 0.5                 Strongly Recommended             >0.25 - <0.5         Recommended  0.1 - 0.25           Discouraged              Remeasure/reassess PCT  <0.1                 Strongly Discouraged     Remeasure/reassess PCT                     PCT values of < 0.5 ng/mL do not exclude an infection, because localized infections (without systemic signs) may be associated with such low concentrations, or a systemic infection in its initial stages (< 6 hours). Furthermore, increased PCT can occur without infection. PCT concentrations between 0.5 and 2.0 ng/mL should be interpreted taking into account the patient's history. It is recommended to retest PCT within 6-24 hours if any concentrations < 2 ng/mL are obtained.    POC Glucose Once [[id number]]  (Abnormal) Collected:  09/05/18 1743    Specimen:  Blood Updated:  09/05/18 1749     Glucose 241 (H) mg/dL     Narrative:       Meter: [id number] : [id number] Jose L Onofre CNA    Basic Metabolic Panel [[id number]]  (Abnormal) Collected:  09/05/18 1647    Specimen:  Blood Updated:  09/05/18 1717     Glucose 183 (H) mg/dL      BUN 58 (H) mg/dL      Creatinine 1.97 (H) mg/dL      Sodium 137 mmol/L      Potassium 5.1 mmol/L      Chloride 93 (L) mmol/L      CO2 32.7 (H) mmol/L      Calcium 9.0 mg/dL      eGFR Non African Amer 26 (L) mL/min/1.73      BUN/Creatinine Ratio 29.4 (H)     Anion Gap 11.3 mmol/L     Narrative:       GFR Normal >60  Chronic Kidney Disease <60  Kidney Failure <15    CBC & Differential [[id number]] Collected:  09/05/18 1647    Specimen:  Blood Updated:  09/05/18 1654    Narrative:       The following orders were created for panel order CBC & Differential.  Procedure                               Abnormality         Status                     ---------                               -----------         ------                     CBC Auto Differential[[id number]]        Abnormal            Final result                 Please view results for these tests on the individual  orders.    CBC Auto Differential [[id number]]  (Abnormal) Collected:  09/05/18 1647    Specimen:  Blood Updated:  09/05/18 1654     WBC 8.95 10*3/mm3      RBC 3.60 (L) 10*6/mm3      Hemoglobin 10.6 (L) g/dL      Hematocrit 33.7 (L) %      MCV 93.6 fL      MCH 29.4 pg      MCHC 31.5 g/dL      RDW 11.6 %      RDW-SD 39.8 fl      MPV 11.0 (H) fL      Platelets 172 10*3/mm3      Neutrophil % 67.1 %      Lymphocyte % 20.0 %      Monocyte % 8.3 (H) %      Eosinophil % 4.0 %      Basophil % 0.3 %      Immature Grans % 0.3 %      Neutrophils, Absolute 6.00 10*3/mm3      Lymphocytes, Absolute 1.79 10*3/mm3      Monocytes, Absolute 0.74 10*3/mm3      Eosinophils, Absolute 0.36 (H) 10*3/mm3      Basophils, Absolute 0.03 10*3/mm3      Immature Grans, Absolute 0.03 10*3/mm3      nRBC 0.0 /100 WBC     POC Glucose Once [[id number]]  (Normal) Collected:  09/05/18 1225    Specimen:  Blood Updated:  09/05/18 1231     Glucose 96 mg/dL     Narrative:       Meter: [id number] : [id number] Jose L Onofre CNA    Respiratory Culture - Sputum, Cough [[id number]]  (Abnormal) Collected:  09/03/18 1546    Specimen:  Sputum from Cough Updated:  09/05/18 0914     Respiratory Culture >100,000 CFU/mL Pseudomonas species (A)      >100,000 CFU/mL Normal Respiratory Zeinab     Gram Stain Result Rare (1+) Epithelial cells per low power field      Many (4+) WBCs seen      Rare (1+) Mixed bacterial morphotypes seen on Gram Stain    POC Glucose Once [[id number]]  (Abnormal) Collected:  09/05/18 0751    Specimen:  Blood Updated:  09/05/18 0801     Glucose 187 (H) mg/dL     Narrative:       Meter: [id number] : [id number] Jose L Onofre  CNA    POC Glucose Once [[id number]]  (Abnormal) Collected:  09/04/18 2307    Specimen:  Blood Updated:  09/04/18 2315     Glucose 196 (H) mg/dL     Narrative:       Meter: [id number] : [id number] Yoel Virk RN    POC Glucose Once [[id number]]  (Abnormal) Collected:  09/04/18 2028    Specimen:  Blood  Updated:  09/04/18 2035     Glucose 411 (H) mg/dL     Narrative:       Confirmed by Repeat Meter: [id number] : [id number] Waylon Layne CNA    POC Glucose Once [[id number]]  (Abnormal) Collected:  09/04/18 2027    Specimen:  Blood Updated:  09/04/18 2035     Glucose 406 (H) mg/dL     Narrative:       Critical repeat  Meter: [id number] : [id number] Waylon Layne CNA    POC Glucose Once [[id number]]  (Abnormal) Collected:  09/04/18 1647    Specimen:  Blood Updated:  09/04/18 1653     Glucose 354 (H) mg/dL     Narrative:       Meter: [id number] : [id number] [employer] CNA-POOL    POC Glucose Once [[id number]]  (Abnormal) Collected:  09/04/18 1148    Specimen:  Blood Updated:  09/04/18 1156     Glucose 190 (H) mg/dL     Narrative:       Meter: [id number] : [id number] [employer] CNA-POOL    Basic Metabolic Panel [[id number]]  (Abnormal) Collected:  09/04/18 1040    Specimen:  Blood Updated:  09/04/18 1108     Glucose 198 (H) mg/dL      BUN 51 (H) mg/dL      Creatinine 1.75 (H) mg/dL      Sodium 138 mmol/L      Potassium 4.8 mmol/L      Chloride 96 (L) mmol/L      CO2 33.3 (H) mmol/L      Calcium 8.8 mg/dL      eGFR Non African Amer 29 (L) mL/min/1.73      BUN/Creatinine Ratio 29.1 (H)     Anion Gap 8.7 mmol/L     Narrative:       GFR Normal >60  Chronic Kidney Disease <60  Kidney Failure <15    POC Glucose Once [[id number]]  (Abnormal) Collected:  09/04/18 0742    Specimen:  Blood Updated:  09/04/18 0750     Glucose 57 (L) mg/dL     Narrative:       Meter: [id number] : [id number] [employer] CNA-POOL    POC Glucose Once [[id number]]  (Abnormal) Collected:  09/03/18 2015    Specimen:  Blood Updated:  09/03/18 2022     Glucose 297 (H) mg/dL     Narrative:       Meter: [id number] : [id number] Waylon Layne CNA    POC Glucose Once [[id number]]  (Abnormal) Collected:  09/03/18 1706    Specimen:  Blood Updated:  09/03/18 1713     Glucose 167 (H) mg/dL     Narrative:       Meter: [id number]  : [id number] Roger Ocampon CNA    POC Glucose Once [[id number]]  (Abnormal) Collected:  09/03/18 1210    Specimen:  Blood Updated:  09/03/18 1216     Glucose 169 (H) mg/dL     Narrative:       Meter: [id number] : [id number] Roger Ocampon CNA    POC Glucose Once [[id number]]  (Abnormal) Collected:  09/03/18 0732    Specimen:  Blood Updated:  09/03/18 0738     Glucose 161 (H) mg/dL     Narrative:       Meter: [id number] : [id number] Roger Thompson CNA    POC Glucose Once [[id number]]  (Abnormal) Collected:  09/02/18 2045    Specimen:  Blood Updated:  09/02/18 2108     Glucose 267 (H) mg/dL     Narrative:       Meter: [id number] : [id number] Tiffanie Madison RN    POC Glucose Once [[id number]]  (Normal) Collected:  09/02/18 1629    Specimen:  Blood Updated:  09/02/18 1635     Glucose 113 mg/dL     Narrative:       Meter: [id number] : [id number] Colton Romero NURSING ASSISTANT    POC Glucose Once [[id number]]  (Abnormal) Collected:  09/02/18 1125    Specimen:  Blood Updated:  09/02/18 1142     Glucose 367 (H) mg/dL     Narrative:       Meter: [id number] : [id number] Celina Tolbert NURSING ASSISTANT    POC Glucose Once [[id number]]  (Abnormal) Collected:  09/02/18 0737    Specimen:  Blood Updated:  09/02/18 0744     Glucose 134 (H) mg/dL     Narrative:       Meter: [id number] : [id number] Celina Tolbert NURSING ASSISTANT    Basic Metabolic Panel [[id number]]  (Abnormal) Collected:  09/02/18 0418    Specimen:  Blood Updated:  09/02/18 0531     Glucose 191 (H) mg/dL      BUN 53 (H) mg/dL      Creatinine 2.14 (H) mg/dL      Sodium 141 mmol/L      Potassium 4.7 mmol/L      Chloride 96 (L) mmol/L      CO2 34.7 (H) mmol/L      Calcium 7.8 (L) mg/dL      eGFR Non African Amer 23 (L) mL/min/1.73      BUN/Creatinine Ratio 24.8     Anion Gap 10.3 mmol/L     Narrative:       GFR Normal >60  Chronic Kidney Disease <60  Kidney Failure <15    POC Glucose Once [[id number]]  (Abnormal) Collected:  09/01/18 2057    Specimen:   Blood Updated:  09/01/18 2104     Glucose 238 (H) mg/dL     Narrative:       Meter: [id number] : [id number] Rickey SAENZ    POC Glucose Once [[id number]]  (Abnormal) Collected:  09/01/18 2007    Specimen:  Blood Updated:  09/01/18 2022     Glucose 238 (H) mg/dL     Narrative:       Meter: [id number] : [id number] Sylvia Pritchett RN    POC Glucose Once [[id number]]  (Abnormal) Collected:  09/01/18 1724    Specimen:  Blood Updated:  09/01/18 1730     Glucose 256 (H) mg/dL     Narrative:       Meter: [id number] : [id number] Roger Thompson CNA    POC Glucose Once [[id number]]  (Abnormal) Collected:  09/01/18 1141    Specimen:  Blood Updated:  09/01/18 1147     Glucose 186 (H) mg/dL     Narrative:       Meter: [id number] : [id number] Roger Thompson CNA    POC Glucose Once [[id number]]  (Normal) Collected:  09/01/18 0932    Specimen:  Blood Updated:  09/01/18 0939     Glucose 85 mg/dL     Narrative:       Meter: [id number] : [id number] Rajendra Weinberg RN    POC Glucose Once [[id number]]  (Abnormal) Collected:  09/01/18 0724    Specimen:  Blood Updated:  09/01/18 0731     Glucose 58 (L) mg/dL     Narrative:       Meter: [id number] : [id number] Roger Thompson CNA    POC Glucose Once [[id number]]  (Abnormal) Collected:  09/01/18 0606    Specimen:  Blood Updated:  09/01/18 0613     Glucose 59 (L) mg/dL     Narrative:       Meter: [id number] : [id number] Sylvia Pritchett RN    Basic Metabolic Panel [[id number]]  (Abnormal) Collected:  09/01/18 0409    Specimen:  Blood Updated:  09/01/18 0507     Glucose 51 (L) mg/dL      BUN 42 (H) mg/dL      Creatinine 2.05 (H) mg/dL      Sodium 143 mmol/L      Potassium 4.4 mmol/L      Chloride 97 (L) mmol/L      CO2 37.3 (H) mmol/L      Calcium 8.5 (L) mg/dL      eGFR Non African Amer 24 (L) mL/min/1.73      BUN/Creatinine Ratio 20.5     Anion Gap 8.7 mmol/L     Narrative:       GFR Normal >60  Chronic Kidney Disease <60  Kidney Failure <15    POC Glucose Once [[id number]]   (Abnormal) Collected:  08/31/18 1953    Specimen:  Blood Updated:  08/31/18 2001     Glucose 418 (H) mg/dL     Narrative:       Critical repeat  DR NOTIFIED Meter: [id number] : [id number] Sylvia SIMON    Respiratory Panel, PCR - Swab, Nasopharynx [[id number]]  (Normal) Collected:  08/31/18 1104    Specimen:  Swab from Nasopharynx Updated:  08/31/18 1822     ADENOVIRUS, PCR Not Detected     Coronavirus 229E Not Detected     Coronavirus HKU1 Not Detected     Coronavirus NL63 Not Detected     Coronavirus OC43 Not Detected     Human Metapneumovirus Not Detected     Human Rhinovirus/Enterovirus Not Detected     Influenza B PCR Not Detected     Parainfluenza Virus 1 Not Detected     Parainfluenza Virus 2 Not Detected     Parainfluenza Virus 3 Not Detected     Parainfluenza Virus 4 Not Detected     Bordetella pertussis pcr Not Detected     Influenza A H1 2009 PCR Not Detected     Chlamydophila pneumoniae PCR Not Detected     Mycoplasma pneumo by PCR Not Detected     Influenza A PCR Not Detected     Influenza A H3 Not Detected     Influenza A H1 Not Detected     RSV, PCR Not Detected    POC Glucose Once [[id number]]  (Abnormal) Collected:  08/31/18 1622    Specimen:  Blood Updated:  08/31/18 1631     Glucose 313 (H) mg/dL     Narrative:       Meter: [id number] : [id number] Roger Thompson CNA    POC Glucose Once [[id number]]  (Abnormal) Collected:  08/31/18 1224    Specimen:  Blood Updated:  08/31/18 1249     Glucose 402 (H) mg/dL     Narrative:       Meter: [id number] : [id number] Rajendra Weinberg RN    T4, Free [[id number]]  (Abnormal) Collected:  08/31/18 0317    Specimen:  Blood Updated:  08/31/18 1205     Free T4 1.82 (H) ng/dL     Troponin [[id number]]  (Normal) Collected:  08/31/18 0927    Specimen:  Blood Updated:  08/31/18 0956     Troponin T 0.011 ng/mL     Narrative:       Troponin T Reference Ranges:  Less than 0.03 ng/mL:    Negative for AMI  0.03 to 0.09 ng/mL:      Indeterminant for AMI  Greater than  0.09 ng/mL: Positive for AMI    Troponin [[id number]]  (Normal) Collected:  08/31/18 0317    Specimen:  Blood Updated:  08/31/18 0502     Troponin T 0.023 ng/mL     Narrative:       Troponin T Reference Ranges:  Less than 0.03 ng/mL:    Negative for AMI  0.03 to 0.09 ng/mL:      Indeterminant for AMI  Greater than 0.09 ng/mL: Positive for AMI    TSH [[id number]]  (Abnormal) Collected:  08/31/18 0317    Specimen:  Blood Updated:  08/31/18 0502     TSH 0.016 (L) mIU/mL     Basic Metabolic Panel [[id number]]  (Abnormal) Collected:  08/31/18 0317    Specimen:  Blood Updated:  08/31/18 0502     Glucose 145 (H) mg/dL      BUN 28 (H) mg/dL      Creatinine 1.40 (H) mg/dL      Sodium 144 mmol/L      Potassium 4.8 mmol/L      Chloride 99 mmol/L      CO2 33.9 (H) mmol/L      Calcium 9.0 mg/dL      eGFR Non African Amer 38 (L) mL/min/1.73      BUN/Creatinine Ratio 20.0     Anion Gap 11.1 mmol/L     Narrative:       GFR Normal >60  Chronic Kidney Disease <60  Kidney Failure <15    Hemoglobin A1c [[id number]]  (Abnormal) Collected:  08/31/18 0317    Specimen:  Blood Updated:  08/31/18 0458     Hemoglobin A1C 7.40 (H) %     Narrative:       Hemoglobin A1C Ranges:    Increased Risk for Diabetes  5.7% to 6.4%  Diabetes                     >= 6.5%  Diabetic Goal                < 7.0%    Lipid Panel [[id number]]  (Abnormal) Collected:  08/31/18 0317    Specimen:  Blood Updated:  08/31/18 0448     Total Cholesterol 126 mg/dL      Triglycerides 88 mg/dL      HDL Cholesterol 64 (H) mg/dL      LDL Cholesterol  44 mg/dL      VLDL Cholesterol 17.6 mg/dL      LDL/HDL Ratio 0.69    Magnesium [[id number]]  (Normal) Collected:  08/31/18 0317    Specimen:  Blood Updated:  08/31/18 0448     Magnesium 1.8 mg/dL     Phosphorus [[id number]]  (Normal) Collected:  08/31/18 0317    Specimen:  Blood Updated:  08/31/18 0448     Phosphorus 3.5 mg/dL     CBC (No Diff) [[id number]]  (Abnormal) Collected:  08/31/18 0317    Specimen:  Blood Updated:  08/31/18 0425      WBC 9.76 10*3/mm3      RBC 3.67 (L) 10*6/mm3      Hemoglobin 10.8 (L) g/dL      Hematocrit 34.3 (L) %      MCV 93.5 fL      MCH 29.4 pg      MCHC 31.5 g/dL      RDW 11.8 %      RDW-SD 39.9 fl      MPV 10.7 (H) fL      Platelets 185 10*3/mm3     BNP [[id number]]  (Abnormal) Collected:  08/30/18 2222    Specimen:  Blood Updated:  08/30/18 2257     proBNP 965.3 (H) pg/mL     Narrative:       Among patients with dyspnea, NT-proBNP is highly sensitive for the detection of acute congestive heart failure. In addition NT-proBNP of <300 pg/ml effectively rules out acute congestive heart failure with 99% negative predictive value.    Troponin [[id number]]  (Normal) Collected:  08/30/18 2222    Specimen:  Blood Updated:  08/30/18 2249     Troponin T 0.029 ng/mL     Narrative:       Troponin T Reference Ranges:  Less than 0.03 ng/mL:    Negative for AMI  0.03 to 0.09 ng/mL:      Indeterminant for AMI  Greater than 0.09 ng/mL: Positive for AMI    Comprehensive Metabolic Panel [[id number]]  (Abnormal) Collected:  08/30/18 2222    Specimen:  Blood Updated:  08/30/18 2244     Glucose 87 mg/dL      BUN 28 (H) mg/dL      Creatinine 1.47 (H) mg/dL      Sodium 144 mmol/L      Potassium 5.1 mmol/L      Chloride 98 mmol/L      CO2 35.4 (H) mmol/L      Calcium 9.0 mg/dL      Total Protein 7.0 g/dL      Albumin 3.70 g/dL      ALT (SGPT) 11 U/L      AST (SGOT) 14 U/L      Alkaline Phosphatase 119 U/L      Total Bilirubin 0.4 mg/dL      eGFR Non African Amer 36 (L) mL/min/1.73      Globulin 3.3 gm/dL      A/G Ratio 1.1 g/dL      BUN/Creatinine Ratio 19.0     Anion Gap 10.6 mmol/L     Protime-INR [[id number]]  (Normal) Collected:  08/30/18 2222    Specimen:  Blood Updated:  08/30/18 2243     Protime 13.7 Seconds      INR 1.05    Narrative:       Therapeutic Ranges for INR: 2.0-3.0 (PT 20-30)                              2.5-3.5 (PT 25-34)    aPTT [[id number]]  (Normal) Collected:  08/30/18 2222    Specimen:  Blood Updated:  08/30/18 2243     PTT  29.3 seconds     Narrative:       PTT = The equivalent PTT values for the therapeutic range of heparin levels at 0.1 to 0.7 U/ml are 53 to 110 seconds.    Blood Gas, Arterial [[id number]]  (Abnormal) Collected:  08/30/18 2225    Specimen:  Arterial Blood Updated:  08/30/18 2235     Site Left Radial     Kevyn's Test Positive     pH, Arterial 7.401 pH units      pCO2, Arterial 57.5 (H) mm Hg      pO2, Arterial 64.4 (L) mm Hg      HCO3, Arterial 35.6 (H) mmol/L      Base Excess, Arterial 9.2 (H) mmol/L      O2 Saturation, Arterial 93.5 (L) %      Hemoglobin, Blood Gas 10.8 (L) g/dL      Temperature 37.0 C      Barometric Pressure for Blood Gas 742 mmHg      Modality Nasal Cannula     Flow Rate 5.0 lpm      Ventilator Mode NA     Collected by [id number]     pCO2, Temperature Corrected 57.5 (H) mm Hg      pH, Temp Corrected 7.401 pH Units      pO2, Temperature Corrected 64.4 (L) mm Hg     CBC & Differential [[id number]] Collected:  08/30/18 2222    Specimen:  Blood Updated:  08/30/18 2227    Narrative:       The following orders were created for panel order CBC & Differential.  Procedure                               Abnormality         Status                     ---------                               -----------         ------                     CBC Auto Differential[[id number]]        Abnormal            Final result                 Please view results for these tests on the individual orders.    CBC Auto Differential [[id number]]  (Abnormal) Collected:  08/30/18 2222    Specimen:  Blood Updated:  08/30/18 2227     WBC 10.46 10*3/mm3      RBC 3.65 (L) 10*6/mm3      Hemoglobin 10.8 (L) g/dL      Hematocrit 34.2 (L) %      MCV 93.7 fL      MCH 29.6 pg      MCHC 31.6 g/dL      RDW 11.8 %      RDW-SD 40.4 fl      MPV 10.3 fL      Platelets 183 10*3/mm3      Neutrophil % 70.0 %      Lymphocyte % 20.0 %      Monocyte % 6.3 %      Eosinophil % 3.1 %      Basophil % 0.3 %      Immature Grans % 0.3 %      Neutrophils, Absolute 7.33  10*3/mm3      Lymphocytes, Absolute 2.09 10*3/mm3      Monocytes, Absolute 0.66 10*3/mm3      Eosinophils, Absolute 0.32 (H) 10*3/mm3      Basophils, Absolute 0.03 10*3/mm3      Immature Grans, Absolute 0.03 10*3/mm3      nRBC 0.0 /100 WBC         Imaging Results (most recent)     Procedure Component Value Units Date/Time    CT Chest Without Contrast [[id number]] Collected:  09/06/18 1053     Updated:  09/06/18 1104    Narrative:       CT CHEST, NONCONTRAST, 9/6/2018     HISTORY:  63-year-old female recently admitted to the hospital with one week  history shortness of breath. Past history right lung surgery (no details  available).     TECHNIQUE:    CT examination of the chest without IV contrast. Radiation dose  reduction techniques included automated exposure control or exposure  modulation based on body size. Radiation audit for CT and nuclear  cardiology exams in the last 12 months: 0.      COMPARISON:  *  Chest x-ray, 9/4/2018 and 8/30/2018.  *  CT chest, 7/27/2017 and 9/21/2015.  *  High-resolution CT chest, 9/23/2014.     FINDINGS:    Chronic postthoracotomy changes in the right hemithorax including old  right upper rib resections and mild chronic elevation right  hemidiaphragm. There are also multiple old left-sided rib fracture  deformities.     There is chronic coarse fibrotic scarring in the peripheral right lower  lobe with associated retractile bronchiectasis, unchanged since prior  exams.      Mild to moderate diffuse centrilobular pulmonary emphysema. Bronchial  wall thickening throughout the central portions of both lungs, greater  on the right, may be associated with acute or chronic bronchitis. There  is also evidence of inflammatory bronchiolitis within the periphery of  the posterior segment right upper lobe.     No airspace consolidation or pleural effusion. No suspicious mass or  adenopathy within the mediastinum or pulmonary sanjuana. Small scattered  mediastinal lymph nodes are stable.     Heart  size is normal. Mild central pulmonary artery enlargement. Normal  caliber thoracic aorta. Limited upper abdominal images show  cholelithiasis.       Impression:       1. Pulmonary emphysema. Central bronchial wall thickening may be  associated with acute or chronic bronchitis. Probable bronchiolitis in  the posterior segment right upper lobe.  2. No airspace consolidation or pleural effusion.  3. Chronic coarse fibrotic scarring in the peripheral right lung base  with some associated bronchiectasis, unchanged since older studies.  4. Old postthoracotomy change in the right hemithorax. Bilateral chronic  postoperative and/or post traumatic rib deformities.     This report was finalized on 9/6/2018 11:02 AM by Dr. David Lester MD.       XR Chest PA & Lateral [[id number]] Collected:  09/04/18 0810     Updated:  09/04/18 0814    Narrative:       Chest x-ray     INDICATION: Increasing shortness of air for 2 days. COPD.     COMPARISON: 8/30/2018     FINDINGS: PA and lateral views of the chest. Heart size stable. There is  emphysema. Left lung is clear. On the right side, there is infiltrate or  atelectasis in the lower lobe. Right-sided chronic rib deformities are  noted. No pneumothorax.       Impression:       Right lower lobe infiltrate or atelectasis. COPD.     This report was finalized on 9/4/2018 8:12 AM by Dr. Kike Cotto MD.       XR Chest 1 View [[id number]] Collected:  08/31/18 0718     Updated:  08/31/18 0726    Narrative:       CHEST X-RAY, 8/30/2018         HISTORY:  63-year-old female in the ED complaining of one week history shortness  of air. Past history includes COPD and congestive heart failure.  Previous right lung surgery.     TECHNIQUE:  AP upright chest x-ray.     COMPARISON:  *  Chest x-ray, 5/22/2018 and 5/3/2017.  *  CT chest, 7/27/2017 and 9/21/2015.     FINDINGS:  The examination shows COPD with chronic fibrosis and volume loss in the  right lower lung and chronic elevation of the  right hemidiaphragm.  Previous right lung surgery with postoperative right upper rib  deformities. There are also multiple old healed left fractures.     The heart is mildly enlarged, and the central pulmonary arteries are  prominent. Pulmonary venous vascularity is within normal limits. No  visible airspace consolidation or pleural effusion.       Impression:       1. Stable mild cardiomegaly.  2. Postop changes right lung surgery with rib deformities and chronic  elevation right hemidiaphragm.  3. COPD.  4. Chronic pulmonary and pleural scarring in the right lower chest.     This report was finalized on 8/31/2018 7:24 AM by Dr. David Lester MD.           PROCEDURES: NONE    Condition on Discharge:  stable    Physical Exam at Discharge  Vital Signs  Temp:  [97.4 °F (36.3 °C)-98.3 °F (36.8 °C)] 97.9 °F (36.6 °C)  Heart Rate:  [74-90] 74  Resp:  [18-24] 20  BP: (114-157)/(55-77) 114/55    Physical Exam:  Physical Exam   Constitutional: Patient appears well-developed and well-nourished and in no acute distress, morbidly obese   HEENT:   Head: Normocephalic and atraumatic.   Eyes:  Pupils are equal, round, and reactive to light. EOM are intact. Sclera are anicteric and non-injected.  Mouth and Throat: Patient has moist mucous membranes. Oropharynx is clear of any erythema or exudate.     Neck: unable to assess secondary to habitus.  Cardiovascular: Regular rate, regular rhythm, S1 normal and S2 normal.  Exam reveals no gallop and no friction rub.  distant heart sounds  Pulmonary/Chest: Lungs diminished all fields, RLL crackles  Abdominal: Obese, soft. Bowel sounds are normal. No distension and no mass. There is no hepatosplenomegaly. There is no tenderness.   Musculoskeletal: Normal Muscle tone  Extremities: 2+ bilateral LE non-pitting edema. Pulses are palpable in all 4 extremities.  Neurological: Patient is alert and oriented to person, place, and time. Cranial nerves II-XII are grossly intact with no focal  deficits.  Skin: Skin is warm. No rash noted. Nails show no clubbing.  No cyanosis or erythema.    Discharge Disposition  Home    Visiting Nurse:    Ordered, patient verbally refuses     Home PT/OT:  Ordered, patient verbally refuses     Home Safety Evaluation:  Ordered, patient verbally refuses     DME  Ordered, patient verbally refuses     Discharge Diet:    Dietary Orders     Start     Ordered    08/31/18 1210  Diet Regular; Consistent Carbohydrate, Cardiac  Diet Effective Now     Question Answer Comment   Diet Texture / Consistency Regular    Common Modifiers Consistent Carbohydrate    Common Modifiers Cardiac        08/31/18 1216        Activity at Discharge:  As tolerated    Pre-discharge education  Diabetic, Smoking, Cardiac, medications, follow up    Follow-up Appointments  No future appointments.  Additional Instructions for the Follow-ups that You Need to Schedule     Discharge Follow-up with PCP    As directed      Currently Documented PCP:  Jina Vaughan MD  PCP Phone Number:  [phone]    Follow Up Details:  1 week         Discharge Follow-up with Specialty: Nephrology; 2 Weeks    As directed      Specialty:  Nephrology    Follow Up:  2 Weeks         Discharge Follow-up with Specified Provider: Dr. Barreto; 2 Weeks    As directed      To:  Dr. Barreto    Follow Up:  2 Weeks             Test Results Pending at Discharge: NONE     Paola Silva, OPAL  09/06/18  1:45 PM    Time: Discharge over 30 min (if over 30 minutes give explanation as to why it took greater than 30 minutes)  Secondary to:   Coordination of care/follow up  Medication reconciliation  D/W patient and family                  Electronically signed by Elis Judd DO at 9/6/2018  2:37 PM       Discharge Order     Start     Ordered    09/06/18 1319  Discharge patient  Once     Expected Discharge Date:  09/06/18    Expected Discharge Time:  Afternoon    Discharge Disposition:  Home or Self Care    Physician of Record for  Attribution - Please select from Treatment Team:  SHANELL MERINO [[id number]]    Review needed by CMO to determine Physician of Record:  No    Please choose which facility the patient is currently admitted if they are being discharged to another facility or unit.:  [facility]       Question Answer Comment   Physician of Record for Attribution - Please select from Treatment Team SHANELL MERINO    Review needed by CMO to determine Physician of Record No    Please choose which facility the patient is currently admitted if they are being discharged to another facility or unit. [facility]        09/06/18 [id number]

## 2018-09-11 ENCOUNTER — OFFICE VISIT (OUTPATIENT)
Dept: INTERNAL MEDICINE | Facility: CLINIC | Age: 63
End: 2018-09-11

## 2018-09-11 ENCOUNTER — TELEPHONE (OUTPATIENT)
Dept: INTERNAL MEDICINE | Facility: CLINIC | Age: 63
End: 2018-09-11

## 2018-09-11 ENCOUNTER — READMISSION MANAGEMENT (OUTPATIENT)
Dept: CALL CENTER | Facility: HOSPITAL | Age: 63
End: 2018-09-11

## 2018-09-11 VITALS
RESPIRATION RATE: 20 BRPM | TEMPERATURE: 98.3 F | SYSTOLIC BLOOD PRESSURE: 140 MMHG | DIASTOLIC BLOOD PRESSURE: 70 MMHG | HEIGHT: 66 IN | BODY MASS INDEX: 41.62 KG/M2 | OXYGEN SATURATION: 90 % | HEART RATE: 73 BPM | WEIGHT: 259 LBS

## 2018-09-11 DIAGNOSIS — E03.9 HYPOTHYROIDISM, ACQUIRED: ICD-10-CM

## 2018-09-11 DIAGNOSIS — IMO0002 UNCONTROLLED TYPE 2 DIABETES MELLITUS WITH STAGE 4 CHRONIC KIDNEY DISEASE, WITHOUT LONG-TERM CURRENT USE OF INSULIN: ICD-10-CM

## 2018-09-11 DIAGNOSIS — N18.4 CKD (CHRONIC KIDNEY DISEASE) STAGE 4, GFR 15-29 ML/MIN (HCC): ICD-10-CM

## 2018-09-11 DIAGNOSIS — J44.9 CHRONIC OBSTRUCTIVE PULMONARY DISEASE, UNSPECIFIED COPD TYPE (HCC): ICD-10-CM

## 2018-09-11 DIAGNOSIS — I10 ESSENTIAL HYPERTENSION: ICD-10-CM

## 2018-09-11 DIAGNOSIS — Z09 HOSPITAL DISCHARGE FOLLOW-UP: Primary | ICD-10-CM

## 2018-09-11 DIAGNOSIS — I50.32 CHRONIC DIASTOLIC CONGESTIVE HEART FAILURE (HCC): ICD-10-CM

## 2018-09-11 DIAGNOSIS — E16.2 HYPOGLYCEMIA: ICD-10-CM

## 2018-09-11 DIAGNOSIS — J96.01 ACUTE HYPOXEMIC RESPIRATORY FAILURE (HCC): ICD-10-CM

## 2018-09-11 PROCEDURE — 99214 OFFICE O/P EST MOD 30 MIN: CPT | Performed by: INTERNAL MEDICINE

## 2018-09-11 NOTE — OUTREACH NOTE
CHF Week 1 Survey      Responses   Facility patient discharged from?  [facility]   Does the patient have one of the following disease processes/diagnoses(primary or secondary)?  CHF   Is there a successful TCM telephone encounter documented?  No   CHF Week 1 attempt successful?  Yes   Call start time  1007   Call end time  1012   Discharge diagnosis  Volume overload with grade 1 diastolic dysfunction    Is patient permission given to speak with other caregiver?  Yes   Person spoke with today (if not patient) and relationship  sister/Sania Goins   Meds reviewed with patient/caregiver?  Yes   Is the patient having any side effects they believe may be caused by any medication additions or changes?  No   Does the patient have all medications ordered at discharge?  Yes   Is the patient taking all medications as directed (includes completed medication regime)?  Yes   Does the patient have a primary care provider?   Yes   Does the patient have an appointment with their PCP within 7 days of discharge?  Yes   Comments regarding PCP  Has an appt today at PCP [facility] Vaughan 09/11/2018   Has the patient kept scheduled appointments due by today?  Yes   What is the Home health agency?   [facility]    Has home health visited the patient within 72 hours of discharge?  Yes   Psychosocial issues?  No   Did the patient receive a copy of their discharge instructions?  Yes   Nursing interventions  Reviewed instructions with patient   What is the patient's perception of their health status since discharge?  Improving   Nursing interventions  Nurse provided patient education   Is the patient weighing daily?  No   Does the patient have scales?  No   Daily weight interventions  Education provided on importance of daily weight   Is the patient able to teach back Heart Failure diet management?  Yes   Is the patient able to teach back Heart Failure Zones?  No [Needs further education]   Is the patient able to teach back signs and symptoms of  worsening condition? (i.e. weight gain, shortness of air, etc.)  Yes   Is the patient/caregiver able to teach back the hierarchy of who to call/visit for symptoms/problems? PCP, Specialist, Home health nurse, Urgent Care, ED, 911  Yes    CHF Week 1 call completed?  Yes          Jemal Friend RN

## 2018-09-11 NOTE — PROGRESS NOTES
Jared Govea is a 63 y.o. female, who presents with a chief complaint of   Chief Complaint   Patient presents with   • Follow-up     HOS f/u 8/30 to 9/6   • Diabetes   • COPD       62 yo F here for hospital follow up and doing well. She is still SOB and tired. Her swelling has gotten better in her legs and has been taking Bumex 1mg TID.     She is still taking Levaquin and still has 3-4 days and has follow up with Dr. Hoyt on Thursday. Currently on 3L sitting in her scooter today.     She has been trying to wean herself off pain medications. Wants to stop it today.     Her BG was running low last night at 41 and she held her Triseba last night, however, she states that she did not have anything to eat all day long. They have been checking her BG's, but do not have records today with her.     She was on 300mcg of synthroid at the hospital (had been taking 250mcg per day previously) and they reduced dose to 125mcg. No side effects from dramatic decrease yet, unsure of why they did this.          The following portions of the patient's history were reviewed and updated as appropriate: allergies, current medications, past family history, past medical history, past social history, past surgical history and problem list.    Allergies: Erythromycin; Erythromycin ethylsuccinate; Ativan [lorazepam]; and Tape    Current Outpatient Prescriptions:   •  acetaminophen (TYLENOL) 325 MG tablet, Take 2 tablets by mouth Every 4 (Four) Hours As Needed for Mild Pain , Headache or Fever., Disp: , Rfl:   •  amLODIPine (NORVASC) 2.5 MG tablet, Take 1 tablet by mouth Daily., Disp: 30 tablet, Rfl: 1  •  aspirin 81 MG EC tablet, Take 81 mg by mouth daily., Disp: , Rfl:   •  atorvastatin (LIPITOR) 40 MG tablet, Take 1 tablet by mouth Daily. for cholesterol., Disp: 90 tablet, Rfl: 1  •  B Complex Vitamins (VITAMIN B COMPLEX PO), Take 1 tablet by mouth daily., Disp: , Rfl:   •  bumetanide (BUMEX) 1 MG tablet, Take 1 tablet by mouth 3  (Three) Times a Day., Disp: 90 tablet, Rfl: 1  •  cetirizine (ZyrTEC) 10 MG tablet, Take 10 mg by mouth every night., Disp: , Rfl:   •  clotrimazole-betamethasone (LOTRISONE) 1-0.05 % cream, Apply  topically 2 (Two) Times a Day., Disp: 180 g, Rfl: 3  •  dimenhyDRINATE (DRAMAMINE) 50 MG tablet, Take 1 tablet by mouth Daily As Needed for movement disorders., Disp: 30 tablet, Rfl: 0  •  famotidine (PEPCID) 20 MG tablet, Take 20 mg by mouth daily., Disp: , Rfl:   •  fluticasone-salmeterol (ADVAIR DISKUS) 250-50 MCG/DOSE DISKUS, Inhale 1 puff 2 (Two) Times a Day. For COPD and rinse mouth after use, Disp: 60 each, Rfl: 5  •  guaiFENesin (MUCINEX) 600 MG 12 hr tablet, Take 1,200 mg by mouth 2 (Two) Times a Day., Disp: , Rfl:   •  HYDROcodone-acetaminophen (NORCO)  MG per tablet, Take 1 tablet by mouth Every 8 (Eight) Hours As Needed for Moderate Pain ., Disp: 90 tablet, Rfl: 0  •  INCRUSE ELLIPTA 62.5 MCG/INH aerosol powder , , Disp: , Rfl:   •  insulin aspart (NOVOLOG FLEXPEN) 100 UNIT/ML solution pen-injector sc pen, SSI (Patient taking differently: 3 (Three) Times a Day With Meals. SSI), Disp: 15 mL, Rfl: 3  •  Insulin Degludec (TRESIBA FLEXTOUCH) 200 UNIT/ML solution pen-injector, Inject 70 Units under the skin Daily. (Patient taking differently: Inject 70 Units under the skin into the appropriate area as directed Every Night.), Disp: 9 mL, Rfl: 3  •  levalbuterol (XOPENEX) 0.63 MG/3ML nebulizer solution, Take 1 ampule by nebulization every 4 (four) hours as needed for wheezing., Disp: , Rfl:   •  levoFLOXacin (LEVAQUIN) 500 MG tablet, Take 1 tablet by mouth Daily for 6 days., Disp: 6 tablet, Rfl: 0  •  levothyroxine (SYNTHROID, LEVOTHROID) 125 MCG tablet, Take 2 tablets by mouth Every Morning., Disp: 30 tablet, Rfl: 1  •  metoprolol tartrate (LOPRESSOR) 100 MG tablet, Take 1 tablet by mouth Every 12 (Twelve) Hours., Disp: 30 tablet, Rfl: 1  •  PROAIR  (90 Base) MCG/ACT inhaler, INHALE TWO PUFFS BY MOUTH  "EVERY 4 HOURS AS NEEDED FOR WHEEZING, Disp: 18 g, Rfl: 5  •  Probiotic Product (ALIGN) 4 MG capsule, Take 4 mg by mouth Daily., Disp: 30 capsule, Rfl: 6  •  QUEtiapine (SEROquel) 100 MG tablet, TAKE ONE TABLET BY MOUTH TWICE A DAY, Disp: 180 tablet, Rfl: 0  •  sennosides-docusate sodium (SENOKOT-S) 8.6-50 MG tablet, Take 2 tablets by mouth At Night As Needed for Constipation., Disp: , Rfl:   •  vitamin D (ERGOCALCIFEROL) 75205 units capsule capsule, Take 1 capsule by mouth Every 7 (Seven) Days. On tuesday, Disp: 12 capsule, Rfl: 3  Medications Discontinued During This Encounter   Medication Reason   • Umeclidinium Bromide (INCRUSE ELLIPTA IN) Formulary change       Review of Systems   Constitutional: Positive for fatigue. Negative for chills and fever.   Respiratory: Positive for cough and shortness of breath (better than before). Negative for wheezing.    Cardiovascular: Positive for leg swelling (better).   Gastrointestinal: Negative for abdominal pain, constipation, diarrhea and nausea.   Skin: Negative for rash.             /70 (BP Location: Left arm, Patient Position: Sitting, Cuff Size: Large Adult)   Pulse 73   Temp 98.3 °F (36.8 °C) (Oral)   Resp 20   Ht 167.6 cm (65.98\")   Wt 117 kg (259 lb)   LMP  (LMP Unknown)   SpO2 90% Comment: 3L O2 NC  BMI 41.82 kg/m²       Physical Exam   Constitutional: She is oriented to person, place, and time. She appears well-developed and well-nourished. No distress.   Morbidly obese sitting in scooter with supplemental oxygen    HENT:   Head: Normocephalic and atraumatic.   Right Ear: External ear normal.   Left Ear: External ear normal.   Mouth/Throat: Oropharynx is clear and moist. No oropharyngeal exudate.   Eyes: Conjunctivae are normal. Right eye exhibits no discharge. Left eye exhibits no discharge. No scleral icterus.   Cardiovascular: Normal rate, regular rhythm and normal heart sounds.  Exam reveals no gallop and no friction rub.    No murmur " heard.  Pulmonary/Chest: Effort normal. No respiratory distress. She has decreased breath sounds (in posterior bases). She has no wheezes. She has no rales.   Abdominal: She exhibits no mass.   Neurological: She is alert and oriented to person, place, and time. She exhibits normal muscle tone.   Skin: Skin is warm. Capillary refill takes less than 2 seconds. No rash noted.   Psychiatric: She has a normal mood and affect. Her behavior is normal.   Nursing note and vitals reviewed.        Results for orders placed or performed during the hospital encounter of 08/30/18   Respiratory Panel, PCR - Swab, Nasopharynx   Result Value Ref Range    ADENOVIRUS, PCR Not Detected Not Detected    Coronavirus 229E Not Detected Not Detected    Coronavirus HKU1 Not Detected Not Detected    Coronavirus NL63 Not Detected Not Detected    Coronavirus OC43 Not Detected Not Detected    Human Metapneumovirus Not Detected Not Detected    Human Rhinovirus/Enterovirus Not Detected Not Detected    Influenza B PCR Not Detected Not Detected    Parainfluenza Virus 1 Not Detected Not Detected    Parainfluenza Virus 2 Not Detected Not Detected    Parainfluenza Virus 3 Not Detected Not Detected    Parainfluenza Virus 4 Not Detected Not Detected    Bordetella pertussis pcr Not Detected Not Detected    Influenza A H1 2009 PCR Not Detected Not Detected    Chlamydophila pneumoniae PCR Not Detected Not Detected    Mycoplasma pneumo by PCR Not Detected Not Detected    Influenza A PCR Not Detected Not Detected    Influenza A H3 Not Detected Not Detected    Influenza A H1 Not Detected Not Detected    RSV, PCR Not Detected Not Detected   Respiratory Culture - Sputum, Cough   Result Value Ref Range    Respiratory Culture >100,000 CFU/mL Pseudomonas species MDRO (C)     Respiratory Culture >100,000 CFU/mL Normal Respiratory Zeinab     Gram Stain Result Rare (1+) Epithelial cells per low power field     Gram Stain Result Many (4+) WBCs seen     Gram Stain Result        Rare (1+) Mixed bacterial morphotypes seen on Gram Stain       Susceptibility    Pseudomonas species MDRO - MIRZA     Amikacin 32 Intermediate ug/ml     Cefepime 16 Intermediate ug/ml     Ceftazidime 4 Susceptible ug/ml     Ciprofloxacin 2 Intermediate ug/ml     Gentamicin >=16 Resistant ug/ml     Levofloxacin 2 Susceptible ug/ml     Piperacillin + Tazobactam <=4 Susceptible ug/ml     Tobramycin 8 Intermediate ug/ml   Comprehensive Metabolic Panel   Result Value Ref Range    Glucose 87 65 - 99 mg/dL    BUN 28 (H) 8 - 23 mg/dL    Creatinine 1.47 (H) 0.57 - 1.00 mg/dL    Sodium 144 136 - 145 mmol/L    Potassium 5.1 3.5 - 5.2 mmol/L    Chloride 98 98 - 107 mmol/L    CO2 35.4 (H) 22.0 - 29.0 mmol/L    Calcium 9.0 8.8 - 10.5 mg/dL    Total Protein 7.0 6.0 - 8.5 g/dL    Albumin 3.70 3.50 - 5.20 g/dL    ALT (SGPT) 11 5 - 33 U/L    AST (SGOT) 14 5 - 32 U/L    Alkaline Phosphatase 119 40 - 129 U/L    Total Bilirubin 0.4 0.2 - 1.2 mg/dL    eGFR Non African Amer 36 (L) >60 mL/min/1.73    Globulin 3.3 gm/dL    A/G Ratio 1.1 g/dL    BUN/Creatinine Ratio 19.0 7.0 - 25.0    Anion Gap 10.6 mmol/L   BNP   Result Value Ref Range    proBNP 965.3 (H) 5.0 - 125.0 pg/mL   Troponin   Result Value Ref Range    Troponin T 0.029 0.000 - 0.030 ng/mL   Protime-INR   Result Value Ref Range    Protime 13.7 12.1 - 15.0 Seconds    INR 1.05 0.90 - 1.10   aPTT   Result Value Ref Range    PTT 29.3 24.3 - 38.1 seconds   CBC Auto Differential   Result Value Ref Range    WBC 10.46 4.80 - 10.80 10*3/mm3    RBC 3.65 (L) 4.20 - 5.40 10*6/mm3    Hemoglobin 10.8 (L) 12.0 - 16.0 g/dL    Hematocrit 34.2 (L) 37.0 - 47.0 %    MCV 93.7 81.0 - 99.0 fL    MCH 29.6 27.0 - 31.0 pg    MCHC 31.6 31.0 - 37.0 g/dL    RDW 11.8 11.5 - 14.5 %    RDW-SD 40.4 37.0 - 54.0 fl    MPV 10.3 7.4 - 10.4 fL    Platelets 183 140 - 500 10*3/mm3    Neutrophil % 70.0 45.0 - 70.0 %    Lymphocyte % 20.0 20.0 - 45.0 %    Monocyte % 6.3 3.0 - 8.0 %    Eosinophil % 3.1 0.0 - 4.0 %     Basophil % 0.3 0.0 - 2.0 %    Immature Grans % 0.3 0.0 - 0.5 %    Neutrophils, Absolute 7.33 1.50 - 8.30 10*3/mm3    Lymphocytes, Absolute 2.09 0.60 - 4.80 10*3/mm3    Monocytes, Absolute 0.66 0.00 - 1.00 10*3/mm3    Eosinophils, Absolute 0.32 (H) 0.10 - 0.30 10*3/mm3    Basophils, Absolute 0.03 0.00 - 0.20 10*3/mm3    Immature Grans, Absolute 0.03 0.00 - 0.03 10*3/mm3    nRBC 0.0 0.0 - 0.0 /100 WBC   Blood Gas, Arterial   Result Value Ref Range    Site Left Radial     Ekvyn's Test Positive     pH, Arterial 7.401 7.350 - 7.450 pH units    pCO2, Arterial 57.5 (H) 35.0 - 45.0 mm Hg    pO2, Arterial 64.4 (L) 83.0 - 108.0 mm Hg    HCO3, Arterial 35.6 (H) 20.0 - 26.0 mmol/L    Base Excess, Arterial 9.2 (H) 0.0 - 2.0 mmol/L    O2 Saturation, Arterial 93.5 (L) 94.0 - 99.0 %    Hemoglobin, Blood Gas 10.8 (L) 13.5 - 17.5 g/dL    Temperature 37.0 C    Barometric Pressure for Blood Gas 742 mmHg    Modality Nasal Cannula     Flow Rate 5.0 lpm    Ventilator Mode NA     Collected by [id number]     pCO2, Temperature Corrected 57.5 (H) 35 - 45 mm Hg    pH, Temp Corrected 7.401 7.350 - 7.450 pH Units    pO2, Temperature Corrected 64.4 (L) 83 - 108 mm Hg   Troponin   Result Value Ref Range    Troponin T 0.023 0.000 - 0.030 ng/mL   Troponin   Result Value Ref Range    Troponin T 0.011 0.000 - 0.030 ng/mL   Basic Metabolic Panel   Result Value Ref Range    Glucose 145 (H) 65 - 99 mg/dL    BUN 28 (H) 8 - 23 mg/dL    Creatinine 1.40 (H) 0.57 - 1.00 mg/dL    Sodium 144 136 - 145 mmol/L    Potassium 4.8 3.5 - 5.2 mmol/L    Chloride 99 98 - 107 mmol/L    CO2 33.9 (H) 22.0 - 29.0 mmol/L    Calcium 9.0 8.8 - 10.5 mg/dL    eGFR Non African Amer 38 (L) >60 mL/min/1.73    BUN/Creatinine Ratio 20.0 7.0 - 25.0    Anion Gap 11.1 mmol/L   CBC (No Diff)   Result Value Ref Range    WBC 9.76 4.80 - 10.80 10*3/mm3    RBC 3.67 (L) 4.20 - 5.40 10*6/mm3    Hemoglobin 10.8 (L) 12.0 - 16.0 g/dL    Hematocrit 34.3 (L) 37.0 - 47.0 %    MCV 93.5 81.0 - 99.0 fL     MCH 29.4 27.0 - 31.0 pg    MCHC 31.5 31.0 - 37.0 g/dL    RDW 11.8 11.5 - 14.5 %    RDW-SD 39.9 37.0 - 54.0 fl    MPV 10.7 (H) 7.4 - 10.4 fL    Platelets 185 140 - 500 10*3/mm3   TSH   Result Value Ref Range    TSH 0.016 (L) 0.270 - 4.200 mIU/mL   Hemoglobin A1c   Result Value Ref Range    Hemoglobin A1C 7.40 (H) 4.80 - 5.60 %   Lipid Panel   Result Value Ref Range    Total Cholesterol 126 0 - 200 mg/dL    Triglycerides 88 0 - 150 mg/dL    HDL Cholesterol 64 (H) 40 - 60 mg/dL    LDL Cholesterol  44 0 - 100 mg/dL    VLDL Cholesterol 17.6 7 - 27 mg/dL    LDL/HDL Ratio 0.69    Magnesium   Result Value Ref Range    Magnesium 1.8 1.7 - 2.5 mg/dL   Phosphorus   Result Value Ref Range    Phosphorus 3.5 2.7 - 4.5 mg/dL   T4, Free   Result Value Ref Range    Free T4 1.82 (H) 0.93 - 1.70 ng/dL   Basic Metabolic Panel   Result Value Ref Range    Glucose 51 (L) 65 - 99 mg/dL    BUN 42 (H) 8 - 23 mg/dL    Creatinine 2.05 (H) 0.57 - 1.00 mg/dL    Sodium 143 136 - 145 mmol/L    Potassium 4.4 3.5 - 5.2 mmol/L    Chloride 97 (L) 98 - 107 mmol/L    CO2 37.3 (H) 22.0 - 29.0 mmol/L    Calcium 8.5 (L) 8.8 - 10.5 mg/dL    eGFR Non African Amer 24 (L) >60 mL/min/1.73    BUN/Creatinine Ratio 20.5 7.0 - 25.0    Anion Gap 8.7 mmol/L   Basic Metabolic Panel   Result Value Ref Range    Glucose 191 (H) 65 - 99 mg/dL    BUN 53 (H) 8 - 23 mg/dL    Creatinine 2.14 (H) 0.57 - 1.00 mg/dL    Sodium 141 136 - 145 mmol/L    Potassium 4.7 3.5 - 5.2 mmol/L    Chloride 96 (L) 98 - 107 mmol/L    CO2 34.7 (H) 22.0 - 29.0 mmol/L    Calcium 7.8 (L) 8.8 - 10.5 mg/dL    eGFR Non African Amer 23 (L) >60 mL/min/1.73    BUN/Creatinine Ratio 24.8 7.0 - 25.0    Anion Gap 10.3 mmol/L   Basic Metabolic Panel   Result Value Ref Range    Glucose 198 (H) 65 - 99 mg/dL    BUN 51 (H) 8 - 23 mg/dL    Creatinine 1.75 (H) 0.57 - 1.00 mg/dL    Sodium 138 136 - 145 mmol/L    Potassium 4.8 3.5 - 5.2 mmol/L    Chloride 96 (L) 98 - 107 mmol/L    CO2 33.3 (H) 22.0 - 29.0 mmol/L     Calcium 8.8 8.8 - 10.5 mg/dL    eGFR Non African Amer 29 (L) >60 mL/min/1.73    BUN/Creatinine Ratio 29.1 (H) 7.0 - 25.0    Anion Gap 8.7 mmol/L   Basic Metabolic Panel   Result Value Ref Range    Glucose 183 (H) 65 - 99 mg/dL    BUN 58 (H) 8 - 23 mg/dL    Creatinine 1.97 (H) 0.57 - 1.00 mg/dL    Sodium 137 136 - 145 mmol/L    Potassium 5.1 3.5 - 5.2 mmol/L    Chloride 93 (L) 98 - 107 mmol/L    CO2 32.7 (H) 22.0 - 29.0 mmol/L    Calcium 9.0 8.8 - 10.5 mg/dL    eGFR Non African Amer 26 (L) >60 mL/min/1.73    BUN/Creatinine Ratio 29.4 (H) 7.0 - 25.0    Anion Gap 11.3 mmol/L   CBC Auto Differential   Result Value Ref Range    WBC 8.95 4.80 - 10.80 10*3/mm3    RBC 3.60 (L) 4.20 - 5.40 10*6/mm3    Hemoglobin 10.6 (L) 12.0 - 16.0 g/dL    Hematocrit 33.7 (L) 37.0 - 47.0 %    MCV 93.6 81.0 - 99.0 fL    MCH 29.4 27.0 - 31.0 pg    MCHC 31.5 31.0 - 37.0 g/dL    RDW 11.6 11.5 - 14.5 %    RDW-SD 39.8 37.0 - 54.0 fl    MPV 11.0 (H) 7.4 - 10.4 fL    Platelets 172 140 - 500 10*3/mm3    Neutrophil % 67.1 45.0 - 70.0 %    Lymphocyte % 20.0 20.0 - 45.0 %    Monocyte % 8.3 (H) 3.0 - 8.0 %    Eosinophil % 4.0 0.0 - 4.0 %    Basophil % 0.3 0.0 - 2.0 %    Immature Grans % 0.3 0.0 - 0.5 %    Neutrophils, Absolute 6.00 1.50 - 8.30 10*3/mm3    Lymphocytes, Absolute 1.79 0.60 - 4.80 10*3/mm3    Monocytes, Absolute 0.74 0.00 - 1.00 10*3/mm3    Eosinophils, Absolute 0.36 (H) 0.10 - 0.30 10*3/mm3    Basophils, Absolute 0.03 0.00 - 0.20 10*3/mm3    Immature Grans, Absolute 0.03 0.00 - 0.03 10*3/mm3    nRBC 0.0 0.0 - 0.0 /100 WBC   Procalcitonin   Result Value Ref Range    Procalcitonin 0.06 (L) 0.10 - 0.25 ng/mL   Basic Metabolic Panel   Result Value Ref Range    Glucose 169 (H) 65 - 99 mg/dL    BUN 65 (H) 8 - 23 mg/dL    Creatinine 1.87 (H) 0.57 - 1.00 mg/dL    Sodium 138 136 - 145 mmol/L    Potassium 5.0 3.5 - 5.2 mmol/L    Chloride 94 (L) 98 - 107 mmol/L    CO2 34.1 (H) 22.0 - 29.0 mmol/L    Calcium 8.5 (L) 8.8 - 10.5 mg/dL    eGFR Non African  Amer 27 (L) >60 mL/min/1.73    BUN/Creatinine Ratio 34.8 (H) 7.0 - 25.0    Anion Gap 9.9 mmol/L   CBC Auto Differential   Result Value Ref Range    WBC 9.75 4.80 - 10.80 10*3/mm3    RBC 3.36 (L) 4.20 - 5.40 10*6/mm3    Hemoglobin 9.8 (L) 12.0 - 16.0 g/dL    Hematocrit 31.6 (L) 37.0 - 47.0 %    MCV 94.0 81.0 - 99.0 fL    MCH 29.2 27.0 - 31.0 pg    MCHC 31.0 31.0 - 37.0 g/dL    RDW 11.6 11.5 - 14.5 %    RDW-SD 39.8 37.0 - 54.0 fl    MPV 10.9 (H) 7.4 - 10.4 fL    Platelets 185 140 - 500 10*3/mm3    Neutrophil % 67.5 45.0 - 70.0 %    Lymphocyte % 17.8 (L) 20.0 - 45.0 %    Monocyte % 9.9 (H) 3.0 - 8.0 %    Eosinophil % 4.1 (H) 0.0 - 4.0 %    Basophil % 0.4 0.0 - 2.0 %    Immature Grans % 0.3 0.0 - 0.5 %    Neutrophils, Absolute 6.57 1.50 - 8.30 10*3/mm3    Lymphocytes, Absolute 1.74 0.60 - 4.80 10*3/mm3    Monocytes, Absolute 0.97 0.00 - 1.00 10*3/mm3    Eosinophils, Absolute 0.40 (H) 0.10 - 0.30 10*3/mm3    Basophils, Absolute 0.04 0.00 - 0.20 10*3/mm3    Immature Grans, Absolute 0.03 0.00 - 0.03 10*3/mm3    nRBC 0.0 0.0 - 0.0 /100 WBC   POC Glucose Once   Result Value Ref Range    Glucose 402 (H) 70 - 130 mg/dL   POC Glucose Once   Result Value Ref Range    Glucose 313 (H) 70 - 130 mg/dL   POC Glucose Once   Result Value Ref Range    Glucose 418 (H) 70 - 130 mg/dL   POC Glucose Once   Result Value Ref Range    Glucose 59 (L) 70 - 130 mg/dL   POC Glucose Once   Result Value Ref Range    Glucose 58 (L) 70 - 130 mg/dL   POC Glucose Once   Result Value Ref Range    Glucose 85 70 - 130 mg/dL   POC Glucose Once   Result Value Ref Range    Glucose 186 (H) 70 - 130 mg/dL   POC Glucose Once   Result Value Ref Range    Glucose 256 (H) 70 - 130 mg/dL   POC Glucose Once   Result Value Ref Range    Glucose 238 (H) 70 - 130 mg/dL   POC Glucose Once   Result Value Ref Range    Glucose 238 (H) 70 - 130 mg/dL   POC Glucose Once   Result Value Ref Range    Glucose 134 (H) 70 - 130 mg/dL   POC Glucose Once   Result Value Ref Range     Glucose 367 (H) 70 - 130 mg/dL   POC Glucose Once   Result Value Ref Range    Glucose 113 70 - 130 mg/dL   POC Glucose Once   Result Value Ref Range    Glucose 267 (H) 70 - 130 mg/dL   POC Glucose Once   Result Value Ref Range    Glucose 161 (H) 70 - 130 mg/dL   POC Glucose Once   Result Value Ref Range    Glucose 169 (H) 70 - 130 mg/dL   POC Glucose Once   Result Value Ref Range    Glucose 167 (H) 70 - 130 mg/dL   POC Glucose Once   Result Value Ref Range    Glucose 297 (H) 70 - 130 mg/dL   POC Glucose Once   Result Value Ref Range    Glucose 57 (L) 70 - 130 mg/dL   POC Glucose Once   Result Value Ref Range    Glucose 190 (H) 70 - 130 mg/dL   POC Glucose Once   Result Value Ref Range    Glucose 354 (H) 70 - 130 mg/dL   POC Glucose Once   Result Value Ref Range    Glucose 406 (H) 70 - 130 mg/dL   POC Glucose Once   Result Value Ref Range    Glucose 411 (H) 70 - 130 mg/dL   POC Glucose Once   Result Value Ref Range    Glucose 196 (H) 70 - 130 mg/dL   POC Glucose Once   Result Value Ref Range    Glucose 187 (H) 70 - 130 mg/dL   POC Glucose Once   Result Value Ref Range    Glucose 96 70 - 130 mg/dL   POC Glucose Once   Result Value Ref Range    Glucose 241 (H) 70 - 130 mg/dL   POC Glucose Once   Result Value Ref Range    Glucose 420 (H) 70 - 130 mg/dL   POC Glucose Once   Result Value Ref Range    Glucose 340 (H) 70 - 130 mg/dL   POC Glucose Once   Result Value Ref Range    Glucose 178 (H) 70 - 130 mg/dL   POC Glucose Once   Result Value Ref Range    Glucose 290 (H) 70 - 130 mg/dL   Adult Transthoracic Echo Limited W/ Cont if Necessary Per Protocol   Result Value Ref Range    BSA 2.3 m^2    IVSd 1.3 cm    LVIDd 4.7 cm    LVIDs 2.9 cm    LVPWd 1.2 cm    IVS/LVPW 1.0     FS 38.2 %    EDV(Teich) 104.4 ml    ESV(Teich) 33.0 ml    EF(Teich) 68.4 %    EDV(cubed) 106.5 ml    ESV(cubed) 25.2 ml    EF(cubed) 76.4 %    LV mass(C)d 230.4 grams    LV mass(C)dI 101.5 grams/m^2    SV(Teich) 71.4 ml    SI(Teich) 31.5 ml/m^2     SV(cubed) 81.3 ml    SI(cubed) 35.9 ml/m^2    LVLd ap4 9.0 cm    EDV(MOD-sp4) 126.0 ml    LVLs ap4 8.2 cm    ESV(MOD-sp4) 45.7 ml    EF(MOD-sp4) 63.7 %    LVLd ap2 8.6 cm    EDV(MOD-sp2) 116.0 ml    LVLs ap2 7.8 cm    ESV(MOD-sp2) 38.2 ml    EF(MOD-sp2) 67.1 %    SV(MOD-sp4) 80.3 ml    SI(MOD-sp4) 35.4 ml/m^2    SV(MOD-sp2) 77.8 ml    SI(MOD-sp2) 34.3 ml/m^2    LV Meraz Vol (BSA corrected) 55.5 ml/m^2    LV Sys Vol (BSA corrected) 20.1 ml/m^2    MV A dur 0.13 sec    MV E max rich 98.6 cm/sec    MV A max rich 124.0 cm/sec    MV E/A 0.8     MV V2 max 122.0 cm/sec    MV max PG 6.0 mmHg    MV V2 mean 97.3 cm/sec    MV mean PG 4.0 mmHg    MV V2 VTI 38.2 cm    MV P1/2t max rich 124.0 cm/sec    MV P1/2t 74.3 msec    MVA(P1/2t) 3.0 cm^2    MV dec slope 489.0 cm/sec^2    MV dec time 0.29 sec    MVA P1/2T LCG 1.8 cm^2     CV ECHO TERRI - BZI_BMI 43.4 kilograms/m^2     CV ECHO TERRI - BSA(HAYCOCK) 2.4 m^2     CV ECHO TERRI - BZI_METRIC_WEIGHT 122.0 kg     CV ECHO TERRI - BZI_METRIC_HEIGHT 167.6 cm    Target HR (85%) 133 bpm    Max. Pred. HR (100%) 157 bpm     CV VAS BP RIGHT /78 mmHg    LA Volume Index 15.0 mL/m2    Avg E/e' ratio 15.17     EF(MOD-bp) 65 %    Lat Peak E' Rich 8.0 cm/sec    Med Peak E' Rich 5.00 cm/sec           Jared was seen today for follow-up, diabetes and copd.    Diagnoses and all orders for this visit:    Hospital discharge follow-up    Acute hypoxemic respiratory failure (CMS/[facility])    CKD (chronic kidney disease) stage 4, GFR 15-29 ml/min (CMS/HCC)  -     CBC & Differential  -     Comprehensive Metabolic Panel    Chronic diastolic congestive heart failure (CMS/[facility])  -     CBC & Differential    Essential hypertension  -     CBC & Differential    Hypoglycemia  -     Hemoglobin A1c    Hypothyroidism, acquired  -     CBC & Differential  -     T4, Free  -     TSH    Chronic obstructive pulmonary disease, unspecified COPD type (CMS/[facility])  -     CBC & Differential    Uncontrolled type 2 diabetes mellitus  with stage 4 chronic kidney disease, without long-term current use of insulin (CMS/HCC)    Patient appears stable today on exam (for her) with still some mild fatigue and SOB (that is back to her baseline). Will continue current management and supplemental oxygen. Keep f/u with Dr. Hoyt on Thursday. Needs CT scan of chest in 6 months to follow up on new nodule.     Encouraged her to call about her BG's if still low.They are running around 120 this morning according to her, but records for me to review. If still running low, will lower Triseba.    Her thyroid medication was drastically changed in hospital. I think that she is going to have to go back to 250-300mcg, but will await f/u labs in 6 weeks to see what her TSH is on 125mcg per day.     Stay on Bumex at current dose and will alert me if 5lbs weight gain in one day or increase swelling in her legs. Keep f/u with Dr. Carson as scheduled. Cr is stable.     Current outpatient and discharge medications have been reconciled for the patient.  Reviewed by: Jina Vaughan MD    Return in about 6 weeks (around 10/23/2018) for Recheck.    Jina Vaughan MD  09/11/2018

## 2018-09-11 NOTE — TELEPHONE ENCOUNTER
----- Message from Jina Vaughan MD sent at 9/10/2018  6:00 PM EDT -----  Regarding: RE: HOME HEALTH DECLINED  Okay, thanks. I’ll attempt to talk to her tomorrow about this.  ----- Message -----  From: Christina Chavez CMA  Sent: 9/10/2018   3:50 PM  To: Jina Vaughan MD  Subject: FW: HOME HEALTH DECLINED                         FYI/documentation     ----- Message -----  From: Magalys Galvan  Sent: 9/10/2018   1:42 PM  To: Christina Muñiz CMA  Subject: HOME HEALTH DECLINED                             MARIE    [facility] phoned stating they contacted patient about the Home Health Referral from our office.    Patient declined services.      Sister would like patient to have services, but patient has declined several times.  [facility] rep, Saige, wanted to pass this along as the patient has an appointment here tomorrow, 9/11/18.    aSige says she will keep the referral in their office in case something changes tomorrow.

## 2018-09-14 RX ORDER — METOPROLOL TARTRATE 50 MG/1
TABLET, FILM COATED ORAL
Qty: 180 TABLET | Refills: 0 | Status: SHIPPED | OUTPATIENT
Start: 2018-09-14 | End: 2019-12-31 | Stop reason: DRUGHIGH

## 2018-09-14 RX ORDER — QUETIAPINE FUMARATE 100 MG/1
TABLET, FILM COATED ORAL
Qty: 180 TABLET | Refills: 0 | Status: SHIPPED | OUTPATIENT
Start: 2018-09-14 | End: 2018-12-16 | Stop reason: SDUPTHER

## 2018-09-18 ENCOUNTER — READMISSION MANAGEMENT (OUTPATIENT)
Dept: CALL CENTER | Facility: HOSPITAL | Age: 63
End: 2018-09-18

## 2018-09-18 NOTE — OUTREACH NOTE
CHF Week 2 Survey      Responses   Facility patient discharged from?  [facility]   Does the patient have one of the following disease processes/diagnoses(primary or secondary)?  CHF   Week 2 attempt successful?  No   Unsuccessful attempts  Attempt 1          Saida Santoro RN

## 2018-09-20 ENCOUNTER — READMISSION MANAGEMENT (OUTPATIENT)
Dept: CALL CENTER | Facility: HOSPITAL | Age: 63
End: 2018-09-20

## 2018-09-20 NOTE — OUTREACH NOTE
CHF Week 2 Survey      Responses   Facility patient discharged from?  [facility]   Does the patient have one of the following disease processes/diagnoses(primary or secondary)?  CHF   Week 2 attempt successful?  Yes   Rescheduled  Rescheduled-pt requested          Garett Mcmahon RN

## 2018-09-21 ENCOUNTER — READMISSION MANAGEMENT (OUTPATIENT)
Dept: CALL CENTER | Facility: HOSPITAL | Age: 63
End: 2018-09-21

## 2018-09-21 NOTE — OUTREACH NOTE
CHF Week 2 Survey      Responses   Facility patient discharged from?  [facility]   Does the patient have one of the following disease processes/diagnoses(primary or secondary)?  CHF   Week 2 attempt successful?  Yes   Call start time  1511   Call end time  1521   Discharge diagnosis  Volume overload with grade 1 diastolic dysfunction    Is patient permission given to speak with other caregiver?  Yes   Person spoke with today (if not patient) and relationship  sister/Sania Goins   Medication alerts for this patient  Started at a new inhaler at PCP appt. ([facility])   Meds reviewed with patient/caregiver?  Yes   Is the patient having any side effects they believe may be caused by any medication additions or changes?  No   Does the patient have all medications ordered at discharge?  Yes   Is the patient taking all medications as directed (includes completed medication regime)?  Yes   Medication comments  New inhaler is working very well. Doctor's office is trying to get it approved through insurance.    Does the patient have a primary care provider?   Yes   Does the patient have an appointment with their PCP within 7 days of discharge?  Yes   Comments regarding PCP  Has an appt today at PCP - Dr Vaughan 09/11/2018   Has the patient kept scheduled appointments due by today?  Yes   Has home health visited the patient within 72 hours of discharge?  Call prior to 72 hours   Home health comments  HH did make contact, but HH did not show up on Sunday, patient decided she did not need HH and does not want it now.    Psychosocial issues?  No   Did the patient receive a copy of their discharge instructions?  Yes   Nursing interventions  Reviewed instructions with patient   What is the patient's perception of their health status since discharge?  Improving   Nursing interventions  Nurse provided patient education   Is the patient weighing daily?  No   Does the patient have scales?  No   Daily weight interventions  Education provided  on importance of daily weight   Is the patient able to teach back Heart Failure diet management?  Yes   Is the patient able to teach back Heart Failure Zones?  Yes   Is the patient able to teach back signs and symptoms of worsening condition? (i.e. weight gain, shortness of air, etc.)  Yes   Is the patient/caregiver able to teach back the hierarchy of who to call/visit for symptoms/problems? PCP, Specialist, Home health nurse, Urgent Care, ED, 911  Yes   CHF Week 2 call completed?  Yes          Jemal Friend RN

## 2018-10-01 ENCOUNTER — READMISSION MANAGEMENT (OUTPATIENT)
Dept: CALL CENTER | Facility: HOSPITAL | Age: 63
End: 2018-10-01

## 2018-10-01 NOTE — OUTREACH NOTE
CHF Week 3 Survey      Responses   Facility patient discharged from?  [facility]   Does the patient have one of the following disease processes/diagnoses(primary or secondary)?  CHF   Week 3 attempt successful?  No   Unsuccessful attempts  Attempt 1          Saida Santoro RN

## 2018-10-02 ENCOUNTER — READMISSION MANAGEMENT (OUTPATIENT)
Dept: CALL CENTER | Facility: HOSPITAL | Age: 63
End: 2018-10-02

## 2018-10-02 NOTE — OUTREACH NOTE
CHF Week 3 Survey      Responses   Facility patient discharged from?  [facility]   Does the patient have one of the following disease processes/diagnoses(primary or secondary)?  CHF   Week 3 attempt successful?  No   Unsuccessful attempts  Attempt 2          Saida Santoro RN

## 2018-10-16 RX ORDER — ATORVASTATIN CALCIUM 40 MG/1
TABLET, FILM COATED ORAL
Qty: 90 TABLET | Refills: 0 | Status: SHIPPED | OUTPATIENT
Start: 2018-10-16 | End: 2019-01-16 | Stop reason: SDUPTHER

## 2018-10-16 RX ORDER — INSULIN DEGLUDEC 200 U/ML
70 INJECTION, SOLUTION SUBCUTANEOUS DAILY
Qty: 9 ML | Refills: 2 | Status: SHIPPED | OUTPATIENT
Start: 2018-10-16 | End: 2018-11-05 | Stop reason: SDUPTHER

## 2018-10-16 RX ORDER — METOPROLOL TARTRATE 100 MG/1
100 TABLET ORAL EVERY 12 HOURS SCHEDULED
Qty: 180 TABLET | Refills: 1 | Status: SHIPPED | OUTPATIENT
Start: 2018-10-16 | End: 2019-04-16 | Stop reason: SDUPTHER

## 2018-10-18 LAB
ALBUMIN SERPL-MCNC: 4 G/DL (ref 3.5–5.2)
ALBUMIN/GLOB SERPL: 1.4 G/DL
ALP SERPL-CCNC: 117 U/L (ref 40–129)
ALT SERPL-CCNC: 11 U/L (ref 5–33)
AST SERPL-CCNC: 13 U/L (ref 5–32)
BASOPHILS # BLD AUTO: 0.04 10*3/MM3 (ref 0–0.2)
BASOPHILS NFR BLD AUTO: 0.4 % (ref 0–2)
BILIRUB SERPL-MCNC: 0.4 MG/DL (ref 0.2–1.2)
BUN SERPL-MCNC: 46 MG/DL (ref 8–23)
BUN/CREAT SERPL: 24.3 (ref 7–25)
CALCIUM SERPL-MCNC: 9.1 MG/DL (ref 8.8–10.5)
CHLORIDE SERPL-SCNC: 101 MMOL/L (ref 98–107)
CO2 SERPL-SCNC: 35 MMOL/L (ref 22–29)
CREAT SERPL-MCNC: 1.89 MG/DL (ref 0.57–1)
EOSINOPHIL # BLD AUTO: 0.35 10*3/MM3 (ref 0.1–0.3)
EOSINOPHIL NFR BLD AUTO: 3.3 % (ref 0–4)
ERYTHROCYTE [DISTWIDTH] IN BLOOD BY AUTOMATED COUNT: 13.3 % (ref 11.5–14.5)
GLOBULIN SER CALC-MCNC: 2.9 GM/DL
GLUCOSE SERPL-MCNC: 109 MG/DL (ref 65–99)
HBA1C MFR BLD: 8.7 % (ref 4.8–5.6)
HCT VFR BLD AUTO: 36.4 % (ref 37–47)
HGB BLD-MCNC: 11.2 G/DL (ref 12–16)
IMM GRANULOCYTES # BLD: 0.04 10*3/MM3 (ref 0–0.03)
IMM GRANULOCYTES NFR BLD: 0.4 % (ref 0–0.5)
LYMPHOCYTES # BLD AUTO: 2.25 10*3/MM3 (ref 0.6–4.8)
LYMPHOCYTES NFR BLD AUTO: 21 % (ref 20–45)
MCH RBC QN AUTO: 28.2 PG (ref 27–31)
MCHC RBC AUTO-ENTMCNC: 30.8 G/DL (ref 31–37)
MCV RBC AUTO: 91.7 FL (ref 81–99)
MONOCYTES # BLD AUTO: 0.61 10*3/MM3 (ref 0–1)
MONOCYTES NFR BLD AUTO: 5.7 % (ref 3–8)
NEUTROPHILS # BLD AUTO: 7.43 10*3/MM3 (ref 1.5–8.3)
NEUTROPHILS NFR BLD AUTO: 69.2 % (ref 45–70)
NRBC BLD AUTO-RTO: 0 /100 WBC (ref 0–0)
PLATELET # BLD AUTO: 198 10*3/MM3 (ref 140–500)
POTASSIUM SERPL-SCNC: 4.5 MMOL/L (ref 3.5–5.2)
PROT SERPL-MCNC: 6.9 G/DL (ref 6–8.5)
RBC # BLD AUTO: 3.97 10*6/MM3 (ref 4.2–5.4)
SODIUM SERPL-SCNC: 146 MMOL/L (ref 136–145)
T4 FREE SERPL-MCNC: 0.99 NG/DL (ref 0.93–1.7)
TSH SERPL DL<=0.005 MIU/L-ACNC: 13.35 MIU/ML (ref 0.27–4.2)
WBC # BLD AUTO: 10.72 10*3/MM3 (ref 4.8–10.8)

## 2018-10-25 ENCOUNTER — OFFICE VISIT (OUTPATIENT)
Dept: INTERNAL MEDICINE | Facility: CLINIC | Age: 63
End: 2018-10-25

## 2018-10-25 VITALS
HEART RATE: 72 BPM | DIASTOLIC BLOOD PRESSURE: 82 MMHG | OXYGEN SATURATION: 90 % | SYSTOLIC BLOOD PRESSURE: 156 MMHG | TEMPERATURE: 97.8 F | RESPIRATION RATE: 20 BRPM | HEIGHT: 66 IN

## 2018-10-25 DIAGNOSIS — I10 ESSENTIAL HYPERTENSION: ICD-10-CM

## 2018-10-25 DIAGNOSIS — N18.4 CKD (CHRONIC KIDNEY DISEASE) STAGE 4, GFR 15-29 ML/MIN (HCC): ICD-10-CM

## 2018-10-25 DIAGNOSIS — E03.9 HYPOTHYROIDISM, ACQUIRED: ICD-10-CM

## 2018-10-25 DIAGNOSIS — I50.32 CHRONIC DIASTOLIC CONGESTIVE HEART FAILURE (HCC): Primary | ICD-10-CM

## 2018-10-25 DIAGNOSIS — M79.89 LEG SWELLING: ICD-10-CM

## 2018-10-25 DIAGNOSIS — E66.01 MORBID OBESITY (HCC): Chronic | ICD-10-CM

## 2018-10-25 PROCEDURE — 99214 OFFICE O/P EST MOD 30 MIN: CPT | Performed by: INTERNAL MEDICINE

## 2018-10-25 RX ORDER — BUMETANIDE 1 MG/1
TABLET ORAL
Qty: 90 TABLET | Refills: 1 | Status: SHIPPED | OUTPATIENT
Start: 2018-10-25 | End: 2019-03-29 | Stop reason: SDUPTHER

## 2018-10-25 RX ORDER — LEVOTHYROXINE SODIUM 0.15 MG/1
150 TABLET ORAL EVERY MORNING
Qty: 30 TABLET | Refills: 3 | Status: SHIPPED | OUTPATIENT
Start: 2018-10-25 | End: 2019-03-04 | Stop reason: SDUPTHER

## 2018-10-25 NOTE — PROGRESS NOTES
Jared Govea is a 63 y.o. female, who presents with a chief complaint of   Chief Complaint   Patient presents with   • Follow-up     1 month f/u, lab results    • Leg Swelling     bilateral, 5 days, painful per patient    • Hypertension       64 yo F here for follow up of her chronic medical conditions and doing fair. She has been missing her afternoon dose of her Bumex regularly due to not remembering. SOB started back up about one week ago as well as some leg swelling worse on the left than right. Her belly feels swollen as well.     Her BG's have been higher. She is taking 70 units at night of her Triseba. She is usually taken Novolog 20 units twice per day. Running around 200-300 sometimes Eye exam is up to date. No lows.     Her HTN is chronic and she has been taking her medications regularly.          The following portions of the patient's history were reviewed and updated as appropriate: allergies, current medications, past family history, past medical history, past social history, past surgical history and problem list.    Allergies: Erythromycin; Erythromycin ethylsuccinate; Ativan [lorazepam]; and Tape    Current Outpatient Prescriptions:   •  acetaminophen (TYLENOL) 325 MG tablet, Take 2 tablets by mouth Every 4 (Four) Hours As Needed for Mild Pain , Headache or Fever., Disp: , Rfl:   •  amLODIPine (NORVASC) 2.5 MG tablet, Take 1 tablet by mouth Daily., Disp: 30 tablet, Rfl: 1  •  aspirin 81 MG EC tablet, Take 81 mg by mouth daily., Disp: , Rfl:   •  atorvastatin (LIPITOR) 40 MG tablet, TAKE ONE TABLET BY MOUTH DAILY FOR CHOLESTEROL, Disp: 90 tablet, Rfl: 0  •  B Complex Vitamins (VITAMIN B COMPLEX PO), Take 1 tablet by mouth daily., Disp: , Rfl:   •  bumetanide (BUMEX) 1 MG tablet, 2mg PO in the AM and 1mg PO in the PM, Disp: 90 tablet, Rfl: 1  •  cetirizine (ZyrTEC) 10 MG tablet, Take 10 mg by mouth every night., Disp: , Rfl:   •  clotrimazole-betamethasone (LOTRISONE) 1-0.05 % cream, Apply   topically 2 (Two) Times a Day., Disp: 180 g, Rfl: 3  •  dimenhyDRINATE (DRAMAMINE) 50 MG tablet, Take 1 tablet by mouth Daily As Needed for movement disorders., Disp: 30 tablet, Rfl: 0  •  famotidine (PEPCID) 20 MG tablet, Take 20 mg by mouth daily., Disp: , Rfl:   •  guaiFENesin (MUCINEX) 600 MG 12 hr tablet, Take 1,200 mg by mouth 2 (Two) Times a Day., Disp: , Rfl:   •  HYDROcodone-acetaminophen (NORCO)  MG per tablet, Take 1 tablet by mouth Every 8 (Eight) Hours As Needed for Moderate Pain ., Disp: 90 tablet, Rfl: 0  •  insulin aspart (NOVOLOG FLEXPEN) 100 UNIT/ML solution pen-injector sc pen, SSI (Patient taking differently: 3 (Three) Times a Day With Meals. SSI), Disp: 15 mL, Rfl: 3  •  Insulin Degludec (TRESIBA FLEXTOUCH) 200 UNIT/ML solution pen-injector, Inject 75 Units under the skin into the appropriate area as directed Daily., Disp: 9 mL, Rfl: 3  •  levalbuterol (XOPENEX) 0.63 MG/3ML nebulizer solution, Take 1 ampule by nebulization every 4 (four) hours as needed for wheezing., Disp: , Rfl:   •  levothyroxine (SYNTHROID, LEVOTHROID) 150 MCG tablet, Take 1 tablet by mouth Every Morning., Disp: 30 tablet, Rfl: 3  •  metoprolol tartrate (LOPRESSOR) 100 MG tablet, Take 1 tablet by mouth Every 12 (Twelve) Hours., Disp: 180 tablet, Rfl: 1  •  metoprolol tartrate (LOPRESSOR) 50 MG tablet, TAKE ONE TABLET BY MOUTH EVERY 12 HOURS FOR HEART, Disp: 180 tablet, Rfl: 0  •  PROAIR  (90 Base) MCG/ACT inhaler, INHALE TWO PUFFS BY MOUTH EVERY 4 HOURS AS NEEDED FOR WHEEZING, Disp: 3 inhaler, Rfl: 3  •  Probiotic Product (ALIGN) 4 MG capsule, Take 4 mg by mouth Daily., Disp: 30 capsule, Rfl: 6  •  QUEtiapine (SEROquel) 100 MG tablet, TAKE ONE TABLET BY MOUTH TWICE A DAY, Disp: 180 tablet, Rfl: 0  •  sennosides-docusate sodium (SENOKOT-S) 8.6-50 MG tablet, Take 2 tablets by mouth At Night As Needed for Constipation., Disp: , Rfl:   •  TRELEGY ELLIPTA 100-62.5-25 MCG/INH aerosol powder , , Disp: , Rfl:   •   "TRESIBA FLEXTOUCH 200 UNIT/ML solution pen-injector, INJECT 70 UNITS UNDER THE SKIN DAILY, Disp: 9 mL, Rfl: 2  •  vitamin D (ERGOCALCIFEROL) 61042 units capsule capsule, Take 1 capsule by mouth Every 7 (Seven) Days. On tuesday, Disp: 12 capsule, Rfl: 3  Medications Discontinued During This Encounter   Medication Reason   • bumetanide (BUMEX) 1 MG tablet Reorder   • fluticasone-salmeterol (ADVAIR DISKUS) 250-50 MCG/DOSE DISKUS *Therapy completed   • INCRUSE ELLIPTA 62.5 MCG/INH aerosol powder  *Therapy completed   • Insulin Degludec (TRESIBA FLEXTOUCH) 200 UNIT/ML solution pen-injector Reorder   • levothyroxine (SYNTHROID, LEVOTHROID) 125 MCG tablet Reorder       Review of Systems   Constitutional: Negative for chills and fatigue.   HENT: Negative for congestion.    Respiratory: Positive for cough and shortness of breath.    Cardiovascular: Positive for leg swelling. Negative for chest pain.   Skin: Negative for rash.   Neurological: Negative for dizziness and headaches.             /82 (BP Location: Left arm, Patient Position: Sitting, Cuff Size: Large Adult)   Pulse 72   Temp 97.8 °F (36.6 °C) (Oral)   Resp 20   Ht 167.6 cm (65.98\")   LMP  (LMP Unknown)   SpO2 90% Comment: 4L o2      Physical Exam   Constitutional: She is oriented to person, place, and time. She appears well-developed and well-nourished. She appears ill (chronically ill appearing ). No distress.   Morbidly obese on 4L NC sitting in scooter   HENT:   Head: Normocephalic and atraumatic.   Mouth/Throat: Oropharynx is clear and moist. No oropharyngeal exudate.   Eyes: Conjunctivae are normal. Right eye exhibits no discharge. Left eye exhibits no discharge. No scleral icterus.   Neck: Neck supple.   Cardiovascular: Normal rate, regular rhythm and normal heart sounds.  Exam reveals no gallop and no friction rub.    No murmur heard.  Pulmonary/Chest: Effort normal. No respiratory distress. She has decreased breath sounds. She has no wheezes. " She has no rales.   Abdominal: Bowel sounds are normal.   Musculoskeletal: She exhibits edema (+ 2 pitting edema of her legs bilaterally up to knees ).   Lymphadenopathy:     She has no cervical adenopathy.   Neurological: She is alert and oriented to person, place, and time. She exhibits normal muscle tone. Coordination normal.   Skin: Skin is warm. Capillary refill takes less than 2 seconds. No rash noted.   Psychiatric: She has a normal mood and affect. Her behavior is normal.   Nursing note and vitals reviewed.          Results for orders placed or performed in visit on 09/11/18   Comprehensive Metabolic Panel   Result Value Ref Range    Glucose 109 (H) 65 - 99 mg/dL    BUN 46 (H) 8 - 23 mg/dL    Creatinine 1.89 (H) 0.57 - 1.00 mg/dL    eGFR Non African Am 27 (L) >60 mL/min/1.73    eGFR African Am 33 (L) >60 mL/min/1.73    BUN/Creatinine Ratio 24.3 7.0 - 25.0    Sodium 146 (H) 136 - 145 mmol/L    Potassium 4.5 3.5 - 5.2 mmol/L    Chloride 101 98 - 107 mmol/L    Total CO2 35.0 (H) 22.0 - 29.0 mmol/L    Calcium 9.1 8.8 - 10.5 mg/dL    Total Protein 6.9 6.0 - 8.5 g/dL    Albumin 4.00 3.50 - 5.20 g/dL    Globulin 2.9 gm/dL    A/G Ratio 1.4 g/dL    Total Bilirubin 0.4 0.2 - 1.2 mg/dL    Alkaline Phosphatase 117 40 - 129 U/L    AST (SGOT) 13 5 - 32 U/L    ALT (SGPT) 11 5 - 33 U/L   Hemoglobin A1c   Result Value Ref Range    Hemoglobin A1C 8.70 (H) 4.80 - 5.60 %   T4, Free   Result Value Ref Range    Free T4 0.99 0.93 - 1.70 ng/dL   TSH   Result Value Ref Range    TSH 13.350 (H) 0.270 - 4.200 mIU/mL   CBC & Differential   Result Value Ref Range    WBC 10.72 4.80 - 10.80 10*3/mm3    RBC 3.97 (L) 4.20 - 5.40 10*6/mm3    Hemoglobin 11.2 (L) 12.0 - 16.0 g/dL    Hematocrit 36.4 (L) 37.0 - 47.0 %    MCV 91.7 81.0 - 99.0 fL    MCH 28.2 27.0 - 31.0 pg    MCHC 30.8 (L) 31.0 - 37.0 g/dL    RDW 13.3 11.5 - 14.5 %    Platelets 198 140 - 500 10*3/mm3    Neutrophil Rel % 69.2 45.0 - 70.0 %    Lymphocyte Rel % 21.0 20.0 - 45.0 %     Monocyte Rel % 5.7 3.0 - 8.0 %    Eosinophil Rel % 3.3 0.0 - 4.0 %    Basophil Rel % 0.4 0.0 - 2.0 %    Neutrophils Absolute 7.43 1.50 - 8.30 10*3/mm3    Lymphocytes Absolute 2.25 0.60 - 4.80 10*3/mm3    Monocytes Absolute 0.61 0.00 - 1.00 10*3/mm3    Eosinophils Absolute 0.35 (H) 0.10 - 0.30 10*3/mm3    Basophils Absolute 0.04 0.00 - 0.20 10*3/mm3    Immature Granulocyte Rel % 0.4 0.0 - 0.5 %    Immature Grans Absolute 0.04 (H) 0.00 - 0.03 10*3/mm3    nRBC 0.0 0.0 - 0.0 /100 WBC           Jared was seen today for follow-up, leg swelling and hypertension.    Diagnoses and all orders for this visit:    Chronic diastolic congestive heart failure (CMS/[facility])    Essential hypertension    CKD (chronic kidney disease) stage 4, GFR 15-29 ml/min (CMS/[facility])    Hypothyroidism, acquired    Morbid obesity (CMS/[facility])    Leg swelling    Other orders  -     bumetanide (BUMEX) 1 MG tablet; 2mg PO in the AM and 1mg PO in the PM  -     Insulin Degludec (TRESIBA FLEXTOUCH) 200 UNIT/ML solution pen-injector; Inject 75 Units under the skin into the appropriate area as directed Daily.  -     levothyroxine (SYNTHROID, LEVOTHROID) 150 MCG tablet; Take 1 tablet by mouth Every Morning.      Will increase her Bumex to 2mg in the morning and 1mg in the PM and call back next week to let me know how her swelling is doing. If not better, will need 2mg PO BID BMP recheck a week later.     Her BP is little high, hoping that getting fluid off will help. Goal around 130/80 and will call me if not at goal and we will reassess her BP medications.     Increase her synthroid to 150mcg per day and recheck in 6 weeks.     CKD is stable. Avoid NSAID's. Monitor every 3-4 months.     Increase Triseba to 75 units at night and rotate shots. Continue SSI. Recheck in 3-4 months.     Return in about 4 months (around 2/25/2019) for Recheck.    Jina Vaughan MD  10/25/2018

## 2018-12-04 RX ORDER — AMLODIPINE BESYLATE 2.5 MG/1
2.5 TABLET ORAL
Qty: 90 TABLET | Refills: 1 | Status: SHIPPED | OUTPATIENT
Start: 2018-12-04 | End: 2019-06-02 | Stop reason: SDUPTHER

## 2018-12-05 DIAGNOSIS — E03.9 HYPOTHYROIDISM, ACQUIRED: Primary | ICD-10-CM

## 2018-12-08 ENCOUNTER — RESULTS ENCOUNTER (OUTPATIENT)
Dept: INTERNAL MEDICINE | Facility: CLINIC | Age: 63
End: 2018-12-08

## 2018-12-08 DIAGNOSIS — E03.9 HYPOTHYROIDISM, ACQUIRED: ICD-10-CM

## 2018-12-17 RX ORDER — QUETIAPINE FUMARATE 100 MG/1
TABLET, FILM COATED ORAL
Qty: 180 TABLET | Refills: 0 | Status: SHIPPED | OUTPATIENT
Start: 2018-12-17 | End: 2019-03-20 | Stop reason: SDUPTHER

## 2019-01-16 RX ORDER — ATORVASTATIN CALCIUM 40 MG/1
TABLET, FILM COATED ORAL
Qty: 90 TABLET | Refills: 0 | Status: SHIPPED | OUTPATIENT
Start: 2019-01-16 | End: 2019-04-15 | Stop reason: SDUPTHER

## 2019-02-11 RX ORDER — INSULIN DEGLUDEC 200 U/ML
INJECTION, SOLUTION SUBCUTANEOUS
Qty: 9 ML | Refills: 6 | Status: SHIPPED | OUTPATIENT
Start: 2019-02-11 | End: 2019-08-12 | Stop reason: SDUPTHER

## 2019-02-18 RX ORDER — ERGOCALCIFEROL 1.25 MG/1
CAPSULE ORAL
Qty: 12 CAPSULE | Refills: 2 | Status: SHIPPED | OUTPATIENT
Start: 2019-02-18 | End: 2019-12-31 | Stop reason: DRUGHIGH

## 2019-02-21 DIAGNOSIS — IMO0002 UNCONTROLLED TYPE 2 DIABETES MELLITUS WITH CHRONIC KIDNEY DISEASE, WITH LONG-TERM CURRENT USE OF INSULIN: Primary | ICD-10-CM

## 2019-02-21 DIAGNOSIS — E03.9 HYPOTHYROIDISM, ACQUIRED: ICD-10-CM

## 2019-02-21 DIAGNOSIS — N18.4 CKD (CHRONIC KIDNEY DISEASE) STAGE 4, GFR 15-29 ML/MIN (HCC): ICD-10-CM

## 2019-02-21 DIAGNOSIS — E78.2 MIXED HYPERLIPIDEMIA: ICD-10-CM

## 2019-02-21 DIAGNOSIS — I10 ESSENTIAL HYPERTENSION: ICD-10-CM

## 2019-02-21 DIAGNOSIS — D64.9 NORMOCYTIC ANEMIA: ICD-10-CM

## 2019-03-04 RX ORDER — LEVOTHYROXINE SODIUM 0.15 MG/1
TABLET ORAL
Qty: 90 TABLET | Refills: 1 | Status: SHIPPED | OUTPATIENT
Start: 2019-03-04 | End: 2019-04-05 | Stop reason: CLARIF

## 2019-03-20 RX ORDER — QUETIAPINE FUMARATE 100 MG/1
TABLET, FILM COATED ORAL
Qty: 180 TABLET | Refills: 1 | Status: SHIPPED | OUTPATIENT
Start: 2019-03-20 | End: 2019-09-21 | Stop reason: SDUPTHER

## 2019-03-29 RX ORDER — BUMETANIDE 1 MG/1
TABLET ORAL
Qty: 90 TABLET | Refills: 0 | Status: SHIPPED | OUTPATIENT
Start: 2019-03-29 | End: 2019-04-23 | Stop reason: SDUPTHER

## 2019-04-03 ENCOUNTER — APPOINTMENT (OUTPATIENT)
Dept: GENERAL RADIOLOGY | Facility: HOSPITAL | Age: 64
End: 2019-04-03

## 2019-04-03 ENCOUNTER — HOSPITAL ENCOUNTER (EMERGENCY)
Facility: HOSPITAL | Age: 64
Discharge: HOME | End: 2019-04-03
Attending: EMERGENCY MEDICINE | Admitting: EMERGENCY MEDICINE

## 2019-04-03 VITALS
OXYGEN SATURATION: 94 % | RESPIRATION RATE: 20 BRPM | SYSTOLIC BLOOD PRESSURE: 162 MMHG | HEIGHT: 66 IN | DIASTOLIC BLOOD PRESSURE: 70 MMHG | TEMPERATURE: 97.8 F | BODY MASS INDEX: 47.09 KG/M2 | WEIGHT: 293 LBS | HEART RATE: 66 BPM

## 2019-04-03 DIAGNOSIS — K62.89 ANAL OR RECTAL PAIN: ICD-10-CM

## 2019-04-03 DIAGNOSIS — J44.1 COPD WITH EXACERBATION (HCC): Primary | ICD-10-CM

## 2019-04-03 LAB
ALBUMIN SERPL-MCNC: 4.2 G/DL (ref 3.5–5.2)
ALBUMIN/GLOB SERPL: 1.2 G/DL
ALP SERPL-CCNC: 111 U/L (ref 39–117)
ALT SERPL W P-5'-P-CCNC: 14 U/L (ref 1–33)
ANION GAP SERPL CALCULATED.3IONS-SCNC: 10 MMOL/L
AST SERPL-CCNC: 14 U/L (ref 1–32)
BASOPHILS # BLD AUTO: 0.04 10*3/MM3 (ref 0–0.2)
BASOPHILS NFR BLD AUTO: 0.4 % (ref 0–1.5)
BILIRUB SERPL-MCNC: 0.5 MG/DL (ref 0.2–1.2)
BUN BLD-MCNC: 30 MG/DL (ref 8–23)
BUN/CREAT SERPL: 19.2 (ref 7–25)
CALCIUM SPEC-SCNC: 9.1 MG/DL (ref 8.6–10.5)
CHLORIDE SERPL-SCNC: 101 MMOL/L (ref 98–107)
CO2 SERPL-SCNC: 32 MMOL/L (ref 22–29)
CREAT BLD-MCNC: 1.56 MG/DL (ref 0.57–1)
DEPRECATED RDW RBC AUTO: 43 FL (ref 37–54)
EOSINOPHIL # BLD AUTO: 0.34 10*3/MM3 (ref 0–0.4)
EOSINOPHIL NFR BLD AUTO: 3.2 % (ref 0.3–6.2)
ERYTHROCYTE [DISTWIDTH] IN BLOOD BY AUTOMATED COUNT: 12.3 % (ref 12.3–15.4)
FLUAV AG NPH QL: NEGATIVE
FLUBV AG NPH QL IA: NEGATIVE
GFR SERPL CREATININE-BSD FRML MDRD: 34 ML/MIN/1.73
GLOBULIN UR ELPH-MCNC: 3.5 GM/DL
GLUCOSE BLD-MCNC: 124 MG/DL (ref 65–99)
HCT VFR BLD AUTO: 37.3 % (ref 34–46.6)
HGB BLD-MCNC: 11.6 G/DL (ref 12–15.9)
HOLD SPECIMEN: NORMAL
HOLD SPECIMEN: NORMAL
IMM GRANULOCYTES # BLD AUTO: 0.04 10*3/MM3 (ref 0–0.05)
IMM GRANULOCYTES NFR BLD AUTO: 0.4 % (ref 0–0.5)
LYMPHOCYTES # BLD AUTO: 1.53 10*3/MM3 (ref 0.7–3.1)
LYMPHOCYTES NFR BLD AUTO: 14.4 % (ref 19.6–45.3)
MCH RBC QN AUTO: 29.4 PG (ref 26.6–33)
MCHC RBC AUTO-ENTMCNC: 31.1 G/DL (ref 31.5–35.7)
MCV RBC AUTO: 94.7 FL (ref 79–97)
MONOCYTES # BLD AUTO: 0.69 10*3/MM3 (ref 0.1–0.9)
MONOCYTES NFR BLD AUTO: 6.5 % (ref 5–12)
NEUTROPHILS # BLD AUTO: 8 10*3/MM3 (ref 1.4–7)
NEUTROPHILS NFR BLD AUTO: 75.1 % (ref 42.7–76)
NRBC BLD AUTO-RTO: 0 /100 WBC (ref 0–0)
NT-PROBNP SERPL-MCNC: 1262 PG/ML (ref 5–900)
PLATELET # BLD AUTO: 201 10*3/MM3 (ref 140–450)
PMV BLD AUTO: 11.1 FL (ref 6–12)
POTASSIUM BLD-SCNC: 4.9 MMOL/L (ref 3.5–5.2)
PROT SERPL-MCNC: 7.7 G/DL (ref 6–8.5)
RBC # BLD AUTO: 3.94 10*6/MM3 (ref 3.77–5.28)
SODIUM BLD-SCNC: 143 MMOL/L (ref 136–145)
TROPONIN T SERPL-MCNC: 0.02 NG/ML (ref 0–0.03)
WBC NRBC COR # BLD: 10.64 10*3/MM3 (ref 3.4–10.8)
WHOLE BLOOD HOLD SPECIMEN: NORMAL
WHOLE BLOOD HOLD SPECIMEN: NORMAL

## 2019-04-03 PROCEDURE — 87205 SMEAR GRAM STAIN: CPT | Performed by: EMERGENCY MEDICINE

## 2019-04-03 PROCEDURE — 99284 EMERGENCY DEPT VISIT MOD MDM: CPT | Performed by: EMERGENCY MEDICINE

## 2019-04-03 PROCEDURE — 71046 X-RAY EXAM CHEST 2 VIEWS: CPT

## 2019-04-03 PROCEDURE — 25010000002 FUROSEMIDE PER 20 MG: Performed by: EMERGENCY MEDICINE

## 2019-04-03 PROCEDURE — 93010 ELECTROCARDIOGRAM REPORT: CPT | Performed by: INTERNAL MEDICINE

## 2019-04-03 PROCEDURE — 87804 INFLUENZA ASSAY W/OPTIC: CPT | Performed by: EMERGENCY MEDICINE

## 2019-04-03 PROCEDURE — 99284 EMERGENCY DEPT VISIT MOD MDM: CPT

## 2019-04-03 PROCEDURE — 96374 THER/PROPH/DIAG INJ IV PUSH: CPT

## 2019-04-03 PROCEDURE — 84484 ASSAY OF TROPONIN QUANT: CPT | Performed by: EMERGENCY MEDICINE

## 2019-04-03 PROCEDURE — 94799 UNLISTED PULMONARY SVC/PX: CPT

## 2019-04-03 PROCEDURE — 83880 ASSAY OF NATRIURETIC PEPTIDE: CPT | Performed by: EMERGENCY MEDICINE

## 2019-04-03 PROCEDURE — 80053 COMPREHEN METABOLIC PANEL: CPT | Performed by: EMERGENCY MEDICINE

## 2019-04-03 PROCEDURE — 93005 ELECTROCARDIOGRAM TRACING: CPT | Performed by: EMERGENCY MEDICINE

## 2019-04-03 PROCEDURE — 94640 AIRWAY INHALATION TREATMENT: CPT

## 2019-04-03 PROCEDURE — 87070 CULTURE OTHR SPECIMN AEROBIC: CPT | Performed by: EMERGENCY MEDICINE

## 2019-04-03 PROCEDURE — 85025 COMPLETE CBC W/AUTO DIFF WBC: CPT | Performed by: EMERGENCY MEDICINE

## 2019-04-03 PROCEDURE — 25010000002 METHYLPREDNISOLONE PER 125 MG: Performed by: EMERGENCY MEDICINE

## 2019-04-03 PROCEDURE — 96375 TX/PRO/DX INJ NEW DRUG ADDON: CPT

## 2019-04-03 RX ORDER — FUROSEMIDE 10 MG/ML
40 INJECTION INTRAMUSCULAR; INTRAVENOUS ONCE
Status: COMPLETED | OUTPATIENT
Start: 2019-04-03 | End: 2019-04-03

## 2019-04-03 RX ORDER — METHYLPREDNISOLONE SODIUM SUCCINATE 125 MG/2ML
125 INJECTION, POWDER, LYOPHILIZED, FOR SOLUTION INTRAMUSCULAR; INTRAVENOUS ONCE
Status: COMPLETED | OUTPATIENT
Start: 2019-04-03 | End: 2019-04-03

## 2019-04-03 RX ORDER — METHYLPREDNISOLONE 4 MG/1
TABLET ORAL
Qty: 21 TABLET | Refills: 0 | Status: SHIPPED | OUTPATIENT
Start: 2019-04-03 | End: 2019-08-13

## 2019-04-03 RX ORDER — ALBUTEROL SULFATE 2.5 MG/3ML
2.5 SOLUTION RESPIRATORY (INHALATION)
Status: COMPLETED | OUTPATIENT
Start: 2019-04-03 | End: 2019-04-03

## 2019-04-03 RX ORDER — SODIUM CHLORIDE 0.9 % (FLUSH) 0.9 %
10 SYRINGE (ML) INJECTION AS NEEDED
Status: DISCONTINUED | OUTPATIENT
Start: 2019-04-03 | End: 2019-04-03 | Stop reason: HOSPADM

## 2019-04-03 RX ORDER — AZITHROMYCIN 250 MG/1
TABLET, FILM COATED ORAL
Qty: 6 TABLET | Refills: 0 | Status: SHIPPED | OUTPATIENT
Start: 2019-04-03 | End: 2019-07-02 | Stop reason: SDUPTHER

## 2019-04-03 RX ORDER — IPRATROPIUM BROMIDE AND ALBUTEROL SULFATE 2.5; .5 MG/3ML; MG/3ML
3 SOLUTION RESPIRATORY (INHALATION) ONCE
Status: COMPLETED | OUTPATIENT
Start: 2019-04-03 | End: 2019-04-03

## 2019-04-03 RX ADMIN — FUROSEMIDE 40 MG: 10 INJECTION, SOLUTION INTRAMUSCULAR; INTRAVENOUS at 13:36

## 2019-04-03 RX ADMIN — IPRATROPIUM BROMIDE AND ALBUTEROL SULFATE 3 ML: .5; 3 SOLUTION RESPIRATORY (INHALATION) at 12:12

## 2019-04-03 RX ADMIN — ALBUTEROL SULFATE 2.5 MG: 2.5 SOLUTION RESPIRATORY (INHALATION) at 13:13

## 2019-04-03 RX ADMIN — ALBUTEROL SULFATE 2.5 MG: 2.5 SOLUTION RESPIRATORY (INHALATION) at 12:48

## 2019-04-03 RX ADMIN — METHYLPREDNISOLONE SODIUM SUCCINATE 125 MG: 125 INJECTION, POWDER, FOR SOLUTION INTRAMUSCULAR; INTRAVENOUS at 12:08

## 2019-04-03 NOTE — ED PROVIDER NOTES
Subjective     History provided by:  Patient    History of Present Illness    · Chief complaint: Shortness of breath    · Location: Lungs    · Quality/Severity: Moderately severe shortness of breath with exertion.  She has associated wheezing.    · Timing/Onset: She admits short of breath for a month.    · Modifying Factors: Exertion exacerbates her shortness of breath.    · Associated symptoms: She has wheezing.  She states her legs been swollen for several months.  She has a cough productive of green sputum.  She also reports some chest pressure substernally.    · Narrative: The patient is a 63-year-old white female with a long history of COPD and congestive heart failure secondary to morbid obesity.  She states for the last month she has been short of breath and her legs have been more swollen.  She has had a cough for a month productive of green sputum.  She also reports for the last 2 days she has had substernal chest pressure.  The patient was here at the hospital to get routine blood draw for her PCP Dr. Vaughan in the exertion of coming into the hospital exacerbated her shortness of breath.  She has a history of COPD secondary to smoking and is on home O2 for cannula.  She quit smoking 10 years ago.  She has no history of previous MI but she does have a history of congestive heart failure.  She also complains of rectal pain and states that she has had a rectal abscess that was lanced in the ER once before and states the pain feels the same.    Review of Systems   Constitutional: Negative for activity change, appetite change, chills, diaphoresis, fatigue and fever.   HENT: Negative for congestion, dental problem, ear pain, hearing loss, mouth sores, postnasal drip, rhinorrhea, sinus pressure, sore throat and voice change.    Eyes: Negative for photophobia, pain, discharge, redness and visual disturbance.   Respiratory: Positive for cough and shortness of breath. Negative for chest tightness, wheezing and  "stridor.    Cardiovascular: Positive for chest pain and leg swelling. Negative for palpitations.   Gastrointestinal: Positive for rectal pain. Negative for abdominal pain, blood in stool, diarrhea, nausea and vomiting.   Genitourinary: Negative for difficulty urinating, dysuria, flank pain, frequency, hematuria and urgency.   Musculoskeletal: Negative for arthralgias, back pain, gait problem, joint swelling, myalgias, neck pain and neck stiffness.   Skin: Negative for color change and rash.   Neurological: Negative for dizziness, tremors, seizures, syncope, facial asymmetry, speech difficulty, weakness, light-headedness, numbness and headaches.   Hematological: Negative for adenopathy.   Psychiatric/Behavioral: Negative.  Negative for confusion and decreased concentration. The patient is not nervous/anxious.      Past Medical History:   Diagnosis Date   • Anxiety    • Arthritis    • CHF (congestive heart failure) (CMS/[facility])    • Chronic back pain    • COPD (chronic obstructive pulmonary disease) (CMS/[facility])    • Depression    • Essential hypertension    • SERENA (generalized anxiety disorder)    • GERD (gastroesophageal reflux disease)    • Headache    • HLD (hyperlipidemia)    • Hypothyroidism, acquired    • Low back pain    • Neuropathy in diabetes (CMS/[facility])    • Rectal abscess    • Sleep apnea    • Type 2 diabetes mellitus (CMS/[facility])    • Wound, open, toe 8/22/2017     /70   Pulse 66   Temp 97.8 °F (36.6 °C)   Resp 20   Ht 167.6 cm (66\")   Wt 136 kg (300 lb)   LMP  (LMP Unknown)   SpO2 94%   BMI 48.42 kg/m²     Past Medical History:   Diagnosis Date   • Anxiety    • Arthritis    • CHF (congestive heart failure) (CMS/[facility])    • Chronic back pain    • COPD (chronic obstructive pulmonary disease) (CMS/[facility])    • Depression    • Essential hypertension    • SERENA (generalized anxiety disorder)    • GERD (gastroesophageal reflux disease)    • Headache    • HLD (hyperlipidemia)    • Hypothyroidism, acquired    • Low " back pain    • Neuropathy in diabetes (CMS/H[facility])    • Rectal abscess    • Sleep apnea    • Type 2 diabetes mellitus (CMS/HCC)    • Wound, open, toe 2017       Allergies   Allergen Reactions   • Erythromycin Nausea And Vomiting   • Erythromycin Ethylsuccinate Nausea And Vomiting   • Ativan [Lorazepam] Hallucinations   • Tape Rash     Paper tape         Past Surgical History:   Procedure Laterality Date   • DILATATION AND CURETTAGE      x5   • LUNG SURGERY Right    • TRACHEOSTOMY         Family History   Problem Relation Age of Onset   • Cancer Mother         bone   • Cancer Father    • Cancer Brother        Social History     Socioeconomic History   • Marital status:      Spouse name: Not on file   • Number of children: Not on file   • Years of education: Not on file   • Highest education level: Not on file   Tobacco Use   • Smoking status: Former Smoker     Packs/day: 0.50     Years: 40.00     Pack years: 20.00     Types: Cigarettes     Last attempt to quit: 10/4/2010     Years since quittin.5   • Smokeless tobacco: Former User     Quit date: 8/3/2012   Substance and Sexual Activity   • Alcohol use: No   • Drug use: No   • Sexual activity: Defer           Objective   Physical Exam   Constitutional: She is oriented to person, place, and time. She appears well-developed and well-nourished. No distress.   The patient is morbidly obese.  She appears in no acute distress and does not appear toxic.  Review of her vital signs: She is afebrile with a temperature 98.2, tachypnea with a respiratory rate of 22 with a oxygen saturation of 95% on her 4 L via nasal cannula, heart rate is normal 66, blood pressure mildly elevated 171/89.   HENT:   Head: Normocephalic and atraumatic.   Nose: Nose normal.   Mouth/Throat: Oropharynx is clear and moist. No oropharyngeal exudate.   Eyes: EOM are normal. Pupils are equal, round, and reactive to light. Right eye exhibits no discharge. Left eye exhibits no discharge. No  scleral icterus.   Neck: Normal range of motion. Neck supple. No JVD present. No thyromegaly present.   Cardiovascular: Normal rate, regular rhythm and normal heart sounds.   No murmur heard.  Pulmonary/Chest: Effort normal. She has wheezes. She has no rales. She exhibits no tenderness.   The patient has fair air movement with diffuse expiratory wheezes.  There are no rales.   Abdominal: Soft. Bowel sounds are normal. She exhibits no distension. There is no tenderness.   Genitourinary:   Genitourinary Comments: The patient has no external hemorrhoids or external rectal abscesses.  Digital exam does not produce any significant tenderness or masses in the rectal vault.  There is no evidence of a rectal abscess on physical exam.   Musculoskeletal: Normal range of motion. She exhibits edema. She exhibits no tenderness or deformity.   The patient has 2+ nonpitting edema of both lower extremities with venous stasis dermatitis.   Lymphadenopathy:     She has no cervical adenopathy.   Neurological: She is alert and oriented to person, place, and time. No cranial nerve deficit. Coordination normal.   No focal motor sensory deficit   Skin: Skin is warm and dry. Capillary refill takes less than 2 seconds. No rash noted. She is not diaphoretic.   Psychiatric: She has a normal mood and affect. Her behavior is normal. Judgment and thought content normal.   Nursing note and vitals reviewed.      ECG 12 Lead    Date/Time: 4/3/2019 11:03 AM  Performed by: Tor Vaughan MD  Authorized by: Tor Vaughan MD   Comments: EKG performed 11: 03, EKG read 11: 07.  Normal sinus rhythm with a rate of 58, normal axis, normal conduction, no acute ST segment elevation or depression consistent with ischemia, no ectopy.                 ED Course  ED Course as of Apr 03 1531 Wed Apr 03, 2019   1527 Review of the patient's test results: Her chest x-ray was interpreted by me and the radiologist as COPD and mild interstitial pulmonary edema.   My interpretation of the patient's EKG tracing was normal sinus rhythm with a rate of 58 and essentially normal.  Her cardiac troponin was negative.  Her proBNP was elevated mildly at 1262.  Her influenza was negative.  Her CBC had a normal white count of 10.6 with a normal differential.  She was mildly anemic with a hemoglobin 11.6 and a normal hematocrit 37.3.  Gram stain of her sputum revealed 4+ WBCs and rare gram-positive cocci.  [TP]   1528 The patient was administered a DuoNeb followed by 2 albuterol nebulizer treatments.  She was administered Solu-Medrol 125 mg IV.  She was also administered Lasix 40 mg IV.  [TP]   1529 The patient was instructed to increase her Bumex to 2 mg twice a day.  She is to continue using her albuterol nebulizer machine.  She was discharged prescription for a Medrol Dosepak and a Z-Suresh as well as Anusol HC for her rectal discomfort.  She is instructed to follow-up with her PCP in the next couple days.  [TP]   1530 Repeat examination prior to the patient being discharged her lungs were clear to auscultation and her oxygen saturation was 95% on oxygen.  [TP]      ED Course User Index  [TP] Tor Vaughan MD                  MDM  Number of Diagnoses or Management Options  Anal or rectal pain: new and does not require workup  COPD with exacerbation (CMS/[facility]): established and worsening     Amount and/or Complexity of Data Reviewed  Clinical lab tests: ordered and reviewed  Tests in the radiology section of CPT®: ordered and reviewed  Tests in the medicine section of CPT®: ordered and reviewed  Independent visualization of images, tracings, or specimens: yes    Risk of Complications, Morbidity, and/or Mortality  Presenting problems: high  Diagnostic procedures: high  Management options: high  General comments: My differential diagnosis for dyspnea includes but is not limited to:  Asthma, COPD, pneumonia, pulmonary embolus, acute respiratory distress syndrome, pneumothorax, pleural  effusion, pulmonary fibrosis, congestive heart failure, myocardial infarction, DKA, uremia, acidosis, sepsis, anemia, drug related, hyperventilation, CNS disease    Patient Progress  Patient progress: improved        Final diagnoses:   COPD with exacerbation (CMS/[facility])   Anal or rectal pain           Labs Reviewed   COMPREHENSIVE METABOLIC PANEL - Abnormal; Notable for the following components:       Result Value    Glucose 124 (*)     BUN 30 (*)     Creatinine 1.56 (*)     CO2 32.0 (*)     eGFR Non  Amer 34 (*)     All other components within normal limits    Narrative:     GFR Normal >60  Chronic Kidney Disease <60  Kidney Failure <15   BNP (IN-HOUSE) - Abnormal; Notable for the following components:    proBNP 1,262.0 (*)     All other components within normal limits    Narrative:     Among patients with dyspnea, NT-proBNP is highly sensitive for the detection of acute congestive heart failure. In addition NT-proBNP of <300 pg/ml effectively rules out acute congestive heart failure with 99% negative predictive value.   CBC WITH AUTO DIFFERENTIAL - Abnormal; Notable for the following components:    Hemoglobin 11.6 (*)     MCHC 31.1 (*)     Lymphocyte % 14.4 (*)     Neutrophils, Absolute 8.00 (*)     All other components within normal limits   INFLUENZA ANTIGEN, RAPID - Normal   TROPONIN (IN-HOUSE) - Normal    Narrative:     Troponin T Reference Range:  <= 0.03 ng/mL-   Negative for AMI  >0.03 ng/mL-     Abnormal for myocardial necrosis.  Clinicians would have to utilize clinical acumen, EKG, Troponin and serial changes to determine if it is an Acute Myocardial Infarction or myocardial injury due to an underlying chronic condition.    RESPIRATORY CULTURE   RAINBOW DRAW    Narrative:     The following orders were created for panel order [place] Draw.  Procedure                               Abnormality         Status                     ---------                               -----------         ------                      Light Blue Top[[id number]]                                   Final result               Green Top (Gel)[[id number]]                                  Final result               Lavender Top[[id number]]                                     Final result               Gold Top - SST[[id number]]                                   Final result                 Please view results for these tests on the individual orders.   CBC AND DIFFERENTIAL    Narrative:     The following orders were created for panel order CBC & Differential.  Procedure                               Abnormality         Status                     ---------                               -----------         ------                     CBC Auto Differential[[id number]]        Abnormal            Final result                 Please view results for these tests on the individual orders.   LIGHT BLUE TOP   GREEN TOP   LAVENDER TOP   GOLD TOP - SST     XR Chest 2 View   ED Interpretation   COPD and mild vascular congestion.      Final Result   1. COPD with superimposed mild interstitial pulmonary edema.   2. Mild cardiac enlargement.       This report was finalized on 4/3/2019 12:56 PM by Dr. Regino Lentz MD.                 Medication List      New Prescriptions    azithromycin 250 MG tablet  Commonly known as:  ZITHROMAX Z-VASILIY  Take 2 tablets the first day, then 1 tablet daily for 4 days.     hydrocortisone 2.5 % rectal cream  Commonly known as:  ANUSOL-HC  Apply to your rectum after each bowel movement and at bedtime     methylPREDNISolone 4 MG tablet  Commonly known as:  MEDROL (VASILIY)  follow package directions        Changed    insulin aspart 100 UNIT/ML solution pen-injector sc pen  Commonly known as:  NOVOLOG FLEXPEN  SSI  What changed:    when to take this  additional instructions               Tor Vaughan MD  04/03/19 [id number]

## 2019-04-03 NOTE — ED NOTES
Pt provided with discharge instructions. Pt waiting in room for sister to come pick her up.      Gissell Cm RN  04/03/19 [id number]

## 2019-04-04 LAB
ALBUMIN SERPL-MCNC: 4.3 G/DL (ref 3.5–5.2)
ALBUMIN/CREAT UR: 1126.8 MG/G CREAT (ref 0–30)
ALBUMIN/GLOB SERPL: 1.4 G/DL
ALP SERPL-CCNC: 116 U/L (ref 39–117)
ALT SERPL-CCNC: 13 U/L (ref 1–33)
AST SERPL-CCNC: 12 U/L (ref 1–32)
BASOPHILS # BLD AUTO: 0.03 10*3/MM3 (ref 0–0.2)
BASOPHILS NFR BLD AUTO: 0.3 % (ref 0–1.5)
BILIRUB SERPL-MCNC: 0.4 MG/DL (ref 0.2–1.2)
BUN SERPL-MCNC: 31 MG/DL (ref 8–23)
BUN/CREAT SERPL: 17.9 (ref 7–25)
CALCIUM SERPL-MCNC: 9.1 MG/DL (ref 8.6–10.5)
CHLORIDE SERPL-SCNC: 100 MMOL/L (ref 98–107)
CHOLEST SERPL-MCNC: 134 MG/DL (ref 0–200)
CO2 SERPL-SCNC: 31.6 MMOL/L (ref 22–29)
CREAT SERPL-MCNC: 1.73 MG/DL (ref 0.57–1)
CREAT UR-MCNC: 23.9 MG/DL
EOSINOPHIL # BLD AUTO: 0.38 10*3/MM3 (ref 0–0.4)
EOSINOPHIL NFR BLD AUTO: 4.1 % (ref 0.3–6.2)
ERYTHROCYTE [DISTWIDTH] IN BLOOD BY AUTOMATED COUNT: 12.4 % (ref 12.3–15.4)
GLOBULIN SER CALC-MCNC: 3.1 GM/DL
GLUCOSE SERPL-MCNC: 163 MG/DL (ref 65–99)
HBA1C MFR BLD: 8.68 % (ref 4.8–5.6)
HCT VFR BLD AUTO: 36.6 % (ref 34–46.6)
HDLC SERPL-MCNC: 59 MG/DL (ref 40–60)
HGB BLD-MCNC: 11.3 G/DL (ref 12–15.9)
IMM GRANULOCYTES # BLD AUTO: 0.03 10*3/MM3 (ref 0–0.05)
IMM GRANULOCYTES NFR BLD AUTO: 0.3 % (ref 0–0.5)
LDLC SERPL CALC-MCNC: 55 MG/DL (ref 0–100)
LDLC/HDLC SERPL: 0.94 {RATIO}
LYMPHOCYTES # BLD AUTO: 1.91 10*3/MM3 (ref 0.7–3.1)
LYMPHOCYTES NFR BLD AUTO: 20.4 % (ref 19.6–45.3)
MCH RBC QN AUTO: 30.1 PG (ref 26.6–33)
MCHC RBC AUTO-ENTMCNC: 30.9 G/DL (ref 31.5–35.7)
MCV RBC AUTO: 97.3 FL (ref 79–97)
MICROALBUMIN UR-MCNC: 269.3 UG/ML
MONOCYTES # BLD AUTO: 0.65 10*3/MM3 (ref 0.1–0.9)
MONOCYTES NFR BLD AUTO: 6.9 % (ref 5–12)
NEUTROPHILS # BLD AUTO: 6.38 10*3/MM3 (ref 1.4–7)
NEUTROPHILS NFR BLD AUTO: 68 % (ref 42.7–76)
NRBC BLD AUTO-RTO: 0 /100 WBC (ref 0–0)
PLATELET # BLD AUTO: 202 10*3/MM3 (ref 140–450)
POTASSIUM SERPL-SCNC: 5.1 MMOL/L (ref 3.5–5.2)
PROT SERPL-MCNC: 7.4 G/DL (ref 6–8.5)
RBC # BLD AUTO: 3.76 10*6/MM3 (ref 3.77–5.28)
SODIUM SERPL-SCNC: 144 MMOL/L (ref 136–145)
T4 FREE SERPL-MCNC: 1.07 NG/DL (ref 0.93–1.7)
TRIGL SERPL-MCNC: 99 MG/DL (ref 0–150)
TSH SERPL DL<=0.005 MIU/L-ACNC: 19.8 MIU/ML (ref 0.27–4.2)
VLDLC SERPL CALC-MCNC: 19.8 MG/DL (ref 5–40)
WBC # BLD AUTO: 9.38 10*3/MM3 (ref 3.4–10.8)

## 2019-04-05 DIAGNOSIS — E03.9 HYPOTHYROIDISM, ACQUIRED: Primary | ICD-10-CM

## 2019-04-05 LAB
BACTERIA SPEC RESP CULT: NORMAL
GRAM STN SPEC: NORMAL

## 2019-04-05 RX ORDER — LEVOTHYROXINE SODIUM 175 UG/1
175 TABLET ORAL DAILY
Qty: 90 TABLET | Refills: 1 | Status: SHIPPED | OUTPATIENT
Start: 2019-04-05 | End: 2019-10-05 | Stop reason: SDUPTHER

## 2019-04-15 RX ORDER — ATORVASTATIN CALCIUM 40 MG/1
TABLET, FILM COATED ORAL
Qty: 90 TABLET | Refills: 1 | Status: SHIPPED | OUTPATIENT
Start: 2019-04-15 | End: 2019-10-12 | Stop reason: SDUPTHER

## 2019-04-16 RX ORDER — METOPROLOL TARTRATE 100 MG/1
TABLET ORAL
Qty: 180 TABLET | Refills: 1 | Status: SHIPPED | OUTPATIENT
Start: 2019-04-16 | End: 2019-10-12 | Stop reason: SDUPTHER

## 2019-04-23 RX ORDER — BUMETANIDE 1 MG/1
TABLET ORAL
Qty: 120 TABLET | Refills: 1 | Status: SHIPPED | OUTPATIENT
Start: 2019-04-23 | End: 2019-07-27 | Stop reason: SDUPTHER

## 2019-04-23 RX ORDER — BUMETANIDE 1 MG/1
TABLET ORAL
Qty: 90 TABLET | Refills: 1 | Status: SHIPPED | OUTPATIENT
Start: 2019-04-23 | End: 2019-04-23 | Stop reason: SDUPTHER

## 2019-06-03 RX ORDER — AMLODIPINE BESYLATE 2.5 MG/1
TABLET ORAL
Qty: 90 TABLET | Refills: 1 | Status: SHIPPED | OUTPATIENT
Start: 2019-06-03 | End: 2019-12-10 | Stop reason: SDUPTHER

## 2019-06-10 RX ORDER — ALBUTEROL SULFATE 90 UG/1
AEROSOL, METERED RESPIRATORY (INHALATION)
Qty: 18 G | Refills: 5 | Status: SHIPPED | OUTPATIENT
Start: 2019-06-10

## 2019-06-28 RX ORDER — LEVALBUTEROL INHALATION SOLUTION 0.63 MG/3ML
1 SOLUTION RESPIRATORY (INHALATION) EVERY 4 HOURS PRN
Qty: 50 AMPULE | Refills: 0 | Status: SHIPPED | OUTPATIENT
Start: 2019-06-28

## 2019-07-01 ENCOUNTER — TELEPHONE (OUTPATIENT)
Dept: INTERNAL MEDICINE | Facility: CLINIC | Age: 64
End: 2019-07-01

## 2019-07-01 NOTE — TELEPHONE ENCOUNTER
This was sent in on Friday. I can't get anyone to answer the phone to let them know        ----- Message from Jina Vaughan MD sent at 6/28/2019 12:48 PM EDT -----  Yes, I can fill this for her enough to get her through to the appointment with him.     ----- Message -----  From: Melinda Casiano MA  Sent: 6/28/2019  11:44 AM  To: Jina Vaughan MD    Sister called pt  Needs refill on her  xopenex neb qid.  Her pulmonary dr won't fill this until he see her on   7/8/19 and she is out of her medication.   Wants to know if we can fill this??    [phone]

## 2019-07-02 ENCOUNTER — OFFICE VISIT (OUTPATIENT)
Dept: INTERNAL MEDICINE | Facility: CLINIC | Age: 64
End: 2019-07-02

## 2019-07-02 VITALS
TEMPERATURE: 97.6 F | RESPIRATION RATE: 18 BRPM | SYSTOLIC BLOOD PRESSURE: 120 MMHG | BODY MASS INDEX: 45.32 KG/M2 | DIASTOLIC BLOOD PRESSURE: 68 MMHG | HEART RATE: 80 BPM | WEIGHT: 282 LBS | OXYGEN SATURATION: 91 % | HEIGHT: 66 IN

## 2019-07-02 DIAGNOSIS — N18.4 CKD (CHRONIC KIDNEY DISEASE) STAGE 4, GFR 15-29 ML/MIN (HCC): ICD-10-CM

## 2019-07-02 DIAGNOSIS — R26.9 GAIT DISTURBANCE: ICD-10-CM

## 2019-07-02 DIAGNOSIS — J44.9 CHRONIC OBSTRUCTIVE PULMONARY DISEASE, UNSPECIFIED COPD TYPE (HCC): ICD-10-CM

## 2019-07-02 DIAGNOSIS — IMO0002 UNCONTROLLED TYPE 2 DIABETES MELLITUS WITH CHRONIC KIDNEY DISEASE, WITH LONG-TERM CURRENT USE OF INSULIN: ICD-10-CM

## 2019-07-02 DIAGNOSIS — B37.2 CANDIDAL SKIN INFECTION: ICD-10-CM

## 2019-07-02 DIAGNOSIS — E66.01 MORBID OBESITY (HCC): Chronic | ICD-10-CM

## 2019-07-02 DIAGNOSIS — S81.802A WOUND OF LEFT LOWER EXTREMITY, INITIAL ENCOUNTER: ICD-10-CM

## 2019-07-02 DIAGNOSIS — E03.9 HYPOTHYROIDISM, ACQUIRED: ICD-10-CM

## 2019-07-02 LAB
ALBUMIN SERPL-MCNC: 4.4 G/DL (ref 3.5–5.2)
ALBUMIN/GLOB SERPL: 1.6 G/DL
ALP SERPL-CCNC: 124 U/L (ref 39–117)
ALT SERPL-CCNC: 10 U/L (ref 1–33)
AST SERPL-CCNC: 11 U/L (ref 1–32)
BASOPHILS # BLD AUTO: 0.03 10*3/MM3 (ref 0–0.2)
BASOPHILS NFR BLD AUTO: 0.3 % (ref 0–1.5)
BILIRUB SERPL-MCNC: 0.4 MG/DL (ref 0.2–1.2)
BUN SERPL-MCNC: 57 MG/DL (ref 8–23)
BUN/CREAT SERPL: 24.6 (ref 7–25)
CALCIUM SERPL-MCNC: 9.2 MG/DL (ref 8.6–10.5)
CHLORIDE SERPL-SCNC: 95 MMOL/L (ref 98–107)
CO2 SERPL-SCNC: 36 MMOL/L (ref 22–29)
CREAT SERPL-MCNC: 2.32 MG/DL (ref 0.57–1)
EOSINOPHIL # BLD AUTO: 0.34 10*3/MM3 (ref 0–0.4)
EOSINOPHIL NFR BLD AUTO: 3.3 % (ref 0.3–6.2)
ERYTHROCYTE [DISTWIDTH] IN BLOOD BY AUTOMATED COUNT: 12.3 % (ref 12.3–15.4)
GLOBULIN SER CALC-MCNC: 2.7 GM/DL
GLUCOSE SERPL-MCNC: 243 MG/DL (ref 65–99)
HBA1C MFR BLD: 10.4 % (ref 4.8–5.6)
HCT VFR BLD AUTO: 34.4 % (ref 34–46.6)
HGB BLD-MCNC: 10.5 G/DL (ref 12–15.9)
IMM GRANULOCYTES # BLD AUTO: 0.04 10*3/MM3 (ref 0–0.05)
IMM GRANULOCYTES NFR BLD AUTO: 0.4 % (ref 0–0.5)
LYMPHOCYTES # BLD AUTO: 1.26 10*3/MM3 (ref 0.7–3.1)
LYMPHOCYTES NFR BLD AUTO: 12.4 % (ref 19.6–45.3)
MCH RBC QN AUTO: 28.8 PG (ref 26.6–33)
MCHC RBC AUTO-ENTMCNC: 30.5 G/DL (ref 31.5–35.7)
MCV RBC AUTO: 94.5 FL (ref 79–97)
MONOCYTES # BLD AUTO: 0.69 10*3/MM3 (ref 0.1–0.9)
MONOCYTES NFR BLD AUTO: 6.8 % (ref 5–12)
NEUTROPHILS # BLD AUTO: 7.81 10*3/MM3 (ref 1.7–7)
NEUTROPHILS NFR BLD AUTO: 76.8 % (ref 42.7–76)
NRBC BLD AUTO-RTO: 0.1 /100 WBC (ref 0–0.2)
PLATELET # BLD AUTO: 204 10*3/MM3 (ref 140–450)
POTASSIUM SERPL-SCNC: 4.9 MMOL/L (ref 3.5–5.2)
PROT SERPL-MCNC: 7.1 G/DL (ref 6–8.5)
RBC # BLD AUTO: 3.64 10*6/MM3 (ref 3.77–5.28)
SODIUM SERPL-SCNC: 141 MMOL/L (ref 136–145)
T4 FREE SERPL-MCNC: 1.32 NG/DL (ref 0.93–1.7)
TSH SERPL DL<=0.005 MIU/L-ACNC: 5.15 MIU/ML (ref 0.27–4.2)
WBC # BLD AUTO: 10.17 10*3/MM3 (ref 3.4–10.8)

## 2019-07-02 PROCEDURE — 99214 OFFICE O/P EST MOD 30 MIN: CPT | Performed by: INTERNAL MEDICINE

## 2019-07-02 RX ORDER — FLUCONAZOLE 100 MG/1
100 TABLET ORAL DAILY
Qty: 7 TABLET | Refills: 0 | Status: SHIPPED | OUTPATIENT
Start: 2019-07-02 | End: 2019-07-09

## 2019-07-02 RX ORDER — CEPHALEXIN 500 MG/1
500 CAPSULE ORAL 2 TIMES DAILY
Qty: 14 CAPSULE | Refills: 0 | Status: SHIPPED | OUTPATIENT
Start: 2019-07-02 | End: 2019-07-09

## 2019-07-02 RX ORDER — SELENIUM SULFIDE 2.5 MG/100ML
LOTION TOPICAL DAILY PRN
Qty: 118 ML | Refills: 6 | Status: SHIPPED | OUTPATIENT
Start: 2019-07-02 | End: 2019-12-31

## 2019-07-02 NOTE — PROGRESS NOTES
Jared Govea is a 64 y.o. female, who presents with a chief complaint of   Chief Complaint   Patient presents with   • Follow-up     6 + x month f/u, lab results from 4/2019   • Hyperlipidemia   • COPD   • Med Refill     all medications per patient        63 yo F here or follow. She has missed appointments recently. She is morbidly obese and has severe COPD and CKD stage 4. She a poor historian, but states today that she has several chronic issues and new issues that she would like to discuss.     She has a left leg wound that has been going on for about 4 months. She has not been to the doctor yet about this. Never called me to be seen. She says that her legs are very swollen.  Takes Bumex twice per day (2mg in the morning and 1 mg in the afternoon) and is not wrapping them. Diet is not good and they have been eating a lot of salt and freezer food. She has not had fever or chills. Very rarely walks and uses her scooter.     She has chronic COPD and is on oxygen. She is taking Zyrtec for her allergies. She takes Trelegy and uses Xopenex as needed when she is short of breath. Feels the weather has been bad for her breathing recently.     She has chronic yeast infection in her folds in her belly, face and eyes. She tends to spread these easily through contact. She has crusting behind her ears, on her nose and in her scalp line. She does not use powder and has not treated these areas recently. States that antibiotic eye drops help her eyes.     She has uncontrolled Type 2 diabetes. She is on insulin and uses daily she states. Does not eat well. No regular exercise. Eye exam is not up to date.     She has chronic hypothyroidism and is taking 175mcg of her synthroid daily. She has not had her thyroid rechecked since 4/2019. Says she feels fairly well on this dose.              The following portions of the patient's history were reviewed and updated as appropriate: allergies, current medications, past family  history, past medical history, past social history, past surgical history and problem list.    Allergies: Erythromycin; Erythromycin ethylsuccinate; Ativan [lorazepam]; and Tape    Current Outpatient Medications:   •  acetaminophen (TYLENOL) 325 MG tablet, Take 2 tablets by mouth Every 4 (Four) Hours As Needed for Mild Pain , Headache or Fever., Disp: , Rfl:   •  ALBUTEROL SULFATE  (90 Base) MCG/ACT inhaler, INHALE TWO PUFFS BY MOUTH EVERY 4 HOURS AS NEEDED FOR WHEEZING, Disp: 18 g, Rfl: 5  •  amLODIPine (NORVASC) 2.5 MG tablet, TAKE ONE TABLET BY MOUTH DAILY, Disp: 90 tablet, Rfl: 1  •  aspirin 81 MG EC tablet, Take 81 mg by mouth daily., Disp: , Rfl:   •  atorvastatin (LIPITOR) 40 MG tablet, TAKE ONE TABLET BY MOUTH DAILY FOR CHOLESTEROL, Disp: 90 tablet, Rfl: 1  •  B Complex Vitamins (VITAMIN B COMPLEX PO), Take 1 tablet by mouth daily., Disp: , Rfl:   •  bumetanide (BUMEX) 1 MG tablet, TAKE   2 PO BID, Disp: 120 tablet, Rfl: 1  •  cetirizine (ZyrTEC) 10 MG tablet, Take 10 mg by mouth every night., Disp: , Rfl:   •  clotrimazole-betamethasone (LOTRISONE) 1-0.05 % cream, Apply  topically 2 (Two) Times a Day., Disp: 180 g, Rfl: 3  •  dimenhyDRINATE (DRAMAMINE) 50 MG tablet, Take 1 tablet by mouth Daily As Needed for movement disorders., Disp: 30 tablet, Rfl: 0  •  famotidine (PEPCID) 20 MG tablet, Take 20 mg by mouth daily., Disp: , Rfl:   •  guaiFENesin (MUCINEX) 600 MG 12 hr tablet, Take 1,200 mg by mouth 2 (Two) Times a Day., Disp: , Rfl:   •  HYDROcodone-acetaminophen (NORCO)  MG per tablet, Take 1 tablet by mouth Every 8 (Eight) Hours As Needed for Moderate Pain ., Disp: 90 tablet, Rfl: 0  •  hydrocortisone (ANUSOL-HC) 2.5 % rectal cream, Apply to your rectum after each bowel movement and at bedtime, Disp: 28.35 g, Rfl: 1  •  insulin aspart (NOVOLOG FLEXPEN) 100 UNIT/ML solution pen-injector sc pen, INJECT SUBCUTANEOUSLY PER SLIDING SCALE AS DIRECTED. MAX 21 UNITS DAILY, Disp: 15 mL, Rfl: 2  •   levalbuterol (XOPENEX) 0.63 MG/3ML nebulizer solution, Take 1 ampule by nebulization Every 4 (Four) Hours As Needed for Wheezing., Disp: 50 ampule, Rfl: 0  •  levothyroxine (SYNTHROID) 175 MCG tablet, Take 1 tablet by mouth Daily., Disp: 90 tablet, Rfl: 1  •  methylPREDNISolone (MEDROL, VASILIY,) 4 MG tablet, follow package directions, Disp: 21 tablet, Rfl: 0  •  metoprolol tartrate (LOPRESSOR) 100 MG tablet, TAKE ONE TABLET BY MOUTH EVERY 12 HOURS, Disp: 180 tablet, Rfl: 1  •  metoprolol tartrate (LOPRESSOR) 50 MG tablet, TAKE ONE TABLET BY MOUTH EVERY 12 HOURS FOR HEART, Disp: 180 tablet, Rfl: 0  •  Probiotic Product (ALIGN) 4 MG capsule, Take 4 mg by mouth Daily., Disp: 30 capsule, Rfl: 6  •  QUEtiapine (SEROquel) 100 MG tablet, TAKE ONE TABLET BY MOUTH TWICE A DAY, Disp: 180 tablet, Rfl: 1  •  sennosides-docusate sodium (SENOKOT-S) 8.6-50 MG tablet, Take 2 tablets by mouth At Night As Needed for Constipation., Disp: , Rfl:   •  TRELEGY ELLIPTA 100-62.5-25 MCG/INH aerosol powder , , Disp: , Rfl:   •  TRESIBA FLEXTOUCH 200 UNIT/ML solution pen-injector, INJECT 74 UNITS UNDER THE SKIN INTO THE APPROPRIATE AREA AS DIRECTED DAILY, Disp: 9 mL, Rfl: 6  •  vitamin D (ERGOCALCIFEROL) 21588 units capsule capsule, TAKE ONE CAPSULE BY MOUTH ONCE WEEKLY ON TUESDAY, Disp: 12 capsule, Rfl: 2  •  cephalexin (KEFLEX) 500 MG capsule, Take 1 capsule by mouth 2 (Two) Times a Day for 7 days., Disp: 14 capsule, Rfl: 0  •  Clotrimazole 1 % ointment, Apply 1 application topically 2 (Two) Times a Day., Disp: 56.7 g, Rfl: 0  •  fluconazole (DIFLUCAN) 100 MG tablet, Take 1 tablet by mouth Daily for 7 days., Disp: 7 tablet, Rfl: 0  •  selenium sulfide (SELSUN) 2.5 % shampoo, Apply  topically to the appropriate area as directed Daily As Needed for Irritation or dandruff., Disp: 118 mL, Rfl: 6  Medications Discontinued During This Encounter   Medication Reason   • azithromycin (ZITHROMAX Z-VASILIY) 250 MG tablet Duplicate order       Review of  "Systems   Constitutional: Positive for fatigue. Negative for chills and fever.   HENT: Negative for congestion.    Respiratory: Positive for shortness of breath (chronic ).    Gastrointestinal: Negative for constipation, diarrhea, nausea and vomiting.   Genitourinary: Negative for difficulty urinating and dysuria.   Skin: Positive for rash (face and ears and scalp) and wound.   Neurological: Negative for dizziness and headaches.             /68 (BP Location: Left arm, Patient Position: Sitting, Cuff Size: Large Adult)   Pulse 80   Temp 97.6 °F (36.4 °C) (Oral)   Resp 18   Ht 167.6 cm (66\")   Wt 128 kg (282 lb)   LMP  (LMP Unknown)   SpO2 91% Comment: 4L o2  BMI 45.52 kg/m²       Physical Exam   Constitutional: She is oriented to person, place, and time. She appears well-developed and well-nourished. She appears ill (chronically ill appearing in her scooter). No distress.   HENT:   Head: Normocephalic and atraumatic.   Mouth/Throat: Oropharynx is clear and moist. No oropharyngeal exudate.   Crusting and excoriations behind ears bilaterally near where her nasal canula lay. Crusting and scaling of her nasal bridge and nasal labia folds bilaterally.    Eyes: Conjunctivae are normal. Right eye exhibits no discharge. Left eye exhibits no discharge. No scleral icterus.   Cardiovascular: Normal rate, regular rhythm and normal heart sounds. Exam reveals no gallop and no friction rub.   No murmur heard.  Pulmonary/Chest: Effort normal. No respiratory distress. She has decreased breath sounds. She has no wheezes. She has no rales.   Abdominal: Soft. Bowel sounds are normal. She exhibits no distension and no mass. There is no tenderness. There is no guarding.   Lymphadenopathy:     She has no cervical adenopathy.   Neurological: She is alert and oriented to person, place, and time.   Skin: Skin is warm. Rash (Erythematous rash in her panus folds that is moist appearing ) noted.   Left lateral leg wound severe " edema (pitting and weeping). Surrounding redness and pea d'orange appearance.    Psychiatric: Her behavior is normal. She exhibits a depressed mood.   Nursing note and vitals reviewed.        Results for orders placed or performed during the hospital encounter of 04/03/19   Influenza Antigen, Rapid - Swab, Nasopharynx   Result Value Ref Range    Influenza A Ag, EIA Negative Negative    Influenza B Ag, EIA Negative Negative   Respiratory Culture - Sputum, Cough   Result Value Ref Range    Respiratory Culture Heavy growth (4+) Normal Respiratory Zeinab     Gram Stain Many (4+) WBCs seen     Gram Stain Rare (1+) Gram positive cocci     Gram Stain Rare (1+) Epithelial cells seen    Comprehensive Metabolic Panel   Result Value Ref Range    Glucose 124 (H) 65 - 99 mg/dL    BUN 30 (H) 8 - 23 mg/dL    Creatinine 1.56 (H) 0.57 - 1.00 mg/dL    Sodium 143 136 - 145 mmol/L    Potassium 4.9 3.5 - 5.2 mmol/L    Chloride 101 98 - 107 mmol/L    CO2 32.0 (H) 22.0 - 29.0 mmol/L    Calcium 9.1 8.6 - 10.5 mg/dL    Total Protein 7.7 6.0 - 8.5 g/dL    Albumin 4.20 3.50 - 5.20 g/dL    ALT (SGPT) 14 1 - 33 U/L    AST (SGOT) 14 1 - 32 U/L    Alkaline Phosphatase 111 39 - 117 U/L    Total Bilirubin 0.5 0.2 - 1.2 mg/dL    eGFR Non African Amer 34 (L) >60 mL/min/1.73    Globulin 3.5 gm/dL    A/G Ratio 1.2 g/dL    BUN/Creatinine Ratio 19.2 7.0 - 25.0    Anion Gap 10.0 mmol/L   BNP   Result Value Ref Range    proBNP 1,262.0 (H) 5.0 - 900.0 pg/mL   Troponin   Result Value Ref Range    Troponin T 0.024 0.000-<0.030 ng/mL   CBC Auto Differential   Result Value Ref Range    WBC 10.64 3.40 - 10.80 10*3/mm3    RBC 3.94 3.77 - 5.28 10*6/mm3    Hemoglobin 11.6 (L) 12.0 - 15.9 g/dL    Hematocrit 37.3 34.0 - 46.6 %    MCV 94.7 79.0 - 97.0 fL    MCH 29.4 26.6 - 33.0 pg    MCHC 31.1 (L) 31.5 - 35.7 g/dL    RDW 12.3 12.3 - 15.4 %    RDW-SD 43.0 37.0 - 54.0 fl    MPV 11.1 6.0 - 12.0 fL    Platelets 201 140 - 450 10*3/mm3    Neutrophil % 75.1 42.7 - 76.0 %     Lymphocyte % 14.4 (L) 19.6 - 45.3 %    Monocyte % 6.5 5.0 - 12.0 %    Eosinophil % 3.2 0.3 - 6.2 %    Basophil % 0.4 0.0 - 1.5 %    Immature Grans % 0.4 0.0 - 0.5 %    Neutrophils, Absolute 8.00 (H) 1.40 - 7.00 10*3/mm3    Lymphocytes, Absolute 1.53 0.70 - 3.10 10*3/mm3    Monocytes, Absolute 0.69 0.10 - 0.90 10*3/mm3    Eosinophils, Absolute 0.34 0.00 - 0.40 10*3/mm3    Basophils, Absolute 0.04 0.00 - 0.20 10*3/mm3    Immature Grans, Absolute 0.04 0.00 - 0.05 10*3/mm3    nRBC 0.0 0.0 - 0.0 /100 WBC   Light Blue Top   Result Value Ref Range    Extra Tube hold for add-on    Green Top (Gel)   Result Value Ref Range    Extra Tube Hold for add-ons.    Lavender Top   Result Value Ref Range    Extra Tube hold for add-on    Gold Top - SST   Result Value Ref Range    Extra Tube Hold for add-ons.            Jared was seen today for follow-up, hyperlipidemia, copd and med refill.    Diagnoses and all orders for this visit:    CKD (chronic kidney disease) stage 4, GFR 15-29 ml/min (CMS/[facility])  -     CBC & Differential  -     Comprehensive Metabolic Panel    Chronic obstructive pulmonary disease, unspecified COPD type (CMS/[facility])    Wound of left lower extremity, initial encounter  -     Ambulatory Referral to Home Health    Gait disturbance  -     Ambulatory Referral to Home Health    Candidal skin infection  -     Ambulatory Referral to Home Health    Morbid obesity (CMS/[facility])    Hypothyroidism, acquired  -     T4, Free  -     TSH    Uncontrolled type 2 diabetes mellitus with chronic kidney disease, with long-term current use of insulin (CMS/[facility])  -     CBC & Differential  -     Comprehensive Metabolic Panel  -     Hemoglobin A1c    Other orders  -     Clotrimazole 1 % ointment; Apply 1 application topically 2 (Two) Times a Day.  -     selenium sulfide (SELSUN) 2.5 % shampoo; Apply  topically to the appropriate area as directed Daily As Needed for Irritation or dandruff.  -     cephalexin (KEFLEX) 500 MG capsule; Take 1 capsule by  mouth 2 (Two) Times a Day for 7 days.  -     fluconazole (DIFLUCAN) 100 MG tablet; Take 1 tablet by mouth Daily for 7 days.      I am going to treat her yeast aggressively yet again. Will put her on Diflucan as well as creams and shampoo. Avoid spreading from her face to her panus. I have also asked her to clean her devices like her canula and her CPAP machine since the areas of concern are where these lay. I am going to have home health come and help her with her wound and her yeast rash.     Treat leg wound with Keflex and return in 7-10 days for recheck. Needs to wear compression stockings daily.     Check labs and will follow up on these when she returns. TSH was high when I saw her last and needed to be rechecked, but it was not.         Return in about 10 days (around 7/12/2019) for Recheck.    Jina Vaughan MD  07/02/2019

## 2019-07-11 ENCOUNTER — OFFICE VISIT (OUTPATIENT)
Dept: INTERNAL MEDICINE | Facility: CLINIC | Age: 64
End: 2019-07-11

## 2019-07-11 VITALS
DIASTOLIC BLOOD PRESSURE: 70 MMHG | SYSTOLIC BLOOD PRESSURE: 134 MMHG | OXYGEN SATURATION: 98 % | BODY MASS INDEX: 45.52 KG/M2 | HEART RATE: 67 BPM | TEMPERATURE: 97.8 F | RESPIRATION RATE: 18 BRPM | HEIGHT: 66 IN

## 2019-07-11 DIAGNOSIS — S60.512A INFECTED ABRASION OF LEFT HAND, INITIAL ENCOUNTER: ICD-10-CM

## 2019-07-11 DIAGNOSIS — IMO0002 UNCONTROLLED TYPE 2 DIABETES MELLITUS WITH CHRONIC KIDNEY DISEASE, WITH LONG-TERM CURRENT USE OF INSULIN: ICD-10-CM

## 2019-07-11 DIAGNOSIS — L08.9 INFECTED ABRASION OF LEFT HAND, INITIAL ENCOUNTER: ICD-10-CM

## 2019-07-11 DIAGNOSIS — S81.802S LEG WOUND, LEFT, SEQUELA: Primary | ICD-10-CM

## 2019-07-11 DIAGNOSIS — N18.4 CKD (CHRONIC KIDNEY DISEASE) STAGE 4, GFR 15-29 ML/MIN (HCC): ICD-10-CM

## 2019-07-11 DIAGNOSIS — E66.01 MORBID OBESITY (HCC): Chronic | ICD-10-CM

## 2019-07-11 PROCEDURE — 99214 OFFICE O/P EST MOD 30 MIN: CPT | Performed by: INTERNAL MEDICINE

## 2019-07-11 RX ORDER — SULFAMETHOXAZOLE AND TRIMETHOPRIM 800; 160 MG/1; MG/1
1 TABLET ORAL 2 TIMES DAILY
Qty: 14 TABLET | Refills: 0 | Status: SHIPPED | OUTPATIENT
Start: 2019-07-11 | End: 2019-07-18

## 2019-07-11 NOTE — PROGRESS NOTES
Jared Govea is a 64 y.o. female, who presents with a chief complaint of   Chief Complaint   Patient presents with   • Follow-up     10 day f/u   • Wound Check   • Hand Pain     L hand, 2nd digit, swollen, redness, painful, pt asking for pain rx        63 yo F here for follow up of her chronic issues. She has worsening kidney function on top of her CKD stage 3 and severe COPD. She is doing well from her yeast infection. She is tolerating Diflucan and has one more dose left. She states that she did not get cream or shampoo even though I sent this to pharmacy. She refused home health.     Her leg feels better after taking Keflex. She has one more dose of this. No fevers.     She has hand issue as well today. Started having excruciating pain last night. Swelling and tenderness of left thumb into her hand. Tried Advil that did not help. Has not tried Tylenol. States that she has not had fever or chills.     She is taking 76 units of insulin at night and 10 units per meal. She is checking her BG's and running around 340 at night. They are running about 250 in the AM. Eating terrible and mostly high sugar and fat. Not walking.                The following portions of the patient's history were reviewed and updated as appropriate: allergies, current medications, past family history, past medical history, past social history, past surgical history and problem list.    Allergies: Erythromycin; Erythromycin ethylsuccinate; Ativan [lorazepam]; and Tape    Current Outpatient Medications:   •  acetaminophen (TYLENOL) 325 MG tablet, Take 2 tablets by mouth Every 4 (Four) Hours As Needed for Mild Pain , Headache or Fever., Disp: , Rfl:   •  ALBUTEROL SULFATE  (90 Base) MCG/ACT inhaler, INHALE TWO PUFFS BY MOUTH EVERY 4 HOURS AS NEEDED FOR WHEEZING, Disp: 18 g, Rfl: 5  •  amLODIPine (NORVASC) 2.5 MG tablet, TAKE ONE TABLET BY MOUTH DAILY, Disp: 90 tablet, Rfl: 1  •  aspirin 81 MG EC tablet, Take 81 mg by mouth daily.,  Disp: , Rfl:   •  atorvastatin (LIPITOR) 40 MG tablet, TAKE ONE TABLET BY MOUTH DAILY FOR CHOLESTEROL, Disp: 90 tablet, Rfl: 1  •  B Complex Vitamins (VITAMIN B COMPLEX PO), Take 1 tablet by mouth daily., Disp: , Rfl:   •  bumetanide (BUMEX) 1 MG tablet, TAKE   2 PO BID, Disp: 120 tablet, Rfl: 1  •  cetirizine (ZyrTEC) 10 MG tablet, Take 10 mg by mouth every night., Disp: , Rfl:   •  Clotrimazole 1 % ointment, Apply 1 application topically 2 (Two) Times a Day., Disp: 56.7 g, Rfl: 0  •  clotrimazole-betamethasone (LOTRISONE) 1-0.05 % cream, Apply  topically 2 (Two) Times a Day., Disp: 180 g, Rfl: 3  •  dimenhyDRINATE (DRAMAMINE) 50 MG tablet, Take 1 tablet by mouth Daily As Needed for movement disorders., Disp: 30 tablet, Rfl: 0  •  famotidine (PEPCID) 20 MG tablet, Take 20 mg by mouth daily., Disp: , Rfl:   •  guaiFENesin (MUCINEX) 600 MG 12 hr tablet, Take 1,200 mg by mouth 2 (Two) Times a Day., Disp: , Rfl:   •  HYDROcodone-acetaminophen (NORCO)  MG per tablet, Take 1 tablet by mouth Every 8 (Eight) Hours As Needed for Moderate Pain ., Disp: 90 tablet, Rfl: 0  •  hydrocortisone (ANUSOL-HC) 2.5 % rectal cream, Apply to your rectum after each bowel movement and at bedtime, Disp: 28.35 g, Rfl: 1  •  insulin aspart (NOVOLOG FLEXPEN) 100 UNIT/ML solution pen-injector sc pen, INJECT SUBCUTANEOUSLY PER SLIDING SCALE AS DIRECTED. MAX 21 UNITS DAILY, Disp: 15 mL, Rfl: 2  •  levalbuterol (XOPENEX) 0.63 MG/3ML nebulizer solution, Take 1 ampule by nebulization Every 4 (Four) Hours As Needed for Wheezing., Disp: 50 ampule, Rfl: 0  •  levothyroxine (SYNTHROID) 175 MCG tablet, Take 1 tablet by mouth Daily., Disp: 90 tablet, Rfl: 1  •  methylPREDNISolone (MEDROL, VASILIY,) 4 MG tablet, follow package directions, Disp: 21 tablet, Rfl: 0  •  metoprolol tartrate (LOPRESSOR) 100 MG tablet, TAKE ONE TABLET BY MOUTH EVERY 12 HOURS, Disp: 180 tablet, Rfl: 1  •  metoprolol tartrate (LOPRESSOR) 50 MG tablet, TAKE ONE TABLET BY MOUTH  "EVERY 12 HOURS FOR HEART, Disp: 180 tablet, Rfl: 0  •  Probiotic Product (ALIGN) 4 MG capsule, Take 4 mg by mouth Daily., Disp: 30 capsule, Rfl: 6  •  QUEtiapine (SEROquel) 100 MG tablet, TAKE ONE TABLET BY MOUTH TWICE A DAY, Disp: 180 tablet, Rfl: 1  •  selenium sulfide (SELSUN) 2.5 % shampoo, Apply  topically to the appropriate area as directed Daily As Needed for Irritation or dandruff., Disp: 118 mL, Rfl: 6  •  sennosides-docusate sodium (SENOKOT-S) 8.6-50 MG tablet, Take 2 tablets by mouth At Night As Needed for Constipation., Disp: , Rfl:   •  TRELEGY ELLIPTA 100-62.5-25 MCG/INH aerosol powder , , Disp: , Rfl:   •  TRESIBA FLEXTOUCH 200 UNIT/ML solution pen-injector, INJECT 74 UNITS UNDER THE SKIN INTO THE APPROPRIATE AREA AS DIRECTED DAILY, Disp: 9 mL, Rfl: 6  •  vitamin D (ERGOCALCIFEROL) 36278 units capsule capsule, TAKE ONE CAPSULE BY MOUTH ONCE WEEKLY ON TUESDAY, Disp: 12 capsule, Rfl: 2  •  sulfamethoxazole-trimethoprim (BACTRIM DS) 800-160 MG per tablet, Take 1 tablet by mouth 2 (Two) Times a Day for 7 days., Disp: 14 tablet, Rfl: 0  There are no discontinued medications.    Review of Systems   Constitutional: Positive for fatigue. Negative for chills.   HENT: Negative for congestion.    Respiratory: Negative for cough and shortness of breath.    Musculoskeletal: Positive for arthralgias. Negative for myalgias.   Skin: Positive for rash (getting better) and wound (left hand ).             /70 (BP Location: Right arm, Patient Position: Sitting, Cuff Size: Large Adult)   Pulse 67   Temp 97.8 °F (36.6 °C) (Oral)   Resp 18   Ht 167.6 cm (66\")   LMP  (LMP Unknown)   SpO2 98% Comment: 4 L o2  BMI 45.52 kg/m²       Physical Exam   Constitutional: She is oriented to person, place, and time. She appears well-developed and well-nourished. She appears ill (morbidly obese in scooter). No distress.   HENT:   Head: Normocephalic and atraumatic.   Right Ear: External ear normal.   Left Ear: External ear " normal.   Mouth/Throat: Oropharynx is clear and moist. No oropharyngeal exudate.   Eyes: Conjunctivae are normal. Right eye exhibits no discharge. Left eye exhibits no discharge. No scleral icterus.   Neck: Neck supple.   Pulmonary/Chest: No accessory muscle usage.   NC in place with breathlessness with talking that is stable    Abdominal: She exhibits no mass.   Lymphadenopathy:     She has no cervical adenopathy.   Neurological: She is alert and oriented to person, place, and time.   Skin: Skin is warm. No rash noted.   Swelling and redness of her left thumb and hand with tenderness to palpation. Healing left leg wound with chronic venous stasis. Swelling improved.    Psychiatric: She has a normal mood and affect. Her behavior is normal.   Nursing note and vitals reviewed.        Results for orders placed or performed in visit on 07/02/19   Comprehensive Metabolic Panel   Result Value Ref Range    Glucose 243 (H) 65 - 99 mg/dL    BUN 57 (H) 8 - 23 mg/dL    Creatinine 2.32 (H) 0.57 - 1.00 mg/dL    eGFR Non African Am 21 (L) >60 mL/min/1.73    eGFR African Am 26 (L) >60 mL/min/1.73    BUN/Creatinine Ratio 24.6 7.0 - 25.0    Sodium 141 136 - 145 mmol/L    Potassium 4.9 3.5 - 5.2 mmol/L    Chloride 95 (L) 98 - 107 mmol/L    Total CO2 36.0 (H) 22.0 - 29.0 mmol/L    Calcium 9.2 8.6 - 10.5 mg/dL    Total Protein 7.1 6.0 - 8.5 g/dL    Albumin 4.40 3.50 - 5.20 g/dL    Globulin 2.7 gm/dL    A/G Ratio 1.6 g/dL    Total Bilirubin 0.4 0.2 - 1.2 mg/dL    Alkaline Phosphatase 124 (H) 39 - 117 U/L    AST (SGOT) 11 1 - 32 U/L    ALT (SGPT) 10 1 - 33 U/L   Hemoglobin A1c   Result Value Ref Range    Hemoglobin A1C 10.40 (H) 4.80 - 5.60 %   T4, Free   Result Value Ref Range    Free T4 1.32 0.93 - 1.70 ng/dL   TSH   Result Value Ref Range    TSH 5.150 (H) 0.270 - 4.200 mIU/mL   CBC & Differential   Result Value Ref Range    WBC 10.17 3.40 - 10.80 10*3/mm3    RBC 3.64 (L) 3.77 - 5.28 10*6/mm3    Hemoglobin 10.5 (L) 12.0 - 15.9 g/dL     Hematocrit 34.4 34.0 - 46.6 %    MCV 94.5 79.0 - 97.0 fL    MCH 28.8 26.6 - 33.0 pg    MCHC 30.5 (L) 31.5 - 35.7 g/dL    RDW 12.3 12.3 - 15.4 %    Platelets 204 140 - 450 10*3/mm3    Neutrophil Rel % 76.8 (H) 42.7 - 76.0 %    Lymphocyte Rel % 12.4 (L) 19.6 - 45.3 %    Monocyte Rel % 6.8 5.0 - 12.0 %    Eosinophil Rel % 3.3 0.3 - 6.2 %    Basophil Rel % 0.3 0.0 - 1.5 %    Neutrophils Absolute 7.81 (H) 1.70 - 7.00 10*3/mm3    Lymphocytes Absolute 1.26 0.70 - 3.10 10*3/mm3    Monocytes Absolute 0.69 0.10 - 0.90 10*3/mm3    Eosinophils Absolute 0.34 0.00 - 0.40 10*3/mm3    Basophils Absolute 0.03 0.00 - 0.20 10*3/mm3    Immature Granulocyte Rel % 0.4 0.0 - 0.5 %    Immature Grans Absolute 0.04 0.00 - 0.05 10*3/mm3    nRBC 0.1 0.0 - 0.2 /100 WBC           Jared was seen today for follow-up, wound check and hand pain.    Diagnoses and all orders for this visit:    Leg wound, left, sequela    Infected abrasion of left hand, initial encounter    Uncontrolled type 2 diabetes mellitus with chronic kidney disease, with long-term current use of insulin (CMS/[facility])    Morbid obesity (CMS/[facility])    CKD (chronic kidney disease) stage 4, GFR 15-29 ml/min (CMS/[facility])    Other orders  -     sulfamethoxazole-trimethoprim (BACTRIM DS) 800-160 MG per tablet; Take 1 tablet by mouth 2 (Two) Times a Day for 7 days.    Left leg is healing well. Refused home health.     I am going to treat her hand wound with 7 days of Bactrim. If not getting better, will need to do x-ray and labs. She is high risks for osteo with her uncontrolled diabetes. She cannot see me for follow up next week do to her family going on vacation and does not have transportation. Will take Tylenol for pain and if not better, will call me and we can give short course of narcotics. Will go to ER if fever or worsening symptoms.     CKD is worsening. Avoid Advil which is she is taking and needs to see Dr. Orr. Will call and schedule appointment.     Non-compliant with her diet  and had uncontrolled diabetes. Will go up on basal insulin 2 units every other day with goal BG's of  in the AM. Call me with BG's in one week.       Return in about 11 days (around 7/22/2019) for Recheck.    Jina Vaughan MD  07/11/2019

## 2019-07-12 RX ORDER — CLOTRIMAZOLE 1 %
CREAM (GRAM) TOPICAL 2 TIMES DAILY
Qty: 60 G | Refills: 0 | Status: ON HOLD | OUTPATIENT
Start: 2019-07-12 | End: 2020-07-20

## 2019-07-29 RX ORDER — BUMETANIDE 1 MG/1
TABLET ORAL
Qty: 120 TABLET | Refills: 0 | Status: SHIPPED | OUTPATIENT
Start: 2019-07-29 | End: 2019-09-27 | Stop reason: SDUPTHER

## 2019-08-09 ENCOUNTER — TELEPHONE (OUTPATIENT)
Dept: INTERNAL MEDICINE | Facility: CLINIC | Age: 64
End: 2019-08-09

## 2019-08-09 NOTE — TELEPHONE ENCOUNTER
Needs to be seen. Please schedule for acute visit today if they will come in and if not next week. Patient has been non-compliant with seeing me and keeping follow ups. Was supposed to see me for follow up about 2 weeks ago. Refused home health and wound care as well in past. I cannot fill Keflex for her without being seen.

## 2019-08-09 NOTE — TELEPHONE ENCOUNTER
"Patient sister calls requesting refill of most recent antibiotic stating that \"leg oozing again\".  OK to refill Keflex?    Sania [phone]      "

## 2019-08-12 RX ORDER — INSULIN DEGLUDEC 200 U/ML
INJECTION, SOLUTION SUBCUTANEOUS
Qty: 3 ML | Refills: 1 | Status: SHIPPED | OUTPATIENT
Start: 2019-08-12 | End: 2019-10-04 | Stop reason: SDUPTHER

## 2019-08-13 ENCOUNTER — TRANSCRIBE ORDERS (OUTPATIENT)
Dept: PULMONOLOGY | Facility: HOSPITAL | Age: 64
End: 2019-08-13

## 2019-08-13 ENCOUNTER — TRANSCRIBE ORDERS (OUTPATIENT)
Dept: ADMINISTRATIVE | Facility: HOSPITAL | Age: 64
End: 2019-08-13

## 2019-08-13 ENCOUNTER — OFFICE VISIT (OUTPATIENT)
Dept: INTERNAL MEDICINE | Facility: CLINIC | Age: 64
End: 2019-08-13

## 2019-08-13 ENCOUNTER — HOSPITAL ENCOUNTER (OUTPATIENT)
Dept: PULMONOLOGY | Facility: HOSPITAL | Age: 64
Discharge: HOME | End: 2019-08-13
Admitting: INTERNAL MEDICINE

## 2019-08-13 VITALS
OXYGEN SATURATION: 98 % | TEMPERATURE: 97.8 F | HEIGHT: 66 IN | RESPIRATION RATE: 18 BRPM | HEART RATE: 62 BPM | BODY MASS INDEX: 45.52 KG/M2 | SYSTOLIC BLOOD PRESSURE: 128 MMHG | DIASTOLIC BLOOD PRESSURE: 62 MMHG

## 2019-08-13 DIAGNOSIS — IMO0002 UNCONTROLLED TYPE 2 DIABETES MELLITUS WITH CHRONIC KIDNEY DISEASE, WITH LONG-TERM CURRENT USE OF INSULIN: ICD-10-CM

## 2019-08-13 DIAGNOSIS — N18.4 CKD (CHRONIC KIDNEY DISEASE) STAGE 4, GFR 15-29 ML/MIN (HCC): ICD-10-CM

## 2019-08-13 DIAGNOSIS — E66.01 MORBID OBESITY (HCC): Primary | ICD-10-CM

## 2019-08-13 DIAGNOSIS — J44.9 OBSTRUCTIVE CHRONIC BRONCHITIS WITHOUT EXACERBATION (HCC): Primary | ICD-10-CM

## 2019-08-13 DIAGNOSIS — I87.2 VENOUS STASIS DERMATITIS OF BOTH LOWER EXTREMITIES: ICD-10-CM

## 2019-08-13 DIAGNOSIS — S81.802D LEG WOUND, LEFT, SUBSEQUENT ENCOUNTER: ICD-10-CM

## 2019-08-13 DIAGNOSIS — I50.32 CHRONIC DIASTOLIC CONGESTIVE HEART FAILURE (HCC): ICD-10-CM

## 2019-08-13 DIAGNOSIS — G89.4 CHRONIC PAIN SYNDROME: ICD-10-CM

## 2019-08-13 DIAGNOSIS — J44.9 OBSTRUCTIVE CHRONIC BRONCHITIS WITHOUT EXACERBATION (HCC): ICD-10-CM

## 2019-08-13 DIAGNOSIS — M79.89 LEG SWELLING: ICD-10-CM

## 2019-08-13 LAB
ARTERIAL PATENCY WRIST A: POSITIVE
ATMOSPHERIC PRESS: 734 MMHG
BASE EXCESS BLDA CALC-SCNC: 6.5 MMOL/L (ref 0–2)
BDY SITE: ABNORMAL
BODY TEMPERATURE: 37 C
GAS FLOW AIRWAY: 4 LPM
HBA1C MFR BLD: 7.9 %
HCO3 BLDA-SCNC: 33.3 MMOL/L (ref 20–26)
HGB BLDA-MCNC: 10.4 G/DL (ref 13.5–17.5)
MODALITY: ABNORMAL
PCO2 BLDA: 58.9 MM HG (ref 35–45)
PCO2 TEMP ADJ BLD: 58.9 MM HG (ref 35–45)
PH BLDA: 7.36 PH UNITS (ref 7.35–7.45)
PH, TEMP CORRECTED: 7.36 PH UNITS (ref 7.35–7.45)
PO2 BLDA: 71.5 MM HG (ref 83–108)
PO2 TEMP ADJ BLD: 71.5 MM HG (ref 83–108)
SAO2 % BLDCOA: 94.6 % (ref 94–99)
VENTILATOR MODE: ABNORMAL

## 2019-08-13 PROCEDURE — 99214 OFFICE O/P EST MOD 30 MIN: CPT | Performed by: INTERNAL MEDICINE

## 2019-08-13 PROCEDURE — 83036 HEMOGLOBIN GLYCOSYLATED A1C: CPT | Performed by: INTERNAL MEDICINE

## 2019-08-13 PROCEDURE — 36600 WITHDRAWAL OF ARTERIAL BLOOD: CPT

## 2019-08-13 PROCEDURE — 82803 BLOOD GASES ANY COMBINATION: CPT

## 2019-08-13 RX ORDER — CEPHALEXIN 500 MG/1
500 CAPSULE ORAL 2 TIMES DAILY
Qty: 20 CAPSULE | Refills: 0 | Status: SHIPPED | OUTPATIENT
Start: 2019-08-13 | End: 2019-08-23

## 2019-08-13 NOTE — PROGRESS NOTES
Jared Govea is a 64 y.o. female, who presents with a chief complaint of   Chief Complaint   Patient presents with   • Wound Infection     L leg        63 yo with uncontrolled type 2 diabetes and chronic leg wound on left. States that home health came later than they should have and that is why she dismissed them.     Left leg started getting more red last week. No fever or chills. Keflex helped the wound, but then got worse after stopping the antibiotic. She scratches it in her sleep.     She has Type 2 diabetes. She is eating healthier and has removed a lot of the sweets. She is taking Novolog SSI. She is taking 76 units of Tresiba. Not lost any weight though.     She has CKD and is not sure that she has follow up with nephrologist. Needs to go see them.          The following portions of the patient's history were reviewed and updated as appropriate: allergies, current medications, past family history, past medical history, past social history, past surgical history and problem list.    Allergies: Erythromycin; Erythromycin ethylsuccinate; Ativan [lorazepam]; and Tape    Current Outpatient Medications:   •  acetaminophen (TYLENOL) 325 MG tablet, Take 2 tablets by mouth Every 4 (Four) Hours As Needed for Mild Pain , Headache or Fever., Disp: , Rfl:   •  ALBUTEROL SULFATE  (90 Base) MCG/ACT inhaler, INHALE TWO PUFFS BY MOUTH EVERY 4 HOURS AS NEEDED FOR WHEEZING, Disp: 18 g, Rfl: 5  •  amLODIPine (NORVASC) 2.5 MG tablet, TAKE ONE TABLET BY MOUTH DAILY, Disp: 90 tablet, Rfl: 1  •  aspirin 81 MG EC tablet, Take 81 mg by mouth daily., Disp: , Rfl:   •  atorvastatin (LIPITOR) 40 MG tablet, TAKE ONE TABLET BY MOUTH DAILY FOR CHOLESTEROL, Disp: 90 tablet, Rfl: 1  •  B Complex Vitamins (VITAMIN B COMPLEX PO), Take 1 tablet by mouth daily., Disp: , Rfl:   •  bumetanide (BUMEX) 1 MG tablet, TAKE TWO TABLETS BY MOUTH TWICE A DAY, Disp: 120 tablet, Rfl: 0  •  cetirizine (ZyrTEC) 10 MG tablet, Take 10 mg by mouth  every night., Disp: , Rfl:   •  clotrimazole (LOTRIMIN) 1 % cream, Apply  topically to the appropriate area as directed 2 (Two) Times a Day., Disp: 60 g, Rfl: 0  •  clotrimazole-betamethasone (LOTRISONE) 1-0.05 % cream, Apply  topically 2 (Two) Times a Day., Disp: 180 g, Rfl: 3  •  dimenhyDRINATE (DRAMAMINE) 50 MG tablet, Take 1 tablet by mouth Daily As Needed for movement disorders., Disp: 30 tablet, Rfl: 0  •  famotidine (PEPCID) 20 MG tablet, Take 20 mg by mouth daily., Disp: , Rfl:   •  guaiFENesin (MUCINEX) 600 MG 12 hr tablet, Take 1,200 mg by mouth 2 (Two) Times a Day., Disp: , Rfl:   •  HYDROcodone-acetaminophen (NORCO)  MG per tablet, Take 1 tablet by mouth Every 8 (Eight) Hours As Needed for Moderate Pain ., Disp: 90 tablet, Rfl: 0  •  hydrocortisone (ANUSOL-HC) 2.5 % rectal cream, Apply to your rectum after each bowel movement and at bedtime, Disp: 28.35 g, Rfl: 1  •  insulin aspart (NOVOLOG FLEXPEN) 100 UNIT/ML solution pen-injector sc pen, INJECT SUB-Q PER SLIDING SCALE AS DIRECTED. MAX 21 UNITS PER DAY, Disp: 15 mL, Rfl: 4  •  levalbuterol (XOPENEX) 0.63 MG/3ML nebulizer solution, Take 1 ampule by nebulization Every 4 (Four) Hours As Needed for Wheezing., Disp: 50 ampule, Rfl: 0  •  levothyroxine (SYNTHROID) 175 MCG tablet, Take 1 tablet by mouth Daily., Disp: 90 tablet, Rfl: 1  •  metoprolol tartrate (LOPRESSOR) 100 MG tablet, TAKE ONE TABLET BY MOUTH EVERY 12 HOURS, Disp: 180 tablet, Rfl: 1  •  metoprolol tartrate (LOPRESSOR) 50 MG tablet, TAKE ONE TABLET BY MOUTH EVERY 12 HOURS FOR HEART, Disp: 180 tablet, Rfl: 0  •  Probiotic Product (ALIGN) 4 MG capsule, Take 4 mg by mouth Daily., Disp: 30 capsule, Rfl: 6  •  QUEtiapine (SEROquel) 100 MG tablet, TAKE ONE TABLET BY MOUTH TWICE A DAY, Disp: 180 tablet, Rfl: 1  •  selenium sulfide (SELSUN) 2.5 % shampoo, Apply  topically to the appropriate area as directed Daily As Needed for Irritation or dandruff., Disp: 118 mL, Rfl: 6  •  sennosides-docusate  "sodium (SENOKOT-S) 8.6-50 MG tablet, Take 2 tablets by mouth At Night As Needed for Constipation., Disp: , Rfl:   •  TRELEGY ELLIPTA 100-62.5-25 MCG/INH aerosol powder , , Disp: , Rfl:   •  TRESIBA FLEXTOUCH 200 UNIT/ML solution pen-injector, INJECT 74 UNITS UNDER THE SKIN INTO THE APPROPRIATE AREA AS DIRECTED DAILY, Disp: 3 mL, Rfl: 1  •  vitamin D (ERGOCALCIFEROL) 26796 units capsule capsule, TAKE ONE CAPSULE BY MOUTH ONCE WEEKLY ON TUESDAY, Disp: 12 capsule, Rfl: 2  •  cephalexin (KEFLEX) 500 MG capsule, Take 1 capsule by mouth 2 (Two) Times a Day for 10 days., Disp: 20 capsule, Rfl: 0  Medications Discontinued During This Encounter   Medication Reason   • methylPREDNISolone (MEDROL, VASILIY,) 4 MG tablet *Therapy completed       Review of Systems   Constitutional: Negative for chills, fatigue and fever.   HENT: Negative for congestion.    Respiratory: Negative for cough and shortness of breath.    Gastrointestinal: Negative for abdominal pain, constipation and diarrhea.   Skin: Positive for wound.             /62 (BP Location: Left arm, Patient Position: Sitting, Cuff Size: Large Adult)   Pulse 62   Temp 97.8 °F (36.6 °C) (Oral)   Resp 18   Ht 167.6 cm (66\")   LMP  (LMP Unknown)   SpO2 98% Comment: 4L o2  BMI 45.52 kg/m²       Physical Exam   Constitutional: She is oriented to person, place, and time. She appears well-developed and well-nourished. She appears ill (chronically ill appearing and sitting in wheelchair). No distress.   HENT:   Head: Normocephalic and atraumatic.   Right Ear: External ear normal.   Left Ear: External ear normal.   Mouth/Throat: Oropharynx is clear and moist. No oropharyngeal exudate.   Eyes: Conjunctivae are normal. Right eye exhibits no discharge. Left eye exhibits no discharge. No scleral icterus.   Neck: Neck supple.   Cardiovascular: Normal rate, regular rhythm and normal heart sounds. Exam reveals no gallop and no friction rub.   No murmur heard.  Pulmonary/Chest: " Effort normal and breath sounds normal. No respiratory distress. She has no wheezes. She has no rales.   Lymphadenopathy:     She has no cervical adenopathy.   Neurological: She is alert and oriented to person, place, and time.   Skin: Skin is warm. No rash noted.   Chronic appearing leg wound on left with yellow drainage with bilateral chronic venous stasis    Psychiatric: She has a normal mood and affect. Her behavior is normal.   Nursing note and vitals reviewed.        Results for orders placed or performed in visit on 08/13/19   POC Glycosylated Hemoglobin (Hb A1C)   Result Value Ref Range    Hemoglobin A1C 7.9 %           Jared was seen today for wound infection.    Diagnoses and all orders for this visit:    Morbid obesity (CMS/[facility])  -     Lightweight Wheelchair    Leg swelling  -     Lightweight Wheelchair    Chronic diastolic congestive heart failure (CMS/[facility])  -     Lightweight Wheelchair    Chronic pain syndrome  -     Lightweight Wheelchair    Uncontrolled type 2 diabetes mellitus with chronic kidney disease, with long-term current use of insulin (CMS/[facility])  -     POC Glycosylated Hemoglobin (Hb A1C)    Venous stasis dermatitis of both lower extremities  -     Ambulatory Referral to Home Health    Leg wound, left, subsequent encounter  -     Ambulatory Referral to Home Health    CKD (chronic kidney disease) stage 4, GFR 15-29 ml/min (CMS/[facility])  -     Basic Metabolic Panel    Other orders  -     cephalexin (KEFLEX) 500 MG capsule; Take 1 capsule by mouth 2 (Two) Times a Day for 10 days.      Her HgA1c is better. I am happy that she is doing so much better. Needs to keep up the good work and will plan on rechecking blood work in 3 months. She is non-compliant usually, but stressed importance today.     Will repeat dose of Keflex and get her into wound care. Agrees to see today.     Repeat BMP today to see if kidney function is better. Needs to keep follow up with Dr. Orr.     Needs wheelchair for her  balance issues.     Return in about 3 months (around 11/13/2019) for Recheck.    Jina Vaughan MD  08/13/2019

## 2019-08-14 LAB
BUN SERPL-MCNC: 58 MG/DL (ref 8–23)
BUN/CREAT SERPL: 23.6 (ref 7–25)
CALCIUM SERPL-MCNC: 9.1 MG/DL (ref 8.6–10.5)
CHLORIDE SERPL-SCNC: 98 MMOL/L (ref 98–107)
CO2 SERPL-SCNC: 32.9 MMOL/L (ref 22–29)
CREAT SERPL-MCNC: 2.46 MG/DL (ref 0.57–1)
GLUCOSE SERPL-MCNC: 81 MG/DL (ref 65–99)
POTASSIUM SERPL-SCNC: 4.7 MMOL/L (ref 3.5–5.2)
SODIUM SERPL-SCNC: 143 MMOL/L (ref 136–145)

## 2019-08-16 ENCOUNTER — TELEPHONE (OUTPATIENT)
Dept: INTERNAL MEDICINE | Facility: CLINIC | Age: 64
End: 2019-08-16

## 2019-08-16 NOTE — TELEPHONE ENCOUNTER
----- Message from Sania Gardner sent at 8/16/2019  2:55 PM EDT -----  Contact: JULIUS - [facility]  MARIE MADRID, [facility], called to say that they are scheduling for wound care for Monday. They want to discuss the plan of care, she said that she is planning to use silver algenate with cotton srap  Please call her back to discuss care  Phone [phone]

## 2019-08-16 NOTE — TELEPHONE ENCOUNTER
That sounds great. If they need me to speak to them on Monday, just have them call my cell phone.

## 2019-08-19 ENCOUNTER — TELEPHONE (OUTPATIENT)
Dept: INTERNAL MEDICINE | Facility: CLINIC | Age: 64
End: 2019-08-19

## 2019-08-19 DIAGNOSIS — E66.01 MORBID OBESITY (HCC): Primary | ICD-10-CM

## 2019-08-19 DIAGNOSIS — I50.32 CHRONIC DIASTOLIC CONGESTIVE HEART FAILURE (HCC): ICD-10-CM

## 2019-08-19 DIAGNOSIS — IMO0002 UNCONTROLLED TYPE 2 DIABETES MELLITUS WITH CHRONIC KIDNEY DISEASE, WITH LONG-TERM CURRENT USE OF INSULIN: ICD-10-CM

## 2019-08-19 NOTE — TELEPHONE ENCOUNTER
----- Message from Jina Broussard sent at 8/16/2019  9:36 AM EDT -----  Monica gil is caller. States we sent an order over for a light weight wheelchair but they can not order this due to no OT/PT for this patient. They can order a standard wheel chair if we can send an order for that. Call back is [phone]. [facility]es [facility]

## 2019-09-24 RX ORDER — QUETIAPINE FUMARATE 100 MG/1
TABLET, FILM COATED ORAL
Qty: 180 TABLET | Refills: 0 | Status: SHIPPED | OUTPATIENT
Start: 2019-09-24 | End: 2019-12-24

## 2019-09-27 RX ORDER — BUMETANIDE 1 MG/1
TABLET ORAL
Qty: 120 TABLET | Refills: 0 | Status: SHIPPED | OUTPATIENT
Start: 2019-09-27 | End: 2019-11-15 | Stop reason: SDUPTHER

## 2019-10-04 RX ORDER — INSULIN DEGLUDEC 200 U/ML
INJECTION, SOLUTION SUBCUTANEOUS
Qty: 3 ML | Refills: 2 | Status: SHIPPED | OUTPATIENT
Start: 2019-10-04 | End: 2019-12-16 | Stop reason: SDUPTHER

## 2019-10-05 DIAGNOSIS — E03.9 HYPOTHYROIDISM, ACQUIRED: ICD-10-CM

## 2019-10-07 RX ORDER — LEVOTHYROXINE SODIUM 175 UG/1
TABLET ORAL
Qty: 90 TABLET | Refills: 0 | Status: SHIPPED | OUTPATIENT
Start: 2019-10-07 | End: 2020-01-08

## 2019-10-14 RX ORDER — METOPROLOL TARTRATE 100 MG/1
TABLET ORAL
Qty: 180 TABLET | Refills: 0 | Status: SHIPPED | OUTPATIENT
Start: 2019-10-14 | End: 2020-01-13

## 2019-10-14 RX ORDER — ATORVASTATIN CALCIUM 40 MG/1
TABLET, FILM COATED ORAL
Qty: 90 TABLET | Refills: 0 | Status: SHIPPED | OUTPATIENT
Start: 2019-10-14 | End: 2020-01-13

## 2019-11-15 ENCOUNTER — TELEPHONE (OUTPATIENT)
Dept: INTERNAL MEDICINE | Facility: CLINIC | Age: 64
End: 2019-11-15

## 2019-11-15 RX ORDER — BUMETANIDE 1 MG/1
2 TABLET ORAL 2 TIMES DAILY
Qty: 120 TABLET | Refills: 0 | Status: SHIPPED | OUTPATIENT
Start: 2019-11-15 | End: 2020-01-08

## 2019-11-15 NOTE — TELEPHONE ENCOUNTER
Patient has made appt for before PCP leaves.  Needs refill on bumetanide (BUMEX) 1 MG tablet     Ha Gage

## 2019-12-10 RX ORDER — AMLODIPINE BESYLATE 2.5 MG/1
TABLET ORAL
Qty: 90 TABLET | Refills: 0 | Status: SHIPPED | OUTPATIENT
Start: 2019-12-10 | End: 2020-03-09

## 2019-12-16 RX ORDER — INSULIN DEGLUDEC 200 U/ML
INJECTION, SOLUTION SUBCUTANEOUS
Qty: 3 PEN | Refills: 1 | Status: SHIPPED | OUTPATIENT
Start: 2019-12-16 | End: 2020-02-13 | Stop reason: SDUPTHER

## 2019-12-24 RX ORDER — QUETIAPINE FUMARATE 100 MG/1
TABLET, FILM COATED ORAL
Qty: 180 TABLET | Refills: 0 | Status: SHIPPED | OUTPATIENT
Start: 2019-12-24 | End: 2020-03-26

## 2019-12-31 ENCOUNTER — OFFICE VISIT (OUTPATIENT)
Dept: INTERNAL MEDICINE | Facility: CLINIC | Age: 64
End: 2019-12-31

## 2019-12-31 VITALS
HEART RATE: 69 BPM | SYSTOLIC BLOOD PRESSURE: 132 MMHG | DIASTOLIC BLOOD PRESSURE: 62 MMHG | BODY MASS INDEX: 45.52 KG/M2 | HEIGHT: 66 IN | OXYGEN SATURATION: 97 % | RESPIRATION RATE: 20 BRPM

## 2019-12-31 DIAGNOSIS — I87.2 VENOUS STASIS DERMATITIS OF BOTH LOWER EXTREMITIES: ICD-10-CM

## 2019-12-31 DIAGNOSIS — J44.9 CHRONIC OBSTRUCTIVE PULMONARY DISEASE, UNSPECIFIED COPD TYPE (HCC): ICD-10-CM

## 2019-12-31 DIAGNOSIS — I10 ESSENTIAL HYPERTENSION: Primary | ICD-10-CM

## 2019-12-31 DIAGNOSIS — E03.9 HYPOTHYROIDISM, ACQUIRED: ICD-10-CM

## 2019-12-31 DIAGNOSIS — E53.8 VITAMIN B12 DEFICIENCY: ICD-10-CM

## 2019-12-31 DIAGNOSIS — E66.01 MORBID OBESITY (HCC): Chronic | ICD-10-CM

## 2019-12-31 DIAGNOSIS — E55.9 VITAMIN D DEFICIENCY: ICD-10-CM

## 2019-12-31 DIAGNOSIS — L20.89 OTHER ATOPIC DERMATITIS: ICD-10-CM

## 2019-12-31 DIAGNOSIS — E78.2 MIXED HYPERLIPIDEMIA: ICD-10-CM

## 2019-12-31 DIAGNOSIS — N18.4 CKD (CHRONIC KIDNEY DISEASE) STAGE 4, GFR 15-29 ML/MIN (HCC): ICD-10-CM

## 2019-12-31 DIAGNOSIS — IMO0002 UNCONTROLLED TYPE 2 DIABETES MELLITUS WITH CHRONIC KIDNEY DISEASE, WITH LONG-TERM CURRENT USE OF INSULIN: ICD-10-CM

## 2019-12-31 PROCEDURE — 99214 OFFICE O/P EST MOD 30 MIN: CPT | Performed by: NURSE PRACTITIONER

## 2019-12-31 RX ORDER — DIAPER,BRIEF,INFANT-TODD,DISP
EACH MISCELLANEOUS 2 TIMES DAILY
Qty: 56 G | Refills: 0 | Status: SHIPPED | OUTPATIENT
Start: 2019-12-31 | End: 2020-08-14 | Stop reason: HOSPADM

## 2019-12-31 NOTE — PROGRESS NOTES
Subjective    Jared Govea is a 64 y.o. female presenting today for   Chief Complaint   Patient presents with   • Establish Care   • Hyperlipidemia   • Hypertension       History of Present Illness     Jared Govea presents today as a new patient to me to establish care.   Prior PCP was Jian Vaughan, and her current/chronic health conditions include:    Patient Active Problem List   Diagnosis   • SERENA (generalized anxiety disorder)   • Chronic back pain   • Chronic diastolic congestive heart failure (CMS/HCC)   • COPD (chronic obstructive pulmonary disease) (CMS/[facility])   • Depression   • Uncontrolled type 2 diabetes mellitus with chronic kidney disease, with long-term current use of insulin (CMS/[facility])   • Venous stasis dermatitis of both lower extremities   • HLD (hyperlipidemia)   • Essential hypertension   • Hypothyroidism, acquired   • Osteoporosis   • Primary insomnia   • LAD (lymphadenopathy), mediastinal   • Lung nodule, solitary   • Diabetic peripheral neuropathy (CMS/[facility])   • CKD (chronic kidney disease) stage 4, GFR 15-29 ml/min (CMS/[facility])   • Chronic pain syndrome   • Lumbar spondylolysis   • Normocytic anemia   • Pleural scarring   • Osteopetrosis   • Acute renal failure superimposed on stage 4 chronic kidney disease (CMS/[facility])   • Morbid obesity (CMS/[facility])   • Acute hypoxemic respiratory failure (CMS/[facility])       Patient Care Team:  Marilu Suresh APRN as PCP - General (Family Medicine)  Jhonny Hoyt MD as Consulting Physician (Pulmonary Disease)  Konstantin Orr MD as Consulting Physician (Nephrology)      Current Outpatient Medications:   •  acetaminophen (TYLENOL) 325 MG tablet, Take 2 tablets by mouth Every 4 (Four) Hours As Needed for Mild Pain , Headache or Fever., Disp: , Rfl:   •  ALBUTEROL SULFATE  (90 Base) MCG/ACT inhaler, INHALE TWO PUFFS BY MOUTH EVERY 4 HOURS AS NEEDED FOR WHEEZING, Disp: 18 g, Rfl: 5  •  amLODIPine (NORVASC) 2.5 MG tablet, TAKE ONE TABLET BY MOUTH DAILY,  Disp: 90 tablet, Rfl: 0  •  aspirin 81 MG EC tablet, Take 81 mg by mouth daily., Disp: , Rfl:   •  atorvastatin (LIPITOR) 40 MG tablet, TAKE ONE TABLET BY MOUTH DAILY FOR CHOLESTEROL, Disp: 90 tablet, Rfl: 0  •  B Complex Vitamins (VITAMIN B COMPLEX PO), Take 1 tablet by mouth daily., Disp: , Rfl:   •  bumetanide (BUMEX) 1 MG tablet, Take 2 tablets by mouth 2 (Two) Times a Day., Disp: 120 tablet, Rfl: 0  •  cetirizine (ZyrTEC) 10 MG tablet, Take 10 mg by mouth every night., Disp: , Rfl:   •  clotrimazole (LOTRIMIN) 1 % cream, Apply  topically to the appropriate area as directed 2 (Two) Times a Day., Disp: 60 g, Rfl: 0  •  clotrimazole-betamethasone (LOTRISONE) 1-0.05 % cream, Apply  topically 2 (Two) Times a Day., Disp: 180 g, Rfl: 3  •  dimenhyDRINATE (DRAMAMINE) 50 MG tablet, Take 1 tablet by mouth Daily As Needed for movement disorders., Disp: 30 tablet, Rfl: 0  •  famotidine (PEPCID) 20 MG tablet, Take 20 mg by mouth daily., Disp: , Rfl:   •  guaiFENesin (MUCINEX) 600 MG 12 hr tablet, Take 1,200 mg by mouth 2 (Two) Times a Day., Disp: , Rfl:   •  hydrocortisone (ANUSOL-HC) 2.5 % rectal cream, Apply to your rectum after each bowel movement and at bedtime, Disp: 28.35 g, Rfl: 1  •  insulin aspart (NOVOLOG FLEXPEN) 100 UNIT/ML solution pen-injector sc pen, INJECT SUB-Q PER SLIDING SCALE AS DIRECTED. MAX 21 UNITS PER DAY, Disp: 15 mL, Rfl: 4  •  levalbuterol (XOPENEX) 0.63 MG/3ML nebulizer solution, Take 1 ampule by nebulization Every 4 (Four) Hours As Needed for Wheezing., Disp: 50 ampule, Rfl: 0  •  levothyroxine (SYNTHROID, LEVOTHROID) 175 MCG tablet, TAKE ONE TABLET BY MOUTH DAILY, Disp: 90 tablet, Rfl: 0  •  metoprolol tartrate (LOPRESSOR) 100 MG tablet, TAKE ONE TABLET BY MOUTH EVERY 12 HOURS, Disp: 180 tablet, Rfl: 0  •  Probiotic Product (ALIGN) 4 MG capsule, Take 4 mg by mouth Daily., Disp: 30 capsule, Rfl: 6  •  QUEtiapine (SEROquel) 100 MG tablet, TAKE ONE TABLET BY MOUTH TWICE A DAY, Disp: 180 tablet,  Rfl: 0  •  sennosides-docusate sodium (SENOKOT-S) 8.6-50 MG tablet, Take 2 tablets by mouth At Night As Needed for Constipation., Disp: , Rfl:   •  TRELEGY ELLIPTA 100-62.5-25 MCG/INH aerosol powder , , Disp: , Rfl:   •  TRESIBA FLEXTOUCH 200 UNIT/ML solution pen-injector pen injection, INJECT 74 UNITS UNDER THE SKIN INTO THE APPROPRIATE AREA AS DIRECTED DAILY, Disp: 3 pen, Rfl: 1  •  VITAMIN D PO, Take  by mouth., Disp: , Rfl:   •  hydrocortisone 1 % ointment, Apply  topically to the appropriate area as directed 2 (Two) Times a Day., Disp: 56 g, Rfl: 0    Ms. Govea is a patient w/ numerous chronic health conditions including HTN, HLD, chronic diastolic heart failure, COPD, uncontrolled Type 2 DM, Hypothyroidism, Stage 4 CKD and venous stasis w/ chronic dermatitis and ulcerations. She does not see Cardiology. Her pulmonary issues are f/b Dr. Hoyt. She is currently using 4L of O2 continuously. Her renal issues are f/b Dr. Carson. She previously saw Gricelda Flores w/ Endocrinology but evidently her previous PCP assumed management of her diabetes. She is currently taking Tresiba at 74 units daily. She takes Novolog per a sliding scale only for correction if over 150. Se is checking blood sugars at least twice daily post-prandial. She has lymphedema and venous stasis w/ chronic dermatitis and ulcerations. She reports these are unchanged today. No systemic symptoms. She has had home health. She knows how to wrap them but does not. She has had no recent labs.    Her next appt with Pulm is 1/6/20.    Uncertain of next appt w/ Nephro.    New complaints today include:  Dry skin on face, ears and feet. Onset was approx a yr ago. She has been applying Cetaphil moisturizer w/o relief.        The following portions of the patient's history were reviewed and updated as appropriate: allergies, current medications, past family history, past medical history, past social history, past surgical history and problem list.    Review of  "Systems   Constitutional: Negative.    HENT: Negative.    Respiratory: Positive for cough and shortness of breath.    Cardiovascular: Negative.    Gastrointestinal: Negative.    Genitourinary: Negative.    Musculoskeletal: Negative.    Skin: Positive for color change, dry skin and skin lesions.   Neurological: Negative.    Psychiatric/Behavioral: Negative.        Objective    Vitals:    12/31/19 1113   BP: 132/62   Pulse: 69   Resp: 20   SpO2: 97%   Height: 167.6 cm (66\")     Body mass index is 45.52 kg/m².  Nursing notes and vitals reviewed.    Physical Exam   Constitutional: She is oriented to person, place, and time. She appears well-developed and well-nourished. No distress. She is morbidly obese.  HENT:   Right Ear: Tympanic membrane, external ear and ear canal normal.   Left Ear: Tympanic membrane, external ear and ear canal normal.   Nose: Nose normal.   Mouth/Throat: Uvula is midline, oropharynx is clear and moist and mucous membranes are normal.   Eyes: Pupils are equal, round, and reactive to light. Conjunctivae and EOM are normal.   Neck: Neck supple. No thyromegaly present.   Cardiovascular: Regular rhythm and normal heart sounds.   2+ non-pitting edema of bilat LEs   Pulmonary/Chest: Effort normal.   4L O2 per NC. Trace scattered expiratory wheezes.   Abdominal:   Unable to assess d/t immobility   Musculoskeletal:   Wheelchair bound. Unable to assess d/t immobility.    Jared had a diabetic foot exam performed today.  Skin Integrity  -  She has right heel is dry and cracked.  She has no right foot ulcer.She has left heel dry and cracked. She has no left foot ulcer..  Neurological: She is alert and oriented to person, place, and time. No cranial nerve deficit or sensory deficit.   Skin:   Dryness and flaking of the skin of the face and ears.   Bilat LEs erythematous to the mid-calf. The left is seeping clear fluid. Thickened ulcerations.       Assessment and Plan    Jared was seen today for establish care, " hyperlipidemia and hypertension.    Diagnoses and all orders for this visit:    Essential hypertension  -     CBC & Differential  -     Comprehensive Metabolic Panel    Mixed hyperlipidemia  -     Lipid Panel With / Chol / HDL Ratio    Uncontrolled type 2 diabetes mellitus with chronic kidney disease, with long-term current use of insulin (CMS/[facility])  -     Hemoglobin A1c  -     Microalbumin / Creatinine Urine Ratio - Urine, Clean Catch  -     Urinalysis With Culture If Indicated -    Hypothyroidism, acquired  -     TSH  -     T4, Free    Vitamin D deficiency  -     Vitamin D 25 Hydroxy    Vitamin B12 deficiency  -     Vitamin B12    Venous stasis dermatitis of both lower extremities  -     C-reactive Protein  -     Sedimentation Rate    Chronic obstructive pulmonary disease, unspecified COPD type (CMS/[facility])    Morbid obesity (CMS/HCC)    CKD (chronic kidney disease) stage 4, GFR 15-29 ml/min (CMS/HCC)    Other atopic dermatitis  -     hydrocortisone 1 % ointment; Apply  topically to the appropriate area as directed 2 (Two) Times a Day.        Medications Discontinued During This Encounter   Medication Reason   • HYDROcodone-acetaminophen (NORCO)  MG per tablet Historical Med - Therapy completed   • metoprolol tartrate (LOPRESSOR) 50 MG tablet Dose adjustment   • selenium sulfide (SELSUN) 2.5 % shampoo Historical Med - Therapy completed   • vitamin D (ERGOCALCIFEROL) 16556 units capsule capsule Dose adjustment       Except as noted above, Ms. Govea will continue her current medications as noted in the med list. I offered referrals to the lymphedema and the wound clinic but she declined both.   She will follow w/ specialty care as directed by them.    Patient Instructions   Amalactin moisturizer for your face and ears.  Apply the Lotrisone (clotrimazole and Betamethasone) you already have to the dry areas on your feet and legs.

## 2019-12-31 NOTE — PATIENT INSTRUCTIONS
Amalactin moisturizer for your face and ears.  Apply the Lotrisone (clotrimazole and Betamethasone) you already have to the dry areas on your feet and legs.

## 2020-01-01 LAB
25(OH)D3+25(OH)D2 SERPL-MCNC: 49.9 NG/ML (ref 30–100)
ALBUMIN SERPL-MCNC: 4.1 G/DL (ref 3.5–5.2)
ALBUMIN/CREAT UR: 22 MG/G CREAT (ref 0–30)
ALBUMIN/GLOB SERPL: 1 G/DL
ALP SERPL-CCNC: 108 U/L (ref 39–117)
ALT SERPL-CCNC: 7 U/L (ref 1–33)
AST SERPL-CCNC: 9 U/L (ref 1–32)
BASOPHILS # BLD AUTO: 0.06 10*3/MM3 (ref 0–0.2)
BASOPHILS NFR BLD AUTO: 0.6 % (ref 0–1.5)
BILIRUB SERPL-MCNC: 0.5 MG/DL (ref 0.2–1.2)
BUN SERPL-MCNC: 60 MG/DL (ref 8–23)
BUN/CREAT SERPL: 25.4 (ref 7–25)
CALCIUM SERPL-MCNC: 9.1 MG/DL (ref 8.6–10.5)
CHLORIDE SERPL-SCNC: 92 MMOL/L (ref 98–107)
CHOLEST SERPL-MCNC: 110 MG/DL (ref 0–200)
CHOLEST/HDLC SERPL: 2.62 {RATIO}
CO2 SERPL-SCNC: 35.2 MMOL/L (ref 22–29)
CREAT SERPL-MCNC: 2.36 MG/DL (ref 0.57–1)
CREAT UR-MCNC: 34.1 MG/DL
CRP SERPL-MCNC: 6.84 MG/DL (ref 0–0.5)
EOSINOPHIL # BLD AUTO: 0.54 10*3/MM3 (ref 0–0.4)
EOSINOPHIL NFR BLD AUTO: 5.3 % (ref 0.3–6.2)
ERYTHROCYTE [DISTWIDTH] IN BLOOD BY AUTOMATED COUNT: 12.7 % (ref 12.3–15.4)
ERYTHROCYTE [SEDIMENTATION RATE] IN BLOOD BY WESTERGREN METHOD: 98 MM/HR (ref 0–30)
GLOBULIN SER CALC-MCNC: 4.1 GM/DL
GLUCOSE SERPL-MCNC: 41 MG/DL (ref 65–99)
HBA1C MFR BLD: 6.2 % (ref 4.8–5.6)
HCT VFR BLD AUTO: 32.2 % (ref 34–46.6)
HDLC SERPL-MCNC: 42 MG/DL (ref 40–60)
HGB BLD-MCNC: 10.5 G/DL (ref 12–15.9)
IMM GRANULOCYTES # BLD AUTO: 0.03 10*3/MM3 (ref 0–0.05)
IMM GRANULOCYTES NFR BLD AUTO: 0.3 % (ref 0–0.5)
LDLC SERPL CALC-MCNC: 52 MG/DL (ref 0–100)
LYMPHOCYTES # BLD AUTO: 2.85 10*3/MM3 (ref 0.7–3.1)
LYMPHOCYTES NFR BLD AUTO: 27.8 % (ref 19.6–45.3)
MCH RBC QN AUTO: 27.9 PG (ref 26.6–33)
MCHC RBC AUTO-ENTMCNC: 32.6 G/DL (ref 31.5–35.7)
MCV RBC AUTO: 85.4 FL (ref 79–97)
MICROALBUMIN UR-MCNC: 7.5 UG/ML
MONOCYTES # BLD AUTO: 0.79 10*3/MM3 (ref 0.1–0.9)
MONOCYTES NFR BLD AUTO: 7.7 % (ref 5–12)
NEUTROPHILS # BLD AUTO: 5.97 10*3/MM3 (ref 1.7–7)
NEUTROPHILS NFR BLD AUTO: 58.3 % (ref 42.7–76)
NRBC BLD AUTO-RTO: 0 /100 WBC (ref 0–0.2)
PLATELET # BLD AUTO: 265 10*3/MM3 (ref 140–450)
POTASSIUM SERPL-SCNC: 4.7 MMOL/L (ref 3.5–5.2)
PROT SERPL-MCNC: 8.2 G/DL (ref 6–8.5)
RBC # BLD AUTO: 3.77 10*6/MM3 (ref 3.77–5.28)
SODIUM SERPL-SCNC: 141 MMOL/L (ref 136–145)
T4 FREE SERPL-MCNC: 1.47 NG/DL (ref 0.93–1.7)
TRIGL SERPL-MCNC: 79 MG/DL (ref 0–150)
TSH SERPL DL<=0.005 MIU/L-ACNC: 2.15 UIU/ML (ref 0.27–4.2)
VIT B12 SERPL-MCNC: 1778 PG/ML (ref 211–946)
VLDLC SERPL CALC-MCNC: 15.8 MG/DL
WBC # BLD AUTO: 10.24 10*3/MM3 (ref 3.4–10.8)

## 2020-01-08 DIAGNOSIS — E03.9 HYPOTHYROIDISM, ACQUIRED: ICD-10-CM

## 2020-01-08 RX ORDER — LEVOTHYROXINE SODIUM 175 UG/1
TABLET ORAL
Qty: 90 TABLET | Refills: 0 | Status: SHIPPED | OUTPATIENT
Start: 2020-01-08 | End: 2020-04-07

## 2020-01-08 RX ORDER — BUMETANIDE 1 MG/1
TABLET ORAL
Qty: 120 TABLET | Refills: 0 | Status: SHIPPED | OUTPATIENT
Start: 2020-01-08 | End: 2020-02-28

## 2020-01-13 RX ORDER — ATORVASTATIN CALCIUM 40 MG/1
TABLET, FILM COATED ORAL
Qty: 90 TABLET | Refills: 0 | Status: SHIPPED | OUTPATIENT
Start: 2020-01-13 | End: 2020-04-10

## 2020-01-13 RX ORDER — METOPROLOL TARTRATE 100 MG/1
TABLET ORAL
Qty: 180 TABLET | Refills: 0 | Status: SHIPPED | OUTPATIENT
Start: 2020-01-13 | End: 2020-04-10

## 2020-01-23 ENCOUNTER — TRANSCRIBE ORDERS (OUTPATIENT)
Dept: ADMINISTRATIVE | Facility: HOSPITAL | Age: 65
End: 2020-01-23

## 2020-01-23 DIAGNOSIS — J47.9 BRONCHIECTASIS WITHOUT COMPLICATION (HCC): ICD-10-CM

## 2020-01-23 DIAGNOSIS — J44.9 CHRONIC OBSTRUCTIVE PULMONARY DISEASE, UNSPECIFIED COPD TYPE (HCC): Primary | ICD-10-CM

## 2020-02-12 DIAGNOSIS — IMO0002 UNCONTROLLED TYPE 2 DIABETES MELLITUS WITH CHRONIC KIDNEY DISEASE, WITH LONG-TERM CURRENT USE OF INSULIN: Primary | ICD-10-CM

## 2020-02-12 RX ORDER — BLOOD-GLUCOSE METER
KIT MISCELLANEOUS
Qty: 1 EACH | Refills: 0 | Status: SHIPPED | OUTPATIENT
Start: 2020-02-12 | End: 2020-02-13 | Stop reason: SDUPTHER

## 2020-02-13 ENCOUNTER — TELEPHONE (OUTPATIENT)
Dept: INTERNAL MEDICINE | Facility: CLINIC | Age: 65
End: 2020-02-13

## 2020-02-28 RX ORDER — BUMETANIDE 1 MG/1
TABLET ORAL
Qty: 120 TABLET | Refills: 0 | Status: SHIPPED | OUTPATIENT
Start: 2020-02-28 | End: 2020-08-04

## 2020-03-09 RX ORDER — AMLODIPINE BESYLATE 2.5 MG/1
TABLET ORAL
Qty: 90 TABLET | Refills: 1 | Status: SHIPPED | OUTPATIENT
Start: 2020-03-09 | End: 2020-08-01 | Stop reason: HOSPADM

## 2020-03-26 RX ORDER — QUETIAPINE FUMARATE 100 MG/1
TABLET, FILM COATED ORAL
Qty: 180 TABLET | Refills: 0 | Status: SHIPPED | OUTPATIENT
Start: 2020-03-26 | End: 2020-06-23

## 2020-04-07 DIAGNOSIS — E03.9 HYPOTHYROIDISM, ACQUIRED: ICD-10-CM

## 2020-04-07 RX ORDER — LEVOTHYROXINE SODIUM 175 UG/1
TABLET ORAL
Qty: 90 TABLET | Refills: 1 | Status: SHIPPED | OUTPATIENT
Start: 2020-04-07

## 2020-04-07 RX ORDER — INSULIN DEGLUDEC 200 U/ML
INJECTION, SOLUTION SUBCUTANEOUS
Qty: 12 PEN | Refills: 1 | Status: SHIPPED | OUTPATIENT
Start: 2020-04-07 | End: 2020-04-13

## 2020-04-10 RX ORDER — METOPROLOL TARTRATE 100 MG/1
TABLET ORAL
Qty: 180 TABLET | Refills: 0 | Status: SHIPPED | OUTPATIENT
Start: 2020-04-10 | End: 2020-07-16

## 2020-04-10 RX ORDER — ATORVASTATIN CALCIUM 40 MG/1
TABLET, FILM COATED ORAL
Qty: 90 TABLET | Refills: 0 | Status: SHIPPED | OUTPATIENT
Start: 2020-04-10 | End: 2020-07-16

## 2020-04-13 DIAGNOSIS — IMO0002 UNCONTROLLED TYPE 2 DIABETES MELLITUS WITH CHRONIC KIDNEY DISEASE, WITH LONG-TERM CURRENT USE OF INSULIN: Primary | ICD-10-CM

## 2020-06-23 ENCOUNTER — TELEPHONE (OUTPATIENT)
Dept: INTERNAL MEDICINE | Facility: CLINIC | Age: 65
End: 2020-06-23

## 2020-06-23 DIAGNOSIS — E78.2 MIXED HYPERLIPIDEMIA: ICD-10-CM

## 2020-06-23 DIAGNOSIS — E03.9 HYPOTHYROIDISM, ACQUIRED: ICD-10-CM

## 2020-06-23 DIAGNOSIS — I10 ESSENTIAL HYPERTENSION: Primary | ICD-10-CM

## 2020-06-23 DIAGNOSIS — IMO0002 UNCONTROLLED TYPE 2 DIABETES MELLITUS WITH CHRONIC KIDNEY DISEASE, WITH LONG-TERM CURRENT USE OF INSULIN: ICD-10-CM

## 2020-06-23 RX ORDER — QUETIAPINE FUMARATE 100 MG/1
TABLET, FILM COATED ORAL
Qty: 180 TABLET | Refills: 0 | Status: SHIPPED | OUTPATIENT
Start: 2020-06-23 | End: 2020-08-01 | Stop reason: HOSPADM

## 2020-06-28 ENCOUNTER — RESULTS ENCOUNTER (OUTPATIENT)
Dept: INTERNAL MEDICINE | Facility: CLINIC | Age: 65
End: 2020-06-28

## 2020-06-28 DIAGNOSIS — E03.9 HYPOTHYROIDISM, ACQUIRED: ICD-10-CM

## 2020-06-28 DIAGNOSIS — IMO0002 UNCONTROLLED TYPE 2 DIABETES MELLITUS WITH CHRONIC KIDNEY DISEASE, WITH LONG-TERM CURRENT USE OF INSULIN: ICD-10-CM

## 2020-06-28 DIAGNOSIS — I10 ESSENTIAL HYPERTENSION: ICD-10-CM

## 2020-06-28 DIAGNOSIS — E78.2 MIXED HYPERLIPIDEMIA: ICD-10-CM

## 2020-07-16 ENCOUNTER — TRANSCRIBE ORDERS (OUTPATIENT)
Dept: ADMINISTRATIVE | Facility: HOSPITAL | Age: 65
End: 2020-07-16

## 2020-07-16 DIAGNOSIS — Z01.818 PREOP EXAMINATION: Primary | ICD-10-CM

## 2020-07-16 RX ORDER — ATORVASTATIN CALCIUM 40 MG/1
TABLET, FILM COATED ORAL
Qty: 30 TABLET | Refills: 0 | Status: SHIPPED | OUTPATIENT
Start: 2020-07-16

## 2020-07-16 RX ORDER — METOPROLOL TARTRATE 100 MG/1
TABLET ORAL
Qty: 60 TABLET | Refills: 0 | Status: SHIPPED | OUTPATIENT
Start: 2020-07-16 | End: 2020-08-14 | Stop reason: HOSPADM

## 2020-07-20 ENCOUNTER — APPOINTMENT (OUTPATIENT)
Dept: GENERAL RADIOLOGY | Facility: HOSPITAL | Age: 65
End: 2020-07-20

## 2020-07-20 ENCOUNTER — HOSPITAL ENCOUNTER (INPATIENT)
Facility: HOSPITAL | Age: 65
LOS: 12 days | Discharge: HOME HEALTH | End: 2020-08-01
Attending: EMERGENCY MEDICINE | Admitting: HOSPITALIST

## 2020-07-20 ENCOUNTER — TELEPHONE (OUTPATIENT)
Dept: INTERNAL MEDICINE | Facility: CLINIC | Age: 65
End: 2020-07-20

## 2020-07-20 ENCOUNTER — LAB (OUTPATIENT)
Dept: LAB | Facility: HOSPITAL | Age: 65
End: 2020-07-20

## 2020-07-20 DIAGNOSIS — J44.9 CHRONIC OBSTRUCTIVE PULMONARY DISEASE, UNSPECIFIED COPD TYPE (HCC): ICD-10-CM

## 2020-07-20 DIAGNOSIS — R53.1 GENERALIZED WEAKNESS: ICD-10-CM

## 2020-07-20 DIAGNOSIS — Z01.818 PREOP EXAMINATION: ICD-10-CM

## 2020-07-20 DIAGNOSIS — R19.5 HEME + STOOL: ICD-10-CM

## 2020-07-20 DIAGNOSIS — R77.8 ELEVATED TROPONIN: ICD-10-CM

## 2020-07-20 DIAGNOSIS — D64.9 ANEMIA, UNSPECIFIED TYPE: Primary | ICD-10-CM

## 2020-07-20 PROBLEM — J96.02 ACUTE RESPIRATORY FAILURE WITH HYPERCAPNIA (HCC): Status: ACTIVE | Noted: 2020-07-20

## 2020-07-20 LAB
ALBUMIN SERPL-MCNC: 2.9 G/DL (ref 3.5–5.2)
ALBUMIN/GLOB SERPL: 0.7 G/DL
ALP SERPL-CCNC: 74 U/L (ref 39–117)
ALT SERPL W P-5'-P-CCNC: 7 U/L (ref 1–33)
ANION GAP SERPL CALCULATED.3IONS-SCNC: 7.7 MMOL/L (ref 5–15)
ARTERIAL PATENCY WRIST A: POSITIVE
ARTERIAL PATENCY WRIST A: POSITIVE
AST SERPL-CCNC: 9 U/L (ref 1–32)
ATMOSPHERIC PRESS: 738 MMHG
ATMOSPHERIC PRESS: 738 MMHG
BACTERIA UR QL AUTO: ABNORMAL /HPF
BASE EXCESS BLDA CALC-SCNC: 7.4 MMOL/L (ref 0–2)
BASE EXCESS BLDA CALC-SCNC: 9.4 MMOL/L (ref 0–2)
BASOPHILS # BLD AUTO: 0.03 10*3/MM3 (ref 0–0.2)
BASOPHILS NFR BLD AUTO: 0.4 % (ref 0–1.5)
BDY SITE: ABNORMAL
BDY SITE: ABNORMAL
BILIRUB SERPL-MCNC: 0.3 MG/DL (ref 0–1.2)
BILIRUB UR QL STRIP: NEGATIVE
BODY TEMPERATURE: 37 C
BODY TEMPERATURE: 37 C
BUN SERPL-MCNC: 26 MG/DL (ref 8–23)
BUN/CREAT SERPL: 14.9 (ref 7–25)
CALCIUM SPEC-SCNC: 8.3 MG/DL (ref 8.6–10.5)
CHLORIDE SERPL-SCNC: 100 MMOL/L (ref 98–107)
CLARITY UR: CLEAR
CO2 SERPL-SCNC: 35.3 MMOL/L (ref 22–29)
COLOR UR: YELLOW
CREAT SERPL-MCNC: 1.75 MG/DL (ref 0.57–1)
DEPRECATED RDW RBC AUTO: 50.7 FL (ref 37–54)
EOSINOPHIL # BLD AUTO: 0.2 10*3/MM3 (ref 0–0.4)
EOSINOPHIL NFR BLD AUTO: 2.8 % (ref 0.3–6.2)
ERYTHROCYTE [DISTWIDTH] IN BLOOD BY AUTOMATED COUNT: 14.3 % (ref 12.3–15.4)
GAS FLOW AIRWAY: 2 LPM
GAS FLOW AIRWAY: 3 LPM
GFR SERPL CREATININE-BSD FRML MDRD: 29 ML/MIN/1.73
GLOBULIN UR ELPH-MCNC: 4 GM/DL
GLUCOSE BLDC GLUCOMTR-MCNC: 151 MG/DL (ref 70–130)
GLUCOSE BLDC GLUCOMTR-MCNC: 32 MG/DL (ref 70–130)
GLUCOSE BLDC GLUCOMTR-MCNC: 39 MG/DL (ref 70–130)
GLUCOSE BLDC GLUCOMTR-MCNC: 74 MG/DL (ref 70–130)
GLUCOSE BLDC GLUCOMTR-MCNC: 83 MG/DL (ref 70–130)
GLUCOSE SERPL-MCNC: 132 MG/DL (ref 65–99)
GLUCOSE UR STRIP-MCNC: NEGATIVE MG/DL
HBA1C MFR BLD: 5.4 % (ref 4.8–5.6)
HCO3 BLDA-SCNC: 35.8 MMOL/L (ref 20–26)
HCO3 BLDA-SCNC: 37.9 MMOL/L (ref 20–26)
HCT VFR BLD AUTO: 30.6 % (ref 34–46.6)
HGB BLD-MCNC: 8.4 G/DL (ref 12–15.9)
HGB BLDA-MCNC: 8.4 G/DL (ref 13.5–17.5)
HGB BLDA-MCNC: 8.6 G/DL (ref 13.5–17.5)
HGB UR QL STRIP.AUTO: ABNORMAL
HYALINE CASTS UR QL AUTO: ABNORMAL /LPF
IMM GRANULOCYTES # BLD AUTO: 0.02 10*3/MM3 (ref 0–0.05)
IMM GRANULOCYTES NFR BLD AUTO: 0.3 % (ref 0–0.5)
KETONES UR QL STRIP: NEGATIVE
LEUKOCYTE ESTERASE UR QL STRIP.AUTO: NEGATIVE
LYMPHOCYTES # BLD AUTO: 1.24 10*3/MM3 (ref 0.7–3.1)
LYMPHOCYTES NFR BLD AUTO: 17.6 % (ref 19.6–45.3)
Lab: ABNORMAL
Lab: ABNORMAL
MAGNESIUM SERPL-MCNC: 1.9 MG/DL (ref 1.6–2.4)
MCH RBC QN AUTO: 26.3 PG (ref 26.6–33)
MCHC RBC AUTO-ENTMCNC: 27.5 G/DL (ref 31.5–35.7)
MCV RBC AUTO: 95.9 FL (ref 79–97)
MODALITY: ABNORMAL
MODALITY: ABNORMAL
MONOCYTES # BLD AUTO: 0.51 10*3/MM3 (ref 0.1–0.9)
MONOCYTES NFR BLD AUTO: 7.3 % (ref 5–12)
NEUTROPHILS NFR BLD AUTO: 5.03 10*3/MM3 (ref 1.7–7)
NEUTROPHILS NFR BLD AUTO: 71.6 % (ref 42.7–76)
NITRITE UR QL STRIP: NEGATIVE
NOTIFIED BY: ABNORMAL
NOTIFIED BY: ABNORMAL
NOTIFIED WHO: ABNORMAL
NOTIFIED WHO: ABNORMAL
NT-PROBNP SERPL-MCNC: 1035 PG/ML (ref 0–900)
PCO2 BLDA: 78.3 MM HG (ref 35–45)
PCO2 BLDA: 81.5 MM HG (ref 35–45)
PCO2 TEMP ADJ BLD: 78.3 MM HG (ref 35–45)
PCO2 TEMP ADJ BLD: 81.5 MM HG (ref 35–45)
PH BLDA: 7.27 PH UNITS (ref 7.35–7.45)
PH BLDA: 7.28 PH UNITS (ref 7.35–7.45)
PH UR STRIP.AUTO: <=5 [PH] (ref 4.5–8)
PH, TEMP CORRECTED: 7.27 PH UNITS (ref 7.35–7.45)
PH, TEMP CORRECTED: 7.28 PH UNITS (ref 7.35–7.45)
PLATELET # BLD AUTO: 171 10*3/MM3 (ref 140–450)
PMV BLD AUTO: 10.5 FL (ref 6–12)
PO2 BLDA: 52.8 MM HG (ref 83–108)
PO2 BLDA: 74.7 MM HG (ref 83–108)
PO2 TEMP ADJ BLD: 52.8 MM HG (ref 83–108)
PO2 TEMP ADJ BLD: 74.7 MM HG (ref 83–108)
POTASSIUM SERPL-SCNC: 4.2 MMOL/L (ref 3.5–5.2)
PROCALCITONIN SERPL-MCNC: 0.05 NG/ML (ref 0–0.25)
PROT SERPL-MCNC: 6.9 G/DL (ref 6–8.5)
PROT UR QL STRIP: NEGATIVE
RBC # BLD AUTO: 3.19 10*6/MM3 (ref 3.77–5.28)
RBC # UR: ABNORMAL /HPF
REF LAB TEST METHOD: ABNORMAL
SAO2 % BLDCOA: 86 % (ref 94–99)
SAO2 % BLDCOA: 95 % (ref 94–99)
SARS-COV-2 RNA PNL SPEC NAA+PROBE: NOT DETECTED
SODIUM SERPL-SCNC: 143 MMOL/L (ref 136–145)
SP GR UR STRIP: 1.01 (ref 1–1.03)
SQUAMOUS #/AREA URNS HPF: ABNORMAL /HPF
TROPONIN T SERPL-MCNC: 0.04 NG/ML (ref 0–0.03)
TSH SERPL DL<=0.05 MIU/L-ACNC: 1.75 UIU/ML (ref 0.27–4.2)
UROBILINOGEN UR QL STRIP: ABNORMAL
VENTILATOR MODE: ABNORMAL
VENTILATOR MODE: ABNORMAL
WBC # BLD AUTO: 7.03 10*3/MM3 (ref 3.4–10.8)
WBC UR QL AUTO: ABNORMAL /HPF

## 2020-07-20 PROCEDURE — 82803 BLOOD GASES ANY COMBINATION: CPT

## 2020-07-20 PROCEDURE — 84145 PROCALCITONIN (PCT): CPT | Performed by: HOSPITALIST

## 2020-07-20 PROCEDURE — G0378 HOSPITAL OBSERVATION PER HR: HCPCS

## 2020-07-20 PROCEDURE — 94640 AIRWAY INHALATION TREATMENT: CPT

## 2020-07-20 PROCEDURE — 71045 X-RAY EXAM CHEST 1 VIEW: CPT

## 2020-07-20 PROCEDURE — 85025 COMPLETE CBC W/AUTO DIFF WBC: CPT | Performed by: PHYSICIAN ASSISTANT

## 2020-07-20 PROCEDURE — 25010000002 CEFTRIAXONE SODIUM-DEXTROSE 1-3.74 GM-%(50ML) RECONSTITUTED SOLUTION: Performed by: HOSPITALIST

## 2020-07-20 PROCEDURE — 36600 WITHDRAWAL OF ARTERIAL BLOOD: CPT

## 2020-07-20 PROCEDURE — 94799 UNLISTED PULMONARY SVC/PX: CPT

## 2020-07-20 PROCEDURE — 93010 ELECTROCARDIOGRAM REPORT: CPT | Performed by: INTERNAL MEDICINE

## 2020-07-20 PROCEDURE — 84484 ASSAY OF TROPONIN QUANT: CPT | Performed by: PHYSICIAN ASSISTANT

## 2020-07-20 PROCEDURE — 82962 GLUCOSE BLOOD TEST: CPT

## 2020-07-20 PROCEDURE — 84443 ASSAY THYROID STIM HORMONE: CPT | Performed by: PHYSICIAN ASSISTANT

## 2020-07-20 PROCEDURE — 81001 URINALYSIS AUTO W/SCOPE: CPT | Performed by: PHYSICIAN ASSISTANT

## 2020-07-20 PROCEDURE — 83036 HEMOGLOBIN GLYCOSYLATED A1C: CPT | Performed by: HOSPITALIST

## 2020-07-20 PROCEDURE — 93005 ELECTROCARDIOGRAM TRACING: CPT | Performed by: PHYSICIAN ASSISTANT

## 2020-07-20 PROCEDURE — 83735 ASSAY OF MAGNESIUM: CPT | Performed by: PHYSICIAN ASSISTANT

## 2020-07-20 PROCEDURE — 83880 ASSAY OF NATRIURETIC PEPTIDE: CPT | Performed by: PHYSICIAN ASSISTANT

## 2020-07-20 PROCEDURE — 94660 CPAP INITIATION&MGMT: CPT

## 2020-07-20 PROCEDURE — 99285 EMERGENCY DEPT VISIT HI MDM: CPT | Performed by: PHYSICIAN ASSISTANT

## 2020-07-20 PROCEDURE — 99284 EMERGENCY DEPT VISIT MOD MDM: CPT

## 2020-07-20 PROCEDURE — 80053 COMPREHEN METABOLIC PANEL: CPT | Performed by: PHYSICIAN ASSISTANT

## 2020-07-20 PROCEDURE — 87635 SARS-COV-2 COVID-19 AMP PRB: CPT | Performed by: HOSPITALIST

## 2020-07-20 PROCEDURE — 99223 1ST HOSP IP/OBS HIGH 75: CPT | Performed by: HOSPITALIST

## 2020-07-20 RX ORDER — PANTOPRAZOLE SODIUM 40 MG/10ML
80 INJECTION, POWDER, LYOPHILIZED, FOR SOLUTION INTRAVENOUS ONCE
Status: COMPLETED | OUTPATIENT
Start: 2020-07-20 | End: 2020-07-20

## 2020-07-20 RX ORDER — IPRATROPIUM BROMIDE AND ALBUTEROL SULFATE 2.5; .5 MG/3ML; MG/3ML
3 SOLUTION RESPIRATORY (INHALATION)
Status: DISCONTINUED | OUTPATIENT
Start: 2020-07-20 | End: 2020-07-21

## 2020-07-20 RX ORDER — FAMOTIDINE 20 MG/1
20 TABLET, FILM COATED ORAL DAILY
Status: DISCONTINUED | OUTPATIENT
Start: 2020-07-21 | End: 2020-08-01 | Stop reason: HOSPADM

## 2020-07-20 RX ORDER — GUAIFENESIN 600 MG/1
1200 TABLET, EXTENDED RELEASE ORAL EVERY 12 HOURS SCHEDULED
Status: DISCONTINUED | OUTPATIENT
Start: 2020-07-20 | End: 2020-08-01 | Stop reason: HOSPADM

## 2020-07-20 RX ORDER — METOPROLOL TARTRATE 50 MG/1
100 TABLET, FILM COATED ORAL EVERY 12 HOURS
Status: DISCONTINUED | OUTPATIENT
Start: 2020-07-20 | End: 2020-08-01 | Stop reason: HOSPADM

## 2020-07-20 RX ORDER — ASPIRIN 81 MG/1
81 TABLET ORAL DAILY
Status: DISCONTINUED | OUTPATIENT
Start: 2020-07-20 | End: 2020-08-01 | Stop reason: HOSPADM

## 2020-07-20 RX ORDER — ATORVASTATIN CALCIUM 40 MG/1
40 TABLET, FILM COATED ORAL DAILY
Status: DISCONTINUED | OUTPATIENT
Start: 2020-07-20 | End: 2020-08-01 | Stop reason: HOSPADM

## 2020-07-20 RX ORDER — CETIRIZINE HYDROCHLORIDE 10 MG/1
10 TABLET ORAL NIGHTLY
Status: DISCONTINUED | OUTPATIENT
Start: 2020-07-20 | End: 2020-08-01 | Stop reason: HOSPADM

## 2020-07-20 RX ORDER — SODIUM CHLORIDE 9 MG/ML
40 INJECTION, SOLUTION INTRAVENOUS AS NEEDED
Status: DISCONTINUED | OUTPATIENT
Start: 2020-07-20 | End: 2020-08-01 | Stop reason: HOSPADM

## 2020-07-20 RX ORDER — AMOXICILLIN 250 MG
2 CAPSULE ORAL NIGHTLY PRN
Status: DISCONTINUED | OUTPATIENT
Start: 2020-07-20 | End: 2020-08-01 | Stop reason: HOSPADM

## 2020-07-20 RX ORDER — NICOTINE POLACRILEX 4 MG
15 LOZENGE BUCCAL
Status: DISCONTINUED | OUTPATIENT
Start: 2020-07-20 | End: 2020-08-01 | Stop reason: HOSPADM

## 2020-07-20 RX ORDER — SODIUM CHLORIDE 0.9 % (FLUSH) 0.9 %
10 SYRINGE (ML) INJECTION EVERY 12 HOURS SCHEDULED
Status: DISCONTINUED | OUTPATIENT
Start: 2020-07-20 | End: 2020-08-01 | Stop reason: HOSPADM

## 2020-07-20 RX ORDER — DEXTROSE MONOHYDRATE 25 G/50ML
25 INJECTION, SOLUTION INTRAVENOUS
Status: DISCONTINUED | OUTPATIENT
Start: 2020-07-20 | End: 2020-08-01 | Stop reason: HOSPADM

## 2020-07-20 RX ORDER — ACETAMINOPHEN 325 MG/1
650 TABLET ORAL EVERY 4 HOURS PRN
Status: DISCONTINUED | OUTPATIENT
Start: 2020-07-20 | End: 2020-08-01 | Stop reason: HOSPADM

## 2020-07-20 RX ORDER — SODIUM CHLORIDE 0.9 % (FLUSH) 0.9 %
10 SYRINGE (ML) INJECTION AS NEEDED
Status: DISCONTINUED | OUTPATIENT
Start: 2020-07-20 | End: 2020-08-01 | Stop reason: HOSPADM

## 2020-07-20 RX ORDER — CEFTRIAXONE 1 G/50ML
1 INJECTION, SOLUTION INTRAVENOUS EVERY 24 HOURS
Status: COMPLETED | OUTPATIENT
Start: 2020-07-20 | End: 2020-07-26

## 2020-07-20 RX ORDER — BUMETANIDE 0.25 MG/ML
2 INJECTION INTRAMUSCULAR; INTRAVENOUS EVERY 12 HOURS
Status: DISCONTINUED | OUTPATIENT
Start: 2020-07-20 | End: 2020-07-22

## 2020-07-20 RX ADMIN — ATORVASTATIN CALCIUM 40 MG: 40 TABLET, FILM COATED ORAL at 20:34

## 2020-07-20 RX ADMIN — DEXTROSE MONOHYDRATE 25 G: 25 INJECTION, SOLUTION INTRAVENOUS at 20:24

## 2020-07-20 RX ADMIN — IPRATROPIUM BROMIDE AND ALBUTEROL SULFATE 3 ML: .5; 3 SOLUTION RESPIRATORY (INHALATION) at 19:30

## 2020-07-20 RX ADMIN — ASPIRIN 81 MG: 81 TABLET, COATED ORAL at 20:34

## 2020-07-20 RX ADMIN — BUMETANIDE: 0.25 INJECTION INTRAMUSCULAR; INTRAVENOUS at 20:34

## 2020-07-20 RX ADMIN — CETIRIZINE HYDROCHLORIDE 10 MG: 10 TABLET, FILM COATED ORAL at 20:34

## 2020-07-20 RX ADMIN — GUAIFENESIN 1200 MG: 600 TABLET, EXTENDED RELEASE ORAL at 20:34

## 2020-07-20 RX ADMIN — PANTOPRAZOLE SODIUM 80 MG: 40 INJECTION, POWDER, FOR SOLUTION INTRAVENOUS at 13:33

## 2020-07-20 RX ADMIN — CEFTRIAXONE 1 G: 1 INJECTION, SOLUTION INTRAVENOUS at 20:45

## 2020-07-20 RX ADMIN — SODIUM CHLORIDE, PRESERVATIVE FREE 10 ML: 5 INJECTION INTRAVENOUS at 20:45

## 2020-07-20 RX ADMIN — METOPROLOL TARTRATE 100 MG: 50 TABLET, FILM COATED ORAL at 20:34

## 2020-07-20 NOTE — TELEPHONE ENCOUNTER
Alea, will home health go out and draw patient's labs? Pt is wheelchair bound. Very high Covid risk. Has not had labs since December. I cannot continue to refill meds w/o labs.

## 2020-07-20 NOTE — TELEPHONE ENCOUNTER
Patient's sister, Sania, called stating that the patient is needing an appointment so she can get her meds refilled, however she us unsuccessful getting the patient out of the house. Sania states that she can't get the patient to leave the house due to the patient being terrified of catching the COVID-19 virus. Patient is out of some important medications. Please advise.     Sania would like to know if there is a way they could get a home health nurse to come and assist to do labs. Please advise.     Sania can be reached at: [phone].

## 2020-07-21 LAB
ALBUMIN SERPL-MCNC: 2.9 G/DL (ref 3.5–5.2)
ANION GAP SERPL CALCULATED.3IONS-SCNC: 9.4 MMOL/L (ref 5–15)
ARTERIAL PATENCY WRIST A: POSITIVE
ATMOSPHERIC PRESS: 738 MMHG
ATMOSPHERIC PRESS: 739 MMHG
ATMOSPHERIC PRESS: 740 MMHG
BASE EXCESS BLDA CALC-SCNC: 10 MMOL/L (ref 0–2)
BASE EXCESS BLDA CALC-SCNC: 12 MMOL/L (ref 0–2)
BASE EXCESS BLDA CALC-SCNC: 8.8 MMOL/L (ref 0–2)
BASOPHILS # BLD AUTO: 0.02 10*3/MM3 (ref 0–0.2)
BASOPHILS NFR BLD AUTO: 0.3 % (ref 0–1.5)
BDY SITE: ABNORMAL
BODY TEMPERATURE: 37 C
BUN SERPL-MCNC: 24 MG/DL (ref 8–23)
BUN/CREAT SERPL: 14.5 (ref 7–25)
CALCIUM SPEC-SCNC: 8.4 MG/DL (ref 8.6–10.5)
CHLORIDE SERPL-SCNC: 99 MMOL/L (ref 98–107)
CO2 SERPL-SCNC: 32.6 MMOL/L (ref 22–29)
CREAT SERPL-MCNC: 1.65 MG/DL (ref 0.57–1)
DEPRECATED RDW RBC AUTO: 49.2 FL (ref 37–54)
EOSINOPHIL # BLD AUTO: 0.16 10*3/MM3 (ref 0–0.4)
EOSINOPHIL NFR BLD AUTO: 2.3 % (ref 0.3–6.2)
EPAP: 6
EPAP: 6
ERYTHROCYTE [DISTWIDTH] IN BLOOD BY AUTOMATED COUNT: 14.1 % (ref 12.3–15.4)
GAS FLOW AIRWAY: 3 LPM
GFR SERPL CREATININE-BSD FRML MDRD: 31 ML/MIN/1.73
GLUCOSE BLDC GLUCOMTR-MCNC: 144 MG/DL (ref 70–130)
GLUCOSE BLDC GLUCOMTR-MCNC: 210 MG/DL (ref 70–130)
GLUCOSE BLDC GLUCOMTR-MCNC: 72 MG/DL (ref 70–130)
GLUCOSE BLDC GLUCOMTR-MCNC: 92 MG/DL (ref 70–130)
GLUCOSE BLDC GLUCOMTR-MCNC: 99 MG/DL (ref 70–130)
GLUCOSE SERPL-MCNC: 29 MG/DL (ref 65–99)
HCO3 BLDA-SCNC: 37.3 MMOL/L (ref 20–26)
HCO3 BLDA-SCNC: 39.2 MMOL/L (ref 20–26)
HCO3 BLDA-SCNC: 40 MMOL/L (ref 20–26)
HCT VFR BLD AUTO: 32 % (ref 34–46.6)
HGB BLD-MCNC: 8.9 G/DL (ref 12–15.9)
HGB BLDA-MCNC: 8.8 G/DL (ref 13.5–17.5)
HGB BLDA-MCNC: 9 G/DL (ref 13.5–17.5)
HGB BLDA-MCNC: 9.3 G/DL (ref 13.5–17.5)
IMM GRANULOCYTES # BLD AUTO: 0.02 10*3/MM3 (ref 0–0.05)
IMM GRANULOCYTES NFR BLD AUTO: 0.3 % (ref 0–0.5)
INHALED O2 CONCENTRATION: 40 %
INHALED O2 CONCENTRATION: 40 %
IPAP: 18
LYMPHOCYTES # BLD AUTO: 1.99 10*3/MM3 (ref 0.7–3.1)
LYMPHOCYTES NFR BLD AUTO: 28.6 % (ref 19.6–45.3)
Lab: ABNORMAL
MCH RBC QN AUTO: 26.5 PG (ref 26.6–33)
MCHC RBC AUTO-ENTMCNC: 27.8 G/DL (ref 31.5–35.7)
MCV RBC AUTO: 95.2 FL (ref 79–97)
MODALITY: ABNORMAL
MONOCYTES # BLD AUTO: 0.53 10*3/MM3 (ref 0.1–0.9)
MONOCYTES NFR BLD AUTO: 7.6 % (ref 5–12)
NEUTROPHILS NFR BLD AUTO: 4.25 10*3/MM3 (ref 1.7–7)
NEUTROPHILS NFR BLD AUTO: 60.9 % (ref 42.7–76)
NOTIFIED BY: ABNORMAL
NOTIFIED WHO: ABNORMAL
NRBC BLD AUTO-RTO: 0 /100 WBC (ref 0–0.2)
PCO2 BLDA: 77.4 MM HG (ref 35–45)
PCO2 BLDA: 78.7 MM HG (ref 35–45)
PCO2 BLDA: 87.8 MM HG (ref 35–45)
PCO2 TEMP ADJ BLD: 77.4 MM HG (ref 35–45)
PCO2 TEMP ADJ BLD: 78.7 MM HG (ref 35–45)
PCO2 TEMP ADJ BLD: 87.8 MM HG (ref 35–45)
PEEP RESPIRATORY: 0 CM[H2O]
PH BLDA: 7.26 PH UNITS (ref 7.35–7.45)
PH BLDA: 7.28 PH UNITS (ref 7.35–7.45)
PH BLDA: 7.32 PH UNITS (ref 7.35–7.45)
PH, TEMP CORRECTED: 7.26 PH UNITS (ref 7.35–7.45)
PH, TEMP CORRECTED: 7.28 PH UNITS (ref 7.35–7.45)
PH, TEMP CORRECTED: 7.32 PH UNITS (ref 7.35–7.45)
PHOSPHATE SERPL-MCNC: 3.9 MG/DL (ref 2.5–4.5)
PLATELET # BLD AUTO: 173 10*3/MM3 (ref 140–450)
PMV BLD AUTO: 10.9 FL (ref 6–12)
PO2 BLDA: 52.4 MM HG (ref 83–108)
PO2 BLDA: 53.7 MM HG (ref 83–108)
PO2 BLDA: 78.3 MM HG (ref 83–108)
PO2 TEMP ADJ BLD: 52.4 MM HG (ref 83–108)
PO2 TEMP ADJ BLD: 53.7 MM HG (ref 83–108)
PO2 TEMP ADJ BLD: 78.3 MM HG (ref 83–108)
POTASSIUM SERPL-SCNC: 4.6 MMOL/L (ref 3.5–5.2)
PSV: 15 CMH2O
RBC # BLD AUTO: 3.36 10*6/MM3 (ref 3.77–5.28)
SAO2 % BLDCOA: 85.5 % (ref 94–99)
SAO2 % BLDCOA: 88.2 % (ref 94–99)
SAO2 % BLDCOA: 95.6 % (ref 94–99)
SET MECH RESP RATE: 20
SET MECH RESP RATE: 24
SODIUM SERPL-SCNC: 141 MMOL/L (ref 136–145)
VENTILATOR MODE: ABNORMAL
VT ON VENT VENT: 450 ML
VT ON VENT VENT: 500 ML
WBC # BLD AUTO: 6.97 10*3/MM3 (ref 3.4–10.8)

## 2020-07-21 PROCEDURE — 94799 UNLISTED PULMONARY SVC/PX: CPT

## 2020-07-21 PROCEDURE — 80069 RENAL FUNCTION PANEL: CPT | Performed by: HOSPITALIST

## 2020-07-21 PROCEDURE — 99233 SBSQ HOSP IP/OBS HIGH 50: CPT | Performed by: HOSPITALIST

## 2020-07-21 PROCEDURE — 94660 CPAP INITIATION&MGMT: CPT

## 2020-07-21 PROCEDURE — 36600 WITHDRAWAL OF ARTERIAL BLOOD: CPT

## 2020-07-21 PROCEDURE — 85025 COMPLETE CBC W/AUTO DIFF WBC: CPT | Performed by: HOSPITALIST

## 2020-07-21 PROCEDURE — 82962 GLUCOSE BLOOD TEST: CPT

## 2020-07-21 PROCEDURE — 82803 BLOOD GASES ANY COMBINATION: CPT

## 2020-07-21 PROCEDURE — 63710000001 INSULIN DETEMIR PER 5 UNITS: Performed by: HOSPITALIST

## 2020-07-21 PROCEDURE — 25010000002 CEFTRIAXONE SODIUM-DEXTROSE 1-3.74 GM-%(50ML) RECONSTITUTED SOLUTION: Performed by: HOSPITALIST

## 2020-07-21 RX ORDER — IPRATROPIUM BROMIDE AND ALBUTEROL SULFATE 2.5; .5 MG/3ML; MG/3ML
3 SOLUTION RESPIRATORY (INHALATION)
Status: DISCONTINUED | OUTPATIENT
Start: 2020-07-21 | End: 2020-07-27

## 2020-07-21 RX ORDER — DEXTROSE AND SODIUM CHLORIDE 5; .45 G/100ML; G/100ML
100 INJECTION, SOLUTION INTRAVENOUS CONTINUOUS
Status: DISCONTINUED | OUTPATIENT
Start: 2020-07-21 | End: 2020-07-21

## 2020-07-21 RX ORDER — NYSTATIN 100000 [USP'U]/G
POWDER TOPICAL EVERY 12 HOURS SCHEDULED
Status: DISCONTINUED | OUTPATIENT
Start: 2020-07-21 | End: 2020-08-01 | Stop reason: HOSPADM

## 2020-07-21 RX ORDER — HYDRALAZINE HYDROCHLORIDE 20 MG/ML
20 INJECTION INTRAMUSCULAR; INTRAVENOUS EVERY 6 HOURS PRN
Status: DISCONTINUED | OUTPATIENT
Start: 2020-07-21 | End: 2020-07-22

## 2020-07-21 RX ORDER — DEXTROSE AND SODIUM CHLORIDE 5; .45 G/100ML; G/100ML
INJECTION, SOLUTION INTRAVENOUS
Status: COMPLETED
Start: 2020-07-21 | End: 2020-07-21

## 2020-07-21 RX ADMIN — IPRATROPIUM BROMIDE AND ALBUTEROL SULFATE 3 ML: .5; 3 SOLUTION RESPIRATORY (INHALATION) at 13:05

## 2020-07-21 RX ADMIN — NYSTATIN: 100000 POWDER TOPICAL at 22:11

## 2020-07-21 RX ADMIN — SODIUM CHLORIDE, PRESERVATIVE FREE 10 ML: 5 INJECTION INTRAVENOUS at 22:12

## 2020-07-21 RX ADMIN — ATORVASTATIN CALCIUM 40 MG: 40 TABLET, FILM COATED ORAL at 21:19

## 2020-07-21 RX ADMIN — IPRATROPIUM BROMIDE AND ALBUTEROL SULFATE 3 ML: .5; 3 SOLUTION RESPIRATORY (INHALATION) at 07:43

## 2020-07-21 RX ADMIN — INSULIN DETEMIR 25 UNITS: 100 INJECTION, SOLUTION SUBCUTANEOUS at 21:20

## 2020-07-21 RX ADMIN — DEXTROSE AND SODIUM CHLORIDE 100 ML/HR: 5; 450 INJECTION, SOLUTION INTRAVENOUS at 05:55

## 2020-07-21 RX ADMIN — ASPIRIN 81 MG: 81 TABLET, COATED ORAL at 08:36

## 2020-07-21 RX ADMIN — CETIRIZINE HYDROCHLORIDE 10 MG: 10 TABLET, FILM COATED ORAL at 21:19

## 2020-07-21 RX ADMIN — BUMETANIDE 2 MG: 0.25 INJECTION INTRAMUSCULAR; INTRAVENOUS at 21:20

## 2020-07-21 RX ADMIN — DEXTROSE MONOHYDRATE 25 G: 25 INJECTION, SOLUTION INTRAVENOUS at 05:31

## 2020-07-21 RX ADMIN — LEVOTHYROXINE SODIUM 175 MCG: 150 TABLET ORAL at 05:31

## 2020-07-21 RX ADMIN — DEXTROSE AND SODIUM CHLORIDE 100 ML/HR: 5; .45 INJECTION, SOLUTION INTRAVENOUS at 05:55

## 2020-07-21 RX ADMIN — BUMETANIDE 2 MG: 0.25 INJECTION INTRAMUSCULAR; INTRAVENOUS at 08:35

## 2020-07-21 RX ADMIN — IPRATROPIUM BROMIDE AND ALBUTEROL SULFATE 3 ML: .5; 3 SOLUTION RESPIRATORY (INHALATION) at 04:07

## 2020-07-21 RX ADMIN — CEFTRIAXONE 1 G: 1 INJECTION, SOLUTION INTRAVENOUS at 21:19

## 2020-07-21 RX ADMIN — METOPROLOL TARTRATE 100 MG: 50 TABLET, FILM COATED ORAL at 21:19

## 2020-07-21 RX ADMIN — GUAIFENESIN 1200 MG: 600 TABLET, EXTENDED RELEASE ORAL at 21:19

## 2020-07-21 RX ADMIN — IPRATROPIUM BROMIDE AND ALBUTEROL SULFATE 3 ML: .5; 3 SOLUTION RESPIRATORY (INHALATION) at 20:08

## 2020-07-21 RX ADMIN — IPRATROPIUM BROMIDE AND ALBUTEROL SULFATE 3 ML: .5; 3 SOLUTION RESPIRATORY (INHALATION) at 23:07

## 2020-07-21 RX ADMIN — FAMOTIDINE 20 MG: 20 TABLET, FILM COATED ORAL at 08:36

## 2020-07-21 RX ADMIN — SODIUM CHLORIDE, PRESERVATIVE FREE 10 ML: 5 INJECTION INTRAVENOUS at 08:36

## 2020-07-21 RX ADMIN — GUAIFENESIN 1200 MG: 600 TABLET, EXTENDED RELEASE ORAL at 08:36

## 2020-07-22 ENCOUNTER — APPOINTMENT (OUTPATIENT)
Dept: PULMONOLOGY | Facility: HOSPITAL | Age: 65
End: 2020-07-22

## 2020-07-22 LAB
ALBUMIN SERPL-MCNC: 2.8 G/DL (ref 3.5–5.2)
ANION GAP SERPL CALCULATED.3IONS-SCNC: 6.3 MMOL/L (ref 5–15)
ARTERIAL PATENCY WRIST A: POSITIVE
ATMOSPHERIC PRESS: 740 MMHG
BASE EXCESS BLDA CALC-SCNC: 13.3 MMOL/L (ref 0–2)
BASOPHILS # BLD AUTO: 0.02 10*3/MM3 (ref 0–0.2)
BASOPHILS NFR BLD AUTO: 0.3 % (ref 0–1.5)
BDY SITE: ABNORMAL
BODY TEMPERATURE: 37 C
BUN SERPL-MCNC: 23 MG/DL (ref 8–23)
BUN/CREAT SERPL: 14.3 (ref 7–25)
CALCIUM SPEC-SCNC: 8.3 MG/DL (ref 8.6–10.5)
CHLORIDE SERPL-SCNC: 99 MMOL/L (ref 98–107)
CO2 SERPL-SCNC: 38.7 MMOL/L (ref 22–29)
CREAT SERPL-MCNC: 1.61 MG/DL (ref 0.57–1)
DEPRECATED RDW RBC AUTO: 49.4 FL (ref 37–54)
EOSINOPHIL # BLD AUTO: 0.15 10*3/MM3 (ref 0–0.4)
EOSINOPHIL NFR BLD AUTO: 2.1 % (ref 0.3–6.2)
ERYTHROCYTE [DISTWIDTH] IN BLOOD BY AUTOMATED COUNT: 14 % (ref 12.3–15.4)
GAS FLOW AIRWAY: 6 LPM
GFR SERPL CREATININE-BSD FRML MDRD: 32 ML/MIN/1.73
GLUCOSE BLDC GLUCOMTR-MCNC: 138 MG/DL (ref 70–130)
GLUCOSE BLDC GLUCOMTR-MCNC: 142 MG/DL (ref 70–130)
GLUCOSE BLDC GLUCOMTR-MCNC: 145 MG/DL (ref 70–130)
GLUCOSE BLDC GLUCOMTR-MCNC: 147 MG/DL (ref 70–130)
GLUCOSE SERPL-MCNC: 137 MG/DL (ref 65–99)
HCO3 BLDA-SCNC: 41.6 MMOL/L (ref 20–26)
HCT VFR BLD AUTO: 32.6 % (ref 34–46.6)
HGB BLD-MCNC: 9.2 G/DL (ref 12–15.9)
HGB BLDA-MCNC: 8.9 G/DL (ref 13.5–17.5)
IMM GRANULOCYTES # BLD AUTO: 0.04 10*3/MM3 (ref 0–0.05)
IMM GRANULOCYTES NFR BLD AUTO: 0.6 % (ref 0–0.5)
LYMPHOCYTES # BLD AUTO: 1.32 10*3/MM3 (ref 0.7–3.1)
LYMPHOCYTES NFR BLD AUTO: 18.3 % (ref 19.6–45.3)
Lab: ABNORMAL
MCH RBC QN AUTO: 27 PG (ref 26.6–33)
MCHC RBC AUTO-ENTMCNC: 28.2 G/DL (ref 31.5–35.7)
MCV RBC AUTO: 95.6 FL (ref 79–97)
MODALITY: ABNORMAL
MONOCYTES # BLD AUTO: 0.63 10*3/MM3 (ref 0.1–0.9)
MONOCYTES NFR BLD AUTO: 8.7 % (ref 5–12)
NEUTROPHILS NFR BLD AUTO: 5.06 10*3/MM3 (ref 1.7–7)
NEUTROPHILS NFR BLD AUTO: 70 % (ref 42.7–76)
NOTIFIED BY: ABNORMAL
NOTIFIED WHO: ABNORMAL
NRBC BLD AUTO-RTO: 0 /100 WBC (ref 0–0.2)
PCO2 BLDA: 81.2 MM HG (ref 35–45)
PCO2 TEMP ADJ BLD: 81.2 MM HG (ref 35–45)
PH BLDA: 7.32 PH UNITS (ref 7.35–7.45)
PH, TEMP CORRECTED: 7.32 PH UNITS (ref 7.35–7.45)
PHOSPHATE SERPL-MCNC: 4.2 MG/DL (ref 2.5–4.5)
PLATELET # BLD AUTO: 154 10*3/MM3 (ref 140–450)
PMV BLD AUTO: 10 FL (ref 6–12)
PO2 BLDA: 55.2 MM HG (ref 83–108)
PO2 TEMP ADJ BLD: 55.2 MM HG (ref 83–108)
POTASSIUM SERPL-SCNC: 4.8 MMOL/L (ref 3.5–5.2)
RBC # BLD AUTO: 3.41 10*6/MM3 (ref 3.77–5.28)
SAO2 % BLDCOA: 88.8 % (ref 94–99)
SODIUM SERPL-SCNC: 144 MMOL/L (ref 136–145)
TROPONIN T SERPL-MCNC: 0.02 NG/ML (ref 0–0.03)
VENTILATOR MODE: ABNORMAL
WBC # BLD AUTO: 7.22 10*3/MM3 (ref 3.4–10.8)

## 2020-07-22 PROCEDURE — 99233 SBSQ HOSP IP/OBS HIGH 50: CPT | Performed by: HOSPITALIST

## 2020-07-22 PROCEDURE — 85025 COMPLETE CBC W/AUTO DIFF WBC: CPT | Performed by: HOSPITALIST

## 2020-07-22 PROCEDURE — 63710000001 INSULIN DETEMIR PER 5 UNITS: Performed by: HOSPITALIST

## 2020-07-22 PROCEDURE — 94799 UNLISTED PULMONARY SVC/PX: CPT

## 2020-07-22 PROCEDURE — 84484 ASSAY OF TROPONIN QUANT: CPT | Performed by: HOSPITALIST

## 2020-07-22 PROCEDURE — 80069 RENAL FUNCTION PANEL: CPT | Performed by: HOSPITALIST

## 2020-07-22 PROCEDURE — 25010000002 CEFTRIAXONE SODIUM-DEXTROSE 1-3.74 GM-%(50ML) RECONSTITUTED SOLUTION: Performed by: HOSPITALIST

## 2020-07-22 PROCEDURE — 25010000002 METHYLPREDNISOLONE PER 125 MG: Performed by: HOSPITALIST

## 2020-07-22 PROCEDURE — 36600 WITHDRAWAL OF ARTERIAL BLOOD: CPT

## 2020-07-22 PROCEDURE — 82803 BLOOD GASES ANY COMBINATION: CPT

## 2020-07-22 PROCEDURE — 94660 CPAP INITIATION&MGMT: CPT

## 2020-07-22 PROCEDURE — 82962 GLUCOSE BLOOD TEST: CPT

## 2020-07-22 RX ORDER — AMLODIPINE BESYLATE 5 MG/1
10 TABLET ORAL
Status: DISCONTINUED | OUTPATIENT
Start: 2020-07-22 | End: 2020-08-01 | Stop reason: HOSPADM

## 2020-07-22 RX ORDER — METHYLPREDNISOLONE SODIUM SUCCINATE 125 MG/2ML
60 INJECTION, POWDER, LYOPHILIZED, FOR SOLUTION INTRAMUSCULAR; INTRAVENOUS EVERY 6 HOURS
Status: DISCONTINUED | OUTPATIENT
Start: 2020-07-22 | End: 2020-07-23

## 2020-07-22 RX ORDER — HYDRALAZINE HYDROCHLORIDE 50 MG/1
50 TABLET, FILM COATED ORAL EVERY 12 HOURS SCHEDULED
Status: DISCONTINUED | OUTPATIENT
Start: 2020-07-22 | End: 2020-07-24

## 2020-07-22 RX ORDER — QUETIAPINE FUMARATE 100 MG/1
100 TABLET, FILM COATED ORAL NIGHTLY
Status: DISCONTINUED | OUTPATIENT
Start: 2020-07-22 | End: 2020-07-27

## 2020-07-22 RX ORDER — BUMETANIDE 1 MG/1
1 TABLET ORAL 2 TIMES DAILY
Status: DISCONTINUED | OUTPATIENT
Start: 2020-07-22 | End: 2020-07-26

## 2020-07-22 RX ADMIN — NYSTATIN: 100000 POWDER TOPICAL at 20:25

## 2020-07-22 RX ADMIN — IPRATROPIUM BROMIDE AND ALBUTEROL SULFATE 3 ML: .5; 3 SOLUTION RESPIRATORY (INHALATION) at 03:26

## 2020-07-22 RX ADMIN — NYSTATIN: 100000 POWDER TOPICAL at 08:24

## 2020-07-22 RX ADMIN — BUMETANIDE 1 MG: 1 TABLET ORAL at 20:26

## 2020-07-22 RX ADMIN — GUAIFENESIN 1200 MG: 600 TABLET, EXTENDED RELEASE ORAL at 08:09

## 2020-07-22 RX ADMIN — IPRATROPIUM BROMIDE AND ALBUTEROL SULFATE 3 ML: .5; 3 SOLUTION RESPIRATORY (INHALATION) at 23:06

## 2020-07-22 RX ADMIN — IPRATROPIUM BROMIDE AND ALBUTEROL SULFATE 3 ML: .5; 3 SOLUTION RESPIRATORY (INHALATION) at 15:29

## 2020-07-22 RX ADMIN — ACETAMINOPHEN 650 MG: 325 TABLET, FILM COATED ORAL at 08:09

## 2020-07-22 RX ADMIN — SODIUM CHLORIDE, PRESERVATIVE FREE 10 ML: 5 INJECTION INTRAVENOUS at 20:26

## 2020-07-22 RX ADMIN — QUETIAPINE FUMARATE 100 MG: 100 TABLET ORAL at 20:26

## 2020-07-22 RX ADMIN — METHYLPREDNISOLONE SODIUM SUCCINATE 60 MG: 125 INJECTION, POWDER, FOR SOLUTION INTRAMUSCULAR; INTRAVENOUS at 18:32

## 2020-07-22 RX ADMIN — FAMOTIDINE 20 MG: 20 TABLET, FILM COATED ORAL at 08:09

## 2020-07-22 RX ADMIN — IPRATROPIUM BROMIDE AND ALBUTEROL SULFATE 3 ML: .5; 3 SOLUTION RESPIRATORY (INHALATION) at 19:23

## 2020-07-22 RX ADMIN — ASPIRIN 81 MG: 81 TABLET, COATED ORAL at 08:09

## 2020-07-22 RX ADMIN — CETIRIZINE HYDROCHLORIDE 10 MG: 10 TABLET, FILM COATED ORAL at 20:26

## 2020-07-22 RX ADMIN — BUMETANIDE 2 MG: 0.25 INJECTION INTRAMUSCULAR; INTRAVENOUS at 08:08

## 2020-07-22 RX ADMIN — IPRATROPIUM BROMIDE AND ALBUTEROL SULFATE 3 ML: .5; 3 SOLUTION RESPIRATORY (INHALATION) at 11:10

## 2020-07-22 RX ADMIN — HYDRALAZINE HYDROCHLORIDE 50 MG: 50 TABLET, FILM COATED ORAL at 20:26

## 2020-07-22 RX ADMIN — CEFTRIAXONE 1 G: 1 INJECTION, SOLUTION INTRAVENOUS at 20:26

## 2020-07-22 RX ADMIN — GUAIFENESIN 1200 MG: 600 TABLET, EXTENDED RELEASE ORAL at 20:26

## 2020-07-22 RX ADMIN — IPRATROPIUM BROMIDE AND ALBUTEROL SULFATE 3 ML: .5; 3 SOLUTION RESPIRATORY (INHALATION) at 06:56

## 2020-07-22 RX ADMIN — METOPROLOL TARTRATE 100 MG: 50 TABLET, FILM COATED ORAL at 08:09

## 2020-07-22 RX ADMIN — SODIUM CHLORIDE, PRESERVATIVE FREE 10 ML: 5 INJECTION INTRAVENOUS at 08:25

## 2020-07-22 RX ADMIN — ATORVASTATIN CALCIUM 40 MG: 40 TABLET, FILM COATED ORAL at 20:26

## 2020-07-22 RX ADMIN — INSULIN DETEMIR 25 UNITS: 100 INJECTION, SOLUTION SUBCUTANEOUS at 20:20

## 2020-07-22 RX ADMIN — METOPROLOL TARTRATE 100 MG: 50 TABLET, FILM COATED ORAL at 20:26

## 2020-07-22 RX ADMIN — LEVOTHYROXINE SODIUM 175 MCG: 150 TABLET ORAL at 06:28

## 2020-07-22 RX ADMIN — AMLODIPINE BESYLATE 10 MG: 5 TABLET ORAL at 11:48

## 2020-07-23 LAB
ALBUMIN SERPL-MCNC: 2.7 G/DL (ref 3.5–5.2)
ANION GAP SERPL CALCULATED.3IONS-SCNC: 7.4 MMOL/L (ref 5–15)
BUN SERPL-MCNC: 26 MG/DL (ref 8–23)
BUN/CREAT SERPL: 16.4 (ref 7–25)
CALCIUM SPEC-SCNC: 8.6 MG/DL (ref 8.6–10.5)
CHLORIDE SERPL-SCNC: 95 MMOL/L (ref 98–107)
CO2 SERPL-SCNC: 37.6 MMOL/L (ref 22–29)
CREAT SERPL-MCNC: 1.59 MG/DL (ref 0.57–1)
GFR SERPL CREATININE-BSD FRML MDRD: 33 ML/MIN/1.73
GLUCOSE BLDC GLUCOMTR-MCNC: 402 MG/DL (ref 70–130)
GLUCOSE BLDC GLUCOMTR-MCNC: 405 MG/DL (ref 70–130)
GLUCOSE BLDC GLUCOMTR-MCNC: 413 MG/DL (ref 70–130)
GLUCOSE BLDC GLUCOMTR-MCNC: 424 MG/DL (ref 70–130)
GLUCOSE BLDC GLUCOMTR-MCNC: 508 MG/DL (ref 70–130)
GLUCOSE SERPL-MCNC: 182 MG/DL (ref 65–99)
PHOSPHATE SERPL-MCNC: 4 MG/DL (ref 2.5–4.5)
POTASSIUM SERPL-SCNC: 5 MMOL/L (ref 3.5–5.2)
SODIUM SERPL-SCNC: 140 MMOL/L (ref 136–145)

## 2020-07-23 PROCEDURE — 99232 SBSQ HOSP IP/OBS MODERATE 35: CPT | Performed by: HOSPITALIST

## 2020-07-23 PROCEDURE — 94660 CPAP INITIATION&MGMT: CPT

## 2020-07-23 PROCEDURE — 80069 RENAL FUNCTION PANEL: CPT | Performed by: HOSPITALIST

## 2020-07-23 PROCEDURE — 25010000002 METHYLPREDNISOLONE PER 125 MG: Performed by: HOSPITALIST

## 2020-07-23 PROCEDURE — 63710000001 INSULIN DETEMIR PER 5 UNITS: Performed by: HOSPITALIST

## 2020-07-23 PROCEDURE — 63710000001 INSULIN ASPART PER 5 UNITS: Performed by: HOSPITALIST

## 2020-07-23 PROCEDURE — 94799 UNLISTED PULMONARY SVC/PX: CPT

## 2020-07-23 PROCEDURE — 25010000002 CEFTRIAXONE SODIUM-DEXTROSE 1-3.74 GM-%(50ML) RECONSTITUTED SOLUTION: Performed by: HOSPITALIST

## 2020-07-23 PROCEDURE — 82962 GLUCOSE BLOOD TEST: CPT

## 2020-07-23 PROCEDURE — 63710000001 INSULIN ASPART PER 5 UNITS: Performed by: NURSE PRACTITIONER

## 2020-07-23 RX ORDER — PREDNISONE 20 MG/1
40 TABLET ORAL
Status: DISCONTINUED | OUTPATIENT
Start: 2020-07-24 | End: 2020-07-26

## 2020-07-23 RX ADMIN — INSULIN ASPART 3 UNITS: 100 INJECTION, SOLUTION INTRAVENOUS; SUBCUTANEOUS at 08:13

## 2020-07-23 RX ADMIN — INSULIN DETEMIR 50 UNITS: 100 INJECTION, SOLUTION SUBCUTANEOUS at 21:05

## 2020-07-23 RX ADMIN — METOPROLOL TARTRATE 100 MG: 50 TABLET, FILM COATED ORAL at 20:56

## 2020-07-23 RX ADMIN — CETIRIZINE HYDROCHLORIDE 10 MG: 10 TABLET, FILM COATED ORAL at 20:56

## 2020-07-23 RX ADMIN — METHYLPREDNISOLONE SODIUM SUCCINATE 60 MG: 125 INJECTION, POWDER, FOR SOLUTION INTRAMUSCULAR; INTRAVENOUS at 00:24

## 2020-07-23 RX ADMIN — ASPIRIN 81 MG: 81 TABLET, COATED ORAL at 08:13

## 2020-07-23 RX ADMIN — METHYLPREDNISOLONE SODIUM SUCCINATE 60 MG: 125 INJECTION, POWDER, FOR SOLUTION INTRAMUSCULAR; INTRAVENOUS at 11:53

## 2020-07-23 RX ADMIN — HYDRALAZINE HYDROCHLORIDE 50 MG: 50 TABLET, FILM COATED ORAL at 08:14

## 2020-07-23 RX ADMIN — SODIUM CHLORIDE, PRESERVATIVE FREE 10 ML: 5 INJECTION INTRAVENOUS at 08:14

## 2020-07-23 RX ADMIN — AMLODIPINE BESYLATE 10 MG: 5 TABLET ORAL at 08:13

## 2020-07-23 RX ADMIN — GUAIFENESIN 1200 MG: 600 TABLET, EXTENDED RELEASE ORAL at 20:56

## 2020-07-23 RX ADMIN — NYSTATIN: 100000 POWDER TOPICAL at 21:12

## 2020-07-23 RX ADMIN — BUMETANIDE 1 MG: 1 TABLET ORAL at 08:14

## 2020-07-23 RX ADMIN — ATORVASTATIN CALCIUM 40 MG: 40 TABLET, FILM COATED ORAL at 20:56

## 2020-07-23 RX ADMIN — GUAIFENESIN 1200 MG: 600 TABLET, EXTENDED RELEASE ORAL at 08:14

## 2020-07-23 RX ADMIN — NYSTATIN: 100000 POWDER TOPICAL at 08:14

## 2020-07-23 RX ADMIN — BUMETANIDE 1 MG: 1 TABLET ORAL at 21:10

## 2020-07-23 RX ADMIN — INSULIN ASPART 24 UNITS: 100 INJECTION, SOLUTION INTRAVENOUS; SUBCUTANEOUS at 21:33

## 2020-07-23 RX ADMIN — HYDRALAZINE HYDROCHLORIDE 50 MG: 50 TABLET, FILM COATED ORAL at 20:56

## 2020-07-23 RX ADMIN — IPRATROPIUM BROMIDE AND ALBUTEROL SULFATE 3 ML: .5; 3 SOLUTION RESPIRATORY (INHALATION) at 22:54

## 2020-07-23 RX ADMIN — IPRATROPIUM BROMIDE AND ALBUTEROL SULFATE 3 ML: .5; 3 SOLUTION RESPIRATORY (INHALATION) at 11:40

## 2020-07-23 RX ADMIN — FAMOTIDINE 20 MG: 20 TABLET, FILM COATED ORAL at 08:13

## 2020-07-23 RX ADMIN — SODIUM CHLORIDE, PRESERVATIVE FREE 10 ML: 5 INJECTION INTRAVENOUS at 21:56

## 2020-07-23 RX ADMIN — INSULIN ASPART 14 UNITS: 100 INJECTION, SOLUTION INTRAVENOUS; SUBCUTANEOUS at 11:53

## 2020-07-23 RX ADMIN — QUETIAPINE FUMARATE 100 MG: 100 TABLET ORAL at 21:22

## 2020-07-23 RX ADMIN — LEVOTHYROXINE SODIUM 175 MCG: 150 TABLET ORAL at 05:46

## 2020-07-23 RX ADMIN — IPRATROPIUM BROMIDE AND ALBUTEROL SULFATE 3 ML: .5; 3 SOLUTION RESPIRATORY (INHALATION) at 07:36

## 2020-07-23 RX ADMIN — IPRATROPIUM BROMIDE AND ALBUTEROL SULFATE 3 ML: .5; 3 SOLUTION RESPIRATORY (INHALATION) at 18:58

## 2020-07-23 RX ADMIN — INSULIN ASPART 14 UNITS: 100 INJECTION, SOLUTION INTRAVENOUS; SUBCUTANEOUS at 17:28

## 2020-07-23 RX ADMIN — IPRATROPIUM BROMIDE AND ALBUTEROL SULFATE 3 ML: .5; 3 SOLUTION RESPIRATORY (INHALATION) at 03:24

## 2020-07-23 RX ADMIN — CEFTRIAXONE 1 G: 1 INJECTION, SOLUTION INTRAVENOUS at 21:13

## 2020-07-23 RX ADMIN — METOPROLOL TARTRATE 100 MG: 50 TABLET, FILM COATED ORAL at 08:14

## 2020-07-23 RX ADMIN — METHYLPREDNISOLONE SODIUM SUCCINATE 60 MG: 125 INJECTION, POWDER, FOR SOLUTION INTRAMUSCULAR; INTRAVENOUS at 05:46

## 2020-07-23 RX ADMIN — IPRATROPIUM BROMIDE AND ALBUTEROL SULFATE 3 ML: .5; 3 SOLUTION RESPIRATORY (INHALATION) at 16:15

## 2020-07-24 LAB
ALBUMIN SERPL-MCNC: 2.8 G/DL (ref 3.5–5.2)
ALBUMIN/GLOB SERPL: 0.7 G/DL
ALP SERPL-CCNC: 70 U/L (ref 39–117)
ALT SERPL W P-5'-P-CCNC: 8 U/L (ref 1–33)
ANION GAP SERPL CALCULATED.3IONS-SCNC: 7.5 MMOL/L (ref 5–15)
AST SERPL-CCNC: 7 U/L (ref 1–32)
BASOPHILS # BLD AUTO: 0 10*3/MM3 (ref 0–0.2)
BASOPHILS NFR BLD AUTO: 0 % (ref 0–1.5)
BILIRUB SERPL-MCNC: 0.2 MG/DL (ref 0–1.2)
BUN SERPL-MCNC: 41 MG/DL (ref 8–23)
BUN/CREAT SERPL: 23.3 (ref 7–25)
CALCIUM SPEC-SCNC: 8.1 MG/DL (ref 8.6–10.5)
CHLORIDE SERPL-SCNC: 91 MMOL/L (ref 98–107)
CO2 SERPL-SCNC: 38.5 MMOL/L (ref 22–29)
CREAT SERPL-MCNC: 1.76 MG/DL (ref 0.57–1)
DEPRECATED RDW RBC AUTO: 46 FL (ref 37–54)
EOSINOPHIL # BLD AUTO: 0 10*3/MM3 (ref 0–0.4)
EOSINOPHIL NFR BLD AUTO: 0 % (ref 0.3–6.2)
ERYTHROCYTE [DISTWIDTH] IN BLOOD BY AUTOMATED COUNT: 14 % (ref 12.3–15.4)
GFR SERPL CREATININE-BSD FRML MDRD: 29 ML/MIN/1.73
GLOBULIN UR ELPH-MCNC: 3.8 GM/DL
GLUCOSE BLDC GLUCOMTR-MCNC: 241 MG/DL (ref 70–130)
GLUCOSE BLDC GLUCOMTR-MCNC: 363 MG/DL (ref 70–130)
GLUCOSE BLDC GLUCOMTR-MCNC: 427 MG/DL (ref 70–130)
GLUCOSE BLDC GLUCOMTR-MCNC: 436 MG/DL (ref 70–130)
GLUCOSE BLDC GLUCOMTR-MCNC: 454 MG/DL (ref 70–130)
GLUCOSE SERPL-MCNC: 395 MG/DL (ref 65–99)
HCT VFR BLD AUTO: 29.2 % (ref 34–46.6)
HGB BLD-MCNC: 8.7 G/DL (ref 12–15.9)
IMM GRANULOCYTES # BLD AUTO: 0.04 10*3/MM3 (ref 0–0.05)
IMM GRANULOCYTES NFR BLD AUTO: 0.6 % (ref 0–0.5)
LYMPHOCYTES # BLD AUTO: 0.73 10*3/MM3 (ref 0.7–3.1)
LYMPHOCYTES NFR BLD AUTO: 10.9 % (ref 19.6–45.3)
MCH RBC QN AUTO: 26.9 PG (ref 26.6–33)
MCHC RBC AUTO-ENTMCNC: 29.8 G/DL (ref 31.5–35.7)
MCV RBC AUTO: 90.1 FL (ref 79–97)
MONOCYTES # BLD AUTO: 0.28 10*3/MM3 (ref 0.1–0.9)
MONOCYTES NFR BLD AUTO: 4.2 % (ref 5–12)
NEUTROPHILS NFR BLD AUTO: 5.67 10*3/MM3 (ref 1.7–7)
NEUTROPHILS NFR BLD AUTO: 84.3 % (ref 42.7–76)
NRBC BLD AUTO-RTO: 0 /100 WBC (ref 0–0.2)
PLATELET # BLD AUTO: 176 10*3/MM3 (ref 140–450)
PMV BLD AUTO: 10.5 FL (ref 6–12)
POTASSIUM SERPL-SCNC: 4.6 MMOL/L (ref 3.5–5.2)
PROT SERPL-MCNC: 6.6 G/DL (ref 6–8.5)
RBC # BLD AUTO: 3.24 10*6/MM3 (ref 3.77–5.28)
SODIUM SERPL-SCNC: 137 MMOL/L (ref 136–145)
WBC # BLD AUTO: 6.72 10*3/MM3 (ref 3.4–10.8)

## 2020-07-24 PROCEDURE — 97162 PT EVAL MOD COMPLEX 30 MIN: CPT

## 2020-07-24 PROCEDURE — 85025 COMPLETE CBC W/AUTO DIFF WBC: CPT | Performed by: HOSPITALIST

## 2020-07-24 PROCEDURE — 94799 UNLISTED PULMONARY SVC/PX: CPT

## 2020-07-24 PROCEDURE — 82962 GLUCOSE BLOOD TEST: CPT

## 2020-07-24 PROCEDURE — 80053 COMPREHEN METABOLIC PANEL: CPT | Performed by: HOSPITALIST

## 2020-07-24 PROCEDURE — 63710000001 INSULIN ASPART PER 5 UNITS: Performed by: NURSE PRACTITIONER

## 2020-07-24 PROCEDURE — 63710000001 PREDNISONE PER 5 MG: Performed by: HOSPITALIST

## 2020-07-24 PROCEDURE — 63710000001 INSULIN ASPART PER 5 UNITS: Performed by: HOSPITALIST

## 2020-07-24 PROCEDURE — 99232 SBSQ HOSP IP/OBS MODERATE 35: CPT | Performed by: HOSPITALIST

## 2020-07-24 PROCEDURE — 25010000002 CEFTRIAXONE SODIUM-DEXTROSE 1-3.74 GM-%(50ML) RECONSTITUTED SOLUTION: Performed by: HOSPITALIST

## 2020-07-24 PROCEDURE — 63710000001 INSULIN DETEMIR PER 5 UNITS: Performed by: HOSPITALIST

## 2020-07-24 RX ORDER — CHOLECALCIFEROL (VITAMIN D3) 125 MCG
10 CAPSULE ORAL NIGHTLY PRN
Status: DISCONTINUED | OUTPATIENT
Start: 2020-07-24 | End: 2020-08-01 | Stop reason: HOSPADM

## 2020-07-24 RX ORDER — HYDRALAZINE HYDROCHLORIDE 50 MG/1
50 TABLET, FILM COATED ORAL EVERY 8 HOURS SCHEDULED
Status: DISCONTINUED | OUTPATIENT
Start: 2020-07-24 | End: 2020-07-31

## 2020-07-24 RX ADMIN — PREDNISONE 40 MG: 20 TABLET ORAL at 08:51

## 2020-07-24 RX ADMIN — CEFTRIAXONE 1 G: 1 INJECTION, SOLUTION INTRAVENOUS at 20:20

## 2020-07-24 RX ADMIN — METOPROLOL TARTRATE 100 MG: 50 TABLET, FILM COATED ORAL at 08:52

## 2020-07-24 RX ADMIN — HYDRALAZINE HYDROCHLORIDE 50 MG: 50 TABLET, FILM COATED ORAL at 21:52

## 2020-07-24 RX ADMIN — GUAIFENESIN 1200 MG: 600 TABLET, EXTENDED RELEASE ORAL at 08:52

## 2020-07-24 RX ADMIN — SODIUM CHLORIDE, PRESERVATIVE FREE 10 ML: 5 INJECTION INTRAVENOUS at 11:50

## 2020-07-24 RX ADMIN — INSULIN ASPART 24 UNITS: 100 INJECTION, SOLUTION INTRAVENOUS; SUBCUTANEOUS at 11:48

## 2020-07-24 RX ADMIN — HYDRALAZINE HYDROCHLORIDE 50 MG: 50 TABLET, FILM COATED ORAL at 09:09

## 2020-07-24 RX ADMIN — IPRATROPIUM BROMIDE AND ALBUTEROL SULFATE 3 ML: .5; 3 SOLUTION RESPIRATORY (INHALATION) at 07:26

## 2020-07-24 RX ADMIN — METOPROLOL TARTRATE 100 MG: 50 TABLET, FILM COATED ORAL at 20:20

## 2020-07-24 RX ADMIN — QUETIAPINE FUMARATE 100 MG: 100 TABLET ORAL at 21:52

## 2020-07-24 RX ADMIN — AMLODIPINE BESYLATE 10 MG: 5 TABLET ORAL at 08:52

## 2020-07-24 RX ADMIN — IPRATROPIUM BROMIDE AND ALBUTEROL SULFATE 3 ML: .5; 3 SOLUTION RESPIRATORY (INHALATION) at 02:55

## 2020-07-24 RX ADMIN — NYSTATIN: 100000 POWDER TOPICAL at 08:54

## 2020-07-24 RX ADMIN — IPRATROPIUM BROMIDE AND ALBUTEROL SULFATE 3 ML: .5; 3 SOLUTION RESPIRATORY (INHALATION) at 19:04

## 2020-07-24 RX ADMIN — INSULIN ASPART 20 UNITS: 100 INJECTION, SOLUTION INTRAVENOUS; SUBCUTANEOUS at 22:24

## 2020-07-24 RX ADMIN — IPRATROPIUM BROMIDE AND ALBUTEROL SULFATE 3 ML: .5; 3 SOLUTION RESPIRATORY (INHALATION) at 15:24

## 2020-07-24 RX ADMIN — BUMETANIDE 1 MG: 1 TABLET ORAL at 20:20

## 2020-07-24 RX ADMIN — BUMETANIDE 1 MG: 1 TABLET ORAL at 09:09

## 2020-07-24 RX ADMIN — FAMOTIDINE 20 MG: 20 TABLET, FILM COATED ORAL at 08:52

## 2020-07-24 RX ADMIN — NYSTATIN: 100000 POWDER TOPICAL at 22:44

## 2020-07-24 RX ADMIN — IPRATROPIUM BROMIDE AND ALBUTEROL SULFATE 3 ML: .5; 3 SOLUTION RESPIRATORY (INHALATION) at 11:23

## 2020-07-24 RX ADMIN — INSULIN ASPART 24 UNITS: 100 INJECTION, SOLUTION INTRAVENOUS; SUBCUTANEOUS at 08:21

## 2020-07-24 RX ADMIN — ATORVASTATIN CALCIUM 40 MG: 40 TABLET, FILM COATED ORAL at 21:52

## 2020-07-24 RX ADMIN — LEVOTHYROXINE SODIUM 175 MCG: 150 TABLET ORAL at 05:55

## 2020-07-24 RX ADMIN — GUAIFENESIN 1200 MG: 600 TABLET, EXTENDED RELEASE ORAL at 20:20

## 2020-07-24 RX ADMIN — CETIRIZINE HYDROCHLORIDE 10 MG: 10 TABLET, FILM COATED ORAL at 20:20

## 2020-07-24 RX ADMIN — INSULIN ASPART 8 UNITS: 100 INJECTION, SOLUTION INTRAVENOUS; SUBCUTANEOUS at 17:04

## 2020-07-24 RX ADMIN — ASPIRIN 81 MG: 81 TABLET, COATED ORAL at 08:51

## 2020-07-24 RX ADMIN — INSULIN DETEMIR 60 UNITS: 100 INJECTION, SOLUTION SUBCUTANEOUS at 22:17

## 2020-07-24 RX ADMIN — SODIUM CHLORIDE, PRESERVATIVE FREE 10 ML: 5 INJECTION INTRAVENOUS at 20:20

## 2020-07-24 RX ADMIN — IPRATROPIUM BROMIDE AND ALBUTEROL SULFATE 3 ML: .5; 3 SOLUTION RESPIRATORY (INHALATION) at 23:07

## 2020-07-25 LAB
GLUCOSE BLDC GLUCOMTR-MCNC: 289 MG/DL (ref 70–130)
GLUCOSE BLDC GLUCOMTR-MCNC: 324 MG/DL (ref 70–130)
GLUCOSE BLDC GLUCOMTR-MCNC: 326 MG/DL (ref 70–130)
GLUCOSE BLDC GLUCOMTR-MCNC: 344 MG/DL (ref 70–130)

## 2020-07-25 PROCEDURE — 99232 SBSQ HOSP IP/OBS MODERATE 35: CPT | Performed by: HOSPITALIST

## 2020-07-25 PROCEDURE — 97110 THERAPEUTIC EXERCISES: CPT

## 2020-07-25 PROCEDURE — 63710000001 INSULIN DETEMIR PER 5 UNITS: Performed by: HOSPITALIST

## 2020-07-25 PROCEDURE — 25010000002 ENOXAPARIN PER 10 MG: Performed by: HOSPITALIST

## 2020-07-25 PROCEDURE — 82962 GLUCOSE BLOOD TEST: CPT

## 2020-07-25 PROCEDURE — 25010000002 CEFTRIAXONE SODIUM-DEXTROSE 1-3.74 GM-%(50ML) RECONSTITUTED SOLUTION: Performed by: HOSPITALIST

## 2020-07-25 PROCEDURE — 94799 UNLISTED PULMONARY SVC/PX: CPT

## 2020-07-25 PROCEDURE — 63710000001 PREDNISONE PER 1 MG: Performed by: HOSPITALIST

## 2020-07-25 PROCEDURE — 63710000001 INSULIN ASPART PER 5 UNITS: Performed by: HOSPITALIST

## 2020-07-25 RX ADMIN — BUMETANIDE 1 MG: 1 TABLET ORAL at 21:57

## 2020-07-25 RX ADMIN — HYDRALAZINE HYDROCHLORIDE 50 MG: 50 TABLET, FILM COATED ORAL at 16:57

## 2020-07-25 RX ADMIN — METOPROLOL TARTRATE 100 MG: 50 TABLET, FILM COATED ORAL at 08:40

## 2020-07-25 RX ADMIN — AMLODIPINE BESYLATE 10 MG: 5 TABLET ORAL at 08:40

## 2020-07-25 RX ADMIN — INSULIN ASPART 16 UNITS: 100 INJECTION, SOLUTION INTRAVENOUS; SUBCUTANEOUS at 08:38

## 2020-07-25 RX ADMIN — HYDRALAZINE HYDROCHLORIDE 50 MG: 50 TABLET, FILM COATED ORAL at 23:30

## 2020-07-25 RX ADMIN — NYSTATIN: 100000 POWDER TOPICAL at 21:59

## 2020-07-25 RX ADMIN — ASPIRIN 81 MG: 81 TABLET, COATED ORAL at 08:39

## 2020-07-25 RX ADMIN — IPRATROPIUM BROMIDE AND ALBUTEROL SULFATE 3 ML: .5; 3 SOLUTION RESPIRATORY (INHALATION) at 15:51

## 2020-07-25 RX ADMIN — QUETIAPINE FUMARATE 100 MG: 100 TABLET ORAL at 21:57

## 2020-07-25 RX ADMIN — IPRATROPIUM BROMIDE AND ALBUTEROL SULFATE 3 ML: .5; 3 SOLUTION RESPIRATORY (INHALATION) at 07:18

## 2020-07-25 RX ADMIN — GUAIFENESIN 1200 MG: 600 TABLET, EXTENDED RELEASE ORAL at 21:57

## 2020-07-25 RX ADMIN — PREDNISONE 40 MG: 20 TABLET ORAL at 08:39

## 2020-07-25 RX ADMIN — SODIUM CHLORIDE, PRESERVATIVE FREE 10 ML: 5 INJECTION INTRAVENOUS at 22:01

## 2020-07-25 RX ADMIN — ENOXAPARIN SODIUM 40 MG: 40 INJECTION SUBCUTANEOUS at 21:58

## 2020-07-25 RX ADMIN — NYSTATIN: 100000 POWDER TOPICAL at 08:40

## 2020-07-25 RX ADMIN — SODIUM CHLORIDE, PRESERVATIVE FREE 10 ML: 5 INJECTION INTRAVENOUS at 08:41

## 2020-07-25 RX ADMIN — IPRATROPIUM BROMIDE AND ALBUTEROL SULFATE 3 ML: .5; 3 SOLUTION RESPIRATORY (INHALATION) at 23:04

## 2020-07-25 RX ADMIN — INSULIN DETEMIR 65 UNITS: 100 INJECTION, SOLUTION SUBCUTANEOUS at 21:59

## 2020-07-25 RX ADMIN — ATORVASTATIN CALCIUM 40 MG: 40 TABLET, FILM COATED ORAL at 21:57

## 2020-07-25 RX ADMIN — INSULIN ASPART 16 UNITS: 100 INJECTION, SOLUTION INTRAVENOUS; SUBCUTANEOUS at 21:59

## 2020-07-25 RX ADMIN — GUAIFENESIN 1200 MG: 600 TABLET, EXTENDED RELEASE ORAL at 08:40

## 2020-07-25 RX ADMIN — FAMOTIDINE 20 MG: 20 TABLET, FILM COATED ORAL at 08:39

## 2020-07-25 RX ADMIN — ACETAMINOPHEN 650 MG: 325 TABLET, FILM COATED ORAL at 18:54

## 2020-07-25 RX ADMIN — IPRATROPIUM BROMIDE AND ALBUTEROL SULFATE 3 ML: .5; 3 SOLUTION RESPIRATORY (INHALATION) at 19:12

## 2020-07-25 RX ADMIN — INSULIN ASPART 16 UNITS: 100 INJECTION, SOLUTION INTRAVENOUS; SUBCUTANEOUS at 12:20

## 2020-07-25 RX ADMIN — HYDRALAZINE HYDROCHLORIDE 50 MG: 50 TABLET, FILM COATED ORAL at 06:24

## 2020-07-25 RX ADMIN — METOPROLOL TARTRATE 100 MG: 50 TABLET, FILM COATED ORAL at 21:57

## 2020-07-25 RX ADMIN — BUMETANIDE 1 MG: 1 TABLET ORAL at 08:40

## 2020-07-25 RX ADMIN — INSULIN ASPART 12 UNITS: 100 INJECTION, SOLUTION INTRAVENOUS; SUBCUTANEOUS at 17:29

## 2020-07-25 RX ADMIN — CEFTRIAXONE 1 G: 1 INJECTION, SOLUTION INTRAVENOUS at 22:00

## 2020-07-25 RX ADMIN — LEVOTHYROXINE SODIUM 175 MCG: 150 TABLET ORAL at 06:24

## 2020-07-25 RX ADMIN — IPRATROPIUM BROMIDE AND ALBUTEROL SULFATE 3 ML: .5; 3 SOLUTION RESPIRATORY (INHALATION) at 03:50

## 2020-07-25 RX ADMIN — CETIRIZINE HYDROCHLORIDE 10 MG: 10 TABLET, FILM COATED ORAL at 21:57

## 2020-07-25 RX ADMIN — IPRATROPIUM BROMIDE AND ALBUTEROL SULFATE 3 ML: .5; 3 SOLUTION RESPIRATORY (INHALATION) at 11:25

## 2020-07-26 LAB
ALBUMIN SERPL-MCNC: 2.7 G/DL (ref 3.5–5.2)
ANION GAP SERPL CALCULATED.3IONS-SCNC: 5.8 MMOL/L (ref 5–15)
BASOPHILS # BLD AUTO: 0.01 10*3/MM3 (ref 0–0.2)
BASOPHILS NFR BLD AUTO: 0.1 % (ref 0–1.5)
BUN SERPL-MCNC: 68 MG/DL (ref 8–23)
BUN/CREAT SERPL: 36.8 (ref 7–25)
CALCIUM SPEC-SCNC: 8.3 MG/DL (ref 8.6–10.5)
CHLORIDE SERPL-SCNC: 94 MMOL/L (ref 98–107)
CO2 SERPL-SCNC: 40.2 MMOL/L (ref 22–29)
CREAT SERPL-MCNC: 1.85 MG/DL (ref 0.57–1)
DEPRECATED RDW RBC AUTO: 48.2 FL (ref 37–54)
EOSINOPHIL # BLD AUTO: 0 10*3/MM3 (ref 0–0.4)
EOSINOPHIL NFR BLD AUTO: 0 % (ref 0.3–6.2)
ERYTHROCYTE [DISTWIDTH] IN BLOOD BY AUTOMATED COUNT: 14.2 % (ref 12.3–15.4)
GFR SERPL CREATININE-BSD FRML MDRD: 27 ML/MIN/1.73
GLUCOSE BLDC GLUCOMTR-MCNC: 219 MG/DL (ref 70–130)
GLUCOSE BLDC GLUCOMTR-MCNC: 293 MG/DL (ref 70–130)
GLUCOSE BLDC GLUCOMTR-MCNC: 323 MG/DL (ref 70–130)
GLUCOSE BLDC GLUCOMTR-MCNC: 439 MG/DL (ref 70–130)
GLUCOSE BLDC GLUCOMTR-MCNC: 458 MG/DL (ref 70–130)
GLUCOSE BLDC GLUCOMTR-MCNC: 462 MG/DL (ref 70–130)
GLUCOSE SERPL-MCNC: 231 MG/DL (ref 65–99)
HCT VFR BLD AUTO: 30.2 % (ref 34–46.6)
HGB BLD-MCNC: 8.7 G/DL (ref 12–15.9)
IMM GRANULOCYTES # BLD AUTO: 0.05 10*3/MM3 (ref 0–0.05)
IMM GRANULOCYTES NFR BLD AUTO: 0.7 % (ref 0–0.5)
LYMPHOCYTES # BLD AUTO: 1.2 10*3/MM3 (ref 0.7–3.1)
LYMPHOCYTES NFR BLD AUTO: 16.4 % (ref 19.6–45.3)
MCH RBC QN AUTO: 26.6 PG (ref 26.6–33)
MCHC RBC AUTO-ENTMCNC: 28.8 G/DL (ref 31.5–35.7)
MCV RBC AUTO: 92.4 FL (ref 79–97)
MONOCYTES # BLD AUTO: 0.45 10*3/MM3 (ref 0.1–0.9)
MONOCYTES NFR BLD AUTO: 6.2 % (ref 5–12)
NEUTROPHILS NFR BLD AUTO: 5.59 10*3/MM3 (ref 1.7–7)
NEUTROPHILS NFR BLD AUTO: 76.6 % (ref 42.7–76)
NRBC BLD AUTO-RTO: 0 /100 WBC (ref 0–0.2)
PHOSPHATE SERPL-MCNC: 3.2 MG/DL (ref 2.5–4.5)
PLATELET # BLD AUTO: 159 10*3/MM3 (ref 140–450)
PMV BLD AUTO: 10.5 FL (ref 6–12)
POTASSIUM SERPL-SCNC: 4.2 MMOL/L (ref 3.5–5.2)
RBC # BLD AUTO: 3.27 10*6/MM3 (ref 3.77–5.28)
SODIUM SERPL-SCNC: 140 MMOL/L (ref 136–145)
WBC # BLD AUTO: 7.3 10*3/MM3 (ref 3.4–10.8)

## 2020-07-26 PROCEDURE — 25010000002 CEFTRIAXONE SODIUM-DEXTROSE 1-3.74 GM-%(50ML) RECONSTITUTED SOLUTION: Performed by: HOSPITALIST

## 2020-07-26 PROCEDURE — 63710000001 INSULIN DETEMIR PER 5 UNITS: Performed by: INTERNAL MEDICINE

## 2020-07-26 PROCEDURE — 63710000001 INSULIN ASPART PER 5 UNITS: Performed by: HOSPITALIST

## 2020-07-26 PROCEDURE — 63710000001 PREDNISONE PER 1 MG: Performed by: HOSPITALIST

## 2020-07-26 PROCEDURE — 80069 RENAL FUNCTION PANEL: CPT | Performed by: HOSPITALIST

## 2020-07-26 PROCEDURE — 85025 COMPLETE CBC W/AUTO DIFF WBC: CPT | Performed by: HOSPITALIST

## 2020-07-26 PROCEDURE — 94799 UNLISTED PULMONARY SVC/PX: CPT

## 2020-07-26 PROCEDURE — 25010000002 ENOXAPARIN PER 10 MG: Performed by: HOSPITALIST

## 2020-07-26 PROCEDURE — 99233 SBSQ HOSP IP/OBS HIGH 50: CPT | Performed by: INTERNAL MEDICINE

## 2020-07-26 PROCEDURE — 82962 GLUCOSE BLOOD TEST: CPT

## 2020-07-26 PROCEDURE — 94660 CPAP INITIATION&MGMT: CPT

## 2020-07-26 RX ADMIN — ENOXAPARIN SODIUM 40 MG: 40 INJECTION SUBCUTANEOUS at 20:35

## 2020-07-26 RX ADMIN — BUMETANIDE 1 MG: 1 TABLET ORAL at 08:34

## 2020-07-26 RX ADMIN — AMLODIPINE BESYLATE 10 MG: 5 TABLET ORAL at 08:34

## 2020-07-26 RX ADMIN — IPRATROPIUM BROMIDE AND ALBUTEROL SULFATE 3 ML: .5; 3 SOLUTION RESPIRATORY (INHALATION) at 03:45

## 2020-07-26 RX ADMIN — IPRATROPIUM BROMIDE AND ALBUTEROL SULFATE 3 ML: .5; 3 SOLUTION RESPIRATORY (INHALATION) at 08:06

## 2020-07-26 RX ADMIN — METOPROLOL TARTRATE 100 MG: 50 TABLET, FILM COATED ORAL at 08:33

## 2020-07-26 RX ADMIN — FAMOTIDINE 20 MG: 20 TABLET, FILM COATED ORAL at 08:33

## 2020-07-26 RX ADMIN — NYSTATIN: 100000 POWDER TOPICAL at 08:36

## 2020-07-26 RX ADMIN — GUAIFENESIN 1200 MG: 600 TABLET, EXTENDED RELEASE ORAL at 20:35

## 2020-07-26 RX ADMIN — INSULIN DETEMIR 74 UNITS: 100 INJECTION, SOLUTION SUBCUTANEOUS at 22:12

## 2020-07-26 RX ADMIN — HYDRALAZINE HYDROCHLORIDE 50 MG: 50 TABLET, FILM COATED ORAL at 21:00

## 2020-07-26 RX ADMIN — MELATONIN TAB 5 MG 10 MG: 5 TAB at 00:59

## 2020-07-26 RX ADMIN — HYDRALAZINE HYDROCHLORIDE 50 MG: 50 TABLET, FILM COATED ORAL at 07:04

## 2020-07-26 RX ADMIN — IPRATROPIUM BROMIDE AND ALBUTEROL SULFATE 3 ML: .5; 3 SOLUTION RESPIRATORY (INHALATION) at 19:13

## 2020-07-26 RX ADMIN — NYSTATIN: 100000 POWDER TOPICAL at 20:35

## 2020-07-26 RX ADMIN — ACETAMINOPHEN 650 MG: 325 TABLET, FILM COATED ORAL at 01:02

## 2020-07-26 RX ADMIN — INSULIN ASPART 12 UNITS: 100 INJECTION, SOLUTION INTRAVENOUS; SUBCUTANEOUS at 12:45

## 2020-07-26 RX ADMIN — QUETIAPINE FUMARATE 100 MG: 100 TABLET ORAL at 20:35

## 2020-07-26 RX ADMIN — IPRATROPIUM BROMIDE AND ALBUTEROL SULFATE 3 ML: .5; 3 SOLUTION RESPIRATORY (INHALATION) at 22:50

## 2020-07-26 RX ADMIN — METOPROLOL TARTRATE 100 MG: 50 TABLET, FILM COATED ORAL at 20:35

## 2020-07-26 RX ADMIN — ENOXAPARIN SODIUM 40 MG: 40 INJECTION SUBCUTANEOUS at 08:35

## 2020-07-26 RX ADMIN — IPRATROPIUM BROMIDE AND ALBUTEROL SULFATE 3 ML: .5; 3 SOLUTION RESPIRATORY (INHALATION) at 16:11

## 2020-07-26 RX ADMIN — ATORVASTATIN CALCIUM 40 MG: 40 TABLET, FILM COATED ORAL at 20:35

## 2020-07-26 RX ADMIN — CETIRIZINE HYDROCHLORIDE 10 MG: 10 TABLET, FILM COATED ORAL at 20:35

## 2020-07-26 RX ADMIN — LEVOTHYROXINE SODIUM 175 MCG: 150 TABLET ORAL at 07:04

## 2020-07-26 RX ADMIN — INSULIN ASPART 24 UNITS: 100 INJECTION, SOLUTION INTRAVENOUS; SUBCUTANEOUS at 22:11

## 2020-07-26 RX ADMIN — IPRATROPIUM BROMIDE AND ALBUTEROL SULFATE 3 ML: .5; 3 SOLUTION RESPIRATORY (INHALATION) at 12:06

## 2020-07-26 RX ADMIN — SODIUM CHLORIDE, PRESERVATIVE FREE 10 ML: 5 INJECTION INTRAVENOUS at 20:35

## 2020-07-26 RX ADMIN — INSULIN ASPART 8 UNITS: 100 INJECTION, SOLUTION INTRAVENOUS; SUBCUTANEOUS at 08:32

## 2020-07-26 RX ADMIN — PREDNISONE 40 MG: 20 TABLET ORAL at 08:33

## 2020-07-26 RX ADMIN — INSULIN ASPART 16 UNITS: 100 INJECTION, SOLUTION INTRAVENOUS; SUBCUTANEOUS at 17:19

## 2020-07-26 RX ADMIN — SODIUM CHLORIDE, PRESERVATIVE FREE 10 ML: 5 INJECTION INTRAVENOUS at 08:35

## 2020-07-26 RX ADMIN — CEFTRIAXONE 1 G: 1 INJECTION, SOLUTION INTRAVENOUS at 20:35

## 2020-07-26 RX ADMIN — GUAIFENESIN 1200 MG: 600 TABLET, EXTENDED RELEASE ORAL at 08:33

## 2020-07-26 RX ADMIN — ASPIRIN 81 MG: 81 TABLET, COATED ORAL at 08:33

## 2020-07-26 RX ADMIN — HYDRALAZINE HYDROCHLORIDE 50 MG: 50 TABLET, FILM COATED ORAL at 13:10

## 2020-07-27 LAB
ANION GAP SERPL CALCULATED.3IONS-SCNC: 6.3 MMOL/L (ref 5–15)
ANION GAP SERPL CALCULATED.3IONS-SCNC: 7.3 MMOL/L (ref 5–15)
BASOPHILS # BLD AUTO: 0.01 10*3/MM3 (ref 0–0.2)
BASOPHILS NFR BLD AUTO: 0.2 % (ref 0–1.5)
BUN SERPL-MCNC: 70 MG/DL (ref 8–23)
BUN SERPL-MCNC: 73 MG/DL (ref 8–23)
BUN/CREAT SERPL: 37.8 (ref 7–25)
BUN/CREAT SERPL: 39.1 (ref 7–25)
CALCIUM SPEC-SCNC: 8.6 MG/DL (ref 8.6–10.5)
CALCIUM SPEC-SCNC: 8.9 MG/DL (ref 8.6–10.5)
CHLORIDE SERPL-SCNC: 94 MMOL/L (ref 98–107)
CHLORIDE SERPL-SCNC: 95 MMOL/L (ref 98–107)
CO2 SERPL-SCNC: 39.7 MMOL/L (ref 22–29)
CO2 SERPL-SCNC: 39.7 MMOL/L (ref 22–29)
CREAT SERPL-MCNC: 1.79 MG/DL (ref 0.57–1)
CREAT SERPL-MCNC: 1.93 MG/DL (ref 0.57–1)
DEPRECATED RDW RBC AUTO: 47.5 FL (ref 37–54)
EOSINOPHIL # BLD AUTO: 0 10*3/MM3 (ref 0–0.4)
EOSINOPHIL NFR BLD AUTO: 0 % (ref 0.3–6.2)
ERYTHROCYTE [DISTWIDTH] IN BLOOD BY AUTOMATED COUNT: 14.1 % (ref 12.3–15.4)
GFR SERPL CREATININE-BSD FRML MDRD: 26 ML/MIN/1.73
GFR SERPL CREATININE-BSD FRML MDRD: 28 ML/MIN/1.73
GLUCOSE BLDC GLUCOMTR-MCNC: 217 MG/DL (ref 70–130)
GLUCOSE BLDC GLUCOMTR-MCNC: 235 MG/DL (ref 70–130)
GLUCOSE BLDC GLUCOMTR-MCNC: 265 MG/DL (ref 70–130)
GLUCOSE BLDC GLUCOMTR-MCNC: 271 MG/DL (ref 70–130)
GLUCOSE BLDC GLUCOMTR-MCNC: 397 MG/DL (ref 70–130)
GLUCOSE BLDC GLUCOMTR-MCNC: 411 MG/DL (ref 70–130)
GLUCOSE BLDC GLUCOMTR-MCNC: 461 MG/DL (ref 70–130)
GLUCOSE SERPL-MCNC: 255 MG/DL (ref 65–99)
GLUCOSE SERPL-MCNC: 264 MG/DL (ref 65–99)
HCT VFR BLD AUTO: 31 % (ref 34–46.6)
HGB BLD-MCNC: 8.8 G/DL (ref 12–15.9)
IMM GRANULOCYTES # BLD AUTO: 0.04 10*3/MM3 (ref 0–0.05)
IMM GRANULOCYTES NFR BLD AUTO: 0.6 % (ref 0–0.5)
LYMPHOCYTES # BLD AUTO: 1.1 10*3/MM3 (ref 0.7–3.1)
LYMPHOCYTES NFR BLD AUTO: 17.5 % (ref 19.6–45.3)
MAGNESIUM SERPL-MCNC: 2 MG/DL (ref 1.6–2.4)
MCH RBC QN AUTO: 26.3 PG (ref 26.6–33)
MCHC RBC AUTO-ENTMCNC: 28.4 G/DL (ref 31.5–35.7)
MCV RBC AUTO: 92.5 FL (ref 79–97)
MONOCYTES # BLD AUTO: 0.44 10*3/MM3 (ref 0.1–0.9)
MONOCYTES NFR BLD AUTO: 7 % (ref 5–12)
NEUTROPHILS NFR BLD AUTO: 4.68 10*3/MM3 (ref 1.7–7)
NEUTROPHILS NFR BLD AUTO: 74.7 % (ref 42.7–76)
NRBC BLD AUTO-RTO: 0 /100 WBC (ref 0–0.2)
PHOSPHATE SERPL-MCNC: 3.5 MG/DL (ref 2.5–4.5)
PLATELET # BLD AUTO: 166 10*3/MM3 (ref 140–450)
PMV BLD AUTO: 11 FL (ref 6–12)
POTASSIUM SERPL-SCNC: 4.4 MMOL/L (ref 3.5–5.2)
POTASSIUM SERPL-SCNC: 4.6 MMOL/L (ref 3.5–5.2)
RBC # BLD AUTO: 3.35 10*6/MM3 (ref 3.77–5.28)
SODIUM SERPL-SCNC: 140 MMOL/L (ref 136–145)
SODIUM SERPL-SCNC: 142 MMOL/L (ref 136–145)
WBC # BLD AUTO: 6.27 10*3/MM3 (ref 3.4–10.8)

## 2020-07-27 PROCEDURE — 63710000001 PREDNISONE PER 5 MG: Performed by: INTERNAL MEDICINE

## 2020-07-27 PROCEDURE — 84100 ASSAY OF PHOSPHORUS: CPT | Performed by: INTERNAL MEDICINE

## 2020-07-27 PROCEDURE — 97110 THERAPEUTIC EXERCISES: CPT

## 2020-07-27 PROCEDURE — 80048 BASIC METABOLIC PNL TOTAL CA: CPT | Performed by: INTERNAL MEDICINE

## 2020-07-27 PROCEDURE — 25010000002 ENOXAPARIN PER 10 MG: Performed by: HOSPITALIST

## 2020-07-27 PROCEDURE — 99233 SBSQ HOSP IP/OBS HIGH 50: CPT | Performed by: INTERNAL MEDICINE

## 2020-07-27 PROCEDURE — 94799 UNLISTED PULMONARY SVC/PX: CPT

## 2020-07-27 PROCEDURE — 63710000001 PREDNISONE PER 1 MG: Performed by: INTERNAL MEDICINE

## 2020-07-27 PROCEDURE — 82962 GLUCOSE BLOOD TEST: CPT

## 2020-07-27 PROCEDURE — 85025 COMPLETE CBC W/AUTO DIFF WBC: CPT | Performed by: INTERNAL MEDICINE

## 2020-07-27 PROCEDURE — 83735 ASSAY OF MAGNESIUM: CPT | Performed by: INTERNAL MEDICINE

## 2020-07-27 PROCEDURE — 63710000001 INSULIN DETEMIR PER 5 UNITS: Performed by: INTERNAL MEDICINE

## 2020-07-27 PROCEDURE — 63710000001 INSULIN ASPART PER 5 UNITS: Performed by: HOSPITALIST

## 2020-07-27 PROCEDURE — 94760 N-INVAS EAR/PLS OXIMETRY 1: CPT

## 2020-07-27 RX ORDER — PREDNISONE 20 MG/1
20 TABLET ORAL
Status: DISCONTINUED | OUTPATIENT
Start: 2020-07-28 | End: 2020-07-29

## 2020-07-27 RX ORDER — ARFORMOTEROL TARTRATE 15 UG/2ML
15 SOLUTION RESPIRATORY (INHALATION)
Status: DISCONTINUED | OUTPATIENT
Start: 2020-07-27 | End: 2020-08-01 | Stop reason: HOSPADM

## 2020-07-27 RX ORDER — ALBUTEROL SULFATE 2.5 MG/3ML
2.5 SOLUTION RESPIRATORY (INHALATION) EVERY 4 HOURS PRN
Status: DISCONTINUED | OUTPATIENT
Start: 2020-07-27 | End: 2020-08-01 | Stop reason: HOSPADM

## 2020-07-27 RX ORDER — BUDESONIDE 0.5 MG/2ML
0.5 INHALANT ORAL
Status: DISCONTINUED | OUTPATIENT
Start: 2020-07-27 | End: 2020-08-01 | Stop reason: HOSPADM

## 2020-07-27 RX ADMIN — HYDRALAZINE HYDROCHLORIDE 50 MG: 50 TABLET, FILM COATED ORAL at 23:45

## 2020-07-27 RX ADMIN — IPRATROPIUM BROMIDE AND ALBUTEROL SULFATE 3 ML: .5; 3 SOLUTION RESPIRATORY (INHALATION) at 15:16

## 2020-07-27 RX ADMIN — ATORVASTATIN CALCIUM 40 MG: 40 TABLET, FILM COATED ORAL at 21:20

## 2020-07-27 RX ADMIN — GUAIFENESIN 1200 MG: 600 TABLET, EXTENDED RELEASE ORAL at 08:21

## 2020-07-27 RX ADMIN — IPRATROPIUM BROMIDE AND ALBUTEROL SULFATE 3 ML: .5; 3 SOLUTION RESPIRATORY (INHALATION) at 12:00

## 2020-07-27 RX ADMIN — NYSTATIN: 100000 POWDER TOPICAL at 08:24

## 2020-07-27 RX ADMIN — AMLODIPINE BESYLATE 10 MG: 5 TABLET ORAL at 08:21

## 2020-07-27 RX ADMIN — INSULIN ASPART 20 UNITS: 100 INJECTION, SOLUTION INTRAVENOUS; SUBCUTANEOUS at 21:20

## 2020-07-27 RX ADMIN — INSULIN ASPART 8 UNITS: 100 INJECTION, SOLUTION INTRAVENOUS; SUBCUTANEOUS at 08:21

## 2020-07-27 RX ADMIN — METOPROLOL TARTRATE 100 MG: 50 TABLET, FILM COATED ORAL at 21:20

## 2020-07-27 RX ADMIN — HYDRALAZINE HYDROCHLORIDE 50 MG: 50 TABLET, FILM COATED ORAL at 14:47

## 2020-07-27 RX ADMIN — ACETAMINOPHEN 650 MG: 325 TABLET, FILM COATED ORAL at 21:34

## 2020-07-27 RX ADMIN — IPRATROPIUM BROMIDE 0.5 MG: 0.5 SOLUTION RESPIRATORY (INHALATION) at 19:08

## 2020-07-27 RX ADMIN — CETIRIZINE HYDROCHLORIDE 10 MG: 10 TABLET, FILM COATED ORAL at 21:20

## 2020-07-27 RX ADMIN — ARFORMOTEROL TARTRATE 15 MCG: 15 SOLUTION RESPIRATORY (INHALATION) at 19:08

## 2020-07-27 RX ADMIN — PREDNISONE 30 MG: 20 TABLET ORAL at 08:20

## 2020-07-27 RX ADMIN — IPRATROPIUM BROMIDE AND ALBUTEROL SULFATE 3 ML: .5; 3 SOLUTION RESPIRATORY (INHALATION) at 03:45

## 2020-07-27 RX ADMIN — HYDRALAZINE HYDROCHLORIDE 50 MG: 50 TABLET, FILM COATED ORAL at 06:18

## 2020-07-27 RX ADMIN — MELATONIN TAB 5 MG 10 MG: 5 TAB at 23:45

## 2020-07-27 RX ADMIN — SODIUM CHLORIDE, POTASSIUM CHLORIDE, SODIUM LACTATE AND CALCIUM CHLORIDE 1000 ML: 600; 310; 30; 20 INJECTION, SOLUTION INTRAVENOUS at 09:11

## 2020-07-27 RX ADMIN — ASPIRIN 81 MG: 81 TABLET, COATED ORAL at 08:21

## 2020-07-27 RX ADMIN — FAMOTIDINE 20 MG: 20 TABLET, FILM COATED ORAL at 08:21

## 2020-07-27 RX ADMIN — INSULIN DETEMIR 74 UNITS: 100 INJECTION, SOLUTION SUBCUTANEOUS at 21:22

## 2020-07-27 RX ADMIN — ENOXAPARIN SODIUM 40 MG: 40 INJECTION SUBCUTANEOUS at 21:18

## 2020-07-27 RX ADMIN — INSULIN ASPART 8 UNITS: 100 INJECTION, SOLUTION INTRAVENOUS; SUBCUTANEOUS at 12:24

## 2020-07-27 RX ADMIN — ENOXAPARIN SODIUM 40 MG: 40 INJECTION SUBCUTANEOUS at 08:21

## 2020-07-27 RX ADMIN — NYSTATIN: 100000 POWDER TOPICAL at 21:22

## 2020-07-27 RX ADMIN — IPRATROPIUM BROMIDE AND ALBUTEROL SULFATE 3 ML: .5; 3 SOLUTION RESPIRATORY (INHALATION) at 07:39

## 2020-07-27 RX ADMIN — METOPROLOL TARTRATE 100 MG: 50 TABLET, FILM COATED ORAL at 08:21

## 2020-07-27 RX ADMIN — LEVOTHYROXINE SODIUM 175 MCG: 150 TABLET ORAL at 06:18

## 2020-07-27 RX ADMIN — BUDESONIDE 0.5 MG: 0.5 SUSPENSION RESPIRATORY (INHALATION) at 19:09

## 2020-07-27 RX ADMIN — INSULIN ASPART 12 UNITS: 100 INJECTION, SOLUTION INTRAVENOUS; SUBCUTANEOUS at 17:34

## 2020-07-27 RX ADMIN — GUAIFENESIN 1200 MG: 600 TABLET, EXTENDED RELEASE ORAL at 21:20

## 2020-07-27 RX ADMIN — SODIUM CHLORIDE, PRESERVATIVE FREE 10 ML: 5 INJECTION INTRAVENOUS at 21:21

## 2020-07-28 LAB
ANION GAP SERPL CALCULATED.3IONS-SCNC: 5.3 MMOL/L (ref 5–15)
BUN SERPL-MCNC: 70 MG/DL (ref 8–23)
BUN/CREAT SERPL: 39.5 (ref 7–25)
CALCIUM SPEC-SCNC: 9.1 MG/DL (ref 8.6–10.5)
CHLORIDE SERPL-SCNC: 96 MMOL/L (ref 98–107)
CO2 SERPL-SCNC: 39.7 MMOL/L (ref 22–29)
CREAT SERPL-MCNC: 1.77 MG/DL (ref 0.57–1)
GFR SERPL CREATININE-BSD FRML MDRD: 29 ML/MIN/1.73
GLUCOSE BLDC GLUCOMTR-MCNC: 163 MG/DL (ref 70–130)
GLUCOSE BLDC GLUCOMTR-MCNC: 222 MG/DL (ref 70–130)
GLUCOSE BLDC GLUCOMTR-MCNC: 286 MG/DL (ref 70–130)
GLUCOSE BLDC GLUCOMTR-MCNC: 324 MG/DL (ref 70–130)
GLUCOSE SERPL-MCNC: 167 MG/DL (ref 65–99)
POTASSIUM SERPL-SCNC: 4.2 MMOL/L (ref 3.5–5.2)
SODIUM SERPL-SCNC: 141 MMOL/L (ref 136–145)

## 2020-07-28 PROCEDURE — 99232 SBSQ HOSP IP/OBS MODERATE 35: CPT | Performed by: INTERNAL MEDICINE

## 2020-07-28 PROCEDURE — 63710000001 INSULIN ASPART PER 5 UNITS: Performed by: HOSPITALIST

## 2020-07-28 PROCEDURE — U0002 COVID-19 LAB TEST NON-CDC: HCPCS | Performed by: INTERNAL MEDICINE

## 2020-07-28 PROCEDURE — 63710000001 PREDNISONE PER 1 MG: Performed by: INTERNAL MEDICINE

## 2020-07-28 PROCEDURE — 94799 UNLISTED PULMONARY SVC/PX: CPT

## 2020-07-28 PROCEDURE — 97110 THERAPEUTIC EXERCISES: CPT

## 2020-07-28 PROCEDURE — 80048 BASIC METABOLIC PNL TOTAL CA: CPT | Performed by: INTERNAL MEDICINE

## 2020-07-28 PROCEDURE — 25010000002 ENOXAPARIN PER 10 MG: Performed by: HOSPITALIST

## 2020-07-28 PROCEDURE — 63710000001 INSULIN DETEMIR PER 5 UNITS: Performed by: INTERNAL MEDICINE

## 2020-07-28 PROCEDURE — 82962 GLUCOSE BLOOD TEST: CPT

## 2020-07-28 RX ORDER — BUMETANIDE 1 MG/1
1 TABLET ORAL DAILY
Status: DISCONTINUED | OUTPATIENT
Start: 2020-07-29 | End: 2020-07-30

## 2020-07-28 RX ADMIN — METOPROLOL TARTRATE 100 MG: 50 TABLET, FILM COATED ORAL at 08:38

## 2020-07-28 RX ADMIN — FAMOTIDINE 20 MG: 20 TABLET, FILM COATED ORAL at 08:38

## 2020-07-28 RX ADMIN — ATORVASTATIN CALCIUM 40 MG: 40 TABLET, FILM COATED ORAL at 20:18

## 2020-07-28 RX ADMIN — ASPIRIN 81 MG: 81 TABLET, COATED ORAL at 08:37

## 2020-07-28 RX ADMIN — ENOXAPARIN SODIUM 40 MG: 40 INJECTION SUBCUTANEOUS at 20:18

## 2020-07-28 RX ADMIN — ARFORMOTEROL TARTRATE 15 MCG: 15 SOLUTION RESPIRATORY (INHALATION) at 19:32

## 2020-07-28 RX ADMIN — IPRATROPIUM BROMIDE 0.5 MG: 0.5 SOLUTION RESPIRATORY (INHALATION) at 19:24

## 2020-07-28 RX ADMIN — INSULIN ASPART 4 UNITS: 100 INJECTION, SOLUTION INTRAVENOUS; SUBCUTANEOUS at 08:36

## 2020-07-28 RX ADMIN — INSULIN ASPART 12 UNITS: 100 INJECTION, SOLUTION INTRAVENOUS; SUBCUTANEOUS at 17:23

## 2020-07-28 RX ADMIN — SODIUM CHLORIDE, PRESERVATIVE FREE 10 ML: 5 INJECTION INTRAVENOUS at 08:39

## 2020-07-28 RX ADMIN — NYSTATIN: 100000 POWDER TOPICAL at 20:21

## 2020-07-28 RX ADMIN — INSULIN DETEMIR 74 UNITS: 100 INJECTION, SOLUTION SUBCUTANEOUS at 21:52

## 2020-07-28 RX ADMIN — LEVOTHYROXINE SODIUM 175 MCG: 150 TABLET ORAL at 06:25

## 2020-07-28 RX ADMIN — SODIUM CHLORIDE, PRESERVATIVE FREE 10 ML: 5 INJECTION INTRAVENOUS at 20:21

## 2020-07-28 RX ADMIN — HYDRALAZINE HYDROCHLORIDE 50 MG: 50 TABLET, FILM COATED ORAL at 15:09

## 2020-07-28 RX ADMIN — AMLODIPINE BESYLATE 10 MG: 5 TABLET ORAL at 08:37

## 2020-07-28 RX ADMIN — PREDNISONE 20 MG: 20 TABLET ORAL at 08:38

## 2020-07-28 RX ADMIN — BUDESONIDE 0.5 MG: 0.5 SUSPENSION RESPIRATORY (INHALATION) at 19:24

## 2020-07-28 RX ADMIN — HYDRALAZINE HYDROCHLORIDE 50 MG: 50 TABLET, FILM COATED ORAL at 06:24

## 2020-07-28 RX ADMIN — ENOXAPARIN SODIUM 40 MG: 40 INJECTION SUBCUTANEOUS at 08:38

## 2020-07-28 RX ADMIN — METOPROLOL TARTRATE 100 MG: 50 TABLET, FILM COATED ORAL at 20:18

## 2020-07-28 RX ADMIN — ARFORMOTEROL TARTRATE 15 MCG: 15 SOLUTION RESPIRATORY (INHALATION) at 07:20

## 2020-07-28 RX ADMIN — GUAIFENESIN 1200 MG: 600 TABLET, EXTENDED RELEASE ORAL at 20:18

## 2020-07-28 RX ADMIN — IPRATROPIUM BROMIDE 0.5 MG: 0.5 SOLUTION RESPIRATORY (INHALATION) at 14:15

## 2020-07-28 RX ADMIN — INSULIN ASPART 16 UNITS: 100 INJECTION, SOLUTION INTRAVENOUS; SUBCUTANEOUS at 20:19

## 2020-07-28 RX ADMIN — NYSTATIN: 100000 POWDER TOPICAL at 08:38

## 2020-07-28 RX ADMIN — GUAIFENESIN 1200 MG: 600 TABLET, EXTENDED RELEASE ORAL at 08:38

## 2020-07-28 RX ADMIN — HYDRALAZINE HYDROCHLORIDE 50 MG: 50 TABLET, FILM COATED ORAL at 21:05

## 2020-07-28 RX ADMIN — INSULIN ASPART 8 UNITS: 100 INJECTION, SOLUTION INTRAVENOUS; SUBCUTANEOUS at 12:34

## 2020-07-28 RX ADMIN — BUDESONIDE 0.5 MG: 0.5 SUSPENSION RESPIRATORY (INHALATION) at 07:14

## 2020-07-28 RX ADMIN — CETIRIZINE HYDROCHLORIDE 10 MG: 10 TABLET, FILM COATED ORAL at 20:18

## 2020-07-28 RX ADMIN — IPRATROPIUM BROMIDE 0.5 MG: 0.5 SOLUTION RESPIRATORY (INHALATION) at 07:14

## 2020-07-29 LAB
GLUCOSE BLDC GLUCOMTR-MCNC: 152 MG/DL (ref 70–130)
GLUCOSE BLDC GLUCOMTR-MCNC: 203 MG/DL (ref 70–130)
GLUCOSE BLDC GLUCOMTR-MCNC: 227 MG/DL (ref 70–130)
GLUCOSE BLDC GLUCOMTR-MCNC: 341 MG/DL (ref 70–130)
REF LAB TEST METHOD: NORMAL
SARS-COV-2 RNA RESP QL NAA+PROBE: NOT DETECTED

## 2020-07-29 PROCEDURE — 94799 UNLISTED PULMONARY SVC/PX: CPT

## 2020-07-29 PROCEDURE — 63710000001 PREDNISONE PER 1 MG: Performed by: INTERNAL MEDICINE

## 2020-07-29 PROCEDURE — 25010000002 ENOXAPARIN PER 10 MG: Performed by: HOSPITALIST

## 2020-07-29 PROCEDURE — 63710000001 INSULIN ASPART PER 5 UNITS: Performed by: HOSPITALIST

## 2020-07-29 PROCEDURE — 97110 THERAPEUTIC EXERCISES: CPT

## 2020-07-29 PROCEDURE — 63710000001 INSULIN DETEMIR PER 5 UNITS: Performed by: INTERNAL MEDICINE

## 2020-07-29 PROCEDURE — 99232 SBSQ HOSP IP/OBS MODERATE 35: CPT | Performed by: HOSPITALIST

## 2020-07-29 PROCEDURE — 82962 GLUCOSE BLOOD TEST: CPT

## 2020-07-29 RX ORDER — PREDNISONE 10 MG/1
10 TABLET ORAL
Status: DISCONTINUED | OUTPATIENT
Start: 2020-07-30 | End: 2020-08-01 | Stop reason: HOSPADM

## 2020-07-29 RX ADMIN — ARFORMOTEROL TARTRATE 15 MCG: 15 SOLUTION RESPIRATORY (INHALATION) at 20:08

## 2020-07-29 RX ADMIN — GUAIFENESIN 1200 MG: 600 TABLET, EXTENDED RELEASE ORAL at 21:01

## 2020-07-29 RX ADMIN — MELATONIN TAB 5 MG 10 MG: 5 TAB at 21:02

## 2020-07-29 RX ADMIN — HYDRALAZINE HYDROCHLORIDE 50 MG: 50 TABLET, FILM COATED ORAL at 16:00

## 2020-07-29 RX ADMIN — ASPIRIN 81 MG: 81 TABLET, COATED ORAL at 09:04

## 2020-07-29 RX ADMIN — HYDRALAZINE HYDROCHLORIDE 50 MG: 50 TABLET, FILM COATED ORAL at 21:01

## 2020-07-29 RX ADMIN — INSULIN ASPART 16 UNITS: 100 INJECTION, SOLUTION INTRAVENOUS; SUBCUTANEOUS at 21:15

## 2020-07-29 RX ADMIN — GUAIFENESIN 1200 MG: 600 TABLET, EXTENDED RELEASE ORAL at 09:04

## 2020-07-29 RX ADMIN — ATORVASTATIN CALCIUM 40 MG: 40 TABLET, FILM COATED ORAL at 21:01

## 2020-07-29 RX ADMIN — ALBUTEROL SULFATE 2.5 MG: 2.5 SOLUTION RESPIRATORY (INHALATION) at 05:46

## 2020-07-29 RX ADMIN — INSULIN ASPART 8 UNITS: 100 INJECTION, SOLUTION INTRAVENOUS; SUBCUTANEOUS at 17:22

## 2020-07-29 RX ADMIN — INSULIN ASPART 8 UNITS: 100 INJECTION, SOLUTION INTRAVENOUS; SUBCUTANEOUS at 09:03

## 2020-07-29 RX ADMIN — BUMETANIDE 1 MG: 1 TABLET ORAL at 09:04

## 2020-07-29 RX ADMIN — AMLODIPINE BESYLATE 10 MG: 5 TABLET ORAL at 09:04

## 2020-07-29 RX ADMIN — IPRATROPIUM BROMIDE 0.5 MG: 0.5 SOLUTION RESPIRATORY (INHALATION) at 09:11

## 2020-07-29 RX ADMIN — ARFORMOTEROL TARTRATE 15 MCG: 15 SOLUTION RESPIRATORY (INHALATION) at 09:11

## 2020-07-29 RX ADMIN — IPRATROPIUM BROMIDE 0.5 MG: 0.5 SOLUTION RESPIRATORY (INHALATION) at 20:08

## 2020-07-29 RX ADMIN — PREDNISONE 20 MG: 20 TABLET ORAL at 09:04

## 2020-07-29 RX ADMIN — INSULIN DETEMIR 74 UNITS: 100 INJECTION, SOLUTION SUBCUTANEOUS at 21:15

## 2020-07-29 RX ADMIN — CETIRIZINE HYDROCHLORIDE 10 MG: 10 TABLET, FILM COATED ORAL at 21:01

## 2020-07-29 RX ADMIN — METOPROLOL TARTRATE 100 MG: 50 TABLET, FILM COATED ORAL at 09:04

## 2020-07-29 RX ADMIN — METOPROLOL TARTRATE 100 MG: 50 TABLET, FILM COATED ORAL at 21:00

## 2020-07-29 RX ADMIN — ENOXAPARIN SODIUM 40 MG: 40 INJECTION SUBCUTANEOUS at 21:02

## 2020-07-29 RX ADMIN — FAMOTIDINE 20 MG: 20 TABLET, FILM COATED ORAL at 09:03

## 2020-07-29 RX ADMIN — IPRATROPIUM BROMIDE 0.5 MG: 0.5 SOLUTION RESPIRATORY (INHALATION) at 15:46

## 2020-07-29 RX ADMIN — NYSTATIN: 100000 POWDER TOPICAL at 21:04

## 2020-07-29 RX ADMIN — ENOXAPARIN SODIUM 40 MG: 40 INJECTION SUBCUTANEOUS at 09:05

## 2020-07-29 RX ADMIN — HYDRALAZINE HYDROCHLORIDE 50 MG: 50 TABLET, FILM COATED ORAL at 09:04

## 2020-07-29 RX ADMIN — BUDESONIDE 0.5 MG: 0.5 SUSPENSION RESPIRATORY (INHALATION) at 09:11

## 2020-07-29 RX ADMIN — NYSTATIN: 100000 POWDER TOPICAL at 09:05

## 2020-07-29 RX ADMIN — INSULIN ASPART 4 UNITS: 100 INJECTION, SOLUTION INTRAVENOUS; SUBCUTANEOUS at 12:38

## 2020-07-29 RX ADMIN — LEVOTHYROXINE SODIUM 175 MCG: 150 TABLET ORAL at 09:04

## 2020-07-29 RX ADMIN — WHITE PETROLATUM 41 % TOPICAL OINTMENT: OINTMENT at 12:39

## 2020-07-29 RX ADMIN — BUDESONIDE 0.5 MG: 0.5 SUSPENSION RESPIRATORY (INHALATION) at 20:08

## 2020-07-30 LAB
GLUCOSE BLDC GLUCOMTR-MCNC: 212 MG/DL (ref 70–130)
GLUCOSE BLDC GLUCOMTR-MCNC: 247 MG/DL (ref 70–130)
GLUCOSE BLDC GLUCOMTR-MCNC: 309 MG/DL (ref 70–130)
GLUCOSE BLDC GLUCOMTR-MCNC: 383 MG/DL (ref 70–130)

## 2020-07-30 PROCEDURE — 63710000001 INSULIN DETEMIR PER 5 UNITS: Performed by: INTERNAL MEDICINE

## 2020-07-30 PROCEDURE — 99232 SBSQ HOSP IP/OBS MODERATE 35: CPT | Performed by: HOSPITALIST

## 2020-07-30 PROCEDURE — 94799 UNLISTED PULMONARY SVC/PX: CPT

## 2020-07-30 PROCEDURE — 63710000001 PREDNISONE PER 5 MG: Performed by: INTERNAL MEDICINE

## 2020-07-30 PROCEDURE — 63710000001 INSULIN ASPART PER 5 UNITS: Performed by: HOSPITALIST

## 2020-07-30 PROCEDURE — 25010000002 ENOXAPARIN PER 10 MG: Performed by: HOSPITALIST

## 2020-07-30 PROCEDURE — 82962 GLUCOSE BLOOD TEST: CPT

## 2020-07-30 RX ORDER — LOPERAMIDE HYDROCHLORIDE 2 MG/1
2 CAPSULE ORAL 4 TIMES DAILY PRN
Status: DISCONTINUED | OUTPATIENT
Start: 2020-07-30 | End: 2020-08-01 | Stop reason: HOSPADM

## 2020-07-30 RX ORDER — BUMETANIDE 1 MG/1
2 TABLET ORAL 2 TIMES DAILY
Status: DISCONTINUED | OUTPATIENT
Start: 2020-07-30 | End: 2020-08-01 | Stop reason: HOSPADM

## 2020-07-30 RX ADMIN — MELATONIN TAB 5 MG 10 MG: 5 TAB at 21:54

## 2020-07-30 RX ADMIN — BUMETANIDE 2 MG: 1 TABLET ORAL at 21:51

## 2020-07-30 RX ADMIN — CETIRIZINE HYDROCHLORIDE 10 MG: 10 TABLET, FILM COATED ORAL at 21:52

## 2020-07-30 RX ADMIN — PREDNISONE 10 MG: 10 TABLET ORAL at 08:30

## 2020-07-30 RX ADMIN — NYSTATIN: 100000 POWDER TOPICAL at 08:31

## 2020-07-30 RX ADMIN — LOPERAMIDE HYDROCHLORIDE 2 MG: 2 CAPSULE ORAL at 15:06

## 2020-07-30 RX ADMIN — HYDRALAZINE HYDROCHLORIDE 50 MG: 50 TABLET, FILM COATED ORAL at 05:35

## 2020-07-30 RX ADMIN — LEVOTHYROXINE SODIUM 175 MCG: 150 TABLET ORAL at 05:35

## 2020-07-30 RX ADMIN — IPRATROPIUM BROMIDE 0.5 MG: 0.5 SOLUTION RESPIRATORY (INHALATION) at 07:05

## 2020-07-30 RX ADMIN — HYDRALAZINE HYDROCHLORIDE 50 MG: 50 TABLET, FILM COATED ORAL at 21:51

## 2020-07-30 RX ADMIN — INSULIN ASPART 16 UNITS: 100 INJECTION, SOLUTION INTRAVENOUS; SUBCUTANEOUS at 17:55

## 2020-07-30 RX ADMIN — FAMOTIDINE 20 MG: 20 TABLET, FILM COATED ORAL at 08:30

## 2020-07-30 RX ADMIN — INSULIN ASPART 8 UNITS: 100 INJECTION, SOLUTION INTRAVENOUS; SUBCUTANEOUS at 12:24

## 2020-07-30 RX ADMIN — ENOXAPARIN SODIUM 40 MG: 40 INJECTION SUBCUTANEOUS at 21:52

## 2020-07-30 RX ADMIN — BUMETANIDE 1 MG: 1 TABLET ORAL at 08:30

## 2020-07-30 RX ADMIN — GUAIFENESIN 1200 MG: 600 TABLET, EXTENDED RELEASE ORAL at 21:51

## 2020-07-30 RX ADMIN — METOPROLOL TARTRATE 100 MG: 50 TABLET, FILM COATED ORAL at 08:30

## 2020-07-30 RX ADMIN — HYDRALAZINE HYDROCHLORIDE 50 MG: 50 TABLET, FILM COATED ORAL at 14:13

## 2020-07-30 RX ADMIN — GUAIFENESIN 1200 MG: 600 TABLET, EXTENDED RELEASE ORAL at 08:30

## 2020-07-30 RX ADMIN — BUDESONIDE 0.5 MG: 0.5 SUSPENSION RESPIRATORY (INHALATION) at 20:14

## 2020-07-30 RX ADMIN — ASPIRIN 81 MG: 81 TABLET, COATED ORAL at 08:30

## 2020-07-30 RX ADMIN — METOPROLOL TARTRATE 100 MG: 50 TABLET, FILM COATED ORAL at 21:51

## 2020-07-30 RX ADMIN — INSULIN DETEMIR 74 UNITS: 100 INJECTION, SOLUTION SUBCUTANEOUS at 21:50

## 2020-07-30 RX ADMIN — INSULIN ASPART 20 UNITS: 100 INJECTION, SOLUTION INTRAVENOUS; SUBCUTANEOUS at 21:54

## 2020-07-30 RX ADMIN — NYSTATIN: 100000 POWDER TOPICAL at 22:00

## 2020-07-30 RX ADMIN — ATORVASTATIN CALCIUM 40 MG: 40 TABLET, FILM COATED ORAL at 21:51

## 2020-07-30 RX ADMIN — ARFORMOTEROL TARTRATE 15 MCG: 15 SOLUTION RESPIRATORY (INHALATION) at 20:14

## 2020-07-30 RX ADMIN — ARFORMOTEROL TARTRATE 15 MCG: 15 SOLUTION RESPIRATORY (INHALATION) at 10:00

## 2020-07-30 RX ADMIN — LOPERAMIDE HYDROCHLORIDE 2 MG: 2 CAPSULE ORAL at 21:54

## 2020-07-30 RX ADMIN — INSULIN ASPART 8 UNITS: 100 INJECTION, SOLUTION INTRAVENOUS; SUBCUTANEOUS at 08:29

## 2020-07-30 RX ADMIN — IPRATROPIUM BROMIDE 0.5 MG: 0.5 SOLUTION RESPIRATORY (INHALATION) at 20:14

## 2020-07-30 RX ADMIN — BUDESONIDE 0.5 MG: 0.5 SUSPENSION RESPIRATORY (INHALATION) at 07:05

## 2020-07-30 RX ADMIN — WHITE PETROLATUM 41 % TOPICAL OINTMENT: OINTMENT at 08:31

## 2020-07-30 RX ADMIN — ENOXAPARIN SODIUM 40 MG: 40 INJECTION SUBCUTANEOUS at 08:30

## 2020-07-30 RX ADMIN — SODIUM CHLORIDE, PRESERVATIVE FREE 10 ML: 5 INJECTION INTRAVENOUS at 22:00

## 2020-07-30 RX ADMIN — AMLODIPINE BESYLATE 10 MG: 5 TABLET ORAL at 08:30

## 2020-07-30 RX ADMIN — IPRATROPIUM BROMIDE 0.5 MG: 0.5 SOLUTION RESPIRATORY (INHALATION) at 13:17

## 2020-07-31 LAB
ALBUMIN SERPL-MCNC: 3.1 G/DL (ref 3.5–5.2)
ANION GAP SERPL CALCULATED.3IONS-SCNC: 6.5 MMOL/L (ref 5–15)
BASOPHILS # BLD AUTO: 0.01 10*3/MM3 (ref 0–0.2)
BASOPHILS NFR BLD AUTO: 0.1 % (ref 0–1.5)
BUN SERPL-MCNC: 61 MG/DL (ref 8–23)
BUN/CREAT SERPL: 35.3 (ref 7–25)
CALCIUM SPEC-SCNC: 8.9 MG/DL (ref 8.6–10.5)
CHLORIDE SERPL-SCNC: 96 MMOL/L (ref 98–107)
CO2 SERPL-SCNC: 40.5 MMOL/L (ref 22–29)
CREAT SERPL-MCNC: 1.73 MG/DL (ref 0.57–1)
DEPRECATED RDW RBC AUTO: 50.7 FL (ref 37–54)
EOSINOPHIL # BLD AUTO: 0.14 10*3/MM3 (ref 0–0.4)
EOSINOPHIL NFR BLD AUTO: 1.1 % (ref 0.3–6.2)
ERYTHROCYTE [DISTWIDTH] IN BLOOD BY AUTOMATED COUNT: 14.7 % (ref 12.3–15.4)
GFR SERPL CREATININE-BSD FRML MDRD: 30 ML/MIN/1.73
GLUCOSE BLDC GLUCOMTR-MCNC: 204 MG/DL (ref 70–130)
GLUCOSE BLDC GLUCOMTR-MCNC: 298 MG/DL (ref 70–130)
GLUCOSE BLDC GLUCOMTR-MCNC: 316 MG/DL (ref 70–130)
GLUCOSE BLDC GLUCOMTR-MCNC: 96 MG/DL (ref 70–130)
GLUCOSE SERPL-MCNC: 151 MG/DL (ref 65–99)
HCT VFR BLD AUTO: 33 % (ref 34–46.6)
HGB BLD-MCNC: 9.1 G/DL (ref 12–15.9)
IMM GRANULOCYTES # BLD AUTO: 0.09 10*3/MM3 (ref 0–0.05)
IMM GRANULOCYTES NFR BLD AUTO: 0.7 % (ref 0–0.5)
LYMPHOCYTES # BLD AUTO: 2.44 10*3/MM3 (ref 0.7–3.1)
LYMPHOCYTES NFR BLD AUTO: 19.3 % (ref 19.6–45.3)
MCH RBC QN AUTO: 25.9 PG (ref 26.6–33)
MCHC RBC AUTO-ENTMCNC: 27.6 G/DL (ref 31.5–35.7)
MCV RBC AUTO: 94 FL (ref 79–97)
MONOCYTES # BLD AUTO: 0.97 10*3/MM3 (ref 0.1–0.9)
MONOCYTES NFR BLD AUTO: 7.7 % (ref 5–12)
NEUTROPHILS NFR BLD AUTO: 71.1 % (ref 42.7–76)
NEUTROPHILS NFR BLD AUTO: 8.96 10*3/MM3 (ref 1.7–7)
NRBC BLD AUTO-RTO: 0 /100 WBC (ref 0–0.2)
PHOSPHATE SERPL-MCNC: 3.7 MG/DL (ref 2.5–4.5)
PLATELET # BLD AUTO: 164 10*3/MM3 (ref 140–450)
PMV BLD AUTO: 11.9 FL (ref 6–12)
POTASSIUM SERPL-SCNC: 4.7 MMOL/L (ref 3.5–5.2)
RBC # BLD AUTO: 3.51 10*6/MM3 (ref 3.77–5.28)
SODIUM SERPL-SCNC: 143 MMOL/L (ref 136–145)
WBC # BLD AUTO: 12.61 10*3/MM3 (ref 3.4–10.8)

## 2020-07-31 PROCEDURE — 82962 GLUCOSE BLOOD TEST: CPT

## 2020-07-31 PROCEDURE — 97110 THERAPEUTIC EXERCISES: CPT

## 2020-07-31 PROCEDURE — 94799 UNLISTED PULMONARY SVC/PX: CPT

## 2020-07-31 PROCEDURE — 85025 COMPLETE CBC W/AUTO DIFF WBC: CPT | Performed by: HOSPITALIST

## 2020-07-31 PROCEDURE — 63710000001 INSULIN ASPART PER 5 UNITS: Performed by: HOSPITALIST

## 2020-07-31 PROCEDURE — 99231 SBSQ HOSP IP/OBS SF/LOW 25: CPT | Performed by: HOSPITALIST

## 2020-07-31 PROCEDURE — 80069 RENAL FUNCTION PANEL: CPT | Performed by: HOSPITALIST

## 2020-07-31 PROCEDURE — 25010000002 ENOXAPARIN PER 10 MG: Performed by: HOSPITALIST

## 2020-07-31 PROCEDURE — 63710000001 INSULIN DETEMIR PER 5 UNITS: Performed by: INTERNAL MEDICINE

## 2020-07-31 PROCEDURE — 94760 N-INVAS EAR/PLS OXIMETRY 1: CPT

## 2020-07-31 PROCEDURE — 63710000001 PREDNISONE PER 5 MG: Performed by: INTERNAL MEDICINE

## 2020-07-31 RX ADMIN — BUDESONIDE 0.5 MG: 0.5 SUSPENSION RESPIRATORY (INHALATION) at 19:13

## 2020-07-31 RX ADMIN — HYDRALAZINE HYDROCHLORIDE 50 MG: 50 TABLET, FILM COATED ORAL at 06:27

## 2020-07-31 RX ADMIN — INSULIN ASPART 8 UNITS: 100 INJECTION, SOLUTION INTRAVENOUS; SUBCUTANEOUS at 12:40

## 2020-07-31 RX ADMIN — ATORVASTATIN CALCIUM 40 MG: 40 TABLET, FILM COATED ORAL at 20:50

## 2020-07-31 RX ADMIN — GUAIFENESIN 1200 MG: 600 TABLET, EXTENDED RELEASE ORAL at 09:29

## 2020-07-31 RX ADMIN — NYSTATIN: 100000 POWDER TOPICAL at 20:51

## 2020-07-31 RX ADMIN — ARFORMOTEROL TARTRATE 15 MCG: 15 SOLUTION RESPIRATORY (INHALATION) at 19:12

## 2020-07-31 RX ADMIN — METOPROLOL TARTRATE 100 MG: 50 TABLET, FILM COATED ORAL at 20:51

## 2020-07-31 RX ADMIN — IPRATROPIUM BROMIDE 0.5 MG: 0.5 SOLUTION RESPIRATORY (INHALATION) at 06:55

## 2020-07-31 RX ADMIN — FAMOTIDINE 20 MG: 20 TABLET, FILM COATED ORAL at 09:30

## 2020-07-31 RX ADMIN — NYSTATIN: 100000 POWDER TOPICAL at 09:32

## 2020-07-31 RX ADMIN — CETIRIZINE HYDROCHLORIDE 10 MG: 10 TABLET, FILM COATED ORAL at 20:51

## 2020-07-31 RX ADMIN — LEVOTHYROXINE SODIUM 175 MCG: 150 TABLET ORAL at 06:26

## 2020-07-31 RX ADMIN — ENOXAPARIN SODIUM 40 MG: 40 INJECTION SUBCUTANEOUS at 09:30

## 2020-07-31 RX ADMIN — IPRATROPIUM BROMIDE 0.5 MG: 0.5 SOLUTION RESPIRATORY (INHALATION) at 19:12

## 2020-07-31 RX ADMIN — ACETAMINOPHEN 650 MG: 325 TABLET, FILM COATED ORAL at 22:31

## 2020-07-31 RX ADMIN — PREDNISONE 10 MG: 10 TABLET ORAL at 09:29

## 2020-07-31 RX ADMIN — HYDRALAZINE HYDROCHLORIDE 75 MG: 25 TABLET, FILM COATED ORAL at 22:00

## 2020-07-31 RX ADMIN — SODIUM CHLORIDE, PRESERVATIVE FREE 10 ML: 5 INJECTION INTRAVENOUS at 21:00

## 2020-07-31 RX ADMIN — METOPROLOL TARTRATE 100 MG: 50 TABLET, FILM COATED ORAL at 09:29

## 2020-07-31 RX ADMIN — INSULIN ASPART 12 UNITS: 100 INJECTION, SOLUTION INTRAVENOUS; SUBCUTANEOUS at 17:44

## 2020-07-31 RX ADMIN — ASPIRIN 81 MG: 81 TABLET, COATED ORAL at 09:30

## 2020-07-31 RX ADMIN — AMLODIPINE BESYLATE 10 MG: 5 TABLET ORAL at 09:29

## 2020-07-31 RX ADMIN — MELATONIN TAB 5 MG 10 MG: 5 TAB at 22:30

## 2020-07-31 RX ADMIN — BUMETANIDE 2 MG: 1 TABLET ORAL at 09:29

## 2020-07-31 RX ADMIN — BUDESONIDE 0.5 MG: 0.5 SUSPENSION RESPIRATORY (INHALATION) at 06:55

## 2020-07-31 RX ADMIN — HYDRALAZINE HYDROCHLORIDE 75 MG: 25 TABLET, FILM COATED ORAL at 14:30

## 2020-07-31 RX ADMIN — IPRATROPIUM BROMIDE 0.5 MG: 0.5 SOLUTION RESPIRATORY (INHALATION) at 13:15

## 2020-07-31 RX ADMIN — WHITE PETROLATUM 41 % TOPICAL OINTMENT: OINTMENT at 09:31

## 2020-07-31 RX ADMIN — ARFORMOTEROL TARTRATE 15 MCG: 15 SOLUTION RESPIRATORY (INHALATION) at 09:35

## 2020-07-31 RX ADMIN — INSULIN ASPART 16 UNITS: 100 INJECTION, SOLUTION INTRAVENOUS; SUBCUTANEOUS at 20:50

## 2020-07-31 RX ADMIN — INSULIN DETEMIR 74 UNITS: 100 INJECTION, SOLUTION SUBCUTANEOUS at 21:00

## 2020-07-31 RX ADMIN — GUAIFENESIN 1200 MG: 600 TABLET, EXTENDED RELEASE ORAL at 20:50

## 2020-07-31 RX ADMIN — ENOXAPARIN SODIUM 40 MG: 40 INJECTION SUBCUTANEOUS at 20:50

## 2020-07-31 RX ADMIN — BUMETANIDE 2 MG: 1 TABLET ORAL at 20:50

## 2020-08-01 ENCOUNTER — READMISSION MANAGEMENT (OUTPATIENT)
Dept: CALL CENTER | Facility: HOSPITAL | Age: 65
End: 2020-08-01

## 2020-08-01 VITALS
HEART RATE: 68 BPM | TEMPERATURE: 97.5 F | WEIGHT: 255.56 LBS | OXYGEN SATURATION: 93 % | HEIGHT: 66 IN | DIASTOLIC BLOOD PRESSURE: 81 MMHG | SYSTOLIC BLOOD PRESSURE: 166 MMHG | RESPIRATION RATE: 20 BRPM | BODY MASS INDEX: 41.07 KG/M2

## 2020-08-01 LAB
GLUCOSE BLDC GLUCOMTR-MCNC: 120 MG/DL (ref 70–130)
GLUCOSE BLDC GLUCOMTR-MCNC: 330 MG/DL (ref 70–130)

## 2020-08-01 PROCEDURE — 99238 HOSP IP/OBS DSCHRG MGMT 30/<: CPT | Performed by: HOSPITALIST

## 2020-08-01 PROCEDURE — 63710000001 PREDNISONE PER 5 MG: Performed by: INTERNAL MEDICINE

## 2020-08-01 PROCEDURE — 25010000002 ENOXAPARIN PER 10 MG: Performed by: HOSPITALIST

## 2020-08-01 PROCEDURE — 63710000001 INSULIN ASPART PER 5 UNITS: Performed by: HOSPITALIST

## 2020-08-01 PROCEDURE — 94799 UNLISTED PULMONARY SVC/PX: CPT

## 2020-08-01 PROCEDURE — 82962 GLUCOSE BLOOD TEST: CPT

## 2020-08-01 RX ORDER — HYDRALAZINE HYDROCHLORIDE 25 MG/1
75 TABLET, FILM COATED ORAL EVERY 8 HOURS SCHEDULED
Qty: 90 TABLET | Refills: 0 | Status: SHIPPED | OUTPATIENT
Start: 2020-08-01 | End: 2020-08-14 | Stop reason: HOSPADM

## 2020-08-01 RX ORDER — AMLODIPINE BESYLATE 10 MG/1
10 TABLET ORAL
Qty: 30 TABLET | Refills: 0 | Status: SHIPPED | OUTPATIENT
Start: 2020-08-02 | End: 2020-08-14 | Stop reason: HOSPADM

## 2020-08-01 RX ADMIN — FAMOTIDINE 20 MG: 20 TABLET, FILM COATED ORAL at 08:24

## 2020-08-01 RX ADMIN — ENOXAPARIN SODIUM 40 MG: 40 INJECTION SUBCUTANEOUS at 08:24

## 2020-08-01 RX ADMIN — BUDESONIDE 0.5 MG: 0.5 SUSPENSION RESPIRATORY (INHALATION) at 07:58

## 2020-08-01 RX ADMIN — METOPROLOL TARTRATE 100 MG: 50 TABLET, FILM COATED ORAL at 08:24

## 2020-08-01 RX ADMIN — WHITE PETROLATUM 41 % TOPICAL OINTMENT: OINTMENT at 08:26

## 2020-08-01 RX ADMIN — BUMETANIDE 2 MG: 1 TABLET ORAL at 08:23

## 2020-08-01 RX ADMIN — IPRATROPIUM BROMIDE 0.5 MG: 0.5 SOLUTION RESPIRATORY (INHALATION) at 07:58

## 2020-08-01 RX ADMIN — LOPERAMIDE HYDROCHLORIDE 2 MG: 2 CAPSULE ORAL at 11:58

## 2020-08-01 RX ADMIN — HYDRALAZINE HYDROCHLORIDE 75 MG: 25 TABLET, FILM COATED ORAL at 13:27

## 2020-08-01 RX ADMIN — LEVOTHYROXINE SODIUM 175 MCG: 150 TABLET ORAL at 05:54

## 2020-08-01 RX ADMIN — NYSTATIN: 100000 POWDER TOPICAL at 08:26

## 2020-08-01 RX ADMIN — ARFORMOTEROL TARTRATE 15 MCG: 15 SOLUTION RESPIRATORY (INHALATION) at 08:04

## 2020-08-01 RX ADMIN — GUAIFENESIN 1200 MG: 600 TABLET, EXTENDED RELEASE ORAL at 08:23

## 2020-08-01 RX ADMIN — PREDNISONE 10 MG: 10 TABLET ORAL at 08:24

## 2020-08-01 RX ADMIN — AMLODIPINE BESYLATE 10 MG: 5 TABLET ORAL at 08:24

## 2020-08-01 RX ADMIN — HYDRALAZINE HYDROCHLORIDE 75 MG: 25 TABLET, FILM COATED ORAL at 08:23

## 2020-08-01 RX ADMIN — INSULIN ASPART 16 UNITS: 100 INJECTION, SOLUTION INTRAVENOUS; SUBCUTANEOUS at 12:53

## 2020-08-01 RX ADMIN — ASPIRIN 81 MG: 81 TABLET, COATED ORAL at 08:24

## 2020-08-01 NOTE — DISCHARGE SUMMARY
"Jared Govea  1955  [id number]    Hospitalists Discharge Summary    Date of Admission: 7/20/2020  Date of Discharge:  8/1/2020    Primary Discharge Diagnoses:  1.  Acute-on-Chronic Hypercapnic/Hypoxemic Respiratory Failure  2.  Hypoglycemia  3.  LLE Cellulitis  4.  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  Steroid-Induced Hyperglycemia    Secondary Discharge Diagnoses:  1.  Diabetes Mellitus, Type 2 A1c 5.4%  2.  HTN  3.  Chronic dCHF  4.  CKDIII-IV  5.  Insomnia  6.  Depression  7.  Hypothyroidism  8.  Chronic Normocytic Anemia      History of Present Illness (taken from H&P):  Ms. Govea is a pleasant, 66 y/o  female who is well-known to me from prior admissions.  She presents today due to the aforementioned complaints.  In addition, she reports dyspnea.  She denies fever and chills.  She denies nausea, but has not had much of an appetite.  Her blood glucose has been \"low\" when she checks but not symptomatic.  She also reports her sister is sick.  When she started feeling bad, she turned her O2 up to 5L without change in symptoms.  She denies hemoptysis.  She also feels \"weak\" all over although she gets around in a scooter.  She has been afraid to leave her home because of COVID.  She denies chest pain and palpitations.  She has noticed some swelling and weeping in her legs, but takes her water pill religiously.  She wears her Trilogy religiously.  She notes some \"twitching\" in her UE.    Hospital Course:    PCP  Patient Care Team:  Marilu Suresh APRN as PCP - General (Family Medicine)  Jhonny Hoyt MD as Consulting Physician (Pulmonary Disease)  Konstantin Orr MD as Consulting Physician (Nephrology)    Consults:   Consults     Date and Time Order Name Status Description    7/22/2020 1556 Inpatient Pulmonology Consult Completed           Operations and Procedures Performed:       Xr Chest 1 View    Result Date: 7/20/2020  Narrative: PORTABLE CHEST X-RAY " 07/20/2020     HISTORY: 65-year-old female with productive cough for one month.  COMPARISON: Chest 04/03/2019  TECHNIQUE: Single frontal portable view of the chest.  FINDINGS: The patient is slightly rotated to the right. There is mild bibasilar atelectasis. No large pleural effusions. No pneumothorax. Mild cardiac enlargement is stable. Mediastinum and pulmonary vasculature are within expected limits.      Impression: 1. Stable mild cardiomegaly. 2. Bibasilar atelectasis. No other acute chest findings.  This report was finalized on 7/20/2020 1:50 PM by Dr. Jewel Currie MD.        Allergies:  is allergic to erythromycin; erythromycin ethylsuccinate; ativan [lorazepam]; and tape.    Graham  reviewed    Discharge Medications:     Discharge Medications      New Medications      Instructions Start Date   hydrALAZINE 25 MG tablet  Commonly known as:  APRESOLINE   75 mg, Oral, Every 8 Hours Scheduled         Changes to Medications      Instructions Start Date   amLODIPine 10 MG tablet  Commonly known as:  NORVASC  What changed:    · medication strength  · how much to take  · when to take this   10 mg, Oral, Every 24 Hours Scheduled   Start Date:  August 2, 2020        Continue These Medications      Instructions Start Date   acetaminophen 325 MG tablet  Commonly known as:  TYLENOL   650 mg, Oral, Every 4 Hours PRN      albuterol sulfate  (90 Base) MCG/ACT inhaler  Commonly known as:  PROVENTIL HFA;VENTOLIN HFA;PROAIR HFA   INHALE TWO PUFFS BY MOUTH EVERY 4 HOURS AS NEEDED FOR WHEEZING      Align 4 MG capsule   4 mg, Oral, Daily      aspirin 81 MG EC tablet   81 mg, Oral, Daily      atorvastatin 40 MG tablet  Commonly known as:  LIPITOR   TAKE ONE TABLET BY MOUTH DAILY FOR CHOLESTEROL      Blood Glucose Monitoring Suppl w/Device kit   Use glucometer to check sugars twice a day. E11.65, E11.22.      bumetanide 1 MG tablet  Commonly known as:  BUMEX   TAKE TWO TABLETS BY MOUTH TWICE A DAY      cetirizine 10 MG  tablet  Commonly known as:  zyrTEC   10 mg, Oral, Nightly      dimenhyDRINATE 50 MG tablet  Commonly known as:  Dramamine   50 mg, Oral, Daily PRN      famotidine 20 MG tablet  Commonly known as:  PEPCID   20 mg, Oral, Daily      guaiFENesin 600 MG 12 hr tablet  Commonly known as:  MUCINEX   1,200 mg, Oral, 2 Times Daily      hydrocortisone 1 % ointment   Topical, 2 Times Daily      insulin aspart 100 UNIT/ML solution pen-injector sc pen  Commonly known as:  NovoLOG FlexPen   INJECT SUB-Q PER SLIDING SCALE AS DIRECTED. MAX 21 UNITS PER DAY      Insulin Degludec 200 UNIT/ML solution pen-injector pen injection  Commonly known as:  Tresiba FlexTouch   74 Units, Subcutaneous, Daily      levalbuterol 0.63 MG/3ML nebulizer solution  Commonly known as:  XOPENEX   0.63 mg, Nebulization, Every 4 Hours PRN      levothyroxine 175 MCG tablet  Commonly known as:  SYNTHROID, LEVOTHROID   TAKE ONE TABLET BY MOUTH DAILY      metoprolol tartrate 100 MG tablet  Commonly known as:  LOPRESSOR   TAKE ONE TABLET BY MOUTH EVERY 12 HOURS      O2  Commonly known as:  OXYGEN   5 L/min, Inhalation, Once      sennosides-docusate 8.6-50 MG per tablet  Commonly known as:  PERICOLACE   2 tablets, Oral, Nightly PRN      Trelegy Ellipta 100-62.5-25 MCG/INH aerosol powder   Generic drug:  Fluticasone-Umeclidin-Vilant   No dose, route, or frequency recorded.      VITAMIN B COMPLEX PO   1 tablet, Oral, Daily      VITAMIN D PO   Oral         Stop These Medications    QUEtiapine 100 MG tablet  Commonly known as:  SEROquel            Last Lab Results:   Lab Results (most recent)     Procedure Component Value Units Date/Time    POC Glucose Once [[id number]]  (Normal) Collected:  08/01/20 0726    Specimen:  Blood Updated:  08/01/20 0743     Glucose 120 mg/dL     POC Glucose Once [[id number]]  (Abnormal) Collected:  07/31/20 2033    Specimen:  Blood Updated:  07/31/20 2039     Glucose 316 mg/dL     Renal Function Panel [[id number]]  (Abnormal) Collected:   07/31/20 0402    Specimen:  Blood Updated:  07/31/20 0510     Glucose 151 mg/dL      BUN 61 mg/dL      Creatinine 1.73 mg/dL      Sodium 143 mmol/L      Potassium 4.7 mmol/L      Chloride 96 mmol/L      CO2 40.5 mmol/L      Calcium 8.9 mg/dL      Albumin 3.10 g/dL      Phosphorus 3.7 mg/dL      Anion Gap 6.5 mmol/L      BUN/Creatinine Ratio 35.3     eGFR Non African Amer 30 mL/min/1.73     Narrative:       GFR Normal >60  Chronic Kidney Disease <60  Kidney Failure <15      CBC & Differential [[id number]] Collected:  07/31/20 0402    Specimen:  Blood Updated:  07/31/20 0448    Narrative:       The following orders were created for panel order CBC & Differential.  Procedure                               Abnormality         Status                     ---------                               -----------         ------                     CBC Auto Differential[[id number]]        Abnormal            Final result                 Please view results for these tests on the individual orders.    CBC Auto Differential [[id number]]  (Abnormal) Collected:  07/31/20 0402    Specimen:  Blood Updated:  07/31/20 0448     WBC 12.61 10*3/mm3      RBC 3.51 10*6/mm3      Hemoglobin 9.1 g/dL      Hematocrit 33.0 %      MCV 94.0 fL      MCH 25.9 pg      MCHC 27.6 g/dL      RDW 14.7 %      RDW-SD 50.7 fl      MPV 11.9 fL      Platelets 164 10*3/mm3      Neutrophil % 71.1 %      Lymphocyte % 19.3 %      Monocyte % 7.7 %      Eosinophil % 1.1 %      Basophil % 0.1 %      Immature Grans % 0.7 %      Neutrophils, Absolute 8.96 10*3/mm3      Lymphocytes, Absolute 2.44 10*3/mm3      Monocytes, Absolute 0.97 10*3/mm3      Eosinophils, Absolute 0.14 10*3/mm3      Basophils, Absolute 0.01 10*3/mm3      Immature Grans, Absolute 0.09 10*3/mm3      nRBC 0.0 /100 WBC     COVID PRE-OP / PRE-PROCEDURE SCREENING ORDER (NO ISOLATION) - Swab, Nasopharynx [[id number]] Collected:  07/28/20 1235    Specimen:  Swab from Nasopharynx Updated:  07/29/20 1413     Narrative:       The following orders were created for panel order COVID PRE-OP / PRE-PROCEDURE SCREENING ORDER (NO ISOLATION) - Swab, Nasopharynx.  Procedure                               Abnormality         Status                     ---------                               -----------         ------                     COVID-19,[facility], NP ...[[id number]]                      Final result                 Please view results for these tests on the individual orders.    COVID-19,[facility], NP SWAB IN LEXAR SALINE MEDIA 24-30 HR TAT - Swab, Nasopharynx [[id number]] Collected:  07/28/20 1235    Specimen:  Swab from Nasopharynx Updated:  07/29/20 1411     Reference Lab Report --     COVID19 Not Detected    Basic Metabolic Panel [[id number]]  (Abnormal) Collected:  07/28/20 0938    Specimen:  Blood Updated:  07/28/20 1007     Glucose 167 mg/dL      BUN 70 mg/dL      Creatinine 1.77 mg/dL      Sodium 141 mmol/L      Potassium 4.2 mmol/L      Chloride 96 mmol/L      CO2 39.7 mmol/L      Calcium 9.1 mg/dL      eGFR Non African Amer 29 mL/min/1.73      BUN/Creatinine Ratio 39.5     Anion Gap 5.3 mmol/L     Narrative:       GFR Normal >60  Chronic Kidney Disease <60  Kidney Failure <15      Basic Metabolic Panel [[id number]]  (Abnormal) Collected:  07/27/20 1308    Specimen:  Blood Updated:  07/27/20 1338     Glucose 255 mg/dL      BUN 70 mg/dL      Creatinine 1.79 mg/dL      Sodium 142 mmol/L      Potassium 4.6 mmol/L      Chloride 95 mmol/L      CO2 39.7 mmol/L      Calcium 8.9 mg/dL      eGFR Non African Amer 28 mL/min/1.73      BUN/Creatinine Ratio 39.1     Anion Gap 7.3 mmol/L     Narrative:       GFR Normal >60  Chronic Kidney Disease <60  Kidney Failure <15      Magnesium [[id number]]  (Normal) Collected:  07/27/20 0422    Specimen:  Blood Updated:  07/27/20 0520     Magnesium 2.0 mg/dL     Phosphorus [[id number]]  (Normal) Collected:  07/27/20 0422    Specimen:  Blood Updated:  07/27/20 0520     Phosphorus 3.5 mg/dL      CBC & Differential [[id number]] Collected:  07/27/20 0422    Specimen:  Blood Updated:  07/27/20 0505    Narrative:       The following orders were created for panel order CBC & Differential.  Procedure                               Abnormality         Status                     ---------                               -----------         ------                     CBC Auto Differential[[id number]]        Abnormal            Final result                 Please view results for these tests on the individual orders.    CBC Auto Differential [[id number]]  (Abnormal) Collected:  07/27/20 0422    Specimen:  Blood Updated:  07/27/20 0505     WBC 6.27 10*3/mm3      RBC 3.35 10*6/mm3      Hemoglobin 8.8 g/dL      Hematocrit 31.0 %      MCV 92.5 fL      MCH 26.3 pg      MCHC 28.4 g/dL      RDW 14.1 %      RDW-SD 47.5 fl      MPV 11.0 fL      Platelets 166 10*3/mm3      Neutrophil % 74.7 %      Lymphocyte % 17.5 %      Monocyte % 7.0 %      Eosinophil % 0.0 %      Basophil % 0.2 %      Immature Grans % 0.6 %      Neutrophils, Absolute 4.68 10*3/mm3      Lymphocytes, Absolute 1.10 10*3/mm3      Monocytes, Absolute 0.44 10*3/mm3      Eosinophils, Absolute 0.00 10*3/mm3      Basophils, Absolute 0.01 10*3/mm3      Immature Grans, Absolute 0.04 10*3/mm3      nRBC 0.0 /100 WBC     Renal Function Panel [[id number]]  (Abnormal) Collected:  07/26/20 0444    Specimen:  Blood Updated:  07/26/20 0532     Glucose 231 mg/dL      BUN 68 mg/dL      Creatinine 1.85 mg/dL      Sodium 140 mmol/L      Potassium 4.2 mmol/L      Chloride 94 mmol/L      CO2 40.2 mmol/L      Calcium 8.3 mg/dL      Albumin 2.70 g/dL      Phosphorus 3.2 mg/dL      Anion Gap 5.8 mmol/L      BUN/Creatinine Ratio 36.8     eGFR Non African Amer 27 mL/min/1.73     Narrative:       GFR Normal >60  Chronic Kidney Disease <60  Kidney Failure <15      Comprehensive Metabolic Panel [[id number]]  (Abnormal) Collected:  07/24/20 0541    Specimen:  Blood Updated:  07/24/20 0613      Glucose 395 mg/dL      BUN 41 mg/dL      Creatinine 1.76 mg/dL      Sodium 137 mmol/L      Potassium 4.6 mmol/L      Chloride 91 mmol/L      CO2 38.5 mmol/L      Calcium 8.1 mg/dL      Total Protein 6.6 g/dL      Albumin 2.80 g/dL      ALT (SGPT) 8 U/L      AST (SGOT) 7 U/L      Alkaline Phosphatase 70 U/L      Total Bilirubin 0.2 mg/dL      eGFR Non African Amer 29 mL/min/1.73      Globulin 3.8 gm/dL      A/G Ratio 0.7 g/dL      BUN/Creatinine Ratio 23.3     Anion Gap 7.5 mmol/L     Narrative:       GFR Normal >60  Chronic Kidney Disease <60  Kidney Failure <15      Blood Gas, Arterial [[id number]]  (Abnormal) Collected:  07/22/20 1212    Specimen:  Arterial Blood Updated:  07/22/20 1218     Site Left Radial     Kevyn's Test Positive     pH, Arterial 7.318 pH units      Comment: 84 Value below reference range        pCO2, Arterial 81.2 mm Hg      Comment: 86 Value above critical limit        pO2, Arterial 55.2 mm Hg      Comment: 84 Value below reference range        HCO3, Arterial 41.6 mmol/L      Comment: 83 Value above reference range        Base Excess, Arterial 13.3 mmol/L      Comment: 83 Value above reference range        O2 Saturation, Arterial 88.8 %      Comment: 84 Value below reference range        Hemoglobin, Blood Gas 8.9 g/dL      Temperature 37.0 C      Barometric Pressure for Blood Gas 740 mmHg      Modality HFNC     Flow Rate 6.0 lpm      Ventilator Mode NA     Comment: Meter: [id number]     :  [id number]        Notified Who rb and german Yates     Notified By [id number]     Notified Time 07/22/2020 12:16     pCO2, Temperature Corrected 81.2 mm Hg      pH, Temp Corrected 7.318 pH Units      pO2, Temperature Corrected 55.2 mm Hg     Troponin [[id number]]  (Normal) Collected:  07/22/20 0505    Specimen:  Blood Updated:  07/22/20 0541     Troponin T 0.021 ng/mL     Narrative:       Troponin T Reference Range:  <= 0.03 ng/mL-   Negative for AMI  >0.03 ng/mL-     Abnormal for myocardial necrosis.   Clinicians would have to utilize clinical acumen, EKG, Troponin and serial changes to determine if it is an Acute Myocardial Infarction or myocardial injury due to an underlying chronic condition.       Results may be falsely decreased if patient taking Biotin.      Blood Gas, Arterial [[id number]]  (Abnormal) Collected:  07/21/20 2310    Specimen:  Arterial Blood Updated:  07/21/20 2324     Site Left Radial     Kevyn's Test Positive     pH, Arterial 7.322 pH units      Comment: 84 Value below reference range        pCO2, Arterial 77.4 mm Hg      Comment: 86 Value above critical limit        pO2, Arterial 53.7 mm Hg      Comment: 85 Value below critical limit        HCO3, Arterial 40.0 mmol/L      Comment: 83 Value above reference range        Base Excess, Arterial 12.0 mmol/L      Comment: 83 Value above reference range        O2 Saturation, Arterial 88.2 %      Comment: 84 Value below reference range        Hemoglobin, Blood Gas 8.8 g/dL      Temperature 37.0 C      Barometric Pressure for Blood Gas 739 mmHg      Modality BiPap     FIO2 40 %      Ventilator Mode NIV     Set Mec Resp Rate 24.0     IPAP 18     Comment: Meter: [id number]     :  [id number]        EPAP 6     Notified Who rb and german cuevas     Notified By [id number]     Notified Time 07/21/2020 23:24     pCO2, Temperature Corrected 77.4 mm Hg      pH, Temp Corrected 7.322 pH Units      pO2, Temperature Corrected 53.7 mm Hg     Procalcitonin [[id number]]  (Normal) Collected:  07/20/20 1302    Specimen:  Blood Updated:  07/20/20 1941     Procalcitonin 0.05 ng/mL     Narrative:       Results may be falsely decreased if patient taking Biotin.     Hemoglobin A1c [[id number]]  (Normal) Collected:  07/20/20 1302    Specimen:  Blood Updated:  07/20/20 1935     Hemoglobin A1C 5.40 %     Narrative:       Hemoglobin A1C Ranges:    Increased Risk for Diabetes  5.7% to 6.4%  Diabetes                     >= 6.5%  Diabetic Goal                < 7.0%     COVID PRE-OP / PRE-PROCEDURE SCREENING ORDER (NO ISOLATION) - Swab, Nasal Cavity [[id number]] Collected:  07/20/20 1650    Specimen:  Swab from Nasal Cavity Updated:  07/20/20 1748    Narrative:       The following orders were created for panel order COVID PRE-OP / PRE-PROCEDURE SCREENING ORDER (NO ISOLATION) - Swab, Nasal Cavity.  Procedure                               Abnormality         Status                     ---------                               -----------         ------                     COVID-19 [facility] [place]...[[id number]]  Normal              Final result                 Please view results for these tests on the individual orders.    COVID-19 [facility]HOUSE, Nasal Swab No Transport Media - Swab, Nasal Cavity [[id number]]  (Normal) Collected:  07/20/20 1650    Specimen:  Swab from Nasal Cavity Updated:  07/20/20 1748     COVID19 Not Detected    Narrative:       Fact sheet for providers: https://www.fda.gov/media/963131/download     Fact sheet for patients: https://www.fda.gov/media/017487/download    BNP [[id number]]  (Abnormal) Collected:  07/20/20 1302    Specimen:  Blood Updated:  07/20/20 1343     proBNP 1,035.0 pg/mL     Narrative:       Among patients with dyspnea, NT-proBNP is highly sensitive for the detection of acute congestive heart failure. In addition NT-proBNP of <300 pg/ml effectively rules out acute congestive heart failure with 99% negative predictive value.    Results may be falsely decreased if patient taking Biotin.      Troponin [[id number]]  (Abnormal) Collected:  07/20/20 1302    Specimen:  Blood Updated:  07/20/20 1343     Troponin T 0.036 ng/mL     Narrative:       Troponin T Reference Range:  <= 0.03 ng/mL-   Negative for AMI  >0.03 ng/mL-     Abnormal for myocardial necrosis.  Clinicians would have to utilize clinical acumen, EKG, Troponin and serial changes to determine if it is an Acute Myocardial Infarction or myocardial injury due to an underlying chronic condition.          Results may be falsely decreased if patient taking Biotin.      TSH [[id number]]  (Normal) Collected:  07/20/20 1302    Specimen:  Blood Updated:  07/20/20 1342     TSH 1.750 uIU/mL     Comprehensive Metabolic Panel [[id number]]  (Abnormal) Collected:  07/20/20 1302    Specimen:  Blood Updated:  07/20/20 1331     Glucose 132 mg/dL      BUN 26 mg/dL      Creatinine 1.75 mg/dL      Sodium 143 mmol/L      Potassium 4.2 mmol/L      Chloride 100 mmol/L      CO2 35.3 mmol/L      Calcium 8.3 mg/dL      Total Protein 6.9 g/dL      Albumin 2.90 g/dL      ALT (SGPT) 7 U/L      AST (SGOT) 9 U/L      Alkaline Phosphatase 74 U/L      Total Bilirubin 0.3 mg/dL      eGFR Non African Amer 29 mL/min/1.73      Globulin 4.0 gm/dL      A/G Ratio 0.7 g/dL      BUN/Creatinine Ratio 14.9     Anion Gap 7.7 mmol/L     Narrative:       GFR Normal >60  Chronic Kidney Disease <60  Kidney Failure <15      Magnesium [[id number]]  (Normal) Collected:  07/20/20 1302    Specimen:  Blood Updated:  07/20/20 1331     Magnesium 1.9 mg/dL     Urinalysis, Microscopic Only - Urine, Clean Catch [[id number]]  (Abnormal) Collected:  07/20/20 1258    Specimen:  Urine, Clean Catch Updated:  07/20/20 1329     RBC, UA 3-5 /HPF      WBC, UA 0-2 /HPF      Bacteria, UA None Seen /HPF      Squamous Epithelial Cells, UA 0-2 /HPF      Hyaline Casts, UA None Seen /LPF      Methodology Manual Light Microscopy    Urinalysis With Culture If Indicated - Urine, Clean Catch [[id number]]  (Abnormal) Collected:  07/20/20 1258    Specimen:  Urine, Clean Catch Updated:  07/20/20 1306     Color, UA Yellow     Appearance, UA Clear     pH, UA <=5.0     Specific Gravity, UA 1.015     Glucose, UA Negative     Ketones, UA Negative     Bilirubin, UA Negative     Blood, UA Trace     Protein, UA Negative     Leuk Esterase, UA Negative     Nitrite, UA Negative     Urobilinogen, UA 0.2 E.U./dL        Imaging Results (Most Recent)     Procedure Component Value Units Date/Time    XR Chest 1  View [[id number]] Collected:  07/20/20 1348     Updated:  07/20/20 1352    Narrative:       PORTABLE CHEST X-RAY 07/20/2020         HISTORY:  65-year-old female with productive cough for one month.     COMPARISON:  Chest 04/03/2019     TECHNIQUE:  Single frontal portable view of the chest.     FINDINGS:  The patient is slightly rotated to the right. There is mild bibasilar  atelectasis. No large pleural effusions. No pneumothorax. Mild cardiac  enlargement is stable. Mediastinum and pulmonary vasculature are within  expected limits.       Impression:       1. Stable mild cardiomegaly.  2. Bibasilar atelectasis. No other acute chest findings.     This report was finalized on 7/20/2020 1:50 PM by Dr. Jewel Currie MD.             PROCEDURES      Condition on Discharge:  Stable    Physical Exam at Discharge  Vital Signs  Temp:  [97.8 °F (36.6 °C)-98.3 °F (36.8 °C)] 98.2 °F (36.8 °C)  Heart Rate:  [65-72] 68  Resp:  [19-20] 20  BP: (134-145)/(53-73) 145/73    Physical Exam:  Physical Exam   Constitutional: No acute distress   Cardiovascular: Regular rate, regular rhythm, S1 normal and S2 normal.  Exam reveals no gallop and no friction rub.  No murmur heard.  Pulmonary/Chest: Lungs are diminished to auscultation bilaterally. No respiratory distress. No wheezes. No rhonchi. No rales.   Abdominal: Morbidly obese. Soft. Bowel sounds are normal. There is no tenderness.   Musculoskeletal: Normal Muscle tone  Extremities: BLE wrapped  Neurological: Cranial nerves II-XII are grossly intact with no focal deficits.    Discharge Disposition  Home w/ Sister    Visiting Nurse:    Yes     Home PT/OT:  Yes     Home Safety Evaluation:  Yes     DME  None    Discharge Diet:      Dietary Orders (From admission, onward)     Start     Ordered    07/21/20 7397  Diet Soft Texture; Ground; Thin; Consistent Carbohydrate, Cardiac  Diet Effective Now     Question Answer Comment   Diet Texture / Consistency Soft Texture    Select Texture: Ground       Fluid Consistency Thin    Common Modifiers Consistent Carbohydrate    Common Modifiers Cardiac        07/21/20 1427                Activity at Discharge:  As tolerated    Pre-discharge education  None    Follow-up Appointments  No future appointments.  Additional Instructions for the Follow-ups that You Need to Schedule     Discharge Follow-up with PCP   As directed       Currently Documented PCP:    Marilu Suresh APRN    PCP Phone Number:    [phone]     Follow Up Details:  Within 2 weeks         Discharge Follow-up with Specialty: [facility]; 2 Weeks   As directed      Specialty:  [facility]    Follow Up:  2 Weeks               Test Results Pending at Discharge  None     Payam Peck MD  08/01/20  10:42

## 2020-08-01 NOTE — OUTREACH NOTE
Prep Survey      Responses   [facility] facility patient discharged from?  [facility]   Is LACE score < 7 ?  No   Eligibility  [facility]   Hospital  [facility]   Date of Admission  07/20/20   Date of Discharge  08/01/20   Discharge Disposition  Home or Self Care   Discharge diagnosis  Weakness, cough, dyspnea   COVID-19 Test Status  Negative   Does the patient have one of the following disease processes/diagnoses(primary or secondary)?  Other   Does the patient have Home health ordered?  Yes   What is the Home health agency?   [facility]    Is there a DME ordered?  Yes   What DME was ordered?  Rolling walker via [facility]   Prep survey completed?  Yes          Siria Bagley RN

## 2020-08-01 NOTE — PROGRESS NOTES
"  Daily Progress Note.   [facility] MED SURG  8/1/2020    Patient:  Name:  Jared Govea  MRN:  [id number]  1955  65 y.o.  female         CC:soa    Interval History:  On her trilogy.  States breathing is so so  No acute events  +po intake, still weak  Follow up for copd, NIV mgmt  ROS: No fever, no diarrhea, no chest pain  PMFSSH: no change    Physical Exam:  /73   Pulse 65   Temp 98.2 °F (36.8 °C) (Oral)   Resp 19   Ht 167.6 cm (65.98\")   Wt 116 kg (255 lb 9 oz)   LMP  (LMP Unknown)   SpO2 90%   BMI 41.27 kg/m²   Body mass index is 41.27 kg/m².    Intake/Output Summary (Last 24 hours) at 8/1/2020 0717  Last data filed at 8/1/2020 0500  Gross per 24 hour   Intake 1200 ml   Output 3400 ml   Net -2200 ml     General appearance: asleep awakens upon entry to room on trilogy but conversant   Eyes: anicteric sclerae, moist conjunctivae; no lid-lag; PERRLA  HENT:limited by NIV mask  Neck: Trachea midline; FROM, supple, no thyromegaly or lymphadenopathy  Lungs: CTA diminished without wheeze symmetric air entry distant with body habitus, with normal respiratory effort and no intercostal retractions  CV: RRR, no rub   Abdomen: obese Soft, non-tender; no masses or HSM  Extremities: No sig peripheral edema or extremity lymphadenopathy  Skin: Normal temperature, turgor and texture; no rash, ulcers or subcutaneous nodules  Psych: Appropriate affect, alert and oriented  Neuro; moves all ext, sensation intact    Data Review:  Notable Labs:  Results from last 7 days   Lab Units 07/31/20  0402 07/27/20  0422 07/26/20  0444   WBC 10*3/mm3 12.61* 6.27 7.30   HEMOGLOBIN g/dL 9.1* 8.8* 8.7*   PLATELETS 10*3/mm3 164 166 159     Results from last 7 days   Lab Units 07/31/20  0402 07/28/20  0938 07/27/20  1308 07/27/20  0422 07/26/20  0444   SODIUM mmol/L 143 141 142 140 140   POTASSIUM mmol/L 4.7 4.2 4.6 4.4 4.2   CHLORIDE mmol/L 96* 96* 95* 94* 94*   CO2 mmol/L 40.5* 39.7* 39.7* 39.7* 40.2*   BUN mg/dL 61* " 70* 70* 73* 68*   CREATININE mg/dL 1.73* 1.77* 1.79* 1.93* 1.85*   GLUCOSE mg/dL 151* 167* 255* 264* 231*   CALCIUM mg/dL 8.9 9.1 8.9 8.6 8.3*   MAGNESIUM mg/dL  --   --   --  2.0  --    PHOSPHORUS mg/dL 3.7  --   --  3.5 3.2   Estimated Creatinine Clearance: 42 mL/min (A) (by C-G formula based on SCr of 1.73 mg/dL (H)).    Results from last 7 days   Lab Units 07/31/20  0402 07/27/20  0422 07/26/20  0444   PLATELETS 10*3/mm3 164 166 159             Imaging:  Reviewed chest images personally from past 3 days    Scheduled meds:      amLODIPine 10 mg Oral Q24H   Aquaphor Advanced Therapy  Topical Q24H   arformoterol 15 mcg Nebulization BID - RT   aspirin 81 mg Oral Daily   atorvastatin 40 mg Oral Daily   budesonide 0.5 mg Nebulization BID - RT   bumetanide 2 mg Oral BID   cetirizine 10 mg Oral Nightly   enoxaparin 40 mg Subcutaneous Q12H   famotidine 20 mg Oral Daily   guaiFENesin 1,200 mg Oral Q12H   hydrALAZINE 75 mg Oral Q8H   insulin aspart 0-24 Units Subcutaneous 4x Daily AC & at Bedtime   insulin detemir 74 Units Subcutaneous Nightly   ipratropium 0.5 mg Nebulization Q6H While Awake - RT   levothyroxine 175 mcg Oral Q AM   metoprolol tartrate 100 mg Oral Q12H   nystatin  Topical Q12H   predniSONE 10 mg Oral Daily With Breakfast   sodium chloride 10 mL Intravenous Q12H       ASSESSMENT  /  PLAN:  1. Acute hypercapnic respiratory failure and chronic hypoxemic respiratory failure  2. ALTA/OHS  3. COPD  4. Left lower extremity cellulitis  5. Chronic diastolic CHF  6. Chronic kidney disease III  7. Type 2 diabetes  8. Hypertension  9. Hypothyroidism     PLAN:  Patient is down to 10 mg of prednisone, may discontinue after 3 days total of this  Gentle diuretics  Discharge on the current regimen  Cont neb budesonide and arformoterol     All issues new to me today.  Prior hospital course, labs and imaging reviewed.        Osiel Best MD  [facility]  08/01/20  7:17 AM

## 2020-08-01 NOTE — NURSING NOTE
Continued Stay Note  TEODORA Dobbs     Patient Name: Jared Govea  MRN: [id number]  Today's Date: 8/1/2020    Admit Date: 7/20/2020    Discharge Plan     Row Name 08/01/20 1058       Plan    Plan Comments  Patient has requested [facility] upon discharge.  Referral called to Mahi harris provided from [facility].  Plan is home today.          Discharge Codes    No documentation.       Expected Discharge Date and Time     Expected Discharge Date Expected Discharge Time    Aug 1, 2020             Saige Goyal RN

## 2020-08-02 NOTE — NURSING NOTE
Case Management Discharge Note      Final Note: dc home with [facility]     Provided Post Acute Provider List?: N/A  Provided Post Acute Provider Quality & Resource List?: Yes  Post Acute Provider Quality and Resource List: Nursing Home  Delivered To: Patient  Method of Delivery: In person    Destination      No service has been selected for the patient.      Durable Medical Equipment      No service has been selected for the patient.      Dialysis/Infusion      No service has been selected for the patient.      Home Medical Care - Selection Complete      Service Provider Request Status Selected Services Address Phone Number Fax Number    [facility] [address] [place] [id number] [phone]      Therapy      No service has been selected for the patient.      Community Resources      No service has been selected for the patient.             Final Discharge Disposition Code: 06 - home with home health care

## 2020-08-03 ENCOUNTER — TRANSITIONAL CARE MANAGEMENT TELEPHONE ENCOUNTER (OUTPATIENT)
Dept: CALL CENTER | Facility: HOSPITAL | Age: 65
End: 2020-08-03

## 2020-08-03 NOTE — OUTREACH NOTE
Call Center TCM Note      Responses   [facility] patient discharged from?  [facility]   COVID-19 Test Status  Negative   Does the patient have one of the following disease processes/diagnoses(primary or secondary)?  Other   TCM attempt successful?  Yes   Call start time  1752   Call end time  1757   Discharge diagnosis  Weakness, cough, dyspnea   Is patient permission given to speak with other caregiver?  Yes   List who call center can speak with  sister- Sania   Person spoke with today (if not patient) and relationship  sister- Sania   Meds reviewed with patient/caregiver?  Yes   Is the patient having any side effects they believe may be caused by any medication additions or changes?  No   Does the patient have all medications ordered at discharge?  No   What is keeping the patient from filling the prescriptions?  Lost script/didn't receive   Nursing Interventions  Nurse provided patient education, Nurse advised patient to call provider   Prescription comments  Per sister, pt need to discuss with PCP- will send note.   Is the patient taking all medications as directed (includes completed medication regime)?  Yes   Does the patient have a primary care provider?   Yes   Does the patient have an appointment with their PCP within 7 days of discharge?  N/A   Comments regarding PCP  patient is requesting a mychart video visit.   Has the patient kept scheduled appointments due by today?  N/A   What is the Home health agency?   [facility]    Has home health visited the patient within 72 hours of discharge?  Yes   What DME was ordered?  Rolling walker via [place]   Has all DME been delivered?  Yes   Did the patient receive a copy of their discharge instructions?  Yes   Nursing interventions  Reviewed instructions with patient   What is the patient's perception of their health status since discharge?  Improving   Is the patient/caregiver able to teach back the hierarchy of who to call/visit for symptoms/problems? PCP,  Specialist, Home health nurse, Urgent Care, ED, 911  Yes   TCM call completed?  Yes   Wrap up additional comments  Per sister, patient is doing well, home health nurse was at home with patient during call, sister states that patient is needing to discuss with PCP about medications and would like a mychart video visit.          Shanna Stringer RN    8/3/2020, 17:57

## 2020-08-04 ENCOUNTER — TELEPHONE (OUTPATIENT)
Dept: INTERNAL MEDICINE | Facility: CLINIC | Age: 65
End: 2020-08-04

## 2020-08-04 RX ORDER — BUMETANIDE 1 MG/1
TABLET ORAL
Qty: 120 TABLET | Refills: 0 | Status: SHIPPED | OUTPATIENT
Start: 2020-08-04

## 2020-08-04 NOTE — PROGRESS NOTES
Reached out to patient today to schedule a telephone visit for Monday August 10th.  No answer and no  VM set up.

## 2020-08-04 NOTE — TELEPHONE ENCOUNTER
IRINEO BILLY W/ [facility] CALLED TO REQUEST AN ORDER FOR PT, 2ICE A WEEK FOR 2 WEEKS, INITIAL EVAL WAS YESTERDAY.  ALSO REQUESTED ORDERS FOR OT EVAL AND SKILLED NURSING EVAL.  WHEN SAW PATIENT YESTERDAY, O2 SAT WAS ONLY 85, PATIENT IS ON 4 LITERS OF O2. BLOOD SUGAR , DOWN FROM 500 WHEN DISCHARGED AT HOSPITAL.  VERBAL ORDERS CAN BE PROVIDED CALLING MARIAJOSE AT: [phone]

## 2020-08-05 ENCOUNTER — TELEPHONE (OUTPATIENT)
Dept: INTERNAL MEDICINE | Facility: CLINIC | Age: 65
End: 2020-08-05

## 2020-08-07 ENCOUNTER — HOSPITAL ENCOUNTER (INPATIENT)
Facility: HOSPITAL | Age: 65
LOS: 7 days | Discharge: NURSING, REHAB OR LONG-TERM CARE | End: 2020-08-14
Attending: EMERGENCY MEDICINE | Admitting: HOSPITALIST

## 2020-08-07 ENCOUNTER — TELEPHONE (OUTPATIENT)
Dept: INTERNAL MEDICINE | Facility: CLINIC | Age: 65
End: 2020-08-07

## 2020-08-07 ENCOUNTER — APPOINTMENT (OUTPATIENT)
Dept: ULTRASOUND IMAGING | Facility: HOSPITAL | Age: 65
End: 2020-08-07

## 2020-08-07 ENCOUNTER — APPOINTMENT (OUTPATIENT)
Dept: GENERAL RADIOLOGY | Facility: HOSPITAL | Age: 65
End: 2020-08-07

## 2020-08-07 DIAGNOSIS — I50.33 ACUTE ON CHRONIC DIASTOLIC CONGESTIVE HEART FAILURE (HCC): ICD-10-CM

## 2020-08-07 DIAGNOSIS — M54.50 CHRONIC MIDLINE LOW BACK PAIN WITHOUT SCIATICA: ICD-10-CM

## 2020-08-07 DIAGNOSIS — N17.9 ACUTE KIDNEY INJURY (HCC): ICD-10-CM

## 2020-08-07 DIAGNOSIS — G89.29 CHRONIC MIDLINE LOW BACK PAIN WITHOUT SCIATICA: ICD-10-CM

## 2020-08-07 DIAGNOSIS — J96.22 ACUTE ON CHRONIC RESPIRATORY FAILURE WITH HYPOXIA AND HYPERCAPNIA (HCC): Primary | ICD-10-CM

## 2020-08-07 DIAGNOSIS — J96.21 ACUTE ON CHRONIC RESPIRATORY FAILURE WITH HYPOXIA AND HYPERCAPNIA (HCC): Primary | ICD-10-CM

## 2020-08-07 DIAGNOSIS — E11.649 HYPOGLYCEMIA DUE TO TYPE 2 DIABETES MELLITUS (HCC): ICD-10-CM

## 2020-08-07 LAB
ALBUMIN SERPL-MCNC: 3.3 G/DL (ref 3.5–5.2)
ALBUMIN/GLOB SERPL: 0.9 G/DL
ALP SERPL-CCNC: 84 U/L (ref 39–117)
ALT SERPL W P-5'-P-CCNC: 24 U/L (ref 1–33)
ANION GAP SERPL CALCULATED.3IONS-SCNC: 10.1 MMOL/L (ref 5–15)
ARTERIAL PATENCY WRIST A: POSITIVE
ARTERIAL PATENCY WRIST A: POSITIVE
AST SERPL-CCNC: 15 U/L (ref 1–32)
ATMOSPHERIC PRESS: 742 MMHG
ATMOSPHERIC PRESS: 742 MMHG
BASE EXCESS BLDA CALC-SCNC: 11.5 MMOL/L (ref 0–2)
BASE EXCESS BLDA CALC-SCNC: 11.9 MMOL/L (ref 0–2)
BASOPHILS # BLD AUTO: 0.01 10*3/MM3 (ref 0–0.2)
BASOPHILS # BLD AUTO: 0.02 10*3/MM3 (ref 0–0.2)
BASOPHILS NFR BLD AUTO: 0.1 % (ref 0–1.5)
BASOPHILS NFR BLD AUTO: 0.3 % (ref 0–1.5)
BDY SITE: ABNORMAL
BDY SITE: ABNORMAL
BILIRUB SERPL-MCNC: 0.4 MG/DL (ref 0–1.2)
BODY TEMPERATURE: 37 C
BODY TEMPERATURE: 37 C
BUN SERPL-MCNC: 57 MG/DL (ref 8–23)
BUN/CREAT SERPL: 21.5 (ref 7–25)
CALCIUM SPEC-SCNC: 8.5 MG/DL (ref 8.6–10.5)
CHLORIDE SERPL-SCNC: 95 MMOL/L (ref 98–107)
CO2 SERPL-SCNC: 35.9 MMOL/L (ref 22–29)
CREAT SERPL-MCNC: 2.65 MG/DL (ref 0.57–1)
DEPRECATED RDW RBC AUTO: 51.6 FL (ref 37–54)
DEPRECATED RDW RBC AUTO: 52.3 FL (ref 37–54)
EOSINOPHIL # BLD AUTO: 0.16 10*3/MM3 (ref 0–0.4)
EOSINOPHIL # BLD AUTO: 0.21 10*3/MM3 (ref 0–0.4)
EOSINOPHIL NFR BLD AUTO: 2.1 % (ref 0.3–6.2)
EOSINOPHIL NFR BLD AUTO: 2.5 % (ref 0.3–6.2)
ERYTHROCYTE [DISTWIDTH] IN BLOOD BY AUTOMATED COUNT: 14.9 % (ref 12.3–15.4)
ERYTHROCYTE [DISTWIDTH] IN BLOOD BY AUTOMATED COUNT: 15 % (ref 12.3–15.4)
GAS FLOW AIRWAY: 4 LPM
GAS FLOW AIRWAY: 4 LPM
GFR SERPL CREATININE-BSD FRML MDRD: 18 ML/MIN/1.73
GLOBULIN UR ELPH-MCNC: 3.5 GM/DL
GLUCOSE BLDC GLUCOMTR-MCNC: 121 MG/DL (ref 70–130)
GLUCOSE BLDC GLUCOMTR-MCNC: 188 MG/DL (ref 70–130)
GLUCOSE BLDC GLUCOMTR-MCNC: 33 MG/DL (ref 70–130)
GLUCOSE BLDC GLUCOMTR-MCNC: 77 MG/DL (ref 70–130)
GLUCOSE SERPL-MCNC: 29 MG/DL (ref 65–99)
HCO3 BLDA-SCNC: 39.2 MMOL/L (ref 20–26)
HCO3 BLDA-SCNC: 39.3 MMOL/L (ref 20–26)
HCT VFR BLD AUTO: 29.5 % (ref 34–46.6)
HCT VFR BLD AUTO: 30 % (ref 34–46.6)
HGB BLD-MCNC: 8.2 G/DL (ref 12–15.9)
HGB BLD-MCNC: 8.3 G/DL (ref 12–15.9)
HGB BLDA-MCNC: 7.5 G/DL (ref 13.5–17.5)
HGB BLDA-MCNC: 8.1 G/DL (ref 13.5–17.5)
IMM GRANULOCYTES # BLD AUTO: 0.03 10*3/MM3 (ref 0–0.05)
IMM GRANULOCYTES # BLD AUTO: 0.03 10*3/MM3 (ref 0–0.05)
IMM GRANULOCYTES NFR BLD AUTO: 0.4 % (ref 0–0.5)
IMM GRANULOCYTES NFR BLD AUTO: 0.4 % (ref 0–0.5)
LYMPHOCYTES # BLD AUTO: 1.35 10*3/MM3 (ref 0.7–3.1)
LYMPHOCYTES # BLD AUTO: 1.7 10*3/MM3 (ref 0.7–3.1)
LYMPHOCYTES NFR BLD AUTO: 17.3 % (ref 19.6–45.3)
LYMPHOCYTES NFR BLD AUTO: 20.5 % (ref 19.6–45.3)
Lab: ABNORMAL
Lab: ABNORMAL
MCH RBC QN AUTO: 26.4 PG (ref 26.6–33)
MCH RBC QN AUTO: 26.5 PG (ref 26.6–33)
MCHC RBC AUTO-ENTMCNC: 27.7 G/DL (ref 31.5–35.7)
MCHC RBC AUTO-ENTMCNC: 27.8 G/DL (ref 31.5–35.7)
MCV RBC AUTO: 95.5 FL (ref 79–97)
MCV RBC AUTO: 95.5 FL (ref 79–97)
MODALITY: ABNORMAL
MODALITY: ABNORMAL
MONOCYTES # BLD AUTO: 0.65 10*3/MM3 (ref 0.1–0.9)
MONOCYTES # BLD AUTO: 0.84 10*3/MM3 (ref 0.1–0.9)
MONOCYTES NFR BLD AUTO: 10.1 % (ref 5–12)
MONOCYTES NFR BLD AUTO: 8.3 % (ref 5–12)
NEUTROPHILS NFR BLD AUTO: 5.52 10*3/MM3 (ref 1.7–7)
NEUTROPHILS NFR BLD AUTO: 5.59 10*3/MM3 (ref 1.7–7)
NEUTROPHILS NFR BLD AUTO: 66.4 % (ref 42.7–76)
NEUTROPHILS NFR BLD AUTO: 71.6 % (ref 42.7–76)
NOTIFIED BY: ABNORMAL
NOTIFIED BY: ABNORMAL
NOTIFIED WHO: ABNORMAL
NOTIFIED WHO: ABNORMAL
NRBC BLD AUTO-RTO: 0 /100 WBC (ref 0–0.2)
NRBC BLD AUTO-RTO: 0 /100 WBC (ref 0–0.2)
NT-PROBNP SERPL-MCNC: 1338 PG/ML (ref 0–900)
PCO2 BLDA: 73.9 MM HG (ref 35–45)
PCO2 BLDA: 75.5 MM HG (ref 35–45)
PCO2 TEMP ADJ BLD: 73.9 MM HG (ref 35–45)
PCO2 TEMP ADJ BLD: 75.5 MM HG (ref 35–45)
PH BLDA: 7.32 PH UNITS (ref 7.35–7.45)
PH BLDA: 7.33 PH UNITS (ref 7.35–7.45)
PH, TEMP CORRECTED: 7.32 PH UNITS (ref 7.35–7.45)
PH, TEMP CORRECTED: 7.33 PH UNITS (ref 7.35–7.45)
PLATELET # BLD AUTO: 202 10*3/MM3 (ref 140–450)
PLATELET # BLD AUTO: 205 10*3/MM3 (ref 140–450)
PMV BLD AUTO: 10.9 FL (ref 6–12)
PMV BLD AUTO: 11.1 FL (ref 6–12)
PO2 BLDA: 55 MM HG (ref 83–108)
PO2 BLDA: 64.6 MM HG (ref 83–108)
PO2 TEMP ADJ BLD: 55 MM HG (ref 83–108)
PO2 TEMP ADJ BLD: 64.6 MM HG (ref 83–108)
POTASSIUM SERPL-SCNC: 4.8 MMOL/L (ref 3.5–5.2)
PROCALCITONIN SERPL-MCNC: 0.1 NG/ML (ref 0–0.25)
PROT SERPL-MCNC: 6.8 G/DL (ref 6–8.5)
RBC # BLD AUTO: 3.09 10*6/MM3 (ref 3.77–5.28)
RBC # BLD AUTO: 3.14 10*6/MM3 (ref 3.77–5.28)
SAO2 % BLDCOA: 86.9 % (ref 94–99)
SAO2 % BLDCOA: 90.9 % (ref 94–99)
SODIUM SERPL-SCNC: 141 MMOL/L (ref 136–145)
TROPONIN T SERPL-MCNC: 0.05 NG/ML (ref 0–0.03)
VENTILATOR MODE: ABNORMAL
VENTILATOR MODE: ABNORMAL
WBC # BLD AUTO: 7.8 10*3/MM3 (ref 3.4–10.8)
WBC # BLD AUTO: 8.31 10*3/MM3 (ref 3.4–10.8)

## 2020-08-07 PROCEDURE — 99284 EMERGENCY DEPT VISIT MOD MDM: CPT

## 2020-08-07 PROCEDURE — 93010 ELECTROCARDIOGRAM REPORT: CPT | Performed by: INTERNAL MEDICINE

## 2020-08-07 PROCEDURE — 36600 WITHDRAWAL OF ARTERIAL BLOOD: CPT

## 2020-08-07 PROCEDURE — 94799 UNLISTED PULMONARY SVC/PX: CPT

## 2020-08-07 PROCEDURE — 82962 GLUCOSE BLOOD TEST: CPT

## 2020-08-07 PROCEDURE — 76775 US EXAM ABDO BACK WALL LIM: CPT

## 2020-08-07 PROCEDURE — 84484 ASSAY OF TROPONIN QUANT: CPT | Performed by: EMERGENCY MEDICINE

## 2020-08-07 PROCEDURE — 99291 CRITICAL CARE FIRST HOUR: CPT | Performed by: INTERNAL MEDICINE

## 2020-08-07 PROCEDURE — 84145 PROCALCITONIN (PCT): CPT | Performed by: INTERNAL MEDICINE

## 2020-08-07 PROCEDURE — 85025 COMPLETE CBC W/AUTO DIFF WBC: CPT | Performed by: EMERGENCY MEDICINE

## 2020-08-07 PROCEDURE — 83880 ASSAY OF NATRIURETIC PEPTIDE: CPT | Performed by: EMERGENCY MEDICINE

## 2020-08-07 PROCEDURE — 99285 EMERGENCY DEPT VISIT HI MDM: CPT | Performed by: EMERGENCY MEDICINE

## 2020-08-07 PROCEDURE — 25010000002 ENOXAPARIN PER 10 MG: Performed by: INTERNAL MEDICINE

## 2020-08-07 PROCEDURE — 80053 COMPREHEN METABOLIC PANEL: CPT | Performed by: EMERGENCY MEDICINE

## 2020-08-07 PROCEDURE — 93005 ELECTROCARDIOGRAM TRACING: CPT | Performed by: EMERGENCY MEDICINE

## 2020-08-07 PROCEDURE — 71046 X-RAY EXAM CHEST 2 VIEWS: CPT

## 2020-08-07 PROCEDURE — 85025 COMPLETE CBC W/AUTO DIFF WBC: CPT | Performed by: INTERNAL MEDICINE

## 2020-08-07 PROCEDURE — 82803 BLOOD GASES ANY COMBINATION: CPT

## 2020-08-07 PROCEDURE — 63710000001 LEVALBUTEROL PER 0.5 MG: Performed by: INTERNAL MEDICINE

## 2020-08-07 RX ORDER — SODIUM CHLORIDE 0.9 % (FLUSH) 0.9 %
10 SYRINGE (ML) INJECTION EVERY 12 HOURS SCHEDULED
Status: DISCONTINUED | OUTPATIENT
Start: 2020-08-07 | End: 2020-08-14 | Stop reason: HOSPADM

## 2020-08-07 RX ORDER — METOPROLOL TARTRATE 50 MG/1
50 TABLET, FILM COATED ORAL EVERY 12 HOURS
Status: DISCONTINUED | OUTPATIENT
Start: 2020-08-08 | End: 2020-08-14 | Stop reason: HOSPADM

## 2020-08-07 RX ORDER — QUETIAPINE FUMARATE 100 MG/1
100 TABLET, FILM COATED ORAL EVERY 12 HOURS SCHEDULED
Status: DISCONTINUED | OUTPATIENT
Start: 2020-08-07 | End: 2020-08-14 | Stop reason: HOSPADM

## 2020-08-07 RX ORDER — DEXTROSE MONOHYDRATE 25 G/50ML
25 INJECTION, SOLUTION INTRAVENOUS ONCE
Status: COMPLETED | OUTPATIENT
Start: 2020-08-07 | End: 2020-08-07

## 2020-08-07 RX ORDER — BUMETANIDE 0.25 MG/ML
2 INJECTION INTRAMUSCULAR; INTRAVENOUS ONCE
Status: COMPLETED | OUTPATIENT
Start: 2020-08-07 | End: 2020-08-07

## 2020-08-07 RX ORDER — ACETAMINOPHEN 325 MG/1
650 TABLET ORAL EVERY 4 HOURS PRN
Status: DISCONTINUED | OUTPATIENT
Start: 2020-08-07 | End: 2020-08-14 | Stop reason: HOSPADM

## 2020-08-07 RX ORDER — IPRATROPIUM BROMIDE AND ALBUTEROL SULFATE 2.5; .5 MG/3ML; MG/3ML
3 SOLUTION RESPIRATORY (INHALATION)
Status: DISCONTINUED | OUTPATIENT
Start: 2020-08-07 | End: 2020-08-07

## 2020-08-07 RX ORDER — DEXTROSE MONOHYDRATE 25 G/50ML
25 INJECTION, SOLUTION INTRAVENOUS
Status: DISCONTINUED | OUTPATIENT
Start: 2020-08-07 | End: 2020-08-14 | Stop reason: HOSPADM

## 2020-08-07 RX ORDER — ONDANSETRON 4 MG/1
4 TABLET, FILM COATED ORAL EVERY 6 HOURS PRN
Status: DISCONTINUED | OUTPATIENT
Start: 2020-08-07 | End: 2020-08-14 | Stop reason: HOSPADM

## 2020-08-07 RX ORDER — ATORVASTATIN CALCIUM 40 MG/1
40 TABLET, FILM COATED ORAL DAILY
Status: DISCONTINUED | OUTPATIENT
Start: 2020-08-07 | End: 2020-08-14 | Stop reason: HOSPADM

## 2020-08-07 RX ORDER — SODIUM CHLORIDE 0.9 % (FLUSH) 0.9 %
10 SYRINGE (ML) INJECTION AS NEEDED
Status: DISCONTINUED | OUTPATIENT
Start: 2020-08-07 | End: 2020-08-14 | Stop reason: HOSPADM

## 2020-08-07 RX ORDER — SODIUM CHLORIDE 9 MG/ML
40 INJECTION, SOLUTION INTRAVENOUS AS NEEDED
Status: DISCONTINUED | OUTPATIENT
Start: 2020-08-07 | End: 2020-08-14 | Stop reason: HOSPADM

## 2020-08-07 RX ORDER — ACETAMINOPHEN 650 MG/1
650 SUPPOSITORY RECTAL EVERY 4 HOURS PRN
Status: DISCONTINUED | OUTPATIENT
Start: 2020-08-07 | End: 2020-08-14 | Stop reason: HOSPADM

## 2020-08-07 RX ORDER — DEXTROSE MONOHYDRATE 25 G/50ML
INJECTION, SOLUTION INTRAVENOUS
Status: DISPENSED
Start: 2020-08-07 | End: 2020-08-08

## 2020-08-07 RX ORDER — HYDRALAZINE HYDROCHLORIDE 50 MG/1
75 TABLET, FILM COATED ORAL EVERY 8 HOURS SCHEDULED
Status: DISCONTINUED | OUTPATIENT
Start: 2020-08-07 | End: 2020-08-07

## 2020-08-07 RX ORDER — AMOXICILLIN 250 MG
2 CAPSULE ORAL 2 TIMES DAILY PRN
Status: DISCONTINUED | OUTPATIENT
Start: 2020-08-07 | End: 2020-08-14 | Stop reason: HOSPADM

## 2020-08-07 RX ORDER — BUMETANIDE 0.25 MG/ML
2 INJECTION INTRAMUSCULAR; INTRAVENOUS EVERY 6 HOURS
Status: DISCONTINUED | OUTPATIENT
Start: 2020-08-08 | End: 2020-08-08

## 2020-08-07 RX ORDER — NICOTINE POLACRILEX 4 MG
15 LOZENGE BUCCAL
Status: DISCONTINUED | OUTPATIENT
Start: 2020-08-07 | End: 2020-08-14 | Stop reason: HOSPADM

## 2020-08-07 RX ORDER — ONDANSETRON 2 MG/ML
4 INJECTION INTRAMUSCULAR; INTRAVENOUS EVERY 6 HOURS PRN
Status: DISCONTINUED | OUTPATIENT
Start: 2020-08-07 | End: 2020-08-14 | Stop reason: HOSPADM

## 2020-08-07 RX ORDER — ASPIRIN 81 MG/1
81 TABLET ORAL DAILY
Status: DISCONTINUED | OUTPATIENT
Start: 2020-08-08 | End: 2020-08-14 | Stop reason: HOSPADM

## 2020-08-07 RX ORDER — LEVALBUTEROL INHALATION SOLUTION 0.63 MG/3ML
0.63 SOLUTION RESPIRATORY (INHALATION)
Status: DISCONTINUED | OUTPATIENT
Start: 2020-08-07 | End: 2020-08-08

## 2020-08-07 RX ORDER — METOPROLOL TARTRATE 50 MG/1
100 TABLET, FILM COATED ORAL EVERY 12 HOURS
Status: DISCONTINUED | OUTPATIENT
Start: 2020-08-08 | End: 2020-08-07

## 2020-08-07 RX ORDER — ALBUTEROL SULFATE 2.5 MG/3ML
2.5 SOLUTION RESPIRATORY (INHALATION) EVERY 6 HOURS PRN
Status: DISCONTINUED | OUTPATIENT
Start: 2020-08-07 | End: 2020-08-14 | Stop reason: HOSPADM

## 2020-08-07 RX ADMIN — ATORVASTATIN CALCIUM 40 MG: 40 TABLET, FILM COATED ORAL at 20:02

## 2020-08-07 RX ADMIN — LEVALBUTEROL HYDROCHLORIDE 0.63 MG: 0.63 SOLUTION RESPIRATORY (INHALATION) at 20:37

## 2020-08-07 RX ADMIN — BUMETANIDE 2 MG: 0.25 INJECTION INTRAMUSCULAR; INTRAVENOUS at 16:03

## 2020-08-07 RX ADMIN — QUETIAPINE FUMARATE 100 MG: 100 TABLET ORAL at 21:23

## 2020-08-07 RX ADMIN — DEXTROSE MONOHYDRATE 25 G: 25 INJECTION, SOLUTION INTRAVENOUS at 16:09

## 2020-08-07 RX ADMIN — BUMETANIDE 2 MG: 0.25 INJECTION INTRAMUSCULAR; INTRAVENOUS at 23:44

## 2020-08-07 RX ADMIN — ENOXAPARIN SODIUM 30 MG: 30 INJECTION SUBCUTANEOUS at 20:02

## 2020-08-07 RX ADMIN — SODIUM CHLORIDE, PRESERVATIVE FREE 10 ML: 5 INJECTION INTRAVENOUS at 20:03

## 2020-08-07 NOTE — PLAN OF CARE
Problem: Patient Care Overview  Goal: Plan of Care Review  Outcome: Ongoing (interventions implemented as appropriate)  Flowsheets (Taken 8/7/2020 1928)  Plan of Care Reviewed With: patient  Outcome Summary: Patient just admitted from ED for SOB and resp failure, Patient using home triology now and ashlee 88-92 plan to diurese patient.

## 2020-08-07 NOTE — PROGRESS NOTES
[facility] with Home Health phones today.  Patient is 86% with 4.5 liter on Trilogy device and drops to 62% when asked to stand to check stage 2 pressure ulcer.  Pain rating 7/10, respiratory difficulty 9/10.  BP sitting 104/54   Cellulitis both lower extremities.  EMS being called for patient transport.

## 2020-08-07 NOTE — ED PROVIDER NOTES
"Subjective     History provided by:  Patient, EMS personnel and medical records    History of Present Illness    · Chief complaint: Shortness of breath    · Location: Home    · Quality/Severity: Patient complaining of shortness of breath that is exacerbated by any exertion.    · Timing/Onset: She states she has been short of breath since released from the hospital here a week ago.    · Modifying Factors: The patient states any exertion exacerbates her shortness of breath.    · Associated symptoms: She reports a cough that is occasionally productive of green sputum.  She denies any fever.    · Narrative: The patient is a 65-year-old white female who was recently admitted here in 7/20- 8/1/2020 for a acute on chronic hypercapnic hypoxic respiratory failure.  She tested COVID negative 7/28/2020.  She has a history of diastolic congestive heart failure for which she takes Bumex 2 mg twice daily.  Today she was visited by the home health nurse who noted her low oxygen saturations and stated \"you are in respiratory failure\".  EMS reports her oxygen saturation was 82% on room air and dropped in the 60s when she went to stand up.  The patient is normally on home O2 4 L via nasal cannula.    Review of Systems   Constitutional: Positive for fatigue. Negative for activity change, appetite change, chills and fever.   HENT: Negative for congestion, ear pain, rhinorrhea, sore throat, trouble swallowing and voice change.    Eyes: Negative for pain and visual disturbance.   Respiratory: Positive for cough and shortness of breath.    Cardiovascular: Positive for leg swelling ( Chronic involving her legs, feet and ankles). Negative for chest pain.   Gastrointestinal: Negative for abdominal pain, diarrhea, nausea and vomiting.   Genitourinary: Negative for difficulty urinating, dysuria, pelvic pain and urgency.   Musculoskeletal: Negative for back pain, neck pain and neck stiffness.   Skin: Negative for color change and rash. " "  Neurological: Positive for weakness. Negative for dizziness, numbness and headaches.   Psychiatric/Behavioral: Negative for confusion.     Past Medical History:   Diagnosis Date   • Anxiety    • Arthritis    • Cellulitis    • CHF (congestive heart failure) (CMS/[facility])    • Chronic back pain    • COPD (chronic obstructive pulmonary disease) (CMS/[facility])    • Depression    • Essential hypertension    • SERENA (generalized anxiety disorder)    • GERD (gastroesophageal reflux disease)    • Headache    • HLD (hyperlipidemia)    • Hypothyroidism, acquired    • Low back pain    • Neuropathy in diabetes (CMS/[facility])    • Rectal abscess    • Sleep apnea    • Type 2 diabetes mellitus (CMS/[facility])    • Wound, open, toe 8/22/2017     /61   Pulse 64   Temp 98 °F (36.7 °C)   Resp 20   Ht 160 cm (63\")   Wt 122 kg (268 lb)   LMP  (LMP Unknown)   SpO2 97%   BMI 47.47 kg/m²     Past Medical History:   Diagnosis Date   • Anxiety    • Arthritis    • Cellulitis    • CHF (congestive heart failure) (CMS/[facility])    • Chronic back pain    • COPD (chronic obstructive pulmonary disease) (CMS/[facility])    • Depression    • Essential hypertension    • SERENA (generalized anxiety disorder)    • GERD (gastroesophageal reflux disease)    • Headache    • HLD (hyperlipidemia)    • Hypothyroidism, acquired    • Low back pain    • Neuropathy in diabetes (CMS/[facility])    • Rectal abscess    • Sleep apnea    • Type 2 diabetes mellitus (CMS/[facility])    • Wound, open, toe 8/22/2017       Allergies   Allergen Reactions   • Erythromycin Nausea And Vomiting   • Erythromycin Ethylsuccinate Nausea And Vomiting   • Ativan [Lorazepam] Hallucinations   • Tape Rash     Paper tape         Past Surgical History:   Procedure Laterality Date   • DILATATION AND CURETTAGE      x5   • LUNG SURGERY Right    • TRACHEOSTOMY         Family History   Problem Relation Age of Onset   • Cancer Mother         bone   • Cancer Father    • Cancer Brother        Social History     Socioeconomic " History   • Marital status:      Spouse name: Not on file   • Number of children: Not on file   • Years of education: Not on file   • Highest education level: Not on file   Tobacco Use   • Smoking status: Former Smoker     Packs/day: 0.50     Years: 40.00     Pack years: 20.00     Types: Cigarettes     Last attempt to quit: 10/4/2010     Years since quittin.8   • Smokeless tobacco: Former User     Quit date: 8/3/2012   Substance and Sexual Activity   • Alcohol use: No   • Drug use: No   • Sexual activity: Defer           Objective   Physical Exam   Constitutional: She is oriented to person, place, and time. She appears well-developed and well-nourished.  Non-toxic appearance. She does not appear ill. No distress.   The patient is morbidly obese.  She appears all of distress.  Review of her vital signs: She is afebrile with a temperature 98, mildly tachypnea with a respiratory rate of 20 with a normal oxygen saturation of 98 to 100% on 4 L of oxygen via nasal cannula, heart rate normal 64, blood pressure normal 118/62.   HENT:   Head: Normocephalic and atraumatic.   Mouth/Throat: Oropharynx is clear and moist.   Eyes: Pupils are equal, round, and reactive to light.   Neck: Normal range of motion. Neck supple. No JVD present.   Cardiovascular: Normal rate, regular rhythm and normal heart sounds.   Pulmonary/Chest: She has no decreased breath sounds. She has no wheezes. She has no rhonchi. She has rales in the right lower field and the left lower field.   Abdominal: Soft. Bowel sounds are normal. There is no tenderness.   Musculoskeletal:        Right lower leg: She exhibits edema. She exhibits no tenderness.        Left lower leg: She exhibits edema. She exhibits no tenderness.   Lymphadenopathy:     She has no cervical adenopathy.   Neurological: She is alert and oriented to person, place, and time. No cranial nerve deficit.   No focal motor or sensory deficit.   Skin: Capillary refill takes less than 2  seconds.   Venous stasis skin changes involving the lower legs bilaterally   Psychiatric: She has a normal mood and affect. Her behavior is normal.   Nursing note and vitals reviewed.      Procedures           ED Course  ED Course as of Aug 07 1644   Fri Aug 07, 2020   1539 My interpretation of the patient's EKG tracing performed 15: 35 is normal sinus rhythm with a rate of 64, normal axis, normal conduction, no acute ST segment elevation or depression consistent with ischemia, no ectopy, essentially a normal EKG.    [TP]   1622 Review the patient's test results: Her chest x-ray was interpreted by me and the radiologist as cardiomegaly and pulmonary vascular congestion with bilateral small pleural effusions consistent with congestive heart failure.  Her proBNP was elevated at 1338 consistent with congestive heart failure.  The patient's CMP had an elevated BUN of 57 with an elevated creatinine at 2.65 and a low GFR of 18 which is a significant change in comparison to 7/31/2020 when her GFR was 30 creatinine was 1.73 consistent with acute kidney injury on chronic renal failure.  Her sodium and potassium were normal.  Her blood glucose was low at 29.  Liver function tests were essentially within normal limits.  Her CBC had a normal white count of 8.3 with a normal differential.  She was anemic with a hemoglobin 8.2 and hematocrit 29.5 which was essentially unchanged from 7/31/2020.  Her cardiac troponin was elevated slightly in the indeterminate range at 0.047 which I believe is probably due to her acute kidney injury.  An arterial blood gas performed on 4 L via nasal cannula her pH was slightly low at 7.324 with a high PCO2 of 75.6 and a diminished PO2 of 64.6 with a low oxygen saturation of 91% on room air.  This is consistent with hypercapnic hypoxic respiratory failure and is similar to the blood gas she had on 7/22/2020.    [TP]   1628 The patient was administered an amp of D50 IV for her hypoglycemia.  She was  also given crackers and juice to eat.    [TP]   1628 She was administered Bumex 2 mg IV for her congestive heart failure.    [TP]   1628 16: 23 the patient was discussed with Dr. Fish, hospitalist, and will admit the patient to the intensive care unit.    [TP]      ED Course User Index  [TP] Tor Vaughan MD                                           MDM  Number of Diagnoses or Management Options  Acute kidney injury (CMS/HCC): new and requires workup  Acute on chronic diastolic congestive heart failure (CMS/HCC): established and worsening  Acute on chronic respiratory failure with hypoxia and hypercapnia (CMS/HCC): established and worsening  Hypoglycemia due to type 2 diabetes mellitus (CMS/HCC): new and requires workup     Amount and/or Complexity of Data Reviewed  Clinical lab tests: reviewed and ordered  Tests in the radiology section of CPT®: ordered and reviewed  Tests in the medicine section of CPT®: ordered and reviewed  Decide to obtain previous medical records or to obtain history from someone other than the patient: yes  Review and summarize past medical records: yes  Discuss the patient with other providers: yes  Independent visualization of images, tracings, or specimens: yes    Risk of Complications, Morbidity, and/or Mortality  Presenting problems: high  Diagnostic procedures: high  Management options: high  General comments: My differential diagnosis for dyspnea includes but is not limited to:  Asthma, COPD, pneumonia, pulmonary embolus, acute respiratory distress syndrome, pneumothorax, pleural effusion, pulmonary fibrosis, congestive heart failure, myocardial infarction, DKA, uremia, acidosis, sepsis, anemia, drug related, hyperventilation, CNS disease    Patient Progress  Patient progress: stable      Final diagnoses:   Acute on chronic respiratory failure with hypoxia and hypercapnia (CMS/HCC)   Acute on chronic diastolic congestive heart failure (CMS/HCC)   Hypoglycemia due to type 2  diabetes mellitus (CMS/[facility])   Acute kidney injury (CMS/[facility])           Labs Reviewed   COMPREHENSIVE METABOLIC PANEL - Abnormal; Notable for the following components:       Result Value    Glucose 29 (*)     BUN 57 (*)     Creatinine 2.65 (*)     Chloride 95 (*)     CO2 35.9 (*)     Calcium 8.5 (*)     Albumin 3.30 (*)     eGFR Non  Amer 18 (*)     All other components within normal limits    Narrative:     GFR Normal >60  Chronic Kidney Disease <60  Kidney Failure <15     BNP (IN-HOUSE) - Abnormal; Notable for the following components:    proBNP 1,338.0 (*)     All other components within normal limits    Narrative:     Among patients with dyspnea, NT-proBNP is highly sensitive for the detection of acute congestive heart failure. In addition NT-proBNP of <300 pg/ml effectively rules out acute congestive heart failure with 99% negative predictive value.    Results may be falsely decreased if patient taking Biotin.     TROPONIN (IN-HOUSE) - Abnormal; Notable for the following components:    Troponin T 0.047 (*)     All other components within normal limits    Narrative:     Troponin T Reference Range:  <= 0.03 ng/mL-   Negative for AMI  >0.03 ng/mL-     Abnormal for myocardial necrosis.  Clinicians would have to utilize clinical acumen, EKG, Troponin and serial changes to determine if it is an Acute Myocardial Infarction or myocardial injury due to an underlying chronic condition.       Results may be falsely decreased if patient taking Biotin.     CBC WITH AUTO DIFFERENTIAL - Abnormal; Notable for the following components:    RBC 3.09 (*)     Hemoglobin 8.2 (*)     Hematocrit 29.5 (*)     MCH 26.5 (*)     MCHC 27.8 (*)     All other components within normal limits   BLOOD GAS, ARTERIAL - Abnormal; Notable for the following components:    pH, Arterial 7.324 (*)     pCO2, Arterial 75.5 (*)     pO2, Arterial 64.6 (*)     HCO3, Arterial 39.2 (*)     Base Excess, Arterial 11.5 (*)     O2 Saturation, Arterial 90.9  (*)     Hemoglobin, Blood Gas 8.1 (*)     pCO2, Temperature Corrected 75.5 (*)     pH, Temp Corrected 7.324 (*)     pO2, Temperature Corrected 64.6 (*)     All other components within normal limits   POCT GLUCOSE FINGERSTICK - Abnormal; Notable for the following components:    Glucose 33 (*)     All other components within normal limits   POCT GLUCOSE FINGERSTICK - Normal   BLOOD GAS, ARTERIAL   CBC AND DIFFERENTIAL    Narrative:     The following orders were created for panel order CBC & Differential.  Procedure                               Abnormality         Status                     ---------                               -----------         ------                     CBC Auto Differential[[id number]]        Abnormal            Final result                 Please view results for these tests on the individual orders.     XR Chest 2 View   ED Interpretation   Cardiomegaly with mild central vascular prominence and small bilateral   pleural effusions with bibasilar atelectasis. Correlate for symptoms of   congestive failure.       This report was finalized on 8/7/2020 3:38 PM by Dr. Jewel Currie MD.          Final Result   Cardiomegaly with mild central vascular prominence and small bilateral   pleural effusions with bibasilar atelectasis. Correlate for symptoms of   congestive failure.       This report was finalized on 8/7/2020 3:38 PM by Dr. Jewel Currie MD.                 Medication List      No changes were made to your prescriptions during this visit.            Tor Vaughan MD  08/07/20 6569

## 2020-08-08 ENCOUNTER — READMISSION MANAGEMENT (OUTPATIENT)
Dept: CALL CENTER | Facility: HOSPITAL | Age: 65
End: 2020-08-08

## 2020-08-08 LAB
ALBUMIN SERPL-MCNC: 2.6 G/DL (ref 3.5–5.2)
ANION GAP SERPL CALCULATED.3IONS-SCNC: 13.1 MMOL/L (ref 5–15)
ANION GAP SERPL CALCULATED.3IONS-SCNC: 7.1 MMOL/L (ref 5–15)
ARTERIAL PATENCY WRIST A: POSITIVE
ATMOSPHERIC PRESS: 742 MMHG
ATMOSPHERIC PRESS: 742 MMHG
ATMOSPHERIC PRESS: 743 MMHG
BASE EXCESS BLDA CALC-SCNC: 11.6 MMOL/L (ref 0–2)
BASE EXCESS BLDA CALC-SCNC: 12.3 MMOL/L (ref 0–2)
BASE EXCESS BLDA CALC-SCNC: 13.1 MMOL/L (ref 0–2)
BASOPHILS # BLD AUTO: 0.01 10*3/MM3 (ref 0–0.2)
BASOPHILS NFR BLD AUTO: 0.2 % (ref 0–1.5)
BDY SITE: ABNORMAL
BODY TEMPERATURE: 37 C
BUN SERPL-MCNC: 59 MG/DL (ref 8–23)
BUN SERPL-MCNC: 60 MG/DL (ref 8–23)
BUN/CREAT SERPL: 21.3 (ref 7–25)
BUN/CREAT SERPL: 23.2 (ref 7–25)
CALCIUM SPEC-SCNC: 8.3 MG/DL (ref 8.6–10.5)
CALCIUM SPEC-SCNC: 8.4 MG/DL (ref 8.6–10.5)
CHLORIDE SERPL-SCNC: 94 MMOL/L (ref 98–107)
CHLORIDE SERPL-SCNC: 94 MMOL/L (ref 98–107)
CO2 SERPL-SCNC: 30.9 MMOL/L (ref 22–29)
CO2 SERPL-SCNC: 37.9 MMOL/L (ref 22–29)
CREAT SERPL-MCNC: 2.59 MG/DL (ref 0.57–1)
CREAT SERPL-MCNC: 2.77 MG/DL (ref 0.57–1)
DEPRECATED RDW RBC AUTO: 50.6 FL (ref 37–54)
EOSINOPHIL # BLD AUTO: 0.18 10*3/MM3 (ref 0–0.4)
EOSINOPHIL NFR BLD AUTO: 3.3 % (ref 0.3–6.2)
EPAP: 5
EPAP: 8
ERYTHROCYTE [DISTWIDTH] IN BLOOD BY AUTOMATED COUNT: 14.6 % (ref 12.3–15.4)
GAS FLOW AIRWAY: 1 LPM
GAS FLOW AIRWAY: 3 LPM
GAS FLOW AIRWAY: 5 LPM
GFR SERPL CREATININE-BSD FRML MDRD: 17 ML/MIN/1.73
GFR SERPL CREATININE-BSD FRML MDRD: 19 ML/MIN/1.73
GLUCOSE BLDC GLUCOMTR-MCNC: 226 MG/DL (ref 70–130)
GLUCOSE BLDC GLUCOMTR-MCNC: 262 MG/DL (ref 70–130)
GLUCOSE BLDC GLUCOMTR-MCNC: 302 MG/DL (ref 70–130)
GLUCOSE SERPL-MCNC: 149 MG/DL (ref 65–99)
GLUCOSE SERPL-MCNC: 247 MG/DL (ref 65–99)
HCO3 BLDA-SCNC: 39.6 MMOL/L (ref 20–26)
HCO3 BLDA-SCNC: 40.7 MMOL/L (ref 20–26)
HCO3 BLDA-SCNC: 41.1 MMOL/L (ref 20–26)
HCT VFR BLD AUTO: 26.6 % (ref 34–46.6)
HGB BLD-MCNC: 7.5 G/DL (ref 12–15.9)
HGB BLDA-MCNC: 7.3 G/DL (ref 13.5–17.5)
HGB BLDA-MCNC: 7.7 G/DL (ref 13.5–17.5)
HGB BLDA-MCNC: 7.9 G/DL (ref 13.5–17.5)
IMM GRANULOCYTES # BLD AUTO: 0.02 10*3/MM3 (ref 0–0.05)
IMM GRANULOCYTES NFR BLD AUTO: 0.4 % (ref 0–0.5)
IPAP: 30
LYMPHOCYTES # BLD AUTO: 1.08 10*3/MM3 (ref 0.7–3.1)
LYMPHOCYTES NFR BLD AUTO: 19.7 % (ref 19.6–45.3)
Lab: ABNORMAL
MCH RBC QN AUTO: 26.7 PG (ref 26.6–33)
MCHC RBC AUTO-ENTMCNC: 28.2 G/DL (ref 31.5–35.7)
MCV RBC AUTO: 94.7 FL (ref 79–97)
MODALITY: ABNORMAL
MONOCYTES # BLD AUTO: 0.52 10*3/MM3 (ref 0.1–0.9)
MONOCYTES NFR BLD AUTO: 9.5 % (ref 5–12)
NEUTROPHILS NFR BLD AUTO: 3.68 10*3/MM3 (ref 1.7–7)
NEUTROPHILS NFR BLD AUTO: 66.9 % (ref 42.7–76)
NOTIFIED BY: ABNORMAL
NOTIFIED WHO: ABNORMAL
NRBC BLD AUTO-RTO: 0 /100 WBC (ref 0–0.2)
NT-PROBNP SERPL-MCNC: 1880 PG/ML (ref 0–900)
PAW @ PEAK INSP FLOW SETTING VENT: 22 CMH2O
PCO2 BLDA: 79.4 MM HG (ref 35–45)
PCO2 BLDA: 80.9 MM HG (ref 35–45)
PCO2 BLDA: 86.4 MM HG (ref 35–45)
PCO2 TEMP ADJ BLD: 79.4 MM HG (ref 35–45)
PCO2 TEMP ADJ BLD: 80.9 MM HG (ref 35–45)
PCO2 TEMP ADJ BLD: 86.4 MM HG (ref 35–45)
PH BLDA: 7.28 PH UNITS (ref 7.35–7.45)
PH BLDA: 7.31 PH UNITS (ref 7.35–7.45)
PH BLDA: 7.32 PH UNITS (ref 7.35–7.45)
PH, TEMP CORRECTED: 7.28 PH UNITS (ref 7.35–7.45)
PH, TEMP CORRECTED: 7.31 PH UNITS (ref 7.35–7.45)
PH, TEMP CORRECTED: 7.32 PH UNITS (ref 7.35–7.45)
PHOSPHATE SERPL-MCNC: 6.2 MG/DL (ref 2.5–4.5)
PLATELET # BLD AUTO: 184 10*3/MM3 (ref 140–450)
PMV BLD AUTO: 11.2 FL (ref 6–12)
PO2 BLDA: 54.8 MM HG (ref 83–108)
PO2 BLDA: 59.4 MM HG (ref 83–108)
PO2 BLDA: 74.2 MM HG (ref 83–108)
PO2 TEMP ADJ BLD: 54.8 MM HG (ref 83–108)
PO2 TEMP ADJ BLD: 59.4 MM HG (ref 83–108)
PO2 TEMP ADJ BLD: 74.2 MM HG (ref 83–108)
POTASSIUM SERPL-SCNC: 4.5 MMOL/L (ref 3.5–5.2)
POTASSIUM SERPL-SCNC: 5 MMOL/L (ref 3.5–5.2)
PROCALCITONIN SERPL-MCNC: 0.08 NG/ML (ref 0–0.25)
RBC # BLD AUTO: 2.81 10*6/MM3 (ref 3.77–5.28)
SAO2 % BLDCOA: 86.1 % (ref 94–99)
SAO2 % BLDCOA: 89.5 % (ref 94–99)
SAO2 % BLDCOA: 94.1 % (ref 94–99)
SET MECH RESP RATE: 12
SET MECH RESP RATE: 20
SODIUM SERPL-SCNC: 138 MMOL/L (ref 136–145)
SODIUM SERPL-SCNC: 139 MMOL/L (ref 136–145)
TROPONIN T SERPL-MCNC: 0.04 NG/ML (ref 0–0.03)
VENTILATOR MODE: ABNORMAL
VT ON VENT VENT: 408 ML
VT ON VENT VENT: 450 ML
WBC # BLD AUTO: 5.49 10*3/MM3 (ref 3.4–10.8)

## 2020-08-08 PROCEDURE — 84484 ASSAY OF TROPONIN QUANT: CPT | Performed by: INTERNAL MEDICINE

## 2020-08-08 PROCEDURE — 25010000002 ENOXAPARIN PER 10 MG: Performed by: INTERNAL MEDICINE

## 2020-08-08 PROCEDURE — 63710000001 INSULIN DETEMIR PER 5 UNITS: Performed by: INTERNAL MEDICINE

## 2020-08-08 PROCEDURE — 63710000001 INSULIN ASPART PER 5 UNITS: Performed by: INTERNAL MEDICINE

## 2020-08-08 PROCEDURE — 94799 UNLISTED PULMONARY SVC/PX: CPT

## 2020-08-08 PROCEDURE — 80069 RENAL FUNCTION PANEL: CPT | Performed by: INTERNAL MEDICINE

## 2020-08-08 PROCEDURE — 99291 CRITICAL CARE FIRST HOUR: CPT | Performed by: INTERNAL MEDICINE

## 2020-08-08 PROCEDURE — 94660 CPAP INITIATION&MGMT: CPT

## 2020-08-08 PROCEDURE — 63710000001 LEVALBUTEROL PER 0.5 MG: Performed by: INTERNAL MEDICINE

## 2020-08-08 PROCEDURE — 83880 ASSAY OF NATRIURETIC PEPTIDE: CPT | Performed by: INTERNAL MEDICINE

## 2020-08-08 PROCEDURE — 82803 BLOOD GASES ANY COMBINATION: CPT

## 2020-08-08 PROCEDURE — 84145 PROCALCITONIN (PCT): CPT | Performed by: INTERNAL MEDICINE

## 2020-08-08 PROCEDURE — 99222 1ST HOSP IP/OBS MODERATE 55: CPT | Performed by: INTERNAL MEDICINE

## 2020-08-08 PROCEDURE — 80048 BASIC METABOLIC PNL TOTAL CA: CPT | Performed by: INTERNAL MEDICINE

## 2020-08-08 PROCEDURE — 85025 COMPLETE CBC W/AUTO DIFF WBC: CPT | Performed by: INTERNAL MEDICINE

## 2020-08-08 PROCEDURE — 82962 GLUCOSE BLOOD TEST: CPT

## 2020-08-08 PROCEDURE — 36600 WITHDRAWAL OF ARTERIAL BLOOD: CPT

## 2020-08-08 RX ORDER — BUMETANIDE 0.25 MG/ML
2 INJECTION INTRAMUSCULAR; INTRAVENOUS EVERY 6 HOURS
Status: DISCONTINUED | OUTPATIENT
Start: 2020-08-08 | End: 2020-08-08

## 2020-08-08 RX ORDER — LEVALBUTEROL INHALATION SOLUTION 0.63 MG/3ML
0.63 SOLUTION RESPIRATORY (INHALATION)
Status: DISCONTINUED | OUTPATIENT
Start: 2020-08-08 | End: 2020-08-14 | Stop reason: HOSPADM

## 2020-08-08 RX ADMIN — QUETIAPINE FUMARATE 100 MG: 100 TABLET ORAL at 09:26

## 2020-08-08 RX ADMIN — SODIUM CHLORIDE, PRESERVATIVE FREE 10 ML: 5 INJECTION INTRAVENOUS at 20:52

## 2020-08-08 RX ADMIN — BUMETANIDE 2 MG: 0.25 INJECTION INTRAMUSCULAR; INTRAVENOUS at 05:25

## 2020-08-08 RX ADMIN — INSULIN ASPART 7 UNITS: 100 INJECTION, SOLUTION INTRAVENOUS; SUBCUTANEOUS at 11:59

## 2020-08-08 RX ADMIN — METOPROLOL TARTRATE 50 MG: 50 TABLET, FILM COATED ORAL at 00:50

## 2020-08-08 RX ADMIN — LEVALBUTEROL HYDROCHLORIDE 0.63 MG: 0.63 SOLUTION RESPIRATORY (INHALATION) at 07:52

## 2020-08-08 RX ADMIN — INSULIN ASPART 8 UNITS: 100 INJECTION, SOLUTION INTRAVENOUS; SUBCUTANEOUS at 18:25

## 2020-08-08 RX ADMIN — LEVALBUTEROL HYDROCHLORIDE 0.63 MG: 0.63 SOLUTION RESPIRATORY (INHALATION) at 14:12

## 2020-08-08 RX ADMIN — ACETAMINOPHEN 650 MG: 325 TABLET, FILM COATED ORAL at 19:29

## 2020-08-08 RX ADMIN — INSULIN ASPART 4 UNITS: 100 INJECTION, SOLUTION INTRAVENOUS; SUBCUTANEOUS at 09:26

## 2020-08-08 RX ADMIN — ASPIRIN 81 MG: 81 TABLET, COATED ORAL at 09:26

## 2020-08-08 RX ADMIN — ATORVASTATIN CALCIUM 40 MG: 40 TABLET, FILM COATED ORAL at 09:26

## 2020-08-08 RX ADMIN — INSULIN DETEMIR 20 UNITS: 100 INJECTION, SOLUTION SUBCUTANEOUS at 20:52

## 2020-08-08 RX ADMIN — ENOXAPARIN SODIUM 30 MG: 30 INJECTION SUBCUTANEOUS at 18:31

## 2020-08-08 RX ADMIN — SODIUM CHLORIDE, PRESERVATIVE FREE 10 ML: 5 INJECTION INTRAVENOUS at 09:27

## 2020-08-08 RX ADMIN — BUMETANIDE 2 MG: 0.25 INJECTION INTRAMUSCULAR; INTRAVENOUS at 11:58

## 2020-08-08 RX ADMIN — METOPROLOL TARTRATE 50 MG: 50 TABLET, FILM COATED ORAL at 11:59

## 2020-08-08 RX ADMIN — LEVALBUTEROL HYDROCHLORIDE 0.63 MG: 0.63 SOLUTION RESPIRATORY (INHALATION) at 19:24

## 2020-08-08 RX ADMIN — QUETIAPINE FUMARATE 100 MG: 100 TABLET ORAL at 20:52

## 2020-08-08 RX ADMIN — FLUTICASONE FUROATE, UMECLIDINIUM BROMIDE AND VILANTEROL TRIFENATATE 1 PUFF: 100; 62.5; 25 POWDER RESPIRATORY (INHALATION) at 11:04

## 2020-08-08 RX ADMIN — LEVOTHYROXINE SODIUM 175 MCG: 150 TABLET ORAL at 09:26

## 2020-08-08 NOTE — PROGRESS NOTES
"SERVICE: [facility] HOSPITALIST    CHIEF COMPLAINT: Hypercapnic respiratory failure    SUBJECTIVE:    Patient examined this morning in conjunction with Dr. Barreto.  Both of us concur that patient appears to be volume up.  Patient is more lethargic today than she was yesterday, did not wake up like she had last night when manipulating her legs.    Patient does complain of still feeling short of breath, feeling crappy, again still nonspecific.  Though did not complain about her legs being unwrapped, and the need for them to be wrapped.    Review of systems was negative for chest pain, nausea, vomiting, diarrhea.    OBJECTIVE:    /60   Pulse 64   Temp 97.6 °F (36.4 °C) (Axillary)   Resp 18   Ht 167.6 cm (66\")   Wt 116 kg (255 lb 12.8 oz)   LMP  (LMP Unknown)   SpO2 92%   BMI 41.29 kg/m²     MEDS/LABS REVIEWED AND ORDERED      aspirin 81 mg Oral Daily   atorvastatin 40 mg Oral Daily   bumetanide 2 mg Intravenous Q6H   enoxaparin 30 mg Subcutaneous Q24H   insulin aspart 0-9 Units Subcutaneous TID AC   levalbuterol 0.63 mg Nebulization Q6H - RT   levothyroxine 175 mcg Oral Daily   metoprolol tartrate 50 mg Oral Q12H   QUEtiapine 100 mg Oral Q12H   sodium chloride 10 mL Intravenous Q12H   Fluticasone-Umeclidin-Vilant 1 puff Inhalation Daily - RT       Physical Exam   Constitutional: She appears lethargic.   Not using accessory muscles, not tachypneic on her home trilogy, but appears more lethargic and acutely ill than yesterday   HENT:   Head: Normocephalic and atraumatic.   Eyes: Pupils are equal, round, and reactive to light.   Cardiovascular: Normal rate, regular rhythm and normal heart sounds. Exam reveals no friction rub.   No murmur heard.  Pulmonary/Chest: Effort normal. No stridor. No respiratory distress. She has no wheezes. She has rales.   Diminished in all fields   Abdominal: Soft. Bowel sounds are normal. She exhibits no distension and no mass. There is no tenderness. There " is no guarding.   Musculoskeletal: She exhibits edema.   2+ on bilateral feet, 1+ in area of chronic venous stasis dermatitis, no pitting on chronic lymphedema above knee   Neurological: She appears lethargic. She is disoriented. No cranial nerve deficit.   Pain on light palpation on her bilateral lower extremities   Skin: She is not diaphoretic. There is erythema.   Chronic bilateral venous stasis dermatitis   Nursing note and vitals reviewed.      LAB/DIAGNOSTICS:    Lab Results (last 24 hours)     Procedure Component Value Units Date/Time    POC Glucose Once [[id number]]  (Abnormal) Collected:  08/08/20 1054    Specimen:  Blood Updated:  08/08/20 1100     Glucose 302 mg/dL     Blood Gas, Arterial [[id number]]  (Abnormal) Collected:  08/08/20 0950    Specimen:  Arterial Blood Updated:  08/08/20 0955     Site Left Radial     Kevyn's Test Positive     pH, Arterial 7.307 pH units      Comment: 84 Value below reference range        pCO2, Arterial 79.4 mm Hg      Comment: 86 Value above critical limit        pO2, Arterial 54.8 mm Hg      Comment: 85 Value below critical limit        HCO3, Arterial 39.6 mmol/L      Comment: 83 Value above reference range        Base Excess, Arterial 11.6 mmol/L      Comment: 83 Value above reference range        O2 Saturation, Arterial 86.1 %      Comment: 84 Value below reference range        Hemoglobin, Blood Gas 7.9 g/dL      Temperature 37.0 C      Barometric Pressure for Blood Gas 743 mmHg      Modality Nasal Cannula     Flow Rate 5.0 lpm      Ventilator Mode NA     Comment: Meter: [id number]     :  [id number]        Notified Who RB AND V DR ELIZABETH     Notified By [id number]     Notified Time 08/08/2020 09:57     pCO2, Temperature Corrected 79.4 mm Hg      pH, Temp Corrected 7.307 pH Units      pO2, Temperature Corrected 54.8 mm Hg     POC Glucose Once [[id number]]  (Abnormal) Collected:  08/08/20 0736    Specimen:  Blood Updated:  08/08/20 0742     Glucose 226 mg/dL     Renal  Function Panel [[id number]]  (Abnormal) Collected:  08/08/20 0540    Specimen:  Blood Updated:  08/08/20 0636     Glucose 149 mg/dL      BUN 60 mg/dL      Creatinine 2.59 mg/dL      Sodium 138 mmol/L      Potassium 4.5 mmol/L      Chloride 94 mmol/L      CO2 30.9 mmol/L      Calcium 8.4 mg/dL      Albumin 2.60 g/dL      Phosphorus 6.2 mg/dL      Anion Gap 13.1 mmol/L      BUN/Creatinine Ratio 23.2     eGFR Non African Amer 19 mL/min/1.73     Narrative:       GFR Normal >60  Chronic Kidney Disease <60  Kidney Failure <15      Troponin [[id number]]  (Abnormal) Collected:  08/08/20 0540    Specimen:  Blood Updated:  08/08/20 0630     Troponin T 0.040 ng/mL     Narrative:       Troponin T Reference Range:  <= 0.03 ng/mL-   Negative for AMI  >0.03 ng/mL-     Abnormal for myocardial necrosis.  Clinicians would have to utilize clinical acumen, EKG, Troponin and serial changes to determine if it is an Acute Myocardial Infarction or myocardial injury due to an underlying chronic condition.       Results may be falsely decreased if patient taking Biotin.      Procalcitonin [[id number]]  (Normal) Collected:  08/08/20 0540    Specimen:  Blood Updated:  08/08/20 0622     Procalcitonin 0.08 ng/mL     Narrative:       Results may be falsely decreased if patient taking Biotin.     BNP [[id number]]  (Abnormal) Collected:  08/08/20 0540    Specimen:  Blood Updated:  08/08/20 0622     proBNP 1,880.0 pg/mL     Narrative:       Among patients with dyspnea, NT-proBNP is highly sensitive for the detection of acute congestive heart failure. In addition NT-proBNP of <300 pg/ml effectively rules out acute congestive heart failure with 99% negative predictive value.    Results may be falsely decreased if patient taking Biotin.      CBC & Differential [[id number]] Collected:  08/08/20 0540    Specimen:  Blood Updated:  08/08/20 0559    Narrative:       The following orders were created for panel order CBC & Differential.  Procedure                                Abnormality         Status                     ---------                               -----------         ------                     CBC Auto Differential[[id number]]        Abnormal            Final result                 Please view results for these tests on the individual orders.    CBC Auto Differential [[id number]]  (Abnormal) Collected:  08/08/20 0540    Specimen:  Blood Updated:  08/08/20 0559     WBC 5.49 10*3/mm3      RBC 2.81 10*6/mm3      Hemoglobin 7.5 g/dL      Hematocrit 26.6 %      MCV 94.7 fL      MCH 26.7 pg      MCHC 28.2 g/dL      RDW 14.6 %      RDW-SD 50.6 fl      MPV 11.2 fL      Platelets 184 10*3/mm3      Neutrophil % 66.9 %      Lymphocyte % 19.7 %      Monocyte % 9.5 %      Eosinophil % 3.3 %      Basophil % 0.2 %      Immature Grans % 0.4 %      Neutrophils, Absolute 3.68 10*3/mm3      Lymphocytes, Absolute 1.08 10*3/mm3      Monocytes, Absolute 0.52 10*3/mm3      Eosinophils, Absolute 0.18 10*3/mm3      Basophils, Absolute 0.01 10*3/mm3      Immature Grans, Absolute 0.02 10*3/mm3      nRBC 0.0 /100 WBC     POC Glucose Once [[id number]]  (Abnormal) Collected:  08/07/20 2131    Specimen:  Blood Updated:  08/07/20 2138     Glucose 188 mg/dL     Blood Gas, Arterial [[id number]]  (Abnormal) Collected:  08/07/20 2120    Specimen:  Arterial Blood Updated:  08/07/20 2134     Site Left Radial     Kevyn's Test Positive     pH, Arterial 7.334 pH units      Comment: 84 Value below reference range        pCO2, Arterial 73.9 mm Hg      Comment: 86 Value above critical limit        pO2, Arterial 55.0 mm Hg      Comment: 84 Value below reference range        HCO3, Arterial 39.3 mmol/L      Comment: 83 Value above reference range        Base Excess, Arterial 11.9 mmol/L      Comment: 83 Value above reference range        O2 Saturation, Arterial 86.9 %      Comment: 84 Value below reference range        Hemoglobin, Blood Gas 7.5 g/dL      Temperature 37.0 C      Barometric Pressure for Blood Gas  742 mmHg      Modality BiPap     Flow Rate 4.0 lpm      Ventilator Mode NA     Comment: Meter: [id number]     :  [id number]        Notified Who RB AND YIN MCLAIN     Notified By [id number]     Notified Time 08/07/2020 21:37     pCO2, Temperature Corrected 73.9 mm Hg      pH, Temp Corrected 7.334 pH Units      pO2, Temperature Corrected 55.0 mm Hg     Procalcitonin [[id number]]  (Normal) Collected:  08/07/20 1529    Specimen:  Blood Updated:  08/07/20 1938     Procalcitonin 0.10 ng/mL     Narrative:       Results may be falsely decreased if patient taking Biotin.     CBC & Differential [[id number]] Collected:  08/07/20 1855    Specimen:  Blood Updated:  08/07/20 1901    Narrative:       The following orders were created for panel order CBC & Differential.  Procedure                               Abnormality         Status                     ---------                               -----------         ------                     CBC Auto Differential[[id number]]        Abnormal            Final result                 Please view results for these tests on the individual orders.    CBC Auto Differential [[id number]]  (Abnormal) Collected:  08/07/20 1855    Specimen:  Blood Updated:  08/07/20 1901     WBC 7.80 10*3/mm3      RBC 3.14 10*6/mm3      Hemoglobin 8.3 g/dL      Hematocrit 30.0 %      MCV 95.5 fL      MCH 26.4 pg      MCHC 27.7 g/dL      RDW 15.0 %      RDW-SD 52.3 fl      MPV 11.1 fL      Platelets 205 10*3/mm3      Neutrophil % 71.6 %      Lymphocyte % 17.3 %      Monocyte % 8.3 %      Eosinophil % 2.1 %      Basophil % 0.3 %      Immature Grans % 0.4 %      Neutrophils, Absolute 5.59 10*3/mm3      Lymphocytes, Absolute 1.35 10*3/mm3      Monocytes, Absolute 0.65 10*3/mm3      Eosinophils, Absolute 0.16 10*3/mm3      Basophils, Absolute 0.02 10*3/mm3      Immature Grans, Absolute 0.03 10*3/mm3      nRBC 0.0 /100 WBC     POC Glucose Once [[id number]]  (Normal) Collected:  08/07/20 1848    Specimen:  Blood  Updated:  08/07/20 1901     Glucose 121 mg/dL     POC Glucose Once [[id number]]  (Abnormal) Collected:  08/07/20 1606    Specimen:  Blood Updated:  08/07/20 1622     Glucose 33 mg/dL     BNP [[id number]]  (Abnormal) Collected:  08/07/20 1529    Specimen:  Blood Updated:  08/07/20 1608     proBNP 1,338.0 pg/mL     Narrative:       Among patients with dyspnea, NT-proBNP is highly sensitive for the detection of acute congestive heart failure. In addition NT-proBNP of <300 pg/ml effectively rules out acute congestive heart failure with 99% negative predictive value.    Results may be falsely decreased if patient taking Biotin.      Comprehensive Metabolic Panel [[id number]]  (Abnormal) Collected:  08/07/20 1529    Specimen:  Blood Updated:  08/07/20 1603     Glucose 29 mg/dL      BUN 57 mg/dL      Creatinine 2.65 mg/dL      Sodium 141 mmol/L      Potassium 4.8 mmol/L      Chloride 95 mmol/L      CO2 35.9 mmol/L      Calcium 8.5 mg/dL      Total Protein 6.8 g/dL      Albumin 3.30 g/dL      ALT (SGPT) 24 U/L      AST (SGOT) 15 U/L      Alkaline Phosphatase 84 U/L      Total Bilirubin 0.4 mg/dL      eGFR Non African Amer 18 mL/min/1.73      Globulin 3.5 gm/dL      A/G Ratio 0.9 g/dL      BUN/Creatinine Ratio 21.5     Anion Gap 10.1 mmol/L     Narrative:       GFR Normal >60  Chronic Kidney Disease <60  Kidney Failure <15      Troponin [[id number]]  (Abnormal) Collected:  08/07/20 1529    Specimen:  Blood Updated:  08/07/20 1602     Troponin T 0.047 ng/mL     Narrative:       Troponin T Reference Range:  <= 0.03 ng/mL-   Negative for AMI  >0.03 ng/mL-     Abnormal for myocardial necrosis.  Clinicians would have to utilize clinical acumen, EKG, Troponin and serial changes to determine if it is an Acute Myocardial Infarction or myocardial injury due to an underlying chronic condition.       Results may be falsely decreased if patient taking Biotin.      Blood Gas, Arterial [[id number]]  (Abnormal) Collected:  08/07/20 7284     Specimen:  Arterial Blood Updated:  08/07/20 1559     Site Left Radial     Kevyn's Test Positive     pH, Arterial 7.324 pH units      Comment: 84 Value below reference range        pCO2, Arterial 75.5 mm Hg      Comment: 86 Value above critical limit        pO2, Arterial 64.6 mm Hg      Comment: 84 Value below reference range        HCO3, Arterial 39.2 mmol/L      Comment: 83 Value above reference range        Base Excess, Arterial 11.5 mmol/L      Comment: 83 Value above reference range        O2 Saturation, Arterial 90.9 %      Comment: 84 Value below reference range        Hemoglobin, Blood Gas 8.1 g/dL      Temperature 37.0 C      Barometric Pressure for Blood Gas 742 mmHg      Modality Nasal Cannula     Flow Rate 4.0 lpm      Ventilator Mode NA     Comment: Meter: [id number]     :  [id number]        Notified Who RB AND YIN DEMPSEY     Notified By [id number]     Notified Time 08/07/2020 16:02     pCO2, Temperature Corrected 75.5 mm Hg      pH, Temp Corrected 7.324 pH Units      pO2, Temperature Corrected 64.6 mm Hg     CBC & Differential [[id number]] Collected:  08/07/20 1529    Specimen:  Blood Updated:  08/07/20 1548    Narrative:       The following orders were created for panel order CBC & Differential.  Procedure                               Abnormality         Status                     ---------                               -----------         ------                     CBC Auto Differential[[id number]]        Abnormal            Final result                 Please view results for these tests on the individual orders.    CBC Auto Differential [[id number]]  (Abnormal) Collected:  08/07/20 1529    Specimen:  Blood Updated:  08/07/20 1548     WBC 8.31 10*3/mm3      RBC 3.09 10*6/mm3      Hemoglobin 8.2 g/dL      Hematocrit 29.5 %      MCV 95.5 fL      MCH 26.5 pg      MCHC 27.8 g/dL      RDW 14.9 %      RDW-SD 51.6 fl      MPV 10.9 fL      Platelets 202 10*3/mm3      Neutrophil % 66.4 %      Lymphocyte  % 20.5 %      Monocyte % 10.1 %      Eosinophil % 2.5 %      Basophil % 0.1 %      Immature Grans % 0.4 %      Neutrophils, Absolute 5.52 10*3/mm3      Lymphocytes, Absolute 1.70 10*3/mm3      Monocytes, Absolute 0.84 10*3/mm3      Eosinophils, Absolute 0.21 10*3/mm3      Basophils, Absolute 0.01 10*3/mm3      Immature Grans, Absolute 0.03 10*3/mm3      nRBC 0.0 /100 WBC         Results for orders placed during the hospital encounter of 08/30/18   Adult Transthoracic Echo Limited W/ Cont if Necessary Per Protocol    Narrative · Left ventricular wall thickness is consistent with mild-to-moderate   concentric hypertrophy.  · Left ventricular systolic function is normal.  · Left ventricular diastolic dysfunction (grade I a) consistent with   impaired relaxation.  · Calculated EF = 65%.        Xr Chest 2 View    Result Date: 8/7/2020  Cardiomegaly with mild central vascular prominence and small bilateral pleural effusions with bibasilar atelectasis. Correlate for symptoms of congestive failure.  This report was finalized on 8/7/2020 3:38 PM by Dr. Jewel Currie MD.      Us Renal Bilateral    Result Date: 8/7/2020  Renal sizes as described. Possible shadowing stone in the mid right collecting. No hydronephrosis. Signer Name: Geovanny Currie MD  Signed: 8/7/2020 7:45 PM  Workstation Name: [id number]  [facility]        ASSESSMENT/PLAN:    Acute on chronic hypoxic hypercapnic Respiratory Failure w/ Chronic Hypoxic Resp Failure   -At home on 3L o2, triology at night, goal Sats 87-92%, Higher results in CO2 retention, baseline Co2 55-60  -No wheezing on exam, unclear if acute exacerbation of COPD a factor on this admission versus more being due to heart failure, see heart failure below  -ABG down in the ER pH 7.3, PCO2 75.5, PO2 64.6 on 6 L  -Was satting well on 3 L last night, attempted to use patient's trilogy, but patient's hypercapnia stayed the same, and pH worsened as of this a.m.  -attempted to  schedule duo nebs and as needed albuterol, but patient refused and stated she wanted to take her home meds instead  -Started patient on hospital BiPAP, with rate of 12, and tidal volume of 450, due to some probably artifactual desaturations, patient was increased to 5 L, ABG this afternoon worse, respiratory has increased rate 20, keep tidal volume the same, diminished FiO2, will recheck ABG later  -Patient has had hypercapnia last admission when her sats were allowed to be above 95%, and last admission patient appeared to have more issues with hypercapnia then hypoxia  -Pulmonary following, who also feel this is likely more related to heart failure     Acute on chronic diastolic heart failure  -BNP increased this a.m., examined with Dr. 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, suspect prerenal from cardiorenal  -See heart failure above  - Baseline Cr 1.5 - 1.7?, Occasional eGFR's in the low 30's, most <30  -Last creatinine on 7/31: 1.73, was 2.65 on admit  -Nephro consult  -Creatinine only with slight improvement, will recheck a BMP this afternoon     Chronic venous stasis dermatitis on chronic lymphedema   -Was treated as cellulitis last admission, check pro-Sam it is negative, no white count, unlikely to be cellulitis  -Consult wound care for leg wraps, unclear if patient had been maintained on leg wraps with home health, or if they were removed in the ER for skin exam      DM2 w/ perpherial neuropathy complicated by hypoglycemia  -Accu-Chek in the ER was 77, 29 on BMP, with 33 on Accu-Chek, due to her being fluid overloaded will not run D10  - a1c 5.4  - home insulin 74 units Tresiba daily, with a 0 to 21 unit sliding scale short acting  -Last admission  presented similarly, long-acting was held, and gradually uptitrated to her home dose, though it was noted she may be having some steroid-induced hyperglycemia, and steroids were being titrated off at the end of her admission  -Patient also reported last admission that when she gets sick she forgets to eat, but she takes her insulin as prescribed  -Fasting today was 226, will increase sliding scale to 0-24, and start 20 units of long-acting nightly     Essential HTN / HLD   -Was on on 10 mg Norvasc (home dose 2.5 mg), home statin sees Lipitor 40 mg daily), metoprolol 100 mg every 12, and last admission started on 75 mg every 8 hours of hydralazine, but patient stated that she had not started taking this yet  -SBP's continue to be 100s to 120s, will continue to hold her hydralazine and Norvasc, and cut her metoprolol in half, and gradually re-add as patient's blood pressures improve     Hypothyroidism  - TSH wnl, home dose of 175 mcg     Stage II Pressure Ulcer Left Buttock POA -consult wound care, was stage II on discharge from last admission, has been being followed by home health nurse     Chronic normocytic anemia due to chronic inflammation/CKD    Critical care time from 08 10- 0 820, then from 09 30- 09 45, then [id number]

## 2020-08-08 NOTE — SIGNIFICANT NOTE
08/07/20 2130   Provider Notification   Reason for Communication Critical lab value  (ABG)   Provider Name Dr. Shore   Notification Route Phone call   Response No new orders  (continue same care at this time)

## 2020-08-08 NOTE — PLAN OF CARE
"Continued Stay Note  [facility]     Patient Name: Jared Govea  MRN: [id number]  Today's Date: 8/8/2020    Admit Date: 8/7/2020    Discharge Plan       Row Name 08/08/20 1114       Plan    Plan  Uncertain at this time    Patient/Family in Agreement with Plan  no    Plan Comments  Spoke with Mrs Govea at bedside.  She lives with her sister in a home in [place].  Facesheet verified.  Mrs Govea was just diachrged on 8/1 from this facility to her sister's home.  her discharge plan was discussed and she states \"I am will to go to a skilled nursing facility for rehab. I am not going to stay.  I want to go home.\"  She is on oxygen @ 3l/min/nc at home and a trilogy at night.  These are provided by [employer] in [place].  She is dependent on help for her ADL's and her sister does al of the driving.  Her pharmacy is [facility] in [place]. She states she is able to afford her prescriptions.  She has an advacned directive on file here at the hospital.              Discharge Codes    No documentation.               Saige Goyal RN    "

## 2020-08-08 NOTE — OUTREACH NOTE
Medical Week 2 Survey      Responses   [facility] patient discharged from?  [facility]   COVID-19 Test Status  Negative   Does the patient have one of the following disease processes/diagnoses(primary or secondary)?  Other   Week 2 attempt successful?  No   Revoke  Readmitted          Mary Whiteside RN

## 2020-08-08 NOTE — PLAN OF CARE
Problem: Patient Care Overview  Goal: Plan of Care Review  Outcome: Ongoing (interventions implemented as appropriate)  Flowsheets (Taken 8/8/2020 0400)  Progress: no change  Plan of Care Reviewed With: patient  Outcome Summary: Patient VSS, sats 88-90% on BiPap. Patient denies pain this shift. Bumex IV given, patient voiding. Resting between care.

## 2020-08-08 NOTE — H&P
"[facility] HOSPITALIST     Marilu Suresh, OPAL    CHIEF COMPLAINT: Heart failure and hypoglycemia    HISTORY OF PRESENT ILLNESS:    65-year-old white female well-known to me from recent admission w/ PMH of Chronic hypoxic/hypercapnic respiratory failure, COPD, DM 2, chronic lymphedema, CKD stage III-4, HTN, previous left buttock pressure ulcer, generalized anxiety disorder/depression, and hypothyroidism who presented via EMS from home after home health nurse found patient to be de-satting down to 62% when asked to stand to check stage II pressure ulcer, even though she was on 4.5 L through her trilogy (patient is usually on 3 L NC).  Patient had been de-satting for a few days, and family had been attempting to get patient to come to the hospital, but patient was non-receptive.    As for her most recent admission, patient was discharged on 8/1, patient was sent home with her sister and home health on discharge last time, due to being turned down by [facility] pulmonary rehab, and all acute care places as not qualifying given her baseline immobility (patient uses a scooter to mobilize at home).  Case management at the time of discharge discussed with the patient that she needs to either go long-term care with Medicaid pending, or home with home health, and it was the opinion of the staff and her sister that LTC was probably safest option, but patient was anxious about going to NH, \"leaving her family and giving up her money\".  So she could chose to go home with her sister and home health.  Review of the records, show that home health had almost immediate concerns about the patient, per that her pulse oximetry she was often reading 85% on 4 L, and that she was not to be turned above 4 L to 2 issues with hypercapnia.  Patient had been sleeping in her chair due to reported orthopnea.    The patient tonight states that she is weak.  Declined to answer questions because she was on trilogy, and was " "too weak.  However when examining legs, patient woke up and started speaking and short sentences about her legs needing to be wrapped, and her leg pain increasing because they were not wrapped.  Unclear if home health was doing compression wrappings.  After exam, patient called nurse and asked the nurse \"what we were doing about her legs tonight\", and nursing will discuss with patient that we will place the compression wrappings once wound care can see her.    When asked about increased erythema, drainage, or pain, patient stated \"this is how they are when they are not wrapped\".    Suspect patient has significant noncompliance at home.  This could have been worsened by her discontinuation of chronic Seroquel last admission due to [facility] stating that he would not take the patient unless she was off of the Seroquel that she has been on for 6 or more years.    History is limited by patient's cooperation, and unable to get full review of systems.    Past Medical History:   Diagnosis Date   • Anxiety    • Arthritis    • Cellulitis    • CHF (congestive heart failure) (CMS/HCC)    • Chronic back pain    • COPD (chronic obstructive pulmonary disease) (CMS/HCC)    • Depression    • Essential hypertension    • SERENA (generalized anxiety disorder)    • GERD (gastroesophageal reflux disease)    • Headache    • HLD (hyperlipidemia)    • Hypothyroidism, acquired    • Low back pain    • Neuropathy in diabetes (CMS/HCC)    • Rectal abscess    • Sleep apnea    • Type 2 diabetes mellitus (CMS/HCC)    • Wound, open, toe 8/22/2017     Past Surgical History:   Procedure Laterality Date   • DILATATION AND CURETTAGE      x5   • LUNG SURGERY Right    • TRACHEOSTOMY       Family History   Problem Relation Age of Onset   • Cancer Mother         bone   • Cancer Father    • Cancer Brother      Social History     Tobacco Use   • Smoking status: Former Smoker     Packs/day: 0.50     Years: 40.00     Pack years: 20.00     " Types: Cigarettes     Last attempt to quit: 10/4/2010     Years since quittin.8   • Smokeless tobacco: Former User     Quit date: 8/3/2012   Substance Use Topics   • Alcohol use: No   • Drug use: No     Medications Prior to Admission   Medication Sig Dispense Refill Last Dose   • amLODIPine (NORVASC) 10 MG tablet Take 1 tablet by mouth Daily. 30 tablet 0 2020 at Unknown time   • aspirin 81 MG EC tablet Take 81 mg by mouth daily.   2020 at Unknown time   • atorvastatin (LIPITOR) 40 MG tablet TAKE ONE TABLET BY MOUTH DAILY FOR CHOLESTEROL 30 tablet 0 2020 at Unknown time   • B Complex Vitamins (VITAMIN B COMPLEX PO) Take 1 tablet by mouth daily.   2020 at Unknown time   • bumetanide (BUMEX) 1 MG tablet TAKE TWO TABLETS BY MOUTH TWICE A  tablet 0 2020 at Unknown time   • cetirizine (ZyrTEC) 10 MG tablet Take 10 mg by mouth every night.   2020 at Unknown time   • famotidine (PEPCID) 20 MG tablet Take 20 mg by mouth daily.   2020 at Unknown time   • guaiFENesin (MUCINEX) 600 MG 12 hr tablet Take 1,200 mg by mouth 2 (Two) Times a Day.   2020 at Unknown time   • hydrocortisone 1 % ointment Apply  topically to the appropriate area as directed 2 (Two) Times a Day. 56 g 0 2020 at Unknown time   • Insulin Degludec (Tresiba FlexTouch) 200 UNIT/ML solution pen-injector pen injection Inject 74 Units under the skin into the appropriate area as directed Daily. 12 pen 1 2020 at Unknown time   • levothyroxine (SYNTHROID, LEVOTHROID) 175 MCG tablet TAKE ONE TABLET BY MOUTH DAILY 90 tablet 1 2020 at Unknown time   • metoprolol tartrate (LOPRESSOR) 100 MG tablet TAKE ONE TABLET BY MOUTH EVERY 12 HOURS 60 tablet 0 2020 at Unknown time   • O2 (OXYGEN) Inhale 5 L/min 1 (One) Time.   2020 at Unknown time   • Probiotic Product (ALIGN) 4 MG capsule Take 4 mg by mouth Daily. 30 capsule 6 2020 at Unknown time   • sennosides-docusate sodium (SENOKOT-S) 8.6-50 MG tablet Take 2  "tablets by mouth At Night As Needed for Constipation.   8/6/2020 at Unknown time   • VITAMIN D PO Take  by mouth.   8/7/2020 at Unknown time   • acetaminophen (TYLENOL) 325 MG tablet Take 2 tablets by mouth Every 4 (Four) Hours As Needed for Mild Pain , Headache or Fever.   Unknown at Unknown time   • ALBUTEROL SULFATE  (90 Base) MCG/ACT inhaler INHALE TWO PUFFS BY MOUTH EVERY 4 HOURS AS NEEDED FOR WHEEZING 18 g 5 Unknown at Unknown time   • Blood Glucose Monitoring Suppl w/Device kit Use glucometer to check sugars twice a day. E11.65, E11.22. 1 each 0 Unknown at Unknown time   • dimenhyDRINATE (DRAMAMINE) 50 MG tablet Take 1 tablet by mouth Daily As Needed for movement disorders. 30 tablet 0 Unknown at Unknown time   • hydrALAZINE (APRESOLINE) 25 MG tablet Take 3 tablets by mouth Every 8 (Eight) Hours. (Patient taking differently: Take 75 mg by mouth Every 8 (Eight) Hours. Pt hasn't started taking yet) 90 tablet 0    • insulin aspart (NOVOLOG FLEXPEN) 100 UNIT/ML solution pen-injector sc pen INJECT SUB-Q PER SLIDING SCALE AS DIRECTED. MAX 21 UNITS PER DAY 15 mL 4 Unknown at Unknown time   • levalbuterol (XOPENEX) 0.63 MG/3ML nebulizer solution Take 1 ampule by nebulization Every 4 (Four) Hours As Needed for Wheezing. 50 ampule 0 Unknown at Unknown time   • TRELEGY ELLIPTA 100-62.5-25 MCG/INH aerosol powder     Unknown at Unknown time     Allergies:  Erythromycin; Erythromycin ethylsuccinate; Ativan [lorazepam]; and Tape  Debilities: As per HPI and Physical Exam.     REVIEW OF SYSTEMS:  Please see the above history of present illness for pertinent positives and negatives.  The remainder of the patient's systems have been reviewed and are negative.     Vital Signs  Temp:  [97.1 °F (36.2 °C)-98 °F (36.7 °C)] 97.1 °F (36.2 °C)  Heart Rate:  [63-64] 64  Resp:  [18-20] 18  BP: (103-140)/(55-71) 129/58    Flowsheet Rows      First Filed Value   Admission Height  160 cm (63\") Documented at 08/07/2020 1425   "   Admission Weight  122 kg (268 lb) Documented at 08/07/2020 1425           Physical Exam:  Physical Exam   Constitutional: No distress.   Breathing, currently on her home trilogy, 5 L NC blood and, and patient was satting 98%, turned down to 3 L and patient was satting 96% after 5 minutes, initially one-word answers, but patient started using sentences when she had complaints about her legs   HENT:   Head: Normocephalic and atraumatic.   Right Ear: External ear normal.   Left Ear: External ear normal.   Nose: Nose normal.   Eyes: Pupils are equal, round, and reactive to light. Conjunctivae and EOM are normal. No scleral icterus.   Neck: Normal range of motion. No JVD present. No tracheal deviation present.   Cardiovascular: Normal rate, regular rhythm and normal heart sounds.   No murmur heard.  Pulmonary/Chest: Effort normal. No stridor. No respiratory distress. She has no wheezes.   Diminished bilaterally, patient not compliant with back exam   Abdominal: Soft. Bowel sounds are normal. She exhibits no distension and no mass. There is no tenderness. There is no guarding.   Musculoskeletal: She exhibits edema.   1-2+ pitting edema on chronic lymphedema notably worse than last time patient was examined by this provider   Lymphadenopathy:     She has no cervical adenopathy.   Neurological: A sensory deficit is present. No cranial nerve deficit. She exhibits normal muscle tone.   Initially sleepy and lethargic, but woke up and spoke in full sentences  Has chronic peripheral DM neuropathy   Skin: Skin is warm and dry. She is not diaphoretic. There is erythema.   Bilateral LE erythema with skin breakdown more consistent with chronic venous stasis dermatitis than acute cellulitis   Psychiatric: Her mood appears anxious. Her affect is labile. Her affect is not angry and not blunt. She is slowed and withdrawn. She is not aggressive and not actively hallucinating. She is inattentive.   Nursing note and vitals  reviewed.       Results Review:    I reviewed the patient's new clinical results.  Lab Results (most recent)     Procedure Component Value Units Date/Time    Procalcitonin [[id number]]  (Normal) Collected:  08/07/20 1529    Specimen:  Blood Updated:  08/07/20 1938     Procalcitonin 0.10 ng/mL     Narrative:       Results may be falsely decreased if patient taking Biotin.     CBC & Differential [[id number]] Collected:  08/07/20 1855    Specimen:  Blood Updated:  08/07/20 1901    Narrative:       The following orders were created for panel order CBC & Differential.  Procedure                               Abnormality         Status                     ---------                               -----------         ------                     CBC Auto Differential[[id number]]        Abnormal            Final result                 Please view results for these tests on the individual orders.    CBC Auto Differential [[id number]]  (Abnormal) Collected:  08/07/20 1855    Specimen:  Blood Updated:  08/07/20 1901     WBC 7.80 10*3/mm3      RBC 3.14 10*6/mm3      Hemoglobin 8.3 g/dL      Hematocrit 30.0 %      MCV 95.5 fL      MCH 26.4 pg      MCHC 27.7 g/dL      RDW 15.0 %      RDW-SD 52.3 fl      MPV 11.1 fL      Platelets 205 10*3/mm3      Neutrophil % 71.6 %      Lymphocyte % 17.3 %      Monocyte % 8.3 %      Eosinophil % 2.1 %      Basophil % 0.3 %      Immature Grans % 0.4 %      Neutrophils, Absolute 5.59 10*3/mm3      Lymphocytes, Absolute 1.35 10*3/mm3      Monocytes, Absolute 0.65 10*3/mm3      Eosinophils, Absolute 0.16 10*3/mm3      Basophils, Absolute 0.02 10*3/mm3      Immature Grans, Absolute 0.03 10*3/mm3      nRBC 0.0 /100 WBC     POC Glucose Once [[id number]]  (Normal) Collected:  08/07/20 1848    Specimen:  Blood Updated:  08/07/20 1901     Glucose 121 mg/dL     POC Glucose Once [[id number]]  (Abnormal) Collected:  08/07/20 1606    Specimen:  Blood Updated:  08/07/20 1622     Glucose 33 mg/dL     BNP [[id number]]   (Abnormal) Collected:  08/07/20 1529    Specimen:  Blood Updated:  08/07/20 1608     proBNP 1,338.0 pg/mL     Narrative:       Among patients with dyspnea, NT-proBNP is highly sensitive for the detection of acute congestive heart failure. In addition NT-proBNP of <300 pg/ml effectively rules out acute congestive heart failure with 99% negative predictive value.    Results may be falsely decreased if patient taking Biotin.      Comprehensive Metabolic Panel [[id number]]  (Abnormal) Collected:  08/07/20 1529    Specimen:  Blood Updated:  08/07/20 1603     Glucose 29 mg/dL      BUN 57 mg/dL      Creatinine 2.65 mg/dL      Sodium 141 mmol/L      Potassium 4.8 mmol/L      Chloride 95 mmol/L      CO2 35.9 mmol/L      Calcium 8.5 mg/dL      Total Protein 6.8 g/dL      Albumin 3.30 g/dL      ALT (SGPT) 24 U/L      AST (SGOT) 15 U/L      Alkaline Phosphatase 84 U/L      Total Bilirubin 0.4 mg/dL      eGFR Non African Amer 18 mL/min/1.73      Globulin 3.5 gm/dL      A/G Ratio 0.9 g/dL      BUN/Creatinine Ratio 21.5     Anion Gap 10.1 mmol/L     Narrative:       GFR Normal >60  Chronic Kidney Disease <60  Kidney Failure <15      Troponin [[id number]]  (Abnormal) Collected:  08/07/20 1529    Specimen:  Blood Updated:  08/07/20 1602     Troponin T 0.047 ng/mL     Narrative:       Troponin T Reference Range:  <= 0.03 ng/mL-   Negative for AMI  >0.03 ng/mL-     Abnormal for myocardial necrosis.  Clinicians would have to utilize clinical acumen, EKG, Troponin and serial changes to determine if it is an Acute Myocardial Infarction or myocardial injury due to an underlying chronic condition.       Results may be falsely decreased if patient taking Biotin.      Blood Gas, Arterial [[id number]]  (Abnormal) Collected:  08/07/20 1551    Specimen:  Arterial Blood Updated:  08/07/20 1559     Site Left Radial     Kevyn's Test Positive     pH, Arterial 7.324 pH units      Comment: 84 Value below reference range        pCO2, Arterial 75.5 mm Hg       Comment: 86 Value above critical limit        pO2, Arterial 64.6 mm Hg      Comment: 84 Value below reference range        HCO3, Arterial 39.2 mmol/L      Comment: 83 Value above reference range        Base Excess, Arterial 11.5 mmol/L      Comment: 83 Value above reference range        O2 Saturation, Arterial 90.9 %      Comment: 84 Value below reference range        Hemoglobin, Blood Gas 8.1 g/dL      Temperature 37.0 C      Barometric Pressure for Blood Gas 742 mmHg      Modality Nasal Cannula     Flow Rate 4.0 lpm      Ventilator Mode NA     Comment: Meter: [id number]     :  [id number]        Notified Who RB AND V DR DEMPSEY     Notified By [id number]     Notified Time 08/07/2020 16:02     pCO2, Temperature Corrected 75.5 mm Hg      pH, Temp Corrected 7.324 pH Units      pO2, Temperature Corrected 64.6 mm Hg     CBC & Differential [[id number]] Collected:  08/07/20 1529    Specimen:  Blood Updated:  08/07/20 1548    Narrative:       The following orders were created for panel order CBC & Differential.  Procedure                               Abnormality         Status                     ---------                               -----------         ------                     CBC Auto Differential[[id number]]        Abnormal            Final result                 Please view results for these tests on the individual orders.    CBC Auto Differential [[id number]]  (Abnormal) Collected:  08/07/20 1529    Specimen:  Blood Updated:  08/07/20 1548     WBC 8.31 10*3/mm3      RBC 3.09 10*6/mm3      Hemoglobin 8.2 g/dL      Hematocrit 29.5 %      MCV 95.5 fL      MCH 26.5 pg      MCHC 27.8 g/dL      RDW 14.9 %      RDW-SD 51.6 fl      MPV 10.9 fL      Platelets 202 10*3/mm3      Neutrophil % 66.4 %      Lymphocyte % 20.5 %      Monocyte % 10.1 %      Eosinophil % 2.5 %      Basophil % 0.1 %      Immature Grans % 0.4 %      Neutrophils, Absolute 5.52 10*3/mm3      Lymphocytes, Absolute 1.70 10*3/mm3      Monocytes,  Absolute 0.84 10*3/mm3      Eosinophils, Absolute 0.21 10*3/mm3      Basophils, Absolute 0.01 10*3/mm3      Immature Grans, Absolute 0.03 10*3/mm3      nRBC 0.0 /100 WBC           Imaging Results (Most Recent)     Procedure Component Value Units Date/Time    US Renal Bilateral [[id number]] Collected:  08/07/20 1945     Updated:  08/07/20 1947    Narrative:       US Renal Comp    INDICATION:   Acute renal failure    COMPARISON:   May 5, 2017    FINDINGS:   KIDNEYS:  The right kidney measures 11.2 cm. The left kidney measures 11.3 cm. Renal cortical thickness and echogenicity is normal. Possible shadowing stone in the mid right collecting system. No hydronephrosis. Mild cortical thinning.    Cholelithiasis.    URINARY BLADDER:  The bladder is empty.      Impression:       Renal sizes as described. Possible shadowing stone in the mid right collecting. No hydronephrosis.    Signer Name: Geovanny Currie MD   Signed: 8/7/2020 7:45 PM   Work[facility] Name: [email or web address]    [facility]    XR Chest 2 View [[id number]] Collected:  08/07/20 1535     Updated:  08/07/20 1546    Narrative:       PA AND LATERAL CHEST 08/07/2020         HISTORY:  65-year-old female with worsening shortness of air and decreasing oxygen  saturation for the last 2-3 days. COPD.     COMPARISON:  Chest x-ray 07/20/2020     TECHNIQUE:  PA and lateral views of the chest.     FINDINGS:  Small bilateral pleural effusions with bibasilar atelectasis. Upper  lungs are clear. No pneumothorax. Mild cardiomegaly is stable. Mild  central vascular prominence is stable. Mediastinum is within normal  limits.       Impression:       Cardiomegaly with mild central vascular prominence and small bilateral  pleural effusions with bibasilar atelectasis. Correlate for symptoms of  congestive failure.     This report was finalized on 8/7/2020 3:38 PM by Dr. Jewel Currie MD.               ECG/EMG Results (most recent)     Procedure Component Value Units  Date/Time    ECG 12 Lead [[id number]] Collected:  08/07/20 1535     Updated:  08/07/20 1538    Narrative:       HEART RATE= 64  bpm  RR Interval= 932  ms  ME Interval= 69  ms  P Horizontal Axis= 15  deg  P Front Axis= 0  deg  QRSD Interval= 95  ms  QT Interval= 432  ms  QRS Axis= 38  deg  T Wave Axis= 34  deg  - BORDERLINE ECG -  Sinus rhythm  Short ME interval  Low voltage, precordial leads  Electronically Signed By:   Date and Time of Study: 2020-08-07 15:35:20            Assessment/Plan     Acute on chronic hypoxic hypercapnic Respiratory Failure w/ Chronic Hypoxic Resp Failure   - On 3L o2, & Home Triology, goal Sats 87-92% - Higher results in CO2 retention, baseline Co2 55-60  -ABG down in the ER pH 7.3, PCO2 75.5, PO2 64.6 on 6 L, by report from nurses she did not come to the floor on BiPAP  -Now after some diuresis, patient is satting well on 3 L through her trilogy  -Have restarted her home trilogy, attempted to schedule duo nebs and as needed albuterol, but patient refused and stated she wanted to take her home meds instead  -Get ABG tonight after BiPAP has been on for 1 hour  -No wheezing on exam, unclear if acute exacerbation of COPD a factor on this admission versus more being due to heart failure, see heart failure below  -Patient has had hypercapnia last admission when her sats were allowed to be above 95%, and last admission patient appeared to have more issues with hypercapnia then hypoxia  -Consult pulmonary    Acute on chronic diastolic heart failure  -Patient's BNP is elevated over last admission, chest x-ray looks more like fluid, but eval is significantly inhibited by her body habitus, appears more fluid overloaded than on exam last admission  - Got 2 mg of Bumex this afternoon in the ER, will continue every 6 hours, at least for the next 3 doses, check renal function in morning  -Consult cardiology    LADY on CKD3-4, suspect prerenal from cardiorenal  -See heart failure above  - Baseline Cr 1.5 -  1.7?, Occasional eGFR's in the low 30's, most <30  -Last creatinine on 7/31: 1.73, was 2.65 on admit  -Hold off on nephro consult, check post void, monitor BMP in a.m., if continues to not resolve may consult nephro in a.m.     Chronic venous stasis dermatitis on chronic lymphedema   -Was treated as cellulitis last admission, check pro-Sam it is negative, no white count, unlikely to be cellulitis  -Consult wound care for leg wraps, unclear if patient had been maintained on leg wraps with home health, or if they were removed in the ER for skin exam    Hypoglycemia / DM2 w/ perpherial neuropathy  -Accu-Chek in the ER was 77, 29 on BMP, with 33 on Accu-Chek, due to her being fluid overloaded will not run D10  - a1c 5.4  - home insulin 74 units Tresiba daily, with a 0 to 21 unit sliding scale short acting  -Last admission presented similarly, long-acting was held, and gradually uptitrated to her home dose, though it was noted she may be having some steroid-induced hyperglycemia, and steroids were being titrated off at the end of her admission  -Patient also reported last admission that when she gets sick she forgets to eat, but she takes her insulin as prescribed    Essential HTN / HLD   -Was on on 10 mg Norvasc (home dose 2.5 mg), home statin sees Lipitor 40 mg daily), metoprolol 100 mg every 12, and last admission started on 75 mg every 8 hours of hydralazine, but patient stated that she had not started taking this yet  -Systolic blood pressures have been 100s to 120s, will continue to hold her hydralazine and Norvasc, and cut her metoprolol in half, and gradually re-add as patient's blood pressures improve     Hypothyroidism  - TSH wnl, home dose of 175 mcg     Stage II Pressure Ulcer Left Buttock POA -consult wound care, was stage II on discharge from last admission, has been being followed by home health nurse     Chronic normocytic anemia due to chronic inflammation/CKD    I discussed the patient's findings and my  recommendations with patient and nursing.     Mik Fish MD  08/07/20  20:13    Time: Critical care time from  1845 -1920

## 2020-08-08 NOTE — CONSULTS
Adult Nutrition  Assessment/PES    Patient Name:  Jared Govea  YOB: 1955  MRN: [id number]  Admit Date:  8/7/2020    Assessment Date:  8/8/2020    Recommend: add consistent CHO to diet order for BG control    Note completed with aid of nursing and review of medical record.     Reason for Assessment     Row Name 08/08/20 1031          Reason for Assessment    Reason For Assessment  nurse/nurse practitioner consult     Diagnosis  cardiac disease;diabetes diagnosis/complications;pulmonary disease resp failure , A/CHF, A/CKD, Venous statis lyphmedema, Hypoglycemia, Stage 2 L butt         Nutrition/Diet History     Row Name 08/08/20 1032          Nutrition/Diet History    Typical Food/Fluid Intake  Pt on bipap in ICU. NO answer when call to unit. Familiar w pt from most recent admit          Anthropometrics     Row Name 08/08/20 1033          Anthropometrics    Weight  -- 255.8#        Body Mass Index (BMI)    BMI Assessment  BMI 40 or greater: obesity grade III         Labs/Tests/Procedures/Meds     Row Name 08/08/20 1033          Labs/Procedures/Meds    Lab Results Reviewed  reviewed     Lab Results Comments  glu 149-226, BUn 60/Creat 2.5 H, phos 6.2 H        Diagnostic Tests/Procedures    Diagnostic Test/Procedure Reviewed  reviewed        Medications    Pertinent Medications Reviewed  reviewed     Pertinent Medications Comments  bumex, novolog          Physical Findings     Row Name 08/08/20 1037          Physical Findings    Skin  pressure injury stage 2 per MD notes L buttocks         Estimated/Assessed Needs     Row Name 08/08/20 1037          Estimated/Assessed Needs    Additional Documentation  Calorie Requirements (Group);Fluid Requirements (Group);Protein Requirements (Group)        Calorie Requirements    Estimated Calorie Need Method  [place]     Estimated Calorie Requirement Comment  1724 kcal( mifflin no factor, BMI >40)  194 gm CHO, 45% kcal         Protein Requirements    Est  Protein Requirement Amount (gms/kg)  0.8 gm protein 93 gm pro         Fluid Requirements    Estimated Fluid Requirement Method  other (see comments) [id number] [facility] guidelines         Nutrition Prescription Ordered     Row Name 08/08/20 1042          Nutrition Prescription PO    Common Modifiers  Low Sodium     Low Sodium Details  1,500 mg Sodium         Evaluation of Received Nutrient/Fluid Intake     Row Name 08/08/20 1042          Fluid Intake Evaluation    Oral Fluid (mL)  -- insufficient data        PO Evaluation    Number of Days PO Intake Evaluated  Insufficient Data               Problem/Interventions:  Problem 1     Row Name 08/08/20 1045          Nutrition Diagnoses Problem 1    Problem 1  Overweight/Obesity     Etiology (related to)  Functional Diagnosis;Factors Affecting Nutrition;Medical Diagnosis     Signs/Symptoms (evidenced by)  BMI     BMI  Greater than 40               Intervention Goal     Row Name 08/08/20 1046          Intervention Goal    General  Meet nutritional needs for age/condition     PO  Establish PO;PO intake (%)     PO Intake %  50 % or greater     Weight  Appropriate weight loss         Nutrition Intervention     Row Name 08/08/20 1046          Nutrition Intervention    RD/Tech Action  Follow Tx progress           Education/Evaluation     Row Name 08/08/20 1047          Education    Education  Education not appropriate at this time        Monitor/Evaluation    Monitor  Per protocol;I&O;PO intake;Pertinent labs;Weight;Skin status           Electronically signed by:  Flor Valencia RD  08/08/20 10:48

## 2020-08-09 ENCOUNTER — APPOINTMENT (OUTPATIENT)
Dept: GENERAL RADIOLOGY | Facility: HOSPITAL | Age: 65
End: 2020-08-09

## 2020-08-09 LAB
ABO GROUP BLD: NORMAL
ABO GROUP BLD: NORMAL
ALBUMIN SERPL-MCNC: 2.6 G/DL (ref 3.5–5.2)
ANION GAP SERPL CALCULATED.3IONS-SCNC: 9.1 MMOL/L (ref 5–15)
ARTERIAL PATENCY WRIST A: POSITIVE
ARTERIAL PATENCY WRIST A: POSITIVE
ATMOSPHERIC PRESS: 741 MMHG
ATMOSPHERIC PRESS: 742 MMHG
BASE EXCESS BLDA CALC-SCNC: 12.4 MMOL/L (ref 0–2)
BASE EXCESS BLDA CALC-SCNC: 13.6 MMOL/L (ref 0–2)
BASOPHILS # BLD AUTO: 0.01 10*3/MM3 (ref 0–0.2)
BASOPHILS NFR BLD AUTO: 0.2 % (ref 0–1.5)
BDY SITE: ABNORMAL
BDY SITE: ABNORMAL
BLD GP AB SCN SERPL QL: NEGATIVE
BODY TEMPERATURE: 37 C
BODY TEMPERATURE: 37 C
BUN SERPL-MCNC: 63 MG/DL (ref 8–23)
BUN/CREAT SERPL: 23.1 (ref 7–25)
CALCIUM SPEC-SCNC: 8 MG/DL (ref 8.6–10.5)
CHLORIDE SERPL-SCNC: 94 MMOL/L (ref 98–107)
CO2 SERPL-SCNC: 36.9 MMOL/L (ref 22–29)
CREAT SERPL-MCNC: 2.73 MG/DL (ref 0.57–1)
DEPRECATED RDW RBC AUTO: 51.5 FL (ref 37–54)
EOSINOPHIL # BLD AUTO: 0.14 10*3/MM3 (ref 0–0.4)
EOSINOPHIL NFR BLD AUTO: 2.9 % (ref 0.3–6.2)
EPAP: 10
EPAP: 8
ERYTHROCYTE [DISTWIDTH] IN BLOOD BY AUTOMATED COUNT: 14.9 % (ref 12.3–15.4)
GAS FLOW AIRWAY: 2 LPM
GAS FLOW AIRWAY: 2 LPM
GFR SERPL CREATININE-BSD FRML MDRD: 17 ML/MIN/1.73
GLUCOSE BLDC GLUCOMTR-MCNC: 137 MG/DL (ref 70–130)
GLUCOSE BLDC GLUCOMTR-MCNC: 238 MG/DL (ref 70–130)
GLUCOSE BLDC GLUCOMTR-MCNC: 299 MG/DL (ref 70–130)
GLUCOSE BLDC GLUCOMTR-MCNC: 94 MG/DL (ref 70–130)
GLUCOSE SERPL-MCNC: 319 MG/DL (ref 65–99)
HCO3 BLDA-SCNC: 40.6 MMOL/L (ref 20–26)
HCO3 BLDA-SCNC: 41.8 MMOL/L (ref 20–26)
HCT VFR BLD AUTO: 25.1 % (ref 34–46.6)
HEMOCCULT STL QL: NEGATIVE
HGB BLD-MCNC: 7.1 G/DL (ref 12–15.9)
HGB BLDA-MCNC: 7.5 G/DL (ref 13.5–17.5)
HGB BLDA-MCNC: 8.4 G/DL (ref 13.5–17.5)
HGB RETIC QN AUTO: 26.6 PG (ref 29.8–36.1)
IMM GRANULOCYTES # BLD AUTO: 0.02 10*3/MM3 (ref 0–0.05)
IMM GRANULOCYTES NFR BLD AUTO: 0.4 % (ref 0–0.5)
IMM RETICS NFR: 22.3 % (ref 3–15.8)
IPAP: 20
IPAP: 20
IRON 24H UR-MRATE: 17 MCG/DL (ref 37–145)
IRON SATN MFR SERPL: 11 % (ref 20–50)
LYMPHOCYTES # BLD AUTO: 1.31 10*3/MM3 (ref 0.7–3.1)
LYMPHOCYTES NFR BLD AUTO: 27.3 % (ref 19.6–45.3)
Lab: ABNORMAL
Lab: ABNORMAL
MCH RBC QN AUTO: 26.9 PG (ref 26.6–33)
MCHC RBC AUTO-ENTMCNC: 28.3 G/DL (ref 31.5–35.7)
MCV RBC AUTO: 95.1 FL (ref 79–97)
MODALITY: ABNORMAL
MODALITY: ABNORMAL
MONOCYTES # BLD AUTO: 0.47 10*3/MM3 (ref 0.1–0.9)
MONOCYTES NFR BLD AUTO: 9.8 % (ref 5–12)
NEUTROPHILS NFR BLD AUTO: 2.85 10*3/MM3 (ref 1.7–7)
NEUTROPHILS NFR BLD AUTO: 59.4 % (ref 42.7–76)
NOTIFIED BY: ABNORMAL
NOTIFIED BY: ABNORMAL
NOTIFIED WHO: ABNORMAL
NOTIFIED WHO: ABNORMAL
NRBC BLD AUTO-RTO: 0 /100 WBC (ref 0–0.2)
PAW @ PEAK INSP FLOW SETTING VENT: 19 CMH2O
PCO2 BLDA: 81.5 MM HG (ref 35–45)
PCO2 BLDA: 83.4 MM HG (ref 35–45)
PCO2 TEMP ADJ BLD: 81.5 MM HG (ref 35–45)
PCO2 TEMP ADJ BLD: 83.4 MM HG (ref 35–45)
PH BLDA: 7.31 PH UNITS (ref 7.35–7.45)
PH BLDA: 7.31 PH UNITS (ref 7.35–7.45)
PH, TEMP CORRECTED: 7.31 PH UNITS (ref 7.35–7.45)
PH, TEMP CORRECTED: 7.31 PH UNITS (ref 7.35–7.45)
PHOSPHATE SERPL-MCNC: 5.5 MG/DL (ref 2.5–4.5)
PLATELET # BLD AUTO: 165 10*3/MM3 (ref 140–450)
PMV BLD AUTO: 11.2 FL (ref 6–12)
PO2 BLDA: 56.7 MM HG (ref 83–108)
PO2 BLDA: 63.6 MM HG (ref 83–108)
PO2 TEMP ADJ BLD: 56.7 MM HG (ref 83–108)
PO2 TEMP ADJ BLD: 63.6 MM HG (ref 83–108)
POTASSIUM SERPL-SCNC: 4.8 MMOL/L (ref 3.5–5.2)
RBC # BLD AUTO: 2.64 10*6/MM3 (ref 3.77–5.28)
RETICS/RBC NFR AUTO: 2.38 % (ref 0.7–1.9)
RH BLD: POSITIVE
RH BLD: POSITIVE
SAO2 % BLDCOA: 87.4 % (ref 94–99)
SAO2 % BLDCOA: 91.7 % (ref 94–99)
SET MECH RESP RATE: 20
SODIUM SERPL-SCNC: 140 MMOL/L (ref 136–145)
T&S EXPIRATION DATE: NORMAL
TIBC SERPL-MCNC: 156 MCG/DL (ref 298–536)
UIBC SERPL-MCNC: 139 MCG/DL (ref 112–346)
VENTILATOR MODE: ABNORMAL
VENTILATOR MODE: ABNORMAL
VT ON VENT VENT: 450 ML
WBC # BLD AUTO: 4.8 10*3/MM3 (ref 3.4–10.8)

## 2020-08-09 PROCEDURE — 80069 RENAL FUNCTION PANEL: CPT | Performed by: INTERNAL MEDICINE

## 2020-08-09 PROCEDURE — 63710000001 INSULIN DETEMIR PER 5 UNITS: Performed by: INTERNAL MEDICINE

## 2020-08-09 PROCEDURE — 82962 GLUCOSE BLOOD TEST: CPT

## 2020-08-09 PROCEDURE — 83540 ASSAY OF IRON: CPT | Performed by: INTERNAL MEDICINE

## 2020-08-09 PROCEDURE — 63710000001 LEVALBUTEROL PER 0.5 MG: Performed by: INTERNAL MEDICINE

## 2020-08-09 PROCEDURE — 86901 BLOOD TYPING SEROLOGIC RH(D): CPT | Performed by: INTERNAL MEDICINE

## 2020-08-09 PROCEDURE — 86901 BLOOD TYPING SEROLOGIC RH(D): CPT

## 2020-08-09 PROCEDURE — 99232 SBSQ HOSP IP/OBS MODERATE 35: CPT | Performed by: INTERNAL MEDICINE

## 2020-08-09 PROCEDURE — 86850 RBC ANTIBODY SCREEN: CPT | Performed by: INTERNAL MEDICINE

## 2020-08-09 PROCEDURE — 85046 RETICYTE/HGB CONCENTRATE: CPT | Performed by: INTERNAL MEDICINE

## 2020-08-09 PROCEDURE — 86923 COMPATIBILITY TEST ELECTRIC: CPT

## 2020-08-09 PROCEDURE — 71045 X-RAY EXAM CHEST 1 VIEW: CPT

## 2020-08-09 PROCEDURE — 86900 BLOOD TYPING SEROLOGIC ABO: CPT

## 2020-08-09 PROCEDURE — 94799 UNLISTED PULMONARY SVC/PX: CPT

## 2020-08-09 PROCEDURE — 36430 TRANSFUSION BLD/BLD COMPNT: CPT

## 2020-08-09 PROCEDURE — 83550 IRON BINDING TEST: CPT | Performed by: INTERNAL MEDICINE

## 2020-08-09 PROCEDURE — 82272 OCCULT BLD FECES 1-3 TESTS: CPT | Performed by: INTERNAL MEDICINE

## 2020-08-09 PROCEDURE — 82803 BLOOD GASES ANY COMBINATION: CPT

## 2020-08-09 PROCEDURE — 86900 BLOOD TYPING SEROLOGIC ABO: CPT | Performed by: INTERNAL MEDICINE

## 2020-08-09 PROCEDURE — 36600 WITHDRAWAL OF ARTERIAL BLOOD: CPT

## 2020-08-09 PROCEDURE — 63710000001 INSULIN ASPART PER 5 UNITS: Performed by: INTERNAL MEDICINE

## 2020-08-09 PROCEDURE — P9016 RBC LEUKOCYTES REDUCED: HCPCS

## 2020-08-09 PROCEDURE — 25010000002 ENOXAPARIN PER 10 MG: Performed by: INTERNAL MEDICINE

## 2020-08-09 PROCEDURE — 85025 COMPLETE CBC W/AUTO DIFF WBC: CPT | Performed by: INTERNAL MEDICINE

## 2020-08-09 RX ORDER — HYDROCODONE BITARTRATE AND ACETAMINOPHEN 5; 325 MG/1; MG/1
1 TABLET ORAL EVERY 6 HOURS PRN
Status: DISCONTINUED | OUTPATIENT
Start: 2020-08-09 | End: 2020-08-14 | Stop reason: HOSPADM

## 2020-08-09 RX ORDER — PETROLATUM 42 G/100G
OINTMENT TOPICAL EVERY 12 HOURS SCHEDULED
Status: DISCONTINUED | OUTPATIENT
Start: 2020-08-09 | End: 2020-08-09

## 2020-08-09 RX ORDER — PETROLATUM 42 G/100G
OINTMENT TOPICAL
Status: DISCONTINUED | OUTPATIENT
Start: 2020-08-10 | End: 2020-08-14 | Stop reason: HOSPADM

## 2020-08-09 RX ADMIN — ACETAMINOPHEN 650 MG: 325 TABLET, FILM COATED ORAL at 08:17

## 2020-08-09 RX ADMIN — LEVOTHYROXINE SODIUM 175 MCG: 150 TABLET ORAL at 08:17

## 2020-08-09 RX ADMIN — ACETAMINOPHEN 650 MG: 325 TABLET, FILM COATED ORAL at 20:27

## 2020-08-09 RX ADMIN — ATORVASTATIN CALCIUM 40 MG: 40 TABLET, FILM COATED ORAL at 08:17

## 2020-08-09 RX ADMIN — QUETIAPINE FUMARATE 100 MG: 100 TABLET ORAL at 20:27

## 2020-08-09 RX ADMIN — FLUTICASONE FUROATE, UMECLIDINIUM BROMIDE AND VILANTEROL TRIFENATATE 1 PUFF: 100; 62.5; 25 POWDER RESPIRATORY (INHALATION) at 10:07

## 2020-08-09 RX ADMIN — ACETAMINOPHEN 650 MG: 325 TABLET, FILM COATED ORAL at 01:09

## 2020-08-09 RX ADMIN — QUETIAPINE FUMARATE 100 MG: 100 TABLET ORAL at 08:17

## 2020-08-09 RX ADMIN — LEVALBUTEROL HYDROCHLORIDE 0.63 MG: 0.63 SOLUTION RESPIRATORY (INHALATION) at 01:03

## 2020-08-09 RX ADMIN — METOPROLOL TARTRATE 50 MG: 50 TABLET, FILM COATED ORAL at 01:08

## 2020-08-09 RX ADMIN — SODIUM CHLORIDE, PRESERVATIVE FREE 10 ML: 5 INJECTION INTRAVENOUS at 08:20

## 2020-08-09 RX ADMIN — SODIUM CHLORIDE, PRESERVATIVE FREE 10 ML: 5 INJECTION INTRAVENOUS at 20:27

## 2020-08-09 RX ADMIN — INSULIN ASPART 12 UNITS: 100 INJECTION, SOLUTION INTRAVENOUS; SUBCUTANEOUS at 08:17

## 2020-08-09 RX ADMIN — LEVALBUTEROL HYDROCHLORIDE 0.63 MG: 0.63 SOLUTION RESPIRATORY (INHALATION) at 07:22

## 2020-08-09 RX ADMIN — INSULIN DETEMIR 45 UNITS: 100 INJECTION, SOLUTION SUBCUTANEOUS at 20:27

## 2020-08-09 RX ADMIN — SODIUM CHLORIDE 40 ML: 900 INJECTION, SOLUTION INTRAVENOUS at 13:15

## 2020-08-09 RX ADMIN — LEVALBUTEROL HYDROCHLORIDE 0.63 MG: 0.63 SOLUTION RESPIRATORY (INHALATION) at 19:09

## 2020-08-09 RX ADMIN — HYDROCODONE BITARTRATE AND ACETAMINOPHEN 1 TABLET: 5; 325 TABLET ORAL at 21:48

## 2020-08-09 RX ADMIN — INSULIN ASPART 8 UNITS: 100 INJECTION, SOLUTION INTRAVENOUS; SUBCUTANEOUS at 12:49

## 2020-08-09 RX ADMIN — ENOXAPARIN SODIUM 30 MG: 30 INJECTION SUBCUTANEOUS at 20:27

## 2020-08-09 RX ADMIN — METOPROLOL TARTRATE 50 MG: 50 TABLET, FILM COATED ORAL at 12:49

## 2020-08-09 RX ADMIN — LEVALBUTEROL HYDROCHLORIDE 0.63 MG: 0.63 SOLUTION RESPIRATORY (INHALATION) at 14:21

## 2020-08-09 RX ADMIN — METOPROLOL TARTRATE 50 MG: 50 TABLET, FILM COATED ORAL at 23:05

## 2020-08-09 NOTE — PROGRESS NOTES
"      [facility]         Dr Galarza Sayied   LOS: 2 days   Patient Care Team:  Marilu Suresh APRN as PCP - General (Family Medicine)  Jhonny Hoyt MD as Consulting Physician (Pulmonary Disease)  Konstantin Orr MD as Consulting Physician (Nephrology)    Chief Complaint: Acute on chronic respiratory failure hypoxemic and hypercapnic with underlying history of COPD ALTA/OHS and volume overload    Interval History: Currently on hospital AVAPS.  Tidal volumes are variable.  She is's awake following simple commands.  Tolerating AVAPS well    REVIEW OF SYSTEMS:   Limited patient on AVAPS    Ventilator/Non-Invasive Ventilation Settings (From admission, onward)     Start     Ordered    08/08/20 1829  NIPPV (CPAP or BIPAP)  Until Discontinued     Question Answer Comment   Indication: Acute Respiratory Failure    Type: AVAPS-AE    NIPPV Mask Interface: Full Face Mask    Rate 20    VT (mL) 450    EPAP Min (cm H2O) 5    EPAP Max (cm H2O) 10    Max Pressure (cm H2O) 30    Pressure Support Min (cm H2O) 5    Pressure Support Max (cm H2O) 20    Other O2 bled in    Titrate for SPO2 Between    Titrate for SPO2 88% - 92%        08/08/20 1830    08/07/20 1833  NIPPV (CPAP or BIPAP)  Until Discontinued,   Status:  Canceled     Question Answer Comment   Type: BIPAP    NIPPV Mask Interface: Full Face Mask        08/07/20 1832                  Vital Signs  Temp:  [97.6 °F (36.4 °C)-98.4 °F (36.9 °C)] 98.4 °F (36.9 °C)  Heart Rate:  [54-73] 55  Resp:  [16-24] 20  BP: ()/(42-85) 113/63    Intake/Output Summary (Last 24 hours) at 8/9/2020 0948  Last data filed at 8/9/2020 0445  Gross per 24 hour   Intake 1320 ml   Output 1850 ml   Net -530 ml     Flowsheet Rows      First Filed Value   Admission Height  160 cm (63\") Documented at 08/07/2020 1425   Admission Weight  122 kg (268 lb) Documented at 08/07/2020 1425          Physical Exam:  Patient is examined using the personal protective equipment as per " guidelines from infection control for this particular patient as enacted.  Hand hygiene was performed before and after patient interaction.   General Appearance:    Alert, cooperative, in no acute distress.  Following simple commands  Neck midline trachea no thyromegaly   Lungs:    Diminished breath sounds rhonchi bilaterally on the basis    Heart:    Regular rhythm and normal rate, normal S1 and S2, no            murmur, no gallop, no rub, no click   Chest Wall:    No abnormalities observed   Abdomen:     Normal bowel sounds, no masses, no organomegaly, soft        non-tender, non-distended, no guarding, no rebound                tenderness   Extremities:   Moves all extremities well, 1-2+ edema, no cyanosis, no             redness  CNS no focal neurological deficits normal sensory exam  Skin no rashes no nodules  Musculoskeletal no cyanosis no clubbing normal range of motion     Results Review:        Results from last 7 days   Lab Units 08/09/20  0430 08/08/20  1616 08/08/20  0540   SODIUM mmol/L 140 139 138   POTASSIUM mmol/L 4.8 5.0 4.5   CHLORIDE mmol/L 94* 94* 94*   CO2 mmol/L 36.9* 37.9* 30.9*   BUN mg/dL 63* 59* 60*   CREATININE mg/dL 2.73* 2.77* 2.59*   GLUCOSE mg/dL 319* 247* 149*   CALCIUM mg/dL 8.0* 8.3* 8.4*     Results from last 7 days   Lab Units 08/08/20  0540 08/07/20  1529   TROPONIN T ng/mL 0.040* 0.047*     Results from last 7 days   Lab Units 08/09/20  0430 08/08/20  0540 08/07/20  1855   WBC 10*3/mm3 4.80 5.49 7.80   HEMOGLOBIN g/dL 7.1* 7.5* 8.3*   HEMATOCRIT % 25.1* 26.6* 30.0*   PLATELETS 10*3/mm3 165 184 205                     Results from last 7 days   Lab Units 08/08/20  1730   PH, ARTERIAL pH units 7.315*   PO2 ART mm Hg 59.4*   PCO2, ARTERIAL mm Hg 80.9*   HCO3 ART mmol/L 41.1*       I reviewed the patient's new clinical results.  I personally viewed and interpreted the patient's CXR        Medication Review:     aspirin 81 mg Oral Daily   atorvastatin 40 mg Oral Daily   enoxaparin 30  mg Subcutaneous Q24H   insulin aspart 0-24 Units Subcutaneous TID AC   insulin detemir 20 Units Subcutaneous Nightly   levalbuterol 0.63 mg Nebulization Q6H - RT   levothyroxine 175 mcg Oral Daily   metoprolol tartrate 50 mg Oral Q12H   QUEtiapine 100 mg Oral Q12H   sodium chloride 10 mL Intravenous Q12H   Fluticasone-Umeclidin-Vilant 1 puff Inhalation Daily - RT            ASSESSMENT:   Acute on chronic hypoxemic hypercapnic respiratory failure  Chronic hypoxemic respiratory failure  Acute on chronic diastolic CHF  Acute kidney injury  Chronic venous stasis/lymphedema  Hypertension  Diabetes mellitus  Morbid obesity  ALTA/OHS  COPD    PLAN:  Volume status difficult to  but noted rising creatinine.  Plans for blood transfusion and monitoring volume status closely.  Diuresis per cardiology  Repeat ABGs on current hospital AVAPS.  ABGs appear to be better than she can be transitioned to her home trilogy  Aggressive PT OT  Bronchodilators  Blood glucose monitoring per ICU protocol    Geovanni Gilmore MD  08/09/20  09:48

## 2020-08-09 NOTE — PROGRESS NOTES
LOS: 2 days   Patient Care Team:  Marilu Suresh APRN as PCP - General (Family Medicine)  Jhonny Hoyt MD as Consulting Physician (Pulmonary Disease)  Konstantin Orr MD as Consulting Physician (Nephrology)    Chief Complaint:     F/u dyspnea and weakness.     Interval History:     She is nauseated today.  She denies chest pain.  She says she still short winded.  She has leg pain.  She does not feel her heart racing or skipping.  She has no abdominal pain.    Objective   Vital Signs  Temp:  [97.6 °F (36.4 °C)-98.3 °F (36.8 °C)] 98.1 °F (36.7 °C)  Heart Rate:  [56-73] 58  Resp:  [16-24] 22  BP: ()/(42-85) 113/63    Intake/Output Summary (Last 24 hours) at 8/9/2020 0722  Last data filed at 8/9/2020 0445  Gross per 24 hour   Intake 1320 ml   Output 1850 ml   Net -530 ml       Comfortable NAD  conjunctivae clear  Neck supple, no JVD or thyromegaly appreciated  S1/S2 RRR, no m/r/g  Lungs fine rales throughout, normal effort  Abdomen S/NT/ND (+) BS, no HSM appreciated  Extremities warm, no clubbing, cyanosis, 1+ edema  Erythema and fungal infections bilateral lower extremities below the knees  A/Ox4, mood and affect appropriate    Results Review:      Results from last 7 days   Lab Units 08/09/20  0430 08/08/20  1616 08/08/20  0540   SODIUM mmol/L 140 139 138   POTASSIUM mmol/L 4.8 5.0 4.5   CHLORIDE mmol/L 94* 94* 94*   CO2 mmol/L 36.9* 37.9* 30.9*   BUN mg/dL 63* 59* 60*   CREATININE mg/dL 2.73* 2.77* 2.59*   GLUCOSE mg/dL 319* 247* 149*   CALCIUM mg/dL 8.0* 8.3* 8.4*     Results from last 7 days   Lab Units 08/08/20  0540 08/07/20  1529   TROPONIN T ng/mL 0.040* 0.047*     Results from last 7 days   Lab Units 08/09/20  0430 08/08/20  0540 08/07/20  1855   WBC 10*3/mm3 4.80 5.49 7.80   HEMOGLOBIN g/dL 7.1* 7.5* 8.3*   HEMATOCRIT % 25.1* 26.6* 30.0*   PLATELETS 10*3/mm3 165 184 205                       I reviewed the patient's new clinical results.  I personally viewed and interpreted the  patient's EKG/Telemetry data        Medication Review:     aspirin 81 mg Oral Daily   atorvastatin 40 mg Oral Daily   enoxaparin 30 mg Subcutaneous Q24H   insulin aspart 0-24 Units Subcutaneous TID AC   insulin detemir 20 Units Subcutaneous Nightly   levalbuterol 0.63 mg Nebulization Q6H - RT   levothyroxine 175 mcg Oral Daily   metoprolol tartrate 50 mg Oral Q12H   QUEtiapine 100 mg Oral Q12H   sodium chloride 10 mL Intravenous Q12H   Fluticasone-Umeclidin-Vilant 1 puff Inhalation Daily - RT            Assessment/Plan       Acute on chronic respiratory failure with hypoxia and hypercapnia (CMS/HCC)    1. Dyspnea and weakness, multifactorial due to lung disease, anemia, kidney disease and likely heart failure.  I do not have any recent echo.  2. Diabetes mellitus type 2 with very low blood glucose on admission, now glucose high.   3. Known chronic pulmonary disease on chronic oxygen and trilogy at home, CO2 retaining with hypoxia.  4. Chronic kidney disease with LADY.  It seems that her baseline creatinine might be around 1.8.  5. Anemia, significant. Worsening - transfuse 1U pRBC.   6. Obesity and immobilization  7. Hypothyroidism    Worsening kidney function and anemia.  Bumex stopped.  Transfuse 1 unit PRBCs and watch volume status.  Check chest x-ray today    Overall, she looks like she is diuresed based on her edema but her exam is very difficult.  I think the nausea is coming from uremia.  She needs her stool hemocculted.  Will follow    Trinidad Barreto MD  08/09/20  07:22

## 2020-08-09 NOTE — PLAN OF CARE
Problem: Patient Care Overview  Goal: Plan of Care Review  Outcome: Ongoing (interventions implemented as appropriate)  Flowsheets (Taken 8/9/2020 7673)  Progress: no change  Plan of Care Reviewed With: patient  Outcome Summary: pt remains on BIPAP overnight with 2-3L bled in. VSS; am labs pending, continue to monitor.

## 2020-08-09 NOTE — PROGRESS NOTES
"SERVICE: [facility] HOSPITALIST    CHIEF COMPLAINT: Her legs not being wrapped    SUBJECTIVE:    Patient's mental status is much improved today since yesterday.  Patient states that she is breathing better.  Not at her baseline.  She is awake and aware enough to complain about her legs not being wrapped, discussed with nurse at bedside, and he will attempt to wrap her legs today while we wait for wound nurse to see her tomorrow.    Patient has no other complaints at this time.    Review of systems negative for fever, chills, nausea, vomiting, diarrhea.    OBJECTIVE:    /59   Pulse 62   Temp 96.8 °F (36 °C)   Resp 20   Ht 167.6 cm (66\")   Wt 117 kg (257 lb 6.4 oz)   LMP  (LMP Unknown)   SpO2 97%   BMI 41.55 kg/m²     MEDS/LABS REVIEWED AND ORDERED      aspirin 81 mg Oral Daily   atorvastatin 40 mg Oral Daily   enoxaparin 30 mg Subcutaneous Q24H   insulin aspart 0-24 Units Subcutaneous TID AC   insulin detemir 20 Units Subcutaneous Nightly   levalbuterol 0.63 mg Nebulization Q6H - RT   levothyroxine 175 mcg Oral Daily   metoprolol tartrate 50 mg Oral Q12H   QUEtiapine 100 mg Oral Q12H   sodium chloride 10 mL Intravenous Q12H   Fluticasone-Umeclidin-Vilant 1 puff Inhalation Daily - RT       Physical Exam   Constitutional: No distress.   Eyes: Pupils are equal, round, and reactive to light.   Cardiovascular: Normal rate, regular rhythm and normal heart sounds.   Pulmonary/Chest: Effort normal. No stridor. No respiratory distress. She has no wheezes. She has no rales.   Diminished bilaterally, increased breath sounds, less crackles today than yesterday   Abdominal: Soft. Bowel sounds are normal. She exhibits no distension. There is no tenderness. There is no guarding.   Musculoskeletal: She exhibits no edema.   Patient's legs are wrinkly, and appears at least volume down   Neurological: She is alert. No cranial nerve deficit.   Skin: Skin is warm and dry. She is not diaphoretic. No " erythema.   Nursing note and vitals reviewed.      LAB/DIAGNOSTICS:    Lab Results (last 24 hours)     Procedure Component Value Units Date/Time    POC Glucose Once [[id number]]  (Abnormal) Collected:  08/09/20 1240    Specimen:  Blood Updated:  08/09/20 1246     Glucose 238 mg/dL     Occult Blood X 1, Stool - Stool, Per Rectum [[id number]]  (Normal) Collected:  08/09/20 0953    Specimen:  Stool from Per Rectum Updated:  08/09/20 1055     Fecal Occult Blood Negative    Iron Profile [[id number]]  (Abnormal) Collected:  08/09/20 0430    Specimen:  Blood from Arm, Left Updated:  08/09/20 1051     Iron 17 mcg/dL      Iron Saturation 11 %      UIBC 139 mcg/dL      TIBC 156 mcg/dL     Blood Gas, Arterial [[id number]]  (Abnormal) Collected:  08/09/20 1020    Specimen:  Arterial Blood Updated:  08/09/20 1040     Site Right Radial     Kevyn's Test Positive     pH, Arterial 7.308 pH units      Comment: 84 Value below reference range        pCO2, Arterial 83.4 mm Hg      Comment: 86 Value above critical limit        pO2, Arterial 56.7 mm Hg      Comment: 84 Value below reference range        HCO3, Arterial 41.8 mmol/L      Comment: 83 Value above reference range        Base Excess, Arterial 13.6 mmol/L      Comment: 83 Value above reference range        O2 Saturation, Arterial 87.4 %      Comment: 84 Value below reference range        Hemoglobin, Blood Gas 7.5 g/dL      Temperature 37.0 C      Barometric Pressure for Blood Gas 742 mmHg      Modality BiPap     Flow Rate 2.0 lpm      Ventilator Mode AVAP     Set Tidal Volume 450     Set Mech Resp Rate 20.0     PIP 19 cmH2O      Comment: Meter: [id number]     :  [id number]        IPAP 20     EPAP 8     Notified Who RB AND V  SAYIED     Notified By [id number]     Notified Time 08/09/2020 10:43     pCO2, Temperature Corrected 83.4 mm Hg      pH, Temp Corrected 7.308 pH Units      pO2, Temperature Corrected 56.7 mm Hg     Retic With IRF & RET-He [[id number]]  (Abnormal)  Collected:  08/09/20 0430    Specimen:  Blood from Arm, Left Updated:  08/09/20 0926     Immature Reticulocyte Fraction 22.3 %      Reticulocyte % 2.38 %      Reticulocyte Hgb 26.6 pg     POC Glucose Once [[id number]]  (Abnormal) Collected:  08/09/20 0804    Specimen:  Blood Updated:  08/09/20 0810     Glucose 299 mg/dL     Renal Function Panel [[id number]]  (Abnormal) Collected:  08/09/20 0430    Specimen:  Blood from Arm, Left Updated:  08/09/20 0543     Glucose 319 mg/dL      BUN 63 mg/dL      Creatinine 2.73 mg/dL      Sodium 140 mmol/L      Potassium 4.8 mmol/L      Chloride 94 mmol/L      CO2 36.9 mmol/L      Calcium 8.0 mg/dL      Albumin 2.60 g/dL      Phosphorus 5.5 mg/dL      Anion Gap 9.1 mmol/L      BUN/Creatinine Ratio 23.1     eGFR Non African Amer 17 mL/min/1.73     Narrative:       GFR Normal >60  Chronic Kidney Disease <60  Kidney Failure <15      CBC & Differential [[id number]] Collected:  08/09/20 0430    Specimen:  Blood from Arm, Left Updated:  08/09/20 0523    Narrative:       The following orders were created for panel order CBC & Differential.  Procedure                               Abnormality         Status                     ---------                               -----------         ------                     CBC Auto Differential[[id number]]        Abnormal            Final result                 Please view results for these tests on the individual orders.    CBC Auto Differential [[id number]]  (Abnormal) Collected:  08/09/20 0430    Specimen:  Blood from Arm, Left Updated:  08/09/20 0523     WBC 4.80 10*3/mm3      RBC 2.64 10*6/mm3      Hemoglobin 7.1 g/dL      Hematocrit 25.1 %      MCV 95.1 fL      MCH 26.9 pg      MCHC 28.3 g/dL      RDW 14.9 %      RDW-SD 51.5 fl      MPV 11.2 fL      Platelets 165 10*3/mm3      Neutrophil % 59.4 %      Lymphocyte % 27.3 %      Monocyte % 9.8 %      Eosinophil % 2.9 %      Basophil % 0.2 %      Immature Grans % 0.4 %      Neutrophils, Absolute 2.85  10*3/mm3      Lymphocytes, Absolute 1.31 10*3/mm3      Monocytes, Absolute 0.47 10*3/mm3      Eosinophils, Absolute 0.14 10*3/mm3      Basophils, Absolute 0.01 10*3/mm3      Immature Grans, Absolute 0.02 10*3/mm3      nRBC 0.0 /100 WBC     POC Glucose Once [[id number]]  (Abnormal) Collected:  08/08/20 2051    Specimen:  Blood Updated:  08/08/20 2107     Glucose 262 mg/dL     Blood Gas, Arterial [[id number]]  (Abnormal) Collected:  08/08/20 1730    Specimen:  Arterial Blood Updated:  08/08/20 1808     Site Left Radial     Kevyn's Test Positive     pH, Arterial 7.315 pH units      Comment: 84 Value below reference range        pCO2, Arterial 80.9 mm Hg      Comment: 86 Value above critical limit        pO2, Arterial 59.4 mm Hg      Comment: 84 Value below reference range        HCO3, Arterial 41.1 mmol/L      Comment: 83 Value above reference range        Base Excess, Arterial 13.1 mmol/L      Comment: 83 Value above reference range        O2 Saturation, Arterial 89.5 %      Comment: 84 Value below reference range        Hemoglobin, Blood Gas 7.7 g/dL      Temperature 37.0 C      Barometric Pressure for Blood Gas 742 mmHg      Modality BiPap     Flow Rate 1.0 lpm      Ventilator Mode AVAP     Set Tidal Volume 408     Set Mech Resp Rate 20.0     PIP 22 cmH2O      Comment: Meter: [id number]     :  [id number]        EPAP 8     Notified Who RB AND V DR ELIZABETH AT 1800     Notified By [id number]     Notified Time 08/08/2020 18:11     pCO2, Temperature Corrected 80.9 mm Hg      pH, Temp Corrected 7.315 pH Units      pO2, Temperature Corrected 59.4 mm Hg     Blood Gas, Arterial [[id number]]  (Abnormal) Collected:  08/08/20 1456    Specimen:  Arterial Blood Updated:  08/08/20 1743     Site Left Radial     Kevyn's Test Positive     pH, Arterial 7.282 pH units      Comment: 84 Value below reference range        pCO2, Arterial 86.4 mm Hg      Comment: 86 Value above critical limit        pO2, Arterial 74.2 mm Hg       Comment: 84 Value below reference range        HCO3, Arterial 40.7 mmol/L      Comment: 83 Value above reference range        Base Excess, Arterial 12.3 mmol/L      Comment: 83 Value above reference range        O2 Saturation, Arterial 94.1 %      Hemoglobin, Blood Gas 7.3 g/dL      Temperature 37.0 C      Barometric Pressure for Blood Gas 742 mmHg      Modality BiPap     Flow Rate 3.0 lpm      Ventilator Mode AVAP     Set Tidal Volume 450     Set Mech Resp Rate 12.0     IPAP 30     Comment: Meter: [id number]     :  [id number]        EPAP 5     Notified Who RB AND V DR ELIZABETH [id number]     Notified By [id number]     Notified Time 08/08/2020 17:45     pCO2, Temperature Corrected 86.4 mm Hg      pH, Temp Corrected 7.282 pH Units      pO2, Temperature Corrected 74.2 mm Hg         Results for orders placed during the hospital encounter of 08/30/18   Adult Transthoracic Echo Limited W/ Cont if Necessary Per Protocol    Narrative · Left ventricular wall thickness is consistent with mild-to-moderate   concentric hypertrophy.  · Left ventricular systolic function is normal.  · Left ventricular diastolic dysfunction (grade I a) consistent with   impaired relaxation.  · Calculated EF = 65%.        Xr Chest 1 View    Result Date: 8/9/2020  No significant interval change. Findings suggest CHF possibly superimposed on background diffuse lung disease. Signer Name: CEM Whiteside MD  Signed: 8/9/2020 8:35 AM  Work[email or web address]  [facility] Renal Bilateral    Result Date: 8/7/2020  Renal sizes as described. Possible shadowing stone in the mid right collecting. No hydronephrosis. Signer Name: Geovanny Currie MD  Signed: 8/7/2020 7:45 PM  Workstation Name: [email or web address]  [facility]      ASSESSMENT/PLAN:    Acute on chronic hypoxic hypercapnic Respiratory Failure w/ Chronic Hypoxic Resp Failure   -At home on 3L o2, triology at night, goal Sats 87-92%, Higher  results in CO2 retention, baseline Co2 55-60  -No wheezing on exam, heart failure appeared to be a bigger contributor at admission  -ABG in ER pH 7.3, PCO2 75.5, PO2 64.6 on 6 L   -Doing well on our BiPAP, though her PCO2 is only down to 80, having more problems with hypoxia today, but patient will become more hypercapnic if her oxygen saturations stay above 95%  -attempted to schedule duo nebs and as needed albuterol, but patient refused and stated she wanted to take her home meds instead  -Pulmonary consulted, and following they have been making adjustments to BiPAP today     Acute on chronic diastolic heart failure  -Examined yesterday with Dr. Barreto, who felt that her presentation was more likely with heart failure  -Patient looks at least euvolemic or hypovolemic this a.m., her creatinine continues to be stable and elevated, see LADY below, cardiology held diuretics today  -Has not had recent echo, cardiology recommends echo on Monday     LADY on CKD3-4, suspect prerenal from cardiorenal  -See heart failure above  - Baseline Cr 1.5 - 1.7?, Occasional eGFR's in the low 30's, most <30  -Last creatinine on 7/31: 1.73, has been stable at about 2.65-2.7 since admit  -Nephro consult     Chronic venous stasis dermatitis on chronic lymphedema   -Was treated as cellulitis last admission, check pro-Sam it is negative, no white count, unlikely to be cellulitis  -Consult wound care for leg wraps, unclear if patient had been maintained on leg wraps with home health, or if they were removed in the ER for skin exam      DM2 w/ perpherial neuropathy complicated by hypoglycemia  -Was hypoglycemic again in the ER this admission, like her most recent admission  - a1c 5.4  - home insulin 74 units Tresiba daily, with a 0 to 21 unit sliding scale short acting  -Last admission presented similarly, long-acting was held, and gradually uptitrated to her home dose, though it was noted she may be having some steroid-induced  hyperglycemia, and steroids were being titrated off at the end of her admission  -Patient also reported last admission that when she gets sick she forgets to eat, but she takes her insulin as prescribed  -Fasting today was 299, will increase sliding scale to 0-24, started on 20 units of long-acting yesterday, will increase to 45     Essential HTN / HLD   -Was on on 10 mg Norvasc (home dose 2.5 mg), home statin sees Lipitor 40 mg daily), metoprolol 100 mg every 12, and last admission started on 75 mg every 8 hours of hydralazine, but patient stated that she had not started taking this yet  -SBP's continue to be 100s to 120s, will continue to hold her hydralazine and Norvasc, and cut her metoprolol in half, and gradually re-add as patient's blood pressures improve     Hypothyroidism  - TSH wnl, home dose of 175 mcg     Stage II Pressure Ulcer Left Buttock POA -consult wound care, was stage II on discharge from last admission, has been being followed by home health nurse     Chronic normocytic anemia due to chronic inflammation/CKD

## 2020-08-09 NOTE — PLAN OF CARE
Problem: Patient Care Overview  Goal: Plan of Care Review  Outcome: Ongoing (interventions implemented as appropriate)  Flowsheets (Taken 8/8/2020 2139)  Progress: improving  Plan of Care Reviewed With: patient     Problem: Patient Care Overview  Goal: Individualization and Mutuality  Outcome: Ongoing (interventions implemented as appropriate)     Problem: Breathing Pattern Ineffective (Adult)  Intervention: Optimize Oxygenation/Ventilation/Perfusion  Flowsheets (Taken 8/8/2020 2139)  Head of Bed (HOB): HOB elevated  Airway/Ventilation Management: airway patency maintained; pulmonary hygiene promoted  Breathing Techniques/Airway Clearance: deep/controlled cough encouraged

## 2020-08-10 ENCOUNTER — APPOINTMENT (OUTPATIENT)
Dept: CARDIOLOGY | Facility: HOSPITAL | Age: 65
End: 2020-08-10

## 2020-08-10 LAB
ALBUMIN SERPL-MCNC: 2.6 G/DL (ref 3.5–5.2)
ANION GAP SERPL CALCULATED.3IONS-SCNC: 8 MMOL/L (ref 5–15)
ARTERIAL PATENCY WRIST A: POSITIVE
ATMOSPHERIC PRESS: 740 MMHG
BASE EXCESS BLDA CALC-SCNC: 13.1 MMOL/L (ref 0–2)
BASOPHILS # BLD AUTO: 0.01 10*3/MM3 (ref 0–0.2)
BASOPHILS NFR BLD AUTO: 0.2 % (ref 0–1.5)
BDY SITE: ABNORMAL
BH BB BLOOD EXPIRATION DATE: NORMAL
BH BB BLOOD TYPE BARCODE: 6200
BH BB DISPENSE STATUS: NORMAL
BH BB PRODUCT CODE: NORMAL
BH BB UNIT NUMBER: NORMAL
BH CV ECHO MEAS - ACS: 2.2 CM
BH CV ECHO MEAS - AO MAX PG: 8 MMHG
BH CV ECHO MEAS - AO MEAN PG (FULL): 1.5 MMHG
BH CV ECHO MEAS - AO MEAN PG: 4.5 MMHG
BH CV ECHO MEAS - AO ROOT AREA (BSA CORRECTED): 1.4
BH CV ECHO MEAS - AO ROOT AREA: 7.5 CM^2
BH CV ECHO MEAS - AO ROOT DIAM: 3.1 CM
BH CV ECHO MEAS - AO V2 MAX: 143 CM/SEC
BH CV ECHO MEAS - AO V2 MEAN: 100.1 CM/SEC
BH CV ECHO MEAS - AO V2 VTI: 38.5 CM
BH CV ECHO MEAS - AVA(I,A): 2.3 CM^2
BH CV ECHO MEAS - AVA(I,D): 2.3 CM^2
BH CV ECHO MEAS - BSA(HAYCOCK): 2.4 M^2
BH CV ECHO MEAS - BSA: 2.2 M^2
BH CV ECHO MEAS - BZI_BMI: 41.5 KILOGRAMS/M^2
BH CV ECHO MEAS - BZI_METRIC_HEIGHT: 167.6 CM
BH CV ECHO MEAS - BZI_METRIC_WEIGHT: 116.6 KG
BH CV ECHO MEAS - CONTRAST EF 4CH: 60.5 CM2
BH CV ECHO MEAS - EDV(CUBED): 132.7 ML
BH CV ECHO MEAS - EDV(MOD-SP2): 152 ML
BH CV ECHO MEAS - EDV(MOD-SP4): 108 ML
BH CV ECHO MEAS - EDV(TEICH): 123.8 ML
BH CV ECHO MEAS - EF(CUBED): 66 %
BH CV ECHO MEAS - EF(MOD-BP): 60.5 %
BH CV ECHO MEAS - EF(MOD-SP2): 61.6 %
BH CV ECHO MEAS - EF(MOD-SP4): 60.3 %
BH CV ECHO MEAS - EF(TEICH): 57.2 %
BH CV ECHO MEAS - ESV(CUBED): 45.1 ML
BH CV ECHO MEAS - ESV(MOD-SP2): 58.4 ML
BH CV ECHO MEAS - ESV(MOD-SP4): 42.9 ML
BH CV ECHO MEAS - ESV(TEICH): 53 ML
BH CV ECHO MEAS - FS: 30.2 %
BH CV ECHO MEAS - IVS/LVPW: 1
BH CV ECHO MEAS - IVSD: 1 CM
BH CV ECHO MEAS - LAT PEAK E' VEL: 7.9 CM/SEC
BH CV ECHO MEAS - LV DIASTOLIC VOL/BSA (35-75): 48.5 ML/M^2
BH CV ECHO MEAS - LV MASS(C)D: 194.4 GRAMS
BH CV ECHO MEAS - LV MASS(C)DI: 87.3 GRAMS/M^2
BH CV ECHO MEAS - LV MAX PG: 5 MMHG
BH CV ECHO MEAS - LV MEAN PG: 3 MMHG
BH CV ECHO MEAS - LV SYSTOLIC VOL/BSA (12-30): 19.3 ML/M^2
BH CV ECHO MEAS - LV V1 MAX: 109 CM/SEC
BH CV ECHO MEAS - LV V1 MEAN: 75.2 CM/SEC
BH CV ECHO MEAS - LV V1 VTI: 28.1 CM
BH CV ECHO MEAS - LVIDD: 5.1 CM
BH CV ECHO MEAS - LVIDS: 3.6 CM
BH CV ECHO MEAS - LVLD AP2: 9.6 CM
BH CV ECHO MEAS - LVLD AP4: 9.5 CM
BH CV ECHO MEAS - LVLS AP2: 8.5 CM
BH CV ECHO MEAS - LVLS AP4: 8.7 CM
BH CV ECHO MEAS - LVOT AREA (M): 3.1 CM^2
BH CV ECHO MEAS - LVOT AREA: 3.1 CM^2
BH CV ECHO MEAS - LVOT DIAM: 2 CM
BH CV ECHO MEAS - LVPWD: 1 CM
BH CV ECHO MEAS - MED PEAK E' VEL: 7.7 CM/SEC
BH CV ECHO MEAS - MV A DUR: 0.19 SEC
BH CV ECHO MEAS - MV A MAX VEL: 90.2 CM/SEC
BH CV ECHO MEAS - MV DEC SLOPE: 507 CM/SEC^2
BH CV ECHO MEAS - MV DEC TIME: 259 SEC
BH CV ECHO MEAS - MV E MAX VEL: 127 CM/SEC
BH CV ECHO MEAS - MV E/A: 1.4
BH CV ECHO MEAS - MV MEAN PG: 3 MMHG
BH CV ECHO MEAS - MV P1/2T MAX VEL: 156 CM/SEC
BH CV ECHO MEAS - MV P1/2T: 90.1 MSEC
BH CV ECHO MEAS - MV V2 MEAN: 74.7 CM/SEC
BH CV ECHO MEAS - MV V2 VTI: 49.3 CM
BH CV ECHO MEAS - MVA P1/2T LCG: 1.4 CM^2
BH CV ECHO MEAS - MVA(P1/2T): 2.4 CM^2
BH CV ECHO MEAS - MVA(VTI): 1.8 CM^2
BH CV ECHO MEAS - PA ACC TIME: 0.1 SEC
BH CV ECHO MEAS - PA MAX PG: 2.6 MMHG
BH CV ECHO MEAS - PA PR(ACCEL): 36.3 MMHG
BH CV ECHO MEAS - PA V2 MAX: 79.9 CM/SEC
BH CV ECHO MEAS - PI END-D VEL: 3.4 CM/SEC
BH CV ECHO MEAS - PULM A REVS DUR: 0.12 SEC
BH CV ECHO MEAS - PULM A REVS VEL: 24.4 CM/SEC
BH CV ECHO MEAS - PULM DIAS VEL: 72.5 CM/SEC
BH CV ECHO MEAS - PULM S/D: 0.66
BH CV ECHO MEAS - PULM SYS VEL: 48 CM/SEC
BH CV ECHO MEAS - RAP SYSTOLE: 15 MMHG
BH CV ECHO MEAS - RV MEAN PG: 1 MMHG
BH CV ECHO MEAS - RV V1 MEAN: 39.3 CM/SEC
BH CV ECHO MEAS - RV V1 VTI: 16.1 CM
BH CV ECHO MEAS - RVSP: 48.6 MMHG
BH CV ECHO MEAS - SI(AO): 130.6 ML/M^2
BH CV ECHO MEAS - SI(CUBED): 39.3 ML/M^2
BH CV ECHO MEAS - SI(LVOT): 39.7 ML/M^2
BH CV ECHO MEAS - SI(MOD-SP2): 42.1 ML/M^2
BH CV ECHO MEAS - SI(MOD-SP4): 29.3 ML/M^2
BH CV ECHO MEAS - SI(TEICH): 31.8 ML/M^2
BH CV ECHO MEAS - SV(AO): 290.6 ML
BH CV ECHO MEAS - SV(CUBED): 87.5 ML
BH CV ECHO MEAS - SV(LVOT): 88.3 ML
BH CV ECHO MEAS - SV(MOD-SP2): 93.6 ML
BH CV ECHO MEAS - SV(MOD-SP4): 65.1 ML
BH CV ECHO MEAS - SV(TEICH): 70.8 ML
BH CV ECHO MEAS - TAPSE (>1.6): 2.1 CM
BH CV ECHO MEAS - TR MAX VEL: 290 CM/SEC
BH CV ECHO MEASUREMENTS AVERAGE E/E' RATIO: 16.28
BH CV VAS BP RIGHT ARM: NORMAL MMHG
BH CV XLRA - RV BASE: 3.8 CM
BH CV XLRA - RV LENGTH: 8.5 CM
BH CV XLRA - RV MID: 3.1 CM
BH CV XLRA - TDI S': 14 CM/SEC
BODY TEMPERATURE: 37 C
BUN SERPL-MCNC: 59 MG/DL (ref 8–23)
BUN/CREAT SERPL: 24.5 (ref 7–25)
CALCIUM SPEC-SCNC: 8.5 MG/DL (ref 8.6–10.5)
CHLORIDE SERPL-SCNC: 98 MMOL/L (ref 98–107)
CO2 SERPL-SCNC: 37 MMOL/L (ref 22–29)
CREAT SERPL-MCNC: 2.41 MG/DL (ref 0.57–1)
CROSSMATCH INTERPRETATION: NORMAL
DEPRECATED RDW RBC AUTO: 50.8 FL (ref 37–54)
EOSINOPHIL # BLD AUTO: 0.16 10*3/MM3 (ref 0–0.4)
EOSINOPHIL NFR BLD AUTO: 3.1 % (ref 0.3–6.2)
EPAP: 10
ERYTHROCYTE [DISTWIDTH] IN BLOOD BY AUTOMATED COUNT: 15 % (ref 12.3–15.4)
GAS FLOW AIRWAY: 2 LPM
GFR SERPL CREATININE-BSD FRML MDRD: 20 ML/MIN/1.73
GLUCOSE BLDC GLUCOMTR-MCNC: 148 MG/DL (ref 70–130)
GLUCOSE BLDC GLUCOMTR-MCNC: 160 MG/DL (ref 70–130)
GLUCOSE BLDC GLUCOMTR-MCNC: 199 MG/DL (ref 70–130)
GLUCOSE BLDC GLUCOMTR-MCNC: 208 MG/DL (ref 70–130)
GLUCOSE SERPL-MCNC: 226 MG/DL (ref 65–99)
HCO3 BLDA-SCNC: 41.6 MMOL/L (ref 20–26)
HCT VFR BLD AUTO: 27.9 % (ref 34–46.6)
HGB BLD-MCNC: 8 G/DL (ref 12–15.9)
HGB BLDA-MCNC: 8.7 G/DL (ref 13.5–17.5)
IMM GRANULOCYTES # BLD AUTO: 0.02 10*3/MM3 (ref 0–0.05)
IMM GRANULOCYTES NFR BLD AUTO: 0.4 % (ref 0–0.5)
IPAP: 20
LEFT ATRIUM VOLUME INDEX: 31 ML/M2
LV EF 2D ECHO EST: 65 %
LYMPHOCYTES # BLD AUTO: 1.19 10*3/MM3 (ref 0.7–3.1)
LYMPHOCYTES NFR BLD AUTO: 22.8 % (ref 19.6–45.3)
Lab: ABNORMAL
MAXIMAL PREDICTED HEART RATE: 155 BPM
MCH RBC QN AUTO: 26.8 PG (ref 26.6–33)
MCHC RBC AUTO-ENTMCNC: 28.7 G/DL (ref 31.5–35.7)
MCV RBC AUTO: 93.6 FL (ref 79–97)
MODALITY: ABNORMAL
MONOCYTES # BLD AUTO: 0.43 10*3/MM3 (ref 0.1–0.9)
MONOCYTES NFR BLD AUTO: 8.3 % (ref 5–12)
NEUTROPHILS NFR BLD AUTO: 3.4 10*3/MM3 (ref 1.7–7)
NEUTROPHILS NFR BLD AUTO: 65.2 % (ref 42.7–76)
NOTIFIED BY: ABNORMAL
NOTIFIED WHO: ABNORMAL
NRBC BLD AUTO-RTO: 0 /100 WBC (ref 0–0.2)
PCO2 BLDA: 82.6 MM HG (ref 35–45)
PCO2 TEMP ADJ BLD: 82.6 MM HG (ref 35–45)
PH BLDA: 7.31 PH UNITS (ref 7.35–7.45)
PH, TEMP CORRECTED: 7.31 PH UNITS (ref 7.35–7.45)
PHOSPHATE SERPL-MCNC: 4.6 MG/DL (ref 2.5–4.5)
PLATELET # BLD AUTO: 185 10*3/MM3 (ref 140–450)
PMV BLD AUTO: 10.9 FL (ref 6–12)
PO2 BLDA: 64.8 MM HG (ref 83–108)
PO2 TEMP ADJ BLD: 64.8 MM HG (ref 83–108)
POTASSIUM SERPL-SCNC: 4.5 MMOL/L (ref 3.5–5.2)
RBC # BLD AUTO: 2.98 10*6/MM3 (ref 3.77–5.28)
SAO2 % BLDCOA: 91.7 % (ref 94–99)
SODIUM SERPL-SCNC: 143 MMOL/L (ref 136–145)
STRESS TARGET HR: 132 BPM
UNIT  ABO: NORMAL
UNIT  RH: NORMAL
VENTILATOR MODE: ABNORMAL
WBC # BLD AUTO: 5.21 10*3/MM3 (ref 3.4–10.8)

## 2020-08-10 PROCEDURE — 82962 GLUCOSE BLOOD TEST: CPT

## 2020-08-10 PROCEDURE — 94799 UNLISTED PULMONARY SVC/PX: CPT

## 2020-08-10 PROCEDURE — 80069 RENAL FUNCTION PANEL: CPT | Performed by: INTERNAL MEDICINE

## 2020-08-10 PROCEDURE — 63710000001 INSULIN ASPART PER 5 UNITS: Performed by: INTERNAL MEDICINE

## 2020-08-10 PROCEDURE — 85025 COMPLETE CBC W/AUTO DIFF WBC: CPT | Performed by: INTERNAL MEDICINE

## 2020-08-10 PROCEDURE — 99232 SBSQ HOSP IP/OBS MODERATE 35: CPT | Performed by: HOSPITALIST

## 2020-08-10 PROCEDURE — 99232 SBSQ HOSP IP/OBS MODERATE 35: CPT | Performed by: NURSE PRACTITIONER

## 2020-08-10 PROCEDURE — 25010000002 ENOXAPARIN PER 10 MG: Performed by: INTERNAL MEDICINE

## 2020-08-10 PROCEDURE — 63710000001 LEVALBUTEROL PER 0.5 MG: Performed by: INTERNAL MEDICINE

## 2020-08-10 PROCEDURE — 93306 TTE W/DOPPLER COMPLETE: CPT | Performed by: INTERNAL MEDICINE

## 2020-08-10 PROCEDURE — 93306 TTE W/DOPPLER COMPLETE: CPT

## 2020-08-10 PROCEDURE — 63710000001 INSULIN DETEMIR PER 5 UNITS: Performed by: HOSPITALIST

## 2020-08-10 PROCEDURE — 36600 WITHDRAWAL OF ARTERIAL BLOOD: CPT

## 2020-08-10 PROCEDURE — 82803 BLOOD GASES ANY COMBINATION: CPT

## 2020-08-10 PROCEDURE — 25010000002 PERFLUTREN (DEFINITY) 8.476 MG IN SODIUM CHLORIDE 0.9 % 10 ML INJECTION: Performed by: INTERNAL MEDICINE

## 2020-08-10 RX ORDER — GUAIFENESIN 600 MG/1
1200 TABLET, EXTENDED RELEASE ORAL EVERY 12 HOURS SCHEDULED
Status: DISCONTINUED | OUTPATIENT
Start: 2020-08-10 | End: 2020-08-14 | Stop reason: HOSPADM

## 2020-08-10 RX ORDER — BUMETANIDE 1 MG/1
2 TABLET ORAL 2 TIMES DAILY
Status: DISCONTINUED | OUTPATIENT
Start: 2020-08-10 | End: 2020-08-14 | Stop reason: HOSPADM

## 2020-08-10 RX ADMIN — SODIUM CHLORIDE, PRESERVATIVE FREE 10 ML: 5 INJECTION INTRAVENOUS at 09:34

## 2020-08-10 RX ADMIN — LEVALBUTEROL HYDROCHLORIDE 0.63 MG: 0.63 SOLUTION RESPIRATORY (INHALATION) at 12:55

## 2020-08-10 RX ADMIN — QUETIAPINE FUMARATE 100 MG: 100 TABLET ORAL at 20:46

## 2020-08-10 RX ADMIN — BUMETANIDE 2 MG: 1 TABLET ORAL at 20:46

## 2020-08-10 RX ADMIN — INSULIN DETEMIR 50 UNITS: 100 INJECTION, SOLUTION SUBCUTANEOUS at 20:46

## 2020-08-10 RX ADMIN — LEVALBUTEROL HYDROCHLORIDE 0.63 MG: 0.63 SOLUTION RESPIRATORY (INHALATION) at 07:29

## 2020-08-10 RX ADMIN — HYDROCODONE BITARTRATE AND ACETAMINOPHEN 1 TABLET: 5; 325 TABLET ORAL at 16:51

## 2020-08-10 RX ADMIN — HYDROCODONE BITARTRATE AND ACETAMINOPHEN 1 TABLET: 5; 325 TABLET ORAL at 23:04

## 2020-08-10 RX ADMIN — WHITE PETROLATUM 41 % TOPICAL OINTMENT: OINTMENT at 16:12

## 2020-08-10 RX ADMIN — LEVALBUTEROL HYDROCHLORIDE 0.63 MG: 0.63 SOLUTION RESPIRATORY (INHALATION) at 19:36

## 2020-08-10 RX ADMIN — METOPROLOL TARTRATE 50 MG: 50 TABLET, FILM COATED ORAL at 23:04

## 2020-08-10 RX ADMIN — ATORVASTATIN CALCIUM 40 MG: 40 TABLET, FILM COATED ORAL at 09:33

## 2020-08-10 RX ADMIN — ASPIRIN 81 MG: 81 TABLET, COATED ORAL at 09:33

## 2020-08-10 RX ADMIN — GUAIFENESIN 1200 MG: 600 TABLET, EXTENDED RELEASE ORAL at 15:21

## 2020-08-10 RX ADMIN — INSULIN ASPART 4 UNITS: 100 INJECTION, SOLUTION INTRAVENOUS; SUBCUTANEOUS at 16:51

## 2020-08-10 RX ADMIN — LEVALBUTEROL HYDROCHLORIDE 0.63 MG: 0.63 SOLUTION RESPIRATORY (INHALATION) at 01:01

## 2020-08-10 RX ADMIN — ENOXAPARIN SODIUM 30 MG: 30 INJECTION SUBCUTANEOUS at 20:46

## 2020-08-10 RX ADMIN — LEVOTHYROXINE SODIUM 175 MCG: 150 TABLET ORAL at 09:33

## 2020-08-10 RX ADMIN — PETROLATUM: 42 OINTMENT TOPICAL at 16:12

## 2020-08-10 RX ADMIN — SODIUM CHLORIDE, PRESERVATIVE FREE 10 ML: 5 INJECTION INTRAVENOUS at 20:46

## 2020-08-10 RX ADMIN — FLUTICASONE FUROATE, UMECLIDINIUM BROMIDE AND VILANTEROL TRIFENATATE 1 PUFF: 100; 62.5; 25 POWDER RESPIRATORY (INHALATION) at 12:55

## 2020-08-10 RX ADMIN — INSULIN ASPART 8 UNITS: 100 INJECTION, SOLUTION INTRAVENOUS; SUBCUTANEOUS at 09:33

## 2020-08-10 RX ADMIN — PERFLUTREN 2 ML: 6.52 INJECTION, SUSPENSION INTRAVENOUS at 08:30

## 2020-08-10 RX ADMIN — HYDROCODONE BITARTRATE AND ACETAMINOPHEN 1 TABLET: 5; 325 TABLET ORAL at 10:36

## 2020-08-10 RX ADMIN — METOPROLOL TARTRATE 50 MG: 50 TABLET, FILM COATED ORAL at 13:09

## 2020-08-10 RX ADMIN — QUETIAPINE FUMARATE 100 MG: 100 TABLET ORAL at 09:33

## 2020-08-10 NOTE — PROGRESS NOTES
"[facility] PULMONARY CARE         Dr Galarza Sayied   LOS: 3 days   Patient Care Team:  Marilu Suresh APRN as PCP - General (Family Medicine)  Jhonny Hoyt MD as Consulting Physician (Pulmonary Disease)  Konstantin Orr MD as Consulting Physician (Nephrology)    Chief Complaint: Acute on chronic respiratory failure hypoxemic and hypercapnic with underlying history of COPD ALTA/OHS and volume overload    Interval History: Patient has been transitioned to BiPAP.  She wakes up follow simple commands.    REVIEW OF SYSTEMS:   Limited patient on AVAPS    Ventilator/Non-Invasive Ventilation Settings (From admission, onward)     Start     Ordered    08/08/20 1829  NIPPV (CPAP or BIPAP)  Until Discontinued     Question Answer Comment   Indication: Acute Respiratory Failure    Type: AVAPS-AE    NIPPV Mask Interface: Full Face Mask    Rate 20    VT (mL) 450    EPAP Min (cm H2O) 5    EPAP Max (cm H2O) 10    Max Pressure (cm H2O) 30    Pressure Support Min (cm H2O) 5    Pressure Support Max (cm H2O) 20    Other O2 bled in    Titrate for SPO2 Between    Titrate for SPO2 88% - 92%        08/08/20 1830 08/07/20 1833  NIPPV (CPAP or BIPAP)  Until Discontinued,   Status:  Canceled     Question Answer Comment   Type: BIPAP    NIPPV Mask Interface: Full Face Mask        08/07/20 1832                  Vital Signs  Temp:  [96.8 °F (36 °C)-97.9 °F (36.6 °C)] 96.9 °F (36.1 °C)  Heart Rate:  [57-76] 58  Resp:  [20-24] 20  BP: (106-148)/(45-92) 119/62    Intake/Output Summary (Last 24 hours) at 8/10/2020 1122  Last data filed at 8/10/2020 0900  Gross per 24 hour   Intake 590 ml   Output 1500 ml   Net -910 ml     Flowsheet Rows      First Filed Value   Admission Height  160 cm (63\") Documented at 08/07/2020 1425   Admission Weight  122 kg (268 lb) Documented at 08/07/2020 1425          Physical Exam:  Patient is examined using the personal protective equipment as per guidelines from infection control for this " particular patient as enacted.  Hand hygiene was performed before and after patient interaction.   General Appearance:    Alert, cooperative, in no acute distress.  Following simple commands  Neck midline trachea no thyromegaly   Lungs:    Diminished breath sounds rhonchi bilaterally on the basis    Heart:    Regular rhythm and normal rate, normal S1 and S2, no            murmur, no gallop, no rub, no click   Chest Wall:    No abnormalities observed   Abdomen:     Normal bowel sounds, no masses, no organomegaly, soft        non-tender, non-distended, no guarding, no rebound                tenderness   Extremities:   Moves all extremities well, 1-2+ edema, no cyanosis, no             redness  CNS no focal neurological deficits normal sensory exam  Skin no rashes no nodules  Musculoskeletal no cyanosis no clubbing normal range of motion     Results Review:        Results from last 7 days   Lab Units 08/10/20  0444 08/09/20  0430 08/08/20  1616   SODIUM mmol/L 143 140 139   POTASSIUM mmol/L 4.5 4.8 5.0   CHLORIDE mmol/L 98 94* 94*   CO2 mmol/L 37.0* 36.9* 37.9*   BUN mg/dL 59* 63* 59*   CREATININE mg/dL 2.41* 2.73* 2.77*   GLUCOSE mg/dL 226* 319* 247*   CALCIUM mg/dL 8.5* 8.0* 8.3*     Results from last 7 days   Lab Units 08/08/20  0540 08/07/20  1529   TROPONIN T ng/mL 0.040* 0.047*     Results from last 7 days   Lab Units 08/10/20  0444 08/09/20  0430 08/08/20  0540   WBC 10*3/mm3 5.21 4.80 5.49   HEMOGLOBIN g/dL 8.0* 7.1* 7.5*   HEMATOCRIT % 27.9* 25.1* 26.6*   PLATELETS 10*3/mm3 185 165 184                     Results from last 7 days   Lab Units 08/10/20  0558   PH, ARTERIAL pH units 7.310*   PO2 ART mm Hg 64.8*   PCO2, ARTERIAL mm Hg 82.6*   HCO3 ART mmol/L 41.6*       I reviewed the patient's new clinical results.  I personally viewed and interpreted the patient's CXR        Medication Review:     Aquaphor Advanced Therapy      aspirin 81 mg Oral Daily   atorvastatin 40 mg Oral Daily   enoxaparin 30 mg  Subcutaneous Q24H   hydrophor  Topical Q24H   insulin aspart 0-24 Units Subcutaneous TID AC   insulin detemir 45 Units Subcutaneous Nightly   levalbuterol 0.63 mg Nebulization Q6H - RT   levothyroxine 175 mcg Oral Daily   metoprolol tartrate 50 mg Oral Q12H   QUEtiapine 100 mg Oral Q12H   sodium chloride 10 mL Intravenous Q12H   Fluticasone-Umeclidin-Vilant 1 puff Inhalation Daily - RT            ASSESSMENT:   Acute on chronic hypoxemic hypercapnic respiratory failure  Chronic hypoxemic respiratory failure  Acute on chronic diastolic CHF  Acute kidney injury  Chronic venous stasis/lymphedema  Hypertension  Diabetes mellitus  Morbid obesity  ALTA/OHS  COPD    PLAN:  Volume status difficult to .  Creatinine stable  Plans for blood transfusion and monitoring volume status closely.  Diuresis per cardiology.  Repeat ABGs showed persistent respiratory acidosis.  I will switch her to vision BiPAP for better flow.  Discussed with nursing staff.  Monitor ABGs closely  Aggressive PT OT  Bronchodilators  Blood glucose monitoring per ICU protocol    Geovanni Gilmore MD  08/10/20  11:22

## 2020-08-10 NOTE — NURSING NOTE
"Continued Stay Note   Reyna Meyer     Patient Name: Jarde Govea  MRN: [id number]  Today's Date: 8/10/2020    Admit Date: 8/7/2020    Discharge Plan     Row Name 08/10/20 1333       Plan    Plan  Short term rehab vs LTC    Plan Comments  Followed up with patient today regarding discharge plans. Patient states \"well I feel like I was discharged to soon and that why I'm already back\". Patient states she is now ready for STR if her insurance will pay for it stating \"the last you all called around my insurance would no pay for it\". Informed patient that  Saige Goyal saw her daughter Carmelita in the pizarro and she states that she would like for us to make a referral to [facility] and patient is agreeable to this. Discussed a back up plan in case her insurance will not approve and she states \"well I'll just have to go back to my sisters\". CM asked patient if she was able to get herself up out of the bed or chair and actually toilet herself when she returned to her sister's house and she stated \"no\". Tried to discuss with patient the possibility of LTC and she states \"no I don't want to leave my family\". CM was given permission to call patient's sister Sania and daughter Carmelita for further discussion regarding discharge plan. Called and spoke with sister Sania to discuss plans and she states \"I have Lupus and can actually not physically take care of her and she has done nothing but sit back in her chair since she got home\". Sister states patient is unable to care for herself and that her SATS drop and she is afraid to get up\". CM asked Sania if patient was unable to do any more for herself was she going to let her come back to live with her and she states \"no\". Asked sister if she has ever had that conversation with patient and she states \"yes but she does not want to hear it and she does not want to hear it\". Informed Sania that I would keep her updated regarding discharge plans. Called and spoke " "with patent's daughter Carmelita and discussed discharge plans if CM is unable to get STR for her mother if she could possibly come stay at there house and she states \"no I have no room at my house for her\". Daughter is want STR at [facility] so that she can get stronger to return to her sister's house. Called Joelle at [facility] and referral made. Name and number placed on white board in room. CM will continue to follow for needs.        Discharge Codes    No documentation.             Kerri Dutton RN    "

## 2020-08-10 NOTE — PLAN OF CARE
Problem: Patient Care Overview  Goal: Plan of Care Review  Outcome: Ongoing (interventions implemented as appropriate)  Flowsheets (Taken 8/10/2020 0311)  Progress: improving  Plan of Care Reviewed With: patient  Outcome Summary: VSS, am labs ordered. echo planned for am. Tolerated BIPAP overnight on 2L; O2 4-5L NC while awake. pt c/o BLE pain, orders given for Norco Q6H prn and ace wrap BLE. Wound care to see pt in am.

## 2020-08-10 NOTE — CONSULTS
Inpatient Nephrology Consult  Consult performed by: David Cowart MD  Consult ordered by: Mik Fish MD  Reason for consult: olayinka          Patient Care Team:  Marilu Suresh APRN as PCP - General (Family Medicine)  Jhonny Hoyt MD as Consulting Physician (Pulmonary Disease)  Konstantin Orr MD as Consulting Physician (Nephrology)    Chief complaint:soa    Subjective     Asked to see this unfortunate wf for olayinka.  She has ckd3, baseline creatinine in the 1.7 range, followed by dr Rafael Orr as an outpatient.  She was here and discharged last week.  She was seen by home health on her first visit and found to have very low o2 sats and brought back to the hospital.  She was found to have a creatinine of 2.6 prompting renal consultation.  She takes no nsaids, has been hemodynamically stable, has not been oliguric, and has had no hemoptysis or gross hematuria.  She has had problems with lower extremity edema and does not elevate her legs.  She has them wrapped now which helps her chronic pain there presumably from diabetic neuropathy and venous stasis edema.  She has been treated with empiric iv abx and her dyspnea has improved.  She denies fever but does have a chronic cough and dyspnea at baseline.  She denies known covid19 exposures.      Review of Systems   Constitutional: Positive for fatigue. Negative for activity change, appetite change, chills, diaphoresis, fever and unexpected weight change.   HENT: Negative.    Eyes: Negative.    Respiratory: Positive for cough and shortness of breath. Negative for apnea, choking, chest tightness, wheezing and stridor.    Cardiovascular: Positive for leg swelling. Negative for chest pain and palpitations.   Gastrointestinal: Positive for nausea. Negative for abdominal distention, abdominal pain, anal bleeding, blood in stool, constipation, diarrhea, rectal pain and vomiting.   Endocrine: Negative.    Genitourinary: Negative.    Musculoskeletal:  Negative.    Skin: Negative.    Allergic/Immunologic: Negative.    Neurological: Negative.    Hematological: Negative.    Psychiatric/Behavioral: Negative.    All other systems reviewed and are negative.       Past Medical History:   Diagnosis Date   • Anxiety    • Arthritis    • Cellulitis    • CHF (congestive heart failure) (CMS/[facility])    • Chronic back pain    • COPD (chronic obstructive pulmonary disease) (CMS/[facility])    • Depression    • Essential hypertension    • SERENA (generalized anxiety disorder)    • GERD (gastroesophageal reflux disease)    • Headache    • HLD (hyperlipidemia)    • Hypothyroidism, acquired    • Low back pain    • Neuropathy in diabetes (CMS/[facility])    • Rectal abscess    • Sleep apnea    • Type 2 diabetes mellitus (CMS/[facility])    • Wound, open, toe 2017     Family History   Problem Relation Age of Onset   • Cancer Mother         bone   • Cancer Father    • Cancer Brother      Social History     Tobacco Use   • Smoking status: Former Smoker     Packs/day: 0.50     Years: 40.00     Pack years: 20.00     Types: Cigarettes     Last attempt to quit: 10/4/2010     Years since quittin.8   • Smokeless tobacco: Former User     Quit date: 8/3/2012   Substance Use Topics   • Alcohol use: No   • Drug use: No       Current Facility-Administered Medications:   •  acetaminophen (TYLENOL) tablet 650 mg, 650 mg, Oral, Q4H PRN, 650 mg at 20 **OR** acetaminophen (TYLENOL) suppository 650 mg, 650 mg, Rectal, Q4H PRN, Mik Fish MD  •  albuterol (PROVENTIL) nebulizer solution 0.083% 2.5 mg/3mL, 2.5 mg, Nebulization, Q6H PRN, Mik Fish MD  •  Aquaphor Advanced Therapy ointment  - ADS Override Pull, , , ,   •  aspirin EC tablet 81 mg, 81 mg, Oral, Daily, Mik Fish MD, 81 mg at 08/10/20 0933  •  atorvastatin (LIPITOR) tablet 40 mg, 40 mg, Oral, Daily, Mik Fish MD, 40 mg at 08/10/20 0933  •  dextrose (D50W) 25 g/ 50mL Intravenous Solution 25 g, 25 g,  Intravenous, Q15 Min PRN, Mik Fish MD  •  dextrose (GLUTOSE) oral gel 15 g, 15 g, Oral, Q15 Min PRN, Mik Fish MD  •  enoxaparin (LOVENOX) syringe 30 mg, 30 mg, Subcutaneous, Q24H, Mik Fish MD, 30 mg at 08/09/20 2027  •  glucagon (human recombinant) (GLUCAGEN DIAGNOSTIC) injection 1 mg, 1 mg, Subcutaneous, Q15 Min PRN, Mik Fish MD  •  HYDROcodone-acetaminophen (NORCO) 5-325 MG per tablet 1 tablet, 1 tablet, Oral, Q6H PRN, Vera Pimentel MD, 1 tablet at 08/10/20 1036  •  hydrophor (AQUAPHOR) ointment, , Topical, Q24H, Vera Pimentel MD  •  insulin aspart (novoLOG) injection 0-24 Units, 0-24 Units, Subcutaneous, TID AC, Mik Fish MD, 8 Units at 08/10/20 0933  •  insulin detemir (LEVEMIR) injection 45 Units, 45 Units, Subcutaneous, Nightly, Mik Fish MD, 45 Units at 08/09/20 2027  •  levalbuterol (XOPENEX) nebulizer solution 0.63 mg, 0.63 mg, Nebulization, Q6H - RT, Mik Fish MD, 0.63 mg at 08/10/20 1255  •  levothyroxine (SYNTHROID, LEVOTHROID) tablet 175 mcg, 175 mcg, Oral, Daily, Mik Fish MD, 175 mcg at 08/10/20 0933  •  metoprolol tartrate (LOPRESSOR) tablet 50 mg, 50 mg, Oral, Q12H, Mik Fish MD, 50 mg at 08/10/20 1309  •  ondansetron (ZOFRAN) tablet 4 mg, 4 mg, Oral, Q6H PRN **OR** ondansetron (ZOFRAN) injection 4 mg, 4 mg, Intravenous, Q6H PRN, Mik Fish MD  •  QUEtiapine (SEROquel) tablet 100 mg, 100 mg, Oral, Q12H, Mik Fish MD, 100 mg at 08/10/20 0933  •  sennosides-docusate (PERICOLACE) 8.6-50 MG per tablet 2 tablet, 2 tablet, Oral, BID PRN, Mik Fish MD  •  sodium chloride 0.9 % flush 10 mL, 10 mL, Intravenous, PRN, Mik Fish MD  •  sodium chloride 0.9 % flush 10 mL, 10 mL, Intravenous, Q12H, Mik Fish MD, 10 mL at 08/10/20 0934  •  sodium chloride 0.9 % infusion 40 mL, 40 mL, Intravenous, PRN, Mik Fish MD, 40  mL at 08/09/20 1315  •  TRELEGY (Fluticasone-Umeclidin-Vilant)100-62.5-25 MCG/INH IN, 1 puff, Inhalation, Daily - RT, Mik Fish MD, 1 puff at 08/10/20 1255    Objective      Vital Signs  Temp:  [96.8 °F (36 °C)-98.5 °F (36.9 °C)] 98.5 °F (36.9 °C)  Heart Rate:  [57-76] 73  Resp:  [18-23] 18  BP: (115-148)/(56-92) 115/56    Physical Exam   Constitutional: She is oriented to person, place, and time. She appears well-developed and well-nourished. No distress.   HENT:   Head: Normocephalic and atraumatic.   Right Ear: External ear normal.   Left Ear: External ear normal.   Nose: Nose normal.   Mouth/Throat: Oropharynx is clear and moist. No oropharyngeal exudate.   Eyes: Pupils are equal, round, and reactive to light. No scleral icterus.   Neck: Neck supple. No JVD present.   Cardiovascular: Normal rate and regular rhythm.   Murmur heard.  Pulmonary/Chest: Effort normal. She has no wheezes. She has no rales.   Rhonchi, dull in bases   Abdominal: Soft. Bowel sounds are normal. She exhibits no distension.   Musculoskeletal: She exhibits edema.   Neurological: She is alert and oriented to person, place, and time.   Skin: Skin is warm and dry. She is not diaphoretic. No erythema.   Nursing note and vitals reviewed.      Results Review:    Radiology noted    Lab Results (last 24 hours)     Procedure Component Value Units Date/Time    POC Glucose Once [[id number]]  (Abnormal) Collected:  08/10/20 1207    Specimen:  Blood Updated:  08/10/20 1216     Glucose 148 mg/dL     POC Glucose Once [[id number]]  (Abnormal) Collected:  08/10/20 0725    Specimen:  Blood Updated:  08/10/20 0732     Glucose 208 mg/dL     Blood Gas, Arterial [[id number]]  (Abnormal) Collected:  08/10/20 0558    Specimen:  Arterial Blood Updated:  08/10/20 0604     Site Left Radial     Kevyn's Test Positive     pH, Arterial 7.310 pH units      Comment: 84 Value below reference range        pCO2, Arterial 82.6 mm Hg      Comment: 86 Value above  critical limit        pO2, Arterial 64.8 mm Hg      Comment: 84 Value below reference range        HCO3, Arterial 41.6 mmol/L      Comment: 83 Value above reference range        Base Excess, Arterial 13.1 mmol/L      Comment: 83 Value above reference range        O2 Saturation, Arterial 91.7 %      Comment: 84 Value below reference range        Hemoglobin, Blood Gas 8.7 g/dL      Temperature 37.0 C      Barometric Pressure for Blood Gas 740 mmHg      Modality BiPap     Flow Rate 2.0 lpm      Ventilator Mode NA     IPAP 20     Comment: Meter: [id number]     :  [id number]        EPAP 10     Notified Who RB AND V TO JAIRON RN     Notified By [id number]     Notified Time 08/10/2020 06:05     pCO2, Temperature Corrected 82.6 mm Hg      pH, Temp Corrected 7.310 pH Units      pO2, Temperature Corrected 64.8 mm Hg     Renal Function Panel [[id number]]  (Abnormal) Collected:  08/10/20 0444    Specimen:  Blood from Arm, Left Updated:  08/10/20 0602     Glucose 226 mg/dL      BUN 59 mg/dL      Creatinine 2.41 mg/dL      Sodium 143 mmol/L      Potassium 4.5 mmol/L      Chloride 98 mmol/L      CO2 37.0 mmol/L      Calcium 8.5 mg/dL      Albumin 2.60 g/dL      Phosphorus 4.6 mg/dL      Anion Gap 8.0 mmol/L      BUN/Creatinine Ratio 24.5     eGFR Non African Amer 20 mL/min/1.73     Narrative:       GFR Normal >60  Chronic Kidney Disease <60  Kidney Failure <15      CBC & Differential [[id number]] Collected:  08/10/20 0444    Specimen:  Blood from Arm, Left Updated:  08/10/20 0540    Narrative:       The following orders were created for panel order CBC & Differential.  Procedure                               Abnormality         Status                     ---------                               -----------         ------                     CBC Auto Differential[[id number]]        Abnormal            Final result                 Please view results for these tests on the individual orders.    CBC Auto Differential [[id number]]   (Abnormal) Collected:  08/10/20 0444    Specimen:  Blood from Arm, Left Updated:  08/10/20 0540     WBC 5.21 10*3/mm3      RBC 2.98 10*6/mm3      Hemoglobin 8.0 g/dL      Hematocrit 27.9 %      MCV 93.6 fL      MCH 26.8 pg      MCHC 28.7 g/dL      RDW 15.0 %      RDW-SD 50.8 fl      MPV 10.9 fL      Platelets 185 10*3/mm3      Neutrophil % 65.2 %      Lymphocyte % 22.8 %      Monocyte % 8.3 %      Eosinophil % 3.1 %      Basophil % 0.2 %      Immature Grans % 0.4 %      Neutrophils, Absolute 3.40 10*3/mm3      Lymphocytes, Absolute 1.19 10*3/mm3      Monocytes, Absolute 0.43 10*3/mm3      Eosinophils, Absolute 0.16 10*3/mm3      Basophils, Absolute 0.01 10*3/mm3      Immature Grans, Absolute 0.02 10*3/mm3      nRBC 0.0 /100 WBC     POC Glucose Once [[id number]]  (Abnormal) Collected:  08/09/20 2013    Specimen:  Blood Updated:  08/09/20 2019     Glucose 137 mg/dL     POC Glucose Once [[id number]]  (Normal) Collected:  08/09/20 1834    Specimen:  Blood Updated:  08/09/20 1850     Glucose 94 mg/dL     Blood Gas, Arterial [[id number]]  (Abnormal) Collected:  08/09/20 1640    Specimen:  Arterial Blood Updated:  08/09/20 1728     Site Right Radial     Kevyn's Test Positive     pH, Arterial 7.306 pH units      Comment: 84 Value below reference range        pCO2, Arterial 81.5 mm Hg      Comment: 86 Value above critical limit        pO2, Arterial 63.6 mm Hg      Comment: 84 Value below reference range        HCO3, Arterial 40.6 mmol/L      Comment: 83 Value above reference range        Base Excess, Arterial 12.4 mmol/L      Comment: 83 Value above reference range        O2 Saturation, Arterial 91.7 %      Comment: 84 Value below reference range        Hemoglobin, Blood Gas 8.4 g/dL      Temperature 37.0 C      Barometric Pressure for Blood Gas 741 mmHg      Modality BiPap     Flow Rate 2.0 lpm      Ventilator Mode BiPAP     IPAP 20     Comment: Meter: [id number]     :  [id number]        EPAP 10     Notified Who RB  AND YIN HOLDEN [id number]     Notified By [id number]     Notified Time 08/09/2020 17:31     pCO2, Temperature Corrected 81.5 mm Hg      pH, Temp Corrected 7.306 pH Units      pO2, Temperature Corrected 63.6 mm Hg                 Assessment/Plan       Acute on chronic respiratory failure with hypoxia and hypercapnia (CMS/HCC)      Assessment & Plan  1. olayinka-likely to be prerenal in the setting of respiratory failure, hypoxia, and right sided heart failure, superimposed on ckd3; follow volume status with cxrs, cautiously resume home dose of diuretics, avoid nephrotoxins, renally dose medications, check lab daily  2. Ankle edema-due to right sided heart failure, cxr does have low level infiltrates suggesting of mild edema pattern  3. Anemia of ckd-stable  4. Chronic respiratory failure    Thanks, will follow along with you    I discussed the patients findings and my recommendations with patient    David Cowart MD  08/10/20  13:51    Time:

## 2020-08-10 NOTE — PLAN OF CARE
Problem: Patient Care Overview  Goal: Plan of Care Review  Outcome: Ongoing (interventions implemented as appropriate)  Flowsheets (Taken 8/10/2020 0108)  Progress: improving  Plan of Care Reviewed With: patient     Problem: Patient Care Overview  Goal: Individualization and Mutuality  Outcome: Ongoing (interventions implemented as appropriate)     Problem: Breathing Pattern Ineffective (Adult)  Intervention: Optimize Oxygenation/Ventilation/Perfusion  Flowsheets (Taken 8/10/2020 0108)  Head of Bed (HOB): HOB elevated  Airway/Ventilation Management: airway patency maintained; pulmonary hygiene promoted  Breathing Techniques/Airway Clearance: deep/controlled cough encouraged

## 2020-08-10 NOTE — PROGRESS NOTES
"    Patient Name: Jared Govea  :1955  65 y.o.      Patient Care Team:  Marilu Suresh APRN as PCP - General (Family Medicine)  Jhonny Hoyt MD as Consulting Physician (Pulmonary Disease)  Konstantin Orr MD as Consulting Physician (Nephrology)    Chief Complaint: dyspnea and weakness    Interval History: States breathing is better, using BiPap.  Denies chest pain/angina.  AAOx3.       Objective   Vital Signs  Temp:  [96.8 °F (36 °C)-97.9 °F (36.6 °C)] 96.9 °F (36.1 °C)  Heart Rate:  [57-76] 58  Resp:  [20-24] 20  BP: (106-148)/(45-92) 119/62    Intake/Output Summary (Last 24 hours) at 8/10/2020 0818  Last data filed at 2020 2124  Gross per 24 hour   Intake 350 ml   Output 800 ml   Net -450 ml     Flowsheet Rows      First Filed Value   Admission Height  160 cm (63\") Documented at 2020 1425   Admission Weight  122 kg (268 lb) Documented at 2020 1425          Physical Exam:   General Appearance:    Alert, cooperative, in no acute distress   Lungs:     Clear to auscultation, diminished  Normal respiratory effort and rate.      Heart:    Regular rhythm and normal rate, normal S1 and S2, no murmurs, gallops or rubs.     Chest Wall:    No abnormalities observed   Abdomen:     Soft, nontender, positive bowel sounds.     Extremities:   no cyanosis, clubbing. LE wrapped, +1 edema.  No marked joint deformities.  Adequate musculoskeletal strength.       Results Review:    Results from last 7 days   Lab Units 08/10/20  0444   SODIUM mmol/L 143   POTASSIUM mmol/L 4.5   CHLORIDE mmol/L 98   CO2 mmol/L 37.0*   BUN mg/dL 59*   CREATININE mg/dL 2.41*   GLUCOSE mg/dL 226*   CALCIUM mg/dL 8.5*     Results from last 7 days   Lab Units 20  0540 20  1529   TROPONIN T ng/mL 0.040* 0.047*     Results from last 7 days   Lab Units 08/10/20  0444   WBC 10*3/mm3 5.21   HEMOGLOBIN g/dL 8.0*   HEMATOCRIT % 27.9*   PLATELETS 10*3/mm3 185                           Medication Review:     "     Aquaphor Advanced Therapy      aspirin 81 mg Oral Daily   atorvastatin 40 mg Oral Daily   enoxaparin 30 mg Subcutaneous Q24H   hydrophor  Topical Q24H   insulin aspart 0-24 Units Subcutaneous TID AC   insulin detemir 45 Units Subcutaneous Nightly   levalbuterol 0.63 mg Nebulization Q6H - RT   levothyroxine 175 mcg Oral Daily   metoprolol tartrate 50 mg Oral Q12H   QUEtiapine 100 mg Oral Q12H   sodium chloride 10 mL Intravenous Q12H   Fluticasone-Umeclidin-Vilant 1 puff Inhalation Daily - RT             Assessment/Plan     Acute on chronic respiratory failure with hypoxia and hypercapnia (CMS/HCC)     1. Dyspnea and weakness, multifactorial due to lung disease, anemia, kidney disease and likely heart failure..  Echo pending.  2. Diabetes mellitus type 2 with very low blood glucose on admission, now glucose high. On insulin  3. Known chronic pulmonary disease on chronic oxygen and trilogy at home, CO2 retaining with hypoxia. Responding well to BiPap.  States breathing is better.  4. Chronic kidney disease with LADY.  It seems that her baseline creatinine might be around 1.8. Diuretics on hold, creatinine 2.41.  Nephrology consulted.  5. Anemia, significant. Worsening - H/H 8.0/27.9 after transfusion.  6. Obesity and immobilization  7. Hypothyroidism    Echo pending.    OPAL Carcamo  [facility]  08/10/20  08:18

## 2020-08-10 NOTE — PROGRESS NOTES
"Hospitalist Team      Patient Care Team:  Marilu Suresh APRN as PCP - General (Family Medicine)  Jhonny Hoyt MD as Consulting Physician (Pulmonary Disease)  Konstantin Orr MD as Consulting Physician (Nephrology)        Chief Complaint: Follow-up A/C H/H Respiratory Failure; LADY    Subjective  No acute events overnight.  RT has been adjusting settings.  Ms. Govea appears clinically stable.  Good appetite and PO intake.  Denies chest pain.      Objective    Vital Signs  Temp:  [96.8 °F (36 °C)-97.9 °F (36.6 °C)] 96.9 °F (36.1 °C)  Heart Rate:  [57-76] 58  Resp:  [20-24] 20  BP: (106-148)/(45-92) 119/62  Oxygen Therapy  SpO2: 97 %  Pulse Oximetry Type: Continuous  Device (Oxygen Therapy): nasal cannula  Device (Oxygen Therapy): NPPV/NIV  Flow (L/min): 3  Oximetry Probe Site Changed: Yes}  Flowsheet Rows      First Filed Value   Admission Height  160 cm (63\") Documented at 08/07/2020 1425   Admission Weight  122 kg (268 lb) Documented at 08/07/2020 1425          Physical Exam:    General: Appears stated age in NAD  Lungs: Breath sounds are diminished.  I appreciate no wheeze accessory use.  CV: Regular rate and rhythm w/ active bowel sounds.  Abdomen: Morbidly obese, soft, and non-tender w/ active bowel sounds.  MSK: No C/C.  BLE wrapped and \"soft\".  Neuro: CN Ii-XII grossly intact.  Psych: Pleasant affect.  Ox3.    Results Review:     I reviewed the patient's new clinical results.    Lab Results (last 24 hours)     Procedure Component Value Units Date/Time    POC Glucose Once [[id number]]  (Abnormal) Collected:  08/10/20 0725    Specimen:  Blood Updated:  08/10/20 0732     Glucose 208 mg/dL     Blood Gas, Arterial [[id number]]  (Abnormal) Collected:  08/10/20 0558    Specimen:  Arterial Blood Updated:  08/10/20 0604     Site Left Radial     Kevyn's Test Positive     pH, Arterial 7.310 pH units      Comment: 84 Value below reference range        pCO2, Arterial 82.6 mm Hg      Comment: 86 Value above " critical limit        pO2, Arterial 64.8 mm Hg      Comment: 84 Value below reference range        HCO3, Arterial 41.6 mmol/L      Comment: 83 Value above reference range        Base Excess, Arterial 13.1 mmol/L      Comment: 83 Value above reference range        O2 Saturation, Arterial 91.7 %      Comment: 84 Value below reference range        Hemoglobin, Blood Gas 8.7 g/dL      Temperature 37.0 C      Barometric Pressure for Blood Gas 740 mmHg      Modality BiPap     Flow Rate 2.0 lpm      Ventilator Mode NA     IPAP 20     Comment: Meter: [id number]     :  [id number]        EPAP 10     Notified Who RB AND V TO JAIRON RN     Notified By [id number]     Notified Time 08/10/2020 06:05     pCO2, Temperature Corrected 82.6 mm Hg      pH, Temp Corrected 7.310 pH Units      pO2, Temperature Corrected 64.8 mm Hg     Renal Function Panel [[id number]]  (Abnormal) Collected:  08/10/20 0444    Specimen:  Blood from Arm, Left Updated:  08/10/20 0602     Glucose 226 mg/dL      BUN 59 mg/dL      Creatinine 2.41 mg/dL      Sodium 143 mmol/L      Potassium 4.5 mmol/L      Chloride 98 mmol/L      CO2 37.0 mmol/L      Calcium 8.5 mg/dL      Albumin 2.60 g/dL      Phosphorus 4.6 mg/dL      Anion Gap 8.0 mmol/L      BUN/Creatinine Ratio 24.5     eGFR Non African Amer 20 mL/min/1.73     Narrative:       GFR Normal >60  Chronic Kidney Disease <60  Kidney Failure <15      CBC & Differential [[id number]] Collected:  08/10/20 0444    Specimen:  Blood from Arm, Left Updated:  08/10/20 0540    Narrative:       The following orders were created for panel order CBC & Differential.  Procedure                               Abnormality         Status                     ---------                               -----------         ------                     CBC Auto Differential[[id number]]        Abnormal            Final result                 Please view results for these tests on the individual orders.    CBC Auto Differential [[id number]]   (Abnormal) Collected:  08/10/20 0444    Specimen:  Blood from Arm, Left Updated:  08/10/20 0540     WBC 5.21 10*3/mm3      RBC 2.98 10*6/mm3      Hemoglobin 8.0 g/dL      Hematocrit 27.9 %      MCV 93.6 fL      MCH 26.8 pg      MCHC 28.7 g/dL      RDW 15.0 %      RDW-SD 50.8 fl      MPV 10.9 fL      Platelets 185 10*3/mm3      Neutrophil % 65.2 %      Lymphocyte % 22.8 %      Monocyte % 8.3 %      Eosinophil % 3.1 %      Basophil % 0.2 %      Immature Grans % 0.4 %      Neutrophils, Absolute 3.40 10*3/mm3      Lymphocytes, Absolute 1.19 10*3/mm3      Monocytes, Absolute 0.43 10*3/mm3      Eosinophils, Absolute 0.16 10*3/mm3      Basophils, Absolute 0.01 10*3/mm3      Immature Grans, Absolute 0.02 10*3/mm3      nRBC 0.0 /100 WBC     POC Glucose Once [[id number]]  (Abnormal) Collected:  08/09/20 2013    Specimen:  Blood Updated:  08/09/20 2019     Glucose 137 mg/dL     POC Glucose Once [[id number]]  (Normal) Collected:  08/09/20 1834    Specimen:  Blood Updated:  08/09/20 1850     Glucose 94 mg/dL     Blood Gas, Arterial [[id number]]  (Abnormal) Collected:  08/09/20 1640    Specimen:  Arterial Blood Updated:  08/09/20 1728     Site Right Radial     Kevyn's Test Positive     pH, Arterial 7.306 pH units      Comment: 84 Value below reference range        pCO2, Arterial 81.5 mm Hg      Comment: 86 Value above critical limit        pO2, Arterial 63.6 mm Hg      Comment: 84 Value below reference range        HCO3, Arterial 40.6 mmol/L      Comment: 83 Value above reference range        Base Excess, Arterial 12.4 mmol/L      Comment: 83 Value above reference range        O2 Saturation, Arterial 91.7 %      Comment: 84 Value below reference range        Hemoglobin, Blood Gas 8.4 g/dL      Temperature 37.0 C      Barometric Pressure for Blood Gas 741 mmHg      Modality BiPap     Flow Rate 2.0 lpm      Ventilator Mode BiPAP     IPAP 20     Comment: Meter: [id number]     :  [id number]        EPAP 10     Notified Who RB  CRISPIN HOLDEN [id number]     Notified By [id number]     Notified Time 08/09/2020 17:31     pCO2, Temperature Corrected 81.5 mm Hg      pH, Temp Corrected 7.306 pH Units      pO2, Temperature Corrected 63.6 mm Hg     POC Glucose Once [[id number]]  (Abnormal) Collected:  08/09/20 1240    Specimen:  Blood Updated:  08/09/20 1246     Glucose 238 mg/dL     Occult Blood X 1, Stool - Stool, Per Rectum [[id number]]  (Normal) Collected:  08/09/20 0953    Specimen:  Stool from Per Rectum Updated:  08/09/20 1055     Fecal Occult Blood Negative    Iron Profile [[id number]]  (Abnormal) Collected:  08/09/20 0430    Specimen:  Blood from Arm, Left Updated:  08/09/20 1051     Iron 17 mcg/dL      Iron Saturation 11 %      UIBC 139 mcg/dL      TIBC 156 mcg/dL     Blood Gas, Arterial [[id number]]  (Abnormal) Collected:  08/09/20 1020    Specimen:  Arterial Blood Updated:  08/09/20 1040     Site Right Radial     Kevyn's Test Positive     pH, Arterial 7.308 pH units      Comment: 84 Value below reference range        pCO2, Arterial 83.4 mm Hg      Comment: 86 Value above critical limit        pO2, Arterial 56.7 mm Hg      Comment: 84 Value below reference range        HCO3, Arterial 41.8 mmol/L      Comment: 83 Value above reference range        Base Excess, Arterial 13.6 mmol/L      Comment: 83 Value above reference range        O2 Saturation, Arterial 87.4 %      Comment: 84 Value below reference range        Hemoglobin, Blood Gas 7.5 g/dL      Temperature 37.0 C      Barometric Pressure for Blood Gas 742 mmHg      Modality BiPap     Flow Rate 2.0 lpm      Ventilator Mode AVAP     Set Tidal Volume 450     Set Mech Resp Rate 20.0     PIP 19 cmH2O      Comment: Meter: [id number]     :  [id number]        IPAP 20     EPAP 8     Notified Who RB AND YIN HOLDEN     Notified By [id number]     Notified Time 08/09/2020 10:43     pCO2, Temperature Corrected 83.4 mm Hg      pH, Temp Corrected 7.308 pH Units      pO2, Temperature Corrected 56.7 mm Hg      Retic With IRF & RET-He [[id number]]  (Abnormal) Collected:  08/09/20 0430    Specimen:  Blood from Arm, Left Updated:  08/09/20 0926     Immature Reticulocyte Fraction 22.3 %      Reticulocyte % 2.38 %      Reticulocyte Hgb 26.6 pg           Imaging Results (Last 24 Hours)     Procedure Component Value Units Date/Time    XR Chest 1 View [[id number]] Collected:  08/09/20 0835     Updated:  08/09/20 0837    Narrative:       CR Chest 1 Vw    INDICATION:   Dyspnea with acute on chronic respiratory failure. History of COPD and CHF.     COMPARISON:    8/7/2020    FINDINGS:  Single portable AP view(s) of the chest.    No visible support lines.    Continued cardiomegaly with diffuse pulmonary vascular congestion and prominent interstitial markings may reflect CHF superimposed on chronic interstitial lung disease. No sizable effusions. No dense consolidation. No pneumothorax.       Impression:       No significant interval change. Findings suggest CHF possibly superimposed on background diffuse lung disease.    Signer Name: CEM Whiteside MD   Signed: 8/9/2020 8:35 AM   Workstation Name: [facility]    [facility]          Xray reviewed personally by physician and compared to PA and Lateral      Medication Review:   I have reviewed the patient's current medication list    Current Facility-Administered Medications:   •  acetaminophen (TYLENOL) tablet 650 mg, 650 mg, Oral, Q4H PRN, 650 mg at 08/09/20 2027 **OR** acetaminophen (TYLENOL) suppository 650 mg, 650 mg, Rectal, Q4H PRN, Mik Fish MD  •  albuterol (PROVENTIL) nebulizer solution 0.083% 2.5 mg/3mL, 2.5 mg, Nebulization, Q6H PRN, Mik Fish MD  •  Aquaphor Advanced Therapy ointment  - ADS Override Pull, , , ,   •  aspirin EC tablet 81 mg, 81 mg, Oral, Daily, Mik Fish MD, 81 mg at 08/08/20 0926  •  atorvastatin (LIPITOR) tablet 40 mg, 40 mg, Oral, Daily, Mik Fish MD, 40 mg at 08/09/20 0817  •   dextrose (D50W) 25 g/ 50mL Intravenous Solution 25 g, 25 g, Intravenous, Q15 Min PRN, Mik Fish MD  •  dextrose (GLUTOSE) oral gel 15 g, 15 g, Oral, Q15 Min PRN, Mik Fish MD  •  enoxaparin (LOVENOX) syringe 30 mg, 30 mg, Subcutaneous, Q24H, Mik Fish MD, 30 mg at 08/09/20 2027  •  glucagon (human recombinant) (GLUCAGEN DIAGNOSTIC) injection 1 mg, 1 mg, Subcutaneous, Q15 Min PRN, Mik Fish MD  •  HYDROcodone-acetaminophen (NORCO) 5-325 MG per tablet 1 tablet, 1 tablet, Oral, Q6H PRN, Vera Pimentel MD, 1 tablet at 08/09/20 2148  •  hydrophor (AQUAPHOR) ointment, , Topical, Q24H, Vera Pimentel MD  •  insulin aspart (novoLOG) injection 0-24 Units, 0-24 Units, Subcutaneous, TID AC, Mik Fish MD, 8 Units at 08/09/20 1249  •  insulin detemir (LEVEMIR) injection 45 Units, 45 Units, Subcutaneous, Nightly, Mik Fish MD, 45 Units at 08/09/20 2027  •  levalbuterol (XOPENEX) nebulizer solution 0.63 mg, 0.63 mg, Nebulization, Q6H - RT, Mik Fish MD, 0.63 mg at 08/10/20 0729  •  levothyroxine (SYNTHROID, LEVOTHROID) tablet 175 mcg, 175 mcg, Oral, Daily, Mik Fish MD, 175 mcg at 08/09/20 0817  •  metoprolol tartrate (LOPRESSOR) tablet 50 mg, 50 mg, Oral, Q12H, Mik Fish MD, 50 mg at 08/09/20 2305  •  ondansetron (ZOFRAN) tablet 4 mg, 4 mg, Oral, Q6H PRN **OR** ondansetron (ZOFRAN) injection 4 mg, 4 mg, Intravenous, Q6H PRN, Mik Fish MD  •  QUEtiapine (SEROquel) tablet 100 mg, 100 mg, Oral, Q12H, Mik Fish MD, 100 mg at 08/09/20 2027  •  sennosides-docusate (PERICOLACE) 8.6-50 MG per tablet 2 tablet, 2 tablet, Oral, BID PRN, Mik Fish MD  •  sodium chloride 0.9 % flush 10 mL, 10 mL, Intravenous, PRN, Mik Fish MD  •  sodium chloride 0.9 % flush 10 mL, 10 mL, Intravenous, Q12H, Mik Fish MD, 10 mL at 08/09/20 2027  •  sodium chloride 0.9 % infusion 40  mL, 40 mL, Intravenous, PRN, Mik Fish MD, 40 mL at 08/09/20 1315  •  TRELEGY (Fluticasone-Umeclidin-Vilant)100-62.5-25 MCG/INH IN, 1 puff, Inhalation, Daily - RT, Mik Fish MD, 1 puff at 08/09/20 1007      Assessment/Plan     1.  Acute-on-Chronic H/H respiratory Failure: Do feel as though there is a compliance issue w/ her device and no help at home.  Pulmonary following.  Will continue to monitor in ICU.    2.  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  Will continue to monitor.  U/S reviewed and negative.    3.  Diabetes Mellitus, Type 2 in Obese: AM and bedsides not at goal today.  Will up titrate basal dose.    4.  HTN: BP at goal.  Holding home regimen.  Will continue to monitor trend.    5.  Stage II Pressure Ulcer Left Buttock POA: Wound care to see.    Plan for disposition: Predicated on hospital course.    Payam Peck MD  08/10/20  08:25

## 2020-08-11 LAB
ALBUMIN SERPL-MCNC: 2.7 G/DL (ref 3.5–5.2)
ANION GAP SERPL CALCULATED.3IONS-SCNC: 5.6 MMOL/L (ref 5–15)
ARTERIAL PATENCY WRIST A: POSITIVE
ATMOSPHERIC PRESS: 739 MMHG
BASE EXCESS BLDA CALC-SCNC: 14.8 MMOL/L (ref 0–2)
BDY SITE: ABNORMAL
BODY TEMPERATURE: 37 C
BUN SERPL-MCNC: 49 MG/DL (ref 8–23)
BUN/CREAT SERPL: 23.9 (ref 7–25)
CALCIUM SPEC-SCNC: 8.6 MG/DL (ref 8.6–10.5)
CHLORIDE SERPL-SCNC: 95 MMOL/L (ref 98–107)
CO2 SERPL-SCNC: 38.4 MMOL/L (ref 22–29)
CREAT SERPL-MCNC: 2.05 MG/DL (ref 0.57–1)
GAS FLOW AIRWAY: 4 LPM
GFR SERPL CREATININE-BSD FRML MDRD: 24 ML/MIN/1.73
GLUCOSE BLDC GLUCOMTR-MCNC: 150 MG/DL (ref 70–130)
GLUCOSE BLDC GLUCOMTR-MCNC: 210 MG/DL (ref 70–130)
GLUCOSE BLDC GLUCOMTR-MCNC: 219 MG/DL (ref 70–130)
GLUCOSE BLDC GLUCOMTR-MCNC: 285 MG/DL (ref 70–130)
GLUCOSE SERPL-MCNC: 242 MG/DL (ref 65–99)
HCO3 BLDA-SCNC: 42.8 MMOL/L (ref 20–26)
HGB BLDA-MCNC: 8.9 G/DL (ref 13.5–17.5)
Lab: ABNORMAL
MODALITY: ABNORMAL
NOTIFIED BY: ABNORMAL
NOTIFIED WHO: ABNORMAL
PCO2 BLDA: 78.8 MM HG (ref 35–45)
PCO2 TEMP ADJ BLD: 78.8 MM HG (ref 35–45)
PH BLDA: 7.34 PH UNITS (ref 7.35–7.45)
PH, TEMP CORRECTED: 7.34 PH UNITS (ref 7.35–7.45)
PHOSPHATE SERPL-MCNC: 4 MG/DL (ref 2.5–4.5)
PO2 BLDA: 55.2 MM HG (ref 83–108)
PO2 TEMP ADJ BLD: 55.2 MM HG (ref 83–108)
POTASSIUM SERPL-SCNC: 4.5 MMOL/L (ref 3.5–5.2)
SAO2 % BLDCOA: 88.9 % (ref 94–99)
SODIUM SERPL-SCNC: 139 MMOL/L (ref 136–145)
VENTILATOR MODE: ABNORMAL

## 2020-08-11 PROCEDURE — 63710000001 INSULIN DETEMIR PER 5 UNITS: Performed by: HOSPITALIST

## 2020-08-11 PROCEDURE — 99232 SBSQ HOSP IP/OBS MODERATE 35: CPT | Performed by: INTERNAL MEDICINE

## 2020-08-11 PROCEDURE — 99233 SBSQ HOSP IP/OBS HIGH 50: CPT | Performed by: HOSPITALIST

## 2020-08-11 PROCEDURE — 25010000002 ENOXAPARIN PER 10 MG: Performed by: HOSPITALIST

## 2020-08-11 PROCEDURE — 63710000001 INSULIN ASPART PER 5 UNITS: Performed by: HOSPITALIST

## 2020-08-11 PROCEDURE — 94799 UNLISTED PULMONARY SVC/PX: CPT

## 2020-08-11 PROCEDURE — 63710000001 INSULIN ASPART PER 5 UNITS: Performed by: INTERNAL MEDICINE

## 2020-08-11 PROCEDURE — 80069 RENAL FUNCTION PANEL: CPT | Performed by: HOSPITALIST

## 2020-08-11 PROCEDURE — 82803 BLOOD GASES ANY COMBINATION: CPT

## 2020-08-11 PROCEDURE — 97161 PT EVAL LOW COMPLEX 20 MIN: CPT

## 2020-08-11 PROCEDURE — 63710000001 LEVALBUTEROL PER 0.5 MG: Performed by: HOSPITALIST

## 2020-08-11 PROCEDURE — 82962 GLUCOSE BLOOD TEST: CPT

## 2020-08-11 PROCEDURE — 63710000001 LEVALBUTEROL PER 0.5 MG: Performed by: INTERNAL MEDICINE

## 2020-08-11 PROCEDURE — 36600 WITHDRAWAL OF ARTERIAL BLOOD: CPT

## 2020-08-11 RX ORDER — HYDRALAZINE HYDROCHLORIDE 50 MG/1
50 TABLET, FILM COATED ORAL EVERY 8 HOURS SCHEDULED
Status: DISCONTINUED | OUTPATIENT
Start: 2020-08-11 | End: 2020-08-14 | Stop reason: HOSPADM

## 2020-08-11 RX ADMIN — LEVOTHYROXINE SODIUM 175 MCG: 150 TABLET ORAL at 08:18

## 2020-08-11 RX ADMIN — QUETIAPINE FUMARATE 100 MG: 100 TABLET ORAL at 22:30

## 2020-08-11 RX ADMIN — ASPIRIN 81 MG: 81 TABLET, COATED ORAL at 08:18

## 2020-08-11 RX ADMIN — HYDRALAZINE HYDROCHLORIDE 50 MG: 50 TABLET, FILM COATED ORAL at 22:30

## 2020-08-11 RX ADMIN — ENOXAPARIN SODIUM 40 MG: 40 INJECTION SUBCUTANEOUS at 22:32

## 2020-08-11 RX ADMIN — LEVALBUTEROL HYDROCHLORIDE 0.63 MG: 0.63 SOLUTION RESPIRATORY (INHALATION) at 01:30

## 2020-08-11 RX ADMIN — LEVALBUTEROL HYDROCHLORIDE 0.63 MG: 0.63 SOLUTION RESPIRATORY (INHALATION) at 13:40

## 2020-08-11 RX ADMIN — QUETIAPINE FUMARATE 100 MG: 100 TABLET ORAL at 08:19

## 2020-08-11 RX ADMIN — FLUTICASONE FUROATE, UMECLIDINIUM BROMIDE AND VILANTEROL TRIFENATATE 1 PUFF: 100; 62.5; 25 POWDER RESPIRATORY (INHALATION) at 10:44

## 2020-08-11 RX ADMIN — HYDRALAZINE HYDROCHLORIDE 50 MG: 50 TABLET, FILM COATED ORAL at 17:14

## 2020-08-11 RX ADMIN — SODIUM CHLORIDE, PRESERVATIVE FREE 10 ML: 5 INJECTION INTRAVENOUS at 08:23

## 2020-08-11 RX ADMIN — BUMETANIDE 2 MG: 1 TABLET ORAL at 08:18

## 2020-08-11 RX ADMIN — SODIUM CHLORIDE, PRESERVATIVE FREE 10 ML: 5 INJECTION INTRAVENOUS at 22:33

## 2020-08-11 RX ADMIN — PETROLATUM: 42 OINTMENT TOPICAL at 08:23

## 2020-08-11 RX ADMIN — METOPROLOL TARTRATE 50 MG: 50 TABLET, FILM COATED ORAL at 23:13

## 2020-08-11 RX ADMIN — GUAIFENESIN 1200 MG: 600 TABLET, EXTENDED RELEASE ORAL at 22:30

## 2020-08-11 RX ADMIN — GUAIFENESIN 1200 MG: 600 TABLET, EXTENDED RELEASE ORAL at 08:19

## 2020-08-11 RX ADMIN — LEVALBUTEROL HYDROCHLORIDE 0.63 MG: 0.63 SOLUTION RESPIRATORY (INHALATION) at 18:14

## 2020-08-11 RX ADMIN — LEVALBUTEROL HYDROCHLORIDE 0.63 MG: 0.63 SOLUTION RESPIRATORY (INHALATION) at 07:23

## 2020-08-11 RX ADMIN — ATORVASTATIN CALCIUM 40 MG: 40 TABLET, FILM COATED ORAL at 08:19

## 2020-08-11 RX ADMIN — INSULIN ASPART 8 UNITS: 100 INJECTION, SOLUTION INTRAVENOUS; SUBCUTANEOUS at 08:17

## 2020-08-11 RX ADMIN — METOPROLOL TARTRATE 50 MG: 50 TABLET, FILM COATED ORAL at 11:42

## 2020-08-11 RX ADMIN — BUMETANIDE 2 MG: 1 TABLET ORAL at 22:29

## 2020-08-11 RX ADMIN — INSULIN ASPART 8 UNITS: 100 INJECTION, SOLUTION INTRAVENOUS; SUBCUTANEOUS at 17:14

## 2020-08-11 RX ADMIN — HYDROCODONE BITARTRATE AND ACETAMINOPHEN 1 TABLET: 5; 325 TABLET ORAL at 11:42

## 2020-08-11 RX ADMIN — INSULIN DETEMIR 60 UNITS: 100 INJECTION, SOLUTION SUBCUTANEOUS at 22:35

## 2020-08-11 NOTE — PLAN OF CARE
Pt more willing to be independent, sat in chair most of shift, while wearing only nasal cannula.  Ok with going to rehab with plan of being strong enough to go back home.

## 2020-08-11 NOTE — NURSING NOTE
Continued Stay Note  TEODORA Dobbs     Patient Name: Jared Govea  MRN: [id number]  Today's Date: 8/11/2020    Admit Date: 8/7/2020    Discharge Plan     Row Name 08/11/20 1322       Plan    Plan  STR    Plan Comments  Call received from Joelle with [facility] stating that they do not accept Trilogy Vents and patient is on one at home. Called Karen with [facility] and referral made and she states that they have had the patient in the past and can start the pre cert tomorrow. She also states that patient just needs a negative COVID while prior to returning no time frame needing for results. CM will continue to follow for needs.        Discharge Codes    No documentation.             Kerri Dutton RN

## 2020-08-11 NOTE — PROGRESS NOTES
"Patient Name: Jared Govea  :1955  65 y.o.      Patient Care Team:  Marilu Suresh APRN as PCP - General (Family Medicine)  Jhonny Hoyt MD as Consulting Physician (Pulmonary Disease)  Konstantin Orr MD as Consulting Physician (Nephrology)    Interval History:   Status post echocardiogram.    Subjective:  Following for shortness of breath    Objective   Vital Signs  Temp:  [97.2 °F (36.2 °C)-98.5 °F (36.9 °C)] 97.8 °F (36.6 °C)  Heart Rate:  [56-73] 70  Resp:  [16-22] 20  BP: (112-145)/(52-90) 134/67    Intake/Output Summary (Last 24 hours) at 2020 0843  Last data filed at 2020 0800  Gross per 24 hour   Intake 840 ml   Output 2750 ml   Net -1910 ml     Flowsheet Rows      First Filed Value   Admission Height  160 cm (63\") Documented at 2020 1425   Admission Weight  122 kg (268 lb) Documented at 2020 1425          Physical Exam:   General Appearance:    Alert, cooperative, in no acute distress   Lungs:     Clear to auscultation.  Normal respiratory effort and rate.      Heart:    Regular rhythm and normal rate, normal S1 and S2, no murmurs, gallops or rubs.     Chest Wall:    No abnormalities observed   Abdomen:     Soft, nontender, positive bowel sounds.     Extremities:   no cyanosis, clubbing or edema.  No marked joint deformities.  Adequate musculoskeletal strength.       Results Review:    Results from last 7 days   Lab Units 20  0424   SODIUM mmol/L 139   POTASSIUM mmol/L 4.5   CHLORIDE mmol/L 95*   CO2 mmol/L 38.4*   BUN mg/dL 49*   CREATININE mg/dL 2.05*   GLUCOSE mg/dL 242*   CALCIUM mg/dL 8.6     Results from last 7 days   Lab Units 20  0540 20  1529   TROPONIN T ng/mL 0.040* 0.047*     Results from last 7 days   Lab Units 08/10/20  0444   WBC 10*3/mm3 5.21   HEMOGLOBIN g/dL 8.0*   HEMATOCRIT % 27.9*   PLATELETS 10*3/mm3 185                         Medication Review:     aspirin 81 mg Oral Daily   atorvastatin 40 mg Oral Daily   bumetanide 2 " mg Oral BID   enoxaparin 30 mg Subcutaneous Q24H   guaiFENesin 1,200 mg Oral Q12H   hydrophor  Topical Q24H   insulin aspart 0-24 Units Subcutaneous TID AC   insulin detemir 50 Units Subcutaneous Nightly   levalbuterol 0.63 mg Nebulization Q6H - RT   levothyroxine 175 mcg Oral Daily   metoprolol tartrate 50 mg Oral Q12H   QUEtiapine 100 mg Oral Q12H   sodium chloride 10 mL Intravenous Q12H   Fluticasone-Umeclidin-Vilant 1 puff Inhalation Daily - RT             Assessment/Plan       1.  Dyspnea and weakness.  2.  Diabetes  3.  COPD on home oxygen.  4.  Chronic kidney disease with acute kidney injury  5.  Anemia  6.  Obesity and immobilization  7.  Hypothyroid  8.  Grade 2 diastolic dysfunction  9.  Mild to moderate tricuspid regurgitation with moderate pulmonary hypertension likely secondary to #3 and #6.    -BNP not significantly elevated compared to recent levels.  -Nephrology managing diuretics.  -No further heart testing needed at this time.  -Please call if there are any changes to cardiac status.    Florence Orta MD, [facility]  08/11/20  08:43

## 2020-08-11 NOTE — PROGRESS NOTES
Ms. Govea is on Lovenox for VTE prophylaxis.  CrCl has improved.  Now above 30, so increase Lovenox to 40mg subq q24h.    Dee Orourke, PharmD  8/11/2020  10:14

## 2020-08-11 NOTE — PROGRESS NOTES
[facility] Renal Progress Note    A little less soa, coughing up yellow mucus, no current soa at rest or cp        Current Facility-Administered Medications:   •  acetaminophen (TYLENOL) tablet 650 mg, 650 mg, Oral, Q4H PRN, 650 mg at 08/09/20 2027 **OR** acetaminophen (TYLENOL) suppository 650 mg, 650 mg, Rectal, Q4H PRN, Mik Fish MD  •  albuterol (PROVENTIL) nebulizer solution 0.083% 2.5 mg/3mL, 2.5 mg, Nebulization, Q6H PRN, Mik Fish MD  •  aspirin EC tablet 81 mg, 81 mg, Oral, Daily, Mik Fish MD, 81 mg at 08/11/20 0818  •  atorvastatin (LIPITOR) tablet 40 mg, 40 mg, Oral, Daily, Mik Fish MD, 40 mg at 08/11/20 0819  •  bumetanide (BUMEX) tablet 2 mg, 2 mg, Oral, BID, David Cowart MD, 2 mg at 08/11/20 0818  •  dextrose (D50W) 25 g/ 50mL Intravenous Solution 25 g, 25 g, Intravenous, Q15 Min PRN, Mik Fish MD  •  dextrose (GLUTOSE) oral gel 15 g, 15 g, Oral, Q15 Min PRN, Mik Fish MD  •  enoxaparin (LOVENOX) syringe 30 mg, 30 mg, Subcutaneous, Q24H, Mik Fish MD, 30 mg at 08/10/20 2046  •  glucagon (human recombinant) (GLUCAGEN DIAGNOSTIC) injection 1 mg, 1 mg, Subcutaneous, Q15 Min PRN, Mik Fish MD  •  guaiFENesin (MUCINEX) 12 hr tablet 1,200 mg, 1,200 mg, Oral, Q12H, Payam Peck MD, 1,200 mg at 08/11/20 0819  •  HYDROcodone-acetaminophen (NORCO) 5-325 MG per tablet 1 tablet, 1 tablet, Oral, Q6H PRN, Vera Pimentel MD, 1 tablet at 08/10/20 2304  •  hydrophor (AQUAPHOR) ointment, , Topical, Q24H, Vera Pimentel MD  •  insulin aspart (novoLOG) injection 0-24 Units, 0-24 Units, Subcutaneous, TID AC, Mik Fish MD, 8 Units at 08/11/20 0817  •  insulin detemir (LEVEMIR) injection 50 Units, 50 Units, Subcutaneous, Nightly, Payam Peck MD, 50 Units at 08/10/20 2046  •  levalbuterol (XOPENEX) nebulizer solution 0.63 mg, 0.63 mg, Nebulization, Q6H - RT, Mik Fish MD, 0.63 mg at  08/11/20 0723  •  levothyroxine (SYNTHROID, LEVOTHROID) tablet 175 mcg, 175 mcg, Oral, Daily, Mik Fish MD, 175 mcg at 08/11/20 0818  •  metoprolol tartrate (LOPRESSOR) tablet 50 mg, 50 mg, Oral, Q12H, Mik Fish MD, 50 mg at 08/10/20 2304  •  ondansetron (ZOFRAN) tablet 4 mg, 4 mg, Oral, Q6H PRN **OR** ondansetron (ZOFRAN) injection 4 mg, 4 mg, Intravenous, Q6H PRN, Mik Fish MD  •  QUEtiapine (SEROquel) tablet 100 mg, 100 mg, Oral, Q12H, Mik Fish MD, 100 mg at 08/11/20 0819  •  sennosides-docusate (PERICOLACE) 8.6-50 MG per tablet 2 tablet, 2 tablet, Oral, BID PRN, Mik Fish MD  •  sodium chloride 0.9 % flush 10 mL, 10 mL, Intravenous, PRN, Mik Fish MD  •  sodium chloride 0.9 % flush 10 mL, 10 mL, Intravenous, Q12H, Mik Fish MD, 10 mL at 08/11/20 0823  •  sodium chloride 0.9 % infusion 40 mL, 40 mL, Intravenous, PRN, Mik Fish MD, 40 mL at 08/09/20 1315  •  TRELEGY (Fluticasone-Umeclidin-Vilant)100-62.5-25 MCG/INH IN, 1 puff, Inhalation, Daily - RT, Mik Fish MD, 1 puff at 08/10/20 1255          Temp:  [97.2 °F (36.2 °C)-98.5 °F (36.9 °C)] 97.8 °F (36.6 °C)  Heart Rate:  [56-73] 70  Resp:  [16-22] 20  BP: (112-145)/(52-90) 134/67  I/O last 3 completed shifts:  In: 840 [P.O.:840]  Out: 2700 [Urine:2700]  I/O this shift:  In: -   Out: 450 [Urine:450]    Physical Exam   Constitutional: She is oriented to person, place, and time. She appears well-developed and well-nourished. No distress.   HENT:   Head: Normocephalic and atraumatic.   Eyes: Pupils are equal, round, and reactive to light. No scleral icterus.   Neck: Neck supple. No JVD present.   Cardiovascular: Normal rate and intact distal pulses. Exam reveals no friction rub.   Pulmonary/Chest: Effort normal and breath sounds normal. She has no rales.   Abdominal: Soft. Bowel sounds are normal.   Musculoskeletal: She exhibits edema.   Neurological: She is  alert and oriented to person, place, and time.   Skin: Skin is warm and dry. She is not diaphoretic.   Nursing note and vitals reviewed.    Lab Results (last 24 hours)     Procedure Component Value Units Date/Time    POC Glucose Once [[id number]]  (Abnormal) Collected:  08/11/20 0714    Specimen:  Blood Updated:  08/11/20 0722     Glucose 219 mg/dL     Renal Function Panel [[id number]]  (Abnormal) Collected:  08/11/20 0424    Specimen:  Blood from Arm, Right Updated:  08/11/20 0557     Glucose 242 mg/dL      BUN 49 mg/dL      Creatinine 2.05 mg/dL      Sodium 139 mmol/L      Potassium 4.5 mmol/L      Chloride 95 mmol/L      CO2 38.4 mmol/L      Calcium 8.6 mg/dL      Albumin 2.70 g/dL      Phosphorus 4.0 mg/dL      Anion Gap 5.6 mmol/L      BUN/Creatinine Ratio 23.9     eGFR Non African Amer 24 mL/min/1.73     Narrative:       GFR Normal >60  Chronic Kidney Disease <60  Kidney Failure <15      POC Glucose Once [[id number]]  (Abnormal) Collected:  08/10/20 2026    Specimen:  Blood Updated:  08/10/20 2033     Glucose 199 mg/dL     POC Glucose Once [[id number]]  (Abnormal) Collected:  08/10/20 1619    Specimen:  Blood Updated:  08/10/20 1626     Glucose 160 mg/dL     POC Glucose Once [[id number]]  (Abnormal) Collected:  08/10/20 1207    Specimen:  Blood Updated:  08/10/20 1216     Glucose 148 mg/dL             Acute on chronic respiratory failure with hypoxia and hypercapnia (CMS/HCC)    1. olayinka-creatinine fell, diuresis was lackluster but adequate; continue same today, pt is tenuous and critically ill in the icu, 32 minutes of critical care time spent managing from 0805 to 0837  2. Hypervolemia-continue diuresis  3. Anemia of ckd-stable  4. Chronic respiratory failure-continue abx and diuretics

## 2020-08-11 NOTE — PLAN OF CARE
Problem: Patient Care Overview  Goal: Plan of Care Review  Outcome: Ongoing (interventions implemented as appropriate)  Flowsheets (Taken 8/11/2020 1819)  Plan of Care Reviewed With: patient  Outcome Summary: pt sat up in chair this shift. tolerating activity well. up to BSC mult times. anxiety minimal this shift. home trilogy unit at bedside. setting adjusted per pulmonary, will have home company to come set perm settings. is on 4 liters NC. wound care to BLE done, legs elevated. PT eval done today.

## 2020-08-11 NOTE — PROGRESS NOTES
"      [facility]         Dr Galarza Sayied   LOS: 4 days   Patient Care Team:  Marilu Suresh APRN as PCP - General (Family Medicine)  Jhonny Hoyt MD as Consulting Physician (Pulmonary Disease)  Konstantin Orr MD as Consulting Physician (Nephrology)    Chief Complaint: Acute on chronic respiratory failure hypoxemic and hypercapnic with underlying history of COPD ALTA/OHS and volume overload    Interval History: She is off her trilogy currently.  Sitting up in a chair feeling better.  Wants her trilogy pressure to be increased.    REVIEW OF SYSTEMS:   Shortness of breath with exertion.  No chest pain    Ventilator/Non-Invasive Ventilation Settings (From admission, onward)     Start     Ordered    08/08/20 1829  NIPPV (CPAP or BIPAP)  Until Discontinued     Question Answer Comment   Indication: Acute Respiratory Failure    Type: AVAPS-AE    NIPPV Mask Interface: Full Face Mask    Rate 20    VT (mL) 450    EPAP Min (cm H2O) 5    EPAP Max (cm H2O) 10    Max Pressure (cm H2O) 30    Pressure Support Min (cm H2O) 5    Pressure Support Max (cm H2O) 20    Other O2 bled in    Titrate for SPO2 Between    Titrate for SPO2 88% - 92%        08/08/20 1830    08/07/20 1833  NIPPV (CPAP or BIPAP)  Until Discontinued,   Status:  Canceled     Question Answer Comment   Type: BIPAP    NIPPV Mask Interface: Full Face Mask        08/07/20 1832            Trilogy with sleep and as needed      Vital Signs  Temp:  [97.2 °F (36.2 °C)-98.5 °F (36.9 °C)] 97.8 °F (36.6 °C)  Heart Rate:  [56-73] 70  Resp:  [16-22] 20  BP: (112-145)/(52-90) 134/67    Intake/Output Summary (Last 24 hours) at 8/11/2020 0944  Last data filed at 8/11/2020 0800  Gross per 24 hour   Intake 600 ml   Output 2050 ml   Net -1450 ml     Flowsheet Rows      First Filed Value   Admission Height  160 cm (63\") Documented at 08/07/2020 1425   Admission Weight  122 kg (268 lb) Documented at 08/07/2020 1425          Physical Exam:  Patient is " examined using the personal protective equipment as per guidelines from infection control for this particular patient as enacted.  Hand hygiene was performed before and after patient interaction.   General Appearance:    Alert, cooperative, in no acute distress.  Following simple commands  Neck midline trachea no thyromegaly   Lungs:    Diminished breath sounds rhonchi bilaterally on the basis    Heart:    Regular rhythm and normal rate, normal S1 and S2, no            murmur, no gallop, no rub, no click   Chest Wall:    No abnormalities observed   Abdomen:     Normal bowel sounds, no masses, no organomegaly, soft        non-tender, non-distended, no guarding, no rebound                tenderness   Extremities:   Moves all extremities well, 1-2+ edema, no cyanosis, no             redness  CNS no focal neurological deficits normal sensory exam  Skin no rashes no nodules  Musculoskeletal no cyanosis no clubbing normal range of motion     Results Review:        Results from last 7 days   Lab Units 08/11/20  0424 08/10/20  0444 08/09/20  0430   SODIUM mmol/L 139 143 140   POTASSIUM mmol/L 4.5 4.5 4.8   CHLORIDE mmol/L 95* 98 94*   CO2 mmol/L 38.4* 37.0* 36.9*   BUN mg/dL 49* 59* 63*   CREATININE mg/dL 2.05* 2.41* 2.73*   GLUCOSE mg/dL 242* 226* 319*   CALCIUM mg/dL 8.6 8.5* 8.0*     Results from last 7 days   Lab Units 08/08/20  0540 08/07/20  1529   TROPONIN T ng/mL 0.040* 0.047*     Results from last 7 days   Lab Units 08/10/20  0444 08/09/20  0430 08/08/20  0540   WBC 10*3/mm3 5.21 4.80 5.49   HEMOGLOBIN g/dL 8.0* 7.1* 7.5*   HEMATOCRIT % 27.9* 25.1* 26.6*   PLATELETS 10*3/mm3 185 165 184                     Results from last 7 days   Lab Units 08/10/20  0558   PH, ARTERIAL pH units 7.310*   PO2 ART mm Hg 64.8*   PCO2, ARTERIAL mm Hg 82.6*   HCO3 ART mmol/L 41.6*       I reviewed the patient's new clinical results.  I personally viewed and interpreted the patient's CXR        Medication Review:     aspirin 81 mg Oral  Daily   atorvastatin 40 mg Oral Daily   bumetanide 2 mg Oral BID   enoxaparin 30 mg Subcutaneous Q24H   guaiFENesin 1,200 mg Oral Q12H   hydrophor  Topical Q24H   insulin aspart 0-24 Units Subcutaneous TID AC   insulin detemir 50 Units Subcutaneous Nightly   levalbuterol 0.63 mg Nebulization Q6H - RT   levothyroxine 175 mcg Oral Daily   metoprolol tartrate 50 mg Oral Q12H   QUEtiapine 100 mg Oral Q12H   sodium chloride 10 mL Intravenous Q12H   Fluticasone-Umeclidin-Vilant 1 puff Inhalation Daily - RT            ASSESSMENT:   Acute on chronic hypoxemic hypercapnic respiratory failure  Chronic hypoxemic respiratory failure  Acute on chronic diastolic CHF  Acute kidney injury  Chronic venous stasis/lymphedema  Hypertension  Diabetes mellitus  Morbid obesity  ALTA/OHS  COPD    PLAN:  Volume status difficult to .  Creatinine stable  Diuresis per cardiology.  Her ABG reflects partial compensation but with persistent respiratory acidosis.  I have asked respiratory therapist to see if her trilogy could be adjusted to higher tidal volume at 400.  She will contact the DME provider.  Aggressive PT OT will help  Long-term weight loss will be beneficial  Bronchodilators  Blood glucose monitoring per ICU protocol    Geovanni Gilmore MD  08/11/20  09:44

## 2020-08-11 NOTE — PLAN OF CARE
Problem: Patient Care Overview  Goal: Plan of Care Review  Flowsheets (Taken 8/11/2020 0910)  Outcome Summary: PT:  physical therapy evaluation completed.  Patient performed sit to stand transfers with walker and CGA, she ambulated forward only 2 small steps, prior to sitting abruptly due to fatigue, patient declining further activity.  Patient was ambulating 5-10 feet with PT and walker during last admission.  Unclear discharge plans but patient hopes to go to short term rehab, then return home to live with her sister.  Patient would benefit from skilled PT services while in hospital to progress safety with functional mobility.

## 2020-08-11 NOTE — PROGRESS NOTES
"Hospitalist Team      Patient Care Team:  Marilu Suresh APRN as PCP - General (Family Medicine)  Jhonny Hoyt MD as Consulting Physician (Pulmonary Disease)  Konstantin Orr MD as Consulting Physician (Nephrology)        Chief Complaint: Follow-up A/C H/H Resp Failure    Subjective    No acute events overnight.  Ms. Govea denies dyspnea at rest.  She denies chest pain.  Tolerating PO.    Objective    Vital Signs  Temp:  [97.2 °F (36.2 °C)-98.5 °F (36.9 °C)] 97.8 °F (36.6 °C)  Heart Rate:  [56-73] 70  Resp:  [16-22] 20  BP: (112-145)/(52-90) 134/67  Oxygen Therapy  SpO2: 94 %  Pulse Oximetry Type: Continuous  Device (Oxygen Therapy): nasal cannula  Device (Oxygen Therapy): nasal cannula, NPPV/NIV  Flow (L/min): 4  Oximetry Probe Site Changed: Yes}    Flowsheet Rows      First Filed Value   Admission Height  160 cm (63\") Documented at 08/07/2020 1425   Admission Weight  122 kg (268 lb) Documented at 08/07/2020 1425          Physical Exam:    General: Appears stated age in NAD  Lungs: Bibasilar crackles noted posterior.  Breath sounds remain diminished.  No accessory use.  CV: Heart sounds distant but regular.  Abdomen:  Morbidly obese, soft, and non-tender w/ active bowel sounds.  MSK: No C/C.  Decreased edema BUE.  BLE wrapped and soft.  Neuro: CN II-XII grossly intact.  Psych: Pleasant affect.  Ox3.    Results Review:     I reviewed the patient's new clinical results.    Lab Results (last 24 hours)     Procedure Component Value Units Date/Time    POC Glucose Once [[id number]]  (Abnormal) Collected:  08/11/20 0714    Specimen:  Blood Updated:  08/11/20 0722     Glucose 219 mg/dL     Renal Function Panel [[id number]]  (Abnormal) Collected:  08/11/20 0424    Specimen:  Blood from Arm, Right Updated:  08/11/20 0557     Glucose 242 mg/dL      BUN 49 mg/dL      Creatinine 2.05 mg/dL      Sodium 139 mmol/L      Potassium 4.5 mmol/L      Chloride 95 mmol/L      CO2 38.4 mmol/L      Calcium 8.6 mg/dL      " Albumin 2.70 g/dL      Phosphorus 4.0 mg/dL      Anion Gap 5.6 mmol/L      BUN/Creatinine Ratio 23.9     eGFR Non African Amer 24 mL/min/1.73     Narrative:       GFR Normal >60  Chronic Kidney Disease <60  Kidney Failure <15      POC Glucose Once [[id number]]  (Abnormal) Collected:  08/10/20 2026    Specimen:  Blood Updated:  08/10/20 2033     Glucose 199 mg/dL     POC Glucose Once [[id number]]  (Abnormal) Collected:  08/10/20 1619    Specimen:  Blood Updated:  08/10/20 1626     Glucose 160 mg/dL     POC Glucose Once [[id number]]  (Abnormal) Collected:  08/10/20 1207    Specimen:  Blood Updated:  08/10/20 1216     Glucose 148 mg/dL           Imaging Results (Last 24 Hours)     ** No results found for the last 24 hours. **        ECG/EMG Results (most recent)     Procedure Component Value Units Date/Time    ECG 12 Lead [[id number]] Collected:  08/07/20 1535     Updated:  08/10/20 1051    Narrative:       HEART RATE= 64  bpm  RR Interval= 932  ms  RI Interval= 69  ms  P Horizontal Axis= 15  deg  P Front Axis= 0  deg  QRSD Interval= 95  ms  QT Interval= 432  ms  QRS Axis= 38  deg  T Wave Axis= 34  deg  - BORDERLINE ECG -  Sinus rhythm  Short RI interval  Low voltage, precordial leads  NO SIGNIFICANT CHANGE FROM PREVIOUS ECG  Electronically Signed By: Mariana Mcnamara ([facility]) 10-Aug-2020 10:45:17  Date and Time of Study: 2020-08-07 15:35:20    Adult Transthoracic Echo Complete W/ Cont if Necessary Per Protocol [[id number]] Collected:  08/10/20 0757     Updated:  08/10/20 1135     BSA 2.2 m^2      IVSd 1.0 cm      LVIDd 5.1 cm      LVIDs 3.6 cm      LVPWd 1.0 cm      IVS/LVPW 1.0     FS 30.2 %      EDV(Teich) 123.8 ml      ESV(Teich) 53.0 ml      EF(Teich) 57.2 %      EDV(cubed) 132.7 ml      ESV(cubed) 45.1 ml      EF(cubed) 66.0 %      LV mass(C)d 194.4 grams      LV mass(C)dI 87.3 grams/m^2      SV(Teich) 70.8 ml      SI(Teich) 31.8 ml/m^2      SV(cubed) 87.5 ml      SI(cubed) 39.3 ml/m^2      Ao root diam 3.1 cm      Ao  root area 7.5 cm^2      ACS 2.2 cm      LVOT diam 2.0 cm      LVOT area 3.1 cm^2      LVOT area(traced) 3.1 cm^2      LVLd ap4 9.5 cm      EDV(MOD-sp4) 108.0 ml      LVLs ap4 8.7 cm      ESV(MOD-sp4) 42.9 ml      EF(MOD-sp4) 60.3 %      LVLd ap2 9.6 cm      EDV(MOD-sp2) 152.0 ml      LVLs ap2 8.5 cm      ESV(MOD-sp2) 58.4 ml      EF(MOD-sp2) 61.6 %      SV(MOD-sp4) 65.1 ml      SI(MOD-sp4) 29.3 ml/m^2      SV(MOD-sp2) 93.6 ml      SI(MOD-sp2) 42.1 ml/m^2      Ao root area (BSA corrected) 1.4     LV Meraz Vol (BSA corrected) 48.5 ml/m^2      LV Sys Vol (BSA corrected) 19.3 ml/m^2      TAPSE (>1.6) 2.1 cm      EF(MOD-bp) 60.5 %      MV A dur 0.19 sec      MV E max rich 127.0 cm/sec      MV A max rich 90.2 cm/sec      MV E/A 1.4     MV V2 mean 74.7 cm/sec      MV mean PG 3.0 mmHg      MV V2 VTI 49.3 cm      MVA(VTI) 1.8 cm^2      MV P1/2t max rich 156.0 cm/sec      MV P1/2t 90.1 msec      MVA(P1/2t) 2.4 cm^2      MV dec slope 507.0 cm/sec^2      MV dec time 259 sec      Ao V2 mean 100.1 cm/sec      Ao mean PG 4.5 mmHg      Ao mean PG (full) 1.5 mmHg      Ao V2 VTI 38.5 cm      SUMA(I,A) 2.3 cm^2      SUMA(I,D) 2.3 cm^2      LV V1 mean PG 3.0 mmHg      LV V1 mean 75.2 cm/sec      LV V1 VTI 28.1 cm      SV(Ao) 290.6 ml      SI(Ao) 130.6 ml/m^2      SV(LVOT) 88.3 ml      SI(LVOT) 39.7 ml/m^2      PA V2 max 79.9 cm/sec      PA max PG 2.6 mmHg      PA acc time 0.1 sec      PI end-d rich 3.4 cm/sec      RV V1 mean PG 1.0 mmHg      RV V1 mean 39.3 cm/sec      RV V1 VTI 16.1 cm      TR max rich 290.0 cm/sec      RVSP(TR) 48.6 mmHg      RAP systole 15.0 mmHg      PA pr(Accel) 36.3 mmHg      Pulm Sys Rich 48.0 cm/sec      Pulm Meraz Rich 72.5 cm/sec      Pulm S/D 0.66     Pulm A Revs Dur 0.12 sec      Pulm A Revs Rich 24.4 cm/sec      MVA P1/2T LCG 1.4 cm^2      RV Base 3.8 cm      RV Length 8.5 cm      RV Mid 3.1 cm      Lat Peak E' Rich 7.9 cm/sec      Med Peak E' Rich 7.7 cm/sec      RV S' 14.0 cm/sec       CV ECHO TERRI - BZI_BMI 41.5  kilograms/m^2      BH CV ECHO TERRI - BSA(HAYCOCK) 2.4 m^2       CV ECHO TERRI - BZI_METRIC_WEIGHT 116.6 kg      BH CV ECHO TERRI - BZI_METRIC_HEIGHT 167.6 cm      Avg E/e' ratio 16.28     Target HR (85%) 132 bpm      Max. Pred. HR (100%) 155 bpm       CV VAS BP RIGHT /62 mmHg      LA Volume Index 31.0 mL/m2      Ao pk kraig 143.0 cm/sec      LV V1 max PG 5.0 mmHg      LV V1 max 109.0 cm/sec      Ao max PG 8.0 mmHg      EF - Contrast (4Ch) 60.5 cm2      Echo EF Estimated 65 %     Narrative:       · Left ventricular systolic function is normal. Calculated EF = 60.5%.   Estimated EF was in disagreement with the calculated EF. Estimated EF =   65%. Normal left ventricular cavity size noted. All left ventricular wall   segments contract normally. Left ventricular wall thickness is consistent   with mild concentric hypertrophy. Left ventricular diastolic dysfunction   is noted (grade II w/high LAP) consistent with pseudonormalization.  · Mild to moderate tricuspid valve regurgitation is present. Estimated   right ventricular systolic pressure from tricuspid regurgitation is   moderately elevated (45-55 mmHg). Calculated right ventricular systolic   pressure from tricuspid regurgitation is 48.6 mmHg.             Medication Review:   I have reviewed the patient's current medication list    Current Facility-Administered Medications:   •  acetaminophen (TYLENOL) tablet 650 mg, 650 mg, Oral, Q4H PRN, 650 mg at 08/09/20 2027 **OR** acetaminophen (TYLENOL) suppository 650 mg, 650 mg, Rectal, Q4H PRN, Mik Fish MD  •  albuterol (PROVENTIL) nebulizer solution 0.083% 2.5 mg/3mL, 2.5 mg, Nebulization, Q6H PRN, Mik Fish MD  •  aspirin EC tablet 81 mg, 81 mg, Oral, Daily, Mik Fish MD, 81 mg at 08/11/20 0818  •  atorvastatin (LIPITOR) tablet 40 mg, 40 mg, Oral, Daily, Mik Fish MD, 40 mg at 08/11/20 0819  •  bumetanide (BUMEX) tablet 2 mg, 2 mg, Oral, BID, David Cowart MD,  2 mg at 08/11/20 0818  •  dextrose (D50W) 25 g/ 50mL Intravenous Solution 25 g, 25 g, Intravenous, Q15 Min PRN, Mik Fish MD  •  dextrose (GLUTOSE) oral gel 15 g, 15 g, Oral, Q15 Min PRN, Mik Fish MD  •  enoxaparin (LOVENOX) syringe 30 mg, 30 mg, Subcutaneous, Q24H, Mik Fish MD, 30 mg at 08/10/20 2046  •  glucagon (human recombinant) (GLUCAGEN DIAGNOSTIC) injection 1 mg, 1 mg, Subcutaneous, Q15 Min PRN, Mik Fish MD  •  guaiFENesin (MUCINEX) 12 hr tablet 1,200 mg, 1,200 mg, Oral, Q12H, Payam Peck MD, 1,200 mg at 08/11/20 0819  •  HYDROcodone-acetaminophen (NORCO) 5-325 MG per tablet 1 tablet, 1 tablet, Oral, Q6H PRN, Vera Pimentel MD, 1 tablet at 08/10/20 2304  •  hydrophor (AQUAPHOR) ointment, , Topical, Q24H, Vera Pimentel MD  •  insulin aspart (novoLOG) injection 0-24 Units, 0-24 Units, Subcutaneous, TID AC, Mik Fish MD, 8 Units at 08/11/20 0817  •  insulin detemir (LEVEMIR) injection 50 Units, 50 Units, Subcutaneous, Nightly, Payam Peck MD, 50 Units at 08/10/20 2046  •  levalbuterol (XOPENEX) nebulizer solution 0.63 mg, 0.63 mg, Nebulization, Q6H - RT, Mik Fish MD, 0.63 mg at 08/11/20 0723  •  levothyroxine (SYNTHROID, LEVOTHROID) tablet 175 mcg, 175 mcg, Oral, Daily, Mik Fish MD, 175 mcg at 08/11/20 0818  •  metoprolol tartrate (LOPRESSOR) tablet 50 mg, 50 mg, Oral, Q12H, Mik Fish MD, 50 mg at 08/10/20 2304  •  ondansetron (ZOFRAN) tablet 4 mg, 4 mg, Oral, Q6H PRN **OR** ondansetron (ZOFRAN) injection 4 mg, 4 mg, Intravenous, Q6H PRN, Mik Fish MD  •  QUEtiapine (SEROquel) tablet 100 mg, 100 mg, Oral, Q12H, Mik Fish MD, 100 mg at 08/11/20 0819  •  sennosides-docusate (PERICOLACE) 8.6-50 MG per tablet 2 tablet, 2 tablet, Oral, BID PRN, Mik Fish MD  •  sodium chloride 0.9 % flush 10 mL, 10 mL, Intravenous, PRN, Mik Fish MD  •  sodium  chloride 0.9 % flush 10 mL, 10 mL, Intravenous, Q12H, Mik Fish MD, 10 mL at 08/11/20 0823  •  sodium chloride 0.9 % infusion 40 mL, 40 mL, Intravenous, PRN, Mik Fish MD, 40 mL at 08/09/20 1315  •  TRELEGY (Fluticasone-Umeclidin-Vilant)100-62.5-25 MCG/INH IN, 1 puff, Inhalation, Daily - RT, Mik Fish MD, 1 puff at 08/10/20 1255      Assessment/Plan     1.  Acute-on-Chronic H/H Resp Failure: Near baseline.  Pulmonary following.  Nephrology managing diuretics.  Continue inhaler regimen.    2.  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  Still sounds wet on exam.  Appreciate nephrology help.  Hard to determine true baseline looking over last few years.    3.  Diabetes Mellitus, Type 2 in Obese: AM not at goal.  Bedsides yesterday were good.  Increase basal dose this evening.    4.  HTN: Near goal on exam.  As noted last time, I was concerned w/ using Norvasc given her propensity of swelling in her legs, but had to resume it to control her pressure.  Still holding this, but will add back Hydralazine.  Heart rate trend will not allow increase in Lopressor.    5.  Stage II Pressure Ulcer, POA: Wound care following.    6.  Anemia: Multifactorial.  Minimize blood draws as much as possible.    7.  Hypothyroidism: Continue current replacement dose.    Plan for disposition: PT eval pending.  Sister cannot help care for her.    Payam Peck MD  08/11/20  09:13

## 2020-08-11 NOTE — THERAPY EVALUATION
Acute Care - Physical Therapy Initial Evaluation   Renya Meyer     Patient Name: Jared Govea  : 1955  MRN: [id number]  Today's Date: 2020   Onset of Illness/Injury or Date of Surgery: 20  Date of Referral to PT: 20  Referring Physician: Dr. Peck      Admit Date: 2020    Visit Dx:     ICD-10-CM ICD-9-CM   1. Acute on chronic respiratory failure with hypoxia and hypercapnia (CMS/[facility]) J96.21 518.84    J96.22 786.09     799.02   2. Acute on chronic diastolic congestive heart failure (CMS/[facility]) I50.33 428.33     428.0   3. Hypoglycemia due to type 2 diabetes mellitus (CMS/[facility]) E11.649 250.80   4. Acute kidney injury (CMS/[facility]) N17.9 584.9     Patient Active Problem List   Diagnosis   • SERENA (generalized anxiety disorder)   • Chronic back pain   • Chronic diastolic congestive heart failure (CMS/[facility])   • COPD (chronic obstructive pulmonary disease) (CMS/[facility])   • Depression   • Uncontrolled type 2 diabetes mellitus with chronic kidney disease, with long-term current use of insulin (CMS/[facility])   • Venous stasis dermatitis of both lower extremities   • HLD (hyperlipidemia)   • Essential hypertension   • Hypothyroidism, acquired   • Osteoporosis   • Primary insomnia   • LAD (lymphadenopathy), mediastinal   • Lung nodule, solitary   • Diabetic peripheral neuropathy (CMS/[facility])   • CKD (chronic kidney disease) stage 4, GFR 15-29 ml/min (CMS/[facility])   • Chronic pain syndrome   • Lumbar spondylolysis   • Normocytic anemia   • Pleural scarring   • Osteopetrosis   • Acute renal failure superimposed on stage 4 chronic kidney disease (CMS/[facility])   • Morbid obesity (CMS/[facility])   • Acute hypoxemic respiratory failure (CMS/HCC)   • Acute respiratory failure with hypercapnia (CMS/HCC)   • Acute on chronic respiratory failure with hypoxia and hypercapnia (CMS/[facility])     Past Medical History:   Diagnosis Date   • Anxiety    • Arthritis    • Cellulitis    • CHF (congestive heart failure) (CMS/[facility])    • Chronic back pain    •  "COPD (chronic obstructive pulmonary disease) (CMS/[facility])    • Depression    • Essential hypertension    • SERENA (generalized anxiety disorder)    • GERD (gastroesophageal reflux disease)    • Headache    • HLD (hyperlipidemia)    • Hypothyroidism, acquired    • Low back pain    • Neuropathy in diabetes (CMS/[facility])    • Rectal abscess    • Sleep apnea    • Type 2 diabetes mellitus (CMS/[facility])    • Wound, open, toe 8/22/2017     Past Surgical History:   Procedure Laterality Date   • DILATATION AND CURETTAGE      x5   • LUNG SURGERY Right    • TRACHEOSTOMY          PT ASSESSMENT (last 12 hours)      Physical Therapy Evaluation     Row Name 08/11/20 0910          PT Evaluation Time/Intention    Symptoms Noted During/After Treatment  fatigue  -     Row Name 08/11/20 0910          General Information    Patient Profile Reviewed?  yes  -     Onset of Illness/Injury or Date of Surgery  08/07/20  -     Referring Physician  Dr. Peck  -     Pertinent History of Current Functional Problem  Patient recently discharged from this hospital following respiratory failure.  Discharged home with assist 8/1, readmitted 8/7 with acute on chronic respiratory failure.  Patient lives with sister.  At last admission, was walking 5-10 feet with walker and CGA/SBA.  Prior to that admiission, was walking up to 4 steps max at home.  Patient reports she has done no walking since being discharged home from hospital.  \"They sent me home too early.  I couldn't breath.\"  Patient hopes to go to short term rehab to get stronger, then return home to live with sister.  -     Risks Reviewed  patient:;nausea/vomiting;dizziness;change in vital signs  -     Benefits Reviewed  patient:;improve function;increase independence;increase strength;decrease risk of DVT;improve skin integrity  -     Barriers to Rehab  medically complex;previous functional deficit  -     Row Name 08/11/20 0910          Living Environment    Living Environment Comment  Patient " previously living with sister, however per case management notes, sister does not want her to return.  Unclear living arrangements/plan upon discharge.  -     Row Name 08/11/20 0910          General ROM    GENERAL ROM COMMENTS  Patient B UE and B LE AROM within functional limits  -     Row Name 08/11/20 0910          MMT (Manual Muscle Testing)    General MMT Comments  B UE 3+/5, B LE 4/5  -     Row Name 08/11/20 0910          Pain Assessment    Additional Documentation  -- c/o generalized pain, unrated  -     Row Name             Wound 07/20/20 1320 Left gluteal Pressure Injury    Wound - Properties Group Date first assessed: 07/20/20  -TO Time first assessed: 1320  -TO Present on Hospital Admission: Y  -TO Side: Left  -TO Location: gluteal  -TO Primary Wound Type: Pressure inj  -TO Stage, Pressure Injury: Stage 2  -TO    Row Name 08/11/20 0910          Plan of Care Review    Plan of Care Reviewed With  patient  -     Outcome Summary  PT:  physical therapy evaluation completed.  Patient performed sit to stand transfers with walker and CGA, she ambulated forward only 2 small steps, prior to sitting abruptly due to fatigue, patient declining further activity.  Patient was ambulating 5-10 feet with PT and walker during last admission.  Unclear discharge plans but patient hopes to go to short term rehab, then return home to live with her sister.  Patient would benefit from skilled PT services while in hospital to progress safety with functional mobility.  -     Row Name 08/11/20 0910          Physical Therapy Clinical Impression    Date of Referral to PT  08/11/20  -     PT Diagnosis (PT Clinical Impression)  Dr. Peck  -     Patient/Family Goals Statement (PT Clinical Impression)  short term rehab, then discharge home with sister  -     Criteria for Skilled Interventions Met (PT Clinical Impression)  yes;treatment indicated  -     Impairments Found (describe specific impairments)  aerobic  capacity/endurance;gait, locomotion, and balance  -     Functional Limitations in Following Categories (Describe Specific Limitations)  self-care;home management  -     Rehab Potential (PT Clinical Summary)  fair, will monitor progress closely  -     Predicted Duration of Therapy (PT)  5 days  -     Care Plan Review (PT)  evaluation/treatment results reviewed;risks/benefits reviewed;current/potential barriers reviewed;patient/other agree to care plan  -     Row Name 08/11/20 0910          Physical Therapy Goals    Transfer Goal Selection (PT)  transfer, PT goal 1  -     Gait Training Goal Selection (PT)  gait training, PT goal 1  -     Row Name 08/11/20 0910          Transfer Goal 1 (PT)    Activity/Assistive Device (Transfer Goal 1, PT)  sit-to-stand/stand-to-sit;walker, rolling  -     [place] Level/Cues Needed (Transfer Goal 1, PT)  supervision required  -     Time Frame (Transfer Goal 1, PT)  short term goal (STG);3 days  -     Progress/Outcome (Transfer Goal 1, PT)  goal ongoing  -[facility] Name 08/11/20 0910          Gait Training Goal 1 (PT)    Activity/Assistive Device (Gait Training Goal 1, PT)  gait (walking locomotion);assistive device use;walker, rolling  -     [place] Level (Gait Training Goal 1, PT)  standby assist;1 person to manage equipment  -     Distance (Gait Goal 1, PT)  10  -     Time Frame (Gait Training Goal 1, PT)  5 days  -     Progress/Outcome (Gait Training Goal 1, PT)  goal ongoing  -[facility] Name 08/11/20 0910          Positioning and Restraints    Pre-Treatment Position  sitting in chair/recliner  -     Post Treatment Position  chair  -     In Chair  reclined;call light within reach;encouraged to call for assist  -       User Key  (r) = Recorded By, (t) = Taken By, (c) = Cosigned By    Initials Name Provider Type     Teodora Sethi, PT Physical Therapist    Chet Westfall, RN Registered Nurse        Physical Therapy Education                  Title: PT OT SLP Therapies (Done)     Topic: Physical Therapy (Done)     Point: Mobility training (Done)     Description:   Instruct learner(s) on safety and technique for assisting patient out of bed, chair or wheelchair.  Instruct in the proper use of assistive devices, such as walker, crutches, cane or brace.              Patient Friendly Description:   It's important to get you on your feet again, but we need to do so in a way that is safe for you. Falling has serious consequences, and your personal safety is the most important thing of all.        When it's time to get out of bed, one of us or a family member will sit next to you on the bed to give you support.     If your doctor or nurse tells you to use a walker, crutches, a cane, or a brace, be sure you use it every time you get out of bed, even if you think you don't need it.    Learning Progress Summary           Patient Acceptance, E,TB, VU by  at 8/11/2020 1341                               User Key     Initials Effective Dates Name Provider Type Discipline     06/08/18 -  Teodora Sethi, PT Physical Therapist PT              PT Recommendation and Plan  Anticipated Discharge Disposition (PT): skilled nursing facility, extended care facility  Planned Therapy Interventions (PT Eval): gait training, home exercise program, patient/family education, strengthening, transfer training  Therapy Frequency (PT Clinical Impression): daily  Outcome Summary/Treatment Plan (PT)  Anticipated Discharge Disposition (PT): skilled nursing facility, extended care facility  Plan of Care Reviewed With: patient  Outcome Summary: PT:  physical therapy evaluation completed.  Patient performed sit to stand transfers with walker and CGA, she ambulated forward only 2 small steps, prior to sitting abruptly due to fatigue, patient declining further activity.  Patient was ambulating 5-10 feet with PT and walker during last admission.  Unclear discharge plans but  patient hopes to go to short term rehab, then return home to live with her sister.  Patient would benefit from skilled PT services while in hospital to progress safety with functional mobility.  Outcome Measures     Row Name 08/11/20 1300             How much help from another person do you currently need...    Turning from your back to your side while in flat bed without using bedrails?  3  -LH      Moving from lying on back to sitting on the side of a flat bed without bedrails?  3  -LH      Moving to and from a bed to a chair (including a wheelchair)?  3  -LH      Standing up from a chair using your arms (e.g., wheelchair, bedside chair)?  3  -LH      Climbing 3-5 steps with a railing?  2  -LH      To walk in hospital room?  2  -      AM-PAC 6 Clicks Score (PT)  16  -         Functional Assessment    Outcome Measure Options  AM-PAC 6 Clicks Basic Mobility (PT)  -        User Key  (r) = Recorded By, (t) = Taken By, (c) = Cosigned By    Initials Name Provider Type     Teodora Sethi, PT Physical Therapist         Time Calculation:   PT Charges     Row Name 08/11/20 1342             Time Calculation    Start Time  0910  -      Stop Time  0938  -      Time Calculation (min)  28 min  -        User Key  (r) = Recorded By, (t) = Taken By, (c) = Cosigned By    Initials Name Provider Type     Teodora Sethi, PT Physical Therapist        Therapy Charges for Today     Code Description Service Date Service Provider Modifiers Qty    59965668486 HC PT EVAL LOW COMPLEXITY 2 8/11/2020 Teodora Sethi, PT GP 1          PT G-Codes  Outcome Measure Options: AM-PAC 6 Clicks Basic Mobility (PT)  AM-PAC 6 Clicks Score (PT): 16      Teodora Sethi PT  8/11/2020  and Acute Care - Physical Therapy Treatment Note   [place]   Therapy Treatment    Rehabilitation Treatment Summary     Row Name 08/11/20 0910             Treatment Time/Intention    Discipline  physical therapist  -      Document Type   evaluation  -      Subjective Information  complains of;weakness;fatigue  -      Mode of Treatment  physical therapy  -      Patient/Family Observations  Patient reclined in chair, on 4 L O2, agrees to therapy.  -      Care Plan Review  evaluation/treatment results reviewed;risks/benefits reviewed;current/potential barriers reviewed;patient/other agree to care plan  -      Patient Effort  adequate  -      Existing Precautions/Restrictions  oxygen therapy device and L/min;fall  -      Patient Response to Treatment  Patient fatigued with minimal activity, requesting to stop  -      Recorded by [] Teodora Sethi, PT 08/11/20 1325      Row Name 08/11/20 0910             Vital Signs    Pre SpO2 (%)  91  -LH      O2 Delivery Pre Treatment  supplemental O2  -      Intra SpO2 (%)  83  -LH      O2 Delivery Intra Treatment  supplemental O2  -LH      Post SpO2 (%)  86  -LH      O2 Delivery Post Treatment  supplemental O2  -LH      Pre Patient Position  Sitting  -      Intra Patient Position  Sitting  -      Post Patient Position  Sitting  -      Recorded by [] Teodora Sethi, PT 08/11/20 1325      Row Name 08/11/20 0910             Cognitive Assessment/Intervention    Additional Documentation  Cognitive Assessment/Intervention (Group)  -      Recorded by [] Teodora Sethi, PT 08/11/20 1325      Row Name 08/11/20 0910             Cognitive Assessment/Intervention- PT/OT    Affect/Mental Status (Cognitive)  WNL  -      Orientation Status (Cognition)  oriented x 4  -      Recorded by [] Teodora Sethi, PT 08/11/20 1325      Row Name 08/11/20 0910             Bed Mobility Assessment/Treatment    Comment (Bed Mobility)  deferred - patient up in chair and reports she sleeps in recliner at home  -      Recorded by [] Teodora Sethi, PT 08/11/20 1325      Row Name 08/11/20 0910             Transfer Assessment/Treatment    Transfer Assessment/Treatment  sit-stand  transfer;stand-sit transfer  -LH      Recorded by [] Teodora Sethi, PT 08/11/20 1325      Row Name 08/11/20 0910             Sit-Stand Transfer    Sit-Stand [place] (Transfers)  contact guard  -LH      Assistive Device (Sit-Stand Transfers)  walker, front-wheeled  -LH      Recorded by [] Teodora Sethi, PT 08/11/20 1325      Row Name 08/11/20 0910             Stand-Sit Transfer    Stand-Sit [place] (Transfers)  contact guard  -LH      Assistive Device (Stand-Sit Transfers)  walker, front-wheeled  -LH      Recorded by [] Teodora Sethi, PT 08/11/20 1325      Row Name 08/11/20 0910             Gait/Stairs Assessment/Training    [place] Level (Gait)  contact guard  -LH      Assistive Device (Gait)  walker, front-wheeled  -LH      Distance in Feet (Gait)  2  -      Deviations/Abnormal Patterns (Gait)  base of support, wide;gait speed decreased;stride length decreased  -      Bilateral Gait Deviations  forward flexed posture  -      Comment (Gait/Stairs)  patient took 2 steps forward, then sat abruptly, reporting she was fatigued and unable to do more today.  patient reports she has been getting up to use BSC independently in room  -      Recorded by [] Teodora Sethi, PT 08/11/20 1325      Row Name                Wound 07/20/20 1320 Left gluteal Pressure Injury    Wound - Properties Group Date first assessed: 07/20/20 [TO] Time first assessed: 1320 [TO] Present on Hospital Admission: Y [TO] Side: Left [TO] Location: gluteal [TO] Primary Wound Type: Pressure inj [TO] Stage, Pressure Injury: Stage 2 [TO] Recorded by:  [TO] Chet Coronado, RN 07/20/20 1326      User Key  (r) = Recorded By, (t) = Taken By, (c) = Cosigned By    Initials Name Effective Dates Discipline     Teodora Sethi, PT 06/08/18 -  PT    Chet Westfall, RN 06/16/16 -  Nurse          Rehab Goal Summary     Row Name 08/11/20 0910             Physical Therapy Goals    Transfer Goal Selection  (PT)  transfer, PT goal 1  -      Gait Training Goal Selection (PT)  gait training, PT goal 1  -         Transfer Goal 1 (PT)    Activity/Assistive Device (Transfer Goal 1, PT)  sit-to-stand/stand-to-sit;walker, rolling  -LH      [place] Level/Cues Needed (Transfer Goal 1, PT)  supervision required  -LH      Time Frame (Transfer Goal 1, PT)  short term goal (STG);3 days  -LH      Progress/Outcome (Transfer Goal 1, PT)  goal ongoing  -         Gait Training Goal 1 (PT)    Activity/Assistive Device (Gait Training Goal 1, PT)  gait (walking locomotion);assistive device use;walker, rolling  -LH      [place] Level (Gait Training Goal 1, PT)  standby assist;1 person to manage equipment  -LH      Distance (Gait Goal 1, PT)  10  -LH      Time Frame (Gait Training Goal 1, PT)  5 days  -LH      Progress/Outcome (Gait Training Goal 1, PT)  goal ongoing  -        User Key  (r) = Recorded By, (t) = Taken By, (c) = Cosigned By    Initials Name Provider Type Discipline     Teodora Sethi, PT Physical Therapist PT          Physical Therapy Education                 Title: PT OT SLP Therapies (Done)     Topic: Physical Therapy (Done)     Point: Mobility training (Done)     Description:   Instruct learner(s) on safety and technique for assisting patient out of bed, chair or wheelchair.  Instruct in the proper use of assistive devices, such as walker, crutches, cane or brace.              Patient Friendly Description:   It's important to get you on your feet again, but we need to do so in a way that is safe for you. Falling has serious consequences, and your personal safety is the most important thing of all.        When it's time to get out of bed, one of us or a family member will sit next to you on the bed to give you support.     If your doctor or nurse tells you to use a walker, crutches, a cane, or a brace, be sure you use it every time you get out of bed, even if you think you don't need it.    Learning  Progress Summary           Patient Acceptance, E,TB, VU by  at 8/11/2020 1341                               User Key     Initials Effective Dates Name Provider Type Discipline     06/08/18 -  Teodora Sethi, PT Physical Therapist PT                PT Recommendation and Plan  Anticipated Discharge Disposition (PT): skilled nursing facility, extended care facility  Planned Therapy Interventions (PT Eval): gait training, home exercise program, patient/family education, strengthening, transfer training  Therapy Frequency (PT Clinical Impression): daily  Outcome Summary/Treatment Plan (PT)  Anticipated Discharge Disposition (PT): skilled nursing facility, extended care facility  Plan of Care Reviewed With: patient  Outcome Summary: PT:  physical therapy evaluation completed.  Patient performed sit to stand transfers with walker and CGA, she ambulated forward only 2 small steps, prior to sitting abruptly due to fatigue, patient declining further activity.  Patient was ambulating 5-10 feet with PT and walker during last admission.  Unclear discharge plans but patient hopes to go to short term rehab, then return home to live with her sister.  Patient would benefit from skilled PT services while in hospital to progress safety with functional mobility.  Outcome Measures     Row Name 08/11/20 1300             How much help from another person do you currently need...    Turning from your back to your side while in flat bed without using bedrails?  3  -LH      Moving from lying on back to sitting on the side of a flat bed without bedrails?  3  -LH      Moving to and from a bed to a chair (including a wheelchair)?  3  -LH      Standing up from a chair using your arms (e.g., wheelchair, bedside chair)?  3  -LH      Climbing 3-5 steps with a railing?  2  -LH      To walk in hospital room?  2  -      AM-PAC 6 Clicks Score (PT)  16  -         Functional Assessment    Outcome Measure Options  AM-PAC 6 Clicks Basic  Mobility (PT)  -        User Key  (r) = Recorded By, (t) = Taken By, (c) = Cosigned By    Initials Name Provider Type     Teodora Sethi, PT Physical Therapist         Time Calculation:   PT Charges     Row Name 08/11/20 1342             Time Calculation    Start Time  0910  -      Stop Time  0938  -      Time Calculation (min)  28 min  -        User Key  (r) = Recorded By, (t) = Taken By, (c) = Cosigned By    Initials Name Provider Type     Teodora Sethi, PT Physical Therapist        Therapy Charges for Today     Code Description Service Date Service Provider Modifiers Qty    50266223330 HC PT EVAL LOW COMPLEXITY 2 8/11/2020 Teodora Sethi, PT GP 1          PT G-Codes  Outcome Measure Options: AM-PAC 6 Clicks Basic Mobility (PT)  AM-PAC 6 Clicks Score (PT): 16    Teodora Sethi, IRINEO  8/11/2020

## 2020-08-11 NOTE — PLAN OF CARE
Problem: Patient Care Overview  Goal: Plan of Care Review  Outcome: Ongoing (interventions implemented as appropriate)  Flowsheets (Taken 8/11/2020 0319)  Progress: improving  Plan of Care Reviewed With: patient  Outcome Summary: VSS; am labs ordered; pt tolerating home trilogy on 4L overnight; pt rested well in chair. has stated she no longer wants to be in the bed. no acute issues noted. continue to monitor

## 2020-08-12 LAB
ALBUMIN SERPL-MCNC: 2.6 G/DL (ref 3.5–5.2)
ANION GAP SERPL CALCULATED.3IONS-SCNC: 5.4 MMOL/L (ref 5–15)
BASOPHILS # BLD AUTO: 0.02 10*3/MM3 (ref 0–0.2)
BASOPHILS NFR BLD AUTO: 0.3 % (ref 0–1.5)
BUN SERPL-MCNC: 50 MG/DL (ref 8–23)
BUN/CREAT SERPL: 22.3 (ref 7–25)
CALCIUM SPEC-SCNC: 8.9 MG/DL (ref 8.6–10.5)
CHLORIDE SERPL-SCNC: 95 MMOL/L (ref 98–107)
CO2 SERPL-SCNC: 40.6 MMOL/L (ref 22–29)
CREAT SERPL-MCNC: 2.24 MG/DL (ref 0.57–1)
DEPRECATED RDW RBC AUTO: 51.1 FL (ref 37–54)
EOSINOPHIL # BLD AUTO: 0.3 10*3/MM3 (ref 0–0.4)
EOSINOPHIL NFR BLD AUTO: 4.3 % (ref 0.3–6.2)
ERYTHROCYTE [DISTWIDTH] IN BLOOD BY AUTOMATED COUNT: 14.6 % (ref 12.3–15.4)
GFR SERPL CREATININE-BSD FRML MDRD: 22 ML/MIN/1.73
GLUCOSE BLDC GLUCOMTR-MCNC: 125 MG/DL (ref 70–130)
GLUCOSE BLDC GLUCOMTR-MCNC: 207 MG/DL (ref 70–130)
GLUCOSE BLDC GLUCOMTR-MCNC: 218 MG/DL (ref 70–130)
GLUCOSE BLDC GLUCOMTR-MCNC: 264 MG/DL (ref 70–130)
GLUCOSE SERPL-MCNC: 272 MG/DL (ref 65–99)
HCT VFR BLD AUTO: 29.5 % (ref 34–46.6)
HGB BLD-MCNC: 8.4 G/DL (ref 12–15.9)
IMM GRANULOCYTES # BLD AUTO: 0.03 10*3/MM3 (ref 0–0.05)
IMM GRANULOCYTES NFR BLD AUTO: 0.4 % (ref 0–0.5)
LYMPHOCYTES # BLD AUTO: 1.44 10*3/MM3 (ref 0.7–3.1)
LYMPHOCYTES NFR BLD AUTO: 20.7 % (ref 19.6–45.3)
MCH RBC QN AUTO: 27.2 PG (ref 26.6–33)
MCHC RBC AUTO-ENTMCNC: 28.5 G/DL (ref 31.5–35.7)
MCV RBC AUTO: 95.5 FL (ref 79–97)
MONOCYTES # BLD AUTO: 0.51 10*3/MM3 (ref 0.1–0.9)
MONOCYTES NFR BLD AUTO: 7.3 % (ref 5–12)
NEUTROPHILS NFR BLD AUTO: 4.64 10*3/MM3 (ref 1.7–7)
NEUTROPHILS NFR BLD AUTO: 67 % (ref 42.7–76)
NRBC BLD AUTO-RTO: 0 /100 WBC (ref 0–0.2)
PHOSPHATE SERPL-MCNC: 3.6 MG/DL (ref 2.5–4.5)
PLATELET # BLD AUTO: 190 10*3/MM3 (ref 140–450)
PMV BLD AUTO: 10.9 FL (ref 6–12)
POTASSIUM SERPL-SCNC: 5 MMOL/L (ref 3.5–5.2)
RBC # BLD AUTO: 3.09 10*6/MM3 (ref 3.77–5.28)
SODIUM SERPL-SCNC: 141 MMOL/L (ref 136–145)
WBC # BLD AUTO: 6.94 10*3/MM3 (ref 3.4–10.8)

## 2020-08-12 PROCEDURE — 94799 UNLISTED PULMONARY SVC/PX: CPT

## 2020-08-12 PROCEDURE — 63710000001 INSULIN DETEMIR PER 5 UNITS: Performed by: HOSPITALIST

## 2020-08-12 PROCEDURE — 63710000001 LEVALBUTEROL PER 0.5 MG: Performed by: HOSPITALIST

## 2020-08-12 PROCEDURE — 97110 THERAPEUTIC EXERCISES: CPT

## 2020-08-12 PROCEDURE — 94760 N-INVAS EAR/PLS OXIMETRY 1: CPT

## 2020-08-12 PROCEDURE — 85025 COMPLETE CBC W/AUTO DIFF WBC: CPT | Performed by: HOSPITALIST

## 2020-08-12 PROCEDURE — 63710000001 INSULIN ASPART PER 5 UNITS: Performed by: HOSPITALIST

## 2020-08-12 PROCEDURE — 99231 SBSQ HOSP IP/OBS SF/LOW 25: CPT | Performed by: HOSPITALIST

## 2020-08-12 PROCEDURE — 80069 RENAL FUNCTION PANEL: CPT | Performed by: HOSPITALIST

## 2020-08-12 PROCEDURE — 82962 GLUCOSE BLOOD TEST: CPT

## 2020-08-12 PROCEDURE — 25010000002 ENOXAPARIN PER 10 MG: Performed by: HOSPITALIST

## 2020-08-12 RX ORDER — NYSTATIN 100000 [USP'U]/G
POWDER TOPICAL EVERY 12 HOURS SCHEDULED
Status: DISCONTINUED | OUTPATIENT
Start: 2020-08-12 | End: 2020-08-14 | Stop reason: HOSPADM

## 2020-08-12 RX ORDER — METOLAZONE 2.5 MG/1
10 TABLET ORAL ONCE
Status: COMPLETED | OUTPATIENT
Start: 2020-08-12 | End: 2020-08-12

## 2020-08-12 RX ADMIN — ASPIRIN 81 MG: 81 TABLET, COATED ORAL at 08:37

## 2020-08-12 RX ADMIN — QUETIAPINE FUMARATE 100 MG: 100 TABLET ORAL at 08:37

## 2020-08-12 RX ADMIN — GUAIFENESIN 1200 MG: 600 TABLET, EXTENDED RELEASE ORAL at 08:37

## 2020-08-12 RX ADMIN — LEVOTHYROXINE SODIUM 175 MCG: 150 TABLET ORAL at 08:37

## 2020-08-12 RX ADMIN — HYDRALAZINE HYDROCHLORIDE 50 MG: 50 TABLET, FILM COATED ORAL at 22:07

## 2020-08-12 RX ADMIN — FLUTICASONE FUROATE, UMECLIDINIUM BROMIDE AND VILANTEROL TRIFENATATE 1 PUFF: 100; 62.5; 25 POWDER RESPIRATORY (INHALATION) at 13:21

## 2020-08-12 RX ADMIN — QUETIAPINE FUMARATE 100 MG: 100 TABLET ORAL at 22:07

## 2020-08-12 RX ADMIN — HYDRALAZINE HYDROCHLORIDE 50 MG: 50 TABLET, FILM COATED ORAL at 06:24

## 2020-08-12 RX ADMIN — INSULIN ASPART 8 UNITS: 100 INJECTION, SOLUTION INTRAVENOUS; SUBCUTANEOUS at 12:44

## 2020-08-12 RX ADMIN — ATORVASTATIN CALCIUM 40 MG: 40 TABLET, FILM COATED ORAL at 08:37

## 2020-08-12 RX ADMIN — LEVALBUTEROL HYDROCHLORIDE 0.63 MG: 0.63 SOLUTION RESPIRATORY (INHALATION) at 19:51

## 2020-08-12 RX ADMIN — ENOXAPARIN SODIUM 40 MG: 40 INJECTION SUBCUTANEOUS at 08:40

## 2020-08-12 RX ADMIN — LEVALBUTEROL HYDROCHLORIDE 0.63 MG: 0.63 SOLUTION RESPIRATORY (INHALATION) at 00:50

## 2020-08-12 RX ADMIN — PETROLATUM: 42 OINTMENT TOPICAL at 13:03

## 2020-08-12 RX ADMIN — HYDROCODONE BITARTRATE AND ACETAMINOPHEN 1 TABLET: 5; 325 TABLET ORAL at 08:39

## 2020-08-12 RX ADMIN — NYSTATIN: 100000 POWDER TOPICAL at 22:08

## 2020-08-12 RX ADMIN — BUMETANIDE 2 MG: 1 TABLET ORAL at 22:07

## 2020-08-12 RX ADMIN — SODIUM CHLORIDE, PRESERVATIVE FREE 10 ML: 5 INJECTION INTRAVENOUS at 20:57

## 2020-08-12 RX ADMIN — INSULIN DETEMIR 70 UNITS: 100 INJECTION, SOLUTION SUBCUTANEOUS at 22:18

## 2020-08-12 RX ADMIN — HYDROCODONE BITARTRATE AND ACETAMINOPHEN 1 TABLET: 5; 325 TABLET ORAL at 02:09

## 2020-08-12 RX ADMIN — INSULIN ASPART 8 UNITS: 100 INJECTION, SOLUTION INTRAVENOUS; SUBCUTANEOUS at 08:41

## 2020-08-12 RX ADMIN — LEVALBUTEROL HYDROCHLORIDE 0.63 MG: 0.63 SOLUTION RESPIRATORY (INHALATION) at 07:31

## 2020-08-12 RX ADMIN — GUAIFENESIN 1200 MG: 600 TABLET, EXTENDED RELEASE ORAL at 22:07

## 2020-08-12 RX ADMIN — ENOXAPARIN SODIUM 40 MG: 40 INJECTION SUBCUTANEOUS at 22:07

## 2020-08-12 RX ADMIN — METOPROLOL TARTRATE 50 MG: 50 TABLET, FILM COATED ORAL at 12:45

## 2020-08-12 RX ADMIN — BUMETANIDE 2 MG: 1 TABLET ORAL at 08:37

## 2020-08-12 RX ADMIN — LEVALBUTEROL HYDROCHLORIDE 0.63 MG: 0.63 SOLUTION RESPIRATORY (INHALATION) at 13:20

## 2020-08-12 RX ADMIN — SODIUM CHLORIDE, PRESERVATIVE FREE 10 ML: 5 INJECTION INTRAVENOUS at 08:42

## 2020-08-12 RX ADMIN — METOLAZONE 10 MG: 2.5 TABLET ORAL at 17:37

## 2020-08-12 NOTE — PROGRESS NOTES
[facility] Renal Progress Note    A little less soa again, still desats when standing, still coughing up yellow mucus, no current soa at rest or cp        Current Facility-Administered Medications:   •  acetaminophen (TYLENOL) tablet 650 mg, 650 mg, Oral, Q4H PRN, 650 mg at 08/09/20 2027 **OR** acetaminophen (TYLENOL) suppository 650 mg, 650 mg, Rectal, Q4H PRN, Payam Peck MD  •  albuterol (PROVENTIL) nebulizer solution 0.083% 2.5 mg/3mL, 2.5 mg, Nebulization, Q6H PRN, Payam Peck MD  •  aspirin EC tablet 81 mg, 81 mg, Oral, Daily, Payam Peck MD, 81 mg at 08/12/20 0837  •  atorvastatin (LIPITOR) tablet 40 mg, 40 mg, Oral, Daily, Payam Peck MD, 40 mg at 08/12/20 0837  •  bumetanide (BUMEX) tablet 2 mg, 2 mg, Oral, BID, Payam Peck MD, 2 mg at 08/12/20 0837  •  dextrose (D50W) 25 g/ 50mL Intravenous Solution 25 g, 25 g, Intravenous, Q15 Min PRN, Payam Peck MD  •  dextrose (GLUTOSE) oral gel 15 g, 15 g, Oral, Q15 Min PRN, Payam Peck MD  •  enoxaparin (LOVENOX) syringe 40 mg, 40 mg, Subcutaneous, Q12H, Payam Peck MD, 40 mg at 08/12/20 0840  •  glucagon (human recombinant) (GLUCAGEN DIAGNOSTIC) injection 1 mg, 1 mg, Subcutaneous, Q15 Min PRN, Payam Peck MD  •  guaiFENesin (MUCINEX) 12 hr tablet 1,200 mg, 1,200 mg, Oral, Q12H, Payam Peck MD, 1,200 mg at 08/12/20 0837  •  hydrALAZINE (APRESOLINE) tablet 50 mg, 50 mg, Oral, Q8H, Payam Peck MD, 50 mg at 08/12/20 0624  •  HYDROcodone-acetaminophen (NORCO) 5-325 MG per tablet 1 tablet, 1 tablet, Oral, Q6H PRN, Payam Peck MD, 1 tablet at 08/12/20 0839  •  hydrophor (AQUAPHOR) ointment, , Topical, Q24H, Payam Peck MD  •  insulin aspart (novoLOG) injection 0-24 Units, 0-24 Units, Subcutaneous, TID AC, Payam Peck MD, 8 Units at 08/12/20 1244  •  insulin detemir (LEVEMIR) injection 60 Units, 60 Units, Subcutaneous, Nightly, Payam Peck MD, 60 Units at 08/11/20 2235  •  levalbuterol (XOPENEX) nebulizer solution 0.63  mg, 0.63 mg, Nebulization, Q6H - RT, Payam Peck MD, 0.63 mg at 08/12/20 1320  •  levothyroxine (SYNTHROID, LEVOTHROID) tablet 175 mcg, 175 mcg, Oral, Daily, Payam Peck MD, 175 mcg at 08/12/20 0837  •  metOLazone (ZAROXOLYN) tablet 10 mg, 10 mg, Oral, Once, David Cowart MD  •  metoprolol tartrate (LOPRESSOR) tablet 50 mg, 50 mg, Oral, Q12H, Payam Peck MD, 50 mg at 08/12/20 1245  •  ondansetron (ZOFRAN) tablet 4 mg, 4 mg, Oral, Q6H PRN **OR** ondansetron (ZOFRAN) injection 4 mg, 4 mg, Intravenous, Q6H PRN, Payam Peck MD  •  QUEtiapine (SEROquel) tablet 100 mg, 100 mg, Oral, Q12H, Payam Peck MD, 100 mg at 08/12/20 0837  •  sennosides-docusate (PERICOLACE) 8.6-50 MG per tablet 2 tablet, 2 tablet, Oral, BID PRN, Payam Peck MD  •  sodium chloride 0.9 % flush 10 mL, 10 mL, Intravenous, PRN, Payam Peck MD  •  sodium chloride 0.9 % flush 10 mL, 10 mL, Intravenous, Q12H, Payam Peck MD, 10 mL at 08/12/20 0842  •  sodium chloride 0.9 % infusion 40 mL, 40 mL, Intravenous, PRN, Payam Peck MD, 40 mL at 08/09/20 1315  •  TRELEGY (Fluticasone-Umeclidin-Vilant)100-62.5-25 MCG/INH IN, 1 puff, Inhalation, Daily - RT, Payam Peck MD, 1 puff at 08/12/20 1321          Temp:  [97.1 °F (36.2 °C)-98.7 °F (37.1 °C)] 97.1 °F (36.2 °C)  Heart Rate:  [58-76] 69  Resp:  [16-20] 20  BP: (113-142)/(47-58) 113/52  I/O last 3 completed shifts:  In: 460 [P.O.:460]  Out: 3900 [Urine:3900]  I/O this shift:  In: -   Out: 1000 [Urine:1000]    Physical Exam   Constitutional: She is oriented to person, place, and time. She appears well-developed and well-nourished. No distress.   HENT:   Head: Normocephalic and atraumatic.   Eyes: Pupils are equal, round, and reactive to light. No scleral icterus.   Neck: Neck supple. No JVD present.   Cardiovascular: Normal rate and intact distal pulses. Exam reveals no friction rub.   Pulmonary/Chest: Effort normal and breath sounds normal. She has no rales.      Abdominal: Soft. Bowel sounds are normal.   Musculoskeletal: She exhibits edema.   Neurological: She is alert and oriented to person, place, and time.   Skin: Skin is warm and dry. She is not diaphoretic.   Nursing note and vitals reviewed.    Lab Results (last 24 hours)     Procedure Component Value Units Date/Time    POC Glucose Once [[id number]]  (Abnormal) Collected:  08/12/20 1105    Specimen:  Blood Updated:  08/12/20 1112     Glucose 218 mg/dL     POC Glucose Once [[id number]]  (Abnormal) Collected:  08/12/20 0721    Specimen:  Blood Updated:  08/12/20 0729     Glucose 207 mg/dL     Renal Function Panel [[id number]]  (Abnormal) Collected:  08/12/20 0326    Specimen:  Blood Updated:  08/12/20 0432     Glucose 272 mg/dL      BUN 50 mg/dL      Creatinine 2.24 mg/dL      Sodium 141 mmol/L      Potassium 5.0 mmol/L      Chloride 95 mmol/L      CO2 40.6 mmol/L      Calcium 8.9 mg/dL      Albumin 2.60 g/dL      Phosphorus 3.6 mg/dL      Anion Gap 5.4 mmol/L      BUN/Creatinine Ratio 22.3     eGFR Non African Amer 22 mL/min/1.73     Narrative:       GFR Normal >60  Chronic Kidney Disease <60  Kidney Failure <15      CBC & Differential [[id number]] Collected:  08/12/20 0326    Specimen:  Blood Updated:  08/12/20 0414    Narrative:       The following orders were created for panel order CBC & Differential.  Procedure                               Abnormality         Status                     ---------                               -----------         ------                     CBC Auto Differential[[id number]]        Abnormal            Final result                 Please view results for these tests on the individual orders.    CBC Auto Differential [[id number]]  (Abnormal) Collected:  08/12/20 0326    Specimen:  Blood Updated:  08/12/20 0414     WBC 6.94 10*3/mm3      RBC 3.09 10*6/mm3      Hemoglobin 8.4 g/dL      Hematocrit 29.5 %      MCV 95.5 fL      MCH 27.2 pg      MCHC 28.5 g/dL      RDW 14.6 %      RDW-SD 51.1  fl      MPV 10.9 fL      Platelets 190 10*3/mm3      Neutrophil % 67.0 %      Lymphocyte % 20.7 %      Monocyte % 7.3 %      Eosinophil % 4.3 %      Basophil % 0.3 %      Immature Grans % 0.4 %      Neutrophils, Absolute 4.64 10*3/mm3      Lymphocytes, Absolute 1.44 10*3/mm3      Monocytes, Absolute 0.51 10*3/mm3      Eosinophils, Absolute 0.30 10*3/mm3      Basophils, Absolute 0.02 10*3/mm3      Immature Grans, Absolute 0.03 10*3/mm3      nRBC 0.0 /100 WBC     POC Glucose Once [[id number]]  (Abnormal) Collected:  08/11/20 1956    Specimen:  Blood Updated:  08/11/20 2003     Glucose 285 mg/dL     POC Glucose Once [[id number]]  (Abnormal) Collected:  08/11/20 1631    Specimen:  Blood Updated:  08/11/20 1639     Glucose 210 mg/dL             Acute on chronic respiratory failure with hypoxia and hypercapnia (CMS/HCC)    1. olayinka-creatinine up a bit, back to previous levels actually, may rise with diuresis and if so may have to back off on bumex but not yet  2. Hypervolemia-continue diuresis, add 1 dose zaroxolyn today  3. Anemia of ckd-stable  4. Chronic respiratory failure-continue abx and diuretics      D/w dr bowman

## 2020-08-12 NOTE — PROGRESS NOTES
"      [facility]         Dr Galarza Sayied   LOS: 5 days   Patient Care Team:  Marilu Suresh APRN as PCP - General (Family Medicine)  Jhonny Hoyt MD as Consulting Physician (Pulmonary Disease)  Konstantin Orr MD as Consulting Physician (Nephrology)    Chief Complaint: Acute on chronic respiratory failure hypoxemic and hypercapnic with underlying history of COPD ALTA/OHS and volume overload    Interval History: She seems to be pretty marked trilogy dependent during the day also.  Once again attempted to adjust her pressure.  REVIEW OF SYSTEMS:   Shortness of breath with exertion.  No chest pain    Ventilator/Non-Invasive Ventilation Settings (From admission, onward)     Start     Ordered    08/08/20 1829  NIPPV (CPAP or BIPAP)  Until Discontinued     Question Answer Comment   Indication: Acute Respiratory Failure    Type: AVAPS-AE    NIPPV Mask Interface: Full Face Mask    Rate 20    VT (mL) 450    EPAP Min (cm H2O) 5    EPAP Max (cm H2O) 10    Max Pressure (cm H2O) 30    Pressure Support Min (cm H2O) 5    Pressure Support Max (cm H2O) 20    Other O2 bled in    Titrate for SPO2 Between    Titrate for SPO2 88% - 92%        08/08/20 1830    08/07/20 1833  NIPPV (CPAP or BIPAP)  Until Discontinued,   Status:  Canceled     Question Answer Comment   Type: BIPAP    NIPPV Mask Interface: Full Face Mask        08/07/20 1832            Trilogy with sleep and as needed      Vital Signs  Temp:  [98.3 °F (36.8 °C)-98.7 °F (37.1 °C)] 98.4 °F (36.9 °C)  Heart Rate:  [58-76] 58  Resp:  [16-20] 16  BP: (118-142)/(47-58) 121/57    Intake/Output Summary (Last 24 hours) at 8/12/2020 1017  Last data filed at 8/12/2020 0951  Gross per 24 hour   Intake 220 ml   Output 2050 ml   Net -1830 ml     Flowsheet Rows      First Filed Value   Admission Height  160 cm (63\") Documented at 08/07/2020 1425   Admission Weight  122 kg (268 lb) Documented at 08/07/2020 1425          Physical Exam:  Patient is examined " using the personal protective equipment as per guidelines from infection control for this particular patient as enacted.  Hand hygiene was performed before and after patient interaction.   General Appearance:    Alert, cooperative, in no acute distress.  Following simple commands  Neck midline trachea no thyromegaly   Lungs:    Diminished breath sounds rhonchi bilaterally on the basis    Heart:    Regular rhythm and normal rate, normal S1 and S2, no            murmur, no gallop, no rub, no click   Chest Wall:    No abnormalities observed   Abdomen:     Normal bowel sounds, no masses, no organomegaly, soft        non-tender, non-distended, no guarding, no rebound                tenderness   Extremities:   Moves all extremities well, 1-2+ edema, no cyanosis, no             redness  CNS no focal neurological deficits normal sensory exam  Skin no rashes no nodules  Musculoskeletal no cyanosis no clubbing normal range of motion     Results Review:        Results from last 7 days   Lab Units 08/12/20  0326 08/11/20  0424 08/10/20  0444   SODIUM mmol/L 141 139 143   POTASSIUM mmol/L 5.0 4.5 4.5   CHLORIDE mmol/L 95* 95* 98   CO2 mmol/L 40.6* 38.4* 37.0*   BUN mg/dL 50* 49* 59*   CREATININE mg/dL 2.24* 2.05* 2.41*   GLUCOSE mg/dL 272* 242* 226*   CALCIUM mg/dL 8.9 8.6 8.5*     Results from last 7 days   Lab Units 08/08/20  0540 08/07/20  1529   TROPONIN T ng/mL 0.040* 0.047*     Results from last 7 days   Lab Units 08/12/20  0326 08/10/20  0444 08/09/20  0430   WBC 10*3/mm3 6.94 5.21 4.80   HEMOGLOBIN g/dL 8.4* 8.0* 7.1*   HEMATOCRIT % 29.5* 27.9* 25.1*   PLATELETS 10*3/mm3 190 185 165                     Results from last 7 days   Lab Units 08/11/20  1055   PH, ARTERIAL pH units 7.344*   PO2 ART mm Hg 55.2*   PCO2, ARTERIAL mm Hg 78.8*   HCO3 ART mmol/L 42.8*       I reviewed the patient's new clinical results.  I personally viewed and interpreted the patient's CXR        Medication Review:     aspirin 81 mg Oral Daily    atorvastatin 40 mg Oral Daily   bumetanide 2 mg Oral BID   enoxaparin 40 mg Subcutaneous Q12H   guaiFENesin 1,200 mg Oral Q12H   hydrALAZINE 50 mg Oral Q8H   hydrophor  Topical Q24H   insulin aspart 0-24 Units Subcutaneous TID AC   insulin detemir 60 Units Subcutaneous Nightly   levalbuterol 0.63 mg Nebulization Q6H - RT   levothyroxine 175 mcg Oral Daily   metoprolol tartrate 50 mg Oral Q12H   QUEtiapine 100 mg Oral Q12H   sodium chloride 10 mL Intravenous Q12H   Fluticasone-Umeclidin-Vilant 1 puff Inhalation Daily - RT            ASSESSMENT:   Acute on chronic hypoxemic hypercapnic respiratory failure  Chronic hypoxemic respiratory failure  Acute on chronic diastolic CHF  Acute kidney injury  Chronic venous stasis/lymphedema  Hypertension  Diabetes mellitus  Morbid obesity  ALTA/OHS  COPD    PLAN:  Volume status difficult to .  Creatinine stable.  Diuresis per cardiology.  Definitely optimizing pulmonary status will help  Her ABG reflects partial compensation but with persistent respiratory acidosis.  I have asked respiratory therapist to see if her trilogy could be adjusted to higher tidal volume at 400.  She will contact the DME provider.  Aggressive PT OT will help  Long-term weight loss will be beneficial  Bronchodilators  Blood glucose monitoring per ICU protocol  She is high risk for decompensating due to multiple cardiac and pulmonary comorbidities    Geovanni Gilmore MD  08/12/20  10:17

## 2020-08-12 NOTE — THERAPY TREATMENT NOTE
Acute Care - Physical Therapy Treatment Note  TEODORA Dobbs     Patient Name: Jared Govea  : 1955  MRN: [id number]  Today's Date: 2020  Onset of Illness/Injury or Date of Surgery: 20  Date of Referral to PT: 20  Referring Physician: Dr. Peck    Admit Date: 2020    Visit Dx:    ICD-10-CM ICD-9-CM   1. Acute on chronic respiratory failure with hypoxia and hypercapnia (CMS/HCC) J96.21 518.84    J96.22 786.09     799.02   2. Acute on chronic diastolic congestive heart failure (CMS/HCC) I50.33 428.33     428.0   3. Hypoglycemia due to type 2 diabetes mellitus (CMS/[facility]) E11.649 250.80   4. Acute kidney injury (CMS/[facility]) N17.9 584.9     Patient Active Problem List   Diagnosis   • SERENA (generalized anxiety disorder)   • Chronic back pain   • Chronic diastolic congestive heart failure (CMS/[facility])   • COPD (chronic obstructive pulmonary disease) (CMS/[facility])   • Depression   • Uncontrolled type 2 diabetes mellitus with chronic kidney disease, with long-term current use of insulin (CMS/[facility])   • Venous stasis dermatitis of both lower extremities   • HLD (hyperlipidemia)   • Essential hypertension   • Hypothyroidism, acquired   • Osteoporosis   • Primary insomnia   • LAD (lymphadenopathy), mediastinal   • Lung nodule, solitary   • Diabetic peripheral neuropathy (CMS/HCC)   • CKD (chronic kidney disease) stage 4, GFR 15-29 ml/min (CMS/HCC)   • Chronic pain syndrome   • Lumbar spondylolysis   • Normocytic anemia   • Pleural scarring   • Osteopetrosis   • Acute renal failure superimposed on stage 4 chronic kidney disease (CMS/HCC)   • Morbid obesity (CMS/HCC)   • Acute hypoxemic respiratory failure (CMS/HCC)   • Acute respiratory failure with hypercapnia (CMS/HCC)   • Acute on chronic respiratory failure with hypoxia and hypercapnia (CMS/HCC)       Therapy Treatment    Rehabilitation Treatment Summary     Row Name 20 0853             Treatment Time/Intention    Discipline  physical therapist  -BP       Document Type  therapy note (daily note)  -BP      Subjective Information  complains of;dyspnea  -BP      Mode of Treatment  physical therapy  -BP      Patient/Family Observations  Patient reclined in bedside chair. Patient initially refuses participation however agreeable with encouragement. Tahira wright 4L O2/NC.   -BP      Care Plan Review  risks/benefits reviewed  -BP      Patient Effort  fair  -BP      Comment  Patient requires encouragement for maximal participation  -BP      Existing Precautions/Restrictions  oxygen therapy device and L/min;fall  -BP      Recorded by [BP] Bryce Hodgson, PT 08/12/20 1155      Row Name 08/12/20 0853             Vital Signs    Pre SpO2 (%)  89  -BP      O2 Delivery Pre Treatment  supplemental O2  -BP      Intra SpO2 (%)  76  -BP      O2 Delivery Intra Treatment  supplemental O2  -BP      Post SpO2 (%)  89  -BP      O2 Delivery Post Treatment  supplemental O2  -BP      Recorded by [BP] Bryce Hodgson, PT 08/12/20 1155      Row Name 08/12/20 0853             Cognitive Assessment/Intervention- PT/OT    Personal Safety Interventions  gait belt;nonskid shoes/slippers when out of bed  -BP      Recorded by [BP] Bryce Hodgson, PT 08/12/20 1155      Row Name 08/12/20 0853             Safety Issues, Functional Mobility    Safety Issues Affecting Function (Mobility)  judgment;positioning of assistive device  -BP      Recorded by [BP] Bryce Hodgson, PT 08/12/20 1155      Row Name 08/12/20 0853             Bed Mobility Assessment/Treatment    Comment (Bed Mobility)  deferred, patient up in chair.   -BP      Recorded by [BP] Bryce Hodgson, PT 08/12/20 1155      Row Name 08/12/20 0853             Transfer Assessment/Treatment    Transfer Assessment/Treatment  sit-stand transfer;stand-sit transfer;stand pivot/stand step transfer  -BP      Comment (Transfers)  Patient requires min A for each transfer to stand. SHe performs stand pivot from chair to BSC however refuses use of  device for this transfer.   -BP      Recorded by [BP] Bryce Hodgson, PT 08/12/20 1155      Row Name 08/12/20 0853             Sit-Stand Transfer    Sit-Stand [place] (Transfers)  minimum assist (75% patient effort);verbal cues  -BP      Assistive Device (Sit-Stand Transfers)  walker, front-wheeled  -BP      Recorded by [BP] Bryce Hodgson, PT 08/12/20 1155      Row Name 08/12/20 0853             Stand-Sit Transfer    Stand-Sit [place] (Transfers)  minimum assist (75% patient effort);verbal cues  -BP      Assistive Device (Stand-Sit Transfers)  walker, front-wheeled  -BP      Recorded by [BP] Bryce Hodgson, PT 08/12/20 1155      Row Name 08/12/20 0853             Stand Pivot/Stand Step Transfer    Stand Pivot/Stand Step [place]  minimum assist (75% patient effort);contact guard;verbal cues  -BP      Assistive Device (Stand Pivot Stand Step Transfer)  other (see comments) refuses device   -BP      Recorded by [BP] Bryce Hodgson, PT 08/12/20 1155      Row Name 08/12/20 0853             Gait/Stairs Assessment/Training    [place]roe Level (Gait)  contact guard  -BP      Assistive Device (Gait)  walker, front-wheeled  -BP      Distance in Feet (Gait)  4   -BP      Pattern (Gait)  swing-to  -BP      Deviations/Abnormal Patterns (Gait)  base of support, wide;gait speed decreased  -BP      Comment (Gait/Stairs)  Patient fatigues very quickly with very minimal activity.   -BP      Recorded by [BP] Bryce Hodgson, PT 08/12/20 1155      Row Name 08/12/20 0853             Positioning and Restraints    Pre-Treatment Position  sitting in chair/recliner  -BP      Post Treatment Position  bsc  -BP      On BS commode  notified nsg;call light within reach;encouraged to call for assist;sitting  -BP      Recorded by [BP] Bryce Hodgson, PT 08/12/20 1155      Row Name 08/12/20 0853             Pain Assessment    Additional Documentation  Pain Scale: Numbers Pre/Post-Treatment (Group)  -BP       Recorded by [BP] Bryce Hodgson, PT 08/12/20 1155      Row Name 08/12/20 0853             Pain Scale: Numbers Pre/Post-Treatment    Pre/Post Treatment Pain Comment  Patient does not complain of pain   -BP      Recorded by [BP] Bryce Hodgson, PT 08/12/20 1155      Row Name                Wound 07/20/20 1320 Left gluteal Pressure Injury    Wound - Properties Group Date first assessed: 07/20/20 [TO] Time first assessed: 1320 [TO] Present on Hospital Admission: Y [TO] Side: Left [TO] Location: gluteal [TO] Primary Wound Type: Pressure inj [TO] Stage, Pressure Injury: Stage 2 [TO] Recorded by:  [TO] Chet Coronado, RN 07/20/20 1326    Row Name 08/12/20 0853             Plan of Care Review    Plan of Care Reviewed With  patient  -BP      Outcome Summary  PT: Patient performs sit to/from stand transfers with min A and stand pivot transfer with CGA and use of FWW.  Patient able to take 4 steps forward with CGA however becomes very SOA. O2 sats decreased to 76% with gait and takes approximately 5 minutes to recover to 89% with triology vent donned. Anticiapte discharge to SNF with transition to extended care.   -BP      Recorded by [BP] Bryce Hodgson, PT 08/12/20 1155      Row Name 08/12/20 0853             Outcome Summary/Treatment Plan (PT)    Daily Summary of Progress (PT)  progress towards functional goals is fair  -BP      Barriers to Overall Progress (PT)  medical status   -BP      Recorded by [BP] Bryce Hodgson, PT 08/12/20 1159        User Key  (r) = Recorded By, (t) = Taken By, (c) = Cosigned By    Initials Name Effective Dates Discipline    BP Bryce Hodgson, PT 04/03/18 -  PT    Chet Westfall, RN 06/16/16 -  Nurse          Wound 07/20/20 1320 Left gluteal Pressure Injury (Active)   Dressing Appearance dry;intact 8/11/2020  9:45 PM   Base red 8/11/2020  4:30 PM   Periwound excoriated;redness 8/11/2020  4:30 PM   Drainage Amount none 8/11/2020  4:30 PM   Care, Wound cleansed with;soap and  water 8/11/2020  4:30 PM   Dressing Care, Wound dressing changed;hydrocolloid 8/11/2020  4:30 PM   Periwound Care, Wound barrier ointment applied;dry periwound area maintained 8/11/2020  4:30 PM           Physical Therapy Education                 Title: PT OT SLP Therapies (In Progress)     Topic: Physical Therapy (In Progress)     Point: Mobility training (In Progress)     Description:   Instruct learner(s) on safety and technique for assisting patient out of bed, chair or wheelchair.  Instruct in the proper use of assistive devices, such as walker, crutches, cane or brace.              Patient Friendly Description:   It's important to get you on your feet again, but we need to do so in a way that is safe for you. Falling has serious consequences, and your personal safety is the most important thing of all.        When it's time to get out of bed, one of us or a family member will sit next to you on the bed to give you support.     If your doctor or nurse tells you to use a walker, crutches, a cane, or a brace, be sure you use it every time you get out of bed, even if you think you don't need it.    Learning Progress Summary           Patient Acceptance, E,TB, NR by  at 8/12/2020 1155    Acceptance, E,TB, VU by  at 8/11/2020 1341                               User Key     Initials Effective Dates Name Provider Type Discipline     06/08/18 -  Teodora Sethi, PT Physical Therapist PT     04/03/18 -  Bryce Hodgson, PT Physical Therapist PT                PT Recommendation and Plan     Outcome Summary/Treatment Plan (PT)  Daily Summary of Progress (PT): progress towards functional goals is fair  Barriers to Overall Progress (PT): medical status   Plan of Care Reviewed With: patient  Outcome Summary: PT: Patient performs sit to/from stand transfers with min A and stand pivot transfer with CGA and use of FWW.  Patient able to take 4 steps forward with CGA however becomes very SOA. O2 sats decreased to  76% with gait and takes approximately 5 minutes to recover to 89% with triology vent donned. Anticiapte discharge to SNF with transition to extended care.   Outcome Measures     Row Name 08/12/20 0853 08/11/20 1300          How much help from another person do you currently need...    Turning from your back to your side while in flat bed without using bedrails?  3  -BP  3  -LH     Moving from lying on back to sitting on the side of a flat bed without bedrails?  3  -BP  3  -LH     Moving to and from a bed to a chair (including a wheelchair)?  3  -BP  3  -LH     Standing up from a chair using your arms (e.g., wheelchair, bedside chair)?  3  -BP  3  -LH     Climbing 3-5 steps with a railing?  2  -BP  2  -LH     To walk in hospital room?  2  -BP  2  -LH     AM-PAC 6 Clicks Score (PT)  16  -BP  16  -LH        Functional Assessment    Outcome Measure Options  AM-PAC 6 Clicks Basic Mobility (PT)  -BP  AM-PAC 6 Clicks Basic Mobility (PT)  -       User Key  (r) = Recorded By, (t) = Taken By, (c) = Cosigned By    Initials Name Provider Type     Teodora Sethi, PT Physical Therapist    Bryce Ac PT Physical Therapist         Time Calculation:   PT Charges     Row Name 08/12/20 1156             Time Calculation    Start Time  0853  -BP      Stop Time  0913  -BP      Time Calculation (min)  20 min  -BP      PT Received On  08/12/20  -BP      PT - Next Appointment  08/13/20  -BP         Timed Charges    58780 - PT Therapeutic Exercise Minutes  20  -BP        User Key  (r) = Recorded By, (t) = Taken By, (c) = Cosigned By    Initials Name Provider Type    BP Bryce Hodgson, PT Physical Therapist        Therapy Charges for Today     Code Description Service Date Service Provider Modifiers Qty    72801307436 HC PT THER PROC EA 15 MIN 8/12/2020 Bryce Hodgson, PT GP 1          PT G-Codes  Outcome Measure Options: AM-PAC 6 Clicks Basic Mobility (PT)  AM-PAC 6 Clicks Score (PT): 16    Bryce Hodgson  PT  8/12/2020

## 2020-08-12 NOTE — PROGRESS NOTES
Adult Nutrition  Assessment/PES    Patient Name:  Jared Govea  YOB: 1955  MRN: [id number]  Admit Date:  8/7/2020    Assessment Date:  8/12/2020    Comments:  Tolerating diet.  Pt refused diet education.  Recommend add Consistent Carbohydrate to current diet order.    Reason for Assessment     Row Name 08/12/20 1410          Reason for Assessment    Reason For Assessment  follow-up protocol     Diagnosis  -- acute/chronic respiratory failure, CHF, diabetes, obesity           Anthropometrics     Row Name 08/12/20 1412          Anthropometrics    Weight  -- 257# 8.10.20         Labs/Tests/Procedures/Meds     Row Name 08/12/20 1413          Labs/Procedures/Meds    Lab Results Comments  glucose 200's        Medications    Pertinent Medications Reviewed  reviewed     Pertinent Medications Comments  bumex         Physical Findings     Row Name 08/12/20 1414          Physical Findings    Skin  pressure injury stage II present on admission, per ANGELICON note 8.10.20 improved            Nutrition Prescription Ordered     Row Name 08/12/20 1414          Nutrition Prescription PO    Common Modifiers  Low Sodium     Low Sodium Details  1,500 mg Sodium         Evaluation of Received Nutrient/Fluid Intake     Row Name 08/12/20 1416          PO Evaluation    Number of Days PO Intake Evaluated  Insufficient Data     Number of Meals  4     % PO Intake  69%               Problem/Interventions:  Problem 1     Row Name 08/12/20 1416          Nutrition Diagnoses Problem 1    Problem 1  Overweight/Obesity     Etiology (related to)  Factors Affecting Nutrition     BMI  Greater than 40 41.5               Intervention Goal     Row Name 08/12/20 1417          Intervention Goal    PO  PO intake (%)     PO Intake %  50 % or greater of meals         Nutrition Intervention     Row Name 08/12/20 1417          Nutrition Intervention    RD[employer]Tech Action  Follow Tx progress           Education/Evaluation     Row Name 08/12/20 1417           Education    Education  Education offered and refused        Monitor/Evaluation    Monitor  I&O;PO intake;Pertinent labs;Weight;Skin status           Electronically signed by:  Silvia Stallworth RD  08/12/20 14:18

## 2020-08-12 NOTE — PLAN OF CARE
Problem: Patient Care Overview  Goal: Plan of Care Review  Outcome: Ongoing (interventions implemented as appropriate)  Flowsheets (Taken 8/12/2020 6838)  Progress: no change  Plan of Care Reviewed With: patient  Outcome Summary: Remained in chair through night, rested some what, VSS, Tele SR, cont to wear mask off and on, oxygen levels cont to drop with activity, but quickly recovers

## 2020-08-12 NOTE — PLAN OF CARE
Problem: Patient Care Overview  Goal: Plan of Care Review  Outcome: Ongoing (interventions implemented as appropriate)  Flowsheets (Taken 8/12/2020 2590)  Progress: improving  Plan of Care Reviewed With: patient  Outcome Summary: Pt up to chair during shift. Intermittent napping. Will desaturate with ADLs such as pivoting from chair to bedside commode. Wound care to bilateral legs per order.

## 2020-08-12 NOTE — NURSING NOTE
Continued Stay Note  TEODORA Dobbs     Patient Name: Jared Govea  MRN: [id number]  Today's Date: 8/12/2020    Admit Date: 8/7/2020    Discharge Plan     Row Name 08/12/20 1310       Plan    Plan  [facility] pending pre cert    Patient/Family in Agreement with Plan  yes    Plan Comments  Spoke with patient from doorway due to MRSA isolation precautions. Patient is sitting up in the chair beginning lunch with no complaints. Informed patient that [facility] could not accept due to her Trilogy. Updated on her that [facility] in [place] was called and per Karen she states that they have had patient there before will begin pre cert today and also they can accept her Trilogy and patient is agreeable to this discharge plans. Patient voiced no other needs at this time. Name and number on white board,  will continue to follow for needs.        Discharge Codes    No documentation.             Kerri Dutton RN

## 2020-08-12 NOTE — PLAN OF CARE
Problem: Patient Care Overview  Goal: Plan of Care Review  Flowsheets  Taken 8/12/2020 9389  Plan of Care Reviewed With: patient  Taken 8/12/2020 0549  Outcome Summary: PT: Patient performs sit to/from stand transfers with min A and stand pivot transfer with CGA and use of FWW.  Patient able to take 4 steps forward with CGA however becomes very SOA. O2 sats decreased to 76% with gait and takes approximately 5 minutes to recover to 89% with triology vent donned. Anticiapte discharge to SNF with transition to extended care.

## 2020-08-12 NOTE — PROGRESS NOTES
"Hospitalist Team      Patient Care Team:  Marilu Suresh APRN as PCP - General (Family Medicine)  Jhonny Hoyt MD as Consulting Physician (Pulmonary Disease)  Konstantin Orr MD as Consulting Physician (Nephrology)        Chief Complaint: Follow-up A/C H/H Resp Failure    Subjective    Feels weak and tired.  Breathing about the same.  She denies chest pain.  Tolerating diet.    Objective    Vital Signs  Temp:  [97.1 °F (36.2 °C)-98.7 °F (37.1 °C)] 97.1 °F (36.2 °C)  Heart Rate:  [58-76] 69  Resp:  [16-20] 20  BP: (113-142)/(47-58) 113/52  Oxygen Therapy  SpO2: 94 %  Pulse Oximetry Type: Continuous  Device (Oxygen Therapy): nasal cannula  Device (Oxygen Therapy): nasal cannula  Flow (L/min): 4  Oximetry Probe Site Changed: Yes}    Flowsheet Rows      First Filed Value   Admission Height  160 cm (63\") Documented at 08/07/2020 1425   Admission Weight  122 kg (268 lb) Documented at 08/07/2020 1425          Physical Exam:    General: No acute distress.  Lungs: Breath sounds are diminished.  Fine crackles in the bases.  No accessory use.  CV: Heart sounds are distant but regular.  No murmur appreciated.  Abdomen: Morbidly obese, soft, and non-tender w/ active bowel sounds.  MSK: No C/C.  Edema of the extremities appears unchanged.  Neuro: CN II-XII grossly intact.  Psych: Normal affect.    Results Review:     I reviewed the patient's new clinical results.    Lab Results (last 24 hours)     Procedure Component Value Units Date/Time    POC Glucose Once [[id number]]  (Normal) Collected:  08/12/20 1720    Specimen:  Blood Updated:  08/12/20 1726     Glucose 125 mg/dL     POC Glucose Once [[id number]]  (Abnormal) Collected:  08/12/20 1105    Specimen:  Blood Updated:  08/12/20 1112     Glucose 218 mg/dL     POC Glucose Once [[id number]]  (Abnormal) Collected:  08/12/20 0721    Specimen:  Blood Updated:  08/12/20 0729     Glucose 207 mg/dL     Renal Function Panel [[id number]]  (Abnormal) Collected:  08/12/20 0326 "    Specimen:  Blood Updated:  08/12/20 0432     Glucose 272 mg/dL      BUN 50 mg/dL      Creatinine 2.24 mg/dL      Sodium 141 mmol/L      Potassium 5.0 mmol/L      Chloride 95 mmol/L      CO2 40.6 mmol/L      Calcium 8.9 mg/dL      Albumin 2.60 g/dL      Phosphorus 3.6 mg/dL      Anion Gap 5.4 mmol/L      BUN/Creatinine Ratio 22.3     eGFR Non African Amer 22 mL/min/1.73     Narrative:       GFR Normal >60  Chronic Kidney Disease <60  Kidney Failure <15      CBC & Differential [[id number]] Collected:  08/12/20 0326    Specimen:  Blood Updated:  08/12/20 0414    Narrative:       The following orders were created for panel order CBC & Differential.  Procedure                               Abnormality         Status                     ---------                               -----------         ------                     CBC Auto Differential[[id number]]        Abnormal            Final result                 Please view results for these tests on the individual orders.    CBC Auto Differential [[id number]]  (Abnormal) Collected:  08/12/20 0326    Specimen:  Blood Updated:  08/12/20 0414     WBC 6.94 10*3/mm3      RBC 3.09 10*6/mm3      Hemoglobin 8.4 g/dL      Hematocrit 29.5 %      MCV 95.5 fL      MCH 27.2 pg      MCHC 28.5 g/dL      RDW 14.6 %      RDW-SD 51.1 fl      MPV 10.9 fL      Platelets 190 10*3/mm3      Neutrophil % 67.0 %      Lymphocyte % 20.7 %      Monocyte % 7.3 %      Eosinophil % 4.3 %      Basophil % 0.3 %      Immature Grans % 0.4 %      Neutrophils, Absolute 4.64 10*3/mm3      Lymphocytes, Absolute 1.44 10*3/mm3      Monocytes, Absolute 0.51 10*3/mm3      Eosinophils, Absolute 0.30 10*3/mm3      Basophils, Absolute 0.02 10*3/mm3      Immature Grans, Absolute 0.03 10*3/mm3      nRBC 0.0 /100 WBC     POC Glucose Once [[id number]]  (Abnormal) Collected:  08/11/20 1956    Specimen:  Blood Updated:  08/11/20 2003     Glucose 285 mg/dL           Imaging Results (Last 24 Hours)     ** No results found for  the last 24 hours. **            Medication Review:   I have reviewed the patient's current medication list    Current Facility-Administered Medications:   •  acetaminophen (TYLENOL) tablet 650 mg, 650 mg, Oral, Q4H PRN, 650 mg at 08/09/20 2027 **OR** acetaminophen (TYLENOL) suppository 650 mg, 650 mg, Rectal, Q4H PRN, Payam Peck MD  •  albuterol (PROVENTIL) nebulizer solution 0.083% 2.5 mg/3mL, 2.5 mg, Nebulization, Q6H PRN, Payam Peck MD  •  aspirin EC tablet 81 mg, 81 mg, Oral, Daily, Payam Peck MD, 81 mg at 08/12/20 0837  •  atorvastatin (LIPITOR) tablet 40 mg, 40 mg, Oral, Daily, Payam Peck MD, 40 mg at 08/12/20 0837  •  bumetanide (BUMEX) tablet 2 mg, 2 mg, Oral, BID, Payam Peck MD, 2 mg at 08/12/20 0837  •  dextrose (D50W) 25 g/ 50mL Intravenous Solution 25 g, 25 g, Intravenous, Q15 Min PRN, Payam Peck MD  •  dextrose (GLUTOSE) oral gel 15 g, 15 g, Oral, Q15 Min PRN, Payam Peck MD  •  enoxaparin (LOVENOX) syringe 40 mg, 40 mg, Subcutaneous, Q12H, Payam Peck MD, 40 mg at 08/12/20 0840  •  glucagon (human recombinant) (GLUCAGEN DIAGNOSTIC) injection 1 mg, 1 mg, Subcutaneous, Q15 Min PRN, Payam Peck MD  •  guaiFENesin (MUCINEX) 12 hr tablet 1,200 mg, 1,200 mg, Oral, Q12H, Payam Peck MD, 1,200 mg at 08/12/20 0837  •  hydrALAZINE (APRESOLINE) tablet 50 mg, 50 mg, Oral, Q8H, Payam Peck MD, 50 mg at 08/12/20 0624  •  HYDROcodone-acetaminophen (NORCO) 5-325 MG per tablet 1 tablet, 1 tablet, Oral, Q6H PRN, Payam Peck MD, 1 tablet at 08/12/20 0839  •  hydrophor (AQUAPHOR) ointment, , Topical, Q24H, Payam Peck MD  •  insulin aspart (novoLOG) injection 0-24 Units, 0-24 Units, Subcutaneous, TID AC, Payam Peck MD, 8 Units at 08/12/20 1244  •  insulin detemir (LEVEMIR) injection 60 Units, 60 Units, Subcutaneous, Nightly, Payam Peck MD, 60 Units at 08/11/20 2235  •  levalbuterol (XOPENEX) nebulizer solution 0.63 mg, 0.63 mg, Nebulization, Q6H - RT,  Payam Peck MD, 0.63 mg at 08/12/20 1320  •  levothyroxine (SYNTHROID, LEVOTHROID) tablet 175 mcg, 175 mcg, Oral, Daily, Payam Peck MD, 175 mcg at 08/12/20 0837  •  metoprolol tartrate (LOPRESSOR) tablet 50 mg, 50 mg, Oral, Q12H, Payam Peck MD, 50 mg at 08/12/20 1245  •  ondansetron (ZOFRAN) tablet 4 mg, 4 mg, Oral, Q6H PRN **OR** ondansetron (ZOFRAN) injection 4 mg, 4 mg, Intravenous, Q6H PRN, Payam Peck MD  •  QUEtiapine (SEROquel) tablet 100 mg, 100 mg, Oral, Q12H, Payam Peck MD, 100 mg at 08/12/20 0837  •  sennosides-docusate (PERICOLACE) 8.6-50 MG per tablet 2 tablet, 2 tablet, Oral, BID PRN, Payam Peck MD  •  sodium chloride 0.9 % flush 10 mL, 10 mL, Intravenous, PRN, Payam Peck MD  •  sodium chloride 0.9 % flush 10 mL, 10 mL, Intravenous, Q12H, Payam Peck MD, 10 mL at 08/12/20 0842  •  sodium chloride 0.9 % infusion 40 mL, 40 mL, Intravenous, PRN, Payam Peck MD, 40 mL at 08/09/20 1315  •  TRELEGY (Fluticasone-Umeclidin-Vilant)100-62.5-25 MCG/INH IN, 1 puff, Inhalation, Daily - RT, Payam Peck MD, 1 puff at 08/12/20 1321      Assessment/Plan     1.  A/C H/H Resp Failure: She appears to be approaching her baseline, but she has had the BiPAP on most of the day.  Pulmonary following.    2.  LADY on CKDIII-IV: Nephrology following.    3.  HTN: At goal on exam.    4.  Diabetes Mellitus, Type 2 in Obese: Continue to monitor trend.      Payam Peck MD  08/12/20  19:32

## 2020-08-13 LAB
ANION GAP SERPL CALCULATED.3IONS-SCNC: 7.4 MMOL/L (ref 5–15)
BUN SERPL-MCNC: 47 MG/DL (ref 8–23)
BUN/CREAT SERPL: 23.9 (ref 7–25)
CALCIUM SPEC-SCNC: 9 MG/DL (ref 8.6–10.5)
CHLORIDE SERPL-SCNC: 92 MMOL/L (ref 98–107)
CO2 SERPL-SCNC: 40.6 MMOL/L (ref 22–29)
CREAT SERPL-MCNC: 1.97 MG/DL (ref 0.57–1)
GFR SERPL CREATININE-BSD FRML MDRD: 25 ML/MIN/1.73
GLUCOSE BLDC GLUCOMTR-MCNC: 156 MG/DL (ref 70–130)
GLUCOSE BLDC GLUCOMTR-MCNC: 166 MG/DL (ref 70–130)
GLUCOSE BLDC GLUCOMTR-MCNC: 182 MG/DL (ref 70–130)
GLUCOSE BLDC GLUCOMTR-MCNC: 268 MG/DL (ref 70–130)
GLUCOSE SERPL-MCNC: 227 MG/DL (ref 65–99)
POTASSIUM SERPL-SCNC: 4.7 MMOL/L (ref 3.5–5.2)
SODIUM SERPL-SCNC: 140 MMOL/L (ref 136–145)

## 2020-08-13 PROCEDURE — 94799 UNLISTED PULMONARY SVC/PX: CPT

## 2020-08-13 PROCEDURE — 97116 GAIT TRAINING THERAPY: CPT

## 2020-08-13 PROCEDURE — 63710000001 LEVALBUTEROL PER 0.5 MG: Performed by: HOSPITALIST

## 2020-08-13 PROCEDURE — 99232 SBSQ HOSP IP/OBS MODERATE 35: CPT | Performed by: HOSPITALIST

## 2020-08-13 PROCEDURE — 25010000002 ONDANSETRON PER 1 MG: Performed by: HOSPITALIST

## 2020-08-13 PROCEDURE — 63710000001 INSULIN ASPART PER 5 UNITS: Performed by: HOSPITALIST

## 2020-08-13 PROCEDURE — 80048 BASIC METABOLIC PNL TOTAL CA: CPT | Performed by: INTERNAL MEDICINE

## 2020-08-13 PROCEDURE — 25010000002 FUROSEMIDE PER 20 MG: Performed by: INTERNAL MEDICINE

## 2020-08-13 PROCEDURE — 63710000001 INSULIN DETEMIR PER 5 UNITS: Performed by: HOSPITALIST

## 2020-08-13 PROCEDURE — 25010000002 ENOXAPARIN PER 10 MG: Performed by: HOSPITALIST

## 2020-08-13 PROCEDURE — 82962 GLUCOSE BLOOD TEST: CPT

## 2020-08-13 PROCEDURE — U0002 COVID-19 LAB TEST NON-CDC: HCPCS | Performed by: HOSPITALIST

## 2020-08-13 RX ORDER — FUROSEMIDE 10 MG/ML
80 INJECTION INTRAMUSCULAR; INTRAVENOUS ONCE
Status: COMPLETED | OUTPATIENT
Start: 2020-08-13 | End: 2020-08-13

## 2020-08-13 RX ORDER — ATORVASTATIN CALCIUM 40 MG/1
TABLET, FILM COATED ORAL
Qty: 30 TABLET | Refills: 0 | OUTPATIENT
Start: 2020-08-13

## 2020-08-13 RX ORDER — METOLAZONE 2.5 MG/1
10 TABLET ORAL ONCE
Status: COMPLETED | OUTPATIENT
Start: 2020-08-13 | End: 2020-08-13

## 2020-08-13 RX ADMIN — HYDRALAZINE HYDROCHLORIDE 50 MG: 50 TABLET, FILM COATED ORAL at 21:21

## 2020-08-13 RX ADMIN — NYSTATIN: 100000 POWDER TOPICAL at 21:23

## 2020-08-13 RX ADMIN — INSULIN DETEMIR 74 UNITS: 100 INJECTION, SOLUTION SUBCUTANEOUS at 21:26

## 2020-08-13 RX ADMIN — FUROSEMIDE 80 MG: 10 INJECTION, SOLUTION INTRAMUSCULAR; INTRAVENOUS at 16:58

## 2020-08-13 RX ADMIN — LEVALBUTEROL HYDROCHLORIDE 0.63 MG: 0.63 SOLUTION RESPIRATORY (INHALATION) at 01:27

## 2020-08-13 RX ADMIN — ENOXAPARIN SODIUM 40 MG: 40 INJECTION SUBCUTANEOUS at 08:45

## 2020-08-13 RX ADMIN — BUMETANIDE 2 MG: 1 TABLET ORAL at 08:55

## 2020-08-13 RX ADMIN — SODIUM CHLORIDE, PRESERVATIVE FREE 10 ML: 5 INJECTION INTRAVENOUS at 21:22

## 2020-08-13 RX ADMIN — HYDROCODONE BITARTRATE AND ACETAMINOPHEN 1 TABLET: 5; 325 TABLET ORAL at 08:54

## 2020-08-13 RX ADMIN — ONDANSETRON 4 MG: 2 INJECTION, SOLUTION INTRAMUSCULAR; INTRAVENOUS at 08:54

## 2020-08-13 RX ADMIN — METOPROLOL TARTRATE 50 MG: 50 TABLET, FILM COATED ORAL at 00:32

## 2020-08-13 RX ADMIN — GUAIFENESIN 1200 MG: 600 TABLET, EXTENDED RELEASE ORAL at 08:55

## 2020-08-13 RX ADMIN — METOLAZONE 10 MG: 2.5 TABLET ORAL at 16:58

## 2020-08-13 RX ADMIN — ATORVASTATIN CALCIUM 40 MG: 40 TABLET, FILM COATED ORAL at 08:55

## 2020-08-13 RX ADMIN — LEVALBUTEROL HYDROCHLORIDE 0.63 MG: 0.63 SOLUTION RESPIRATORY (INHALATION) at 07:06

## 2020-08-13 RX ADMIN — ENOXAPARIN SODIUM 40 MG: 40 INJECTION SUBCUTANEOUS at 21:21

## 2020-08-13 RX ADMIN — HYDRALAZINE HYDROCHLORIDE 50 MG: 50 TABLET, FILM COATED ORAL at 07:45

## 2020-08-13 RX ADMIN — HYDRALAZINE HYDROCHLORIDE 50 MG: 50 TABLET, FILM COATED ORAL at 13:43

## 2020-08-13 RX ADMIN — INSULIN ASPART 12 UNITS: 100 INJECTION, SOLUTION INTRAVENOUS; SUBCUTANEOUS at 12:22

## 2020-08-13 RX ADMIN — INSULIN ASPART 4 UNITS: 100 INJECTION, SOLUTION INTRAVENOUS; SUBCUTANEOUS at 08:45

## 2020-08-13 RX ADMIN — NYSTATIN: 100000 POWDER TOPICAL at 08:47

## 2020-08-13 RX ADMIN — PETROLATUM: 42 OINTMENT TOPICAL at 08:56

## 2020-08-13 RX ADMIN — LEVALBUTEROL HYDROCHLORIDE 0.63 MG: 0.63 SOLUTION RESPIRATORY (INHALATION) at 13:33

## 2020-08-13 RX ADMIN — METOPROLOL TARTRATE 50 MG: 50 TABLET, FILM COATED ORAL at 12:22

## 2020-08-13 RX ADMIN — QUETIAPINE FUMARATE 100 MG: 100 TABLET ORAL at 08:55

## 2020-08-13 RX ADMIN — GUAIFENESIN 1200 MG: 600 TABLET, EXTENDED RELEASE ORAL at 21:21

## 2020-08-13 RX ADMIN — SODIUM CHLORIDE, PRESERVATIVE FREE 10 ML: 5 INJECTION INTRAVENOUS at 08:56

## 2020-08-13 RX ADMIN — LEVALBUTEROL HYDROCHLORIDE 0.63 MG: 0.63 SOLUTION RESPIRATORY (INHALATION) at 20:19

## 2020-08-13 RX ADMIN — BUMETANIDE 2 MG: 1 TABLET ORAL at 21:21

## 2020-08-13 RX ADMIN — INSULIN ASPART 4 UNITS: 100 INJECTION, SOLUTION INTRAVENOUS; SUBCUTANEOUS at 16:58

## 2020-08-13 RX ADMIN — LEVOTHYROXINE SODIUM 175 MCG: 150 TABLET ORAL at 08:55

## 2020-08-13 RX ADMIN — FLUTICASONE FUROATE, UMECLIDINIUM BROMIDE AND VILANTEROL TRIFENATATE 1 PUFF: 100; 62.5; 25 POWDER RESPIRATORY (INHALATION) at 09:47

## 2020-08-13 RX ADMIN — ASPIRIN 81 MG: 81 TABLET, COATED ORAL at 08:55

## 2020-08-13 RX ADMIN — QUETIAPINE FUMARATE 100 MG: 100 TABLET ORAL at 21:21

## 2020-08-13 NOTE — PLAN OF CARE
Problem: Patient Care Overview  Goal: Plan of Care Review  Outcome: Ongoing (interventions implemented as appropriate)  Flowsheets (Taken 8/13/2020 1627)  Progress: improving  Plan of Care Reviewed With: patient  Outcome Summary: Medicated x1 for c/o BLE pain; BLE drsgs changed as per order;Norco & Zofran given x1 this shift; up in chair most of day; using BSC; Lasix 80mg IV x1 given;O2 remains at 4L NC with occas increase to 6L when eating; labs in am; plan is for pt to be D/C'd to [facility]

## 2020-08-13 NOTE — THERAPY TREATMENT NOTE
Acute Care - Physical Therapy Treatment Note  TEODORA Dobbs     Patient Name: Jared Govea  : 1955  MRN: [id number]  Today's Date: 2020  Onset of Illness/Injury or Date of Surgery: 20  Date of Referral to PT: 20  Referring Physician: Dr. Peck    Admit Date: 2020    Visit Dx:    ICD-10-CM ICD-9-CM   1. Acute on chronic respiratory failure with hypoxia and hypercapnia (CMS/HCC) J96.21 518.84    J96.22 786.09     799.02   2. Acute on chronic diastolic congestive heart failure (CMS/HCC) I50.33 428.33     428.0   3. Hypoglycemia due to type 2 diabetes mellitus (CMS/HCC) E11.649 250.80   4. Acute kidney injury (CMS/HCC) N17.9 584.9     Patient Active Problem List   Diagnosis   • SERENA (generalized anxiety disorder)   • Chronic back pain   • Chronic diastolic congestive heart failure (CMS/HCC)   • COPD (chronic obstructive pulmonary disease) (CMS/[facility])   • Depression   • Uncontrolled type 2 diabetes mellitus with chronic kidney disease, with long-term current use of insulin (CMS/HCC)   • Venous stasis dermatitis of both lower extremities   • HLD (hyperlipidemia)   • Essential hypertension   • Hypothyroidism, acquired   • Osteoporosis   • Primary insomnia   • LAD (lymphadenopathy), mediastinal   • Lung nodule, solitary   • Diabetic peripheral neuropathy (CMS/HCC)   • CKD (chronic kidney disease) stage 4, GFR 15-29 ml/min (CMS/HCC)   • Chronic pain syndrome   • Lumbar spondylolysis   • Normocytic anemia   • Pleural scarring   • Osteopetrosis   • Acute renal failure superimposed on stage 4 chronic kidney disease (CMS/HCC)   • Morbid obesity (CMS/HCC)   • Acute hypoxemic respiratory failure (CMS/HCC)   • Acute respiratory failure with hypercapnia (CMS/HCC)   • Acute on chronic respiratory failure with hypoxia and hypercapnia (CMS/HCC)       Therapy Treatment    Rehabilitation Treatment Summary     Row Name 20 1153             Treatment Time/Intention    Discipline  physical therapist  -AS       Document Type  therapy note (daily note)  -AS      Subjective Information  complains of;fatigue  -AS      Mode of Treatment  physical therapy  -AS      Care Plan Review  evaluation/treatment results reviewed;care plan/treatment goals reviewed;risks/benefits reviewed;current/potential barriers reviewed;patient/other agree to care plan  -AS      Patient Effort  fair  -AS      Recorded by [AS] Konstantin Izquierdo, PT 08/13/20 1157      Row Name 08/13/20 1153             Functional Mobility    Functional Mobility- Ind. Level  contact guard assist  -AS      Functional Mobility- Device  rolling walker  -AS      Functional Mobility-Distance (Feet)  4  -AS      Recorded by [AS] Konstantin Izquierdo, PT 08/13/20 1157      Row Name 08/13/20 1153             Sit-Stand Transfer    Sit-Stand [place] (Transfers)  minimum assist (75% patient effort)  -AS      Assistive Device (Sit-Stand Transfers)  walker, front-wheeled  -AS      Recorded by [AS] Konstantin Izquierdo, PT 08/13/20 1157      Row Name 08/13/20 1153             Stand-Sit Transfer    Stand-Sit [place] (Transfers)  minimum assist (75% patient effort)  -AS      Assistive Device (Stand-Sit Transfers)  walker, front-wheeled  -AS      Recorded by [AS] Konstantin Izquierdo, PT 08/13/20 1157      Row Name 08/13/20 1153             Gait/Stairs Assessment/Training    [place] Level (Gait)  contact guard  -AS      Assistive Device (Gait)  walker, front-wheeled  -AS      Distance in Feet (Gait)  4  -AS      Recorded by [AS] Konstantin Izquierdo, PT 08/13/20 1157      Row Name 08/13/20 1153             Positioning and Restraints    Pre-Treatment Position  sitting in chair/recliner  -AS      Post Treatment Position  chair  -AS      In Chair  reclined;call light within reach;encouraged to call for assist  -AS      Recorded by [AS] Konstantin Izquierdo, PT 08/13/20 1157      Row Name                Wound 07/20/20 1320 Left gluteal Pressure Injury    Wound -  Properties Group Date first assessed: 07/20/20 [TO] Time first assessed: 1320 [TO] Present on Hospital Admission: Y [TO] Side: Left [TO] Location: gluteal [TO] Primary Wound Type: Pressure inj [TO] Stage, Pressure Injury: Stage 2 [TO] Recorded by:  [TO] Chet Coronado RN 07/20/20 1326    Row Name 08/13/20 1153             Plan of Care Review    Plan of Care Reviewed With  patient  -AS      Progress  no change  -AS      Outcome Summary  Patient sitting in chair upon entry to room. She is willing to work with PT this morning. Patient performs sit to stand transfer with Min A. She ambulated 4 steps and asked to return to her shair so she did requiring Min A. Patient did report fatigue during gait and O2 sat dropped to 78% and recovered to 88% within 5 minutes.  -AS      Recorded by [AS] Konstantin Izquierdo, PT 08/13/20 1157      Row Name 08/13/20 1153             Outcome Summary/Treatment Plan (PT)    Daily Summary of Progress (PT)  unable to show any progress toward functional goals  -AS      Recorded by [AS] Konstantin Izquierdo, PT 08/13/20 1157        User Key  (r) = Recorded By, (t) = Taken By, (c) = Cosigned By    Initials Name Effective Dates Discipline    TO Chet Coronado RN 06/16/16 -  Nurse    AS Konstantin Izquierdo, PT 12/11/15 -  PT          Wound 07/20/20 1320 Left gluteal Pressure Injury (Active)   Dressing Appearance dry;intact 8/13/2020  8:54 AM   Closure None 8/13/2020  8:54 AM   Base pink 8/13/2020  8:54 AM   Periwound redness 8/13/2020  8:54 AM   Periwound Temperature warm 8/13/2020  8:54 AM   Periwound Skin Turgor soft 8/12/2020  4:29 PM   Edges open 8/12/2020  4:29 PM   Drainage Amount none 8/13/2020  8:54 AM   Care, Wound cleansed with;soap and water 8/12/2020  9:45 PM   Dressing Care, Wound dressing changed;foam 8/13/2020  8:54 AM   Periwound Care, Wound barrier ointment applied;dry periwound area maintained 8/12/2020  9:45 PM           Physical Therapy Education                  Title: PT OT SLP Therapies (Done)     Topic: Physical Therapy (Done)     Point: Mobility training (Done)     Description:   Instruct learner(s) on safety and technique for assisting patient out of bed, chair or wheelchair.  Instruct in the proper use of assistive devices, such as walker, crutches, cane or brace.              Patient Friendly Description:   It's important to get you on your feet again, but we need to do so in a way that is safe for you. Falling has serious consequences, and your personal safety is the most important thing of all.        When it's time to get out of bed, one of us or a family member will sit next to you on the bed to give you support.     If your doctor or nurse tells you to use a walker, crutches, a cane, or a brace, be sure you use it every time you get out of bed, even if you think you don't need it.    Learning Progress Summary           Patient Acceptance, E,TB, VU by AS at 8/13/2020 1158    Acceptance, E, VU by AB at 8/13/2020 0555    Acceptance, E,TB, NR by  at 8/12/2020 1155    Acceptance, E,TB, VU by  at 8/11/2020 1341                               User Key     Initials Effective Dates Name Provider Type Discipline     06/08/18 -  Teodora Sethi, PT Physical Therapist PT    AB 06/16/16 -  Maura Graham, RN Registered Nurse Nurse     04/03/18 -  Bryce Hodgson, PT Physical Therapist PT    AS 12/11/15 -  Konstantin Izquierdo, PT Physical Therapist PT                PT Recommendation and Plan     Outcome Summary/Treatment Plan (PT)  Daily Summary of Progress (PT): unable to show any progress toward functional goals  Plan of Care Reviewed With: patient  Progress: no change  Outcome Summary: Patient sitting in chair upon entry to room. She is willing to work with PT this morning. Patient performs sit to stand transfer with Min A. She ambulated 4 steps and asked to return to her shair so she did requiring Min A. Patient did report fatigue during gait and O2 sat  dropped to 78% and recovered to 88% within 5 minutes.  Outcome Measures     Row Name 08/13/20 1100 08/12/20 0853 08/11/20 1300       How much help from another person do you currently need...    Turning from your back to your side while in flat bed without using bedrails?  3  -AS  3  -BP  3  -LH    Moving from lying on back to sitting on the side of a flat bed without bedrails?  3  -AS  3  -BP  3  -LH    Moving to and from a bed to a chair (including a wheelchair)?  3  -AS  3  -BP  3  -LH    Standing up from a chair using your arms (e.g., wheelchair, bedside chair)?  3  -AS  3  -BP  3  -LH    Climbing 3-5 steps with a railing?  2  -AS  2  -BP  2  -LH    To walk in hospital room?  2  -AS  2  -BP  2  -LH    AM-PAC 6 Clicks Score (PT)  16  -AS  16  -BP  16  -LH       Functional Assessment    Outcome Measure Options  AM-PAC 6 Clicks Basic Mobility (PT)  -AS  AM-PAC 6 Clicks Basic Mobility (PT)  -BP  AM-PAC 6 Clicks Basic Mobility (PT)  -      User Key  (r) = Recorded By, (t) = Taken By, (c) = Cosigned By    Initials Name Provider Type     Teodora Sethi, PT Physical Therapist    BP Bryce Hodgson, PT Physical Therapist    AS Konstantin Izquierdo, PT Physical Therapist         Time Calculation:   PT Charges     Row Name 08/13/20 1159             Time Calculation    Start Time  0951  -AS      Stop Time  1008  -AS      Time Calculation (min)  17 min  -AS        User Key  (r) = Recorded By, (t) = Taken By, (c) = Cosigned By    Initials Name Provider Type    AS Konstantin Izquierdo, PT Physical Therapist        Therapy Charges for Today     Code Description Service Date Service Provider Modifiers Qty    [id number] HC GAIT TRAINING EA 15 MIN 8/13/2020 Konstantin Izquierdo, PT GP 1          PT G-Codes  Outcome Measure Options: AM-PAC 6 Clicks Basic Mobility (PT)  AM-PAC 6 Clicks Score (PT): 16    Konstantin Izquierdo, PT  8/13/2020

## 2020-08-13 NOTE — NURSING NOTE
Continued Stay Note  TEODORA OrellanaMcloud     Patient Name: Jared Govea  MRN: [id number]  Today's Date: 8/13/2020    Admit Date: 8/7/2020    Discharge Plan     Row Name 08/13/20 1104       Plan    Plan Comments  Darryl with [employer] with return call and order given and he states that he will let the respiratory therapist know to do that. CM will continue to follow for needs.    Row Name 08/13/20 1056       Plan    Plan Comments  Case management consult received from Dr. Gilmore to have DME supplier to increase tidal volume on patient's Trilogy to 400 AVAPS. Called and left  for Darryl Russo with [employer] of such with CM return phone number for any questions. CM will continue to follow for needs.    Row Name 08/13/20 7381       Plan    Plan Comments  Karen with [facility] with return phone call and states that patient's insurance is reqesting a peer to peer with the doctor. Dr. Peck made aware and is agreeable to do peer to peer. Provided phone number of [phone] with option 5 to be selected. He is also aware that the deadline for the peer to peer will is today by 1:30pm. CM will continue to follow for needs.    Row Name 08/13/20 0918       Plan    Plan  STR    Plan Comments  Followed up with Karen with [facility] in [place] to see if she has a decision on patient's authrization from insurance. Karen states they have not heard anything yet but will let me know as soon as they do. CM will continue to follow for needs.        Discharge Codes    No documentation.             Kerri Dutton, RN

## 2020-08-13 NOTE — PROGRESS NOTES
"      [facility]         Dr Galarza Sayied   LOS: 6 days   Patient Care Team:  Marilu Suresh APRN as PCP - General (Family Medicine)  Jhonny Hoyt MD as Consulting Physician (Pulmonary Disease)  Konstantin Orr MD as Consulting Physician (Nephrology)    Chief Complaint: Acute on chronic respiratory failure hypoxemic and hypercapnic with underlying history of COPD ALTA/OHS and volume overload    Interval History: On trilogy this morning.  Pretty much trilogy dependent.  Still feels she needs more pressure.  Uses trilogy as needed and with sleep  REVIEW OF SYSTEMS:   Shortness of breath with exertion.  No chest pain    Ventilator/Non-Invasive Ventilation Settings (From admission, onward)     Start     Ordered    08/08/20 1829  NIPPV (CPAP or BIPAP)  Until Discontinued     Question Answer Comment   Indication: Acute Respiratory Failure    Type: AVAPS-AE    NIPPV Mask Interface: Full Face Mask    Rate 20    VT (mL) 450    EPAP Min (cm H2O) 5    EPAP Max (cm H2O) 10    Max Pressure (cm H2O) 30    Pressure Support Min (cm H2O) 5    Pressure Support Max (cm H2O) 20    Other O2 bled in    Titrate for SPO2 Between    Titrate for SPO2 88% - 92%        08/08/20 1830    08/07/20 1833  NIPPV (CPAP or BIPAP)  Until Discontinued,   Status:  Canceled     Question Answer Comment   Type: BIPAP    NIPPV Mask Interface: Full Face Mask        08/07/20 1832            Trilogy with sleep and as needed      Vital Signs  Temp:  [97.1 °F (36.2 °C)-98 °F (36.7 °C)] 98 °F (36.7 °C)  Heart Rate:  [67-85] 72  Resp:  [18-24] 20  BP: (113-133)/(51-58) 128/56    Intake/Output Summary (Last 24 hours) at 8/13/2020 1024  Last data filed at 8/13/2020 0900  Gross per 24 hour   Intake 120 ml   Output 2350 ml   Net -2230 ml     Flowsheet Rows      First Filed Value   Admission Height  160 cm (63\") Documented at 08/07/2020 1425   Admission Weight  122 kg (268 lb) Documented at 08/07/2020 1425          Physical Exam:  Patient " is examined using the personal protective equipment as per guidelines from infection control for this particular patient as enacted.  Hand hygiene was performed before and after patient interaction.   General Appearance:    Alert, cooperative, in no acute distress.  Following simple commands  Neck midline trachea no thyromegaly   Lungs:    Diminished breath sounds rhonchi bilaterally on the basis    Heart:    Regular rhythm and normal rate, normal S1 and S2, no            murmur, no gallop, no rub, no click   Chest Wall:    No abnormalities observed   Abdomen:     Normal bowel sounds, no masses, no organomegaly, soft        non-tender, non-distended, no guarding, no rebound                tenderness   Extremities:   Moves all extremities well, 1-2+ edema, no cyanosis, no             redness  CNS no focal neurological deficits normal sensory exam  Skin no rashes no nodules  Musculoskeletal no cyanosis no clubbing normal range of motion     Results Review:        Results from last 7 days   Lab Units 08/13/20  0342 08/12/20  0326 08/11/20  0424   SODIUM mmol/L 140 141 139   POTASSIUM mmol/L 4.7 5.0 4.5   CHLORIDE mmol/L 92* 95* 95*   CO2 mmol/L 40.6* 40.6* 38.4*   BUN mg/dL 47* 50* 49*   CREATININE mg/dL 1.97* 2.24* 2.05*   GLUCOSE mg/dL 227* 272* 242*   CALCIUM mg/dL 9.0 8.9 8.6     Results from last 7 days   Lab Units 08/08/20  0540 08/07/20  1529   TROPONIN T ng/mL 0.040* 0.047*     Results from last 7 days   Lab Units 08/12/20  0326 08/10/20  0444 08/09/20  0430   WBC 10*3/mm3 6.94 5.21 4.80   HEMOGLOBIN g/dL 8.4* 8.0* 7.1*   HEMATOCRIT % 29.5* 27.9* 25.1*   PLATELETS 10*3/mm3 190 185 165                     Results from last 7 days   Lab Units 08/11/20  1055   PH, ARTERIAL pH units 7.344*   PO2 ART mm Hg 55.2*   PCO2, ARTERIAL mm Hg 78.8*   HCO3 ART mmol/L 42.8*       I reviewed the patient's new clinical results.  I personally viewed and interpreted the patient's CXR        Medication Review:     aspirin 81 mg  Oral Daily   atorvastatin 40 mg Oral Daily   bumetanide 2 mg Oral BID   enoxaparin 40 mg Subcutaneous Q12H   guaiFENesin 1,200 mg Oral Q12H   hydrALAZINE 50 mg Oral Q8H   hydrophor  Topical Q24H   insulin aspart 0-24 Units Subcutaneous TID AC   insulin detemir 70 Units Subcutaneous Nightly   levalbuterol 0.63 mg Nebulization Q6H - RT   levothyroxine 175 mcg Oral Daily   metoprolol tartrate 50 mg Oral Q12H   nystatin  Topical Q12H   QUEtiapine 100 mg Oral Q12H   sodium chloride 10 mL Intravenous Q12H   Fluticasone-Umeclidin-Vilant 1 puff Inhalation Daily - RT            ASSESSMENT:   Acute on chronic hypoxemic hypercapnic respiratory failure  Chronic hypoxemic respiratory failure  Acute on chronic diastolic CHF  Acute kidney injury  Chronic venous stasis/lymphedema  Hypertension  Diabetes mellitus  Morbid obesity  ALTA/OHS  COPD    PLAN:  Volume status difficult to .  Creatinine stable.  Diuresis per cardiology with close monitoring of creatinine.    Her ABG reflects partial compensation but with persistent respiratory acidosis.  I have asked respiratory therapist to see if her trilogy could be adjusted to higher tidal volume at 400.  She will contact the DME provider.  I will place an order for CCP also  Aggressive PT OT will help  Long-term weight loss will be beneficial  Bronchodilators  Blood glucose monitoring per ICU protocol  She is high risk for decompensating due to multiple cardiac and pulmonary comorbidities    Geovanni Gilmore MD  08/13/20  10:24

## 2020-08-13 NOTE — NURSING NOTE
"Continued Stay Note   Renya Meyer     Patient Name: Jared Govea  MRN: [id number]  Today's Date: 8/13/2020    Admit Date: 8/7/2020    Discharge Plan     Row Name 08/13/20 5493       Plan    Plan Comments  Call received from patient stating that she has just spoken with her sister and they are trying to figure out what they can do to keep her from going LTC. CM was granted permission to call patient's sister Sania to further discuss discharge needs. Informed Sania that her sister was once again denied short term rehab by her insurance. Sania states what does that mean if she doesn't go there she will never get stronger to help care for herself. CM discussed with sister that insurance feels that patient is at her baseline and that short term rehab is not going to benefit her. CM also explained to Sania that patient is able to do exactly what she was doing at home prior to her hospitalization and asked if that was enough for her to return back to her house and she states \"she needs to be willing to do more\". Sania also states that patient has an ANAT she wants to go to her grandchildren at her death and has a long term check and her SS check she doesn't want to give up. CM explained that if patient went to LTC that money would be use to give her the care she needs and deserves. Sania states she will have to speak with patient's daughter Carmelita to figure this out and call back with an answer.  spoke with patient again and updated her that her sister and daughter was going to discuss this issue and try to come up with a safe discharge plan and she is agreeable. CM will continue to follow for needs.    Row Name 08/13/20 5545       Plan    Plan  Undecided    Plan Comments  Dr. Peck did peer to peer and patient's insurance is not going to cover her short term rehab. Karen with [facility] call to states the same. CM ask Karen if she could come medicaid pending if patient is agreeable and she states yes but the patient " "will need to understand that she would have to reliquish all assets and her montly check. Karen states that patient would get to keep $40.00 dollars a month. Called and spoke with Magalys at [facility] regarding patient coming there for palliative and she states that her insurance does not pay for palliative treatment. Into room and discussed with patient the fact that her insurance has one again denied paying for short term rehab. Patient is very upset and states \"well I guess I will just go back home\". Informed patient that I had spoken with her sister when she came in and she stated that she could no longer take care of her and would not be able to return unless you were stronger and could do more things for yourself. Patient is very upset and states \"well I'm just going to call my sister to find out and I would like to do it privately\".  CM left room and will continue to follow for needs.        Discharge Codes    No documentation.             Kerri Dutton RN    "

## 2020-08-13 NOTE — PLAN OF CARE
Problem: Patient Care Overview  Goal: Plan of Care Review  Flowsheets (Taken 8/13/2020 1155)  Progress: no change  Plan of Care Reviewed With: patient  Outcome Summary: Patient sitting in chair upon entry to room. She is willing to work with PT this morning. Patient performs sit to stand transfer with Min A. She ambulated 4 steps and asked to return to her shair so she did requiring Min A. Patient did report fatigue during gait and O2 sat dropped to 78% and recovered to 88% within 5 minutes.

## 2020-08-13 NOTE — PROGRESS NOTES
[facility] Renal Progress Note    No soa but still desats with any exertion, no cp n/v or diarrhea        Current Facility-Administered Medications:   •  acetaminophen (TYLENOL) tablet 650 mg, 650 mg, Oral, Q4H PRN, 650 mg at 08/09/20 2027 **OR** acetaminophen (TYLENOL) suppository 650 mg, 650 mg, Rectal, Q4H PRN, Payam Peck MD  •  albuterol (PROVENTIL) nebulizer solution 0.083% 2.5 mg/3mL, 2.5 mg, Nebulization, Q6H PRN, Payam Peck MD  •  aspirin EC tablet 81 mg, 81 mg, Oral, Daily, Payam Peck MD, 81 mg at 08/13/20 0855  •  atorvastatin (LIPITOR) tablet 40 mg, 40 mg, Oral, Daily, Payam Peck MD, 40 mg at 08/13/20 0855  •  bumetanide (BUMEX) tablet 2 mg, 2 mg, Oral, BID, Payam Pcek MD, 2 mg at 08/13/20 0855  •  dextrose (D50W) 25 g/ 50mL Intravenous Solution 25 g, 25 g, Intravenous, Q15 Min PRN, Payam Peck MD  •  dextrose (GLUTOSE) oral gel 15 g, 15 g, Oral, Q15 Min PRN, Payam Peck MD  •  enoxaparin (LOVENOX) syringe 40 mg, 40 mg, Subcutaneous, Q12H, Payam Peck MD, 40 mg at 08/13/20 0845  •  glucagon (human recombinant) (GLUCAGEN DIAGNOSTIC) injection 1 mg, 1 mg, Subcutaneous, Q15 Min PRN, Payam Peck MD  •  guaiFENesin (MUCINEX) 12 hr tablet 1,200 mg, 1,200 mg, Oral, Q12H, Payam Peck MD, 1,200 mg at 08/13/20 0855  •  hydrALAZINE (APRESOLINE) tablet 50 mg, 50 mg, Oral, Q8H, Payam Peck MD, 50 mg at 08/13/20 1343  •  HYDROcodone-acetaminophen (NORCO) 5-325 MG per tablet 1 tablet, 1 tablet, Oral, Q6H PRN, Payam Peck MD, 1 tablet at 08/13/20 0854  •  hydrophor (AQUAPHOR) ointment, , Topical, Q24H, Payam Peck MD  •  insulin aspart (novoLOG) injection 0-24 Units, 0-24 Units, Subcutaneous, TID AC, Payam Peck MD, 12 Units at 08/13/20 1222  •  insulin detemir (LEVEMIR) injection 70 Units, 70 Units, Subcutaneous, Nightly, Payam Peck MD, 70 Units at 08/12/20 2218  •  levalbuterol (XOPENEX) nebulizer solution 0.63 mg, 0.63 mg, Nebulization, Q6H - RT, Payam Peck  MD ARCELIA, 0.63 mg at 08/13/20 1333  •  levothyroxine (SYNTHROID, LEVOTHROID) tablet 175 mcg, 175 mcg, Oral, Daily, Payam Peck MD, 175 mcg at 08/13/20 0855  •  metoprolol tartrate (LOPRESSOR) tablet 50 mg, 50 mg, Oral, Q12H, Payam Peck MD, 50 mg at 08/13/20 1222  •  nystatin (MYCOSTATIN) powder, , Topical, Q12H, Paola Silva APRN  •  ondansetron (ZOFRAN) tablet 4 mg, 4 mg, Oral, Q6H PRN **OR** ondansetron (ZOFRAN) injection 4 mg, 4 mg, Intravenous, Q6H PRN, Payam Peck MD, 4 mg at 08/13/20 0854  •  QUEtiapine (SEROquel) tablet 100 mg, 100 mg, Oral, Q12H, Payam Peck MD, 100 mg at 08/13/20 0855  •  sennosides-docusate (PERICOLACE) 8.6-50 MG per tablet 2 tablet, 2 tablet, Oral, BID PRN, Payam Peck MD  •  sodium chloride 0.9 % flush 10 mL, 10 mL, Intravenous, PRN, Payam Peck MD  •  sodium chloride 0.9 % flush 10 mL, 10 mL, Intravenous, Q12H, Payam Peck MD, 10 mL at 08/13/20 0856  •  sodium chloride 0.9 % infusion 40 mL, 40 mL, Intravenous, PRN, Payam Peck MD, 40 mL at 08/09/20 1315  •  TRELEGY (Fluticasone-Umeclidin-Vilant)100-62.5-25 MCG/INH IN, 1 puff, Inhalation, Daily - RT, Payam Peck MD, 1 puff at 08/13/20 0947          Temp:  [97.1 °F (36.2 °C)-98 °F (36.7 °C)] 98 °F (36.7 °C)  Heart Rate:  [69-85] 72  Resp:  [18-24] 18  BP: (101-133)/(44-58) 101/44  I/O last 3 completed shifts:  In: -   Out: 3425 [Urine:3425]  I/O this shift:  In: 120 [P.O.:120]  Out: 950 [Urine:950]    Physical Exam   Constitutional: She is oriented to person, place, and time. She appears well-developed and well-nourished. No distress.   HENT:   Head: Normocephalic and atraumatic.   Eyes: Pupils are equal, round, and reactive to light. No scleral icterus.   Neck: Neck supple. No JVD present.   Cardiovascular: Normal rate and intact distal pulses. Exam reveals no friction rub.   Pulmonary/Chest: Effort normal and breath sounds normal. She has no rales.   Abdominal: Soft. Bowel sounds are normal.      Musculoskeletal: She exhibits edema.   Neurological: She is alert and oriented to person, place, and time.   Skin: Skin is warm and dry. She is not diaphoretic.   Nursing note and vitals reviewed.    Lab Results (last 24 hours)     Procedure Component Value Units Date/Time    COVID PRE-OP / PRE-PROCEDURE SCREENING ORDER (NO ISOLATION) - Swab, Nasopharynx [[id number]] Collected:  08/13/20 1222    Specimen:  Swab from Nasopharynx Updated:  08/13/20 1246    Narrative:       The following orders were created for panel order COVID PRE-OP / PRE-PROCEDURE SCREENING ORDER (NO ISOLATION) - Swab, Nasopharynx.  Procedure                               Abnormality         Status                     ---------                               -----------         ------                     COVID-19,[facility], NP ...[[id number]]                      In process                   Please view results for these tests on the individual orders.    COVID-19,[facility], NP SWAB IN [facility] VIRAL TRANSPORT MEDIA 24-30 HR TAT - Swab, Nasopharynx [[id number]] Collected:  08/13/20 1222    Specimen:  Swab from Nasopharynx Updated:  08/13/20 1246    POC Glucose Once [[id number]]  (Abnormal) Collected:  08/13/20 1141    Specimen:  Blood Updated:  08/13/20 1156     Glucose 268 mg/dL     POC Glucose Once [[id number]]  (Abnormal) Collected:  08/13/20 0753    Specimen:  Blood Updated:  08/13/20 0759     Glucose 182 mg/dL     Basic Metabolic Panel [[id number]]  (Abnormal) Collected:  08/13/20 0342    Specimen:  Blood Updated:  08/13/20 0501     Glucose 227 mg/dL      BUN 47 mg/dL      Creatinine 1.97 mg/dL      Sodium 140 mmol/L      Potassium 4.7 mmol/L      Chloride 92 mmol/L      CO2 40.6 mmol/L      Calcium 9.0 mg/dL      eGFR Non African Amer 25 mL/min/1.73      BUN/Creatinine Ratio 23.9     Anion Gap 7.4 mmol/L     Narrative:       GFR Normal >60  Chronic Kidney Disease <60  Kidney Failure <15      POC Glucose Once [[id number]]  (Abnormal) Collected:   08/12/20 2205    Specimen:  Blood Updated:  08/12/20 2211     Glucose 264 mg/dL     POC Glucose Once [[id number]]  (Normal) Collected:  08/12/20 1720    Specimen:  Blood Updated:  08/12/20 1726     Glucose 125 mg/dL             Acute on chronic respiratory failure with hypoxia and hypercapnia (CMS/HCC)    1. olayinka-creatinine a bit lower, closer to chronic baseline, may rise with diuresis and if so may have to back off on bumex but not yet  2. Hypervolemia-continue diuresis, add 1 dose zaroxolyn today  3. Anemia of ckd-stable  4. Chronic respiratory failure-continue abx and diuretics

## 2020-08-13 NOTE — NURSING NOTE
Continued Stay Note  [facility]     Patient Name: Jared Govea  MRN: [id number]  Today's Date: 8/13/2020    Admit Date: 8/7/2020    Discharge Plan     Row Name 08/13/20 105       Plan    Plan Comments  Case management consult received from Dr. Gilmore to have DME supplier to increase tidal volume on patient's Trilogy to 400 AVAPS. Called and left  for Darryl Russo with [employer] of such with CM return phone number for any questions. CM will continue to follow for needs.    Row Name 08/13/20 3886       Plan    Plan Comments  Karen with [facility] with return phone call and states that patient's insurance is reqesting a peer to peer with the doctor. Dr. Peck made aware and is agreeable to do peer to peer. Provided phone number of [phone] with option 5 to be selected. He is also aware that the deadline for the peer to peer will is today by 1:30pm. CM will continue to follow for needs.    Row Name 08/13/20 0918       Plan    Plan  STR    Plan Comments  Followed up with Karen with [facility] in [place] to see if she has a decision on patient's authrization from insurance. Karen states they have not heard anything yet but will let me know as soon as they do. CM will continue to follow for needs.        Discharge Codes    No documentation.             Kerri Dutton, RN

## 2020-08-13 NOTE — NURSING NOTE
Continued Stay Note  TEODORA Dobbs     Patient Name: Jared Govea  MRN: [id number]  Today's Date: 8/13/2020    Admit Date: 8/7/2020    Discharge Plan     Row Name 08/13/20 0918       Plan    Plan  STR    Plan Comments  Followed up with Karen with [facility] in [place] to see if she has a decision on patient's authorization from insurance. Karen states they have not heard anything yet but will let me know as soon as they do. CM will continue to follow for needs.        Discharge Codes    No documentation.             Kerri Dutton, RN

## 2020-08-13 NOTE — PROGRESS NOTES
"Hospitalist Team      Patient Care Team:  Marilu Suresh APRN as PCP - General (Family Medicine)  Jhonny Hoyt MD as Consulting Physician (Pulmonary Disease)  Konstantin Orr MD as Consulting Physician (Nephrology)        Chief Complaint:  Follow-up A/C H/H Resp Failure    Subjective    Feels better this morning.  Feels stronger.  Breathing about the same.  She was a little nauseated so didn't eat much breakfast.  Denies chest pain.  Staff report she is getting up to the bedside commode OK.    Objective    Vital Signs  Temp:  [97.1 °F (36.2 °C)-98 °F (36.7 °C)] 98 °F (36.7 °C)  Heart Rate:  [67-85] 72  Resp:  [18-24] 20  BP: (113-133)/(51-58) 128/56  Oxygen Therapy  SpO2: 93 %  Pulse Oximetry Type: Continuous  Device (Oxygen Therapy): nasal cannula  Device (Oxygen Therapy): nasal cannula  Flow (L/min): 4  Oximetry Probe Site Changed: Yes}    Flowsheet Rows      First Filed Value   Admission Height  160 cm (63\") Documented at 08/07/2020 1425   Admission Weight  122 kg (268 lb) Documented at 08/07/2020 1425          Physical Exam:    General: No acute distress.  Lungs: Crackles noted at the Left base.  Otherwise, diminished.  No accessory use.   CV: Regular rate and rhythm.  Radial pulses are 2+ and symmetric.  Abdomen: Morbidly obese, soft, and non-tender w/ active bowel sounds.  MSK: No C/C.  BLE wrapped.  Soft.  Neuro: CN II-XII grossly intact.  Psych: Normal affect.  Oriented x3.    Results Review:     I reviewed the patient's new clinical results.    Lab Results (last 24 hours)     Procedure Component Value Units Date/Time    POC Glucose Once [[id number]]  (Abnormal) Collected:  08/13/20 0753    Specimen:  Blood Updated:  08/13/20 0759     Glucose 182 mg/dL     Basic Metabolic Panel [[id number]]  (Abnormal) Collected:  08/13/20 0342    Specimen:  Blood Updated:  08/13/20 0501     Glucose 227 mg/dL      BUN 47 mg/dL      Creatinine 1.97 mg/dL      Sodium 140 mmol/L      Potassium 4.7 mmol/L      " Chloride 92 mmol/L      CO2 40.6 mmol/L      Calcium 9.0 mg/dL      eGFR Non African Amer 25 mL/min/1.73      BUN/Creatinine Ratio 23.9     Anion Gap 7.4 mmol/L     Narrative:       GFR Normal >60  Chronic Kidney Disease <60  Kidney Failure <15      POC Glucose Once [[id number]]  (Abnormal) Collected:  08/12/20 2205    Specimen:  Blood Updated:  08/12/20 2211     Glucose 264 mg/dL     POC Glucose Once [[id number]]  (Normal) Collected:  08/12/20 1720    Specimen:  Blood Updated:  08/12/20 1726     Glucose 125 mg/dL     POC Glucose Once [[id number]]  (Abnormal) Collected:  08/12/20 1105    Specimen:  Blood Updated:  08/12/20 1112     Glucose 218 mg/dL           Imaging Results (Last 24 Hours)     ** No results found for the last 24 hours. **            Medication Review:   I have reviewed the patient's current medication list    Current Facility-Administered Medications:   •  acetaminophen (TYLENOL) tablet 650 mg, 650 mg, Oral, Q4H PRN, 650 mg at 08/09/20 2027 **OR** acetaminophen (TYLENOL) suppository 650 mg, 650 mg, Rectal, Q4H PRN, Payam Peck MD  •  albuterol (PROVENTIL) nebulizer solution 0.083% 2.5 mg/3mL, 2.5 mg, Nebulization, Q6H PRN, Payam Peck MD  •  aspirin EC tablet 81 mg, 81 mg, Oral, Daily, Payam Peck MD, 81 mg at 08/13/20 0855  •  atorvastatin (LIPITOR) tablet 40 mg, 40 mg, Oral, Daily, Payam Peck MD, 40 mg at 08/13/20 0855  •  bumetanide (BUMEX) tablet 2 mg, 2 mg, Oral, BID, Payam Peck MD, 2 mg at 08/13/20 0855  •  dextrose (D50W) 25 g/ 50mL Intravenous Solution 25 g, 25 g, Intravenous, Q15 Min PRN, Payam Peck MD  •  dextrose (GLUTOSE) oral gel 15 g, 15 g, Oral, Q15 Min PRN, Payam Peck MD  •  enoxaparin (LOVENOX) syringe 40 mg, 40 mg, Subcutaneous, Q12H, Payam Peck MD, 40 mg at 08/13/20 0845  •  glucagon (human recombinant) (GLUCAGEN DIAGNOSTIC) injection 1 mg, 1 mg, Subcutaneous, Q15 Min PRN, Payam Peck MD  •  guaiFENesin (MUCINEX) 12 hr tablet 1,200 mg, 1,200  mg, Oral, Q12H, Payam Peck MD, 1,200 mg at 08/13/20 0855  •  hydrALAZINE (APRESOLINE) tablet 50 mg, 50 mg, Oral, Q8H, Payam Peck MD, 50 mg at 08/13/20 0745  •  HYDROcodone-acetaminophen (NORCO) 5-325 MG per tablet 1 tablet, 1 tablet, Oral, Q6H PRN, Payam Peck MD, 1 tablet at 08/13/20 0854  •  hydrophor (AQUAPHOR) ointment, , Topical, Q24H, Payam Peck MD  •  insulin aspart (novoLOG) injection 0-24 Units, 0-24 Units, Subcutaneous, TID AC, Payam Peck MD, 4 Units at 08/13/20 0845  •  insulin detemir (LEVEMIR) injection 70 Units, 70 Units, Subcutaneous, Nightly, Payam Peck MD, 70 Units at 08/12/20 2218  •  levalbuterol (XOPENEX) nebulizer solution 0.63 mg, 0.63 mg, Nebulization, Q6H - RT, Payam Peck MD, 0.63 mg at 08/13/20 0706  •  levothyroxine (SYNTHROID, LEVOTHROID) tablet 175 mcg, 175 mcg, Oral, Daily, Payam Peck MD, 175 mcg at 08/13/20 0855  •  metoprolol tartrate (LOPRESSOR) tablet 50 mg, 50 mg, Oral, Q12H, Payam Peck MD, 50 mg at 08/13/20 0032  •  nystatin (MYCOSTATIN) powder, , Topical, Q12H, Paola Silva, APRN  •  ondansetron (ZOFRAN) tablet 4 mg, 4 mg, Oral, Q6H PRN **OR** ondansetron (ZOFRAN) injection 4 mg, 4 mg, Intravenous, Q6H PRN, Payam Peck MD, 4 mg at 08/13/20 0854  •  QUEtiapine (SEROquel) tablet 100 mg, 100 mg, Oral, Q12H, Payam Peck MD, 100 mg at 08/13/20 0855  •  sennosides-docusate (PERICOLACE) 8.6-50 MG per tablet 2 tablet, 2 tablet, Oral, BID PRN, Payam Peck MD  •  sodium chloride 0.9 % flush 10 mL, 10 mL, Intravenous, PRN, Payam Peck MD  •  sodium chloride 0.9 % flush 10 mL, 10 mL, Intravenous, Q12H, Payam Peck MD, 10 mL at 08/13/20 0856  •  sodium chloride 0.9 % infusion 40 mL, 40 mL, Intravenous, PRN, Payam Peck MD, 40 mL at 08/09/20 1315  •  TRELEGY (Fluticasone-Umeclidin-Vilant)100-62.5-25 MCG/INH IN, 1 puff, Inhalation, Daily - RT, Payam Peck MD, 1 puff at 08/13/20 0947      Assessment/Plan     1. A/C H/H  Resp Failure: Still mostly Trilogy dependent.  Dr. Zaidi discussed calling her company to increase her TV.  Still de-saturates w/ activity and taking some time to recover.  Still sounds wet on exam.    2.  Volume Overload: Remains on Bumex.  Nephrology following.    3.  LADY on CKDIII-IV: Creatinine improved.  Nephrology following.    4.  Diabetes Mellitus, Type 2 in Obese:  Bedsides not at goal.  I'm going to put her back on her full basal dose and increase SSI dose.    5.  COPD: As in #1.  Continue inhaler regimen.    6.  HTN: At goal on exam.  Continue current regimen.  Would not resume Norvasc.    7.  Stage II Pressure Ulcer Left Buttock, POA: Wound care following.    8.  Anemia: Multifactorial.    9.  Hypothyroidism: Continue replacement dose.    Plan for disposition: Declined for S/T Rehab.  Palliative Care?  She recognizes she can't go back with her sister.    Payam Peck MD  08/13/20  10:23

## 2020-08-13 NOTE — PLAN OF CARE
Problem: Patient Care Overview  Goal: Plan of Care Review  Outcome: Ongoing (interventions implemented as appropriate)  Flowsheets (Taken 8/13/2020 0553)  Progress: improving  Plan of Care Reviewed With: patient  Outcome Summary: Patient given bath and washed hair this shift. Tolerated well. Alert and oriented. No complaints of pain or discomfort. Rested well throughout shift. Voiding without difficulty. Telemetry showing sinus rhythm.  Goal: Individualization and Mutuality  Outcome: Ongoing (interventions implemented as appropriate)  Goal: Discharge Needs Assessment  Outcome: Ongoing (interventions implemented as appropriate)  Goal: Interprofessional Rounds/Family Conf  Outcome: Ongoing (interventions implemented as appropriate)     Problem: Fall Risk (Adult)  Goal: Absence of Fall  Outcome: Ongoing (interventions implemented as appropriate)  Flowsheets (Taken 8/13/2020 0553)  Absence of Fall: making progress toward outcome     Problem: Skin Injury Risk (Adult)  Goal: Skin Health and Integrity  Outcome: Ongoing (interventions implemented as appropriate)  Flowsheets (Taken 8/13/2020 0553)  Skin Health and Integrity: making progress toward outcome     Problem: Breathing Pattern Ineffective (Adult)  Goal: Effective Oxygenation/Ventilation  Outcome: Ongoing (interventions implemented as appropriate)  Flowsheets (Taken 8/13/2020 0553)  Effective Oxygenation/Ventilation: making progress toward outcome  Goal: Anxiety/Fear Reduction  Outcome: Ongoing (interventions implemented as appropriate)  Flowsheets (Taken 8/13/2020 0553)  Anxiety/Fear Reduction: making progress toward outcome     Problem: NPPV/CPAP (Adult)  Goal: Signs and Symptoms of Listed Potential Problems Will be Absent, Minimized or Managed (NPPV/CPAP)  Outcome: Ongoing (interventions implemented as appropriate)  Flowsheets (Taken 8/13/2020 0553)  Problems Assessed (NPPV/CPAP): all  Problems Present (NPPV/CPAP): hypoxia/hypoxemia; skin breakdown

## 2020-08-13 NOTE — PLAN OF CARE
Problem: Patient Care Overview  Goal: Plan of Care Review  Outcome: Ongoing (interventions implemented as appropriate)  Flowsheets (Taken 8/13/2020 0200)  Progress: improving  Plan of Care Reviewed With: patient     Problem: Patient Care Overview  Goal: Individualization and Mutuality  Outcome: Ongoing (interventions implemented as appropriate)     Problem: Breathing Pattern Ineffective (Adult)  Intervention: Optimize Oxygenation/Ventilation/Perfusion  Flowsheets (Taken 8/13/2020 0200)  Head of Bed (HOB): other (see comments) (up in chair)  Airway/Ventilation Management: airway patency maintained;pulmonary hygiene promoted  Breathing Techniques/Airway Clearance: deep/controlled cough encouraged

## 2020-08-14 VITALS
OXYGEN SATURATION: 90 % | TEMPERATURE: 97.9 F | DIASTOLIC BLOOD PRESSURE: 82 MMHG | WEIGHT: 249.4 LBS | RESPIRATION RATE: 20 BRPM | HEART RATE: 80 BPM | BODY MASS INDEX: 40.08 KG/M2 | HEIGHT: 66 IN | SYSTOLIC BLOOD PRESSURE: 133 MMHG

## 2020-08-14 LAB
ANION GAP SERPL CALCULATED.3IONS-SCNC: 9.3 MMOL/L (ref 5–15)
BASOPHILS # BLD AUTO: 0.02 10*3/MM3 (ref 0–0.2)
BASOPHILS NFR BLD AUTO: 0.3 % (ref 0–1.5)
BUN SERPL-MCNC: 48 MG/DL (ref 8–23)
BUN/CREAT SERPL: 24.6 (ref 7–25)
CALCIUM SPEC-SCNC: 8.8 MG/DL (ref 8.6–10.5)
CHLORIDE SERPL-SCNC: 87 MMOL/L (ref 98–107)
CO2 SERPL-SCNC: 41.7 MMOL/L (ref 22–29)
CREAT SERPL-MCNC: 1.95 MG/DL (ref 0.57–1)
DEPRECATED RDW RBC AUTO: 49.9 FL (ref 37–54)
EOSINOPHIL # BLD AUTO: 0.29 10*3/MM3 (ref 0–0.4)
EOSINOPHIL NFR BLD AUTO: 4.5 % (ref 0.3–6.2)
ERYTHROCYTE [DISTWIDTH] IN BLOOD BY AUTOMATED COUNT: 14.4 % (ref 12.3–15.4)
GFR SERPL CREATININE-BSD FRML MDRD: 26 ML/MIN/1.73
GLUCOSE BLDC GLUCOMTR-MCNC: 117 MG/DL (ref 70–130)
GLUCOSE BLDC GLUCOMTR-MCNC: 275 MG/DL (ref 70–130)
GLUCOSE BLDC GLUCOMTR-MCNC: 346 MG/DL (ref 70–130)
GLUCOSE SERPL-MCNC: 328 MG/DL (ref 65–99)
HCT VFR BLD AUTO: 30.5 % (ref 34–46.6)
HGB BLD-MCNC: 8.6 G/DL (ref 12–15.9)
IMM GRANULOCYTES # BLD AUTO: 0.03 10*3/MM3 (ref 0–0.05)
IMM GRANULOCYTES NFR BLD AUTO: 0.5 % (ref 0–0.5)
LYMPHOCYTES # BLD AUTO: 1.65 10*3/MM3 (ref 0.7–3.1)
LYMPHOCYTES NFR BLD AUTO: 25.4 % (ref 19.6–45.3)
MCH RBC QN AUTO: 27 PG (ref 26.6–33)
MCHC RBC AUTO-ENTMCNC: 28.2 G/DL (ref 31.5–35.7)
MCV RBC AUTO: 95.9 FL (ref 79–97)
MONOCYTES # BLD AUTO: 0.45 10*3/MM3 (ref 0.1–0.9)
MONOCYTES NFR BLD AUTO: 6.9 % (ref 5–12)
NEUTROPHILS NFR BLD AUTO: 4.05 10*3/MM3 (ref 1.7–7)
NEUTROPHILS NFR BLD AUTO: 62.4 % (ref 42.7–76)
NRBC BLD AUTO-RTO: 0 /100 WBC (ref 0–0.2)
PLATELET # BLD AUTO: 181 10*3/MM3 (ref 140–450)
PMV BLD AUTO: 10.7 FL (ref 6–12)
POTASSIUM SERPL-SCNC: 5.2 MMOL/L (ref 3.5–5.2)
RBC # BLD AUTO: 3.18 10*6/MM3 (ref 3.77–5.28)
REF LAB TEST METHOD: NORMAL
SARS-COV-2 RNA RESP QL NAA+PROBE: NOT DETECTED
SODIUM SERPL-SCNC: 138 MMOL/L (ref 136–145)
WBC # BLD AUTO: 6.49 10*3/MM3 (ref 3.4–10.8)

## 2020-08-14 PROCEDURE — 94799 UNLISTED PULMONARY SVC/PX: CPT

## 2020-08-14 PROCEDURE — 63710000001 LEVALBUTEROL PER 0.5 MG: Performed by: HOSPITALIST

## 2020-08-14 PROCEDURE — 25010000002 ENOXAPARIN PER 10 MG: Performed by: HOSPITALIST

## 2020-08-14 PROCEDURE — 97110 THERAPEUTIC EXERCISES: CPT

## 2020-08-14 PROCEDURE — 80048 BASIC METABOLIC PNL TOTAL CA: CPT | Performed by: INTERNAL MEDICINE

## 2020-08-14 PROCEDURE — 25010000002 FUROSEMIDE PER 20 MG: Performed by: INTERNAL MEDICINE

## 2020-08-14 PROCEDURE — 82962 GLUCOSE BLOOD TEST: CPT

## 2020-08-14 PROCEDURE — 99238 HOSP IP/OBS DSCHRG MGMT 30/<: CPT | Performed by: HOSPITALIST

## 2020-08-14 PROCEDURE — 85025 COMPLETE CBC W/AUTO DIFF WBC: CPT | Performed by: HOSPITALIST

## 2020-08-14 PROCEDURE — 63710000001 INSULIN ASPART PER 5 UNITS: Performed by: HOSPITALIST

## 2020-08-14 RX ORDER — METOPROLOL TARTRATE 50 MG/1
50 TABLET, FILM COATED ORAL EVERY 12 HOURS
Start: 2020-08-15

## 2020-08-14 RX ORDER — SACCHAROMYCES BOULARDII 250 MG
250 CAPSULE ORAL 2 TIMES DAILY
Start: 2020-08-14

## 2020-08-14 RX ORDER — HYDRALAZINE HYDROCHLORIDE 50 MG/1
50 TABLET, FILM COATED ORAL EVERY 8 HOURS SCHEDULED
Start: 2020-08-14

## 2020-08-14 RX ORDER — METOLAZONE 2.5 MG/1
10 TABLET ORAL ONCE
Status: COMPLETED | OUTPATIENT
Start: 2020-08-14 | End: 2020-08-14

## 2020-08-14 RX ORDER — M-VIT,TX,IRON,MINS/CALC/FOLIC 27MG-0.4MG
1 TABLET ORAL DAILY
Qty: 30 TABLET | Refills: 11 | Status: SHIPPED | OUTPATIENT
Start: 2020-08-14 | End: 2021-08-14

## 2020-08-14 RX ORDER — HYDROCODONE BITARTRATE AND ACETAMINOPHEN 5; 325 MG/1; MG/1
1 TABLET ORAL EVERY 8 HOURS PRN
Qty: 30 TABLET | Refills: 0 | Status: SHIPPED | OUTPATIENT
Start: 2020-08-14 | End: 2020-08-24

## 2020-08-14 RX ORDER — ACETAMINOPHEN 325 MG/1
650 TABLET ORAL EVERY 4 HOURS PRN
Start: 2020-08-14

## 2020-08-14 RX ORDER — FUROSEMIDE 10 MG/ML
80 INJECTION INTRAMUSCULAR; INTRAVENOUS ONCE
Status: COMPLETED | OUTPATIENT
Start: 2020-08-14 | End: 2020-08-14

## 2020-08-14 RX ORDER — NYSTATIN 100000 [USP'U]/G
POWDER TOPICAL EVERY 12 HOURS SCHEDULED
Start: 2020-08-14

## 2020-08-14 RX ORDER — QUETIAPINE FUMARATE 100 MG/1
100 TABLET, FILM COATED ORAL EVERY 12 HOURS SCHEDULED
Start: 2020-08-14

## 2020-08-14 RX ORDER — ONDANSETRON 4 MG/1
4 TABLET, FILM COATED ORAL EVERY 6 HOURS PRN
Start: 2020-08-14

## 2020-08-14 RX ORDER — ECHINACEA PURPUREA EXTRACT 125 MG
2 TABLET ORAL AS NEEDED
Status: DISCONTINUED | OUTPATIENT
Start: 2020-08-14 | End: 2020-08-14 | Stop reason: HOSPADM

## 2020-08-14 RX ORDER — PETROLATUM 42 G/100G
OINTMENT TOPICAL
Start: 2020-08-15

## 2020-08-14 RX ORDER — NICOTINE POLACRILEX 4 MG
15 LOZENGE BUCCAL
Start: 2020-08-14

## 2020-08-14 RX ADMIN — BUMETANIDE 2 MG: 1 TABLET ORAL at 09:11

## 2020-08-14 RX ADMIN — SODIUM CHLORIDE, PRESERVATIVE FREE 10 ML: 5 INJECTION INTRAVENOUS at 09:13

## 2020-08-14 RX ADMIN — QUETIAPINE FUMARATE 100 MG: 100 TABLET ORAL at 09:12

## 2020-08-14 RX ADMIN — ASPIRIN 81 MG: 81 TABLET, COATED ORAL at 09:11

## 2020-08-14 RX ADMIN — ATORVASTATIN CALCIUM 40 MG: 40 TABLET, FILM COATED ORAL at 09:12

## 2020-08-14 RX ADMIN — LEVALBUTEROL HYDROCHLORIDE 0.63 MG: 0.63 SOLUTION RESPIRATORY (INHALATION) at 13:53

## 2020-08-14 RX ADMIN — LEVALBUTEROL HYDROCHLORIDE 0.63 MG: 0.63 SOLUTION RESPIRATORY (INHALATION) at 01:04

## 2020-08-14 RX ADMIN — FUROSEMIDE 80 MG: 10 INJECTION, SOLUTION INTRAMUSCULAR; INTRAVENOUS at 09:12

## 2020-08-14 RX ADMIN — METOPROLOL TARTRATE 50 MG: 50 TABLET, FILM COATED ORAL at 00:35

## 2020-08-14 RX ADMIN — METOLAZONE 10 MG: 2.5 TABLET ORAL at 09:15

## 2020-08-14 RX ADMIN — HYDROCODONE BITARTRATE AND ACETAMINOPHEN 1 TABLET: 5; 325 TABLET ORAL at 09:11

## 2020-08-14 RX ADMIN — HYDRALAZINE HYDROCHLORIDE 50 MG: 50 TABLET, FILM COATED ORAL at 13:26

## 2020-08-14 RX ADMIN — PETROLATUM: 42 OINTMENT TOPICAL at 12:34

## 2020-08-14 RX ADMIN — LEVOTHYROXINE SODIUM 175 MCG: 150 TABLET ORAL at 09:12

## 2020-08-14 RX ADMIN — FLUTICASONE FUROATE, UMECLIDINIUM BROMIDE AND VILANTEROL TRIFENATATE 1 PUFF: 100; 62.5; 25 POWDER RESPIRATORY (INHALATION) at 09:50

## 2020-08-14 RX ADMIN — METOPROLOL TARTRATE 50 MG: 50 TABLET, FILM COATED ORAL at 12:33

## 2020-08-14 RX ADMIN — HYDROCODONE BITARTRATE AND ACETAMINOPHEN 1 TABLET: 5; 325 TABLET ORAL at 01:35

## 2020-08-14 RX ADMIN — NYSTATIN: 100000 POWDER TOPICAL at 09:14

## 2020-08-14 RX ADMIN — INSULIN ASPART 16 UNITS: 100 INJECTION, SOLUTION INTRAVENOUS; SUBCUTANEOUS at 12:33

## 2020-08-14 RX ADMIN — BUMETANIDE 2 MG: 1 TABLET ORAL at 16:22

## 2020-08-14 RX ADMIN — LEVALBUTEROL HYDROCHLORIDE 0.63 MG: 0.63 SOLUTION RESPIRATORY (INHALATION) at 07:05

## 2020-08-14 RX ADMIN — INSULIN ASPART 12 UNITS: 100 INJECTION, SOLUTION INTRAVENOUS; SUBCUTANEOUS at 09:10

## 2020-08-14 RX ADMIN — HYDROCODONE BITARTRATE AND ACETAMINOPHEN 1 TABLET: 5; 325 TABLET ORAL at 16:22

## 2020-08-14 RX ADMIN — ENOXAPARIN SODIUM 40 MG: 40 INJECTION SUBCUTANEOUS at 09:10

## 2020-08-14 RX ADMIN — GUAIFENESIN 1200 MG: 600 TABLET, EXTENDED RELEASE ORAL at 09:10

## 2020-08-14 NOTE — PLAN OF CARE
Problem: Patient Care Overview  Goal: Plan of Care Review  Flowsheets (Taken 8/14/2020 1200)  Outcome Summary: PT: Patient performs sit to stand transfer with SBA and gait 2x10 feet with rolling walker, SBA on 5 liters O2.  Patient requires seated rest break due to fatigue.  O2 sats decreased to 81% and 70% on subsequent gait trials, requiring significant time recovery.  Patient is likely nearing baseline level of function, plan to see x1-2 additional visits.  patient remains limited by O2 sats with activity.

## 2020-08-14 NOTE — PROGRESS NOTES
Contacted by staff regarding episode of desaturation with essentially no symptoms per patient.  Fingers noted to be cool to touch and suspected error however staff increased oxygen to 6 L then decrease to 5 L with oxygen saturations in the mid 90s.  Head probe to be placed, wean oxygen back to baseline 4 L as soon as possible.

## 2020-08-14 NOTE — NURSING NOTE
"Continued Stay Note   Reyna Meyer     Patient Name: Aspen Govea  MRN: [id number]  Today's Date: 8/14/2020    Admit Date: 8/7/2020    Discharge Plan     Row Name 08/14/20 0928       Plan    Plan Comments  Spoke with Mrs Govea at bedside. She states that \"I am going home.\"  When asked about going to [facility] as an intermediate care patient on Medicaid pending she states \"No I am going home to my sister's.\"   I told her that her sister has repeatedly said she cannot care for her.  She wanted to know when she said that because \"My sister told me that I could come home with home health to help.\"  I explained that home health is very limited and only there several hours per week.  I told her I would call Sania her sister, and see what she has to say.  Called Sania and she states once again that she does not think she can continue to care for Aspen.  Aspen does not take medicines when given to her and often throws the prepared food into the trash.  Sania states \"I have grandchildren and children and I can't keep on like this.  But I don't know what to do.\"  I explained to her about Aspen going to [facility] as a Medicaid pending patient and she would have to give her 2 monthly checks ($200 state and $100 social security) to [facility] to help pay for her care and then Medicaid would be billed for the rest.  Sania suggested private duty which I let her know was private pay at around $20 per hour.  figuring 4 hours per day that's $80 per day.  Sania says [facility] will have to pay for it.\".  I asked if she really thought that Aspen would agree to that and she didn't answer.  Sania then decided that she needed to talk with Aspen' daughter Carmelita and her brother Alin to see what they thought.  She will call me back with what they decide.  Will continue to follow        Discharge Codes    No documentation.             Saige Goyal RN    "

## 2020-08-14 NOTE — PLAN OF CARE
Problem: Patient Care Overview  Goal: Plan of Care Review  Outcome: Ongoing (interventions implemented as appropriate)  Flowsheets (Taken 8/14/2020 8702)  Progress: no change  Plan of Care Reviewed With: patient  Outcome Summary: desat with getting up to the bsc, wears trilology as well, urinating well and frequently

## 2020-08-14 NOTE — PROGRESS NOTES
"      [facility]         Dr Galarza Sayied   LOS: 7 days   Patient Care Team:  Marilu Suresh APRN as PCP - General (Family Medicine)  Jhonny Hoyt MD as Consulting Physician (Pulmonary Disease)  Konstantin Orr MD as Consulting Physician (Nephrology)    Chief Complaint: Acute on chronic respiratory failure hypoxemic and hypercapnic with underlying history of COPD ALTA/OHS and volume overload    Interval History: She is currently off trilogy and tolerating well.  No new complaints reported.  REVIEW OF SYSTEMS:   Shortness of breath with exertion.  No chest pain    Ventilator/Non-Invasive Ventilation Settings (From admission, onward)     Start     Ordered    08/08/20 1829  NIPPV (CPAP or BIPAP)  Until Discontinued     Question Answer Comment   Indication: Acute Respiratory Failure    Type: AVAPS-AE    NIPPV Mask Interface: Full Face Mask    Rate 20    VT (mL) 450    EPAP Min (cm H2O) 5    EPAP Max (cm H2O) 10    Max Pressure (cm H2O) 30    Pressure Support Min (cm H2O) 5    Pressure Support Max (cm H2O) 20    Other O2 bled in    Titrate for SPO2 Between    Titrate for SPO2 88% - 92%        08/08/20 1830 08/07/20 1833  NIPPV (CPAP or BIPAP)  Until Discontinued,   Status:  Canceled     Question Answer Comment   Type: BIPAP    NIPPV Mask Interface: Full Face Mask        08/07/20 1832            Trilogy with sleep and as needed      Vital Signs  Temp:  [97.9 °F (36.6 °C)-98.1 °F (36.7 °C)] 97.9 °F (36.6 °C)  Heart Rate:  [63-87] 80  Resp:  [18-22] 20  BP: ()/(44-62) 98/62    Intake/Output Summary (Last 24 hours) at 8/14/2020 1051  Last data filed at 8/14/2020 0921  Gross per 24 hour   Intake 480 ml   Output 3225 ml   Net -2745 ml     Flowsheet Rows      First Filed Value   Admission Height  160 cm (63\") Documented at 08/07/2020 1425   Admission Weight  122 kg (268 lb) Documented at 08/07/2020 1425          Physical Exam:  Patient is examined using the personal protective equipment " as per guidelines from infection control for this particular patient as enacted.  Hand hygiene was performed before and after patient interaction.   General Appearance:    Alert, cooperative, in no acute distress.  Following simple commands  Neck midline trachea no thyromegaly   Lungs:    Diminished breath sounds rhonchi bilaterally on the basis    Heart:    Regular rhythm and normal rate, normal S1 and S2, no            murmur, no gallop, no rub, no click   Chest Wall:    No abnormalities observed   Abdomen:     Normal bowel sounds, no masses, no organomegaly, soft        non-tender, non-distended, no guarding, no rebound                tenderness   Extremities:   Moves all extremities well, 1-2+ edema, no cyanosis, no             redness  CNS no focal neurological deficits normal sensory exam  Skin no rashes no nodules  Musculoskeletal no cyanosis no clubbing normal range of motion     Results Review:        Results from last 7 days   Lab Units 08/14/20  0347 08/13/20  0342 08/12/20  0326   SODIUM mmol/L 138 140 141   POTASSIUM mmol/L 5.2 4.7 5.0   CHLORIDE mmol/L 87* 92* 95*   CO2 mmol/L 41.7* 40.6* 40.6*   BUN mg/dL 48* 47* 50*   CREATININE mg/dL 1.95* 1.97* 2.24*   GLUCOSE mg/dL 328* 227* 272*   CALCIUM mg/dL 8.8 9.0 8.9     Results from last 7 days   Lab Units 08/08/20  0540 08/07/20  1529   TROPONIN T ng/mL 0.040* 0.047*     Results from last 7 days   Lab Units 08/14/20  0347 08/12/20  0326 08/10/20  0444   WBC 10*3/mm3 6.49 6.94 5.21   HEMOGLOBIN g/dL 8.6* 8.4* 8.0*   HEMATOCRIT % 30.5* 29.5* 27.9*   PLATELETS 10*3/mm3 181 190 185                     Results from last 7 days   Lab Units 08/11/20  1055   PH, ARTERIAL pH units 7.344*   PO2 ART mm Hg 55.2*   PCO2, ARTERIAL mm Hg 78.8*   HCO3 ART mmol/L 42.8*       I reviewed the patient's new clinical results.  I personally viewed and interpreted the patient's CXR        Medication Review:     aspirin 81 mg Oral Daily   atorvastatin 40 mg Oral Daily    bumetanide 2 mg Oral BID   enoxaparin 40 mg Subcutaneous Q12H   guaiFENesin 1,200 mg Oral Q12H   hydrALAZINE 50 mg Oral Q8H   hydrophor  Topical Q24H   insulin aspart 0-24 Units Subcutaneous TID AC   insulin detemir 74 Units Subcutaneous Nightly   levalbuterol 0.63 mg Nebulization Q6H - RT   levothyroxine 175 mcg Oral Daily   metoprolol tartrate 50 mg Oral Q12H   nystatin  Topical Q12H   QUEtiapine 100 mg Oral Q12H   sodium chloride 10 mL Intravenous Q12H   Fluticasone-Umeclidin-Vilant 1 puff Inhalation Daily - RT            ASSESSMENT:   Acute on chronic hypoxemic hypercapnic respiratory failure  Chronic hypoxemic respiratory failure  Acute on chronic diastolic CHF  Acute kidney injury  Chronic venous stasis/lymphedema  Hypertension  Diabetes mellitus  Morbid obesity  ALTA/OHS  COPD    PLAN:  Improving with diuresis.  Diuresis per cardiology.  Monitor creatinine closely with diuresis  Her ABG reflects partial compensation but with persistent respiratory acidosis.  I have asked [employer] to adjust tidal volume 400+ on trilogy  Aggressive PT OT will help  Long-term weight loss will be beneficial  Bronchodilators  Blood glucose monitoring per ICU protocol  She is high risk for decompensating due to multiple cardiac and pulmonary comorbidities    Geovanni Gilmore MD  08/14/20  10:51

## 2020-08-14 NOTE — THERAPY TREATMENT NOTE
Acute Care - Physical Therapy Treatment Note  TEODORA Dobbs     Patient Name: Jared Govea  : 1955  MRN: [id number]  Today's Date: 2020  Onset of Illness/Injury or Date of Surgery: 20  Date of Referral to PT: 20  Referring Physician: Dr. Peck    Admit Date: 2020    Visit Dx:    ICD-10-CM ICD-9-CM   1. Acute on chronic respiratory failure with hypoxia and hypercapnia (CMS/HCC) J96.21 518.84    J96.22 786.09     799.02   2. Acute on chronic diastolic congestive heart failure (CMS/HCC) I50.33 428.33     428.0   3. Hypoglycemia due to type 2 diabetes mellitus (CMS/HCC) E11.649 250.80   4. Acute kidney injury (CMS/HCC) N17.9 584.9     Patient Active Problem List   Diagnosis   • SERENA (generalized anxiety disorder)   • Chronic back pain   • Chronic diastolic congestive heart failure (CMS/HCC)   • COPD (chronic obstructive pulmonary disease) (CMS/[facility])   • Depression   • Uncontrolled type 2 diabetes mellitus with chronic kidney disease, with long-term current use of insulin (CMS/HCC)   • Venous stasis dermatitis of both lower extremities   • HLD (hyperlipidemia)   • Essential hypertension   • Hypothyroidism, acquired   • Osteoporosis   • Primary insomnia   • LAD (lymphadenopathy), mediastinal   • Lung nodule, solitary   • Diabetic peripheral neuropathy (CMS/HCC)   • CKD (chronic kidney disease) stage 4, GFR 15-29 ml/min (CMS/HCC)   • Chronic pain syndrome   • Lumbar spondylolysis   • Normocytic anemia   • Pleural scarring   • Osteopetrosis   • Acute renal failure superimposed on stage 4 chronic kidney disease (CMS/HCC)   • Morbid obesity (CMS/HCC)   • Acute hypoxemic respiratory failure (CMS/HCC)   • Acute respiratory failure with hypercapnia (CMS/HCC)   • Acute on chronic respiratory failure with hypoxia and hypercapnia (CMS/HCC)       Therapy Treatment    Rehabilitation Treatment Summary     Row Name 20 1200             Treatment Time/Intention    Discipline  physical therapist  -NEERAJ       Document Type  therapy note (daily note)  -JW      Subjective Information  complains of;fatigue;dyspnea  -JW      Mode of Treatment  physical therapy  -JW      Patient/Family Observations  pt in recliner, agreeable to therapy  -JW      Care Plan Review  care plan/treatment goals reviewed;risks/benefits reviewed;patient/other agree to care plan  -JW      Patient Effort  fair  -JW      Existing Precautions/Restrictions  oxygen therapy device and L/min  -JW      Patient Response to Treatment  pt fatigued with minimal activity  -JW      Recorded by [JW] Shelia Russo, PT 08/14/20 1300      Row Name 08/14/20 1200             Vital Signs    Intra SpO2 (%)  70  -JW      O2 Delivery Intra Treatment  supplemental O2 5 L/min  -JW      Post SpO2 (%)  89  -JW      O2 Delivery Post Treatment  supplemental O2 5 L/min  -JW      Recorded by [JW] Shelia Russo, PT 08/14/20 1300      Row Name 08/14/20 1200             Cognitive Assessment/Intervention- PT/OT    Personal Safety Interventions  gait belt;nonskid shoes/slippers when out of bed  -JW      Recorded by [JW] Shelia Russo, PT 08/14/20 1300      Row Name 08/14/20 1200             Bed Mobility Assessment/Treatment    Comment (Bed Mobility)  deferred-up in chair  -JW      Recorded by [JW] Shelia Russo, PT 08/14/20 1300      Row Name 08/14/20 1200             Transfer Assessment/Treatment    Transfer Assessment/Treatment  sit-stand transfer;stand-sit transfer  -JW      Comment (Transfers)  requires cues for hand placement  -JW      Recorded by [JW] Shelia Russo, PT 08/14/20 1300      Row Name 08/14/20 1200             Sit-Stand Transfer    Sit-Stand [place] (Transfers)  supervision  -JW      Assistive Device (Sit-Stand Transfers)  walker, front-wheeled  -JW      Recorded by [JW] Shelia Russo, PT 08/14/20 1300      Row Name 08/14/20 1200             Stand-Sit Transfer    Stand-Sit [place] (Transfers)  supervision  -JW      Assistive Device (Stand-Sit  Transfers)  walker, front-wheeled  -JW      Recorded by [JW] Shelia Russo, PT 08/14/20 1300      Row Name 08/14/20 1200             Gait/Stairs Assessment/Training    [place] Level (Gait)  supervision  -JW      Assistive Device (Gait)  walker, front-wheeled  -JW      Distance in Feet (Gait)  2x10 feet with seated rest break  -JW      Pattern (Gait)  swing-through  -JW      Deviations/Abnormal Patterns (Gait)  base of support, narrow  -JW      Bilateral Gait Deviations  forward flexed posture  -JW      Comment (Gait/Stairs)  pt requires seated rest break due to fatigue and SOA  -JW      Recorded by [JW] hSelia Russo, PT 08/14/20 1300      Row Name 08/14/20 1200             Positioning and Restraints    Pre-Treatment Position  sitting in chair/recliner  -JW      Post Treatment Position  chair  -JW      In Chair  reclined;call light within reach;encouraged to call for assist  -JW      Recorded by [JW] Shelia Russo, PT 08/14/20 1300      Row Name 08/14/20 1200             Pain Scale: Numbers Pre/Post-Treatment    Pre/Post Treatment Pain Comment  no c/o pain  -JW      Recorded by [JW] Shelia Russo, PT 08/14/20 1300      Row Name                Wound 07/20/20 1320 Left gluteal Pressure Injury    Wound - Properties Group Date first assessed: 07/20/20 [TO] Time first assessed: 1320 [TO] Present on Hospital Admission: Y [TO] Side: Left [TO] Location: gluteal [TO] Primary Wound Type: Pressure inj [TO] Stage, Pressure Injury: Stage 2 [TO] Recorded by:  [TO] Chet Coronado RN 07/20/20 1326    Row Name 08/14/20 1200             Plan of Care Review    Outcome Summary  PT: Patient performs sit to stand transfer with SBA and gait 2x10 feet with rolling walker, SBA on 5 liters O2.  Patient requires seated rest break due to fatigue.  O2 sats decreased to 81% and 70% on subsequent gait trials, requiring significant time recovery.  Patient is likely nearing baseline level of function, plan to see x1-2 additional  visits.  patient remains limited by O2 sats with activity.  -JW      Recorded by [JW] Shelia Russo, PT 08/14/20 1300      Row Name 08/14/20 1200             Outcome Summary/Treatment Plan (PT)    Daily Summary of Progress (PT)  progress toward functional goals as expected  -NEERAJ      Patient/Family Concerns, Anticipated Discharge Disposition (PT)  recommend extended care facility due to continued hospital admissions and poor baseline level of function  -JW      Recorded by [JW] Shelia Russo, PT 08/14/20 1300        User Key  (r) = Recorded By, (t) = Taken By, (c) = Cosigned By    Initials Name Effective Dates Discipline    JW Shelia Russo, PT 04/03/18 -  PT    Chet Westfall RN 06/16/16 -  Nurse          Wound 07/20/20 1320 Left gluteal Pressure Injury (Active)   Dressing Appearance dry;intact 8/14/2020  9:10 AM   Closure None 8/14/2020  9:10 AM   Base pink 8/14/2020  9:10 AM   Periwound redness 8/14/2020  9:10 AM   Periwound Temperature warm 8/14/2020  9:10 AM   Drainage Amount none 8/14/2020  9:10 AM   Dressing Care, Wound other (see comments) 8/13/2020  8:25 PM           Physical Therapy Education                 Title: PT OT SLP Therapies (Done)     Topic: Physical Therapy (Done)     Point: Mobility training (Done)     Description:   Instruct learner(s) on safety and technique for assisting patient out of bed, chair or wheelchair.  Instruct in the proper use of assistive devices, such as walker, crutches, cane or brace.              Patient Friendly Description:   It's important to get you on your feet again, but we need to do so in a way that is safe for you. Falling has serious consequences, and your personal safety is the most important thing of all.        When it's time to get out of bed, one of us or a family member will sit next to you on the bed to give you support.     If your doctor or nurse tells you to use a walker, crutches, a cane, or a brace, be sure you use it every time you get out  of bed, even if you think you don't need it.    Learning Progress Summary           Patient Acceptance, E,TB, VU by  at 8/14/2020 1301    Acceptance, E,TB, VU by AS at 8/13/2020 1158    Acceptance, E, VU by AB at 8/13/2020 0555    Acceptance, E,TB, NR by  at 8/12/2020 1155    Acceptance, E,TB, VU by  at 8/11/2020 1341                               User Key     Initials Effective Dates Name Provider Type Discipline     06/08/18 -  Teodora Sethi, PT Physical Therapist PT    AB 06/16/16 -  Maura Graham, RN Registered Nurse Nurse     04/03/18 -  Bryce Hodgson, PT Physical Therapist PT     04/03/18 -  Shelia Russo, PT Physical Therapist PT    AS 12/11/15 -  Konstantin Izquierdo, PT Physical Therapist PT                PT Recommendation and Plan     Outcome Summary/Treatment Plan (PT)  Daily Summary of Progress (PT): progress toward functional goals as expected  Patient/Family Concerns, Anticipated Discharge Disposition (PT): recommend extended care facility due to continued hospital admissions and poor baseline level of function  Outcome Summary: PT: Patient performs sit to stand transfer with SBA and gait 2x10 feet with rolling walker, SBA on 5 liters O2.  Patient requires seated rest break due to fatigue.  O2 sats decreased to 81% and 70% on subsequent gait trials, requiring significant time recovery.  Patient is likely nearing baseline level of function, plan to see x1-2 additional visits.  patient remains limited by O2 sats with activity.  Outcome Measures     Row Name 08/14/20 1043 08/13/20 1100 08/12/20 0853       How much help from another person do you currently need...    Turning from your back to your side while in flat bed without using bedrails?  3  -  3  -AS  3  -BP    Moving from lying on back to sitting on the side of a flat bed without bedrails?  3  -  3  -AS  3  -BP    Moving to and from a bed to a chair (including a wheelchair)?  3  -  3  -AS  3  -BP    Standing up from  a chair using your arms (e.g., wheelchair, bedside chair)?  3  -JW  3  -AS  3  -BP    Climbing 3-5 steps with a railing?  2  -JW  2  -AS  2  -BP    To walk in hospital room?  2  -JW  2  -AS  2  -BP    AM-PAC 6 Clicks Score (PT)  16  -  16  -AS  16  -BP       Functional Assessment    Outcome Measure Options  AM-PAC 6 Clicks Basic Mobility (PT)  -JW  AM-PAC 6 Clicks Basic Mobility (PT)  -AS  AM-PAC 6 Clicks Basic Mobility (PT)  -BP      User Key  (r) = Recorded By, (t) = Taken By, (c) = Cosigned By    Initials Name Provider Type    BP Bryce Hodgson, PT Physical Therapist    Shelia Saul, PT Physical Therapist    AS Konstantin Izquierdo, PT Physical Therapist         Time Calculation:   PT Charges     Row Name 08/14/20 1302             Time Calculation    Start Time  1043  -      Stop Time  1115  -      Time Calculation (min)  32 min  -      PT Received On  08/14/20  -      PT - Next Appointment  08/15/20  -         Timed Charges    52816 - PT Therapeutic Exercise Minutes  32  -        User Key  (r) = Recorded By, (t) = Taken By, (c) = Cosigned By    Initials Name Provider Type     Shelia Russo, PT Physical Therapist        Therapy Charges for Today     Code Description Service Date Service Provider Modifiers Qty    [id number]  PT THER PROC EA 15 MIN 8/14/2020 Shelia Russo, PT GP 2          PT G-Codes  Outcome Measure Options: AM-PAC 6 Clicks Basic Mobility (PT)  AM-PAC 6 Clicks Score (PT): 16    Shelia Russo, PT  8/14/2020

## 2020-08-14 NOTE — PROGRESS NOTES
[facility]al Progress Note    No soa but still desats with exertion, no cp n/v or diarrhea        Current Facility-Administered Medications:   •  acetaminophen (TYLENOL) tablet 650 mg, 650 mg, Oral, Q4H PRN, 650 mg at 08/09/20 2027 **OR** acetaminophen (TYLENOL) suppository 650 mg, 650 mg, Rectal, Q4H PRN, Payam Peck MD  •  albuterol (PROVENTIL) nebulizer solution 0.083% 2.5 mg/3mL, 2.5 mg, Nebulization, Q6H PRN, Payam Peck MD  •  aspirin EC tablet 81 mg, 81 mg, Oral, Daily, Payam Peck MD, 81 mg at 08/14/20 0911  •  atorvastatin (LIPITOR) tablet 40 mg, 40 mg, Oral, Daily, Payam Peck MD, 40 mg at 08/14/20 0912  •  bumetanide (BUMEX) tablet 2 mg, 2 mg, Oral, BID, Payam Peck MD, 2 mg at 08/14/20 0911  •  dextrose (D50W) 25 g/ 50mL Intravenous Solution 25 g, 25 g, Intravenous, Q15 Min PRN, Payam Peck MD  •  dextrose (GLUTOSE) oral gel 15 g, 15 g, Oral, Q15 Min PRN, Payam Peck MD  •  enoxaparin (LOVENOX) syringe 40 mg, 40 mg, Subcutaneous, Q12H, Payam Peck MD, 40 mg at 08/14/20 0910  •  glucagon (human recombinant) (GLUCAGEN DIAGNOSTIC) injection 1 mg, 1 mg, Subcutaneous, Q15 Min PRN, Payam Peck MD  •  guaiFENesin (MUCINEX) 12 hr tablet 1,200 mg, 1,200 mg, Oral, Q12H, Payam Peck MD, 1,200 mg at 08/14/20 0910  •  hydrALAZINE (APRESOLINE) tablet 50 mg, 50 mg, Oral, Q8H, Payam Peck MD, 50 mg at 08/14/20 1326  •  HYDROcodone-acetaminophen (NORCO) 5-325 MG per tablet 1 tablet, 1 tablet, Oral, Q6H PRN, Payam Peck MD, 1 tablet at 08/14/20 0911  •  hydrophor (AQUAPHOR) ointment, , Topical, Q24H, Payam Peck MD  •  insulin aspart (novoLOG) injection 0-24 Units, 0-24 Units, Subcutaneous, TID AC, Payam Peck MD, 16 Units at 08/14/20 1233  •  insulin detemir (LEVEMIR) injection 74 Units, 74 Units, Subcutaneous, Nightly, Payam Peck MD, 74 Units at 08/13/20 2126  •  levalbuterol (XOPENEX) nebulizer solution 0.63 mg, 0.63 mg, Nebulization, Q6H - RT, Payam Peck,  MD, 0.63 mg at 08/14/20 1353  •  levothyroxine (SYNTHROID, LEVOTHROID) tablet 175 mcg, 175 mcg, Oral, Daily, Payam Peck MD, 175 mcg at 08/14/20 0912  •  metoprolol tartrate (LOPRESSOR) tablet 50 mg, 50 mg, Oral, Q12H, Payam Peck MD, 50 mg at 08/14/20 1233  •  nystatin (MYCOSTATIN) powder, , Topical, Q12H, Paola Silva APRN  •  ondansetron (ZOFRAN) tablet 4 mg, 4 mg, Oral, Q6H PRN **OR** ondansetron (ZOFRAN) injection 4 mg, 4 mg, Intravenous, Q6H PRN, Payam Peck MD, 4 mg at 08/13/20 0854  •  QUEtiapine (SEROquel) tablet 100 mg, 100 mg, Oral, Q12H, Payam Peck MD, 100 mg at 08/14/20 0912  •  sennosides-docusate (PERICOLACE) 8.6-50 MG per tablet 2 tablet, 2 tablet, Oral, BID PRN, Payam Peck MD  •  sodium chloride 0.9 % flush 10 mL, 10 mL, Intravenous, PRN, Payam Peck MD  •  sodium chloride 0.9 % flush 10 mL, 10 mL, Intravenous, Q12H, Payam Peck MD, 10 mL at 08/14/20 0913  •  sodium chloride 0.9 % infusion 40 mL, 40 mL, Intravenous, PRN, Payam Peck MD, 40 mL at 08/09/20 1315  •  TRELEGY (Fluticasone-Umeclidin-Vilant)100-62.5-25 MCG/INH IN, 1 puff, Inhalation, Daily - RT, Payam Peck MD, 1 puff at 08/14/20 0950          Temp:  [97.3 °F (36.3 °C)-98.1 °F (36.7 °C)] 97.3 °F (36.3 °C)  Heart Rate:  [63-92] 92  Resp:  [18-22] 20  BP: ()/(50-62) 101/52  I/O last 3 completed shifts:  In: 120 [P.O.:120]  Out: 4475 [Urine:4475]  I/O this shift:  In: 480 [P.O.:480]  Out: 500 [Urine:500]    Physical Exam   Constitutional: She is oriented to person, place, and time. She appears well-developed and well-nourished. No distress.   HENT:   Head: Normocephalic and atraumatic.   Eyes: Pupils are equal, round, and reactive to light. No scleral icterus.   Neck: Neck supple. No JVD present.   Cardiovascular: Normal rate and intact distal pulses. Exam reveals no friction rub.   Pulmonary/Chest: Effort normal and breath sounds normal. She has no rales.   Abdominal: Soft. Bowel sounds are  normal.   Musculoskeletal: She exhibits edema.   Neurological: She is alert and oriented to person, place, and time.   Skin: Skin is warm and dry. She is not diaphoretic.   Nursing note and vitals reviewed.    Lab Results (last 24 hours)     Procedure Component Value Units Date/Time    POC Glucose Once [[id number]]  (Abnormal) Collected:  08/14/20 1228    Specimen:  Blood Updated:  08/14/20 1235     Glucose 346 mg/dL     POC Glucose Once [[id number]]  (Abnormal) Collected:  08/14/20 0815    Specimen:  Blood Updated:  08/14/20 0832     Glucose 275 mg/dL     Basic Metabolic Panel [[id number]]  (Abnormal) Collected:  08/14/20 0347    Specimen:  Blood Updated:  08/14/20 0454     Glucose 328 mg/dL      BUN 48 mg/dL      Creatinine 1.95 mg/dL      Sodium 138 mmol/L      Potassium 5.2 mmol/L      Chloride 87 mmol/L      CO2 41.7 mmol/L      Calcium 8.8 mg/dL      eGFR Non African Amer 26 mL/min/1.73      BUN/Creatinine Ratio 24.6     Anion Gap 9.3 mmol/L     Narrative:       GFR Normal >60  Chronic Kidney Disease <60  Kidney Failure <15      CBC & Differential [[id number]] Collected:  08/14/20 0347    Specimen:  Blood Updated:  08/14/20 0427    Narrative:       The following orders were created for panel order CBC & Differential.  Procedure                               Abnormality         Status                     ---------                               -----------         ------                     CBC Auto Differential[[id number]]        Abnormal            Final result                 Please view results for these tests on the individual orders.    CBC Auto Differential [[id number]]  (Abnormal) Collected:  08/14/20 0347    Specimen:  Blood Updated:  08/14/20 0427     WBC 6.49 10*3/mm3      RBC 3.18 10*6/mm3      Hemoglobin 8.6 g/dL      Hematocrit 30.5 %      MCV 95.9 fL      MCH 27.0 pg      MCHC 28.2 g/dL      RDW 14.4 %      RDW-SD 49.9 fl      MPV 10.7 fL      Platelets 181 10*3/mm3      Neutrophil % 62.4 %       Lymphocyte % 25.4 %      Monocyte % 6.9 %      Eosinophil % 4.5 %      Basophil % 0.3 %      Immature Grans % 0.5 %      Neutrophils, Absolute 4.05 10*3/mm3      Lymphocytes, Absolute 1.65 10*3/mm3      Monocytes, Absolute 0.45 10*3/mm3      Eosinophils, Absolute 0.29 10*3/mm3      Basophils, Absolute 0.02 10*3/mm3      Immature Grans, Absolute 0.03 10*3/mm3      nRBC 0.0 /100 WBC     POC Glucose Once [[id number]]  (Abnormal) Collected:  08/13/20 2054    Specimen:  Blood Updated:  08/13/20 2100     Glucose 166 mg/dL     POC Glucose Once [[id number]]  (Abnormal) Collected:  08/13/20 1638    Specimen:  Blood Updated:  08/13/20 1646     Glucose 156 mg/dL             Acute on chronic respiratory failure with hypoxia and hypercapnia (CMS/HCC)    1. olayinka-creatinine continues to fall, closer to chronic baseline, may rise with diuresis and if so may have to back off on bumex but not yet, would dc on current dose of 2mg po bid and check a bmp next week  2. Hypervolemia-continue diuresis  3. Anemia of ckd-stable  4. Chronic respiratory failure-continue abx and diuretics

## 2020-08-14 NOTE — NURSING NOTE
"Continued Stay Note  TEODORA Dobbs     Patient Name: Jared Govea  MRN: [id number]  Today's Date: 8/14/2020    Admit Date: 8/7/2020    Discharge Plan     Row Name 08/14/20 1256       Plan    Plan Comments  Spoke with Mrs Govea after calling sister Sania.  I was unable to reach Sania or leave a voice mail.  Jared states \"I am going home to my sister's house.  I have $80,000 in an ANAT for my grandchildren and 2 life insurance policies that Sania is the beneficiary for and I am not giving all of that to [facility].\"  Spoke with the sister Sania and she says she has not heard Carmelita her daughter.  I said she is being discharged today.  She said she will call Jared and tell her she can't come home.  Will continue to follow        Discharge Codes    No documentation.             Saige Goyal RN    "

## 2020-08-14 NOTE — DISCHARGE SUMMARY
Jared Govea  1955  [id number]    Hospitalists Discharge Summary    Date of Admission: 8/7/2020  Date of Discharge:  8/15/2020    History of Present Illness and Hospital Course Summary:    Patient admitted with weakness and SOA.  She was diagnosed with heart failure.  She has been diuresed and is doing much better.  She is weak and deconditioned.  Will go to [place] for further care.      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    Anemia/ multifactorial/ probably secondary to chronic disease    Hypothyroidism/ on levothyroxine      PCP  Patient Care Team:  Marilu Suresh APRN as PCP - General (Family Medicine)  Jhonny Hoyt MD as Consulting Physician (Pulmonary Disease)  Konstantin Orr MD as Consulting Physician (Nephrology)    Consults:   Consults     Date and Time Order Name Status Description    8/8/2020 0814 Inpatient Nephrology Consult Completed     8/7/2020 1832 Inpatient Cardiology Consult Completed     8/7/2020 1832 Inpatient Pulmonology Consult Completed     7/22/2020 1556 Inpatient Pulmonology Consult Completed           Operations and Procedures Performed:       Xr Chest 2 View    Result Date: 8/7/2020  Narrative: PA AND LATERAL CHEST 08/07/2020     HISTORY: 65-year-old female with worsening shortness of air and decreasing oxygen saturation for the last 2-3 days. COPD.  COMPARISON: Chest x-ray 07/20/2020  TECHNIQUE: PA and lateral views of the chest.  FINDINGS: Small bilateral pleural effusions with bibasilar atelectasis. Upper lungs are clear. No pneumothorax. Mild cardiomegaly is stable. Mild central vascular  prominence is stable. Mediastinum is within normal limits.      Impression: Cardiomegaly with mild central vascular prominence and small bilateral pleural effusions with bibasilar atelectasis. Correlate for symptoms of congestive failure.  This report was finalized on 8/7/2020 3:38 PM by Dr. Jewel Currie MD.      Xr Chest 1 View    Result Date: 8/9/2020  Narrative: CR Chest 1 Vw INDICATION: Dyspnea with acute on chronic respiratory failure. History of COPD and CHF. COMPARISON:  8/7/2020 FINDINGS: Single portable AP view(s) of the chest. No visible support lines. Continued cardiomegaly with diffuse pulmonary vascular congestion and prominent interstitial markings may reflect CHF superimposed on chronic interstitial lung disease. No sizable effusions. No dense consolidation. No pneumothorax.     Impression: No significant interval change. Findings suggest CHF possibly superimposed on background diffuse lung disease. Signer Name: CEM Whiteside MD  Signed: 8/9/2020 8:35 AM  Workstation Name: [facility]  [facility]    Xr Chest 1 View    Result Date: 7/20/2020  Narrative: PORTABLE CHEST X-RAY 07/20/2020     HISTORY: 65-year-old female with productive cough for one month.  COMPARISON: Chest 04/03/2019  TECHNIQUE: Single frontal portable view of the chest.  FINDINGS: The patient is slightly rotated to the right. There is mild bibasilar atelectasis. No large pleural effusions. No pneumothorax. Mild cardiac enlargement is stable. Mediastinum and pulmonary vasculature are within expected limits.      Impression: 1. Stable mild cardiomegaly. 2. Bibasilar atelectasis. No other acute chest findings.  This report was finalized on 7/20/2020 1:50 PM by Dr. Jewel Currie MD.      Us Renal Bilateral    Result Date: 8/7/2020  Narrative: US Renal Comp INDICATION: Acute renal failure COMPARISON: May 5, 2017 FINDINGS: KIDNEYS:  The right kidney measures 11.2 cm. The left kidney measures 11.3 cm. Renal  cortical thickness and echogenicity is normal. Possible shadowing stone in the mid right collecting system. No hydronephrosis. Mild cortical thinning. Cholelithiasis. URINARY BLADDER:  The bladder is empty.     Impression: Renal sizes as described. Possible shadowing stone in the mid right collecting. No hydronephrosis. Signer Name: Geovanny Currie MD  Signed: 8/7/2020 7:45 PM  [facility]: [facility]  [facility]      Allergies:  is allergic to erythromycin; erythromycin ethylsuccinate; ativan [lorazepam]; and tape.    Graham  reviewed    Discharge Medications:     Discharge Medications      New Medications      Instructions Start Date   dextrose 40 % gel  Commonly known as:  GLUTOSE   15 g, Oral, Every 15 Minutes PRN      HYDROcodone-acetaminophen 5-325 MG per tablet  Commonly known as:  NORCO   1 tablet, Oral, Every 8 Hours PRN      hydrophor ointment ointment   Topical, Every 24 Hours Scheduled      insulin aspart 100 UNIT/ML injection  Commonly known as:  novoLOG  Replaces:  insulin aspart 100 UNIT/ML solution pen-injector sc pen   0-24 Units, Subcutaneous, 3 Times Daily Before Meals      insulin detemir 100 UNIT/ML injection  Commonly known as:  LEVEMIR   74 Units, Subcutaneous, Nightly      nystatin 036474 UNIT/GM powder  Commonly known as:  MYCOSTATIN   Topical, Every 12 Hours Scheduled      ondansetron 4 MG tablet  Commonly known as:  ZOFRAN   4 mg, Oral, Every 6 Hours PRN      QUEtiapine 100 MG tablet  Commonly known as:  SEROquel   100 mg, Oral, Every 12 Hours Scheduled      saccharomyces boulardii 250 MG capsule  Commonly known as:  Florastor   250 mg, Oral, 2 Times Daily      therapeutic multivitamin-minerals tablet   1 tablet, Oral, Daily         Changes to Medications      Instructions Start Date   acetaminophen 325 MG tablet  Commonly known as:  TYLENOL  What changed:  reasons to take this   650 mg, Oral, Every 4 Hours PRN      hydrALAZINE 50 MG tablet  Commonly known as:   APRESOLINE  What changed:    · medication strength  · how much to take   50 mg, Oral, Every 8 Hours Scheduled      metoprolol tartrate 50 MG tablet  Commonly known as:  LOPRESSOR  What changed:    · medication strength  · how much to take   50 mg, Oral, Every 12 Hours         Continue These Medications      Instructions Start Date   albuterol sulfate  (90 Base) MCG/ACT inhaler  Commonly known as:  PROVENTIL HFA;VENTOLIN HFA;PROAIR HFA   INHALE TWO PUFFS BY MOUTH EVERY 4 HOURS AS NEEDED FOR WHEEZING      aspirin 81 MG EC tablet   81 mg, Oral, Daily      atorvastatin 40 MG tablet  Commonly known as:  LIPITOR   TAKE ONE TABLET BY MOUTH DAILY FOR CHOLESTEROL      Blood Glucose Monitoring Suppl w/Device kit   Use glucometer to check sugars twice a day. E11.65, E11.22.      bumetanide 1 MG tablet  Commonly known as:  BUMEX   TAKE TWO TABLETS BY MOUTH TWICE A DAY      cetirizine 10 MG tablet  Commonly known as:  zyrTEC   10 mg, Oral, Nightly      famotidine 20 MG tablet  Commonly known as:  PEPCID   20 mg, Oral, Daily      guaiFENesin 600 MG 12 hr tablet  Commonly known as:  MUCINEX   1,200 mg, Oral, 2 Times Daily      levalbuterol 0.63 MG/3ML nebulizer solution  Commonly known as:  XOPENEX   0.63 mg, Nebulization, Every 4 Hours PRN      levothyroxine 175 MCG tablet  Commonly known as:  SYNTHROID, LEVOTHROID   TAKE ONE TABLET BY MOUTH DAILY      O2  Commonly known as:  OXYGEN   5 L/min, Inhalation, Once      sennosides-docusate 8.6-50 MG per tablet  Commonly known as:  PERICOLACE   2 tablets, Oral, Nightly PRN      Trelegy Ellipta 100-62.5-25 MCG/INH aerosol powder   Generic drug:  Fluticasone-Umeclidin-Vilant   No dose, route, or frequency recorded.         Stop These Medications    Align 4 MG capsule     amLODIPine 10 MG tablet  Commonly known as:  NORVASC     dimenhyDRINATE 50 MG tablet  Commonly known as:  Dramamine     hydrocortisone 1 % ointment     insulin aspart 100 UNIT/ML solution pen-injector sc  pen  Commonly known as:  NovoLOG FlexPen  Replaced by:  insulin aspart 100 UNIT/ML injection     Insulin Degludec 200 UNIT/ML solution pen-injector pen injection  Commonly known as:  Tresiba FlexTouch     VITAMIN B COMPLEX PO     VITAMIN D PO            Last Lab Results:   Lab Results (most recent)     Procedure Component Value Units Date/Time    POC Glucose Once [[id number]]  (Abnormal) Collected:  08/14/20 1228    Specimen:  Blood Updated:  08/14/20 1235     Glucose 346 mg/dL     POC Glucose Once [[id number]]  (Abnormal) Collected:  08/14/20 0815    Specimen:  Blood Updated:  08/14/20 0832     Glucose 275 mg/dL     Basic Metabolic Panel [[id number]]  (Abnormal) Collected:  08/14/20 0347    Specimen:  Blood Updated:  08/14/20 0454     Glucose 328 mg/dL      BUN 48 mg/dL      Creatinine 1.95 mg/dL      Sodium 138 mmol/L      Potassium 5.2 mmol/L      Chloride 87 mmol/L      CO2 41.7 mmol/L      Calcium 8.8 mg/dL      eGFR Non African Amer 26 mL/min/1.73      BUN/Creatinine Ratio 24.6     Anion Gap 9.3 mmol/L     Narrative:       GFR Normal >60  Chronic Kidney Disease <60  Kidney Failure <15      CBC & Differential [[id number]] Collected:  08/14/20 0347    Specimen:  Blood Updated:  08/14/20 0427    Narrative:       The following orders were created for panel order CBC & Differential.  Procedure                               Abnormality         Status                     ---------                               -----------         ------                     CBC Auto Differential[[id number]]        Abnormal            Final result                 Please view results for these tests on the individual orders.    CBC Auto Differential [[id number]]  (Abnormal) Collected:  08/14/20 0347    Specimen:  Blood Updated:  08/14/20 0427     WBC 6.49 10*3/mm3      RBC 3.18 10*6/mm3      Hemoglobin 8.6 g/dL      Hematocrit 30.5 %      MCV 95.9 fL      MCH 27.0 pg      MCHC 28.2 g/dL      RDW 14.4 %      RDW-SD 49.9 fl      MPV 10.7 fL       Platelets 181 10*3/mm3      Neutrophil % 62.4 %      Lymphocyte % 25.4 %      Monocyte % 6.9 %      Eosinophil % 4.5 %      Basophil % 0.3 %      Immature Grans % 0.5 %      Neutrophils, Absolute 4.05 10*3/mm3      Lymphocytes, Absolute 1.65 10*3/mm3      Monocytes, Absolute 0.45 10*3/mm3      Eosinophils, Absolute 0.29 10*3/mm3      Basophils, Absolute 0.02 10*3/mm3      Immature Grans, Absolute 0.03 10*3/mm3      nRBC 0.0 /100 WBC     COVID PRE-OP / PRE-PROCEDURE SCREENING ORDER (NO ISOLATION) - Swab, Nasopharynx [[id number]] Collected:  08/13/20 1222    Specimen:  Swab from Nasopharynx Updated:  08/13/20 1246    Narrative:       The following orders were created for panel order COVID PRE-OP / PRE-PROCEDURE SCREENING ORDER (NO ISOLATION) - Swab, Nasopharynx.  Procedure                               Abnormality         Status                     ---------                               -----------         ------                     COVID-19,[facility], NP ...[[id number]]                      In process                   Please view results for these tests on the individual orders.    COVID-19,[facility], NP SWAB IN [facility]EXAR VIRAL TRANSPORT MEDIA 24-30 HR TAT - Swab, Nasopharynx [[id number]] Collected:  08/13/20 1222    Specimen:  Swab from Nasopharynx Updated:  08/13/20 1246    Basic Metabolic Panel [[id number]]  (Abnormal) Collected:  08/13/20 0342    Specimen:  Blood Updated:  08/13/20 0501     Glucose 227 mg/dL      BUN 47 mg/dL      Creatinine 1.97 mg/dL      Sodium 140 mmol/L      Potassium 4.7 mmol/L      Chloride 92 mmol/L      CO2 40.6 mmol/L      Calcium 9.0 mg/dL      eGFR Non African Amer 25 mL/min/1.73      BUN/Creatinine Ratio 23.9     Anion Gap 7.4 mmol/L     Narrative:       GFR Normal >60  Chronic Kidney Disease <60  Kidney Failure <15      Renal Function Panel [[id number]]  (Abnormal) Collected:  08/12/20 0326    Specimen:  Blood Updated:  08/12/20 0432     Glucose 272 mg/dL      BUN 50 mg/dL       Creatinine 2.24 mg/dL      Sodium 141 mmol/L      Potassium 5.0 mmol/L      Chloride 95 mmol/L      CO2 40.6 mmol/L      Calcium 8.9 mg/dL      Albumin 2.60 g/dL      Phosphorus 3.6 mg/dL      Anion Gap 5.4 mmol/L      BUN/Creatinine Ratio 22.3     eGFR Non African Amer 22 mL/min/1.73     Narrative:       GFR Normal >60  Chronic Kidney Disease <60  Kidney Failure <15      CBC & Differential [[id number]] Collected:  08/12/20 0326    Specimen:  Blood Updated:  08/12/20 0414    Narrative:       The following orders were created for panel order CBC & Differential.  Procedure                               Abnormality         Status                     ---------                               -----------         ------                     CBC Auto Differential[[id number]]        Abnormal            Final result                 Please view results for these tests on the individual orders.    CBC Auto Differential [[id number]]  (Abnormal) Collected:  08/12/20 0326    Specimen:  Blood Updated:  08/12/20 0414     WBC 6.94 10*3/mm3      RBC 3.09 10*6/mm3      Hemoglobin 8.4 g/dL      Hematocrit 29.5 %      MCV 95.5 fL      MCH 27.2 pg      MCHC 28.5 g/dL      RDW 14.6 %      RDW-SD 51.1 fl      MPV 10.9 fL      Platelets 190 10*3/mm3      Neutrophil % 67.0 %      Lymphocyte % 20.7 %      Monocyte % 7.3 %      Eosinophil % 4.3 %      Basophil % 0.3 %      Immature Grans % 0.4 %      Neutrophils, Absolute 4.64 10*3/mm3      Lymphocytes, Absolute 1.44 10*3/mm3      Monocytes, Absolute 0.51 10*3/mm3      Eosinophils, Absolute 0.30 10*3/mm3      Basophils, Absolute 0.02 10*3/mm3      Immature Grans, Absolute 0.03 10*3/mm3      nRBC 0.0 /100 WBC     Blood Gas, Arterial [[id number]]  (Abnormal) Collected:  08/11/20 1055    Specimen:  Arterial Blood Updated:  08/11/20 1109     Site Left Radial     Kevyn's Test Positive     pH, Arterial 7.344 pH units      Comment: 84 Value below reference range        pCO2, Arterial 78.8 mm Hg       Comment: 86 Value above critical limit        pO2, Arterial 55.2 mm Hg      Comment: 84 Value below reference range        HCO3, Arterial 42.8 mmol/L      Comment: 83 Value above reference range        Base Excess, Arterial 14.8 mmol/L      Comment: 83 Value above reference range        O2 Saturation, Arterial 88.9 %      Comment: 84 Value below reference range        Hemoglobin, Blood Gas 8.9 g/dL      Temperature 37.0 C      Barometric Pressure for Blood Gas 739 mmHg      Modality Nasal Cannula     Flow Rate 4.0 lpm      Ventilator Mode NA     Comment: Meter: [id number]     :  [id number]        Notified Who RB AND YIN Tristan RN     Notified By [id number]     Notified Time 08/11/2020 11:05     pCO2, Temperature Corrected 78.8 mm Hg      pH, Temp Corrected 7.344 pH Units      pO2, Temperature Corrected 55.2 mm Hg     Renal Function Panel [[id number]]  (Abnormal) Collected:  08/11/20 0424    Specimen:  Blood from Arm, Right Updated:  08/11/20 0557     Glucose 242 mg/dL      BUN 49 mg/dL      Creatinine 2.05 mg/dL      Sodium 139 mmol/L      Potassium 4.5 mmol/L      Chloride 95 mmol/L      CO2 38.4 mmol/L      Calcium 8.6 mg/dL      Albumin 2.70 g/dL      Phosphorus 4.0 mg/dL      Anion Gap 5.6 mmol/L      BUN/Creatinine Ratio 23.9     eGFR Non African Amer 24 mL/min/1.73     Narrative:       GFR Normal >60  Chronic Kidney Disease <60  Kidney Failure <15      Blood Gas, Arterial [[id number]]  (Abnormal) Collected:  08/10/20 0558    Specimen:  Arterial Blood Updated:  08/10/20 0604     Site Left Radial     Kevyn's Test Positive     pH, Arterial 7.310 pH units      Comment: 84 Value below reference range        pCO2, Arterial 82.6 mm Hg      Comment: 86 Value above critical limit        pO2, Arterial 64.8 mm Hg      Comment: 84 Value below reference range        HCO3, Arterial 41.6 mmol/L      Comment: 83 Value above reference range        Base Excess, Arterial 13.1 mmol/L      Comment: 83 Value above reference  range        O2 Saturation, Arterial 91.7 %      Comment: 84 Value below reference range        Hemoglobin, Blood Gas 8.7 g/dL      Temperature 37.0 C      Barometric Pressure for Blood Gas 740 mmHg      Modality BiPap     Flow Rate 2.0 lpm      Ventilator Mode NA     IPAP 20     Comment: Meter: [id number]     :  [id number]        EPAP 10     Notified Who RB AND V TO JAIRON RN     Notified By [id number]     Notified Time 08/10/2020 06:05     pCO2, Temperature Corrected 82.6 mm Hg      pH, Temp Corrected 7.310 pH Units      pO2, Temperature Corrected 64.8 mm Hg     Occult Blood X 1, Stool - Stool, Per Rectum [[id number]]  (Normal) Collected:  08/09/20 0953    Specimen:  Stool from Per Rectum Updated:  08/09/20 1055     Fecal Occult Blood Negative    Iron Profile [[id number]]  (Abnormal) Collected:  08/09/20 0430    Specimen:  Blood from Arm, Left Updated:  08/09/20 1051     Iron 17 mcg/dL      Iron Saturation 11 %      UIBC 139 mcg/dL      TIBC 156 mcg/dL     Retic With IRF & RET-He [[id number]]  (Abnormal) Collected:  08/09/20 0430    Specimen:  Blood from Arm, Left Updated:  08/09/20 0926     Immature Reticulocyte Fraction 22.3 %      Reticulocyte % 2.38 %      Reticulocyte Hgb 26.6 pg     Troponin [[id number]]  (Abnormal) Collected:  08/08/20 0540    Specimen:  Blood Updated:  08/08/20 0630     Troponin T 0.040 ng/mL     Narrative:       Troponin T Reference Range:  <= 0.03 ng/mL-   Negative for AMI  >0.03 ng/mL-     Abnormal for myocardial necrosis.  Clinicians would have to utilize clinical acumen, EKG, Troponin and serial changes to determine if it is an Acute Myocardial Infarction or myocardial injury due to an underlying chronic condition.       Results may be falsely decreased if patient taking Biotin.      Procalcitonin [[id number]]  (Normal) Collected:  08/08/20 0540    Specimen:  Blood Updated:  08/08/20 0622     Procalcitonin 0.08 ng/mL     Narrative:       Results may be falsely decreased if patient  taking Biotin.     BNP [[id number]]  (Abnormal) Collected:  08/08/20 0540    Specimen:  Blood Updated:  08/08/20 0622     proBNP 1,880.0 pg/mL     Narrative:       Among patients with dyspnea, NT-proBNP is highly sensitive for the detection of acute congestive heart failure. In addition NT-proBNP of <300 pg/ml effectively rules out acute congestive heart failure with 99% negative predictive value.    Results may be falsely decreased if patient taking Biotin.      Procalcitonin [[id number]]  (Normal) Collected:  08/07/20 1529    Specimen:  Blood Updated:  08/07/20 1938     Procalcitonin 0.10 ng/mL     Narrative:       Results may be falsely decreased if patient taking Biotin.     BNP [[id number]]  (Abnormal) Collected:  08/07/20 1529    Specimen:  Blood Updated:  08/07/20 1608     proBNP 1,338.0 pg/mL     Narrative:       Among patients with dyspnea, NT-proBNP is highly sensitive for the detection of acute congestive heart failure. In addition NT-proBNP of <300 pg/ml effectively rules out acute congestive heart failure with 99% negative predictive value.    Results may be falsely decreased if patient taking Biotin.      Comprehensive Metabolic Panel [[id number]]  (Abnormal) Collected:  08/07/20 1529    Specimen:  Blood Updated:  08/07/20 1603     Glucose 29 mg/dL      BUN 57 mg/dL      Creatinine 2.65 mg/dL      Sodium 141 mmol/L      Potassium 4.8 mmol/L      Chloride 95 mmol/L      CO2 35.9 mmol/L      Calcium 8.5 mg/dL      Total Protein 6.8 g/dL      Albumin 3.30 g/dL      ALT (SGPT) 24 U/L      AST (SGOT) 15 U/L      Alkaline Phosphatase 84 U/L      Total Bilirubin 0.4 mg/dL      eGFR Non African Amer 18 mL/min/1.73      Globulin 3.5 gm/dL      A/G Ratio 0.9 g/dL      BUN/Creatinine Ratio 21.5     Anion Gap 10.1 mmol/L     Narrative:       GFR Normal >60  Chronic Kidney Disease <60  Kidney Failure <15      Troponin [[id number]]  (Abnormal) Collected:  08/07/20 1529    Specimen:  Blood Updated:  08/07/20 1602      Troponin T 0.047 ng/mL     Narrative:       Troponin T Reference Range:  <= 0.03 ng/mL-   Negative for AMI  >0.03 ng/mL-     Abnormal for myocardial necrosis.  Clinicians would have to utilize clinical acumen, EKG, Troponin and serial changes to determine if it is an Acute Myocardial Infarction or myocardial injury due to an underlying chronic condition.       Results may be falsely decreased if patient taking Biotin.          Imaging Results (Most Recent)     Procedure Component Value Units Date/Time    XR Chest 1 View [[id number]] Collected:  08/09/20 0835     Updated:  08/09/20 0837    Narrative:       CR Chest 1 Vw    INDICATION:   Dyspnea with acute on chronic respiratory failure. History of COPD and CHF.     COMPARISON:    8/7/2020    FINDINGS:  Single portable AP view(s) of the chest.    No visible support lines.    Continued cardiomegaly with diffuse pulmonary vascular congestion and prominent interstitial markings may reflect CHF superimposed on chronic interstitial lung disease. No sizable effusions. No dense consolidation. No pneumothorax.       Impression:       No significant interval change. Findings suggest CHF possibly superimposed on background diffuse lung disease.    Signer Name: CEM Whiteside MD   Signed: 8/9/2020 8:35 AM   Workstation Name: [id number]    [facility] Renal Bilateral [[id number]] Collected:  08/07/20 1945     Updated:  08/07/20 1947    Narrative:        Renal Comp    INDICATION:   Acute renal failure    COMPARISON:   May 5, 2017    FINDINGS:   KIDNEYS:  The right kidney measures 11.2 cm. The left kidney measures 11.3 cm. Renal cortical thickness and echogenicity is normal. Possible shadowing stone in the mid right collecting system. No hydronephrosis. Mild cortical thinning.    Cholelithiasis.    URINARY BLADDER:  The bladder is empty.      Impression:       Renal sizes as described. Possible shadowing stone in the mid right collecting. No  hydronephrosis.    Signer Name: Geovanny Currie MD   Signed: 8/7/2020 7:45 PM   Workstation Name: [id number]    [facility] [place]    XR Chest 2 View [[id number]] Collected:  08/07/20 1535     Updated:  08/07/20 1546    Narrative:       PA AND LATERAL CHEST 08/07/2020         HISTORY:  65-year-old female with worsening shortness of air and decreasing oxygen  saturation for the last 2-3 days. COPD.     COMPARISON:  Chest x-ray 07/20/2020     TECHNIQUE:  PA and lateral views of the chest.     FINDINGS:  Small bilateral pleural effusions with bibasilar atelectasis. Upper  lungs are clear. No pneumothorax. Mild cardiomegaly is stable. Mild  central vascular prominence is stable. Mediastinum is within normal  limits.       Impression:       Cardiomegaly with mild central vascular prominence and small bilateral  pleural effusions with bibasilar atelectasis. Correlate for symptoms of  congestive failure.     This report was finalized on 8/7/2020 3:38 PM by Dr. Jewel Currie MD.             PROCEDURES      Condition on Discharge:  Stable    Physical Exam at Discharge  Vital Signs  Temp:  [97.3 °F (36.3 °C)-97.9 °F (36.6 °C)] 97.9 °F (36.6 °C)  Heart Rate:  [71-92] 80  Resp:  [18-22] 20  BP: (101-133)/(50-82) 133/82    Body mass index is 40.25 kg/m².      Physical Exam   Constitutional:   Patient is obese.  She is not happy about not going home with family.  See notes from discharge planners.   Cardiovascular: Normal rate and regular rhythm.   Pulmonary/Chest: Effort normal and breath sounds normal.   Abdominal: Soft. Bowel sounds are normal.   Skin: Skin is warm and dry.   Nursing note and vitals reviewed.        Discharge Disposition  [facility]    Visiting Nurse:    No     Home PT/OT:  No     Home Safety Evaluation:  No     DME  None at this time/    Discharge Diet:          Activity at Discharge:  As tolerated / she is inhibited by her hypoxia and desaturation   Wears oxygen at 5 L  NC    Pre-discharge education  Medication Review      Follow-up Appointments  No future appointments.  Additional Instructions for the Follow-ups that You Need to Schedule     Discharge Follow-up with PCP   As directed       Currently Documented PCP:    Marilu Suresh APRN    PCP Phone Number:    [phone]     Follow Up Details:  Make appointment with PCP after  patient is discharged from your facility.               Test Results Pending at Discharge       Elis Judd DO  08/15/20  08:09    Time: 30 min (if over 30 minutes give explanation as to why it took greater than 30 minutes)

## 2020-08-14 NOTE — NURSING NOTE
[facility] [phone].  Call transferred to nursing administrator.  Msg left on phone requesting CB to give report.

## 2020-08-14 NOTE — NURSING NOTE
"Continued Stay Note   Reyna Meyer     Patient Name: Jared oGvea  MRN: [id number]  Today's Date: 8/14/2020    Admit Date: 8/7/2020    Discharge Plan     Row Name 08/14/20 1350       Plan    Plan Comments  Received call from sister Sania and she has spoken with brother Alin and daughter Carmelita and they are all in agreement that they can no longer care for Jared and will need for her to be admitted to [facility].  Spoke with Jared and she states \"They will take all of my money.\"  I informed her that she will go as a Medicaid pending patient  for 30 days.  She states \"Then I will work hard for 30 days to get better so that I can go home.\"  Called Karen at [facility] and they can accept the patient today.  Will continue to follow    Row Name 08/14/20 1256       Plan    Plan Comments  Spoke with Mrs Govea after calling sister Sania.  I was unable to reach Sania or leave a voice mail.  Jared states \"I am going home to my sister's house.  I have $80,000 in an ANAT for my grandchildren and 2 life insurance policies that Sania is the beneficiary for and I am not giving all of that to [facility].\"  Spoke with the sister Sania and she says she has not heard Carmelita her daughter.  I said she is being discharged today.  She said she will call Jared and tell her she can't come home.  Will continue to follow        Discharge Codes    No documentation.             Saige Goyal RN    "

## 2020-08-15 NOTE — NURSING NOTE
Case Management Discharge Note      Final Note: Discharged to [facility].    Provided Post Acute Provider List?: Refused  Refused Provider List Comment: I don't need that    Destination - Selection Complete      Service Provider Request Status Selected Services Address Phone Number Fax Number    [facility] Selected Intermediate Care [address] [address] [phone]      Durable Medical Equipment      No service has been selected for the patient.      Dialysis/Infusion      No service has been selected for the patient.      Home Medical Care - Selection Complete      Service Provider Request Status Selected Services Address Phone Number Fax Number    [facility] Selected Home Health Services [address] [address] [phone]      Therapy      No service has been selected for the patient.      Community Resources      No service has been selected for the patient.             Final Discharge Disposition Code: 03 - skilled nursing facility (SNF)

## 2020-08-15 NOTE — THERAPY DISCHARGE NOTE
Acute Care - Physical Therapy Discharge Summary   Reyna Meyer       Patient Name: Jared Govea  : 1955  MRN: [id number]    Today's Date: 8/15/2020  Onset of Illness/Injury or Date of Surgery: 20    Date of Referral to PT: 20  Referring Physician: Dr. Peck      Admit Date: 2020      PT Recommendation and Plan    Visit Dx:    ICD-10-CM ICD-9-CM   1. Acute on chronic respiratory failure with hypoxia and hypercapnia (CMS/[facility], located within [facility]) J96.21 518.84    J96.22 786.09     799.02   2. Acute on chronic diastolic congestive heart failure (CMS/[facility] located [facility]) I50.33 428.33     428.0   3. Hypoglycemia due to type 2 diabetes mellitus (CMS/[facility]) E11.649 250.80   4. Acute kidney injury (CMS/[facility]) N17.9 584.9   5. Chronic midline low back pain without sciatica M54.5 724.2    G89.29 338.29       Outcome Measures     Row Name 20 1043 20 1100          How much help from another person do you currently need...    Turning from your back to your side while in flat bed without using bedrails?  3  -  3  -AS     Moving from lying on back to sitting on the side of a flat bed without bedrails?  3  -  3  -AS     Moving to and from a bed to a chair (including a wheelchair)?  3  -  3  -AS     Standing up from a chair using your arms (e.g., wheelchair, bedside chair)?  3  -  3  -AS     Climbing 3-5 steps with a railing?  2  -  2  -AS     To walk in hospital room?  2  -  2  -AS     AM-PAC 6 Clicks Score (PT)  16  -  16  -AS        Functional Assessment    Outcome Measure Options  AM-PAC 6 Clicks Basic Mobility (PT)  -  AM-PAC 6 Clicks Basic Mobility (PT)  -AS       User Key  (r) = Recorded By, (t) = Taken By, (c) = Cosigned By    Initials Name Provider Type    Shelia Saul, PT Physical Therapist    AS Konstantin Izquierdo, PT Physical Therapist              Rehab Goal Summary     Row Name 08/15/20 0900             Physical Therapy Goals    Transfer Goal Selection (PT)  transfer, PT goal 1  -LN      Gait  Training Goal Selection (PT)  gait training, PT goal 1  -LN         Transfer Goal 1 (PT)    Activity/Assistive Device (Transfer Goal 1, PT)  sit-to-stand/stand-to-sit;walker, rolling  -LN      [place] Level/Cues Needed (Transfer Goal 1, PT)  supervision required  -LN      Time Frame (Transfer Goal 1, PT)  short term goal (STG);3 days  -LN      Progress/Outcome (Transfer Goal 1, PT)  goal partially met;goal met SBA  -LN         Gait Training Goal 1 (PT)    Activity/Assistive Device (Gait Training Goal 1, PT)  gait (walking locomotion);assistive device use;walker, rolling  -LN      [place] Level (Gait Training Goal 1, PT)  standby assist;1 person to manage equipment  -LN      Distance (Gait Goal 1, PT)  10  -LN      Time Frame (Gait Training Goal 1, PT)  5 days  -LN      Progress/Outcome (Gait Training Goal 1, PT)  goal met  -LN        User Key  (r) = Recorded By, (t) = Taken By, (c) = Cosigned By    Initials Name Provider Type Discipline    Gricelda Barahona, PT Physical Therapist PT              PT Discharge Summary  Anticipated Discharge Disposition (PT): extended care facility([facility])  Reason for Discharge: All goals achieved  Outcomes Achieved: Able to achieve all goals within established timeline, Patient able to partially acheive established goals  Discharge Destination: Extended care facility - LTC      Gricelda Schmidt, PT   8/15/2020

## 2020-08-24 RX ORDER — METOPROLOL TARTRATE 100 MG/1
TABLET ORAL
Qty: 60 TABLET | Refills: 0 | OUTPATIENT
Start: 2020-08-24

## 2020-08-25 ENCOUNTER — OFFICE VISIT (OUTPATIENT)
Dept: INTERNAL MEDICINE | Facility: CLINIC | Age: 65
End: 2020-08-25

## 2020-08-25 VITALS — SYSTOLIC BLOOD PRESSURE: 109 MMHG | DIASTOLIC BLOOD PRESSURE: 51 MMHG | OXYGEN SATURATION: 90 % | HEART RATE: 76 BPM

## 2020-08-25 DIAGNOSIS — I10 ESSENTIAL HYPERTENSION: ICD-10-CM

## 2020-08-25 DIAGNOSIS — E78.2 MIXED HYPERLIPIDEMIA: ICD-10-CM

## 2020-08-25 DIAGNOSIS — I50.32 CHRONIC DIASTOLIC CONGESTIVE HEART FAILURE (HCC): ICD-10-CM

## 2020-08-25 DIAGNOSIS — F41.1 GAD (GENERALIZED ANXIETY DISORDER): ICD-10-CM

## 2020-08-25 DIAGNOSIS — IMO0002 UNCONTROLLED TYPE 2 DIABETES MELLITUS WITH CHRONIC KIDNEY DISEASE, WITH LONG-TERM CURRENT USE OF INSULIN: ICD-10-CM

## 2020-08-25 DIAGNOSIS — J44.9 CHRONIC OBSTRUCTIVE PULMONARY DISEASE, UNSPECIFIED COPD TYPE (HCC): ICD-10-CM

## 2020-08-25 DIAGNOSIS — J96.12 CHRONIC RESPIRATORY FAILURE WITH HYPOXIA AND HYPERCAPNIA (HCC): Primary | ICD-10-CM

## 2020-08-25 DIAGNOSIS — F33.2 SEVERE EPISODE OF RECURRENT MAJOR DEPRESSIVE DISORDER, WITHOUT PSYCHOTIC FEATURES (HCC): ICD-10-CM

## 2020-08-25 DIAGNOSIS — J96.11 CHRONIC RESPIRATORY FAILURE WITH HYPOXIA AND HYPERCAPNIA (HCC): Primary | ICD-10-CM

## 2020-08-25 DIAGNOSIS — M25.531 PAIN AND SWELLING OF RIGHT WRIST: ICD-10-CM

## 2020-08-25 DIAGNOSIS — M25.431 PAIN AND SWELLING OF RIGHT WRIST: ICD-10-CM

## 2020-08-25 DIAGNOSIS — F51.01 PRIMARY INSOMNIA: ICD-10-CM

## 2020-08-25 PROCEDURE — 99215 OFFICE O/P EST HI 40 MIN: CPT | Performed by: NURSE PRACTITIONER

## 2020-08-25 NOTE — PROGRESS NOTES
Jared Govea is a 65 y.o. female presenting today for   Chief Complaint   Patient presents with   • Follow-up   • Hypertension   • Hyperlipidemia   • COPD   • Diabetes     This visit was conducted via Video Telehealth.  Unable to complete visit using a video connection to the patient. A phone visit was used to complete this visits. Total time of discussion was 45 minutes.  You have chosen to receive care through a telehealth visit.  Do you consent to use a video/audio connection for your medical care today? Yes    [facility] Nurse w/ [facility] present throughout discussion.      Subjective    History of Present Illness     I have had one prior meeting with this patient on 12/31/2020. Since that time my office have made numerous attempts to schedule her to return to the office for labs and f/u. She has refused.    She has had two recent hospital admissions.  07/20/2020-08/01/2020, she was admitted to [facility] for Acute on Chronic resp failure and cellulitis    08/07/2020-08/15/2020, she was admitted to [facility] again for Acute on Chronic resp failure as well as volume overload.    Upon d/c, she was unable to return to her sister's home where she had been living for a number of years. She was transferred to \[facility] in [place]. She has been seen by Justen since 08/22/2020.    Ms. Govea is a poor historian.    She has COPD and chronic resp failure. Supposedly she is f/b Dr. Hoyt. I do not have any notes that she has been seen by him in the last several months. She wears a Triology device at 6L. She has albuterol to use PRN. She uses a Trelegy Ellipta.    She has chronic CHF, HTN, HLD. Evidently she does not follow w/ a cardiologist. She takes a daily ASA, bumex, hydralazine and metoprolol.    She has DM. She takes novolog and levemir.     She has a history of SERENA and MDD. She sts that in the past she had taken Cymbalta. She sts that many years ago this had been changed to  Seroquel. She does not know why it was changed. Prior to her most recent admission, she had taken 100mg BID. Apparently this was decreased although I cannot find documentation r/t this. She sts she has had 4 panic attacks since being d/c'ed to assisted living. She sts she is not sleeping well. She requests that her Seroquel be returned to the previous dosing.     Home Health nurse reports that upon her assessment today, the patient's R arm at the wrist is swollen, red, warm, and tender to touch. The patient is afebrile. She cannot recall the exact onset of these S&S. It may have been yesterday but may have been 2-3 days ago. The patient does not recall any injury. However, HH RN sts there is a small bruise at the 5th digit. There was no recent IV access in this area.        The following portions of the patient's history were reviewed and updated as appropriate: allergies, current medications, problem list, past medical history, past surgical history, family history, and social history.    Review of Systems   Constitutional: Negative.  Negative for chills and fever.   HENT: Negative.    Eyes: Negative.    Respiratory: Positive for shortness of breath.    Cardiovascular: Negative.    Gastrointestinal: Negative.    Endocrine: Negative.    Genitourinary: Negative.    Musculoskeletal: Positive for arthralgias.   Skin: Positive for color change.   Neurological: Negative.    Hematological: Negative.    Psychiatric/Behavioral: Positive for sleep disturbance. The patient is nervous/anxious.          Objective    Vitals:    08/25/20 1415   BP: 109/51   Pulse: 76   SpO2: 90%         Physical Exam   Constitutional: No distress.   Pulmonary/Chest: Effort normal. No respiratory distress.   Neurological: She is alert. She is not disoriented.   Psychiatric: She has a normal mood and affect. Her speech is normal. She is attentive.         Assessment and Plan    Jared was seen today for follow-up, hypertension, hyperlipidemia, copd  and diabetes.    Diagnoses and all orders for this visit:    Chronic respiratory failure with hypoxia and hypercapnia (CMS/HCC)    Chronic obstructive pulmonary disease, unspecified COPD type (CMS/[facility])    Chronic diastolic congestive heart failure (CMS/[facility])    Essential hypertension    Mixed hyperlipidemia    Uncontrolled type 2 diabetes mellitus with chronic kidney disease, with long-term current use of insulin (CMS/[facility])    SERENA (generalized anxiety disorder)    Severe episode of recurrent major depressive disorder, without psychotic features (CMS/[facility])    Primary insomnia    Pain and swelling of right wrist        Again, Mrs. Govea refuses to come into the office for appropriate care. I discussed with her at length that given her chronic medical conditions and recent exacerbations, her care cannot appropriately be managed virtually.  At this time I have recommended that her care be assumed by the staff providers at the [facility], [facility], or [facility]. She requires a provider who can evaluate her in her facility and manage her care from her facility. [facility] sts that she will work with the [facility]  regarding this transfer of care.  Ms. Govea's current medications are being prescribed by a Dr. Teddy orellana/ the [facility] and I will defer to him in terms of adjusting her Seroquel dosage.  VO given to [facility] for CBC, CMP, ESR, CRP, and uric acid level. VO also given for US of the R UE.      The plan of care was discussed. All questions were answered. Patient verbalized understanding.

## 2020-09-02 ENCOUNTER — TELEPHONE (OUTPATIENT)
Dept: INTERNAL MEDICINE | Facility: CLINIC | Age: 65
End: 2020-09-02

## 2020-09-02 NOTE — TELEPHONE ENCOUNTER
Pharmacists calls back to see if any information available from message left earlier today.  If no response available until PCP returns tomorrow, please advise pharmacy.  He is concerned she will not have any meds today.

## 2020-09-03 ENCOUNTER — TELEPHONE (OUTPATIENT)
Dept: INTERNAL MEDICINE | Facility: CLINIC | Age: 65
End: 2020-09-03

## 2020-09-03 NOTE — TELEPHONE ENCOUNTER
with [facility] phones to check status of previously scheduled appt for pt.  Ms. Nicolas shares that patient does not want to leave the facility and come to office for appointment.  Ms. Nicolas knows the PCP wants/needs to see pt in office. Pt has not been in office since December 2019.

## 2020-09-03 NOTE — TELEPHONE ENCOUNTER
[facility] Please inform Virginia/ or caretaker  that this result(s) is/are showing 2 cyst on the left ovary.  Please advise her to repeat an ultrasound in 6 weeks to make sure the cysts resolved.  Please help her set up that exam.  Thanks. MARIETTA Mishra MD   Silvia, this is the call we discussed in the office. I cannot manage this patient's care via phone. She is non-compliant. I have only seen her in the office once. We have tried to get her into the office repeatedly and she has refused. I cannot manage her care if I cannot properly evaluate her. If it is no longer possible for her to come into the office then her care needs to be transferred to a provider who can see her in her Assisted Living facility.

## 2020-09-03 NOTE — TELEPHONE ENCOUNTER
Due to Covid, [facility] can not let people out of facility unless its an emergency. They are requesting a tele-health visit for Ms. Govea in order to get her meds straightened out.

## 2020-09-08 NOTE — TELEPHONE ENCOUNTER
Spoke with Radha again today she is looking into getting MD2U for Jared.  She agrees that she does need to be seen due to her medical hx and recent hospital stays.  Radha will get back with me tomorrow . She is aware we would love to help the patient but she needs to be compliant and be seen .

## 2020-09-14 RX ORDER — AMLODIPINE BESYLATE 2.5 MG/1
TABLET ORAL
Qty: 90 TABLET | Refills: 0 | OUTPATIENT
Start: 2020-09-14

## 2020-09-18 ENCOUNTER — TELEPHONE (OUTPATIENT)
Dept: INTERNAL MEDICINE | Facility: CLINIC | Age: 65
End: 2020-09-18

## 2020-09-18 NOTE — TELEPHONE ENCOUNTER
JAKUB   Tried to reach patient since I was told from [facility] [phone] she was in the hospital at [facility] and they didn't have a record of the patient. I spoke with Sania Goins pt sister because that is the number that is listed in her chart.  She states patient is in [facility] so I have requested records.  It seems like as soon as she gets out she goes back in the ER. That is why she missed her appt her sister said she was sorry she was supposed to call and cancel .

## 2020-09-21 RX ORDER — QUETIAPINE FUMARATE 100 MG/1
TABLET, FILM COATED ORAL
Qty: 180 TABLET | Refills: 0 | OUTPATIENT
Start: 2020-09-21

## 2020-10-05 DIAGNOSIS — E03.9 HYPOTHYROIDISM, ACQUIRED: ICD-10-CM

## 2020-10-05 RX ORDER — LEVOTHYROXINE SODIUM 175 UG/1
TABLET ORAL
Qty: 90 TABLET | Refills: 0 | OUTPATIENT
Start: 2020-10-05

## 2021-03-22 ENCOUNTER — BULK ORDERING (OUTPATIENT)
Dept: CASE MANAGEMENT | Facility: OTHER | Age: 66
End: 2021-03-22

## 2021-03-22 DIAGNOSIS — Z23 IMMUNIZATION DUE: ICD-10-CM

## 2021-10-07 NOTE — CONSULTS
Date of Hospital Visit: [unfilled]ENC@  Encounter Provider: Trinidad Barreto MD  Place of Service: [facility] CARDIOLOGY  Patient Name: Jared Govea  :1955  Referral Provider: No ref. provider found    Chief complaint    Weak and dyspnea    History of Present Illness    This is a 65-year-old female with a history of diabetes mellitus type 2, GERD, immobilization, CKD, anxiety/depression hypertension, hyperlipidemia, hypothyroidism, obstructive sleep apnea, COPD on home oxygen (3 L nasal cannula) with chronic hypoxic/hypercapnic respiratory failure on trilogy at home, left buttock pressure ulcer, chronic lymphedema morbid obesity.    She has been admitted multiple times but was discharged on 2020 with home health to her sisters.  She uses a scooter to ambulate.  At the last admission, they were trying to get her into a nursing care facility with Medicaid pending but she did not want to go because she did not believe her family or give up her money.  She was brought in this time via EMS because home health found her oxygen saturations to be 62% even though she was using 4 and half liters on her trilogy machine.  When she arrived to the emergency department, she was very weak and was complaining of leg pain as well as needing her legs to be wrapped.  On arrival, she had respiratory acidosis.  Her proBNP was 1338, glucose 29, troponin 0 0.047 (mostly mildly chronically elevated), creatinine 2.65, potassium 4.8, hemoglobin 8.2, otherwise unremarkable CBC.  Her heart rate was in the 60s, blood pressure 120s over 70s, respiratory was 20.  Her chest x-ray showed cardiomegaly with mild central vascular prominence and small bilateral pleural effusions.  Her EKG showed sinus rhythm, no acute ST changes.  She was placed on BiPAP.  She was placed on IV Bumex we have been asked to see her for heart failure.    Her last echocardiogram done  showed ejection fraction 65% with mild to  moderate concentric left ventricle hypertrophy and grade 1A diastolic dysfunction.    She still is very lethargic and sleepy.  She denies chest pain or nausea.  She does still feel short winded.  She does not feel her heart racing or skipping.  She remains on BiPAP at this time.    Past Medical History:   Diagnosis Date   • Anxiety    • Arthritis    • Cellulitis    • CHF (congestive heart failure) (CMS/[facility])    • Chronic back pain    • COPD (chronic obstructive pulmonary disease) (CMS/[facility])    • Depression    • Essential hypertension    • SERENA (generalized anxiety disorder)    • GERD (gastroesophageal reflux disease)    • Headache    • HLD (hyperlipidemia)    • Hypothyroidism, acquired    • Low back pain    • Neuropathy in diabetes (CMS/[facility])    • Rectal abscess    • Sleep apnea    • Type 2 diabetes mellitus (CMS/[facility])    • Wound, open, toe 8/22/2017       Past Surgical History:   Procedure Laterality Date   • DILATATION AND CURETTAGE      x5   • LUNG SURGERY Right    • TRACHEOSTOMY         Medications Prior to Admission   Medication Sig Dispense Refill Last Dose   • amLODIPine (NORVASC) 10 MG tablet Take 1 tablet by mouth Daily. 30 tablet 0 8/7/2020 at Unknown time   • aspirin 81 MG EC tablet Take 81 mg by mouth daily.   8/7/2020 at Unknown time   • atorvastatin (LIPITOR) 40 MG tablet TAKE ONE TABLET BY MOUTH DAILY FOR CHOLESTEROL 30 tablet 0 8/6/2020 at Unknown time   • B Complex Vitamins (VITAMIN B COMPLEX PO) Take 1 tablet by mouth daily.   8/7/2020 at Unknown time   • bumetanide (BUMEX) 1 MG tablet TAKE TWO TABLETS BY MOUTH TWICE A  tablet 0 8/7/2020 at Unknown time   • cetirizine (ZyrTEC) 10 MG tablet Take 10 mg by mouth every night.   8/6/2020 at Unknown time   • famotidine (PEPCID) 20 MG tablet Take 20 mg by mouth daily.   8/7/2020 at Unknown time   • guaiFENesin (MUCINEX) 600 MG 12 hr tablet Take 1,200 mg by mouth 2 (Two) Times a Day.   8/7/2020 at Unknown time   • hydrocortisone 1 % ointment Apply   topically to the appropriate area as directed 2 (Two) Times a Day. 56 g 0 8/7/2020 at Unknown time   • Insulin Degludec (Tresiba FlexTouch) 200 UNIT/ML solution pen-injector pen injection Inject 74 Units under the skin into the appropriate area as directed Daily. 12 pen 1 8/6/2020 at Unknown time   • levothyroxine (SYNTHROID, LEVOTHROID) 175 MCG tablet TAKE ONE TABLET BY MOUTH DAILY 90 tablet 1 8/7/2020 at Unknown time   • metoprolol tartrate (LOPRESSOR) 100 MG tablet TAKE ONE TABLET BY MOUTH EVERY 12 HOURS 60 tablet 0 8/7/2020 at Unknown time   • O2 (OXYGEN) Inhale 5 L/min 1 (One) Time.   8/7/2020 at Unknown time   • Probiotic Product (ALIGN) 4 MG capsule Take 4 mg by mouth Daily. 30 capsule 6 8/7/2020 at Unknown time   • sennosides-docusate sodium (SENOKOT-S) 8.6-50 MG tablet Take 2 tablets by mouth At Night As Needed for Constipation.   8/6/2020 at Unknown time   • VITAMIN D PO Take  by mouth.   8/7/2020 at Unknown time   • acetaminophen (TYLENOL) 325 MG tablet Take 2 tablets by mouth Every 4 (Four) Hours As Needed for Mild Pain , Headache or Fever.   Unknown at Unknown time   • ALBUTEROL SULFATE  (90 Base) MCG/ACT inhaler INHALE TWO PUFFS BY MOUTH EVERY 4 HOURS AS NEEDED FOR WHEEZING 18 g 5 Unknown at Unknown time   • Blood Glucose Monitoring Suppl w/Device kit Use glucometer to check sugars twice a day. E11.65, E11.22. 1 each 0 Unknown at Unknown time   • dimenhyDRINATE (DRAMAMINE) 50 MG tablet Take 1 tablet by mouth Daily As Needed for movement disorders. 30 tablet 0 Unknown at Unknown time   • hydrALAZINE (APRESOLINE) 25 MG tablet Take 3 tablets by mouth Every 8 (Eight) Hours. (Patient taking differently: Take 75 mg by mouth Every 8 (Eight) Hours. Pt hasn't started taking yet) 90 tablet 0    • insulin aspart (NOVOLOG FLEXPEN) 100 UNIT/ML solution pen-injector sc pen INJECT SUB-Q PER SLIDING SCALE AS DIRECTED. MAX 21 UNITS PER DAY 15 mL 4 Unknown at Unknown time   • levalbuterol (XOPENEX) 0.63 MG/3ML  nebulizer solution Take 1 ampule by nebulization Every 4 (Four) Hours As Needed for Wheezing. 50 ampule 0 Unknown at Unknown time   • TRELEGY ELLIPTA 100-62.5-25 MCG/INH aerosol powder     Unknown at Unknown time       Current Meds  Scheduled Meds:  aspirin 81 mg Oral Daily   atorvastatin 40 mg Oral Daily   bumetanide 2 mg Intravenous Q6H   enoxaparin 30 mg Subcutaneous Q24H   insulin aspart 0-9 Units Subcutaneous TID AC   levalbuterol 0.63 mg Nebulization 4x Daily - RT   levothyroxine 175 mcg Oral Daily   metoprolol tartrate 50 mg Oral Q12H   QUEtiapine 100 mg Oral Q12H   sodium chloride 10 mL Intravenous Q12H   Fluticasone-Umeclidin-Vilant 1 puff Inhalation Daily - RT     Continuous Infusions:   PRN Meds:.•  acetaminophen **OR** acetaminophen  •  albuterol  •  dextrose  •  dextrose  •  glucagon (human recombinant)  •  ondansetron **OR** ondansetron  •  sennosides-docusate  •  sodium chloride  •  sodium chloride    Allergies as of 2020 - Reviewed 2020   Allergen Reaction Noted   • Erythromycin Nausea And Vomiting 10/27/2012   • Erythromycin ethylsuccinate Nausea And Vomiting 01/10/2016   • Ativan [lorazepam] Hallucinations 01/10/2016   • Tape Rash 2016       Social History     Socioeconomic History   • Marital status:      Spouse name: Not on file   • Number of children: Not on file   • Years of education: Not on file   • Highest education level: Not on file   Tobacco Use   • Smoking status: Former Smoker     Packs/day: 0.50     Years: 40.00     Pack years: 20.00     Types: Cigarettes     Last attempt to quit: 10/4/2010     Years since quittin.8   • Smokeless tobacco: Former User     Quit date: 8/3/2012   Substance and Sexual Activity   • Alcohol use: No   • Drug use: No   • Sexual activity: Defer       Family History   Problem Relation Age of Onset   • Cancer Mother         bone   • Cancer Father    • Cancer Brother        REVIEW OF SYSTEMS:   12 point ROS was performed and is negative  "except as outlined in HPI         Objective:   Temp:  [97.1 °F (36.2 °C)-98 °F (36.7 °C)] 97.1 °F (36.2 °C)  Heart Rate:  [53-68] 64  Resp:  [12-20] 13  BP: (103-140)/(54-77) 136/66  Body mass index is 41.29 kg/m².  Flowsheet Rows      First Filed Value   Admission Height  160 cm (63\") Documented at 08/07/2020 1425   Admission Weight  122 kg (268 lb) Documented at 08/07/2020 1425        Vitals:    08/08/20 0700   BP: 136/66   Pulse: 64   Resp:    Temp:    SpO2:        General Appearance:    Alert, cooperative, in no acute distress, on BiPAP   Head:    Normocephalic, without obvious abnormality, atraumatic   Eyes:            Lids and lashes normal, conjunctivae and sclerae normal, no   icterus, no pallor, corneas clear   Ears:    Ears appear intact with no abnormalities noted       Neck:   No adenopathy, supple, trachea midline, no thyromegaly, no   carotid bruit, no JVD   Back:     No kyphosis present, no scoliosis present, no skin lesions, erythema or scars, no tenderness to palpation, range of motion normal   Lungs:    Expiratory rales throughout,respirations regular, even and unlabored    Heart:    Regular rhythm and normal rate, normal S1 and S2, no murmur, no gallop, no rub, no click   Chest Wall:    No abnormalities observed   Abdomen:     Normal bowel sounds, no masses, no organomegaly, soft, nontender, nondistended, no guarding, no rebound  tenderness   Extremities:   Moves all extremities well, 2+ lower extremity edema, no cyanosis, has redness lower extremity   Pulses:   Pulses palpable and equal bilaterally. Normal radial, dorsalis pedis and posterior tibial pulses bilaterally.    Skin:  Psychiatric:   No bleeding, bruising or rash  Does awake to voice but is sleepy                 Lab Review:    Results from last 7 days   Lab Units 08/08/20  0540 08/07/20  1529   SODIUM mmol/L 138 141   POTASSIUM mmol/L 4.5 4.8   CHLORIDE mmol/L 94* 95*   CO2 mmol/L 30.9* 35.9*   BUN mg/dL 60* 57*   CREATININE mg/dL " 2.59* 2.65*   CALCIUM mg/dL 8.4* 8.5*   BILIRUBIN mg/dL  --  0.4   ALK PHOS U/L  --  84   ALT (SGPT) U/L  --  24   AST (SGOT) U/L  --  15   GLUCOSE mg/dL 149* 29*     Results from last 7 days   Lab Units 08/08/20  0540 08/07/20  1529   TROPONIN T ng/mL 0.040* 0.047*       Results from last 7 days   Lab Units 08/08/20  0540 08/07/20  1855 08/07/20  1529   WBC 10*3/mm3 5.49 7.80 8.31   HEMOGLOBIN g/dL 7.5* 8.3* 8.2*   HEMATOCRIT % 26.6* 30.0* 29.5*   PLATELETS 10*3/mm3 184 205 202                 I personally viewed and interpreted the patient's EKG/Telemetry data  )  Patient Active Problem List   Diagnosis   • SERENA (generalized anxiety disorder)   • Chronic back pain   • Chronic diastolic congestive heart failure (CMS/[facility])   • COPD (chronic obstructive pulmonary disease) (CMS/[facility])   • Depression   • Uncontrolled type 2 diabetes mellitus with chronic kidney disease, with long-term current use of insulin (CMS/[facility])   • Venous stasis dermatitis of both lower extremities   • HLD (hyperlipidemia)   • Essential hypertension   • Hypothyroidism, acquired   • Osteoporosis   • Primary insomnia   • LAD (lymphadenopathy), mediastinal   • Lung nodule, solitary   • Diabetic peripheral neuropathy (CMS/[facility])   • CKD (chronic kidney disease) stage 4, GFR 15-29 ml/min (CMS/[facility])   • Chronic pain syndrome   • Lumbar spondylolysis   • Normocytic anemia   • Pleural scarring   • Osteopetrosis   • Acute renal failure superimposed on stage 4 chronic kidney disease (CMS/[facility])   • Morbid obesity (CMS/[facility])   • Acute hypoxemic respiratory failure (CMS/[facility])   • Acute respiratory failure with hypercapnia (CMS/H[facility])   • Acute on chronic respiratory failure with hypoxia and hypercapnia (CMS/[facility])     Assessment and Plan:    1. Dyspnea and weakness, multifactorial due to lung disease, anemia, kidney disease and likely heart failure.  I do not have any recent echo.  2. Diabetes mellitus type 2 with very low blood glucose on admission  3. Known chronic  pulmonary disease on chronic oxygen and trilogy at home, CO2 retaining with hypoxia.  4. Chronic kidney disease with LADY.  It seems that her baseline creatinine might be around 1.8.  5. Anemia, significant.  6. Obesity and immobilization  7. Hypothyroidism    Continue IV diuresis, check echocardiogram on Monday.  Watch hemoglobin.  Will follow.    Trinidad Barreto MD  08/08/20  07:58.  Time spent in reviewing chart, discussion and examination:                   Medications

## 2021-11-19 NOTE — NURSING NOTE
Spoke with Dr Romero currently on med surg unit related to clarification of d/c orders:  Dr Romero said for pt to follow up with her primary care DR in two weeks, and that pt does not have to follow up with Dr Coello before she is discharged   7

## 2023-01-29 NOTE — CONSULTS
CONSULT NOTE    Patient Identification:  Jared Govea  65 y.o.  female  1955  [id number]            Requesting physician: Hospitalist     Reason for Consultation: Acute on chronic respiratory failure    CC:     History of Present Illness:  65-year-old female well-known to our service known history of chronic respiratory failure on trilogy underlying history of COPD diabetes ALTA/OHS.  Patient recently discharged from the hospital presented with worsening respiratory status increased shortness of breath.  At this time patient on home trilogy and not answering any questions.  Chart reviewed events noted.      Review of Systems  Unable to get with the patient's present condition  Past Medical History:  Past Medical History:   Diagnosis Date   • Anxiety    • Arthritis    • Cellulitis    • CHF (congestive heart failure) (CMS/[facility])    • Chronic back pain    • COPD (chronic obstructive pulmonary disease) (CMS/[facility])    • Depression    • Essential hypertension    • SERENA (generalized anxiety disorder)    • GERD (gastroesophageal reflux disease)    • Headache    • HLD (hyperlipidemia)    • Hypothyroidism, acquired    • Low back pain    • Neuropathy in diabetes (CMS/[facility])    • Rectal abscess    • Sleep apnea    • Type 2 diabetes mellitus (CMS/[facility])    • Wound, open, toe 8/22/2017       Past Surgical History:  Past Surgical History:   Procedure Laterality Date   • DILATATION AND CURETTAGE      x5   • LUNG SURGERY Right    • TRACHEOSTOMY          Home Meds:  Medications Prior to Admission   Medication Sig Dispense Refill Last Dose   • amLODIPine (NORVASC) 10 MG tablet Take 1 tablet by mouth Daily. 30 tablet 0 8/7/2020 at Unknown time   • aspirin 81 MG EC tablet Take 81 mg by mouth daily.   8/7/2020 at Unknown time   • atorvastatin (LIPITOR) 40 MG tablet TAKE ONE TABLET BY MOUTH DAILY FOR CHOLESTEROL 30 tablet 0 8/6/2020 at Unknown time   • B Complex Vitamins (VITAMIN B COMPLEX PO) Take 1 tablet by mouth daily.   8/7/2020 at Unknown  time   • bumetanide (BUMEX) 1 MG tablet TAKE TWO TABLETS BY MOUTH TWICE A  tablet 0 8/7/2020 at Unknown time   • cetirizine (ZyrTEC) 10 MG tablet Take 10 mg by mouth every night.   8/6/2020 at Unknown time   • famotidine (PEPCID) 20 MG tablet Take 20 mg by mouth daily.   8/7/2020 at Unknown time   • guaiFENesin (MUCINEX) 600 MG 12 hr tablet Take 1,200 mg by mouth 2 (Two) Times a Day.   8/7/2020 at Unknown time   • hydrocortisone 1 % ointment Apply  topically to the appropriate area as directed 2 (Two) Times a Day. 56 g 0 8/7/2020 at Unknown time   • Insulin Degludec (Tresiba FlexTouch) 200 UNIT/ML solution pen-injector pen injection Inject 74 Units under the skin into the appropriate area as directed Daily. 12 pen 1 8/6/2020 at Unknown time   • levothyroxine (SYNTHROID, LEVOTHROID) 175 MCG tablet TAKE ONE TABLET BY MOUTH DAILY 90 tablet 1 8/7/2020 at Unknown time   • metoprolol tartrate (LOPRESSOR) 100 MG tablet TAKE ONE TABLET BY MOUTH EVERY 12 HOURS 60 tablet 0 8/7/2020 at Unknown time   • O2 (OXYGEN) Inhale 5 L/min 1 (One) Time.   8/7/2020 at Unknown time   • Probiotic Product (ALIGN) 4 MG capsule Take 4 mg by mouth Daily. 30 capsule 6 8/7/2020 at Unknown time   • sennosides-docusate sodium (SENOKOT-S) 8.6-50 MG tablet Take 2 tablets by mouth At Night As Needed for Constipation.   8/6/2020 at Unknown time   • VITAMIN D PO Take  by mouth.   8/7/2020 at Unknown time   • acetaminophen (TYLENOL) 325 MG tablet Take 2 tablets by mouth Every 4 (Four) Hours As Needed for Mild Pain , Headache or Fever.   Unknown at Unknown time   • ALBUTEROL SULFATE  (90 Base) MCG/ACT inhaler INHALE TWO PUFFS BY MOUTH EVERY 4 HOURS AS NEEDED FOR WHEEZING 18 g 5 Unknown at Unknown time   • Blood Glucose Monitoring Suppl w/Device kit Use glucometer to check sugars twice a day. E11.65, E11.22. 1 each 0 Unknown at Unknown time   • dimenhyDRINATE (DRAMAMINE) 50 MG tablet Take 1 tablet by mouth Daily As Needed for movement  "disorders. 30 tablet 0 Unknown at Unknown time   • hydrALAZINE (APRESOLINE) 25 MG tablet Take 3 tablets by mouth Every 8 (Eight) Hours. (Patient taking differently: Take 75 mg by mouth Every 8 (Eight) Hours. Pt hasn't started taking yet) 90 tablet 0    • insulin aspart (NOVOLOG FLEXPEN) 100 UNIT/ML solution pen-injector sc pen INJECT SUB-Q PER SLIDING SCALE AS DIRECTED. MAX 21 UNITS PER DAY 15 mL 4 Unknown at Unknown time   • levalbuterol (XOPENEX) 0.63 MG/3ML nebulizer solution Take 1 ampule by nebulization Every 4 (Four) Hours As Needed for Wheezing. 50 ampule 0 Unknown at Unknown time   • TRELEGY ELLIPTA 100-62.5-25 MCG/INH aerosol powder     Unknown at Unknown time       Allergies:  Allergies   Allergen Reactions   • Erythromycin Nausea And Vomiting   • Erythromycin Ethylsuccinate Nausea And Vomiting   • Ativan [Lorazepam] Hallucinations   • Tape Rash     Paper tape         Social History:   Social History     Socioeconomic History   • Marital status:      Spouse name: Not on file   • Number of children: Not on file   • Years of education: Not on file   • Highest education level: Not on file   Tobacco Use   • Smoking status: Former Smoker     Packs/day: 0.50     Years: 40.00     Pack years: 20.00     Types: Cigarettes     Last attempt to quit: 10/4/2010     Years since quittin.8   • Smokeless tobacco: Former User     Quit date: 8/3/2012   Substance and Sexual Activity   • Alcohol use: No   • Drug use: No   • Sexual activity: Defer       Family History:  Family History   Problem Relation Age of Onset   • Cancer Mother         bone   • Cancer Father    • Cancer Brother        Physical Exam:  /68 (BP Location: Left arm, Patient Position: Lying)   Pulse 62   Temp 97.1 °F (36.2 °C) (Axillary)   Resp 20   Ht 167.6 cm (66\")   Wt 116 kg (255 lb 12.8 oz)   LMP  (LMP Unknown)   SpO2 94%   BMI 41.29 kg/m²  Body mass index is 41.29 kg/m². 94% 116 kg (255 lb 12.8 oz)  Physical Exam  Patient is " examined using the personal protective equipment as per guidelines from infection control for this particular patient as enacted.  Hand hygiene was performed before and after patient interaction.  Well-developed morbidly obese body habitus  Eyes normal conjunctivae and pupils reactive to light  ENT on home trilogy.  Neck midline trachea no thyromegaly  Chest diminished breath sounds bilaterally no wheezing  CVS regular rate and rhythm 3+ lower extremity edema  Abdomen soft nontender no hepatosplenomegaly  CNS intact normal sensory exam  Skin no rashes no nodules  Psych on BiPAP  Musculoskeletal no cyanosis no clubbing normal range of motion        LABS:  Lab Results   Component Value Date    CALCIUM 8.4 (L) 08/08/2020    PHOS 6.2 (H) 08/08/2020     Results from last 7 days   Lab Units 08/08/20  0540 08/07/20  1855 08/07/20  1529   SODIUM mmol/L 138  --  141   POTASSIUM mmol/L 4.5  --  4.8   CHLORIDE mmol/L 94*  --  95*   CO2 mmol/L 30.9*  --  35.9*   BUN mg/dL 60*  --  57*   CREATININE mg/dL 2.59*  --  2.65*   GLUCOSE mg/dL 149*  --  29*   CALCIUM mg/dL 8.4*  --  8.5*   WBC 10*3/mm3 5.49 7.80 8.31   HEMOGLOBIN g/dL 7.5* 8.3* 8.2*   PLATELETS 10*3/mm3 184 205 202   ALT (SGPT) U/L  --   --  24   AST (SGOT) U/L  --   --  15   PROBNP pg/mL 1,880.0*  --  1,338.0*   PROCALCITONIN ng/mL 0.08  --  0.10     Lab Results   Component Value Date    CKTOTAL 119 06/01/2014    TROPONINT 0.040 (C) 08/08/2020     Results from last 7 days   Lab Units 08/08/20  0540 08/07/20  1529   TROPONIN T ng/mL 0.040* 0.047*         Results from last 7 days   Lab Units 08/08/20  0540 08/07/20  1529   PROCALCITONIN ng/mL 0.08 0.10     Results from last 7 days   Lab Units 08/08/20  0950 08/07/20  2120 08/07/20  1551   PH, ARTERIAL pH units 7.307* 7.334* 7.324*   PCO2, ARTERIAL mm Hg 79.4* 73.9* 75.5*   PO2 ART mm Hg 54.8* 55.0* 64.6*   O2 SATURATION ART % 86.1* 86.9* 90.9*   FLOW RATE lpm 5.0 4.0 4.0   MODALITY  Nasal Cannula BiPap Nasal Cannula                  Lab Results   Component Value Date    TSH 1.750 07/20/2020     Estimated Creatinine Clearance: 28 mL/min (A) (by C-G formula based on SCr of 2.59 mg/dL (H)).         Imaging: I personally visualized the images of scans/x-rays performed within last 3 days.      Assessment:  Acute on chronic hypoxemic hypercapnic respiratory failure  Chronic hypoxemic respiratory failure  Acute on chronic diastolic CHF  Acute kidney injury  Chronic venous stasis/lymphedema  Hypertension  Diabetes mellitus  Morbid obesity  ALTA/OHS  COPD      Recommendations:  Suspect decline respite status multifactorial lung disease but mostly heart failure contributing.  Agree with diuresis  Continue trilogy.  ABGs noted with respiratory acidosis.  I will go ahead and transition her to hospital BiPAP for now.  Monitor ABGs closely  Monitor creatinine closely with diuresis  Continue bronchodilators.  Monitor clinical status closely.    Critical care time 35 minutes            Geovanni Gilmore MD  8/8/2020  10:41      Much of this encounter note is an electronic transcription/translation of spoken language to printed text using Dragon Software.   Universal Safety Interventions